# Patient Record
Sex: MALE | Race: WHITE | NOT HISPANIC OR LATINO | Employment: FULL TIME | URBAN - METROPOLITAN AREA
[De-identification: names, ages, dates, MRNs, and addresses within clinical notes are randomized per-mention and may not be internally consistent; named-entity substitution may affect disease eponyms.]

---

## 2017-02-08 ENCOUNTER — ALLSCRIPTS OFFICE VISIT (OUTPATIENT)
Dept: OTHER | Facility: OTHER | Age: 62
End: 2017-02-08

## 2017-02-16 ENCOUNTER — TRANSCRIBE ORDERS (OUTPATIENT)
Dept: ADMINISTRATIVE | Facility: HOSPITAL | Age: 62
End: 2017-02-16

## 2017-02-16 ENCOUNTER — HOSPITAL ENCOUNTER (OUTPATIENT)
Dept: RADIOLOGY | Facility: MEDICAL CENTER | Age: 62
Discharge: HOME | End: 2017-02-16
Payer: COMMERCIAL

## 2017-02-16 DIAGNOSIS — S29.012A UPPER BACK STRAIN, INITIAL ENCOUNTER: ICD-10-CM

## 2017-02-16 DIAGNOSIS — M53.0 CERVICOCRANIAL SYNDROME: ICD-10-CM

## 2017-02-16 DIAGNOSIS — M54.2 CERVICALGIA: ICD-10-CM

## 2017-02-16 DIAGNOSIS — S16.1XXA CERVICAL STRAIN, INITIAL ENCOUNTER: ICD-10-CM

## 2017-02-16 DIAGNOSIS — S16.1XXA CERVICAL STRAIN, INITIAL ENCOUNTER: Primary | ICD-10-CM

## 2017-02-16 PROCEDURE — 72052 X-RAY EXAM NECK SPINE 6/>VWS: CPT

## 2017-03-03 DIAGNOSIS — E11.9 TYPE 2 DIABETES MELLITUS WITHOUT COMPLICATIONS (HCC): ICD-10-CM

## 2017-03-27 ENCOUNTER — GENERIC CONVERSION - ENCOUNTER (OUTPATIENT)
Dept: OTHER | Facility: OTHER | Age: 62
End: 2017-03-27

## 2017-05-11 ENCOUNTER — ALLSCRIPTS OFFICE VISIT (OUTPATIENT)
Dept: OTHER | Facility: OTHER | Age: 62
End: 2017-05-11

## 2017-05-11 DIAGNOSIS — I10 ESSENTIAL (PRIMARY) HYPERTENSION: ICD-10-CM

## 2017-05-11 DIAGNOSIS — E11.9 TYPE 2 DIABETES MELLITUS WITHOUT COMPLICATIONS (HCC): ICD-10-CM

## 2017-06-01 ENCOUNTER — TRANSCRIBE ORDERS (OUTPATIENT)
Dept: MRI IMAGING | Facility: CLINIC | Age: 62
End: 2017-06-01

## 2017-06-01 DIAGNOSIS — M54.12 CERVICAL RADICULOPATHY AT C5: Primary | ICD-10-CM

## 2017-06-20 ENCOUNTER — HOSPITAL ENCOUNTER (OUTPATIENT)
Dept: MRI IMAGING | Facility: CLINIC | Age: 62
Discharge: HOME | End: 2017-06-20
Payer: COMMERCIAL

## 2017-06-20 DIAGNOSIS — M54.12 CERVICAL RADICULOPATHY AT C5: ICD-10-CM

## 2017-06-20 PROCEDURE — 72141 MRI NECK SPINE W/O DYE: CPT

## 2017-08-05 ENCOUNTER — TRANSCRIBE ORDERS (OUTPATIENT)
Dept: ADMINISTRATIVE | Facility: HOSPITAL | Age: 62
End: 2017-08-05

## 2017-08-05 ENCOUNTER — APPOINTMENT (OUTPATIENT)
Dept: LAB | Facility: HOSPITAL | Age: 62
End: 2017-08-05
Attending: FAMILY MEDICINE
Payer: COMMERCIAL

## 2017-08-05 DIAGNOSIS — E11.9 TYPE 2 DIABETES MELLITUS WITHOUT COMPLICATIONS (HCC): ICD-10-CM

## 2017-08-05 DIAGNOSIS — I10 ESSENTIAL (PRIMARY) HYPERTENSION: ICD-10-CM

## 2017-08-05 LAB
ALBUMIN SERPL BCP-MCNC: 3.6 G/DL (ref 3.5–5)
ALP SERPL-CCNC: 55 U/L (ref 46–116)
ALT SERPL W P-5'-P-CCNC: 245 U/L (ref 12–78)
ANION GAP SERPL CALCULATED.3IONS-SCNC: 9 MMOL/L (ref 4–13)
AST SERPL W P-5'-P-CCNC: 72 U/L (ref 5–45)
BILIRUB SERPL-MCNC: 0.5 MG/DL (ref 0.2–1)
BUN SERPL-MCNC: 23 MG/DL (ref 5–25)
CALCIUM SERPL-MCNC: 8.7 MG/DL (ref 8.3–10.1)
CHLORIDE SERPL-SCNC: 107 MMOL/L (ref 100–108)
CHOLEST SERPL-MCNC: 178 MG/DL (ref 50–200)
CO2 SERPL-SCNC: 27 MMOL/L (ref 21–32)
CREAT SERPL-MCNC: 0.81 MG/DL (ref 0.6–1.3)
ERYTHROCYTE [DISTWIDTH] IN BLOOD BY AUTOMATED COUNT: 13.8 % (ref 11.6–15.1)
EST. AVERAGE GLUCOSE BLD GHB EST-MCNC: 105 MG/DL
GFR SERPL CREATININE-BSD FRML MDRD: 97 ML/MIN/1.73SQ M
GLUCOSE P FAST SERPL-MCNC: 139 MG/DL (ref 65–99)
HBA1C MFR BLD: 5.3 % (ref 4.2–6.3)
HCT VFR BLD AUTO: 42.5 % (ref 36.5–49.3)
HDLC SERPL-MCNC: 37 MG/DL (ref 40–60)
HGB BLD-MCNC: 14.9 G/DL (ref 12–17)
LDLC SERPL CALC-MCNC: 116 MG/DL (ref 0–100)
MCH RBC QN AUTO: 30.3 PG (ref 26.8–34.3)
MCHC RBC AUTO-ENTMCNC: 35.1 G/DL (ref 31.4–37.4)
MCV RBC AUTO: 87 FL (ref 82–98)
PLATELET # BLD AUTO: 154 THOUSANDS/UL (ref 149–390)
PMV BLD AUTO: 9.9 FL (ref 8.9–12.7)
POTASSIUM SERPL-SCNC: 3.4 MMOL/L (ref 3.5–5.3)
PROT SERPL-MCNC: 6.7 G/DL (ref 6.4–8.2)
RBC # BLD AUTO: 4.91 MILLION/UL (ref 3.88–5.62)
SODIUM SERPL-SCNC: 143 MMOL/L (ref 136–145)
TRIGL SERPL-MCNC: 126 MG/DL
TSH SERPL DL<=0.05 MIU/L-ACNC: 3.74 UIU/ML (ref 0.36–3.74)
WBC # BLD AUTO: 7.59 THOUSAND/UL (ref 4.31–10.16)

## 2017-08-05 PROCEDURE — 83036 HEMOGLOBIN GLYCOSYLATED A1C: CPT

## 2017-08-05 PROCEDURE — 36415 COLL VENOUS BLD VENIPUNCTURE: CPT

## 2017-08-05 PROCEDURE — 80053 COMPREHEN METABOLIC PANEL: CPT

## 2017-08-05 PROCEDURE — 80061 LIPID PANEL: CPT

## 2017-08-05 PROCEDURE — 85027 COMPLETE CBC AUTOMATED: CPT

## 2017-08-05 PROCEDURE — 84443 ASSAY THYROID STIM HORMONE: CPT

## 2017-08-08 ENCOUNTER — ALLSCRIPTS OFFICE VISIT (OUTPATIENT)
Dept: OTHER | Facility: OTHER | Age: 62
End: 2017-08-08

## 2017-08-08 DIAGNOSIS — R79.89 OTHER SPECIFIED ABNORMAL FINDINGS OF BLOOD CHEMISTRY: ICD-10-CM

## 2017-08-09 ENCOUNTER — APPOINTMENT (OUTPATIENT)
Dept: LAB | Facility: HOSPITAL | Age: 62
End: 2017-08-09
Attending: FAMILY MEDICINE
Payer: COMMERCIAL

## 2017-08-09 ENCOUNTER — TRANSCRIBE ORDERS (OUTPATIENT)
Dept: ADMINISTRATIVE | Facility: HOSPITAL | Age: 62
End: 2017-08-09

## 2017-08-09 DIAGNOSIS — R79.89 OTHER SPECIFIED ABNORMAL FINDINGS OF BLOOD CHEMISTRY: ICD-10-CM

## 2017-08-09 PROCEDURE — 80074 ACUTE HEPATITIS PANEL: CPT

## 2017-08-09 PROCEDURE — 36415 COLL VENOUS BLD VENIPUNCTURE: CPT

## 2017-08-10 ENCOUNTER — GENERIC CONVERSION - ENCOUNTER (OUTPATIENT)
Dept: OTHER | Facility: OTHER | Age: 62
End: 2017-08-10

## 2017-08-10 ENCOUNTER — ALLSCRIPTS OFFICE VISIT (OUTPATIENT)
Dept: OTHER | Facility: OTHER | Age: 62
End: 2017-08-10

## 2017-08-10 LAB
HAV IGM SER QL: NORMAL
HBV CORE IGM SER QL: NORMAL
HBV SURFACE AG SER QL: NORMAL
HCV AB SER QL: NORMAL

## 2017-08-12 ENCOUNTER — TRANSCRIBE ORDERS (OUTPATIENT)
Dept: ADMINISTRATIVE | Facility: HOSPITAL | Age: 62
End: 2017-08-12

## 2017-08-12 ENCOUNTER — APPOINTMENT (OUTPATIENT)
Dept: LAB | Facility: HOSPITAL | Age: 62
End: 2017-08-12
Attending: INTERNAL MEDICINE
Payer: COMMERCIAL

## 2017-08-12 DIAGNOSIS — R79.89 OTHER SPECIFIED ABNORMAL FINDINGS OF BLOOD CHEMISTRY: ICD-10-CM

## 2017-08-12 LAB
FERRITIN SERPL-MCNC: 140 NG/ML (ref 8–388)
IRON SATN MFR SERPL: 56 %
IRON SERPL-MCNC: 144 UG/DL (ref 65–175)
TIBC SERPL-MCNC: 257 UG/DL (ref 250–450)

## 2017-08-12 PROCEDURE — 86038 ANTINUCLEAR ANTIBODIES: CPT

## 2017-08-12 PROCEDURE — 86255 FLUORESCENT ANTIBODY SCREEN: CPT

## 2017-08-12 PROCEDURE — 83540 ASSAY OF IRON: CPT

## 2017-08-12 PROCEDURE — 36415 COLL VENOUS BLD VENIPUNCTURE: CPT

## 2017-08-12 PROCEDURE — 83550 IRON BINDING TEST: CPT

## 2017-08-12 PROCEDURE — 83516 IMMUNOASSAY NONANTIBODY: CPT

## 2017-08-12 PROCEDURE — 82784 ASSAY IGA/IGD/IGG/IGM EACH: CPT

## 2017-08-12 PROCEDURE — 82390 ASSAY OF CERULOPLASMIN: CPT

## 2017-08-12 PROCEDURE — 82728 ASSAY OF FERRITIN: CPT

## 2017-08-12 PROCEDURE — 82103 ALPHA-1-ANTITRYPSIN TOTAL: CPT

## 2017-08-13 LAB
A1AT SERPL-MCNC: 132 MG/DL (ref 90–200)
CERULOPLASMIN SERPL-MCNC: 23.7 MG/DL (ref 16–31)

## 2017-08-14 LAB
ENDOMYSIUM IGA SER QL: NEGATIVE
GLIADIN PEPTIDE IGA SER-ACNC: 6 UNITS (ref 0–19)
GLIADIN PEPTIDE IGG SER-ACNC: 3 UNITS (ref 0–19)
IGA SERPL-MCNC: 173 MG/DL (ref 90–386)
RYE IGE QN: NEGATIVE
TTG IGA SER-ACNC: <2 U/ML (ref 0–3)
TTG IGG SER-ACNC: <2 U/ML (ref 0–5)

## 2017-08-15 ENCOUNTER — HOSPITAL ENCOUNTER (OUTPATIENT)
Dept: ULTRASOUND IMAGING | Facility: CLINIC | Age: 62
Discharge: HOME | End: 2017-08-15
Payer: COMMERCIAL

## 2017-08-15 DIAGNOSIS — R79.89 OTHER SPECIFIED ABNORMAL FINDINGS OF BLOOD CHEMISTRY: ICD-10-CM

## 2017-08-15 PROCEDURE — 76705 ECHO EXAM OF ABDOMEN: CPT

## 2017-10-02 ENCOUNTER — ALLSCRIPTS OFFICE VISIT (OUTPATIENT)
Dept: OTHER | Facility: OTHER | Age: 62
End: 2017-10-02

## 2017-10-03 NOTE — PROGRESS NOTES
Assessment  1  Viral syndrome (079 99) (B34 9)    Plan  Viral syndrome    · Promethazine-Codeine 6 25-10 MG/5ML Oral Syrup; TAKE 5 ML EVERY 4 TO 6  HOURS AS NEEDED FOR COUGH    Discussion/Summary  The patient was counseled regarding diagnostic results,-instructions for management,-prognosis  Possible side effects of new medications were reviewed with the patient/guardian today  The treatment plan was reviewed with the patient/guardian  The patient/guardian understands and agrees with the treatment plan      Chief Complaint  - pt thinks its poss bronchitis which started Friday with leg cramps then Saturday had a lot of mucus and coughing and body chills, sneezing and coughing with sweats/ pounding headache as well  History of Present Illness  Viral Infection (Brief): The patient is being seen for an initial evaluation of viral infection  Symptoms:  chills-   The patient presents with complaints of gradual onset of constant episodes of moderate bilateral nasal congestion  Episodes started about 3 days ago  The patient is currently experiencing symptoms  No associated symptoms are reported  The patient is not currently being treated for this problem  Active Problems  1  Abnormal liver function test (790 6) (R79 89)   2  Acute bacterial conjunctivitis of both eyes (372 03) (H10 33)   3  Acute bronchitis (466 0) (J20 9)   4  Acute URI (465 9) (J06 9)   5  Allergic rhinitis (477 9) (J30 9)   6  Anxiety (300 00) (F41 9)   7  Arthritis of hand (716 94) (M19 049)   8  Cervical radiculopathy (723 4) (M54 12)   9  Deficiency anemia (281 9) (D53 9)   10  Encounter for prostate cancer screening (V76 44) (Z12 5)   11  Epistaxis (784 7) (R04 0)   12  Essential hypertension (401 9) (I10)   13  Fatigue (780 79) (R53 83)   14  GERD (gastroesophageal reflux disease) (530 81) (K21 9)   15  Hypokalemia (276 8) (E87 6)   16  Insomnia (780 52) (G47 00)   17  Kidney stone (592 0) (N20 0)   18   Lateral epicondylitis of left elbow (726 32) (M77 12)   19  Lateral epicondylitis, right   20  Migraine headache (346 90) (G43 909)   21  Mixed hyperlipidemia (272 2) (E78 2)   22  Need for influenza vaccination (V04 81) (Z23)   23  Need for pneumococcal vaccination (V03 82) (Z23)   24  Need for Tdap vaccination (V06 1) (Z23)   25  Scalp psoriasis (696 1) (L40 9)   26  Screen for colon cancer (V76 51) (Z12 11)   27  Seborrhea (706 3) (L21 9)   28  Tinea cruris (110 3) (B35 6)   29  Type 2 diabetes mellitus (250 00) (E11 9)    Past Medical History  1  History of Acute sinusitis (461 9) (J01 90)   2  History of Acute upper respiratory infection (465 9) (J06 9)   3  History of EKG (V15 89) (Z92 89)   4  History of tinea cruris (V12 09) (Z86 19)   5  History of Malaise and fatigue (780 79) (R53 81,R53 83)    Family History  Mother    1  Family history of Aneurysm  Father    2  Family history of CAD (coronary artery disease)   3  Family history of    4  Denied: Family history of mental disorder   5  Denied: Family history of substance abuse  Grandmother    6  Family history of Cancer  Grandfather    7  Family history of CVA (cerebral vascular accident)    Social History   · Always uses seat belt   · Full-time employment   · Lives with wife   ·    · Never a smoker   · No drug use   · Occasional alcohol use   · Recreation: Gardening   · Seeing a dentist    Surgical History  1  History of Septoplasty    Current Meds   1  AmLODIPine Besylate 10 MG Oral Tablet; take 1 tablet by mouth every day; Therapy: 62KSH4296 to (Evaluate:87Liq3413)  Requested for: 52Ydi3966; Last   Rx:50Rni6739 Ordered   2  Clobetasol Propionate 0 05 % External Liquid; APPLY  AND RUB  IN A THIN FILM TO   scalp bid; Therapy: 95Ryt9628 to (Last Rx:15Zlp5417)  Requested for: 28Yfh5416 Ordered   3  Clotrimazole-Betamethasone 1-0 05 % External Cream; APPLY  AND RUB  IN A THIN   FILM TO AFFECTED AREAS TWICE DAILY  (AM AND PM);    Therapy: 57CHE2406 to (Last Rx:63Yhc7510) Requested for: 83XHS9013 Ordered   4  Doxazosin Mesylate 2 MG Oral Tablet; Take 1 tablet daily; Therapy: 14Wha3405 to (Last Rx:10Aug2017)  Requested for: 46Xaf2793 Ordered   5  Fluticasone Propionate 50 MCG/ACT Nasal Suspension; USE 2 SPRAYS IN EACH   NOSTRIL ONCE DAILY; Therapy: 44GSD3174 to (Last Rx:05Cux9214)  Requested for: 66PKQ1700 Ordered   6  Hydrocodone-Acetaminophen 5-300 MG Oral Tablet; TAKE 1 TABLET EVERY 4 TO 6   HOURS AS NEEDED FOR PAIN;   Therapy: 22NAK0967 to (Evaluate:01Jan2017); Last Rx:15Crx3232 Ordered   7  Imitrex 50 MG Oral Tablet; TAKE 1 TABLET FOR MIGRAINE RELIEF  MAY REPEAT   EVERY 2 HOURS  MAX 200MG/DAY; Therapy: 77ZOI5841 to (Last Rx:17Nov2016)  Requested for: 79SNF9226 Ordered   8  Ketoconazole 2 % External Shampoo; APPLY TO AFFECTED AREA(S) ONCE DAILY AS   DIRECTED; Therapy: 04LCZ3465 to (Last Rx:11May2017)  Requested for: 47CBV9981 Ordered   9  Losartan Potassium 100 MG Oral Tablet; take 1 tablet once daily; Therapy: 57RYP4418 to (Evaluate:38Pbi3126)  Requested for: 37NLD8914; Last   Rx:02Jun2017 Ordered   10  Meloxicam 15 MG Oral Tablet; TAKE 1 TABLET DAILY AS NEEDED; Therapy: 69Gau8307 to (Evaluate:13Nov2017)  Requested for: 45Mwi3310; Last    Rx:78Bxh3524 Ordered   11  Selenium Sulfide 2 5 % External Lotion; use as directed on package; Therapy: 69Ust7106 to (Katy Hnacock)  Requested for: 40CEI2830; Last    Rx:24Jan2017 Ordered   12  SUMAtriptan Succinate 50 MG Oral Tablet; take 1 tablet for migraine relief  may repeat    every 2 hours  max 200mg/day; Therapy: 18KXG0450 to (Evaluate:02Oct2017)  Requested for: 57OLZ3286; Last    Rx:04Jun2017 Ordered   13  TraZODone HCl - 100 MG Oral Tablet; Take 1 tablet by mouth at bedtime; Therapy: 66KJK3447 to (Evaluate:20Jan2018)  Requested for: 28Owb3236; Last    Rx:89Jmj2074 Ordered   14  Venlafaxine HCl  MG Oral Capsule Extended Release 24 Hour; TAKE ONE    CAPSULE BY MOUTH ONCE DAILY;     Therapy: 47XCI6203 to (Evaluate:79Kbr4036)  Requested for: 13Fli4441; Last    Rx:94Gbl7312 Ordered   15  Ventolin  (90 Base) MCG/ACT Inhalation Aerosol Solution; INHALE 2 PUFFS 3    TIMES DAILY AS NEEDED; Therapy: 33Ust7000 to (Evaluate:15Apr2017)  Requested for: 54Gyw4201; Last    Rx:00Dxp1159 Ordered    Allergies  1  No Known Drug Allergies    Vitals   Recorded: 89NNV3852 01:28PM   Temperature 98 9 F   Heart Rate 97   Respiration 16   Systolic 387   Diastolic 84   Height 5 ft 2 5 in   Weight 196 lb    BMI Calculated 35 28   BSA Calculated 1 91   O2 Saturation 97     Physical Exam    Constitutional   General appearance: No acute distress, well appearing and well nourished  Ears, Nose, Mouth, and Throat   Oropharynx: Abnormal  -post nasal drip  Pulmonary   Auscultation of lungs: Clear to auscultation, equal breath sounds bilaterally, no wheezes, no rales, no rhonci  Cardiovascular   Auscultation of heart: Normal rate and rhythm, normal S1 and S2, without murmurs  Examination of extremities for edema and/or varicosities: Normal     Abdomen   Abdomen: Non-tender, no masses           Future Appointments    Date/Time Provider Specialty Site   02/13/2018 04:45 PM Otis Shah DO Family Medicine [address]     Signatures   Electronically signed by : Lizbet Espinoza DO; Oct  2 2017  1:39PM EST                       (Author)

## 2017-11-11 ENCOUNTER — TRANSCRIBE ORDERS (OUTPATIENT)
Dept: ADMINISTRATIVE | Facility: HOSPITAL | Age: 62
End: 2017-11-11

## 2017-11-11 ENCOUNTER — APPOINTMENT (OUTPATIENT)
Dept: LAB | Facility: HOSPITAL | Age: 62
End: 2017-11-11
Attending: INTERNAL MEDICINE
Payer: COMMERCIAL

## 2017-11-11 DIAGNOSIS — R79.89 OTHER SPECIFIED ABNORMAL FINDINGS OF BLOOD CHEMISTRY: ICD-10-CM

## 2017-11-11 LAB
ALBUMIN SERPL BCP-MCNC: 3.6 G/DL (ref 3.5–5)
ALP SERPL-CCNC: 55 U/L (ref 46–116)
ALT SERPL W P-5'-P-CCNC: 35 U/L (ref 12–78)
AST SERPL W P-5'-P-CCNC: 19 U/L (ref 5–45)
BILIRUB DIRECT SERPL-MCNC: 0.07 MG/DL (ref 0–0.2)
BILIRUB SERPL-MCNC: 0.4 MG/DL (ref 0.2–1)
PROT SERPL-MCNC: 6.6 G/DL (ref 6.4–8.2)

## 2017-11-11 PROCEDURE — 80076 HEPATIC FUNCTION PANEL: CPT

## 2017-11-11 PROCEDURE — 36415 COLL VENOUS BLD VENIPUNCTURE: CPT

## 2017-11-14 DIAGNOSIS — R79.89 OTHER SPECIFIED ABNORMAL FINDINGS OF BLOOD CHEMISTRY: ICD-10-CM

## 2017-12-22 ENCOUNTER — GENERIC CONVERSION - ENCOUNTER (OUTPATIENT)
Dept: FAMILY MEDICINE CLINIC | Facility: CLINIC | Age: 62
End: 2017-12-22

## 2018-01-08 DIAGNOSIS — E11.9 TYPE 2 DIABETES MELLITUS WITHOUT COMPLICATIONS (HCC): ICD-10-CM

## 2018-01-09 NOTE — MISCELLANEOUS
Message  Return to work or school:   Maureen Kyle is under my professional care  He was seen in my office on 02/29/2016   He is able to return to work on  03/02/2016       Thang Núñez, [address]        Signatures   Electronically signed by : Jay Vázquez; Feb 29 2016 11:33AM EST                       (Author)

## 2018-01-12 NOTE — PROGRESS NOTES
Assessment    1  Encounter for preventive health examination (V70 0) (Z00 00)   2  Abnormal liver function test (790 6) (R79 89)   3  Type 2 diabetes mellitus (250 00) (E11 9)   4  Essential hypertension (401 9) (I10)   5  Lateral epicondylitis of left elbow (726 32) (M77 12)    Plan  Abnormal liver function test    · 1 Sarah Alegria MD, Payam Jurado  (Gastroenterology) Co-Management  *  Status: Active   Requested for: 44HAG6283  Care Summary provided  : Yes   · (1) ACUTE HEPATITIS PANEL; Status:Active; Requested for:46Nov9990;   Type 2 diabetes mellitus    · Follow-up visit in 6 months Evaluation and Treatment  Follow-up  Status: Hold For -  Scheduling  Requested for: 27Ltc9523   · (1) COMPREHENSIVE METABOLIC PANEL; Status:Active; Requested QZW:09GNF1326;    · (1) HEMOGLOBIN A1C; Status:Active; Requested JUZ:13KJY3821;    · (1) LIPID PANEL, FASTING; Status:Active; Requested OFX:89QOL3973; Discussion/Summary  health maintenance visit Currently, he eats a healthy diet  Prostate cancer screening: PSA was ordered  Colorectal cancer screening: colorectal cancer screening is current  The risks and benefits of immunizations were discussed  Advice and education were given regarding cardiovascular risk reduction and weight bearing exercise  Patient discussion: discussed with the patient  The patient was counseled on Hepatitis C screening  The patient agrees to Hepatitis C screening  Recommend a tennis elbow strap for his left elbow  The patient was counseled regarding diagnostic results, instructions for management, prognosis  Chief Complaint  Patient is here today for six month follow up visit/ physical Pain in the left elbow  Pain level 1  Prescription refills needed  Patient is due for eye exam       History of Present Illness  HM, Adult Male: The patient is being seen for a health maintenance evaluation  General Health: The patient's health since the last visit is described as good   He has regular dental visits  He denies vision problems  He denies hearing loss  Immunizations status: up to date  Lifestyle:  He consumes a diverse and healthy diet  He does not have any weight concerns  He exercises regularly  He does not use tobacco  He denies alcohol use  He denies drug use  Screening: cancer screening reviewed and current  metabolic screening reviewed and current  risk screening reviewed and current  Review of Systems    Constitutional: No fever or chills, feels well, no tiredness, no recent weight gain or weight loss  Eyes: No complaints of eye pain, no red eyes, no discharge from eyes, no itchy eyes  ENT: no complaints of earache, no hearing loss, no nosebleeds, no nasal discharge, no sore throat, no hoarseness  Cardiovascular: No complaints of slow heart rate, no fast heart rate, no chest pain, no palpitations, no leg claudication, no lower extremity  Respiratory: No complaints of shortness of breath, no wheezing, no cough, no SOB on exertion, no orthopnea or PND  Gastrointestinal: No complaints of abdominal pain, no constipation, no nausea or vomiting, no diarrhea or bloody stools  Genitourinary: No complaints of dysuria, no incontinence, no hesitancy, no nocturia, no genital lesion, no testicular pain  Musculoskeletal: left elbow pain  Integumentary: No complaints of skin rash or skin lesions, no itching, no skin wound, no dry skin  Neurological: No compliants of headache, no confusion, no convulsions, no numbness or tingling, no dizziness or fainting, no limb weakness, no difficulty walking  Psychiatric: Is not suicidal, no sleep disturbances, no anxiety or depression, no change in personality, no emotional problems  Endocrine: No complaints of proptosis, no hot flashes, no muscle weakness, no erectile dysfunction, no deepening of the voice, no feelings of weakness     Hematologic/Lymphatic: No complaints of swollen glands, no swollen glands in the neck, does not bleed easily, no easy bruising  Active Problems    1  Acute bacterial conjunctivitis of both eyes (372 03) (H10 33)   2  Acute bronchitis (466 0) (J20 9)   3  Acute URI (465 9) (J06 9)   4  Allergic rhinitis (477 9) (J30 9)   5  Anxiety (300 00) (F41 9)   6  Cervical radiculopathy (723 4) (M54 12)   7  Deficiency anemia (281 9) (D53 9)   8  Encounter for prostate cancer screening (V76 44) (Z12 5)   9  Epistaxis (784 7) (R04 0)   10  Essential hypertension (401 9) (I10)   11  Fatigue (780 79) (R53 83)   12  GERD (gastroesophageal reflux disease) (530 81) (K21 9)   13  Hypokalemia (276 8) (E87 6)   14  Insomnia (780 52) (G47 00)   15  Kidney stone (592 0) (N20 0)   16  Lateral epicondylitis of left elbow (726 32) (M77 12)   17  Lateral epicondylitis, right   18  Migraine headache (346 90) (G43 909)   19  Mixed hyperlipidemia (272 2) (E78 2)   20  Need for influenza vaccination (V04 81) (Z23)   21  Need for pneumococcal vaccination (V03 82) (Z23)   22  Need for Tdap vaccination (V06 1) (Z23)   23  Scalp psoriasis (696 1) (L40 9)   24  Screen for colon cancer (V76 51) (Z12 11)   25  Seborrhea (706 3) (L21 9)   26  Tinea cruris (110 3) (B35 6)   27   Type 2 diabetes mellitus (250 00) (E11 9)    Past Medical History    · History of Acute sinusitis (461 9) (J01 90)   · History of Acute upper respiratory infection (465 9) (J06 9)   · History of EKG (V15 89) (Z92 89)   · History of tinea cruris (V12 09) (Z86 19)   · History of Malaise and fatigue (780 79) (R53 81,R53 83)    Surgical History    · History of Septoplasty    Family History  Mother    · Family history of Aneurysm  Father    · Family history of CAD (coronary artery disease)   · Family history of    · Denied: Family history of mental disorder   · Denied: Family history of substance abuse  Grandmother    · Family history of Cancer  Grandfather    · Family history of CVA (cerebral vascular accident)    Social History    · Always uses seat belt   · Full-time employment   · Lives with wife   ·    · Never a smoker   · No drug use   · Occasional alcohol use   · Recreation: Gardening   · Seeing a dentist    Current Meds   1  AmLODIPine Besylate 10 MG Oral Tablet; take 1 tablet by mouth every day; Therapy: 31HWL1912 to (Evaluate:04Feb2018)  Requested for: 40Tam7764; Last   Rx:62Uvb3010 Ordered   2  Clobetasol Propionate 0 05 % External Liquid; APPLY  AND RUB  IN A THIN FILM TO   scalp bid; Therapy: 97Nne8150 to (Last Rx:21Jul2016)  Requested for: 52Cdj9767 Ordered   3  Clotrimazole-Betamethasone 1-0 05 % External Cream; APPLY  AND RUB  IN A THIN   FILM TO AFFECTED AREAS TWICE DAILY  (AM AND PM); Therapy: 10PVT5053 to (Last Rx:08Feb2017)  Requested for: 31YON0678 Ordered   4  Doxazosin Mesylate 2 MG Oral Tablet; Take 1 tablet daily; Therapy: 69HUC7421 to (Last Rx:08Feb2017)  Requested for: 70NKI1638 Ordered   5  Fluticasone Propionate 50 MCG/ACT Nasal Suspension; USE 2 SPRAYS IN EACH   NOSTRIL ONCE DAILY; Therapy: 72PCU3225 to (Last Rx:26Sep2016)  Requested for: 64KRU5811 Ordered   6  Hydrocodone-Acetaminophen 5-300 MG Oral Tablet; TAKE 1 TABLET EVERY 4 TO 6   HOURS AS NEEDED FOR PAIN;   Therapy: 57GKF4874 to (Evaluate:01Jan2017); Last Rx:00Vil4094 Ordered   7  Imitrex 50 MG Oral Tablet; TAKE 1 TABLET FOR MIGRAINE RELIEF  MAY REPEAT   EVERY 2 HOURS  MAX 200MG/DAY; Therapy: 47JKK3415 to (Last Rx:17Nov2016)  Requested for: 91NDM4141 Ordered   8  Ketoconazole 2 % External Shampoo; APPLY TO AFFECTED AREA(S) ONCE DAILY AS   DIRECTED; Therapy: 61VKR7723 to (Last Rx:59Enq2192)  Requested for: 90LNS2301 Ordered   9  Losartan Potassium 100 MG Oral Tablet; take 1 tablet once daily; Therapy: 56LGY7171 to (Evaluate:31Aug2017)  Requested for: 29ELG2783; Last   Rx:02Jun2017 Ordered   10  Selenium Sulfide 2 5 % External Lotion; use as directed on package; Therapy: 32Yat0225 to (Gaviota Swartz)  Requested for: 56YOT5379; Last    Rx:24Jan2017 Ordered   11  SUMAtriptan Succinate 50 MG Oral Tablet; take 1 tablet for migraine relief  may repeat    every 2 hours  max 200mg/day; Therapy: 57KFQ6361 to [phone])  Requested for: 70HJM1239; Last    Rx:04Jun2017 Ordered   12  TraZODone HCl - 100 MG Oral Tablet; Take 1 tablet by mouth at bedtime; Therapy: 24AFH7580 to (Evaluate:20Jan2018)  Requested for: 30Izu9937; Last    Rx:52Zqn2653 Ordered   13  Venlafaxine HCl  MG Oral Capsule Extended Release 24 Hour; TAKE ONE    CAPSULE BY MOUTH ONCE DAILY; Therapy: 61IUE7159 to (Evaluate:75Lxt3051)  Requested for: 91Xzd7134; Last    Rx:41Myj8119 Ordered   14  Ventolin  (90 Base) MCG/ACT Inhalation Aerosol Solution; INHALE 2 PUFFS 3    TIMES DAILY AS NEEDED; Therapy: 22Qtr2458 to (Evaluate:87Nru7753)  Requested for: 19Yug3642; Last    Rx:33Hor2981 Ordered    Allergies    1  No Known Drug Allergies    Vitals   Recorded: 08Aug2017 05:04PM   Heart Rate 84   Systolic 804   Diastolic 78   Height 5 ft 2 5 in   Weight 199 lb 4 oz   BMI Calculated 35 86   BSA Calculated 1 92   O2 Saturation 97     Physical Exam    Constitutional   General appearance: No acute distress, well appearing and well nourished  Eyes   Conjunctiva and lids: No erythema, swelling or discharge  Pupils and irises: Equal, round, reactive to light  Ophthalmoscopic examination: Normal fundi and optic discs  Ears, Nose, Mouth, and Throat   External inspection of ears and nose: Normal     Otoscopic examination: Tympanic membranes translucent with normal light reflex  Canals patent without erythema  Hearing: Normal     Nasal mucosa, septum, and turbinates: Normal without edema or erythema  Lips, teeth, and gums: Normal, good dentition  Oropharynx: Normal with no erythema, edema, exudate or lesions  Neck   Neck: Supple, symmetric, trachea midline, no masses  Thyroid: Normal, no thyromegaly      Pulmonary   Respiratory effort: No increased work of breathing or signs of respiratory distress  Percussion of chest: Normal     Palpation of chest: Normal     Auscultation of lungs: Clear to auscultation  Cardiovascular   Palpation of heart: Normal PMI, no thrills  Auscultation of heart: Normal rate and rhythm, normal S1 and S2, no murmurs  Carotid pulses: 2+ bilaterally  Abdominal aorta: Normal     Femoral pulses: 2+ bilaterally  Pedal pulses: 2+ bilaterally  Examination of extremities for edema and/or varicosities: Normal     Chest   Breasts: Normal, no dimpling or skin changes appreciated  Palpation of breasts and axillae: Normal, no masses palpated  Chest: Normal     Abdomen   Abdomen: Non-tender, no masses  Liver and spleen: No hepatomegaly or splenomegaly  Lymphatic   Palpation of lymph nodes in neck: No lymphadenopathy  Palpation of lymph nodes in axillae: No lymphadenopathy  Musculoskeletal   Gait and station: Normal     Inspection/palpation of digits and nails: Normal without clubbing or cyanosis  Inspection/palpation of joints, bones, and muscles: Abnormal   tenderness over the left lateral epicondyle  Range of motion: Normal     Stability: Normal     Muscle strength/tone: Normal     Skin   Skin and subcutaneous tissue: Normal without rashes or lesions  Palpation of skin and subcutaneous tissue: Normal turgor  Neurologic   Cranial nerves: Cranial nerves 2-12 intact  Reflexes: 2+ and symmetric  Sensation: No sensory loss  Psychiatric   Judgment and insight: Normal     Orientation to person, place and time: Normal     Recent and remote memory: Intact      Mood and affect: Normal        Signatures   Electronically signed by : James Finch DO; Aug  8 2017  5:27PM EST                       (Author)

## 2018-01-13 VITALS
SYSTOLIC BLOOD PRESSURE: 132 MMHG | WEIGHT: 198 LBS | HEIGHT: 63 IN | HEART RATE: 78 BPM | BODY MASS INDEX: 35.08 KG/M2 | OXYGEN SATURATION: 97 % | DIASTOLIC BLOOD PRESSURE: 82 MMHG

## 2018-01-13 VITALS
WEIGHT: 199.25 LBS | SYSTOLIC BLOOD PRESSURE: 130 MMHG | OXYGEN SATURATION: 97 % | BODY MASS INDEX: 35.3 KG/M2 | DIASTOLIC BLOOD PRESSURE: 78 MMHG | HEART RATE: 84 BPM | HEIGHT: 63 IN

## 2018-01-14 VITALS
WEIGHT: 205 LBS | TEMPERATURE: 97.8 F | DIASTOLIC BLOOD PRESSURE: 90 MMHG | HEIGHT: 63 IN | BODY MASS INDEX: 36.32 KG/M2 | SYSTOLIC BLOOD PRESSURE: 138 MMHG | OXYGEN SATURATION: 98 % | HEART RATE: 87 BPM

## 2018-01-14 VITALS
HEART RATE: 97 BPM | SYSTOLIC BLOOD PRESSURE: 122 MMHG | BODY MASS INDEX: 34.73 KG/M2 | OXYGEN SATURATION: 97 % | HEIGHT: 63 IN | WEIGHT: 196 LBS | RESPIRATION RATE: 16 BRPM | TEMPERATURE: 98.9 F | DIASTOLIC BLOOD PRESSURE: 84 MMHG

## 2018-01-14 VITALS
HEIGHT: 63 IN | BODY MASS INDEX: 34.55 KG/M2 | WEIGHT: 195 LBS | DIASTOLIC BLOOD PRESSURE: 66 MMHG | SYSTOLIC BLOOD PRESSURE: 130 MMHG

## 2018-01-14 NOTE — RESULT NOTES
Verified Results  (1) ACUTE HEPATITIS PANEL 38Ojt5660 11:13AM Dionisio Sheppardrex Order Number: [id number]     Test Name Result Flag Reference   HEPATITIS B SURFACE ANTIGEN Non-reactive  Non-reactive, NonReactive - Confirmed   HEPATITIS A IGM ANTIBODY Non-reactive  Non-reactive, Equivocal-Suggest Recollect   HEPATITIS C ANTIBODY Non-reactive  Non-reactive   HEPATITIS B CORE IGM ANTIBODY Non-reactive  Non-reactive

## 2018-01-16 NOTE — MISCELLANEOUS
Message  Return to work or school:   Peyton Perez is under my professional care  He was seen in my office on 10/2/2017    He is not able to return to work until 10/10/2017     Please excuse from 10/2-10/10          Signatures   Electronically signed by : Johnny Cota DO; Oct  2 2017  1:55PM EST                       (Author)    Electronically signed by : Johnny Cota DO; Oct  9 2017  1:17PM EST                       (Author)

## 2018-01-26 DIAGNOSIS — F41.9 ANXIETY: Primary | ICD-10-CM

## 2018-01-26 RX ORDER — TRAZODONE HYDROCHLORIDE 100 MG/1
100 TABLET ORAL
Qty: 30 TABLET | Refills: 5 | Status: SHIPPED | OUTPATIENT
Start: 2018-01-26 | End: 2018-08-08 | Stop reason: SDUPTHER

## 2018-01-26 RX ORDER — TRAZODONE HYDROCHLORIDE 100 MG/1
1 TABLET ORAL
COMMUNITY
Start: 2015-06-15 | End: 2018-01-26 | Stop reason: SDUPTHER

## 2018-01-26 RX ORDER — TRAZODONE HYDROCHLORIDE 100 MG/1
TABLET ORAL
Qty: 30 TABLET | Refills: 5 | OUTPATIENT
Start: 2018-01-26

## 2018-02-10 ENCOUNTER — TRANSCRIBE ORDERS (OUTPATIENT)
Dept: ADMINISTRATIVE | Facility: HOSPITAL | Age: 63
End: 2018-02-10

## 2018-02-10 ENCOUNTER — APPOINTMENT (OUTPATIENT)
Dept: LAB | Facility: HOSPITAL | Age: 63
End: 2018-02-10
Attending: FAMILY MEDICINE
Payer: COMMERCIAL

## 2018-02-10 DIAGNOSIS — E11.9 TYPE 2 DIABETES MELLITUS WITHOUT COMPLICATIONS (HCC): ICD-10-CM

## 2018-02-10 LAB
ALBUMIN SERPL BCP-MCNC: 3.4 G/DL (ref 3.5–5)
ALP SERPL-CCNC: 54 U/L (ref 46–116)
ALT SERPL W P-5'-P-CCNC: 29 U/L (ref 12–78)
ANION GAP SERPL CALCULATED.3IONS-SCNC: 8 MMOL/L (ref 4–13)
AST SERPL W P-5'-P-CCNC: 17 U/L (ref 5–45)
BILIRUB SERPL-MCNC: 0.4 MG/DL (ref 0.2–1)
BUN SERPL-MCNC: 18 MG/DL (ref 5–25)
CALCIUM SERPL-MCNC: 8.4 MG/DL (ref 8.3–10.1)
CHLORIDE SERPL-SCNC: 106 MMOL/L (ref 100–108)
CHOLEST SERPL-MCNC: 169 MG/DL (ref 50–200)
CO2 SERPL-SCNC: 28 MMOL/L (ref 21–32)
CREAT SERPL-MCNC: 0.86 MG/DL (ref 0.6–1.3)
EST. AVERAGE GLUCOSE BLD GHB EST-MCNC: 97 MG/DL
GFR SERPL CREATININE-BSD FRML MDRD: 94 ML/MIN/1.73SQ M
GLUCOSE P FAST SERPL-MCNC: 128 MG/DL (ref 65–99)
HBA1C MFR BLD: 5 % (ref 4.2–6.3)
HDLC SERPL-MCNC: 41 MG/DL (ref 40–60)
LDLC SERPL CALC-MCNC: 99 MG/DL (ref 0–100)
POTASSIUM SERPL-SCNC: 3.8 MMOL/L (ref 3.5–5.3)
PROT SERPL-MCNC: 6.4 G/DL (ref 6.4–8.2)
SODIUM SERPL-SCNC: 142 MMOL/L (ref 136–145)
TRIGL SERPL-MCNC: 145 MG/DL

## 2018-02-10 PROCEDURE — 36415 COLL VENOUS BLD VENIPUNCTURE: CPT

## 2018-02-10 PROCEDURE — 80061 LIPID PANEL: CPT

## 2018-02-10 PROCEDURE — 83036 HEMOGLOBIN GLYCOSYLATED A1C: CPT

## 2018-02-10 PROCEDURE — 80053 COMPREHEN METABOLIC PANEL: CPT

## 2018-02-13 ENCOUNTER — OFFICE VISIT (OUTPATIENT)
Dept: FAMILY MEDICINE CLINIC | Facility: CLINIC | Age: 63
End: 2018-02-13
Payer: COMMERCIAL

## 2018-02-13 VITALS
WEIGHT: 210.8 LBS | OXYGEN SATURATION: 97 % | HEIGHT: 63 IN | SYSTOLIC BLOOD PRESSURE: 130 MMHG | HEART RATE: 80 BPM | DIASTOLIC BLOOD PRESSURE: 84 MMHG | BODY MASS INDEX: 37.35 KG/M2

## 2018-02-13 DIAGNOSIS — F41.9 ANXIETY: ICD-10-CM

## 2018-02-13 DIAGNOSIS — K21.9 GASTROESOPHAGEAL REFLUX DISEASE WITHOUT ESOPHAGITIS: ICD-10-CM

## 2018-02-13 DIAGNOSIS — I10 ESSENTIAL HYPERTENSION: Primary | ICD-10-CM

## 2018-02-13 DIAGNOSIS — M77.12 LATERAL EPICONDYLITIS, LEFT ELBOW: ICD-10-CM

## 2018-02-13 DIAGNOSIS — E11.9 TYPE 2 DIABETES MELLITUS WITHOUT COMPLICATION, WITHOUT LONG-TERM CURRENT USE OF INSULIN (HCC): ICD-10-CM

## 2018-02-13 PROBLEM — M54.12 CERVICAL RADICULOPATHY: Status: ACTIVE | Noted: 2017-05-11

## 2018-02-13 PROBLEM — R79.89 ABNORMAL LIVER FUNCTION TEST: Status: RESOLVED | Noted: 2017-08-08 | Resolved: 2018-02-13

## 2018-02-13 PROBLEM — R79.89 ABNORMAL LIVER FUNCTION TEST: Status: ACTIVE | Noted: 2017-08-08

## 2018-02-13 PROCEDURE — 99214 OFFICE O/P EST MOD 30 MIN: CPT | Performed by: FAMILY MEDICINE

## 2018-02-13 PROCEDURE — 20605 DRAIN/INJ JOINT/BURSA W/O US: CPT | Performed by: FAMILY MEDICINE

## 2018-02-13 PROCEDURE — 3008F BODY MASS INDEX DOCD: CPT | Performed by: FAMILY MEDICINE

## 2018-02-13 RX ORDER — HYDROCODONE BITARTRATE AND ACETAMINOPHEN 5; 300 MG/1; MG/1
1 TABLET ORAL
COMMUNITY
Start: 2014-12-20 | End: 2020-04-16 | Stop reason: SDUPTHER

## 2018-02-13 RX ORDER — AMLODIPINE BESYLATE 10 MG/1
10 TABLET ORAL DAILY
Qty: 90 TABLET | Refills: 3 | Status: SHIPPED | OUTPATIENT
Start: 2018-02-13 | End: 2019-04-17 | Stop reason: SDUPTHER

## 2018-02-13 RX ORDER — LIDOCAINE HYDROCHLORIDE 10 MG/ML
1 INJECTION, SOLUTION INFILTRATION; PERINEURAL
Status: COMPLETED | OUTPATIENT
Start: 2018-02-13 | End: 2018-02-13

## 2018-02-13 RX ORDER — SUMATRIPTAN 50 MG/1
1 TABLET, FILM COATED ORAL
COMMUNITY
Start: 2014-11-14 | End: 2018-05-10 | Stop reason: SDUPTHER

## 2018-02-13 RX ORDER — KETOCONAZOLE 20 MG/ML
SHAMPOO TOPICAL DAILY
COMMUNITY
Start: 2017-05-11

## 2018-02-13 RX ORDER — LOSARTAN POTASSIUM 100 MG/1
1 TABLET ORAL DAILY
COMMUNITY
Start: 2014-12-11 | End: 2018-07-11 | Stop reason: SDUPTHER

## 2018-02-13 RX ORDER — CLOBETASOL PROPIONATE 0.05 G/ML
SPRAY TOPICAL
COMMUNITY
Start: 2016-07-21 | End: 2019-04-17

## 2018-02-13 RX ORDER — AMLODIPINE BESYLATE 10 MG/1
1 TABLET ORAL DAILY
COMMUNITY
Start: 2014-12-11 | End: 2018-02-13 | Stop reason: SDUPTHER

## 2018-02-13 RX ORDER — DOXAZOSIN 2 MG/1
1 TABLET ORAL DAILY
COMMUNITY
Start: 2016-07-21 | End: 2018-05-10 | Stop reason: SDUPTHER

## 2018-02-13 RX ORDER — PANTOPRAZOLE SODIUM 40 MG/1
40 TABLET, DELAYED RELEASE ORAL DAILY
Refills: 0 | COMMUNITY
Start: 2017-11-22

## 2018-02-13 RX ORDER — FLUTICASONE PROPIONATE 50 MCG
2 SPRAY, SUSPENSION (ML) NASAL DAILY
COMMUNITY
Start: 2015-06-15

## 2018-02-13 RX ORDER — METHYLPREDNISOLONE ACETATE 40 MG/ML
1 INJECTION, SUSPENSION INTRA-ARTICULAR; INTRALESIONAL; INTRAMUSCULAR; SOFT TISSUE
Status: COMPLETED | OUTPATIENT
Start: 2018-02-13 | End: 2018-02-13

## 2018-02-13 RX ORDER — VENLAFAXINE HYDROCHLORIDE 150 MG/1
1 CAPSULE, EXTENDED RELEASE ORAL DAILY
COMMUNITY
Start: 2015-03-13 | End: 2018-02-13 | Stop reason: SDUPTHER

## 2018-02-13 RX ORDER — CLOTRIMAZOLE AND BETAMETHASONE DIPROPIONATE 10; .64 MG/G; MG/G
CREAM TOPICAL 2 TIMES DAILY
COMMUNITY
Start: 2017-02-08 | End: 2019-10-16 | Stop reason: SDUPTHER

## 2018-02-13 RX ORDER — VENLAFAXINE HYDROCHLORIDE 150 MG/1
150 CAPSULE, EXTENDED RELEASE ORAL DAILY
Qty: 90 CAPSULE | Refills: 3 | Status: SHIPPED | OUTPATIENT
Start: 2018-02-13 | End: 2019-02-08 | Stop reason: SDUPTHER

## 2018-02-13 RX ORDER — MELOXICAM 15 MG/1
1 TABLET ORAL DAILY PRN
COMMUNITY
Start: 2017-09-14 | End: 2018-05-08 | Stop reason: SDUPTHER

## 2018-02-13 RX ORDER — SELENIUM SULFIDE 2.5 MG/100ML
LOTION TOPICAL
COMMUNITY
Start: 2016-07-21 | End: 2019-04-17

## 2018-02-13 RX ADMIN — METHYLPREDNISOLONE ACETATE 1 ML: 40 INJECTION, SUSPENSION INTRA-ARTICULAR; INTRALESIONAL; INTRAMUSCULAR; SOFT TISSUE at 17:11

## 2018-02-13 RX ADMIN — LIDOCAINE HYDROCHLORIDE 1 ML: 10 INJECTION, SOLUTION INFILTRATION; PERINEURAL at 17:11

## 2018-02-13 NOTE — PROGRESS NOTES
Assessment/Plan:       Diagnoses and all orders for this visit:    Essential hypertension    Type 2 diabetes mellitus without complication, without long-term current use of insulin (HCC)    Gastroesophageal reflux disease without esophagitis    Anxiety    Other orders  -     VENTOLIN  (90 Base) MCG/ACT inhaler; INHALE 2 PUFFS 3 TIMES A DAY AS NEEDED  -     amLODIPine (NORVASC) 10 mg tablet; Take 1 tablet by mouth daily  -     clobetasol propionate (CLOBEX) 0 05 % external spray; Apply topically  -     clotrimazole-betamethasone (LOTRISONE) 1-0 05 % cream; Apply topically Twice daily  -     doxazosin (CARDURA) 2 mg tablet; Take 1 tablet by mouth daily  -     SUMAtriptan (IMITREX) 50 mg tablet; Take 1 tablet by mouth  -     HYDROcodone-acetaminophen (XODOL) 5-300 MG per tablet; Take 1 tablet by mouth  -     fluticasone (FLONASE) 50 mcg/act nasal spray; 2 sprays into each nostril daily  -     losartan (COZAAR) 100 MG tablet; Take 1 tablet by mouth daily  -     ketoconazole (NIZORAL) 2 % shampoo; Apply topically daily  -     meloxicam (MOBIC) 15 mg tablet; Take 1 tablet by mouth daily as needed  -     selenium sulfide (SELSUN) 2 5 % shampoo; Apply topically  -     pantoprazole (PROTONIX) 40 mg tablet; Take 40 mg by mouth daily  -     venlafaxine (EFFEXOR-XR) 150 mg 24 hr capsule; Take 1 capsule by mouth daily          Continue current medications    Subjective:      Patient ID: Mark Medellin is a 63 y o  male  Hypertension   This is a chronic problem  The current episode started more than 1 year ago  The problem is unchanged  The problem is controlled  Associated symptoms include anxiety  Pertinent negatives include no chest pain, headaches or shortness of breath  Past treatments include angiotensin blockers and direct vasodilators  The current treatment provides moderate improvement  There are no compliance problems  Anxiety   Presents for follow-up visit  Symptoms include excessive worry and irritability  Patient reports no chest pain, confusion, dizziness, nervous/anxious behavior or shortness of breath  The severity of symptoms is mild  The quality of sleep is fair  Nighttime awakenings: occasional        Heartburn   He complains of heartburn  He reports no abdominal pain, no chest pain, no sore throat or no wheezing  This is a chronic problem  The current episode started more than 1 year ago  The problem occurs rarely  The problem has been resolved  The heartburn does not wake him from sleep  The heartburn does not limit his activity  The heartburn doesn't change with position  Pertinent negatives include no fatigue  He has tried a PPI for the symptoms  The treatment provided moderate relief  Arm Pain    The incident occurred more than 1 week ago  The pain is present in the left elbow  The pain is moderate  The pain has been constant since the incident  Pertinent negatives include no chest pain or numbness  The symptoms are aggravated by movement  The following portions of the patient's history were reviewed and updated as appropriate:   He has no past medical history on file  ,   does not have any pertinent problems on file  ,   has no past surgical history on file  ,  family history is not on file  ,   reports that he has been smoking  He has never used smokeless tobacco  His alcohol and drug histories are not on file  ,  has No Known Allergies     Current Outpatient Prescriptions   Medication Sig Dispense Refill    amLODIPine (NORVASC) 10 mg tablet Take 1 tablet by mouth daily      clobetasol propionate (CLOBEX) 0 05 % external spray Apply topically      clotrimazole-betamethasone (LOTRISONE) 1-0 05 % cream Apply topically Twice daily      doxazosin (CARDURA) 2 mg tablet Take 1 tablet by mouth daily      fluticasone (FLONASE) 50 mcg/act nasal spray 2 sprays into each nostril daily      HYDROcodone-acetaminophen (XODOL) 5-300 MG per tablet Take 1 tablet by mouth      ketoconazole (NIZORAL) 2 % shampoo Apply topically daily      losartan (COZAAR) 100 MG tablet Take 1 tablet by mouth daily      meloxicam (MOBIC) 15 mg tablet Take 1 tablet by mouth daily as needed      selenium sulfide (SELSUN) 2 5 % shampoo Apply topically      SUMAtriptan (IMITREX) 50 mg tablet Take 1 tablet by mouth      venlafaxine (EFFEXOR-XR) 150 mg 24 hr capsule Take 1 capsule by mouth daily      pantoprazole (PROTONIX) 40 mg tablet Take 40 mg by mouth daily  0    traZODone (DESYREL) 100 mg tablet Take 1 tablet by mouth daily at bedtime 30 tablet 5    VENTOLIN  (90 Base) MCG/ACT inhaler INHALE 2 PUFFS 3 TIMES A DAY AS NEEDED  2     No current facility-administered medications for this visit  Review of Systems   Constitutional: Positive for irritability  Negative for chills, fatigue and fever  HENT: Negative for congestion, ear pain, hearing loss, postnasal drip, rhinorrhea and sore throat  Eyes: Negative for pain and visual disturbance  Respiratory: Negative for chest tightness, shortness of breath and wheezing  Cardiovascular: Negative for chest pain and leg swelling  Gastrointestinal: Positive for heartburn  Negative for abdominal distention, abdominal pain, constipation, diarrhea and vomiting  Endocrine: Negative for cold intolerance and heat intolerance  Genitourinary: Negative for difficulty urinating, frequency and urgency  Musculoskeletal: Negative for arthralgias and gait problem  Left elbow pain   Skin: Negative for color change  Neurological: Negative for dizziness, tremors, syncope, numbness and headaches  Hematological: Negative for adenopathy  Psychiatric/Behavioral: Negative for agitation, confusion and sleep disturbance  The patient is not nervous/anxious  Objective:  Vitals:    02/13/18 1647   BP: 130/84   Pulse: 80   SpO2: 97%     [email or web address] mass index is 37 94 kg/m²  Physical Exam   Constitutional: He is oriented to person, place, and time   He appears well-developed and well-nourished  HENT:   Head: Normocephalic  Eyes: Conjunctivae are normal  No scleral icterus  Neck: Normal range of motion  No thyromegaly present  Cardiovascular: Normal rate, regular rhythm and normal heart sounds  No murmur heard  Pulmonary/Chest: No respiratory distress  He has no wheezes  Abdominal: Soft  Bowel sounds are normal  He exhibits no distension  There is no tenderness  Musculoskeletal: Normal range of motion  He exhibits tenderness (over the left lateral epicondyle)  He exhibits no edema  Lymphadenopathy:     He has no cervical adenopathy  Neurological: He is alert and oriented to person, place, and time  No cranial nerve deficit  Skin: Skin is warm and dry  No rash noted  No pallor  Psychiatric: He has a normal mood and affect  His behavior is normal    Nursing note and vitals reviewed        Hand/upper extremity injection  Date/Time: 2/13/2018 5:11 PM  Consent given by: patient  Timeout: Immediately prior to procedure a time out was called to verify the correct patient, procedure, equipment, support staff and site/side marked as required   Supporting Documentation  Indications: pain   Procedure Details  Condition:lateral epicondylitis Needle size: 25 G  Medications administered: 1 mL lidocaine 1 %; 1 mL methylPREDNISolone acetate 40 mg/mL  Patient tolerance: patient tolerated the procedure well with no immediate complications  Dressing:  Sterile dressing applied

## 2018-05-08 DIAGNOSIS — M19.90 ARTHRITIS: Primary | ICD-10-CM

## 2018-05-09 RX ORDER — MELOXICAM 15 MG/1
TABLET ORAL DAILY PRN
Qty: 30 TABLET | Refills: 5 | Status: SHIPPED | OUTPATIENT
Start: 2018-05-09 | End: 2018-11-05 | Stop reason: SDUPTHER

## 2018-05-10 DIAGNOSIS — G43.109 MIGRAINE WITH AURA AND WITHOUT STATUS MIGRAINOSUS, NOT INTRACTABLE: Primary | ICD-10-CM

## 2018-05-10 DIAGNOSIS — I10 ESSENTIAL HYPERTENSION: Primary | ICD-10-CM

## 2018-05-11 RX ORDER — DOXAZOSIN 2 MG/1
TABLET ORAL DAILY
Qty: 30 TABLET | Refills: 5 | Status: SHIPPED | OUTPATIENT
Start: 2018-05-11 | End: 2018-10-01 | Stop reason: SDUPTHER

## 2018-05-11 RX ORDER — SUMATRIPTAN 50 MG/1
TABLET, FILM COATED ORAL
Qty: 9 TABLET | Refills: 3 | Status: SHIPPED | OUTPATIENT
Start: 2018-05-11 | End: 2019-10-16 | Stop reason: SDUPTHER

## 2018-07-11 DIAGNOSIS — I10 ESSENTIAL HYPERTENSION: Primary | ICD-10-CM

## 2018-07-12 PROCEDURE — 4010F ACE/ARB THERAPY RXD/TAKEN: CPT | Performed by: FAMILY MEDICINE

## 2018-07-12 RX ORDER — LOSARTAN POTASSIUM 100 MG/1
100 TABLET ORAL DAILY
Qty: 90 TABLET | Refills: 3 | Status: SHIPPED | OUTPATIENT
Start: 2018-07-12 | End: 2019-08-04 | Stop reason: SDUPTHER

## 2018-08-08 DIAGNOSIS — F41.9 ANXIETY: ICD-10-CM

## 2018-08-09 RX ORDER — TRAZODONE HYDROCHLORIDE 100 MG/1
100 TABLET ORAL
Qty: 30 TABLET | Refills: 5 | Status: SHIPPED | OUTPATIENT
Start: 2018-08-09 | End: 2019-02-08 | Stop reason: SDUPTHER

## 2018-08-10 ENCOUNTER — APPOINTMENT (OUTPATIENT)
Dept: LAB | Facility: HOSPITAL | Age: 63
End: 2018-08-10
Attending: FAMILY MEDICINE
Payer: COMMERCIAL

## 2018-08-10 DIAGNOSIS — I10 ESSENTIAL HYPERTENSION: ICD-10-CM

## 2018-08-10 DIAGNOSIS — E11.9 TYPE 2 DIABETES MELLITUS WITHOUT COMPLICATION, WITHOUT LONG-TERM CURRENT USE OF INSULIN (HCC): ICD-10-CM

## 2018-08-10 LAB
ALBUMIN SERPL BCP-MCNC: 3.7 G/DL (ref 3.5–5)
ALP SERPL-CCNC: 50 U/L (ref 46–116)
ALT SERPL W P-5'-P-CCNC: 26 U/L (ref 12–78)
ANION GAP SERPL CALCULATED.3IONS-SCNC: 8 MMOL/L (ref 4–13)
AST SERPL W P-5'-P-CCNC: 15 U/L (ref 5–45)
BILIRUB SERPL-MCNC: 0.4 MG/DL (ref 0.2–1)
BUN SERPL-MCNC: 23 MG/DL (ref 5–25)
CALCIUM SERPL-MCNC: 8.5 MG/DL (ref 8.3–10.1)
CHLORIDE SERPL-SCNC: 106 MMOL/L (ref 100–108)
CO2 SERPL-SCNC: 28 MMOL/L (ref 21–32)
CREAT SERPL-MCNC: 0.92 MG/DL (ref 0.6–1.3)
ERYTHROCYTE [DISTWIDTH] IN BLOOD BY AUTOMATED COUNT: 14.1 % (ref 11.6–15.1)
GFR SERPL CREATININE-BSD FRML MDRD: 90 ML/MIN/1.73SQ M
GLUCOSE P FAST SERPL-MCNC: 118 MG/DL (ref 65–99)
HCT VFR BLD AUTO: 43 % (ref 36.5–49.3)
HGB BLD-MCNC: 15.1 G/DL (ref 12–17)
MCH RBC QN AUTO: 30.7 PG (ref 26.8–34.3)
MCHC RBC AUTO-ENTMCNC: 35.1 G/DL (ref 31.4–37.4)
MCV RBC AUTO: 87 FL (ref 82–98)
PLATELET # BLD AUTO: 155 THOUSANDS/UL (ref 149–390)
PMV BLD AUTO: 10.4 FL (ref 8.9–12.7)
POTASSIUM SERPL-SCNC: 3.5 MMOL/L (ref 3.5–5.3)
PROT SERPL-MCNC: 6.6 G/DL (ref 6.4–8.2)
RBC # BLD AUTO: 4.92 MILLION/UL (ref 3.88–5.62)
SODIUM SERPL-SCNC: 142 MMOL/L (ref 136–145)
WBC # BLD AUTO: 6.67 THOUSAND/UL (ref 4.31–10.16)

## 2018-08-10 PROCEDURE — 36415 COLL VENOUS BLD VENIPUNCTURE: CPT

## 2018-08-10 PROCEDURE — 85027 COMPLETE CBC AUTOMATED: CPT

## 2018-08-10 PROCEDURE — 80053 COMPREHEN METABOLIC PANEL: CPT

## 2018-09-04 ENCOUNTER — TELEPHONE (OUTPATIENT)
Dept: FAMILY MEDICINE CLINIC | Facility: CLINIC | Age: 63
End: 2018-09-04

## 2018-09-04 DIAGNOSIS — H10.33 ACUTE BACTERIAL CONJUNCTIVITIS OF BOTH EYES: Primary | ICD-10-CM

## 2018-09-04 RX ORDER — TOBRAMYCIN 3 MG/ML
1 SOLUTION/ DROPS OPHTHALMIC
Qty: 5 ML | Refills: 0 | Status: SHIPPED | OUTPATIENT
Start: 2018-09-04 | End: 2019-04-17

## 2018-10-01 DIAGNOSIS — I10 ESSENTIAL HYPERTENSION: ICD-10-CM

## 2018-10-02 RX ORDER — DOXAZOSIN 2 MG/1
2 TABLET ORAL DAILY
Qty: 30 TABLET | Refills: 5 | Status: SHIPPED | OUTPATIENT
Start: 2018-10-02 | End: 2019-06-12 | Stop reason: SDUPTHER

## 2018-10-08 ENCOUNTER — OFFICE VISIT (OUTPATIENT)
Dept: FAMILY MEDICINE CLINIC | Facility: CLINIC | Age: 63
End: 2018-10-08
Payer: COMMERCIAL

## 2018-10-08 VITALS
OXYGEN SATURATION: 97 % | WEIGHT: 199 LBS | DIASTOLIC BLOOD PRESSURE: 84 MMHG | HEIGHT: 63 IN | HEART RATE: 84 BPM | BODY MASS INDEX: 35.26 KG/M2 | SYSTOLIC BLOOD PRESSURE: 138 MMHG | RESPIRATION RATE: 17 BRPM | TEMPERATURE: 99.3 F

## 2018-10-08 DIAGNOSIS — K21.9 GASTROESOPHAGEAL REFLUX DISEASE WITHOUT ESOPHAGITIS: ICD-10-CM

## 2018-10-08 DIAGNOSIS — G43.109 MIGRAINE WITH AURA AND WITHOUT STATUS MIGRAINOSUS, NOT INTRACTABLE: ICD-10-CM

## 2018-10-08 DIAGNOSIS — I10 ESSENTIAL HYPERTENSION: ICD-10-CM

## 2018-10-08 DIAGNOSIS — E11.9 TYPE 2 DIABETES MELLITUS WITHOUT COMPLICATION, WITHOUT LONG-TERM CURRENT USE OF INSULIN (HCC): Primary | ICD-10-CM

## 2018-10-08 DIAGNOSIS — M77.12 LATERAL EPICONDYLITIS OF LEFT ELBOW: ICD-10-CM

## 2018-10-08 DIAGNOSIS — F41.9 ANXIETY: ICD-10-CM

## 2018-10-08 PROCEDURE — 3075F SYST BP GE 130 - 139MM HG: CPT | Performed by: FAMILY MEDICINE

## 2018-10-08 PROCEDURE — 99214 OFFICE O/P EST MOD 30 MIN: CPT | Performed by: FAMILY MEDICINE

## 2018-10-08 PROCEDURE — 20605 DRAIN/INJ JOINT/BURSA W/O US: CPT | Performed by: FAMILY MEDICINE

## 2018-10-08 PROCEDURE — 3079F DIAST BP 80-89 MM HG: CPT | Performed by: FAMILY MEDICINE

## 2018-10-08 RX ORDER — METHYLPREDNISOLONE ACETATE 40 MG/ML
1 INJECTION, SUSPENSION INTRA-ARTICULAR; INTRALESIONAL; INTRAMUSCULAR; SOFT TISSUE
Status: COMPLETED | OUTPATIENT
Start: 2018-10-08 | End: 2018-10-08

## 2018-10-08 RX ADMIN — METHYLPREDNISOLONE ACETATE 1 ML: 40 INJECTION, SUSPENSION INTRA-ARTICULAR; INTRALESIONAL; INTRAMUSCULAR; SOFT TISSUE at 16:40

## 2018-10-08 NOTE — PROGRESS NOTES
Assessment/Plan:       Diagnoses and all orders for this visit:    Type 2 diabetes mellitus without complication, without long-term current use of insulin (HCC)    Essential hypertension    Gastroesophageal reflux disease without esophagitis    Anxiety    Migraine with aura and without status migrainosus, not intractable    Lateral epicondylitis of left elbow    Other orders  -     Erenumab-aooe (AIMOVIG 140 DOSE) 70 MG/ML SOAJ; Inject under the skin        Type 2 diabetes mellitus without complication, without long-term current use of insulin (HCC)  Lab Results   Component Value Date    HGBA1C 5 0 02/10/2018       No results for input(s): POCGLU in the last 72 hours  Blood Sugar Average: Last 72 hrs:  controlled with diet    Gastroesophageal reflux disease without esophagitis  Controlled with pantoprazole, has tried to stop and symptoms returned    Essential hypertension  Controled with amlodipine and losartan  Anxiety  Controlled with effexor and trazodone     continue current medications, he is up-to-date with blood work, we will see him back in 6 months time, sooner if he has any problems  Subjective:      Patient ID: Olam Nyhan is a 66 y o  male  Patient comes in today for checkup  He states that he is doing well,He is controlling his diet for his diabetes, he is not taking medications for this, he does not check his blood sugars  He is taking his amlodipine and his losartan for his hypertension, he is not having any compliance issues, no side effects  He was recently started on new medication for his migraines by his neurologist   He states this is working well for him  He does complain of some left elbow pain located over the lateral aspect of his elbow where he has had previous injections done          The following portions of the patient's history were reviewed and updated as appropriate:   He has no past medical history on file  ,   does not have any pertinent problems on file ,   has a past surgical history that includes Septoplasty  ,  family history includes Aneurysm in his mother; Cancer in his other; Coronary artery disease in his father; Stroke in his other  ,   reports that he has been smoking  He has never used smokeless tobacco  He reports that he drinks alcohol  He reports that he does not use drugs  ,  has No Known Allergies     Current Outpatient Prescriptions   Medication Sig Dispense Refill    amLODIPine (NORVASC) 10 mg tablet Take 1 tablet (10 mg total) by mouth daily 90 tablet 3    clobetasol propionate (CLOBEX) 0 05 % external spray Apply topically      clotrimazole-betamethasone (LOTRISONE) 1-0 05 % cream Apply topically Twice daily      doxazosin (CARDURA) 2 mg tablet Take 1 tablet (2 mg total) by mouth daily 30 tablet 5    Erenumab-aooe (AIMOVIG 140 DOSE) 70 MG/ML SOAJ Inject under the skin      fluticasone (FLONASE) 50 mcg/act nasal spray 2 sprays into each nostril daily      HYDROcodone-acetaminophen (XODOL) 5-300 MG per tablet Take 1 tablet by mouth      ketoconazole (NIZORAL) 2 % shampoo Apply topically daily      losartan (COZAAR) 100 MG tablet Take 1 tablet (100 mg total) by mouth daily 90 tablet 3    meloxicam (MOBIC) 15 mg tablet TAKE 1 TABLET BY MOUTH DAILY AS NEEDED  30 tablet 5    pantoprazole (PROTONIX) 40 mg tablet Take 40 mg by mouth daily  0    selenium sulfide (SELSUN) 2 5 % shampoo Apply topically      SUMAtriptan (IMITREX) 50 mg tablet TAKE 1 TABLET FOR MIGRAINE RELIEF  MAY REPEAT EVERY 2 HOURS  MAX 200MG/DAY   9 tablet 3    tobramycin (TOBREX) 0 3 % SOLN Administer 1 drop to both eyes every 4 (four) hours while awake 5 mL 0    traZODone (DESYREL) 100 mg tablet Take 1 tablet (100 mg total) by mouth daily at bedtime 30 tablet 5    venlafaxine (EFFEXOR-XR) 150 mg 24 hr capsule Take 1 capsule (150 mg total) by mouth daily 90 capsule 3    VENTOLIN  (90 Base) MCG/ACT inhaler INHALE 2 PUFFS 3 TIMES A DAY AS NEEDED  2     No current facility-administered medications for this visit  Review of Systems   Constitutional: Negative for chills, fatigue and fever  HENT: Negative for congestion, ear pain, hearing loss, postnasal drip, rhinorrhea and sore throat  Eyes: Negative for pain and visual disturbance  Respiratory: Negative for chest tightness, shortness of breath and wheezing  Cardiovascular: Negative for chest pain and leg swelling  Gastrointestinal: Negative for abdominal distention, abdominal pain, constipation, diarrhea and vomiting  Endocrine: Negative for cold intolerance and heat intolerance  Genitourinary: Negative for difficulty urinating, frequency and urgency  Musculoskeletal: Positive for arthralgias (left elbow)  Negative for gait problem  Skin: Negative for color change  Neurological: Negative for dizziness, tremors, syncope, numbness and headaches  Hematological: Negative for adenopathy  Psychiatric/Behavioral: Negative for agitation, confusion and sleep disturbance  The patient is not nervous/anxious  Objective:  Vitals:    10/08/18 1627   BP: 138/84   Pulse: 84   Resp: 17   Temp: 99 3 °F (37 4 °C)   SpO2: 97%   Weight: 90 3 kg (199 lb)   Height: 5' 2 5" (1 588 m)     Body mass index is 35 82 kg/m²  Physical Exam   Constitutional: He is oriented to person, place, and time  He appears well-developed and well-nourished  HENT:   Head: Normocephalic  Mouth/Throat: Oropharynx is clear and moist    Eyes: Conjunctivae are normal  No scleral icterus  Neck: Normal range of motion  No thyromegaly present  Cardiovascular: Normal rate, regular rhythm and normal heart sounds  No murmur heard  Pulmonary/Chest: Effort normal and breath sounds normal  No respiratory distress  He has no wheezes  Abdominal: Soft  Bowel sounds are normal  He exhibits no distension  There is no tenderness  Musculoskeletal: Normal range of motion  He exhibits tenderness (over left lateral epicondyle)   He exhibits no edema  Lymphadenopathy:     He has no cervical adenopathy  Neurological: He is alert and oriented to person, place, and time  No cranial nerve deficit  Skin: Skin is warm and dry  No rash noted  No pallor  Psychiatric: He has a normal mood and affect  His behavior is normal    Nursing note and vitals reviewed      Medium joint arthrocentesis  Date/Time: 10/8/2018 4:40 PM  Consent given by: patient  Site marked: site marked  Supporting Documentation  Indications: pain   Procedure Details  Location: elbow - L elbow  Needle size: 25 G  Ultrasound guidance: no  Approach: anterolateral  Medications administered: 1 mL methylPREDNISolone acetate 40 mg/mL    Patient tolerance: patient tolerated the procedure well with no immediate complications  Dressing:  Sterile dressing applied

## 2018-10-08 NOTE — ASSESSMENT & PLAN NOTE
Lab Results   Component Value Date    HGBA1C 5 0 02/10/2018       No results for input(s): POCGLU in the last 72 hours      Blood Sugar Average: Last 72 hrs:  controlled with diet

## 2018-11-05 DIAGNOSIS — M19.90 ARTHRITIS: ICD-10-CM

## 2018-11-05 RX ORDER — MELOXICAM 15 MG/1
15 TABLET ORAL DAILY PRN
Qty: 30 TABLET | Refills: 0 | Status: SHIPPED | OUTPATIENT
Start: 2018-11-05 | End: 2018-12-10 | Stop reason: SDUPTHER

## 2018-11-28 ENCOUNTER — TELEPHONE (OUTPATIENT)
Dept: FAMILY MEDICINE CLINIC | Facility: CLINIC | Age: 63
End: 2018-11-28

## 2018-11-28 DIAGNOSIS — J31.0 RHINOSINUSITIS: Primary | ICD-10-CM

## 2018-11-28 DIAGNOSIS — J32.9 RHINOSINUSITIS: Primary | ICD-10-CM

## 2018-11-28 RX ORDER — AZITHROMYCIN 250 MG/1
TABLET, FILM COATED ORAL
Qty: 6 TABLET | Refills: 0 | Status: SHIPPED | OUTPATIENT
Start: 2018-11-28 | End: 2018-12-02

## 2018-12-10 DIAGNOSIS — M19.90 ARTHRITIS: ICD-10-CM

## 2018-12-11 RX ORDER — MELOXICAM 15 MG/1
TABLET ORAL
Qty: 30 TABLET | Refills: 5 | Status: SHIPPED | OUTPATIENT
Start: 2018-12-11 | End: 2019-06-17 | Stop reason: SDUPTHER

## 2019-01-28 DIAGNOSIS — J32.9 RHINOSINUSITIS: ICD-10-CM

## 2019-01-28 DIAGNOSIS — J31.0 RHINOSINUSITIS: ICD-10-CM

## 2019-01-29 RX ORDER — AZITHROMYCIN 250 MG/1
TABLET, FILM COATED ORAL
Qty: 6 TABLET | Refills: 0 | OUTPATIENT
Start: 2019-01-29

## 2019-02-08 DIAGNOSIS — F41.9 ANXIETY: ICD-10-CM

## 2019-02-08 RX ORDER — TRAZODONE HYDROCHLORIDE 100 MG/1
100 TABLET ORAL
Qty: 90 TABLET | Refills: 3 | Status: SHIPPED | OUTPATIENT
Start: 2019-02-08 | End: 2020-01-13

## 2019-02-08 RX ORDER — VENLAFAXINE HYDROCHLORIDE 150 MG/1
150 CAPSULE, EXTENDED RELEASE ORAL DAILY
Qty: 90 CAPSULE | Refills: 3 | Status: SHIPPED | OUTPATIENT
Start: 2019-02-08 | End: 2020-02-07

## 2019-02-19 ENCOUNTER — OFFICE VISIT (OUTPATIENT)
Dept: FAMILY MEDICINE CLINIC | Facility: CLINIC | Age: 64
End: 2019-02-19
Payer: COMMERCIAL

## 2019-02-19 VITALS
WEIGHT: 201 LBS | OXYGEN SATURATION: 94 % | BODY MASS INDEX: 35.61 KG/M2 | HEART RATE: 100 BPM | DIASTOLIC BLOOD PRESSURE: 76 MMHG | HEIGHT: 63 IN | SYSTOLIC BLOOD PRESSURE: 138 MMHG | TEMPERATURE: 97.4 F

## 2019-02-19 DIAGNOSIS — Z23 NEED FOR TDAP VACCINATION: ICD-10-CM

## 2019-02-19 DIAGNOSIS — H66.003 ACUTE SUPPURATIVE OTITIS MEDIA OF BOTH EARS WITHOUT SPONTANEOUS RUPTURE OF TYMPANIC MEMBRANES, RECURRENCE NOT SPECIFIED: Primary | ICD-10-CM

## 2019-02-19 DIAGNOSIS — Z23 NEED FOR 23-POLYVALENT PNEUMOCOCCAL POLYSACCHARIDE VACCINE: ICD-10-CM

## 2019-02-19 PROCEDURE — 90472 IMMUNIZATION ADMIN EACH ADD: CPT

## 2019-02-19 PROCEDURE — 90732 PPSV23 VACC 2 YRS+ SUBQ/IM: CPT

## 2019-02-19 PROCEDURE — 99213 OFFICE O/P EST LOW 20 MIN: CPT | Performed by: PHYSICIAN ASSISTANT

## 2019-02-19 PROCEDURE — 3008F BODY MASS INDEX DOCD: CPT | Performed by: PHYSICIAN ASSISTANT

## 2019-02-19 PROCEDURE — 90471 IMMUNIZATION ADMIN: CPT

## 2019-02-19 PROCEDURE — 90715 TDAP VACCINE 7 YRS/> IM: CPT

## 2019-02-19 RX ORDER — MECLIZINE HYDROCHLORIDE 25 MG/1
25 TABLET ORAL 3 TIMES DAILY PRN
Qty: 30 TABLET | Refills: 0 | Status: SHIPPED | OUTPATIENT
Start: 2019-02-19 | End: 2019-11-14 | Stop reason: SDUPTHER

## 2019-02-19 RX ORDER — AMOXICILLIN AND CLAVULANATE POTASSIUM 875; 125 MG/1; MG/1
1 TABLET, FILM COATED ORAL EVERY 12 HOURS SCHEDULED
Qty: 14 TABLET | Refills: 0 | Status: SHIPPED | OUTPATIENT
Start: 2019-02-19 | End: 2019-02-26

## 2019-02-19 RX ORDER — AZELASTINE 1 MG/ML
2 SPRAY, METERED NASAL 2 TIMES DAILY
Qty: 1 BOTTLE | Refills: 0 | Status: SHIPPED | OUTPATIENT
Start: 2019-02-19 | End: 2019-03-19 | Stop reason: SDUPTHER

## 2019-02-19 NOTE — PROGRESS NOTES
Assessment/Plan:       Diagnoses and all orders for this visit:    Acute suppurative otitis media of both ears without spontaneous rupture of tympanic membranes, recurrence not specified  -     azelastine (ASTELIN) 0 1 % nasal spray; 2 sprays into each nostril 2 (two) times a day Use in each nostril as directed  -     amoxicillin-clavulanate (AUGMENTIN) 875-125 mg per tablet; Take 1 tablet by mouth every 12 (twelve) hours for 7 days  -     meclizine (ANTIVERT) 25 mg tablet; Take 1 tablet (25 mg total) by mouth 3 (three) times a day as needed for dizziness    Need for Tdap vaccination  -     TDAP VACCINE GREATER THAN OR EQUAL TO 6YO IM    Need for 23-polyvalent pneumococcal polysaccharide vaccine  -     PNEUMOCOCCAL POLYSACCHARIDE VACCINE 23-VALENT =>3YO SQ IM            Subjective:      Patient ID: Julio Cesar Parham is a 66 y o  male  Dizziness   This is a new problem  The current episode started in the past 7 days  The problem occurs 2 to 4 times per day  The problem has been gradually worsening  Associated symptoms include congestion, headaches and vertigo  Pertinent negatives include no abdominal pain, anorexia, change in bowel habit, chest pain, chills, coughing, diaphoresis, fatigue, fever, nausea, neck pain, numbness, rash, sore throat, swollen glands, urinary symptoms, visual change, vomiting or weakness  The symptoms are aggravated by bending and twisting  He has tried rest and position changes for the symptoms  The treatment provided mild relief  Earache    There is pain in the right ear  This is a new problem  The current episode started in the past 7 days  The problem occurs every few minutes  The problem has been unchanged  There has been no fever  The pain is at a severity of 5/10  Associated symptoms include headaches  Pertinent negatives include no abdominal pain, coughing, ear discharge, neck pain, rash, rhinorrhea, sore throat or vomiting  He has tried nothing for the symptoms   The treatment provided no relief  The following portions of the patient's history were reviewed and updated as appropriate:   He has no past medical history on file  ,  does not have any pertinent problems on file  ,   has a past surgical history that includes Septoplasty  ,  family history includes Aneurysm in his mother; Cancer in his other; Coronary artery disease in his father; Stroke in his other  ,   reports that he has been smoking  He has never used smokeless tobacco  He reports that he drinks alcohol  He reports that he does not use drugs  ,  has No Known Allergies     Current Outpatient Medications   Medication Sig Dispense Refill    amLODIPine (NORVASC) 10 mg tablet Take 1 tablet (10 mg total) by mouth daily 90 tablet 3    doxazosin (CARDURA) 2 mg tablet Take 1 tablet (2 mg total) by mouth daily 30 tablet 5    Erenumab-aooe (AIMOVIG 140 DOSE) 70 MG/ML SOAJ Inject under the skin      HYDROcodone-acetaminophen (XODOL) 5-300 MG per tablet Take 1 tablet by mouth      losartan (COZAAR) 100 MG tablet Take 1 tablet (100 mg total) by mouth daily 90 tablet 3    meloxicam (MOBIC) 15 mg tablet TAKE 1 TABLET BY MOUTH ONCE DAILY AS NEEDED 30 tablet 5    pantoprazole (PROTONIX) 40 mg tablet Take 40 mg by mouth daily  0    SUMAtriptan (IMITREX) 50 mg tablet TAKE 1 TABLET FOR MIGRAINE RELIEF  MAY REPEAT EVERY 2 HOURS  MAX 200MG/DAY   9 tablet 3    traZODone (DESYREL) 100 mg tablet Take 1 tablet (100 mg total) by mouth daily at bedtime 90 tablet 3    venlafaxine (EFFEXOR-XR) 150 mg 24 hr capsule Take 1 capsule (150 mg total) by mouth daily 90 capsule 3    VENTOLIN  (90 Base) MCG/ACT inhaler INHALE 2 PUFFS 3 TIMES A DAY AS NEEDED  2    amoxicillin-clavulanate (AUGMENTIN) 875-125 mg per tablet Take 1 tablet by mouth every 12 (twelve) hours for 7 days 14 tablet 0    azelastine (ASTELIN) 0 1 % nasal spray 2 sprays into each nostril 2 (two) times a day Use in each nostril as directed 1 Bottle 0    clobetasol propionate (CLOBEX) 0 05 % external spray Apply topically      clotrimazole-betamethasone (LOTRISONE) 1-0 05 % cream Apply topically Twice daily      fluticasone (FLONASE) 50 mcg/act nasal spray 2 sprays into each nostril daily      ketoconazole (NIZORAL) 2 % shampoo Apply topically daily      meclizine (ANTIVERT) 25 mg tablet Take 1 tablet (25 mg total) by mouth 3 (three) times a day as needed for dizziness 30 tablet 0    selenium sulfide (SELSUN) 2 5 % shampoo Apply topically      tobramycin (TOBREX) 0 3 % SOLN Administer 1 drop to both eyes every 4 (four) hours while awake (Patient not taking: Reported on 2/19/2019) 5 mL 0     No current facility-administered medications for this visit  Review of Systems   Constitutional: Negative for chills, diaphoresis, fatigue and fever  HENT: Positive for congestion, ear pain and tinnitus  Negative for ear discharge, rhinorrhea, sinus pressure, sinus pain, sneezing and sore throat  Respiratory: Negative for cough and chest tightness  Cardiovascular: Negative for chest pain  Gastrointestinal: Negative for abdominal pain, anorexia, change in bowel habit, nausea and vomiting  Musculoskeletal: Negative for neck pain  Skin: Negative for rash  Neurological: Positive for dizziness, vertigo and headaches  Negative for syncope, weakness and numbness  Objective:  Vitals:    02/19/19 0959   BP: 138/76   Pulse: 100   Temp: (!) 97 4 °F (36 3 °C)   SpO2: 94%   Weight: 91 2 kg (201 lb)   Height: 5' 2 5" (1 588 m)     Body mass index is 36 18 kg/m²  Physical Exam   Constitutional: He is oriented to person, place, and time  He appears well-developed and well-nourished  HENT:   Head: Normocephalic  Right Ear: Hearing, external ear and ear canal normal  Tympanic membrane is erythematous  A middle ear effusion is present  Left Ear: Hearing, external ear and ear canal normal  A middle ear effusion is present     Mouth/Throat: Uvula is midline and oropharynx is clear and moist    Eyes: Conjunctivae are normal    Cardiovascular: Normal rate, regular rhythm and normal heart sounds  Pulmonary/Chest: Effort normal and breath sounds normal    Lymphadenopathy:     He has cervical adenopathy  Neurological: He is alert and oriented to person, place, and time  Skin: Skin is warm and dry  Psychiatric: He has a normal mood and affect  His behavior is normal  Judgment and thought content normal    Nursing note and vitals reviewed

## 2019-03-19 DIAGNOSIS — H66.003 ACUTE SUPPURATIVE OTITIS MEDIA OF BOTH EARS WITHOUT SPONTANEOUS RUPTURE OF TYMPANIC MEMBRANES, RECURRENCE NOT SPECIFIED: ICD-10-CM

## 2019-03-19 RX ORDER — AZELASTINE 1 MG/ML
2 SPRAY, METERED NASAL 2 TIMES DAILY
Qty: 1 BOTTLE | Refills: 5 | Status: SHIPPED | OUTPATIENT
Start: 2019-03-19 | End: 2019-04-17

## 2019-04-17 ENCOUNTER — OFFICE VISIT (OUTPATIENT)
Dept: FAMILY MEDICINE CLINIC | Facility: CLINIC | Age: 64
End: 2019-04-17
Payer: COMMERCIAL

## 2019-04-17 VITALS
BODY MASS INDEX: 37.39 KG/M2 | SYSTOLIC BLOOD PRESSURE: 122 MMHG | DIASTOLIC BLOOD PRESSURE: 70 MMHG | HEIGHT: 63 IN | TEMPERATURE: 97.2 F | HEART RATE: 86 BPM | OXYGEN SATURATION: 96 % | WEIGHT: 211 LBS

## 2019-04-17 DIAGNOSIS — F41.9 ANXIETY: ICD-10-CM

## 2019-04-17 DIAGNOSIS — E11.9 TYPE 2 DIABETES MELLITUS WITHOUT COMPLICATION, WITHOUT LONG-TERM CURRENT USE OF INSULIN (HCC): Primary | ICD-10-CM

## 2019-04-17 DIAGNOSIS — M77.12 LATERAL EPICONDYLITIS, LEFT ELBOW: ICD-10-CM

## 2019-04-17 DIAGNOSIS — G43.109 MIGRAINE WITH AURA AND WITHOUT STATUS MIGRAINOSUS, NOT INTRACTABLE: ICD-10-CM

## 2019-04-17 DIAGNOSIS — Z12.5 SCREENING PSA (PROSTATE SPECIFIC ANTIGEN): ICD-10-CM

## 2019-04-17 DIAGNOSIS — E66.9 OBESITY (BMI 35.0-39.9 WITHOUT COMORBIDITY): ICD-10-CM

## 2019-04-17 DIAGNOSIS — I10 ESSENTIAL HYPERTENSION: ICD-10-CM

## 2019-04-17 DIAGNOSIS — Z12.11 SCREEN FOR COLON CANCER: ICD-10-CM

## 2019-04-17 PROBLEM — H66.003 ACUTE SUPPURATIVE OTITIS MEDIA OF BOTH EARS WITHOUT SPONTANEOUS RUPTURE OF TYMPANIC MEMBRANES: Status: RESOLVED | Noted: 2019-02-19 | Resolved: 2019-04-17

## 2019-04-17 LAB — SL AMB POCT HEMOGLOBIN AIC: 5 (ref ?–6.5)

## 2019-04-17 PROCEDURE — 20605 DRAIN/INJ JOINT/BURSA W/O US: CPT | Performed by: FAMILY MEDICINE

## 2019-04-17 PROCEDURE — 3044F HG A1C LEVEL LT 7.0%: CPT | Performed by: PHYSICIAN ASSISTANT

## 2019-04-17 PROCEDURE — 3008F BODY MASS INDEX DOCD: CPT | Performed by: FAMILY MEDICINE

## 2019-04-17 PROCEDURE — 36416 COLLJ CAPILLARY BLOOD SPEC: CPT | Performed by: FAMILY MEDICINE

## 2019-04-17 PROCEDURE — 3074F SYST BP LT 130 MM HG: CPT | Performed by: FAMILY MEDICINE

## 2019-04-17 PROCEDURE — 3078F DIAST BP <80 MM HG: CPT | Performed by: FAMILY MEDICINE

## 2019-04-17 PROCEDURE — 83036 HEMOGLOBIN GLYCOSYLATED A1C: CPT | Performed by: FAMILY MEDICINE

## 2019-04-17 PROCEDURE — 99214 OFFICE O/P EST MOD 30 MIN: CPT | Performed by: FAMILY MEDICINE

## 2019-04-17 PROCEDURE — 3044F HG A1C LEVEL LT 7.0%: CPT | Performed by: FAMILY MEDICINE

## 2019-04-17 RX ORDER — METHYLPREDNISOLONE ACETATE 40 MG/ML
1 INJECTION, SUSPENSION INTRA-ARTICULAR; INTRALESIONAL; INTRAMUSCULAR; SOFT TISSUE
Status: COMPLETED | OUTPATIENT
Start: 2019-04-17 | End: 2019-04-17

## 2019-04-17 RX ORDER — LIDOCAINE HYDROCHLORIDE 10 MG/ML
1 INJECTION, SOLUTION INFILTRATION; PERINEURAL
Status: COMPLETED | OUTPATIENT
Start: 2019-04-17 | End: 2019-04-17

## 2019-04-17 RX ORDER — AMLODIPINE BESYLATE 10 MG/1
10 TABLET ORAL DAILY
Qty: 90 TABLET | Refills: 3 | Status: SHIPPED | OUTPATIENT
Start: 2019-04-17 | End: 2020-05-04 | Stop reason: SDUPTHER

## 2019-04-17 RX ADMIN — LIDOCAINE HYDROCHLORIDE 1 ML: 10 INJECTION, SOLUTION INFILTRATION; PERINEURAL at 16:20

## 2019-04-17 RX ADMIN — METHYLPREDNISOLONE ACETATE 1 ML: 40 INJECTION, SUSPENSION INTRA-ARTICULAR; INTRALESIONAL; INTRAMUSCULAR; SOFT TISSUE at 16:20

## 2019-05-29 ENCOUNTER — TELEPHONE (OUTPATIENT)
Dept: FAMILY MEDICINE CLINIC | Facility: CLINIC | Age: 64
End: 2019-05-29

## 2019-05-29 DIAGNOSIS — M25.552 PAIN OF BOTH HIP JOINTS: Primary | ICD-10-CM

## 2019-05-29 DIAGNOSIS — M25.551 PAIN OF BOTH HIP JOINTS: Primary | ICD-10-CM

## 2019-06-12 DIAGNOSIS — I10 ESSENTIAL HYPERTENSION: ICD-10-CM

## 2019-06-12 RX ORDER — DOXAZOSIN 2 MG/1
2 TABLET ORAL DAILY
Qty: 30 TABLET | Refills: 5 | Status: SHIPPED | OUTPATIENT
Start: 2019-06-12 | End: 2019-12-11 | Stop reason: SDUPTHER

## 2019-06-17 DIAGNOSIS — M19.90 ARTHRITIS: ICD-10-CM

## 2019-06-17 RX ORDER — MELOXICAM 15 MG/1
TABLET ORAL
Qty: 30 TABLET | Refills: 5 | Status: SHIPPED | OUTPATIENT
Start: 2019-06-17 | End: 2020-05-04

## 2019-08-04 DIAGNOSIS — I10 ESSENTIAL HYPERTENSION: ICD-10-CM

## 2019-08-04 RX ORDER — LOSARTAN POTASSIUM 100 MG/1
TABLET ORAL
Qty: 90 TABLET | Refills: 3 | Status: SHIPPED | OUTPATIENT
Start: 2019-08-04 | End: 2020-05-11

## 2019-10-10 PROBLEM — E11.9 TYPE 2 DIABETES MELLITUS WITHOUT COMPLICATIONS (HCC): Status: ACTIVE | Noted: 2019-10-10

## 2019-10-11 PROBLEM — E66.812 CLASS 2 SEVERE OBESITY WITH SERIOUS COMORBIDITY AND BODY MASS INDEX (BMI) OF 37.0 TO 37.9 IN ADULT (HCC): Status: ACTIVE | Noted: 2019-10-11

## 2019-10-11 PROBLEM — E66.01 CLASS 2 SEVERE OBESITY WITH SERIOUS COMORBIDITY AND BODY MASS INDEX (BMI) OF 37.0 TO 37.9 IN ADULT (HCC): Status: ACTIVE | Noted: 2019-10-11

## 2019-10-12 ENCOUNTER — APPOINTMENT (OUTPATIENT)
Dept: LAB | Facility: HOSPITAL | Age: 64
End: 2019-10-12
Attending: FAMILY MEDICINE
Payer: COMMERCIAL

## 2019-10-12 DIAGNOSIS — I10 ESSENTIAL HYPERTENSION: ICD-10-CM

## 2019-10-12 DIAGNOSIS — Z12.5 SCREENING PSA (PROSTATE SPECIFIC ANTIGEN): ICD-10-CM

## 2019-10-12 DIAGNOSIS — E11.9 TYPE 2 DIABETES MELLITUS WITHOUT COMPLICATION, WITHOUT LONG-TERM CURRENT USE OF INSULIN (HCC): ICD-10-CM

## 2019-10-12 LAB
ALBUMIN SERPL BCP-MCNC: 3.4 G/DL (ref 3.5–5)
ALP SERPL-CCNC: 47 U/L (ref 46–116)
ALT SERPL W P-5'-P-CCNC: 31 U/L (ref 12–78)
ANION GAP SERPL CALCULATED.3IONS-SCNC: 9 MMOL/L (ref 4–13)
AST SERPL W P-5'-P-CCNC: 13 U/L (ref 5–45)
BILIRUB SERPL-MCNC: 0.5 MG/DL (ref 0.2–1)
BUN SERPL-MCNC: 15 MG/DL (ref 5–25)
CALCIUM SERPL-MCNC: 8.3 MG/DL (ref 8.3–10.1)
CHLORIDE SERPL-SCNC: 108 MMOL/L (ref 100–108)
CHOLEST SERPL-MCNC: 182 MG/DL (ref 50–200)
CO2 SERPL-SCNC: 27 MMOL/L (ref 21–32)
CREAT SERPL-MCNC: 0.85 MG/DL (ref 0.6–1.3)
ERYTHROCYTE [DISTWIDTH] IN BLOOD BY AUTOMATED COUNT: 13.1 % (ref 11.6–15.1)
GFR SERPL CREATININE-BSD FRML MDRD: 93 ML/MIN/1.73SQ M
GLUCOSE P FAST SERPL-MCNC: 99 MG/DL (ref 65–99)
HCT VFR BLD AUTO: 42 % (ref 36.5–49.3)
HDLC SERPL-MCNC: 44 MG/DL (ref 40–60)
HGB BLD-MCNC: 14.7 G/DL (ref 12–17)
LDLC SERPL CALC-MCNC: 109 MG/DL (ref 0–100)
MCH RBC QN AUTO: 31.1 PG (ref 26.8–34.3)
MCHC RBC AUTO-ENTMCNC: 35 G/DL (ref 31.4–37.4)
MCV RBC AUTO: 89 FL (ref 82–98)
NONHDLC SERPL-MCNC: 138 MG/DL
PLATELET # BLD AUTO: 164 THOUSANDS/UL (ref 149–390)
PMV BLD AUTO: 10.4 FL (ref 8.9–12.7)
POTASSIUM SERPL-SCNC: 3.7 MMOL/L (ref 3.5–5.3)
PROT SERPL-MCNC: 6.4 G/DL (ref 6.4–8.2)
RBC # BLD AUTO: 4.72 MILLION/UL (ref 3.88–5.62)
SODIUM SERPL-SCNC: 144 MMOL/L (ref 136–145)
TRIGL SERPL-MCNC: 143 MG/DL
WBC # BLD AUTO: 6.03 THOUSAND/UL (ref 4.31–10.16)

## 2019-10-12 PROCEDURE — 80053 COMPREHEN METABOLIC PANEL: CPT

## 2019-10-12 PROCEDURE — 36415 COLL VENOUS BLD VENIPUNCTURE: CPT

## 2019-10-12 PROCEDURE — 80061 LIPID PANEL: CPT

## 2019-10-12 PROCEDURE — G0103 PSA SCREENING: HCPCS

## 2019-10-12 PROCEDURE — 85027 COMPLETE CBC AUTOMATED: CPT

## 2019-10-13 LAB — PSA SERPL-MCNC: 0.2 NG/ML (ref 0–4)

## 2019-10-16 ENCOUNTER — OFFICE VISIT (OUTPATIENT)
Dept: FAMILY MEDICINE CLINIC | Facility: CLINIC | Age: 64
End: 2019-10-16
Payer: COMMERCIAL

## 2019-10-16 VITALS
OXYGEN SATURATION: 98 % | HEIGHT: 63 IN | HEART RATE: 60 BPM | SYSTOLIC BLOOD PRESSURE: 110 MMHG | WEIGHT: 207 LBS | DIASTOLIC BLOOD PRESSURE: 74 MMHG | TEMPERATURE: 98.1 F | BODY MASS INDEX: 36.68 KG/M2

## 2019-10-16 DIAGNOSIS — J45.20 MILD INTERMITTENT ASTHMA WITHOUT COMPLICATION: ICD-10-CM

## 2019-10-16 DIAGNOSIS — L30.9 DERMATITIS: ICD-10-CM

## 2019-10-16 DIAGNOSIS — K21.9 GASTROESOPHAGEAL REFLUX DISEASE WITHOUT ESOPHAGITIS: ICD-10-CM

## 2019-10-16 DIAGNOSIS — E11.9 TYPE 2 DIABETES MELLITUS WITHOUT COMPLICATION, WITHOUT LONG-TERM CURRENT USE OF INSULIN (HCC): Primary | ICD-10-CM

## 2019-10-16 DIAGNOSIS — G43.109 MIGRAINE WITH AURA AND WITHOUT STATUS MIGRAINOSUS, NOT INTRACTABLE: ICD-10-CM

## 2019-10-16 DIAGNOSIS — F41.9 ANXIETY: ICD-10-CM

## 2019-10-16 DIAGNOSIS — I10 ESSENTIAL HYPERTENSION: ICD-10-CM

## 2019-10-16 DIAGNOSIS — Z12.11 COLON CANCER SCREENING: ICD-10-CM

## 2019-10-16 DIAGNOSIS — M77.12 LATERAL EPICONDYLITIS OF LEFT ELBOW: ICD-10-CM

## 2019-10-16 LAB — SL AMB POCT HEMOGLOBIN AIC: 5 (ref ?–6.5)

## 2019-10-16 PROCEDURE — 83036 HEMOGLOBIN GLYCOSYLATED A1C: CPT

## 2019-10-16 PROCEDURE — 3074F SYST BP LT 130 MM HG: CPT

## 2019-10-16 PROCEDURE — 99214 OFFICE O/P EST MOD 30 MIN: CPT

## 2019-10-16 PROCEDURE — 3078F DIAST BP <80 MM HG: CPT

## 2019-10-16 PROCEDURE — 20605 DRAIN/INJ JOINT/BURSA W/O US: CPT

## 2019-10-16 PROCEDURE — 3008F BODY MASS INDEX DOCD: CPT

## 2019-10-16 RX ORDER — CLOTRIMAZOLE AND BETAMETHASONE DIPROPIONATE 10; .64 MG/G; MG/G
CREAM TOPICAL 2 TIMES DAILY
Qty: 30 G | Refills: 0 | Status: SHIPPED | OUTPATIENT
Start: 2019-10-16 | End: 2019-12-01 | Stop reason: SDUPTHER

## 2019-10-16 RX ORDER — SUMATRIPTAN 50 MG/1
50 TABLET, FILM COATED ORAL EVERY 2 HOUR PRN
Qty: 9 TABLET | Refills: 3 | Status: SHIPPED | OUTPATIENT
Start: 2019-10-16 | End: 2020-04-14

## 2019-10-16 RX ORDER — METHYLPREDNISOLONE ACETATE 80 MG/ML
80 INJECTION, SUSPENSION INTRA-ARTICULAR; INTRALESIONAL; INTRAMUSCULAR; SOFT TISSUE ONCE
Status: COMPLETED | OUTPATIENT
Start: 2019-10-16 | End: 2019-10-16

## 2019-10-16 RX ORDER — LIDOCAINE HYDROCHLORIDE 10 MG/ML
1 INJECTION, SOLUTION INFILTRATION; PERINEURAL
Status: COMPLETED | OUTPATIENT
Start: 2019-10-16 | End: 2019-10-16

## 2019-10-16 RX ADMIN — LIDOCAINE HYDROCHLORIDE 1 ML: 10 INJECTION, SOLUTION INFILTRATION; PERINEURAL at 16:35

## 2019-10-16 RX ADMIN — METHYLPREDNISOLONE ACETATE 80 MG: 80 INJECTION, SUSPENSION INTRA-ARTICULAR; INTRALESIONAL; INTRAMUSCULAR; SOFT TISSUE at 16:41

## 2019-10-16 NOTE — PROGRESS NOTES
Assessment/Plan:       Diagnoses and all orders for this visit:    Type 2 diabetes mellitus without complication, without long-term current use of insulin ([facility])  -     POCT hemoglobin A1c  -     Comprehensive metabolic panel; Future  -     Lipid panel; Future    Colon cancer screening  -     Cologuard; Future    Essential hypertension  -     CBC; Future    Gastroesophageal reflux disease without esophagitis    Anxiety    Migraine with aura and without status migrainosus, not intractable  -     Erenumab-aooe (AIMOVIG, 140 MG DOSE,) 70 MG/ML SOAJ; Inject 70 mg under the skin once as needed (headache) for up to 1 dose  -     SUMAtriptan (IMITREX) 50 mg tablet; Take 1 tablet (50 mg total) by mouth every 2 (two) hours as needed for migraine Max 200 mg/day    Lateral epicondylitis of left elbow  -     methylPREDNISolone acetate (DEPO-MEDROL) injection 80 mg    Mild intermittent asthma without complication  -     VENTOLIN  (90 Base) MCG/ACT inhaler; Inhale 2 puffs 3 (three) times a day as needed for wheezing    Dermatitis  -     clotrimazole-betamethasone (LOTRISONE) 1-0 05 % cream; Apply topically 2 (two) times a day    Other orders  -     Medium joint arthrocentesis        Gastroesophageal reflux disease without esophagitis  Continue pantoprazole, symptoms well controlled    Type 2 diabetes mellitus without complications ([facility] [address] )    Lab Results   Component Value Date    HGBA1C 5 0 10/16/2019    Diet controlled, doing well with this  Essential hypertension  Controlled, continue his amlodipine and his losartan  Migraine headache  He is given refills of the Imitrex  Anxiety  Continue his venlafaxine daily and his trazodone at night for sleep  Follow-up in 6 months  Subjective:      Patient ID: Madelyn Peoples is a 60 y o  male  Patient comes in today for check on his diabetes, hypertension, migraine cephalgia, hypertension, anxiety    He continues take his medications prescribed, he does need refills of his inhaler for his intermittent asthma, his Imitrex for his migraines, and his Lotrisone cream which he uses intermittently for dermatitis  He does complain of some left lateral elbow pain which he has had intermittently in the past and has received Depo-Medrol injections for with relief  The following portions of the patient's history were reviewed and updated as appropriate:   He has no past medical history on file  ,  does not have any pertinent problems on file  ,   has a past surgical history that includes Septoplasty  ,  family history includes Aneurysm in his mother; Cancer in his other; Coronary artery disease in his father; Stroke in his other  ,   reports that he has been smoking  He has never used smokeless tobacco  He reports that he drinks alcohol  He reports that he does not use drugs  ,  has No Known Allergies     Current Outpatient Medications   Medication Sig Dispense Refill    amLODIPine (NORVASC) 10 mg tablet Take 1 tablet (10 mg total) by mouth daily 90 tablet 3    doxazosin (CARDURA) 2 mg tablet Take 1 tablet (2 mg total) by mouth daily 30 tablet 5    Erenumab-aooe (AIMOVIG, 140 MG DOSE,) 70 MG/ML SOAJ Inject 70 mg under the skin once as needed (headache) for up to 1 dose 2 pen 3    fluticasone (FLONASE) 50 mcg/act nasal spray 2 sprays into each nostril daily      HYDROcodone-acetaminophen (XODOL) 5-300 MG per tablet Take 1 tablet by mouth      losartan (COZAAR) 100 MG tablet TAKE 1 TABLET BY MOUTH EVERY DAY 90 tablet 3    meclizine (ANTIVERT) 25 mg tablet Take 1 tablet (25 mg total) by mouth 3 (three) times a day as needed for dizziness 30 tablet 0    meloxicam (MOBIC) 15 mg tablet TAKE 1 TABLET BY MOUTH ONCE DAILY AS NEEDED 30 tablet 5    pantoprazole (PROTONIX) 40 mg tablet Take 40 mg by mouth daily  0    SUMAtriptan (IMITREX) 50 mg tablet Take 1 tablet (50 mg total) by mouth every 2 (two) hours as needed for migraine Max 200 mg/day 9 tablet 3    traZODone (DESYREL) 100 mg tablet Take 1 tablet (100 mg total) by mouth daily at bedtime 90 tablet 3    venlafaxine (EFFEXOR-XR) 150 mg 24 hr capsule Take 1 capsule (150 mg total) by mouth daily 90 capsule 3    VENTOLIN  (90 Base) MCG/ACT inhaler Inhale 2 puffs 3 (three) times a day as needed for wheezing 18 g 2    clotrimazole-betamethasone (LOTRISONE) 1-0 05 % cream Apply topically 2 (two) times a day 30 g 0    ketoconazole (NIZORAL) 2 % shampoo Apply topically daily       No current facility-administered medications for this visit  Review of Systems   Constitutional: Negative for chills, fatigue and fever  HENT: Negative for congestion, ear pain, hearing loss, postnasal drip, rhinorrhea and sore throat  Eyes: Negative for pain and visual disturbance  Respiratory: Negative for chest tightness, shortness of breath and wheezing  Cardiovascular: Negative for chest pain and leg swelling  Gastrointestinal: Negative for abdominal distention, abdominal pain, constipation, diarrhea and vomiting  Endocrine: Negative for cold intolerance and heat intolerance  Genitourinary: Negative for difficulty urinating, frequency and urgency  Musculoskeletal: Positive for arthralgias (Left elbow)  Negative for gait problem  Skin: Negative for color change  Neurological: Negative for dizziness, tremors, syncope, numbness and headaches  Hematological: Negative for adenopathy  Psychiatric/Behavioral: Negative for agitation, confusion and sleep disturbance  The patient is not nervous/anxious  Objective:  Vitals:    10/16/19 1558   BP: 110/74   Pulse: 60   Temp: 98 1 °F (36 7 °C)   SpO2: 98%   Weight: 93 9 kg (207 lb)   Height: 5' 2 5" (1 588 m)     Body mass index is 37 26 kg/m²  Physical Exam   Constitutional: He is oriented to person, place, and time  He appears well-developed and well-nourished  HENT:   Head: Normocephalic     Mouth/Throat: Oropharynx is clear and moist    Eyes: Conjunctivae are normal  No scleral icterus  Neck: Normal range of motion  No thyromegaly present  Cardiovascular: Normal rate, regular rhythm and normal heart sounds  No murmur heard  Pulmonary/Chest: Effort normal and breath sounds normal  No respiratory distress  He has no wheezes  Abdominal: Soft  Bowel sounds are normal  He exhibits no distension  There is no tenderness  Musculoskeletal: Normal range of motion  He exhibits tenderness (Over the left lateral epicondyle)  He exhibits no edema  Lymphadenopathy:     He has no cervical adenopathy  Neurological: He is alert and oriented to person, place, and time  No cranial nerve deficit  Skin: Skin is warm and dry  No rash noted  No pallor  Psychiatric: He has a normal mood and affect  His behavior is normal    Nursing note and vitals reviewed          Medium joint arthrocentesis: L elbow  Date/Time: 10/16/2019 4:35 PM  Consent given by: patient  Site marked: site marked  Timeout: Immediately prior to procedure a time out was called to verify the correct patient, procedure, equipment, support staff and site/side marked as required   Supporting Documentation  Indications: pain   Procedure Details  Location: elbow - L elbow  Needle size: 25 G  Ultrasound guidance: no  Approach: anterolateral  Medications administered: 1 mL lidocaine 1 %    Aspirate amount: 0 mL    Patient tolerance: patient tolerated the procedure well with no immediate complications  Dressing:  Sterile dressing applied

## 2019-11-14 ENCOUNTER — OFFICE VISIT (OUTPATIENT)
Dept: FAMILY MEDICINE CLINIC | Facility: CLINIC | Age: 64
End: 2019-11-14
Payer: COMMERCIAL

## 2019-11-14 VITALS
DIASTOLIC BLOOD PRESSURE: 80 MMHG | WEIGHT: 194 LBS | BODY MASS INDEX: 34.38 KG/M2 | OXYGEN SATURATION: 97 % | SYSTOLIC BLOOD PRESSURE: 122 MMHG | HEIGHT: 63 IN | TEMPERATURE: 98.5 F | HEART RATE: 100 BPM

## 2019-11-14 DIAGNOSIS — R42 DIZZINESS: ICD-10-CM

## 2019-11-14 DIAGNOSIS — J01.90 ACUTE NON-RECURRENT SINUSITIS, UNSPECIFIED LOCATION: Primary | ICD-10-CM

## 2019-11-14 PROCEDURE — 99213 OFFICE O/P EST LOW 20 MIN: CPT | Performed by: FAMILY MEDICINE

## 2019-11-14 RX ORDER — AMOXICILLIN AND CLAVULANATE POTASSIUM 875; 125 MG/1; MG/1
1 TABLET, FILM COATED ORAL 2 TIMES DAILY
Qty: 20 TABLET | Refills: 0 | Status: SHIPPED | OUTPATIENT
Start: 2019-11-14 | End: 2019-11-24

## 2019-11-14 RX ORDER — MECLIZINE HYDROCHLORIDE 25 MG/1
25 TABLET ORAL 3 TIMES DAILY PRN
Qty: 30 TABLET | Refills: 0 | Status: SHIPPED | OUTPATIENT
Start: 2019-11-14

## 2019-11-14 NOTE — PROGRESS NOTES
Assessment/Plan:       Diagnoses and all orders for this visit:    Acute non-recurrent sinusitis, unspecified location  -     amoxicillin-clavulanate (AUGMENTIN) 875-125 mg per tablet; Take 1 tablet by mouth 2 (two) times a day for 10 days    Dizziness  -     meclizine (ANTIVERT) 25 mg tablet; Take 1 tablet (25 mg total) by mouth 3 (three) times a day as needed for dizziness        No problem-specific Assessment & Plan notes found for this encounter  Subjective:      Patient ID: Ewelina Harry is a 60 y o  male  Patient comes in with a 3 day history of sinus congestion and dizziness  The following portions of the patient's history were reviewed and updated as appropriate:   He has no past medical history on file  ,  does not have any pertinent problems on file  ,   has a past surgical history that includes Septoplasty  ,  family history includes Aneurysm in his mother; Cancer in his other; Coronary artery disease in his father; Stroke in his other  ,   reports that he has been smoking  He has never used smokeless tobacco  He reports that he drinks alcohol  He reports that he does not use drugs  ,  has No Known Allergies     Current Outpatient Medications   Medication Sig Dispense Refill    amLODIPine (NORVASC) 10 mg tablet Take 1 tablet (10 mg total) by mouth daily 90 tablet 3    clotrimazole-betamethasone (LOTRISONE) 1-0 05 % cream Apply topically 2 (two) times a day 30 g 0    doxazosin (CARDURA) 2 mg tablet Take 1 tablet (2 mg total) by mouth daily 30 tablet 5    Erenumab-aooe (AIMOVIG, 140 MG DOSE,) 70 MG/ML SOAJ Inject 70 mg under the skin once as needed (headache) for up to 1 dose 2 pen 3    fluticasone (FLONASE) 50 mcg/act nasal spray 2 sprays into each nostril daily      HYDROcodone-acetaminophen (XODOL) 5-300 MG per tablet Take 1 tablet by mouth      ketoconazole (NIZORAL) 2 % shampoo Apply topically daily      losartan (COZAAR) 100 MG tablet TAKE 1 TABLET BY MOUTH EVERY DAY 90 tablet 3    meclizine (ANTIVERT) 25 mg tablet Take 1 tablet (25 mg total) by mouth 3 (three) times a day as needed for dizziness 30 tablet 0    meloxicam (MOBIC) 15 mg tablet TAKE 1 TABLET BY MOUTH ONCE DAILY AS NEEDED 30 tablet 5    pantoprazole (PROTONIX) 40 mg tablet Take 40 mg by mouth daily  0    SUMAtriptan (IMITREX) 50 mg tablet Take 1 tablet (50 mg total) by mouth every 2 (two) hours as needed for migraine Max 200 mg/day 9 tablet 3    traZODone (DESYREL) 100 mg tablet Take 1 tablet (100 mg total) by mouth daily at bedtime 90 tablet 3    venlafaxine (EFFEXOR-XR) 150 mg 24 hr capsule Take 1 capsule (150 mg total) by mouth daily 90 capsule 3    VENTOLIN  (90 Base) MCG/ACT inhaler Inhale 2 puffs 3 (three) times a day as needed for wheezing 18 g 2    amoxicillin-clavulanate (AUGMENTIN) 875-125 mg per tablet Take 1 tablet by mouth 2 (two) times a day for 10 days 20 tablet 0     No current facility-administered medications for this visit  Review of Systems   Constitutional: Negative  HENT: Positive for congestion  Respiratory: Negative  Gastrointestinal: Positive for nausea  Neurological: Positive for dizziness  Objective:  Vitals:    11/14/19 0922   BP: 122/80   Pulse: 100   Temp: 98 5 °F (36 9 °C)   SpO2: 97%   Weight: 88 kg (194 lb)   Height: 5' 2 5" (1 588 m)     Body mass index is 34 92 kg/m²  Physical Exam   Constitutional: He is oriented to person, place, and time  He appears well-developed and well-nourished  HENT:   Head: Normocephalic and atraumatic  Right Ear: Tympanic membrane and external ear normal    Left Ear: Tympanic membrane and external ear normal    Paranasal sinus are red bilaterally  Posterior oropharynx is injected   Eyes: Pupils are equal, round, and reactive to light  EOM are normal    Neck: Neck supple  Cardiovascular: Normal rate, regular rhythm and normal heart sounds  Pulmonary/Chest: Effort normal and breath sounds normal    Abdominal: Soft  Bowel sounds are normal    Musculoskeletal: Normal range of motion  Neurological: He is alert and oriented to person, place, and time  Skin: Skin is warm and dry  Psychiatric: He has a normal mood and affect   Thought content normal

## 2019-12-01 DIAGNOSIS — L30.9 DERMATITIS: ICD-10-CM

## 2019-12-03 RX ORDER — CLOTRIMAZOLE AND BETAMETHASONE DIPROPIONATE 10; .64 MG/G; MG/G
CREAM TOPICAL
Qty: 30 G | Refills: 0 | Status: SHIPPED | OUTPATIENT
Start: 2019-12-03 | End: 2021-03-01

## 2019-12-11 DIAGNOSIS — I10 ESSENTIAL HYPERTENSION: ICD-10-CM

## 2019-12-11 RX ORDER — DOXAZOSIN 2 MG/1
TABLET ORAL
Qty: 90 TABLET | Refills: 1 | Status: SHIPPED | OUTPATIENT
Start: 2019-12-11 | End: 2020-05-04 | Stop reason: SDUPTHER

## 2020-01-13 DIAGNOSIS — F41.9 ANXIETY: ICD-10-CM

## 2020-01-13 RX ORDER — TRAZODONE HYDROCHLORIDE 100 MG/1
100 TABLET ORAL
Qty: 90 TABLET | Refills: 0 | Status: SHIPPED | OUTPATIENT
Start: 2020-01-13 | End: 2020-04-17

## 2020-02-07 DIAGNOSIS — F41.9 ANXIETY: ICD-10-CM

## 2020-02-07 RX ORDER — VENLAFAXINE HYDROCHLORIDE 150 MG/1
CAPSULE, EXTENDED RELEASE ORAL
Qty: 90 CAPSULE | Refills: 3 | Status: SHIPPED | OUTPATIENT
Start: 2020-02-07 | End: 2021-01-06

## 2020-03-09 ENCOUNTER — TELEPHONE (OUTPATIENT)
Dept: FAMILY MEDICINE CLINIC | Facility: CLINIC | Age: 65
End: 2020-03-09

## 2020-03-09 NOTE — TELEPHONE ENCOUNTER
Pt was doing some home repairs yesterday and stepped on a painted nail  Foot has a lump on it now where the nail penetrated  It did not bleed much at time of injury  He is going to soak it in epsom salts  Does he need an appt or a tetanus?   He does have a recent tetanus on file dated 2/19/19   [phone] call pt back

## 2020-03-09 NOTE — TELEPHONE ENCOUNTER
I am sorry Dr Gita Davison  His last Tdap was 02/2019  I just want to make sure that pt needs another one  Please let me know

## 2020-04-14 DIAGNOSIS — J45.20 MILD INTERMITTENT ASTHMA WITHOUT COMPLICATION: ICD-10-CM

## 2020-04-14 DIAGNOSIS — G43.109 MIGRAINE WITH AURA AND WITHOUT STATUS MIGRAINOSUS, NOT INTRACTABLE: ICD-10-CM

## 2020-04-14 RX ORDER — SUMATRIPTAN 50 MG/1
50 TABLET, FILM COATED ORAL EVERY 2 HOUR PRN
Qty: 9 TABLET | Refills: 3 | Status: SHIPPED | OUTPATIENT
Start: 2020-04-14 | End: 2020-11-27

## 2020-04-15 ENCOUNTER — TELEPHONE (OUTPATIENT)
Dept: FAMILY MEDICINE CLINIC | Facility: CLINIC | Age: 65
End: 2020-04-15

## 2020-04-16 ENCOUNTER — TELEMEDICINE (OUTPATIENT)
Dept: FAMILY MEDICINE CLINIC | Facility: CLINIC | Age: 65
End: 2020-04-16
Payer: COMMERCIAL

## 2020-04-16 VITALS
BODY MASS INDEX: 35.28 KG/M2 | SYSTOLIC BLOOD PRESSURE: 152 MMHG | TEMPERATURE: 97.7 F | WEIGHT: 196 LBS | DIASTOLIC BLOOD PRESSURE: 105 MMHG

## 2020-04-16 DIAGNOSIS — N20.0 KIDNEY STONE: Primary | ICD-10-CM

## 2020-04-16 PROCEDURE — 99442 PR PHYS/QHP TELEPHONE EVALUATION 11-20 MIN: CPT | Performed by: FAMILY MEDICINE

## 2020-04-16 RX ORDER — HYDROCODONE BITARTRATE AND ACETAMINOPHEN 5; 300 MG/1; MG/1
1 TABLET ORAL EVERY 4 HOURS PRN
Qty: 30 TABLET | Refills: 0 | Status: SHIPPED | OUTPATIENT
Start: 2020-04-16 | End: 2021-03-01

## 2020-04-17 DIAGNOSIS — F41.9 ANXIETY: ICD-10-CM

## 2020-04-17 RX ORDER — TRAZODONE HYDROCHLORIDE 100 MG/1
TABLET ORAL
Qty: 90 TABLET | Refills: 0 | Status: SHIPPED | OUTPATIENT
Start: 2020-04-17 | End: 2020-05-04 | Stop reason: SDUPTHER

## 2020-05-04 ENCOUNTER — TELEMEDICINE (OUTPATIENT)
Dept: FAMILY MEDICINE CLINIC | Facility: CLINIC | Age: 65
End: 2020-05-04
Payer: COMMERCIAL

## 2020-05-04 DIAGNOSIS — M19.049 HAND ARTHRITIS: Primary | ICD-10-CM

## 2020-05-04 DIAGNOSIS — I10 ESSENTIAL HYPERTENSION: ICD-10-CM

## 2020-05-04 DIAGNOSIS — F41.9 ANXIETY: ICD-10-CM

## 2020-05-04 PROBLEM — N20.0 KIDNEY STONE: Status: RESOLVED | Noted: 2020-04-16 | Resolved: 2020-05-04

## 2020-05-04 PROBLEM — J01.90 ACUTE NON-RECURRENT SINUSITIS: Status: RESOLVED | Noted: 2019-11-14 | Resolved: 2020-05-04

## 2020-05-04 PROCEDURE — 99214 OFFICE O/P EST MOD 30 MIN: CPT | Performed by: FAMILY MEDICINE

## 2020-05-04 RX ORDER — CELECOXIB 200 MG/1
200 CAPSULE ORAL DAILY
Qty: 30 CAPSULE | Refills: 3 | Status: SHIPPED | OUTPATIENT
Start: 2020-05-04 | End: 2020-08-24

## 2020-05-04 RX ORDER — DOXAZOSIN 2 MG/1
2 TABLET ORAL DAILY
Qty: 90 TABLET | Refills: 1 | Status: SHIPPED | OUTPATIENT
Start: 2020-05-04 | End: 2021-01-06

## 2020-05-04 RX ORDER — TRAZODONE HYDROCHLORIDE 100 MG/1
100 TABLET ORAL
Qty: 90 TABLET | Refills: 3 | Status: SHIPPED | OUTPATIENT
Start: 2020-05-04 | End: 2021-04-02

## 2020-05-04 RX ORDER — AMLODIPINE BESYLATE 10 MG/1
10 TABLET ORAL DAILY
Qty: 90 TABLET | Refills: 3 | Status: SHIPPED | OUTPATIENT
Start: 2020-05-04 | End: 2021-04-02

## 2020-05-10 DIAGNOSIS — I10 ESSENTIAL HYPERTENSION: ICD-10-CM

## 2020-05-11 DIAGNOSIS — I10 ESSENTIAL HYPERTENSION: Primary | ICD-10-CM

## 2020-05-11 PROCEDURE — 4010F ACE/ARB THERAPY RXD/TAKEN: CPT | Performed by: FAMILY MEDICINE

## 2020-05-11 RX ORDER — LOSARTAN POTASSIUM 100 MG/1
TABLET ORAL
Qty: 90 TABLET | Refills: 3 | OUTPATIENT
Start: 2020-05-11

## 2020-05-11 RX ORDER — VALSARTAN 320 MG/1
320 TABLET ORAL DAILY
Qty: 90 TABLET | Refills: 1 | Status: SHIPPED | OUTPATIENT
Start: 2020-05-11 | End: 2020-10-20

## 2020-08-21 ENCOUNTER — APPOINTMENT (OUTPATIENT)
Dept: LAB | Facility: HOSPITAL | Age: 65
End: 2020-08-21
Attending: FAMILY MEDICINE
Payer: COMMERCIAL

## 2020-08-21 DIAGNOSIS — I10 ESSENTIAL HYPERTENSION: ICD-10-CM

## 2020-08-21 DIAGNOSIS — E11.9 TYPE 2 DIABETES MELLITUS WITHOUT COMPLICATION, WITHOUT LONG-TERM CURRENT USE OF INSULIN (HCC): ICD-10-CM

## 2020-08-21 LAB
ALBUMIN SERPL BCP-MCNC: 3.5 G/DL (ref 3.5–5)
ALP SERPL-CCNC: 63 U/L (ref 46–116)
ALT SERPL W P-5'-P-CCNC: 24 U/L (ref 12–78)
ANION GAP SERPL CALCULATED.3IONS-SCNC: 8 MMOL/L (ref 4–13)
AST SERPL W P-5'-P-CCNC: 21 U/L (ref 5–45)
BILIRUB SERPL-MCNC: 0.5 MG/DL (ref 0.2–1)
BUN SERPL-MCNC: 13 MG/DL (ref 5–25)
CALCIUM SERPL-MCNC: 8.3 MG/DL (ref 8.3–10.1)
CHLORIDE SERPL-SCNC: 109 MMOL/L (ref 100–108)
CHOLEST SERPL-MCNC: 165 MG/DL (ref 50–200)
CO2 SERPL-SCNC: 27 MMOL/L (ref 21–32)
CREAT SERPL-MCNC: 0.93 MG/DL (ref 0.6–1.3)
ERYTHROCYTE [DISTWIDTH] IN BLOOD BY AUTOMATED COUNT: 14.1 % (ref 11.6–15.1)
GFR SERPL CREATININE-BSD FRML MDRD: 88 ML/MIN/1.73SQ M
GLUCOSE P FAST SERPL-MCNC: 108 MG/DL (ref 65–99)
HCT VFR BLD AUTO: 43 % (ref 36.5–49.3)
HDLC SERPL-MCNC: 53 MG/DL
HGB BLD-MCNC: 15 G/DL (ref 12–17)
LDLC SERPL CALC-MCNC: 85 MG/DL (ref 0–100)
MCH RBC QN AUTO: 31.9 PG (ref 26.8–34.3)
MCHC RBC AUTO-ENTMCNC: 34.9 G/DL (ref 31.4–37.4)
MCV RBC AUTO: 92 FL (ref 82–98)
NONHDLC SERPL-MCNC: 112 MG/DL
PLATELET # BLD AUTO: 168 THOUSANDS/UL (ref 149–390)
PMV BLD AUTO: 10 FL (ref 8.9–12.7)
POTASSIUM SERPL-SCNC: 3.9 MMOL/L (ref 3.5–5.3)
PROT SERPL-MCNC: 6.5 G/DL (ref 6.4–8.2)
RBC # BLD AUTO: 4.7 MILLION/UL (ref 3.88–5.62)
SODIUM SERPL-SCNC: 144 MMOL/L (ref 136–145)
TRIGL SERPL-MCNC: 133 MG/DL
WBC # BLD AUTO: 6.39 THOUSAND/UL (ref 4.31–10.16)

## 2020-08-21 PROCEDURE — 80053 COMPREHEN METABOLIC PANEL: CPT

## 2020-08-21 PROCEDURE — 80061 LIPID PANEL: CPT

## 2020-08-21 PROCEDURE — 85027 COMPLETE CBC AUTOMATED: CPT

## 2020-08-21 PROCEDURE — 36415 COLL VENOUS BLD VENIPUNCTURE: CPT

## 2020-08-24 ENCOUNTER — OFFICE VISIT (OUTPATIENT)
Dept: FAMILY MEDICINE CLINIC | Facility: CLINIC | Age: 65
End: 2020-08-24
Payer: COMMERCIAL

## 2020-08-24 VITALS
DIASTOLIC BLOOD PRESSURE: 72 MMHG | HEIGHT: 63 IN | OXYGEN SATURATION: 97 % | SYSTOLIC BLOOD PRESSURE: 120 MMHG | TEMPERATURE: 97 F | BODY MASS INDEX: 35.26 KG/M2 | WEIGHT: 199 LBS | HEART RATE: 68 BPM

## 2020-08-24 DIAGNOSIS — G43.109 MIGRAINE WITH AURA AND WITHOUT STATUS MIGRAINOSUS, NOT INTRACTABLE: ICD-10-CM

## 2020-08-24 DIAGNOSIS — M77.12 LATERAL EPICONDYLITIS OF LEFT ELBOW: ICD-10-CM

## 2020-08-24 DIAGNOSIS — F41.9 ANXIETY: ICD-10-CM

## 2020-08-24 DIAGNOSIS — E11.9 TYPE 2 DIABETES MELLITUS WITHOUT COMPLICATION, UNSPECIFIED WHETHER LONG TERM INSULIN USE (HCC): Primary | ICD-10-CM

## 2020-08-24 DIAGNOSIS — M54.12 CERVICAL RADICULOPATHY: ICD-10-CM

## 2020-08-24 DIAGNOSIS — I10 ESSENTIAL HYPERTENSION: ICD-10-CM

## 2020-08-24 LAB — SL AMB POCT HEMOGLOBIN AIC: 5.1 (ref ?–6.5)

## 2020-08-24 PROCEDURE — 83036 HEMOGLOBIN GLYCOSYLATED A1C: CPT | Performed by: FAMILY MEDICINE

## 2020-08-24 PROCEDURE — 3008F BODY MASS INDEX DOCD: CPT | Performed by: FAMILY MEDICINE

## 2020-08-24 PROCEDURE — 3078F DIAST BP <80 MM HG: CPT | Performed by: FAMILY MEDICINE

## 2020-08-24 PROCEDURE — 3044F HG A1C LEVEL LT 7.0%: CPT | Performed by: FAMILY MEDICINE

## 2020-08-24 PROCEDURE — 20605 DRAIN/INJ JOINT/BURSA W/O US: CPT | Performed by: FAMILY MEDICINE

## 2020-08-24 PROCEDURE — 99214 OFFICE O/P EST MOD 30 MIN: CPT | Performed by: FAMILY MEDICINE

## 2020-08-24 PROCEDURE — 3074F SYST BP LT 130 MM HG: CPT | Performed by: FAMILY MEDICINE

## 2020-08-24 RX ORDER — TRIAMCINOLONE ACETONIDE 40 MG/ML
40 INJECTION, SUSPENSION INTRA-ARTICULAR; INTRAMUSCULAR
Status: COMPLETED | OUTPATIENT
Start: 2020-08-24 | End: 2020-08-24

## 2020-08-24 RX ORDER — LIDOCAINE HYDROCHLORIDE 10 MG/ML
1 INJECTION, SOLUTION INFILTRATION; PERINEURAL
Status: COMPLETED | OUTPATIENT
Start: 2020-08-24 | End: 2020-08-24

## 2020-08-24 RX ORDER — INDOMETHACIN 50 MG/1
50 CAPSULE ORAL 2 TIMES DAILY WITH MEALS
Qty: 60 CAPSULE | Refills: 1 | Status: SHIPPED | OUTPATIENT
Start: 2020-08-24 | End: 2020-10-19

## 2020-08-24 RX ADMIN — TRIAMCINOLONE ACETONIDE 40 MG: 40 INJECTION, SUSPENSION INTRA-ARTICULAR; INTRAMUSCULAR at 08:57

## 2020-08-24 RX ADMIN — LIDOCAINE HYDROCHLORIDE 1 ML: 10 INJECTION, SOLUTION INFILTRATION; PERINEURAL at 08:57

## 2020-08-24 NOTE — PROGRESS NOTES
Assessment/Plan:         Problem List Items Addressed This Visit        Endocrine    Type 2 diabetes mellitus without complications ([facility] [address] ) - Primary     Well controlled, continue watch his diet  Will check a CMP, hemoglobin A1c in 6 months  Lab Results   Component Value Date    HGBA1C 5 1 08/24/2020            Relevant Orders    POCT hemoglobin A1c (Completed)    Comprehensive metabolic panel    Hemoglobin A1C       Cardiovascular and Mediastinum    Essential hypertension      Well controlled, continue current medications  Migraine headache       Doing well with the sumatriptan as needed  Relevant Medications    indomethacin (INDOCIN) 50 mg capsule       Nervous and Auditory    Cervical radiculopathy      Stop the Celebrex as it is not working, will switch to indomethacin twice daily as needed  Relevant Medications    indomethacin (INDOCIN) 50 mg capsule       Other    Anxiety       Controlled the venlafaxine, continue this                 Subjective:      Patient ID: Riley Rich is a 60 y o  male  Patient comes in today for checkup on his diabetes, hypertension, migraine cephalgia, cervical radiculopathy  He does complain of left elbow pain which she has had multiple times in the past including injections  He would like another injection of this  He states the pain is worsened as he is now moving and has been doing a lot of lifting  Continues to watch his diet for his diabetes, he is taking his medications for his anxiety and blood pressure as prescribed  He is using sumatriptan as needed for his headaches which is working well  He does state that he feels Celebrex is not working would like to try something different  The following portions of the patient's history were reviewed and updated as appropriate:   He has no past medical history on file  ,  does not have any pertinent problems on file  ,   has a past surgical history that includes Septoplasty  ,  family history includes Aneurysm in his mother; Cancer in his other; Coronary artery disease in his father; Stroke in his other  ,   reports that he has been smoking  He has never used smokeless tobacco  He reports current alcohol use  He reports that he does not use drugs  ,  has No Known Allergies     Current Outpatient Medications   Medication Sig Dispense Refill    amLODIPine (NORVASC) 10 mg tablet Take 1 tablet (10 mg total) by mouth daily 90 tablet 3    clotrimazole-betamethasone (LOTRISONE) 1-0 05 % cream APPLY TO AFFECTED AREA TWICE A DAY 30 g 0    doxazosin (CARDURA) 2 mg tablet Take 1 tablet (2 mg total) by mouth daily 90 tablet 1    Erenumab-aooe (AIMOVIG, 140 MG DOSE,) 70 MG/ML SOAJ Inject 70 mg under the skin once as needed (headache) for up to 1 dose 2 pen 3    fluticasone (FLONASE) 50 mcg/act nasal spray 2 sprays into each nostril daily      HYDROcodone-acetaminophen (XODOL) 5-300 MG per tablet Take 1 tablet by mouth every 4 (four) hours as needed for moderate painMax Daily Amount: 6 tablets 30 tablet 0    ketoconazole (NIZORAL) 2 % shampoo Apply topically daily      meclizine (ANTIVERT) 25 mg tablet Take 1 tablet (25 mg total) by mouth 3 (three) times a day as needed for dizziness 30 tablet 0    pantoprazole (PROTONIX) 40 mg tablet Take 40 mg by mouth daily  0    SUMAtriptan (IMITREX) 50 mg tablet TAKE 1 TABLET (50 MG TOTAL) BY MOUTH EVERY 2 (TWO) HOURS AS NEEDED FOR MIGRAINE  MG/DAY 9 tablet 3    traZODone (DESYREL) 100 mg tablet Take 1 tablet (100 mg total) by mouth daily at bedtime 90 tablet 3    valsartan (DIOVAN) 320 MG tablet Take 1 tablet (320 mg total) by mouth daily 90 tablet 1    venlafaxine (EFFEXOR-XR) 150 mg 24 hr capsule TAKE 1 CAPSULE BY MOUTH EVERY DAY 90 capsule 3    VENTOLIN  (90 Base) MCG/ACT inhaler INHALE 2 PUFFS 3 (THREE) TIMES A DAY AS NEEDED FOR WHEEZING 18 Inhaler 2    indomethacin (INDOCIN) 50 mg capsule Take 1 capsule (50 mg total) by mouth 2 (two) times a day with meals 60 capsule 1     No current facility-administered medications for this visit  Review of Systems   Constitutional: Negative for chills, fatigue and fever  HENT: Negative for congestion, ear pain, hearing loss, postnasal drip, rhinorrhea and sore throat  Eyes: Negative for pain and visual disturbance  Respiratory: Negative for chest tightness, shortness of breath and wheezing  Cardiovascular: Negative for chest pain and leg swelling  Gastrointestinal: Negative for abdominal distention, abdominal pain, constipation, diarrhea and vomiting  Endocrine: Negative for cold intolerance and heat intolerance  Genitourinary: Negative for difficulty urinating, frequency and urgency  Musculoskeletal: Positive for arthralgias  Negative for gait problem  Skin: Negative for color change  Neurological: Negative for dizziness, tremors, syncope, numbness and headaches  Hematological: Negative for adenopathy  Psychiatric/Behavioral: Negative for agitation, confusion and sleep disturbance  The patient is not nervous/anxious  Objective:  Vitals:    08/24/20 0810   BP: 120/72   Pulse: 68   Temp: (!) 97 °F (36 1 °C)   SpO2: 97%   Weight: 90 3 kg (199 lb)   Height: 5' 2 5" (1 588 m)     Body mass index is 35 82 kg/m²  Physical Exam  Vitals signs and nursing note reviewed  Constitutional:       Appearance: He is well-developed  HENT:      Head: Normocephalic  Eyes:      General: No scleral icterus  Conjunctiva/sclera: Conjunctivae normal    Neck:      Musculoskeletal: Normal range of motion  Thyroid: No thyromegaly  Cardiovascular:      Rate and Rhythm: Normal rate and regular rhythm  Pulses: no weak pulses          Dorsalis pedis pulses are 2+ on the right side and 2+ on the left side  Posterior tibial pulses are 2+ on the right side and 2+ on the left side  Heart sounds: Normal heart sounds  No murmur     Pulmonary:      Effort: Pulmonary effort is normal  No respiratory distress  Breath sounds: Normal breath sounds  No wheezing  Abdominal:      General: Bowel sounds are normal  There is no distension  Palpations: Abdomen is soft  Tenderness: There is no abdominal tenderness  Musculoskeletal: Normal range of motion  General: No tenderness  Feet:      Right foot:      Skin integrity: No ulcer, skin breakdown, erythema, warmth, callus or dry skin  Left foot:      Skin integrity: No ulcer, skin breakdown, erythema, warmth, callus or dry skin  Lymphadenopathy:      Cervical: No cervical adenopathy  Skin:     General: Skin is warm and dry  Coloration: Skin is not pale  Findings: No rash  Neurological:      Mental Status: He is alert and oriented to person, place, and time  Cranial Nerves: No cranial nerve deficit  Psychiatric:         Behavior: Behavior normal        BMI Counseling: Body mass index is 35 82 kg/m²  The BMI is above normal  Nutrition recommendations include decreasing portion sizes and encouraging healthy choices of fruits and vegetables  Exercise recommendations include moderate physical activity 150 minutes/week  No pharmacotherapy was ordered  Patient's shoes and socks removed  Right Foot/Ankle   Right Foot Inspection  Skin Exam: skin normal and skin intact no dry skin, no warmth, no callus, no erythema, no maceration, no abnormal color, no pre-ulcer, no ulcer and no callus                            Sensory       Monofilament testing: intact  Vascular    The right DP pulse is 2+  The right PT pulse is 2+  Left Foot/Ankle  Left Foot Inspection  Skin Exam: skin normal and skin intactno dry skin, no warmth, no erythema, no maceration, normal color, no pre-ulcer, no ulcer and no callus                                         Sensory       Monofilament: intact  Vascular    The left DP pulse is 2+  The left PT pulse is 2+  Assign Risk Category:  No deformity present;  No loss of protective sensation;  No weak pulses       Risk: 0      Medium joint arthrocentesis: L elbow  Date/Time: 8/24/2020 8:57 AM  Consent given by: patient  Site marked: site marked  Timeout: Immediately prior to procedure a time out was called to verify the correct patient, procedure, equipment, support staff and site/side marked as required   Supporting Documentation  Indications: pain and joint swelling   Procedure Details  Location: elbow - L elbow  Preparation: Patient was prepped and draped in the usual sterile fashion  Needle size: 25 G  Ultrasound guidance: no  Approach: posterolateral  Medications administered: 1 mL lidocaine 1 %; 40 mg triamcinolone acetonide 40 mg/mL    Aspirate amount: 0 mL

## 2020-08-24 NOTE — ASSESSMENT & PLAN NOTE
Well controlled, continue watch his diet    Will check a CMP, hemoglobin A1c in 6 months  Lab Results   Component Value Date    HGBA1C 5 1 08/24/2020

## 2020-10-16 DIAGNOSIS — J45.20 MILD INTERMITTENT ASTHMA WITHOUT COMPLICATION: ICD-10-CM

## 2020-10-18 DIAGNOSIS — M54.12 CERVICAL RADICULOPATHY: ICD-10-CM

## 2020-10-19 RX ORDER — INDOMETHACIN 50 MG/1
50 CAPSULE ORAL 2 TIMES DAILY WITH MEALS
Qty: 60 CAPSULE | Refills: 1 | Status: SHIPPED | OUTPATIENT
Start: 2020-10-19 | End: 2021-01-06

## 2020-10-20 DIAGNOSIS — I10 ESSENTIAL HYPERTENSION: ICD-10-CM

## 2020-10-20 RX ORDER — VALSARTAN 320 MG/1
TABLET ORAL
Qty: 90 TABLET | Refills: 1 | Status: SHIPPED | OUTPATIENT
Start: 2020-10-20 | End: 2021-04-21

## 2020-11-06 ENCOUNTER — TELEPHONE (OUTPATIENT)
Dept: FAMILY MEDICINE CLINIC | Facility: CLINIC | Age: 65
End: 2020-11-06

## 2020-11-06 DIAGNOSIS — M54.50 ACUTE BILATERAL LOW BACK PAIN WITHOUT SCIATICA: Primary | ICD-10-CM

## 2020-11-06 RX ORDER — CYCLOBENZAPRINE HCL 10 MG
10 TABLET ORAL 3 TIMES DAILY PRN
Qty: 30 TABLET | Refills: 0 | Status: SHIPPED | OUTPATIENT
Start: 2020-11-06 | End: 2021-04-06 | Stop reason: SDUPTHER

## 2020-11-25 DIAGNOSIS — G43.109 MIGRAINE WITH AURA AND WITHOUT STATUS MIGRAINOSUS, NOT INTRACTABLE: ICD-10-CM

## 2020-11-27 RX ORDER — SUMATRIPTAN 50 MG/1
50 TABLET, FILM COATED ORAL EVERY 2 HOUR PRN
Qty: 9 TABLET | Refills: 3 | Status: SHIPPED | OUTPATIENT
Start: 2020-11-27 | End: 2021-03-15

## 2020-12-29 ENCOUNTER — TELEPHONE (OUTPATIENT)
Dept: FAMILY MEDICINE CLINIC | Facility: CLINIC | Age: 65
End: 2020-12-29

## 2020-12-29 DIAGNOSIS — H10.33 ACUTE CONJUNCTIVITIS OF BOTH EYES, UNSPECIFIED ACUTE CONJUNCTIVITIS TYPE: Primary | ICD-10-CM

## 2020-12-29 RX ORDER — TOBRAMYCIN 3 MG/ML
2 SOLUTION/ DROPS OPHTHALMIC
Qty: 5 ML | Refills: 0 | Status: SHIPPED | OUTPATIENT
Start: 2020-12-29 | End: 2021-03-01

## 2021-01-05 DIAGNOSIS — I10 ESSENTIAL HYPERTENSION: ICD-10-CM

## 2021-01-05 DIAGNOSIS — F41.9 ANXIETY: ICD-10-CM

## 2021-01-05 DIAGNOSIS — M54.12 CERVICAL RADICULOPATHY: ICD-10-CM

## 2021-01-06 RX ORDER — DOXAZOSIN 2 MG/1
TABLET ORAL
Qty: 90 TABLET | Refills: 1 | Status: SHIPPED | OUTPATIENT
Start: 2021-01-06 | End: 2021-06-15

## 2021-01-06 RX ORDER — VENLAFAXINE HYDROCHLORIDE 150 MG/1
CAPSULE, EXTENDED RELEASE ORAL
Qty: 90 CAPSULE | Refills: 3 | Status: SHIPPED | OUTPATIENT
Start: 2021-01-06 | End: 2021-12-09

## 2021-01-06 RX ORDER — INDOMETHACIN 50 MG/1
50 CAPSULE ORAL 2 TIMES DAILY WITH MEALS
Qty: 60 CAPSULE | Refills: 1 | Status: SHIPPED | OUTPATIENT
Start: 2021-01-06 | End: 2021-03-01

## 2021-02-26 ENCOUNTER — APPOINTMENT (OUTPATIENT)
Dept: LAB | Facility: HOSPITAL | Age: 66
End: 2021-02-26
Attending: FAMILY MEDICINE
Payer: COMMERCIAL

## 2021-02-26 DIAGNOSIS — E11.9 TYPE 2 DIABETES MELLITUS WITHOUT COMPLICATION, UNSPECIFIED WHETHER LONG TERM INSULIN USE (HCC): ICD-10-CM

## 2021-02-26 LAB
ALBUMIN SERPL BCP-MCNC: 3.3 G/DL (ref 3.5–5)
ALP SERPL-CCNC: 67 U/L (ref 46–116)
ALT SERPL W P-5'-P-CCNC: 21 U/L (ref 12–78)
ANION GAP SERPL CALCULATED.3IONS-SCNC: 8 MMOL/L (ref 4–13)
AST SERPL W P-5'-P-CCNC: 14 U/L (ref 5–45)
BILIRUB SERPL-MCNC: 0.4 MG/DL (ref 0.2–1)
BUN SERPL-MCNC: 19 MG/DL (ref 5–25)
CALCIUM ALBUM COR SERPL-MCNC: 9.1 MG/DL (ref 8.3–10.1)
CALCIUM SERPL-MCNC: 8.5 MG/DL (ref 8.3–10.1)
CHLORIDE SERPL-SCNC: 107 MMOL/L (ref 100–108)
CO2 SERPL-SCNC: 28 MMOL/L (ref 21–32)
CREAT SERPL-MCNC: 0.92 MG/DL (ref 0.6–1.3)
EST. AVERAGE GLUCOSE BLD GHB EST-MCNC: 97 MG/DL
GFR SERPL CREATININE-BSD FRML MDRD: 88 ML/MIN/1.73SQ M
GLUCOSE P FAST SERPL-MCNC: 167 MG/DL (ref 65–99)
HBA1C MFR BLD: 5 %
POTASSIUM SERPL-SCNC: 3.9 MMOL/L (ref 3.5–5.3)
PROT SERPL-MCNC: 6.5 G/DL (ref 6.4–8.2)
SODIUM SERPL-SCNC: 143 MMOL/L (ref 136–145)

## 2021-02-26 PROCEDURE — 83036 HEMOGLOBIN GLYCOSYLATED A1C: CPT

## 2021-02-26 PROCEDURE — 36415 COLL VENOUS BLD VENIPUNCTURE: CPT

## 2021-02-26 PROCEDURE — 3044F HG A1C LEVEL LT 7.0%: CPT | Performed by: FAMILY MEDICINE

## 2021-02-26 PROCEDURE — 80053 COMPREHEN METABOLIC PANEL: CPT

## 2021-03-01 ENCOUNTER — OFFICE VISIT (OUTPATIENT)
Dept: FAMILY MEDICINE CLINIC | Facility: CLINIC | Age: 66
End: 2021-03-01
Payer: COMMERCIAL

## 2021-03-01 VITALS
RESPIRATION RATE: 16 BRPM | TEMPERATURE: 96.8 F | WEIGHT: 195 LBS | HEIGHT: 63 IN | SYSTOLIC BLOOD PRESSURE: 140 MMHG | BODY MASS INDEX: 34.55 KG/M2 | DIASTOLIC BLOOD PRESSURE: 90 MMHG | HEART RATE: 86 BPM | OXYGEN SATURATION: 97 %

## 2021-03-01 DIAGNOSIS — F41.9 ANXIETY: ICD-10-CM

## 2021-03-01 DIAGNOSIS — Z12.11 SCREEN FOR COLON CANCER: ICD-10-CM

## 2021-03-01 DIAGNOSIS — M25.512 ACUTE PAIN OF LEFT SHOULDER: ICD-10-CM

## 2021-03-01 DIAGNOSIS — M77.12 LATERAL EPICONDYLITIS OF LEFT ELBOW: ICD-10-CM

## 2021-03-01 DIAGNOSIS — E66.01 CLASS 2 SEVERE OBESITY DUE TO EXCESS CALORIES WITH SERIOUS COMORBIDITY AND BODY MASS INDEX (BMI) OF 37.0 TO 37.9 IN ADULT (HCC): ICD-10-CM

## 2021-03-01 DIAGNOSIS — K21.9 GASTROESOPHAGEAL REFLUX DISEASE WITHOUT ESOPHAGITIS: ICD-10-CM

## 2021-03-01 DIAGNOSIS — Z12.5 SCREENING PSA (PROSTATE SPECIFIC ANTIGEN): ICD-10-CM

## 2021-03-01 DIAGNOSIS — G43.109 MIGRAINE WITH AURA AND WITHOUT STATUS MIGRAINOSUS, NOT INTRACTABLE: ICD-10-CM

## 2021-03-01 DIAGNOSIS — E11.9 TYPE 2 DIABETES MELLITUS WITHOUT COMPLICATION, UNSPECIFIED WHETHER LONG TERM INSULIN USE (HCC): Primary | ICD-10-CM

## 2021-03-01 DIAGNOSIS — I10 ESSENTIAL HYPERTENSION: ICD-10-CM

## 2021-03-01 PROCEDURE — 3008F BODY MASS INDEX DOCD: CPT | Performed by: FAMILY MEDICINE

## 2021-03-01 PROCEDURE — 20605 DRAIN/INJ JOINT/BURSA W/O US: CPT | Performed by: FAMILY MEDICINE

## 2021-03-01 PROCEDURE — 3077F SYST BP >= 140 MM HG: CPT | Performed by: FAMILY MEDICINE

## 2021-03-01 PROCEDURE — 99214 OFFICE O/P EST MOD 30 MIN: CPT | Performed by: FAMILY MEDICINE

## 2021-03-01 PROCEDURE — 3725F SCREEN DEPRESSION PERFORMED: CPT | Performed by: FAMILY MEDICINE

## 2021-03-01 PROCEDURE — 3080F DIAST BP >= 90 MM HG: CPT | Performed by: FAMILY MEDICINE

## 2021-03-01 PROCEDURE — 4004F PT TOBACCO SCREEN RCVD TLK: CPT | Performed by: FAMILY MEDICINE

## 2021-03-01 RX ORDER — METHYLPREDNISOLONE ACETATE 40 MG/ML
1 INJECTION, SUSPENSION INTRA-ARTICULAR; INTRALESIONAL; INTRAMUSCULAR; SOFT TISSUE
Status: COMPLETED | OUTPATIENT
Start: 2021-03-01 | End: 2021-03-01

## 2021-03-01 RX ORDER — LIDOCAINE HYDROCHLORIDE 10 MG/ML
1 INJECTION, SOLUTION INFILTRATION; PERINEURAL
Status: COMPLETED | OUTPATIENT
Start: 2021-03-01 | End: 2021-03-01

## 2021-03-01 RX ORDER — MELOXICAM 15 MG/1
15 TABLET ORAL DAILY
Qty: 30 TABLET | Refills: 1 | Status: SHIPPED | OUTPATIENT
Start: 2021-03-01 | End: 2021-04-27

## 2021-03-01 RX ADMIN — LIDOCAINE HYDROCHLORIDE 1 ML: 10 INJECTION, SOLUTION INFILTRATION; PERINEURAL at 08:48

## 2021-03-01 RX ADMIN — METHYLPREDNISOLONE ACETATE 1 ML: 40 INJECTION, SUSPENSION INTRA-ARTICULAR; INTRALESIONAL; INTRAMUSCULAR; SOFT TISSUE at 08:48

## 2021-03-01 NOTE — PROGRESS NOTES
Assessment/Plan:         Problem List Items Addressed This Visit        Digestive    Gastroesophageal reflux disease without esophagitis      Well controlled, continue pantoprazole  Endocrine    Type 2 diabetes mellitus without complications ([facility] [address] ) - Primary       Continue to watch his diet, encouraged to lose weight  Lab Results   Component Value Date    HGBA1C 5 0 02/26/2021            Relevant Orders    Comprehensive metabolic panel    Hemoglobin A1C       Cardiovascular and Mediastinum    Essential hypertension       Continue amlodipine, valsartan, recheck blood pressure in 6 months  Relevant Orders    CBC    Migraine headache       Continues Imitrex as needed         Relevant Medications    meloxicam (MOBIC) 15 mg tablet       Other    Anxiety      Continue his venlafaxine daily, and his trazodone at bedtime  Class 2 severe obesity with serious comorbidity and body mass index (BMI) of 37 0 to 37 9 in [facility])      Other Visit Diagnoses     Screen for colon cancer        Relevant Orders    Cologuard    Acute pain of left shoulder        Relevant Medications    meloxicam (MOBIC) 15 mg tablet    Screening PSA (prostate specific antigen)        Relevant Orders    PSA, Total Screen    Lateral epicondylitis of left elbow            BMI Counseling: Body mass index is 35 1 kg/m²  The BMI is above normal  Nutrition recommendations include decreasing portion sizes and encouraging healthy choices of fruits and vegetables  Exercise recommendations include moderate physical activity 150 minutes/week  No pharmacotherapy was ordered  Tobacco Cessation Counseling: Tobacco cessation counseling was provided  The patient is sincerely urged to quit consumption of tobacco  He is not ready to quit tobacco        Subjective:      Patient ID: Ewelina Harry is a 63 y o  male  She comes in today for checkup on his diabetes, migraine cephalgia, hypertension, anxiety, GERD    He is take his medication as prescribed, no compliance issues or side effects  He did have blood work done and this was reviewed with him today  He does complain of some left shoulder pain and left elbow pain worse with activity  He would like to get another injection of his lateral epicondyle  The following portions of the patient's history were reviewed and updated as appropriate:   Past Medical History:  He has no past medical history on file ,  _______________________________________________________________________  Medical Problems:  does not have any pertinent problems on file ,  _______________________________________________________________________  Past Surgical History:   has a past surgical history that includes Septoplasty  ,  _______________________________________________________________________  Family History:  family history includes Aneurysm in his mother; Cancer in his other; Coronary artery disease in his father; Stroke in his other ,  _______________________________________________________________________  Social History:   reports that he has been smoking  He has never used smokeless tobacco  He reports current alcohol use  He reports that he does not use drugs  ,  _______________________________________________________________________  Allergies:  has No Known Allergies     _______________________________________________________________________  Current Outpatient Medications   Medication Sig Dispense Refill    amLODIPine (NORVASC) 10 mg tablet Take 1 tablet (10 mg total) by mouth daily 90 tablet 3    cyclobenzaprine (FLEXERIL) 10 mg tablet Take 1 tablet (10 mg total) by mouth 3 (three) times a day as needed for muscle spasms 30 tablet 0    doxazosin (CARDURA) 2 mg tablet TAKE 1 TABLET BY MOUTH EVERY DAY 90 tablet 1    fluticasone (FLONASE) 50 mcg/act nasal spray 2 sprays into each nostril daily      meclizine (ANTIVERT) 25 mg tablet Take 1 tablet (25 mg total) by mouth 3 (three) times a day as needed for dizziness 30 tablet 0    pantoprazole (PROTONIX) 40 mg tablet Take 40 mg by mouth daily  0    SUMAtriptan (IMITREX) 50 mg tablet TAKE 1 TABLET (50 MG TOTAL) BY MOUTH EVERY 2 (TWO) HOURS AS NEEDED FOR MIGRAINE  MG/DAY 9 tablet 3    traZODone (DESYREL) 100 mg tablet Take 1 tablet (100 mg total) by mouth daily at bedtime 90 tablet 3    valsartan (DIOVAN) 320 MG tablet TAKE 1 TABLET BY MOUTH EVERY DAY 90 tablet 1    venlafaxine (EFFEXOR-XR) 150 mg 24 hr capsule TAKE 1 CAPSULE BY MOUTH EVERY DAY 90 capsule 3    Ventolin  (90 Base) MCG/ACT inhaler INHALE 2 PUFFS 3 (THREE) TIMES A DAY AS NEEDED FOR WHEEZING 18 Inhaler 2    ketoconazole (NIZORAL) 2 % shampoo Apply topically daily      meloxicam (MOBIC) 15 mg tablet Take 1 tablet (15 mg total) by mouth daily 30 tablet 1     No current facility-administered medications for this visit       _______________________________________________________________________  Review of Systems   Constitutional: Negative for chills, fatigue and fever  HENT: Negative for congestion, ear pain, hearing loss, postnasal drip, rhinorrhea and sore throat  Eyes: Negative for pain and visual disturbance  Respiratory: Negative for chest tightness, shortness of breath and wheezing  Cardiovascular: Negative for chest pain and leg swelling  Gastrointestinal: Negative for abdominal distention, abdominal pain, constipation, diarrhea and vomiting  Endocrine: Negative for cold intolerance and heat intolerance  Genitourinary: Negative for difficulty urinating, frequency and urgency  Musculoskeletal: Positive for arthralgias (  Left elbow and shoulder)  Negative for gait problem  Skin: Negative for color change  Neurological: Negative for dizziness, tremors, syncope, numbness and headaches  Hematological: Negative for adenopathy  Psychiatric/Behavioral: Negative for agitation, confusion and sleep disturbance  The patient is not nervous/anxious  Objective:  Vitals:    03/01/21 0816   BP: 140/90   Pulse: 86   Resp: 16   Temp: (!) 96 8 °F (36 °C)   SpO2: 97%   Weight: 88 5 kg (195 lb)   Height: 5' 2 5" (1 588 m)     Body mass index is 35 1 kg/m²  Physical Exam  Vitals signs and nursing note reviewed  Constitutional:       Appearance: He is well-developed  HENT:      Head: Normocephalic  Eyes:      General: No scleral icterus  Conjunctiva/sclera: Conjunctivae normal    Neck:      Musculoskeletal: Normal range of motion  Thyroid: No thyromegaly  Cardiovascular:      Rate and Rhythm: Normal rate and regular rhythm  Heart sounds: Normal heart sounds  No murmur  Pulmonary:      Effort: Pulmonary effort is normal  No respiratory distress  Breath sounds: Normal breath sounds  No wheezing  Abdominal:      General: Bowel sounds are normal  There is no distension  Palpations: Abdomen is soft  Tenderness: There is no abdominal tenderness  Musculoskeletal: Normal range of motion  General: Tenderness ( over the lateral left epicondyle, and the left shoulder anterior and posterior rotator cuff ) present  Lymphadenopathy:      Cervical: No cervical adenopathy  Skin:     General: Skin is warm and dry  Coloration: Skin is not pale  Findings: No rash  Neurological:      Mental Status: He is alert and oriented to person, place, and time  Cranial Nerves: No cranial nerve deficit  Psychiatric:         Behavior: Behavior normal          Medium joint arthrocentesis: L elbow  Universal Protocol:  Consent: Verbal consent obtained  Risks and benefits: risks, benefits and alternatives were discussed  Consent given by: patient  Time out: Immediately prior to procedure a "time out" was called to verify the correct patient, procedure, equipment, support staff and site/side marked as required    Timeout called at: 3/1/2021 8:38 AM   Patient understanding: patient states understanding of the procedure being performed  Site marked: the operative site was marked  Patient identity confirmed: verbally with patient    Supporting Documentation  Indications: pain and joint swelling   Procedure Details  Location: elbow - L elbow  Needle size: 25 G  Ultrasound guidance: no  Approach: anterolateral  Medications administered: 1 mL lidocaine 1 %; 1 mL methylPREDNISolone acetate 40 mg/mL    Patient tolerance: patient tolerated the procedure well with no immediate complications  Dressing:  Sterile dressing applied

## 2021-03-01 NOTE — ASSESSMENT & PLAN NOTE
Continue to watch his diet, encouraged to lose weight    Lab Results   Component Value Date    HGBA1C 5 0 02/26/2021

## 2021-03-15 DIAGNOSIS — G43.109 MIGRAINE WITH AURA AND WITHOUT STATUS MIGRAINOSUS, NOT INTRACTABLE: ICD-10-CM

## 2021-03-15 RX ORDER — SUMATRIPTAN 50 MG/1
50 TABLET, FILM COATED ORAL EVERY 2 HOUR PRN
Qty: 9 TABLET | Refills: 3 | Status: SHIPPED | OUTPATIENT
Start: 2021-03-15

## 2021-04-02 DIAGNOSIS — I10 ESSENTIAL HYPERTENSION: ICD-10-CM

## 2021-04-02 DIAGNOSIS — F41.9 ANXIETY: ICD-10-CM

## 2021-04-02 RX ORDER — AMLODIPINE BESYLATE 10 MG/1
TABLET ORAL
Qty: 90 TABLET | Refills: 3 | Status: SHIPPED | OUTPATIENT
Start: 2021-04-02

## 2021-04-02 RX ORDER — TRAZODONE HYDROCHLORIDE 100 MG/1
TABLET ORAL
Qty: 90 TABLET | Refills: 3 | Status: SHIPPED | OUTPATIENT
Start: 2021-04-02 | End: 2022-07-28 | Stop reason: SDUPTHER

## 2021-04-06 DIAGNOSIS — M54.50 ACUTE BILATERAL LOW BACK PAIN WITHOUT SCIATICA: ICD-10-CM

## 2021-04-06 RX ORDER — CYCLOBENZAPRINE HCL 10 MG
10 TABLET ORAL 3 TIMES DAILY PRN
Qty: 30 TABLET | Refills: 0 | Status: SHIPPED | OUTPATIENT
Start: 2021-04-06

## 2021-04-20 DIAGNOSIS — I10 ESSENTIAL HYPERTENSION: ICD-10-CM

## 2021-04-21 PROCEDURE — 4010F ACE/ARB THERAPY RXD/TAKEN: CPT | Performed by: FAMILY MEDICINE

## 2021-04-21 RX ORDER — VALSARTAN 320 MG/1
TABLET ORAL
Qty: 90 TABLET | Refills: 1 | Status: SHIPPED | OUTPATIENT
Start: 2021-04-21 | End: 2021-10-26

## 2021-04-27 DIAGNOSIS — M25.512 ACUTE PAIN OF LEFT SHOULDER: ICD-10-CM

## 2021-04-27 RX ORDER — MELOXICAM 15 MG/1
TABLET ORAL
Qty: 30 TABLET | Refills: 1 | Status: SHIPPED | OUTPATIENT
Start: 2021-04-27 | End: 2021-07-08 | Stop reason: SDUPTHER

## 2021-06-14 DIAGNOSIS — I10 ESSENTIAL HYPERTENSION: ICD-10-CM

## 2021-06-15 RX ORDER — DOXAZOSIN 2 MG/1
TABLET ORAL
Qty: 90 TABLET | Refills: 1 | Status: SHIPPED | OUTPATIENT
Start: 2021-06-15 | End: 2021-07-08 | Stop reason: SDUPTHER

## 2021-07-08 DIAGNOSIS — I10 ESSENTIAL HYPERTENSION: ICD-10-CM

## 2021-07-08 DIAGNOSIS — M25.512 ACUTE PAIN OF LEFT SHOULDER: ICD-10-CM

## 2021-07-08 RX ORDER — MELOXICAM 15 MG/1
15 TABLET ORAL DAILY
Qty: 30 TABLET | Refills: 1 | Status: SHIPPED | OUTPATIENT
Start: 2021-07-08 | End: 2021-09-05

## 2021-07-08 RX ORDER — DOXAZOSIN 2 MG/1
2 TABLET ORAL DAILY
Qty: 90 TABLET | Refills: 1 | Status: SHIPPED | OUTPATIENT
Start: 2021-07-08 | End: 2022-04-06

## 2021-07-28 ENCOUNTER — VBI (OUTPATIENT)
Dept: ADMINISTRATIVE | Facility: OTHER | Age: 66
End: 2021-07-28

## 2021-09-04 DIAGNOSIS — M25.512 ACUTE PAIN OF LEFT SHOULDER: ICD-10-CM

## 2021-09-05 RX ORDER — MELOXICAM 15 MG/1
TABLET ORAL
Qty: 30 TABLET | Refills: 1 | Status: SHIPPED | OUTPATIENT
Start: 2021-09-05 | End: 2021-11-06

## 2021-09-10 ENCOUNTER — APPOINTMENT (OUTPATIENT)
Dept: LAB | Facility: HOSPITAL | Age: 66
End: 2021-09-10
Attending: FAMILY MEDICINE
Payer: COMMERCIAL

## 2021-09-10 DIAGNOSIS — I10 ESSENTIAL HYPERTENSION: ICD-10-CM

## 2021-09-10 DIAGNOSIS — Z12.5 SCREENING PSA (PROSTATE SPECIFIC ANTIGEN): ICD-10-CM

## 2021-09-10 DIAGNOSIS — E11.9 TYPE 2 DIABETES MELLITUS WITHOUT COMPLICATION, UNSPECIFIED WHETHER LONG TERM INSULIN USE (HCC): ICD-10-CM

## 2021-09-10 LAB
ALBUMIN SERPL BCP-MCNC: 3.4 G/DL (ref 3.5–5)
ALP SERPL-CCNC: 65 U/L (ref 46–116)
ALT SERPL W P-5'-P-CCNC: 28 U/L (ref 12–78)
ANION GAP SERPL CALCULATED.3IONS-SCNC: 10 MMOL/L (ref 4–13)
AST SERPL W P-5'-P-CCNC: 24 U/L (ref 5–45)
BILIRUB SERPL-MCNC: 0.59 MG/DL (ref 0.2–1)
BUN SERPL-MCNC: 18 MG/DL (ref 5–25)
CALCIUM ALBUM COR SERPL-MCNC: 8.9 MG/DL (ref 8.3–10.1)
CALCIUM SERPL-MCNC: 8.4 MG/DL (ref 8.3–10.1)
CHLORIDE SERPL-SCNC: 106 MMOL/L (ref 100–108)
CO2 SERPL-SCNC: 27 MMOL/L (ref 21–32)
CREAT SERPL-MCNC: 0.87 MG/DL (ref 0.6–1.3)
ERYTHROCYTE [DISTWIDTH] IN BLOOD BY AUTOMATED COUNT: 13.7 % (ref 11.6–15.1)
EST. AVERAGE GLUCOSE BLD GHB EST-MCNC: 85 MG/DL
GFR SERPL CREATININE-BSD FRML MDRD: 91 ML/MIN/1.73SQ M
GLUCOSE P FAST SERPL-MCNC: 103 MG/DL (ref 65–99)
HBA1C MFR BLD: 4.6 %
HCT VFR BLD AUTO: 41.9 % (ref 36.5–49.3)
HGB BLD-MCNC: 14.8 G/DL (ref 12–17)
MCH RBC QN AUTO: 31.7 PG (ref 26.8–34.3)
MCHC RBC AUTO-ENTMCNC: 35.3 G/DL (ref 31.4–37.4)
MCV RBC AUTO: 90 FL (ref 82–98)
PLATELET # BLD AUTO: 158 THOUSANDS/UL (ref 149–390)
PMV BLD AUTO: 9.8 FL (ref 8.9–12.7)
POTASSIUM SERPL-SCNC: 4 MMOL/L (ref 3.5–5.3)
PROT SERPL-MCNC: 6.6 G/DL (ref 6.4–8.2)
PSA SERPL-MCNC: 0.3 NG/ML (ref 0–4)
RBC # BLD AUTO: 4.67 MILLION/UL (ref 3.88–5.62)
SODIUM SERPL-SCNC: 143 MMOL/L (ref 136–145)
WBC # BLD AUTO: 4.36 THOUSAND/UL (ref 4.31–10.16)

## 2021-09-10 PROCEDURE — 3044F HG A1C LEVEL LT 7.0%: CPT | Performed by: FAMILY MEDICINE

## 2021-09-10 PROCEDURE — 80053 COMPREHEN METABOLIC PANEL: CPT

## 2021-09-10 PROCEDURE — 36415 COLL VENOUS BLD VENIPUNCTURE: CPT

## 2021-09-10 PROCEDURE — G0103 PSA SCREENING: HCPCS

## 2021-09-10 PROCEDURE — 85027 COMPLETE CBC AUTOMATED: CPT

## 2021-09-10 PROCEDURE — 83036 HEMOGLOBIN GLYCOSYLATED A1C: CPT

## 2021-09-13 ENCOUNTER — OFFICE VISIT (OUTPATIENT)
Dept: FAMILY MEDICINE CLINIC | Facility: CLINIC | Age: 66
End: 2021-09-13
Payer: COMMERCIAL

## 2021-09-13 VITALS
TEMPERATURE: 97.8 F | OXYGEN SATURATION: 96 % | HEIGHT: 63 IN | WEIGHT: 198 LBS | HEART RATE: 77 BPM | SYSTOLIC BLOOD PRESSURE: 118 MMHG | BODY MASS INDEX: 35.08 KG/M2 | DIASTOLIC BLOOD PRESSURE: 76 MMHG

## 2021-09-13 DIAGNOSIS — G43.109 MIGRAINE WITH AURA AND WITHOUT STATUS MIGRAINOSUS, NOT INTRACTABLE: ICD-10-CM

## 2021-09-13 DIAGNOSIS — J45.20 MILD INTERMITTENT ASTHMA WITHOUT COMPLICATION: ICD-10-CM

## 2021-09-13 DIAGNOSIS — L30.9 DERMATITIS: ICD-10-CM

## 2021-09-13 DIAGNOSIS — Z23 NEED FOR VACCINATION: ICD-10-CM

## 2021-09-13 DIAGNOSIS — E11.9 TYPE 2 DIABETES MELLITUS WITHOUT COMPLICATION, UNSPECIFIED WHETHER LONG TERM INSULIN USE (HCC): ICD-10-CM

## 2021-09-13 DIAGNOSIS — Z00.00 ANNUAL PHYSICAL EXAM: Primary | ICD-10-CM

## 2021-09-13 DIAGNOSIS — J31.0 CHRONIC RHINITIS: ICD-10-CM

## 2021-09-13 PROCEDURE — 3008F BODY MASS INDEX DOCD: CPT | Performed by: FAMILY MEDICINE

## 2021-09-13 PROCEDURE — 4004F PT TOBACCO SCREEN RCVD TLK: CPT | Performed by: FAMILY MEDICINE

## 2021-09-13 PROCEDURE — 99396 PREV VISIT EST AGE 40-64: CPT | Performed by: FAMILY MEDICINE

## 2021-09-13 PROCEDURE — 90682 RIV4 VACC RECOMBINANT DNA IM: CPT | Performed by: FAMILY MEDICINE

## 2021-09-13 PROCEDURE — 90471 IMMUNIZATION ADMIN: CPT | Performed by: FAMILY MEDICINE

## 2021-09-13 RX ORDER — MOMETASONE FUROATE 1 MG/G
CREAM TOPICAL DAILY
Qty: 30 G | Refills: 0 | Status: SHIPPED | OUTPATIENT
Start: 2021-09-13

## 2021-09-13 RX ORDER — PROMETHAZINE HYDROCHLORIDE 25 MG/1
25 TABLET ORAL EVERY 6 HOURS PRN
Qty: 30 TABLET | Refills: 0 | Status: SHIPPED | OUTPATIENT
Start: 2021-09-13 | End: 2022-03-27 | Stop reason: SDUPTHER

## 2021-09-13 RX ORDER — AZELASTINE 1 MG/ML
1 SPRAY, METERED NASAL 2 TIMES DAILY
Qty: 30 ML | Refills: 3 | Status: SHIPPED | OUTPATIENT
Start: 2021-09-13 | End: 2021-12-07

## 2021-09-13 NOTE — PROGRESS NOTES
[address]    NAME: Christin Silva  AGE: 61 y o  SEX: male  : 1955     DATE: 2021     Assessment and Plan:     Problem List Items Addressed This Visit        Endocrine    Type 2 diabetes mellitus without complications ([address] )    Relevant Orders    Comprehensive metabolic panel    Hemoglobin A1C       Respiratory    Mild intermittent asthma without complication       Cardiovascular and Mediastinum    Migraine headache    Relevant Medications    promethazine (PHENERGAN) 25 mg tablet      Other Visit Diagnoses     Annual physical exam    -  Primary    BMI 35 0-35 9,adult        Dermatitis        Relevant Medications    mometasone (ELOCON) 0 1 % cream    Chronic rhinitis        Relevant Medications    azelastine (ASTELIN) 0 1 % nasal spray          Immunizations and preventive care screenings were discussed with patient today  Appropriate education was printed on patient's after visit summary  Counseling:  · Dental Health: discussed importance of regular tooth brushing, flossing, and dental visits  BMI Counseling: Body mass index is 35 64 kg/m²  The BMI is above normal  Nutrition recommendations include decreasing portion sizes and encouraging healthy choices of fruits and vegetables  Exercise recommendations include moderate physical activity 150 minutes/week  No pharmacotherapy was ordered  Return in 6 months (on 3/13/2022)  Chief Complaint:     Chief Complaint   Patient presents with    Follow-up     labs done on the 10th     foot exam due      History of Present Illness:     Adult Annual Physical   Patient here for a comprehensive physical exam  The patient reports no problems  Diet and Physical Activity  · Diet/Nutrition: diabetic diet  · Exercise: walking        Depression Screening  PHQ-9 Depression Screening    PHQ-9:   Frequency of the following problems over the past two weeks:           General Health  · Sleep: sleeps well  · Hearing: normal - bilateral   · Vision: no vision problems  · Dental: regular dental visits   Health  · Symptoms include: none     Review of Systems:     Review of Systems   Constitutional: Negative for chills, fatigue and fever  HENT: Positive for rhinorrhea  Negative for congestion, ear pain, hearing loss, postnasal drip and sore throat  Eyes: Negative for pain and visual disturbance  Respiratory: Negative for chest tightness, shortness of breath and wheezing  Cardiovascular: Negative for chest pain and leg swelling  Gastrointestinal: Negative for abdominal distention, abdominal pain, constipation, diarrhea and vomiting  Endocrine: Negative for cold intolerance and heat intolerance  Genitourinary: Negative for difficulty urinating, frequency and urgency  Musculoskeletal: Negative for arthralgias and gait problem  Skin: Positive for rash  Negative for color change  Neurological: Negative for dizziness, tremors, syncope, numbness and headaches  Hematological: Negative for adenopathy  Psychiatric/Behavioral: Negative for agitation, confusion and sleep disturbance  The patient is not nervous/anxious  Past Medical History:     History reviewed  No pertinent past medical history     Past Surgical History:     Past Surgical History:   Procedure Laterality Date    SEPTOPLASTY        Family History:     Family History   Problem Relation Age of Onset    Aneurysm Mother     Coronary artery disease Father     Cancer Other     Stroke Other         CVA      Social History:     Social History     Socioeconomic History    Marital status: /Civil Union     Spouse name: None    Number of children: None    Years of education: None    Highest education level: None   Occupational History    Occupation: full-time employment   Tobacco Use    Smoking status: Current Some Day Smoker    Smokeless tobacco: Never Used    Tobacco comment: never a smoker ( as per allscripts)   Substance and Sexual Activity    Alcohol use: Yes     Comment: occasional alcohol use    Drug use: No    Sexual activity: None   Other Topics Concern    None   Social History Narrative    Always uses seat belt    Lives with wife    Recreation: gardening    Seeing a dentist     Social Determinants of Health     Financial Resource Strain:     Difficulty of Paying Living Expenses:    Food Insecurity:     Worried About [address] in the Last Year:     [address] in the Last Year:    Transportation Needs:     Lack of Transportation (Medical):      Lack of Transportation (Non-Medical):    Physical Activity:     Days of Exercise per Week:     Minutes of Exercise per Session:    Stress:     Feeling of Stress :    Social Connections:     Frequency of Communication with Friends and Family:     Frequency of Social Gatherings with Friends and Family:     Attends Holiness Services:     Active Member of Clubs or Organizations:     Attends Club or Organization Meetings:     Marital Status:    Intimate Partner Violence:     Fear of Current or Ex-Partner:     Emotionally Abused:     Physically Abused:     Sexually Abused:       Current Medications:     Current Outpatient Medications   Medication Sig Dispense Refill    amLODIPine (NORVASC) 10 mg tablet TAKE 1 TABLET BY MOUTH EVERY DAY 90 tablet 3    azelastine (ASTELIN) 0 1 % nasal spray 1 spray into each nostril 2 (two) times a day Use in each nostril as directed 30 mL 3    cyclobenzaprine (FLEXERIL) 10 mg tablet Take 1 tablet (10 mg total) by mouth 3 (three) times a day as needed for muscle spasms 30 tablet 0    doxazosin (CARDURA) 2 mg tablet Take 1 tablet (2 mg total) by mouth daily 90 tablet 1    fluticasone (FLONASE) 50 mcg/act nasal spray 2 sprays into each nostril daily      ketoconazole (NIZORAL) 2 % shampoo Apply topically daily      meclizine (ANTIVERT) 25 mg tablet Take 1 tablet (25 mg total) by mouth 3 (three) times a day as needed for dizziness 30 tablet 0    meloxicam (MOBIC) 15 mg tablet TAKE 1 TABLET BY MOUTH EVERY DAY 30 tablet 1    mometasone (ELOCON) 0 1 % cream Apply topically daily 30 g 0    pantoprazole (PROTONIX) 40 mg tablet Take 40 mg by mouth daily  0    promethazine (PHENERGAN) 25 mg tablet Take 1 tablet (25 mg total) by mouth every 6 (six) hours as needed for nausea or vomiting 30 tablet 0    SUMAtriptan (IMITREX) 50 mg tablet TAKE 1 TABLET (50 MG TOTAL) BY MOUTH EVERY 2 (TWO) HOURS AS NEEDED FOR MIGRAINE  MG/DAY 9 tablet 3    traZODone (DESYREL) 100 mg tablet TAKE 1 TABLET BY MOUTH DAILY AT BEDTIME 90 tablet 3    valsartan (DIOVAN) 320 MG tablet TAKE 1 TABLET BY MOUTH EVERY DAY 90 tablet 1    venlafaxine (EFFEXOR-XR) 150 mg 24 hr capsule TAKE 1 CAPSULE BY MOUTH EVERY DAY 90 capsule 3    Ventolin  (90 Base) MCG/ACT inhaler INHALE 2 PUFFS 3 (THREE) TIMES A DAY AS NEEDED FOR WHEEZING 18 Inhaler 2     No current facility-administered medications for this visit  Allergies:     No Known Allergies   Physical Exam:     /76   Pulse 77   Temp 97 8 °F (36 6 °C)   Ht 5' 2 5" (1 588 m)   Wt 89 8 kg (198 lb)   SpO2 96%   BMI 35 64 kg/m²     Physical Exam  Constitutional:       Appearance: He is well-developed  HENT:      Head: Normocephalic  Right Ear: External ear normal       Left Ear: External ear normal       Nose: Nose normal    Eyes:      Conjunctiva/sclera: Conjunctivae normal       Pupils: Pupils are equal, round, and reactive to light  Neck:      Thyroid: No thyromegaly  Cardiovascular:      Rate and Rhythm: Normal rate and regular rhythm  Pulses: no weak pulses          Dorsalis pedis pulses are 2+ on the right side and 2+ on the left side  Posterior tibial pulses are 2+ on the right side and 2+ on the left side  Heart sounds: Normal heart sounds     Pulmonary:      Effort: Pulmonary effort is normal  Breath sounds: Normal breath sounds  Abdominal:      General: Bowel sounds are normal       Palpations: Abdomen is soft  Musculoskeletal:         General: Normal range of motion  Cervical back: Normal range of motion  Feet:      Right foot:      Skin integrity: No ulcer, skin breakdown, erythema, warmth, callus or dry skin  Left foot:      Skin integrity: No ulcer, skin breakdown, erythema, warmth, callus or dry skin  Skin:     General: Skin is warm and dry  Neurological:      Mental Status: He is alert and oriented to person, place, and time  Psychiatric:         Behavior: Behavior normal       Patient's shoes and socks removed  Right Foot/Ankle   Right Foot Inspection  Skin Exam: skin normal and skin intact no dry skin, no warmth, no callus, no erythema, no maceration, no abnormal color, no pre-ulcer, no ulcer and no callus                            Sensory       Monofilament testing: intact  Vascular    The right DP pulse is 2+  The right PT pulse is 2+  Left Foot/Ankle  Left Foot Inspection  Skin Exam: skin normal and skin intactno dry skin, no warmth, no erythema, no maceration, normal color, no pre-ulcer, no ulcer and no callus                                         Sensory       Monofilament: intact  Vascular    The left DP pulse is 2+  The left PT pulse is 2+  Assign Risk Category:  No deformity present; No loss of protective sensation;  No weak pulses       Risk: 0        DO Lorenza Everett [id number] [facility]

## 2021-09-13 NOTE — PATIENT INSTRUCTIONS

## 2021-10-26 DIAGNOSIS — I10 ESSENTIAL HYPERTENSION: ICD-10-CM

## 2021-10-26 PROCEDURE — 4010F ACE/ARB THERAPY RXD/TAKEN: CPT | Performed by: FAMILY MEDICINE

## 2021-10-26 RX ORDER — VALSARTAN 320 MG/1
TABLET ORAL
Qty: 90 TABLET | Refills: 1 | Status: SHIPPED | OUTPATIENT
Start: 2021-10-26 | End: 2022-04-28

## 2021-11-06 DIAGNOSIS — M25.512 ACUTE PAIN OF LEFT SHOULDER: ICD-10-CM

## 2021-11-06 RX ORDER — MELOXICAM 15 MG/1
TABLET ORAL
Qty: 30 TABLET | Refills: 1 | Status: SHIPPED | OUTPATIENT
Start: 2021-11-06 | End: 2022-01-23

## 2021-11-23 ENCOUNTER — VBI (OUTPATIENT)
Dept: ADMINISTRATIVE | Facility: OTHER | Age: 66
End: 2021-11-23

## 2021-12-07 DIAGNOSIS — J31.0 CHRONIC RHINITIS: ICD-10-CM

## 2021-12-07 RX ORDER — AZELASTINE 1 MG/ML
1 SPRAY, METERED NASAL 2 TIMES DAILY
Qty: 1 ML | Refills: 1 | Status: SHIPPED | OUTPATIENT
Start: 2021-12-07 | End: 2022-04-20

## 2021-12-09 DIAGNOSIS — F41.9 ANXIETY: ICD-10-CM

## 2021-12-09 RX ORDER — VENLAFAXINE HYDROCHLORIDE 150 MG/1
CAPSULE, EXTENDED RELEASE ORAL
Qty: 90 CAPSULE | Refills: 3 | Status: SHIPPED | OUTPATIENT
Start: 2021-12-09

## 2022-01-23 DIAGNOSIS — M25.512 ACUTE PAIN OF LEFT SHOULDER: ICD-10-CM

## 2022-01-23 RX ORDER — MELOXICAM 15 MG/1
TABLET ORAL
Qty: 30 TABLET | Refills: 1 | Status: SHIPPED | OUTPATIENT
Start: 2022-01-23 | End: 2022-03-23

## 2022-02-28 ENCOUNTER — TELEPHONE (OUTPATIENT)
Dept: FAMILY MEDICINE CLINIC | Facility: CLINIC | Age: 67
End: 2022-02-28

## 2022-02-28 NOTE — TELEPHONE ENCOUNTER
Patient dropped off a sheet stating his niece has the Prothrombin [id number] Mutation and her doctor thinks the rest of the family should be tested for this  He would like a lab ordered for him if possible to test for this  The sheet is in your folder and scanned into the patients chart

## 2022-02-28 NOTE — TELEPHONE ENCOUNTER
Called patient - he said he does not think it is necessary to be tested for this and does not think it will be an issue   Patient does not want to see a hematologist

## 2022-02-28 NOTE — TELEPHONE ENCOUNTER
I do not know what test is appropriate for this, I would have to refer him to a hematologist for possible genetic evaluation    Would he like to see a hematologist?

## 2022-03-09 ENCOUNTER — APPOINTMENT (OUTPATIENT)
Dept: LAB | Facility: HOSPITAL | Age: 67
End: 2022-03-09
Payer: COMMERCIAL

## 2022-03-09 DIAGNOSIS — E11.9 TYPE 2 DIABETES MELLITUS WITHOUT COMPLICATION, UNSPECIFIED WHETHER LONG TERM INSULIN USE (HCC): ICD-10-CM

## 2022-03-09 LAB
ALBUMIN SERPL BCP-MCNC: 3.8 G/DL (ref 3.5–5)
ALP SERPL-CCNC: 60 U/L (ref 46–116)
ALT SERPL W P-5'-P-CCNC: 28 U/L (ref 12–78)
ANION GAP SERPL CALCULATED.3IONS-SCNC: 10 MMOL/L (ref 4–13)
AST SERPL W P-5'-P-CCNC: 15 U/L (ref 5–45)
BILIRUB SERPL-MCNC: 0.44 MG/DL (ref 0.2–1)
BUN SERPL-MCNC: 17 MG/DL (ref 5–25)
CALCIUM SERPL-MCNC: 8.7 MG/DL (ref 8.3–10.1)
CHLORIDE SERPL-SCNC: 107 MMOL/L (ref 100–108)
CO2 SERPL-SCNC: 27 MMOL/L (ref 21–32)
CREAT SERPL-MCNC: 0.82 MG/DL (ref 0.6–1.3)
EST. AVERAGE GLUCOSE BLD GHB EST-MCNC: 94 MG/DL
GFR SERPL CREATININE-BSD FRML MDRD: 93 ML/MIN/1.73SQ M
GLUCOSE P FAST SERPL-MCNC: 106 MG/DL (ref 65–99)
HBA1C MFR BLD: 4.9 %
POTASSIUM SERPL-SCNC: 3.8 MMOL/L (ref 3.5–5.3)
PROT SERPL-MCNC: 7.1 G/DL (ref 6.4–8.2)
SODIUM SERPL-SCNC: 144 MMOL/L (ref 136–145)

## 2022-03-09 PROCEDURE — 36415 COLL VENOUS BLD VENIPUNCTURE: CPT

## 2022-03-09 PROCEDURE — 3044F HG A1C LEVEL LT 7.0%: CPT | Performed by: FAMILY MEDICINE

## 2022-03-09 PROCEDURE — 80053 COMPREHEN METABOLIC PANEL: CPT

## 2022-03-09 PROCEDURE — 83036 HEMOGLOBIN GLYCOSYLATED A1C: CPT

## 2022-03-14 ENCOUNTER — OFFICE VISIT (OUTPATIENT)
Dept: FAMILY MEDICINE CLINIC | Facility: CLINIC | Age: 67
End: 2022-03-14
Payer: COMMERCIAL

## 2022-03-14 VITALS
TEMPERATURE: 97.2 F | HEIGHT: 63 IN | OXYGEN SATURATION: 97 % | HEART RATE: 88 BPM | DIASTOLIC BLOOD PRESSURE: 80 MMHG | WEIGHT: 188.6 LBS | BODY MASS INDEX: 33.42 KG/M2 | SYSTOLIC BLOOD PRESSURE: 130 MMHG

## 2022-03-14 DIAGNOSIS — J45.20 MILD INTERMITTENT ASTHMA WITHOUT COMPLICATION: ICD-10-CM

## 2022-03-14 DIAGNOSIS — E11.9 TYPE 2 DIABETES MELLITUS WITHOUT COMPLICATION, UNSPECIFIED WHETHER LONG TERM INSULIN USE (HCC): Primary | ICD-10-CM

## 2022-03-14 DIAGNOSIS — I10 ESSENTIAL HYPERTENSION: ICD-10-CM

## 2022-03-14 DIAGNOSIS — G43.109 MIGRAINE WITH AURA AND WITHOUT STATUS MIGRAINOSUS, NOT INTRACTABLE: ICD-10-CM

## 2022-03-14 PROBLEM — R42 DIZZINESS: Status: RESOLVED | Noted: 2019-11-14 | Resolved: 2022-03-14

## 2022-03-14 PROCEDURE — 3008F BODY MASS INDEX DOCD: CPT | Performed by: FAMILY MEDICINE

## 2022-03-14 PROCEDURE — 3725F SCREEN DEPRESSION PERFORMED: CPT | Performed by: FAMILY MEDICINE

## 2022-03-14 PROCEDURE — 99214 OFFICE O/P EST MOD 30 MIN: CPT | Performed by: FAMILY MEDICINE

## 2022-03-14 RX ORDER — TOPIRAMATE 25 MG/1
25 CAPSULE, COATED PELLETS ORAL 2 TIMES DAILY
Qty: 60 CAPSULE | Refills: 3 | Status: SHIPPED | OUTPATIENT
Start: 2022-03-14 | End: 2022-03-27

## 2022-03-14 NOTE — PROGRESS NOTES
Assessment/Plan:         Problem List Items Addressed This Visit        Endocrine    Type 2 diabetes mellitus without complications ([address] ) - Primary       Continue to watch his diet, will check a CMP, hemoglobin A1c in 6 months  Lab Results   Component Value Date    HGBA1C 4 9 03/09/2022            Relevant Orders    Comprehensive metabolic panel    Hemoglobin A1C       Respiratory    Mild intermittent asthma without complication       Continue his Ventolin as needed  Cardiovascular and Mediastinum    Essential hypertension       Controlled, continue his valsartan, amlodipine         Migraine headache      Start Topamax 25 mg twice daily, he is to call us in a week let us know how this is working and potentially increase to 50 twice daily         Relevant Medications    topiramate (TOPAMAX) 25 mg sprinkle capsule       Other    BMI 33 0-33 9,adult            Subjective:      Patient ID: Allen Bianchi is a 61 y o  male  Patient comes in today for checkup, states that he has had more frequent migraines especially since having COVID  He has been using his Imitrex on a regular basis  He did get his blood work done, and this reviewed with him today  The following portions of the patient's history were reviewed and updated as appropriate:   Past Medical History:  He has no past medical history on file ,  _______________________________________________________________________  Medical Problems:  does not have any pertinent problems on file ,  _______________________________________________________________________  Past Surgical History:   has a past surgical history that includes Septoplasty  ,  _______________________________________________________________________  Family History:  family history includes Aneurysm in his mother; Cancer in his other; Coronary artery disease in his father; Stroke in his other ,  _______________________________________________________________________  Social History: reports that he has been smoking  He has never used smokeless tobacco  He reports current alcohol use  He reports that he does not use drugs  ,  _______________________________________________________________________  Allergies:  has No Known Allergies     _______________________________________________________________________  Current Outpatient Medications   Medication Sig Dispense Refill    amLODIPine (NORVASC) 10 mg tablet TAKE 1 TABLET BY MOUTH EVERY DAY 90 tablet 3    azelastine (ASTELIN) 0 1 % nasal spray 1 SPRAY INTO EACH NOSTRIL 2 (TWO) TIMES A DAY USE IN EACH NOSTRIL AS DIRECTED 1 mL 1    cyclobenzaprine (FLEXERIL) 10 mg tablet Take 1 tablet (10 mg total) by mouth 3 (three) times a day as needed for muscle spasms 30 tablet 0    doxazosin (CARDURA) 2 mg tablet Take 1 tablet (2 mg total) by mouth daily 90 tablet 1    fluticasone (FLONASE) 50 mcg/act nasal spray 2 sprays into each nostril daily      ketoconazole (NIZORAL) 2 % shampoo Apply topically daily      meclizine (ANTIVERT) 25 mg tablet Take 1 tablet (25 mg total) by mouth 3 (three) times a day as needed for dizziness 30 tablet 0    meloxicam (MOBIC) 15 mg tablet TAKE 1 TABLET BY MOUTH EVERY DAY 30 tablet 1    mometasone (ELOCON) 0 1 % cream Apply topically daily 30 g 0    pantoprazole (PROTONIX) 40 mg tablet Take 40 mg by mouth daily  0    promethazine (PHENERGAN) 25 mg tablet Take 1 tablet (25 mg total) by mouth every 6 (six) hours as needed for nausea or vomiting 30 tablet 0    SUMAtriptan (IMITREX) 50 mg tablet TAKE 1 TABLET (50 MG TOTAL) BY MOUTH EVERY 2 (TWO) HOURS AS NEEDED FOR MIGRAINE  MG/DAY 9 tablet 3    traZODone (DESYREL) 100 mg tablet TAKE 1 TABLET BY MOUTH DAILY AT BEDTIME 90 tablet 3    valsartan (DIOVAN) 320 MG tablet TAKE 1 TABLET BY MOUTH EVERY DAY 90 tablet 1    venlafaxine (EFFEXOR-XR) 150 mg 24 hr capsule TAKE 1 CAPSULE BY MOUTH EVERY DAY 90 capsule 3    Ventolin  (90 Base) MCG/ACT inhaler INHALE 2 PUFFS 3 (THREE) TIMES A DAY AS NEEDED FOR WHEEZING 18 Inhaler 2    topiramate (TOPAMAX) 25 mg sprinkle capsule Take 1 capsule (25 mg total) by mouth 2 (two) times a day 60 capsule 3     No current facility-administered medications for this visit      _______________________________________________________________________  Review of Systems   Constitutional: Negative for chills, fatigue and fever  HENT: Negative for congestion, ear pain, hearing loss, postnasal drip, rhinorrhea and sore throat  Eyes: Negative for pain and visual disturbance  Respiratory: Negative for chest tightness, shortness of breath and wheezing  Cardiovascular: Negative for chest pain and leg swelling  Gastrointestinal: Negative for abdominal distention, abdominal pain, constipation, diarrhea and vomiting  Endocrine: Negative for cold intolerance and heat intolerance  Genitourinary: Negative for difficulty urinating, frequency and urgency  Musculoskeletal: Negative for arthralgias and gait problem  Skin: Negative for color change  Neurological: Positive for headaches  Negative for dizziness, tremors, syncope and numbness  Hematological: Negative for adenopathy  Psychiatric/Behavioral: Negative for agitation, confusion and sleep disturbance  The patient is not nervous/anxious  Objective:  Vitals:    03/14/22 0909   BP: 130/80   BP Location: Left arm   Patient Position: Sitting   Pulse: 88   Temp: (!) 97 2 °F (36 2 °C)   TempSrc: Tympanic   SpO2: 97%   Weight: 85 5 kg (188 lb 9 6 oz)   Height: 5' 2 5" (1 588 m)     Body mass index is 33 95 kg/m²  Physical Exam  Vitals and nursing note reviewed  Constitutional:       Appearance: He is well-developed  HENT:      Head: Normocephalic  Eyes:      General: No scleral icterus  Conjunctiva/sclera: Conjunctivae normal    Neck:      Thyroid: No thyromegaly  Cardiovascular:      Rate and Rhythm: Normal rate and regular rhythm        Heart sounds: Normal heart sounds  No murmur heard  Pulmonary:      Effort: Pulmonary effort is normal  No respiratory distress  Breath sounds: Normal breath sounds  No wheezing  Abdominal:      General: Bowel sounds are normal  There is no distension  Palpations: Abdomen is soft  Tenderness: There is no abdominal tenderness  Musculoskeletal:         General: No tenderness  Normal range of motion  Cervical back: Normal range of motion  Lymphadenopathy:      Cervical: No cervical adenopathy  Skin:     General: Skin is warm and dry  Coloration: Skin is not pale  Findings: No rash  Neurological:      Mental Status: He is alert and oriented to person, place, and time  Cranial Nerves: No cranial nerve deficit     Psychiatric:         Behavior: Behavior normal

## 2022-03-14 NOTE — ASSESSMENT & PLAN NOTE
Continue to watch his diet, will check a CMP, hemoglobin A1c in 6 months    Lab Results   Component Value Date    HGBA1C 4 9 03/09/2022

## 2022-03-14 NOTE — ASSESSMENT & PLAN NOTE
Start Topamax 25 mg twice daily, he is to call us in a week let us know how this is working and potentially increase to 50 twice daily

## 2022-03-23 DIAGNOSIS — M25.512 ACUTE PAIN OF LEFT SHOULDER: ICD-10-CM

## 2022-03-23 RX ORDER — MELOXICAM 15 MG/1
TABLET ORAL
Qty: 30 TABLET | Refills: 1 | Status: SHIPPED | OUTPATIENT
Start: 2022-03-23 | End: 2022-05-26

## 2022-03-25 ENCOUNTER — TELEPHONE (OUTPATIENT)
Dept: FAMILY MEDICINE CLINIC | Facility: CLINIC | Age: 67
End: 2022-03-25

## 2022-03-25 NOTE — TELEPHONE ENCOUNTER
T/c from pt - pt states the Topiramate is not working and is still getting migraines  Was wondering if he should increase tht daily dosage and also if he can have a refill for Promethazine 25mg  Pt also requested if an allergy medicine can be called in as his eyes are starting to get itchy due to his allergies      Please advise    Pt phone - [phone]

## 2022-03-27 DIAGNOSIS — H10.13 ALLERGIC CONJUNCTIVITIS OF BOTH EYES: Primary | ICD-10-CM

## 2022-03-27 DIAGNOSIS — G43.109 MIGRAINE WITH AURA AND WITHOUT STATUS MIGRAINOSUS, NOT INTRACTABLE: ICD-10-CM

## 2022-03-27 RX ORDER — TOPIRAMATE 50 MG/1
50 TABLET, FILM COATED ORAL EVERY 12 HOURS SCHEDULED
Qty: 60 TABLET | Refills: 1 | Status: SHIPPED | OUTPATIENT
Start: 2022-03-27 | End: 2022-04-20

## 2022-03-27 RX ORDER — OLOPATADINE HYDROCHLORIDE 1 MG/ML
1 SOLUTION/ DROPS OPHTHALMIC 2 TIMES DAILY
Qty: 5 ML | Refills: 1 | Status: SHIPPED | OUTPATIENT
Start: 2022-03-27

## 2022-03-27 RX ORDER — PROMETHAZINE HYDROCHLORIDE 25 MG/1
25 TABLET ORAL EVERY 6 HOURS PRN
Qty: 30 TABLET | Refills: 0 | Status: SHIPPED | OUTPATIENT
Start: 2022-03-27 | End: 2022-06-07 | Stop reason: SDUPTHER

## 2022-03-27 NOTE — TELEPHONE ENCOUNTER
Increase topamax to 50mg, he can take two of the 25mg tabs BID and I sent in the 50 mg dose    I also sent in the promethazine and patanol eye drops

## 2022-04-06 DIAGNOSIS — I10 ESSENTIAL HYPERTENSION: ICD-10-CM

## 2022-04-06 RX ORDER — DOXAZOSIN 2 MG/1
TABLET ORAL
Qty: 90 TABLET | Refills: 1 | Status: SHIPPED | OUTPATIENT
Start: 2022-04-06

## 2022-04-20 DIAGNOSIS — J31.0 CHRONIC RHINITIS: ICD-10-CM

## 2022-04-20 DIAGNOSIS — G43.109 MIGRAINE WITH AURA AND WITHOUT STATUS MIGRAINOSUS, NOT INTRACTABLE: ICD-10-CM

## 2022-04-20 RX ORDER — TOPIRAMATE 50 MG/1
TABLET, FILM COATED ORAL
Qty: 60 TABLET | Refills: 1 | Status: SHIPPED | OUTPATIENT
Start: 2022-04-20 | End: 2022-05-23

## 2022-04-20 RX ORDER — AZELASTINE HYDROCHLORIDE 137 UG/1
SPRAY, METERED NASAL
Qty: 30 ML | Refills: 1 | Status: SHIPPED | OUTPATIENT
Start: 2022-04-20

## 2022-04-28 DIAGNOSIS — I10 ESSENTIAL HYPERTENSION: ICD-10-CM

## 2022-04-28 PROCEDURE — 4010F ACE/ARB THERAPY RXD/TAKEN: CPT | Performed by: FAMILY MEDICINE

## 2022-04-28 RX ORDER — VALSARTAN 320 MG/1
TABLET ORAL
Qty: 90 TABLET | Refills: 1 | Status: SHIPPED | OUTPATIENT
Start: 2022-04-28

## 2022-05-26 DIAGNOSIS — M25.512 ACUTE PAIN OF LEFT SHOULDER: ICD-10-CM

## 2022-05-26 RX ORDER — MELOXICAM 15 MG/1
TABLET ORAL
Qty: 30 TABLET | Refills: 1 | Status: SHIPPED | OUTPATIENT
Start: 2022-05-26 | End: 2022-06-07 | Stop reason: SDUPTHER

## 2022-06-07 DIAGNOSIS — M25.512 ACUTE PAIN OF LEFT SHOULDER: ICD-10-CM

## 2022-06-07 DIAGNOSIS — G43.109 MIGRAINE WITH AURA AND WITHOUT STATUS MIGRAINOSUS, NOT INTRACTABLE: ICD-10-CM

## 2022-06-07 RX ORDER — MELOXICAM 15 MG/1
15 TABLET ORAL DAILY
Qty: 30 TABLET | Refills: 1 | Status: SHIPPED | OUTPATIENT
Start: 2022-06-07

## 2022-06-07 RX ORDER — PROMETHAZINE HYDROCHLORIDE 25 MG/1
25 TABLET ORAL EVERY 6 HOURS PRN
Qty: 30 TABLET | Refills: 0 | Status: SHIPPED | OUTPATIENT
Start: 2022-06-07

## 2022-07-28 DIAGNOSIS — F41.9 ANXIETY: ICD-10-CM

## 2022-07-28 RX ORDER — TRAZODONE HYDROCHLORIDE 100 MG/1
100 TABLET ORAL
Qty: 90 TABLET | Refills: 3 | Status: SHIPPED | OUTPATIENT
Start: 2022-07-28

## 2022-08-23 ENCOUNTER — APPOINTMENT (OUTPATIENT)
Dept: RADIOLOGY | Facility: HOSPITAL | Age: 67
DRG: 247 | End: 2022-08-23
Payer: COMMERCIAL

## 2022-08-23 ENCOUNTER — HOSPITAL ENCOUNTER (INPATIENT)
Facility: HOSPITAL | Age: 67
LOS: 1 days | Discharge: HOME | DRG: 247 | End: 2022-08-26
Attending: STUDENT IN AN ORGANIZED HEALTH CARE EDUCATION/TRAINING PROGRAM | Admitting: STUDENT IN AN ORGANIZED HEALTH CARE EDUCATION/TRAINING PROGRAM
Payer: COMMERCIAL

## 2022-08-23 DIAGNOSIS — I21.4 NSTEMI (NON-ST ELEVATED MYOCARDIAL INFARCTION) (HCC): ICD-10-CM

## 2022-08-23 DIAGNOSIS — R94.39 ABNORMAL STRESS TEST: ICD-10-CM

## 2022-08-23 DIAGNOSIS — R07.9 CHEST PAIN: Primary | ICD-10-CM

## 2022-08-23 PROBLEM — E11.9 TYPE 2 DIABETES MELLITUS WITHOUT COMPLICATIONS (HCC): Status: RESOLVED | Noted: 2019-10-10 | Resolved: 2022-08-23

## 2022-08-23 LAB
2HR DELTA HS TROPONIN: 40 NG/L
4HR DELTA HS TROPONIN: 48 NG/L
ALBUMIN SERPL BCP-MCNC: 3.5 G/DL (ref 3.5–5)
ALP SERPL-CCNC: 52 U/L (ref 46–116)
ALT SERPL W P-5'-P-CCNC: 29 U/L (ref 12–78)
ANION GAP SERPL CALCULATED.3IONS-SCNC: 11 MMOL/L (ref 4–13)
AST SERPL W P-5'-P-CCNC: 17 U/L (ref 5–45)
BASOPHILS # BLD AUTO: 0.03 THOUSANDS/ΜL (ref 0–0.1)
BASOPHILS NFR BLD AUTO: 1 % (ref 0–1)
BILIRUB SERPL-MCNC: 0.21 MG/DL (ref 0.2–1)
BUN SERPL-MCNC: 26 MG/DL (ref 5–25)
CALCIUM SERPL-MCNC: 8 MG/DL (ref 8.3–10.1)
CARDIAC TROPONIN I PNL SERPL HS: 102 NG/L
CARDIAC TROPONIN I PNL SERPL HS: 54 NG/L
CARDIAC TROPONIN I PNL SERPL HS: 94 NG/L
CHLORIDE SERPL-SCNC: 112 MMOL/L (ref 96–108)
CO2 SERPL-SCNC: 20 MMOL/L (ref 21–32)
CREAT SERPL-MCNC: 1.07 MG/DL (ref 0.6–1.3)
EOSINOPHIL # BLD AUTO: 0.11 THOUSAND/ΜL (ref 0–0.61)
EOSINOPHIL NFR BLD AUTO: 2 % (ref 0–6)
ERYTHROCYTE [DISTWIDTH] IN BLOOD BY AUTOMATED COUNT: 13.8 % (ref 11.6–15.1)
GFR SERPL CREATININE-BSD FRML MDRD: 72 ML/MIN/1.73SQ M
GLUCOSE SERPL-MCNC: 192 MG/DL (ref 65–140)
HCT VFR BLD AUTO: 42.1 % (ref 36.5–49.3)
HGB BLD-MCNC: 14.9 G/DL (ref 12–17)
IMM GRANULOCYTES # BLD AUTO: 0.01 THOUSAND/UL (ref 0–0.2)
IMM GRANULOCYTES NFR BLD AUTO: 0 % (ref 0–2)
LIPASE SERPL-CCNC: 155 U/L (ref 73–393)
LYMPHOCYTES # BLD AUTO: 0.85 THOUSANDS/ΜL (ref 0.6–4.47)
LYMPHOCYTES NFR BLD AUTO: 16 % (ref 14–44)
MCH RBC QN AUTO: 32.6 PG (ref 26.8–34.3)
MCHC RBC AUTO-ENTMCNC: 35.4 G/DL (ref 31.4–37.4)
MCV RBC AUTO: 92 FL (ref 82–98)
MONOCYTES # BLD AUTO: 0.24 THOUSAND/ΜL (ref 0.17–1.22)
MONOCYTES NFR BLD AUTO: 5 % (ref 4–12)
NEUTROPHILS # BLD AUTO: 3.93 THOUSANDS/ΜL (ref 1.85–7.62)
NEUTS SEG NFR BLD AUTO: 76 % (ref 43–75)
NRBC BLD AUTO-RTO: 0 /100 WBCS
PLATELET # BLD AUTO: 144 THOUSANDS/UL (ref 149–390)
PMV BLD AUTO: 10.1 FL (ref 8.9–12.7)
POTASSIUM SERPL-SCNC: 3.6 MMOL/L (ref 3.5–5.3)
PROT SERPL-MCNC: 6.4 G/DL (ref 6.4–8.4)
RBC # BLD AUTO: 4.57 MILLION/UL (ref 3.88–5.62)
SODIUM SERPL-SCNC: 143 MMOL/L (ref 135–147)
WBC # BLD AUTO: 5.17 THOUSAND/UL (ref 4.31–10.16)

## 2022-08-23 PROCEDURE — 84484 ASSAY OF TROPONIN QUANT: CPT | Performed by: STUDENT IN AN ORGANIZED HEALTH CARE EDUCATION/TRAINING PROGRAM

## 2022-08-23 PROCEDURE — 36415 COLL VENOUS BLD VENIPUNCTURE: CPT

## 2022-08-23 PROCEDURE — 93005 ELECTROCARDIOGRAM TRACING: CPT

## 2022-08-23 PROCEDURE — 71045 X-RAY EXAM CHEST 1 VIEW: CPT

## 2022-08-23 PROCEDURE — 84484 ASSAY OF TROPONIN QUANT: CPT | Performed by: INTERNAL MEDICINE

## 2022-08-23 PROCEDURE — 99285 EMERGENCY DEPT VISIT HI MDM: CPT

## 2022-08-23 PROCEDURE — 83690 ASSAY OF LIPASE: CPT | Performed by: PHYSICIAN ASSISTANT

## 2022-08-23 PROCEDURE — 99220 PR INITIAL OBSERVATION CARE/DAY 70 MINUTES: CPT | Performed by: INTERNAL MEDICINE

## 2022-08-23 PROCEDURE — 99285 EMERGENCY DEPT VISIT HI MDM: CPT | Performed by: PHYSICIAN ASSISTANT

## 2022-08-23 PROCEDURE — 80053 COMPREHEN METABOLIC PANEL: CPT | Performed by: STUDENT IN AN ORGANIZED HEALTH CARE EDUCATION/TRAINING PROGRAM

## 2022-08-23 PROCEDURE — 85025 COMPLETE CBC W/AUTO DIFF WBC: CPT | Performed by: STUDENT IN AN ORGANIZED HEALTH CARE EDUCATION/TRAINING PROGRAM

## 2022-08-23 RX ORDER — PANTOPRAZOLE SODIUM 40 MG/1
40 TABLET, DELAYED RELEASE ORAL DAILY
Status: DISCONTINUED | OUTPATIENT
Start: 2022-08-24 | End: 2022-08-26 | Stop reason: HOSPADM

## 2022-08-23 RX ORDER — VENLAFAXINE HYDROCHLORIDE 150 MG/1
150 CAPSULE, EXTENDED RELEASE ORAL DAILY
Status: DISCONTINUED | OUTPATIENT
Start: 2022-08-24 | End: 2022-08-26 | Stop reason: HOSPADM

## 2022-08-23 RX ORDER — ASPIRIN 81 MG/1
324 TABLET, CHEWABLE ORAL ONCE
Status: COMPLETED | OUTPATIENT
Start: 2022-08-23 | End: 2022-08-23

## 2022-08-23 RX ORDER — ALBUTEROL SULFATE 90 UG/1
2 AEROSOL, METERED RESPIRATORY (INHALATION) 3 TIMES DAILY PRN
Status: DISCONTINUED | OUTPATIENT
Start: 2022-08-23 | End: 2022-08-26 | Stop reason: HOSPADM

## 2022-08-23 RX ORDER — ACETAMINOPHEN 325 MG/1
650 TABLET ORAL EVERY 6 HOURS PRN
Status: DISCONTINUED | OUTPATIENT
Start: 2022-08-23 | End: 2022-08-26 | Stop reason: HOSPADM

## 2022-08-23 RX ORDER — TRAZODONE HYDROCHLORIDE 100 MG/1
100 TABLET ORAL
Status: DISCONTINUED | OUTPATIENT
Start: 2022-08-23 | End: 2022-08-26 | Stop reason: HOSPADM

## 2022-08-23 RX ORDER — ENOXAPARIN SODIUM 100 MG/ML
40 INJECTION SUBCUTANEOUS DAILY
Status: DISCONTINUED | OUTPATIENT
Start: 2022-08-24 | End: 2022-08-24

## 2022-08-23 RX ORDER — TOPIRAMATE 25 MG/1
50 TABLET ORAL EVERY 12 HOURS
Status: DISCONTINUED | OUTPATIENT
Start: 2022-08-23 | End: 2022-08-26 | Stop reason: HOSPADM

## 2022-08-23 RX ORDER — DOXAZOSIN MESYLATE 1 MG/1
2 TABLET ORAL DAILY
Status: DISCONTINUED | OUTPATIENT
Start: 2022-08-24 | End: 2022-08-26 | Stop reason: HOSPADM

## 2022-08-23 RX ORDER — LOSARTAN POTASSIUM 50 MG/1
100 TABLET ORAL DAILY
Refills: 1 | Status: DISCONTINUED | OUTPATIENT
Start: 2022-08-24 | End: 2022-08-26 | Stop reason: HOSPADM

## 2022-08-23 RX ORDER — ATORVASTATIN CALCIUM 40 MG/1
80 TABLET, FILM COATED ORAL EVERY EVENING
Status: DISCONTINUED | OUTPATIENT
Start: 2022-08-23 | End: 2022-08-26 | Stop reason: HOSPADM

## 2022-08-23 RX ORDER — AMLODIPINE BESYLATE 10 MG/1
10 TABLET ORAL DAILY
Status: DISCONTINUED | OUTPATIENT
Start: 2022-08-24 | End: 2022-08-26 | Stop reason: HOSPADM

## 2022-08-23 RX ORDER — ASPIRIN 81 MG/1
81 TABLET, CHEWABLE ORAL DAILY
Status: DISCONTINUED | OUTPATIENT
Start: 2022-08-24 | End: 2022-08-26 | Stop reason: HOSPADM

## 2022-08-23 RX ADMIN — TRAZODONE HYDROCHLORIDE 100 MG: 100 TABLET ORAL at 21:17

## 2022-08-23 RX ADMIN — ASPIRIN 324 MG: 81 TABLET, CHEWABLE ORAL at 20:08

## 2022-08-23 RX ADMIN — ATORVASTATIN CALCIUM 80 MG: 40 TABLET, FILM COATED ORAL at 21:17

## 2022-08-23 RX ADMIN — TOPIRAMATE 50 MG: 25 TABLET, FILM COATED ORAL at 21:17

## 2022-08-24 ENCOUNTER — APPOINTMENT (OUTPATIENT)
Dept: NUCLEAR MEDICINE | Facility: HOSPITAL | Age: 67
DRG: 247 | End: 2022-08-24
Payer: COMMERCIAL

## 2022-08-24 ENCOUNTER — APPOINTMENT (OUTPATIENT)
Dept: NON INVASIVE DIAGNOSTICS | Facility: HOSPITAL | Age: 67
DRG: 247 | End: 2022-08-24
Payer: COMMERCIAL

## 2022-08-24 DIAGNOSIS — M25.512 ACUTE PAIN OF LEFT SHOULDER: ICD-10-CM

## 2022-08-24 LAB
ALBUMIN SERPL BCP-MCNC: 3.4 G/DL (ref 3.5–5)
ALP SERPL-CCNC: 47 U/L (ref 46–116)
ALT SERPL W P-5'-P-CCNC: 27 U/L (ref 12–78)
ANION GAP SERPL CALCULATED.3IONS-SCNC: 7 MMOL/L (ref 4–13)
AORTIC ROOT: 3.6 CM
APICAL FOUR CHAMBER EJECTION FRACTION: 71 %
ASCENDING AORTA: 3.7 CM
AST SERPL W P-5'-P-CCNC: 16 U/L (ref 5–45)
ATRIAL RATE: 68 BPM
ATRIAL RATE: 74 BPM
ATRIAL RATE: 76 BPM
ATRIAL RATE: 77 BPM
AV LVOT MEAN GRADIENT: 3 MMHG
AV LVOT PEAK GRADIENT: 5 MMHG
AV REGURGITATION PRESSURE HALF TIME: 499 MS
BILIRUB SERPL-MCNC: 0.27 MG/DL (ref 0.2–1)
BUN SERPL-MCNC: 22 MG/DL (ref 5–25)
CALCIUM ALBUM COR SERPL-MCNC: 8.6 MG/DL (ref 8.3–10.1)
CALCIUM SERPL-MCNC: 8.1 MG/DL (ref 8.3–10.1)
CARDIAC TROPONIN I PNL SERPL HS: 79 NG/L
CHLORIDE SERPL-SCNC: 110 MMOL/L (ref 96–108)
CHOLEST SERPL-MCNC: 182 MG/DL
CO2 SERPL-SCNC: 24 MMOL/L (ref 21–32)
CREAT SERPL-MCNC: 0.89 MG/DL (ref 0.6–1.3)
DOP CALC LVOT PEAK VEL VTI: 25.14 CM
DOP CALC LVOT PEAK VEL: 1.16 M/S
E WAVE DECELERATION TIME: 214 MS
ERYTHROCYTE [DISTWIDTH] IN BLOOD BY AUTOMATED COUNT: 13.4 % (ref 11.6–15.1)
FRACTIONAL SHORTENING: 37 % (ref 28–44)
GFR SERPL CREATININE-BSD FRML MDRD: 90 ML/MIN/1.73SQ M
GLUCOSE SERPL-MCNC: 93 MG/DL (ref 65–140)
HCT VFR BLD AUTO: 40.6 % (ref 36.5–49.3)
HDLC SERPL-MCNC: 41 MG/DL
HGB BLD-MCNC: 14 G/DL (ref 12–17)
INTERVENTRICULAR SEPTUM IN DIASTOLE (PARASTERNAL SHORT AXIS VIEW): 1.4 CM
INTERVENTRICULAR SEPTUM: 1.4 CM (ref 0.6–1.1)
LAAS-AP2: 35 CM2
LAAS-AP4: 33.7 CM2
LDLC SERPL CALC-MCNC: 107 MG/DL (ref 0–100)
LEFT ATRIUM AREA SYSTOLE SINGLE PLANE A4C: 37.7 CM2
LEFT ATRIUM SIZE: 5.2 CM
LEFT INTERNAL DIMENSION IN SYSTOLE: 3.1 CM (ref 2.1–4)
LEFT VENTRICULAR INTERNAL DIMENSION IN DIASTOLE: 4.9 CM (ref 3.5–6)
LEFT VENTRICULAR POSTERIOR WALL IN END DIASTOLE: 1.3 CM
LEFT VENTRICULAR STROKE VOLUME: 76 ML
LVSV (TEICH): 76 ML
MAGNESIUM SERPL-MCNC: 2.3 MG/DL (ref 1.6–2.6)
MAX HR: 99 BPM
MCH RBC QN AUTO: 31.7 PG (ref 26.8–34.3)
MCHC RBC AUTO-ENTMCNC: 34.5 G/DL (ref 31.4–37.4)
MCV RBC AUTO: 92 FL (ref 82–98)
MV E'TISSUE VEL-SEP: 8 CM/S
MV PEAK A VEL: 1.07 M/S
MV PEAK E VEL: 117 CM/S
MV STENOSIS PRESSURE HALF TIME: 62 MS
MV VALVE AREA P 1/2 METHOD: 3.55 CM2
NUC STRESS EJECTION FRACTION: 66 %
P AXIS: 53 DEGREES
P AXIS: 56 DEGREES
P AXIS: 57 DEGREES
P AXIS: 69 DEGREES
PLATELET # BLD AUTO: 147 THOUSANDS/UL (ref 149–390)
PMV BLD AUTO: 10.2 FL (ref 8.9–12.7)
POTASSIUM SERPL-SCNC: 3.4 MMOL/L (ref 3.5–5.3)
PR INTERVAL: 148 MS
PR INTERVAL: 150 MS
PR INTERVAL: 154 MS
PR INTERVAL: 154 MS
PROT SERPL-MCNC: 6.2 G/DL (ref 6.4–8.4)
QRS AXIS: 30 DEGREES
QRS AXIS: 36 DEGREES
QRS AXIS: 36 DEGREES
QRS AXIS: 37 DEGREES
QRSD INTERVAL: 78 MS
QRSD INTERVAL: 80 MS
QRSD INTERVAL: 82 MS
QRSD INTERVAL: 84 MS
QT INTERVAL: 392 MS
QT INTERVAL: 394 MS
QT INTERVAL: 404 MS
QT INTERVAL: 416 MS
QTC INTERVAL: 435 MS
QTC INTERVAL: 442 MS
QTC INTERVAL: 443 MS
QTC INTERVAL: 457 MS
RATE PRESSURE PRODUCT: NORMAL
RBC # BLD AUTO: 4.42 MILLION/UL (ref 3.88–5.62)
RIGHT ATRIUM AREA SYSTOLE A4C: 15.2 CM2
RIGHT VENTRICLE ID DIMENSION: 4.3 CM
SL CV AV DECELERATION TIME RETROGRADE: 1722 MS
SL CV AV PEAK GRADIENT RETROGRADE: 66 MMHG
SL CV LEFT ATRIUM LENGTH A2C: 6.8 CM
SL CV PED ECHO LEFT VENTRICLE DIASTOLIC VOLUME (MOD BIPLANE) 2D: 113 ML
SL CV PED ECHO LEFT VENTRICLE SYSTOLIC VOLUME (MOD BIPLANE) 2D: 37 ML
SL CV REST NUCLEAR ISOTOPE DOSE: 10.8 MCI
SL CV STRESS NUCLEAR ISOTOPE DOSE: 31.8 MCI
SL CV STRESS RECOVERY BP: NORMAL MMHG
SL CV STRESS RECOVERY HR: 90 BPM
SODIUM SERPL-SCNC: 141 MMOL/L (ref 135–147)
STRESS BASELINE BP: NORMAL MMHG
STRESS BASELINE HR: 65 BPM
STRESS O2 SAT REST: 100 %
STRESS PEAK HR: 99 BPM
STRESS POST O2 SAT PEAK: 100 %
STRESS POST PEAK BP: 166 MMHG
STRESS ST DEPRESSION: 0 MM
T WAVE AXIS: 25 DEGREES
T WAVE AXIS: 29 DEGREES
T WAVE AXIS: 34 DEGREES
T WAVE AXIS: 36 DEGREES
TR MAX PG: 27 MMHG
TR PEAK VELOCITY: 2.6 M/S
TRICUSPID VALVE PEAK REGURGITATION VELOCITY: 2.59 M/S
TRIGL SERPL-MCNC: 171 MG/DL
VENTRICULAR RATE: 68 BPM
VENTRICULAR RATE: 74 BPM
VENTRICULAR RATE: 76 BPM
VENTRICULAR RATE: 77 BPM
WBC # BLD AUTO: 6.34 THOUSAND/UL (ref 4.31–10.16)

## 2022-08-24 PROCEDURE — A9502 TC99M TETROFOSMIN: HCPCS

## 2022-08-24 PROCEDURE — 80053 COMPREHEN METABOLIC PANEL: CPT | Performed by: INTERNAL MEDICINE

## 2022-08-24 PROCEDURE — 93018 CV STRESS TEST I&R ONLY: CPT | Performed by: INTERNAL MEDICINE

## 2022-08-24 PROCEDURE — 93306 TTE W/DOPPLER COMPLETE: CPT | Performed by: INTERNAL MEDICINE

## 2022-08-24 PROCEDURE — G1004 CDSM NDSC: HCPCS

## 2022-08-24 PROCEDURE — 93017 CV STRESS TEST TRACING ONLY: CPT

## 2022-08-24 PROCEDURE — 78452 HT MUSCLE IMAGE SPECT MULT: CPT | Performed by: INTERNAL MEDICINE

## 2022-08-24 PROCEDURE — 99205 OFFICE O/P NEW HI 60 MIN: CPT | Performed by: INTERNAL MEDICINE

## 2022-08-24 PROCEDURE — 84484 ASSAY OF TROPONIN QUANT: CPT | Performed by: INTERNAL MEDICINE

## 2022-08-24 PROCEDURE — 78452 HT MUSCLE IMAGE SPECT MULT: CPT

## 2022-08-24 PROCEDURE — 85027 COMPLETE CBC AUTOMATED: CPT | Performed by: INTERNAL MEDICINE

## 2022-08-24 PROCEDURE — 83735 ASSAY OF MAGNESIUM: CPT | Performed by: INTERNAL MEDICINE

## 2022-08-24 PROCEDURE — 36415 COLL VENOUS BLD VENIPUNCTURE: CPT | Performed by: INTERNAL MEDICINE

## 2022-08-24 PROCEDURE — 80061 LIPID PANEL: CPT | Performed by: INTERNAL MEDICINE

## 2022-08-24 PROCEDURE — 93306 TTE W/DOPPLER COMPLETE: CPT

## 2022-08-24 PROCEDURE — 93010 ELECTROCARDIOGRAM REPORT: CPT | Performed by: INTERNAL MEDICINE

## 2022-08-24 PROCEDURE — 93016 CV STRESS TEST SUPVJ ONLY: CPT | Performed by: INTERNAL MEDICINE

## 2022-08-24 RX ORDER — MELOXICAM 15 MG/1
15 TABLET ORAL DAILY
Qty: 30 TABLET | Refills: 1 | Status: SHIPPED | OUTPATIENT
Start: 2022-08-24 | End: 2022-10-19

## 2022-08-24 RX ORDER — AMINOPHYLLINE DIHYDRATE 25 MG/ML
50 INJECTION, SOLUTION INTRAVENOUS ONCE
Status: COMPLETED | OUTPATIENT
Start: 2022-08-24 | End: 2022-08-24

## 2022-08-24 RX ORDER — SODIUM CHLORIDE 9 MG/ML
100 INJECTION, SOLUTION INTRAVENOUS CONTINUOUS
Status: DISCONTINUED | OUTPATIENT
Start: 2022-08-24 | End: 2022-08-24

## 2022-08-24 RX ORDER — SODIUM CHLORIDE 9 MG/ML
100 INJECTION, SOLUTION INTRAVENOUS CONTINUOUS
Status: DISCONTINUED | OUTPATIENT
Start: 2022-08-25 | End: 2022-08-26

## 2022-08-24 RX ADMIN — LOSARTAN POTASSIUM 100 MG: 50 TABLET, FILM COATED ORAL at 09:05

## 2022-08-24 RX ADMIN — AMINOPHYLLINE 50 MG: 25 INJECTION, SOLUTION INTRAVENOUS at 14:53

## 2022-08-24 RX ADMIN — AMLODIPINE BESYLATE 10 MG: 10 TABLET ORAL at 09:05

## 2022-08-24 RX ADMIN — ENOXAPARIN SODIUM 40 MG: 40 INJECTION SUBCUTANEOUS at 09:05

## 2022-08-24 RX ADMIN — REGADENOSON 0.4 MG: 0.08 INJECTION, SOLUTION INTRAVENOUS at 14:48

## 2022-08-24 RX ADMIN — TOPIRAMATE 50 MG: 25 TABLET, FILM COATED ORAL at 09:03

## 2022-08-24 RX ADMIN — VENLAFAXINE HYDROCHLORIDE 150 MG: 150 CAPSULE, EXTENDED RELEASE ORAL at 09:03

## 2022-08-24 RX ADMIN — ASPIRIN 81 MG: 81 TABLET, CHEWABLE ORAL at 09:05

## 2022-08-24 RX ADMIN — TOPIRAMATE 50 MG: 25 TABLET, FILM COATED ORAL at 20:05

## 2022-08-24 RX ADMIN — ACETAMINOPHEN 650 MG: 325 TABLET, FILM COATED ORAL at 20:06

## 2022-08-24 RX ADMIN — SODIUM CHLORIDE 100 ML/HR: 0.9 INJECTION, SOLUTION INTRAVENOUS at 23:06

## 2022-08-24 RX ADMIN — DOXAZOSIN 2 MG: 1 TABLET ORAL at 09:03

## 2022-08-24 RX ADMIN — ATORVASTATIN CALCIUM 80 MG: 40 TABLET, FILM COATED ORAL at 17:08

## 2022-08-24 RX ADMIN — PANTOPRAZOLE SODIUM 40 MG: 40 TABLET, DELAYED RELEASE ORAL at 09:05

## 2022-08-24 RX ADMIN — TRAZODONE HYDROCHLORIDE 100 MG: 100 TABLET ORAL at 21:09

## 2022-08-24 NOTE — ASSESSMENT & PLAN NOTE
Patient presenting to the ED for intermittent episodes of midsternal to left-sided chest pain that started today  Patient reports that his chest pain had radiated to his jaw and down his left arm  He also reports some associated shortness of breath  Denies any associated headaches, dizziness, abdominal pain, nausea/vomiting/diarrhea, urinary complaints at this time    · Patient reports lower solution of chest pain in the ED  · ECG: NSR without ischemic changes  · Troponin level: 0hr 54  · Start on aspirin and statin therapy  · Monitor on telemetry  · Serial ECG and troponin  · Consult to Cardiology, recommendations are appreciated

## 2022-08-24 NOTE — ED PROVIDER NOTES
History  Chief Complaint   Patient presents with    Chest Pain     Patient reports chest pains that began earlier today, patient reports significant pressure and Jaw hurting  Patient also reports left arm was hurting  Currently in triage no pain in arm or Jaw  SOB during pain and nausea during pain, +Dizziness during episode; hx of gastric bypass ; patient reports increased pain after eating x couple days, radiates across chest lasting about 10 minutes  62 yo with h/o HTN, HLD here with chest pain  Ongoing for the past week  Intermittent  More severe today  States it felt like someone was sitting on his chest  Radiated to left jaw and left arm  Now resolved  Felt short of breath and dizzy during this episode  Aside from the intermittent episodes this past week he has no prior h/o similar  Denies recent travels, trauma, surgery  No h/o VTE or CAD  History provided by:  Patient   used: No    Chest Pain  Pain location:  Substernal area  Pain quality: pressure    Pain radiates to:  L jaw and L arm  Pain severity:  Moderate  Onset quality:  Gradual  Duration:  1 week  Timing:  Intermittent  Progression:  Worsening  Chronicity:  New  Relieved by:  Nothing  Worsened by:  Nothing tried  Associated symptoms: shortness of breath    Associated symptoms: no abdominal pain, no back pain, no cough, no fever, no palpitations and not vomiting        Prior to Admission Medications   Prescriptions Last Dose Informant Patient Reported? Taking?    Azelastine HCl 137 MCG/SPRAY SOLN   No No   Si SPRAY INTO EACH NOSTRIL 2 (TWO) TIMES A DAY USE IN EACH NOSTRIL AS DIRECTED   SUMAtriptan (IMITREX) 50 mg tablet   No No   Sig: TAKE 1 TABLET (50 MG TOTAL) BY MOUTH EVERY 2 (TWO) HOURS AS NEEDED FOR MIGRAINE  MG/DAY   Ventolin  (90 Base) MCG/ACT inhaler   No No   Sig: INHALE 2 PUFFS 3 (THREE) TIMES A DAY AS NEEDED FOR WHEEZING   amLODIPine (NORVASC) 10 mg tablet   No No   Sig: TAKE 1 TABLET BY MOUTH EVERY DAY   cyclobenzaprine (FLEXERIL) 10 mg tablet   No No   Sig: Take 1 tablet (10 mg total) by mouth 3 (three) times a day as needed for muscle spasms   doxazosin (CARDURA) 2 mg tablet   No No   Sig: TAKE 1 TABLET BY MOUTH EVERY DAY   fluticasone (FLONASE) 50 mcg/act nasal spray   Yes No   Si sprays into each nostril daily   ketoconazole (NIZORAL) 2 % shampoo   Yes No   Sig: Apply topically daily   meclizine (ANTIVERT) 25 mg tablet   No No   Sig: Take 1 tablet (25 mg total) by mouth 3 (three) times a day as needed for dizziness   meloxicam (MOBIC) 15 mg tablet   No No   Sig: Take 1 tablet (15 mg total) by mouth daily   mometasone (ELOCON) 0 1 % cream   No No   Sig: Apply topically daily   olopatadine (PATANOL) 0 1 % ophthalmic solution   No No   Sig: Administer 1 drop to both eyes 2 (two) times a day   pantoprazole (PROTONIX) 40 mg tablet   Yes No   Sig: Take 40 mg by mouth daily   promethazine (PHENERGAN) 25 mg tablet   No No   Sig: Take 1 tablet (25 mg total) by mouth every 6 (six) hours as needed for nausea or vomiting   topiramate (TOPAMAX) 50 MG tablet   No No   Sig: TAKE 1 TABLET BY MOUTH EVERY 12 HOURS   traZODone (DESYREL) 100 mg tablet   No No   Sig: Take 1 tablet (100 mg total) by mouth daily at bedtime   valsartan (DIOVAN) 320 MG tablet   No No   Sig: TAKE 1 TABLET BY MOUTH EVERY DAY   venlafaxine (EFFEXOR-XR) 150 mg 24 hr capsule   No No   Sig: TAKE 1 CAPSULE BY MOUTH EVERY DAY      Facility-Administered Medications: None       No past medical history on file  Past Surgical History:   Procedure Laterality Date    SEPTOPLASTY         Family History   Problem Relation Age of Onset    Aneurysm Mother     Coronary artery disease Father     Cancer Other     Stroke Other         CVA     I have reviewed and agree with the history as documented      E-Cigarette/Vaping     E-Cigarette/Vaping Substances     Social History     Tobacco Use    Smoking status: Current Some Day Smoker    Smokeless tobacco: Never Used    Tobacco comment: never a smoker ( as per allscripts)   Substance Use Topics    Alcohol use: Yes     Comment: occasional alcohol use    Drug use: No       Review of Systems   Constitutional: Negative for chills and fever  HENT: Negative for ear pain and sore throat  Eyes: Negative for pain and visual disturbance  Respiratory: Positive for shortness of breath  Negative for cough  Cardiovascular: Positive for chest pain  Negative for palpitations  Gastrointestinal: Negative for abdominal pain and vomiting  Genitourinary: Negative for dysuria and hematuria  Musculoskeletal: Negative for arthralgias and back pain  Skin: Negative for color change and rash  Neurological: Positive for light-headedness  Negative for seizures and syncope  All other systems reviewed and are negative  Physical Exam  Physical Exam  Vitals and nursing note reviewed  Constitutional:       Appearance: He is well-developed  HENT:      Head: Normocephalic and atraumatic  Eyes:      Conjunctiva/sclera: Conjunctivae normal    Cardiovascular:      Rate and Rhythm: Normal rate and regular rhythm  Heart sounds: No murmur heard  Pulmonary:      Effort: Pulmonary effort is normal  No respiratory distress  Breath sounds: Normal breath sounds  Abdominal:      Palpations: Abdomen is soft  Tenderness: There is no abdominal tenderness  Musculoskeletal:      Cervical back: Neck supple  Skin:     General: Skin is warm and dry  Neurological:      Mental Status: He is alert           Vital Signs  ED Triage Vitals   Temperature Pulse Respirations Blood Pressure SpO2   08/23/22 1840 08/23/22 1838 08/23/22 1838 08/23/22 1838 08/23/22 1838   97 6 °F (36 4 °C) 88 16 169/82 100 %      Temp Source Heart Rate Source Patient Position - Orthostatic VS BP Location FiO2 (%)   08/23/22 1840 08/23/22 1838 08/23/22 1838 08/23/22 1838 --   Temporal Monitor Sitting Left arm       Pain Score 08/23/22 1838       No Pain           Vitals:    08/23/22 1838 08/23/22 1915 08/23/22 2000   BP: 169/82 (!) 147/106 132/75   Pulse: 88 71 75   Patient Position - Orthostatic VS: Sitting           Visual Acuity      ED Medications  Medications   aspirin chewable tablet 324 mg (324 mg Oral Given 8/23/22 2008)       Diagnostic Studies  Results Reviewed     Procedure Component Value Units Date/Time    HS Troponin I 4hr [[id number]]     Lab Status: No result Specimen: Blood     Lipase [[id number]]  (Normal) Collected: 08/23/22 1842    Lab Status: Final result Specimen: Blood from Arm, Right Updated: 08/23/22 2002     Lipase 155 u/L     HS Troponin 0hr (reflex protocol) [[id number]]  (Abnormal) Collected: 08/23/22 1842    Lab Status: Final result Specimen: Blood from Arm, Right Updated: 08/1957     hs TnI 0hr 54 ng/L     HS Troponin I 2hr [[id number]]     Lab Status: No result Specimen: Blood     Comprehensive metabolic panel [[id number]]  (Abnormal) Collected: 08/23/22 1842    Lab Status: Final result Specimen: Blood from Arm, Right Updated: 08/23/22 1909     Sodium 143 mmol/L      Potassium 3 6 mmol/L      Chloride 112 mmol/L      CO2 20 mmol/L      ANION GAP 11 mmol/L      BUN 26 mg/dL      Creatinine 1 07 mg/dL      Glucose 192 mg/dL      Calcium 8 0 mg/dL      AST 17 U/L      ALT 29 U/L      Alkaline Phosphatase 52 U/L      Total Protein 6 4 g/dL      Albumin 3 5 g/dL      Total Bilirubin 0 21 mg/dL      eGFR 72 ml/min/1 73sq m     Narrative:      Dana guidelines for Chronic Kidney Disease (CKD):     Stage 1 with normal or high GFR (GFR > 90 mL/min/1 73 square meters)    Stage 2 Mild CKD (GFR = 60-89 mL/min/1 73 square meters)    Stage 3A Moderate CKD (GFR = 45-59 mL/min/1 73 square meters)    Stage 3B Moderate CKD (GFR = 30-44 mL/min/1 73 square meters)    Stage 4 Severe CKD (GFR = 15-29 mL/min/1 73 square meters)    Stage 5 End Stage CKD (GFR <15 mL/min/1 73 square meters)  Note: GFR calculation is accurate only with a steady state creatinine    CBC and differential [[id number]]  (Abnormal) Collected: 08/23/22 1842    Lab Status: Final result Specimen: Blood from Arm, Right Updated: 08/23/22 1854     WBC 5 17 Thousand/uL      RBC 4 57 Million/uL      Hemoglobin 14 9 g/dL      Hematocrit 42 1 %      MCV 92 fL      MCH 32 6 pg      MCHC 35 4 g/dL      RDW 13 8 %      MPV 10 1 fL      Platelets 072 Thousands/uL      nRBC 0 /100 WBCs      Neutrophils Relative 76 %      Immat GRANS % 0 %      Lymphocytes Relative 16 %      Monocytes Relative 5 %      Eosinophils Relative 2 %      Basophils Relative 1 %      Neutrophils Absolute 3 93 Thousands/µL      Immature Grans Absolute 0 01 Thousand/uL      Lymphocytes Absolute 0 85 Thousands/µL      Monocytes Absolute 0 24 Thousand/µL      Eosinophils Absolute 0 11 Thousand/µL      Basophils Absolute 0 03 Thousands/µL                  XR chest 1 view portable    (Results Pending)              Procedures  ECG 12 Lead Documentation Only    Date/Time: 8/23/2022 8:13 PM  Performed by: Chris Toure PA-C  Authorized by: Chris Toure PA-C     ECG reviewed by me, the ED Provider: yes    Patient location:  ED  Interpretation:     Interpretation: normal    Quality:     Tracing quality:  Limited by artifact  Rate:     ECG rate:  77    ECG rate assessment: normal    Rhythm:     Rhythm: sinus rhythm    Ectopy:     Ectopy: none    QRS:     QRS axis:  Normal    QRS intervals:  Normal  Conduction:     Conduction: normal    ST segments:     ST segments:  Normal  T waves:     T waves: normal               ED Course             HEART Risk Score    Flowsheet Row Most Recent Value   Heart Score Risk Calculator    History 1 Filed at: 08/23/2022 2015   ECG 0 Filed at: 08/23/2022 2015   Age 1 Filed at: 08/23/2022 2015   Risk Factors 1 Filed at: 08/23/2022 2015   Troponin 2 Filed at: 08/23/2022 2015   HEART Score 5 Filed at: 08/23/2022 2015 SBIRT 22yo+    Flowsheet Row Most Recent Value   SBIRT (23 yo +)    In order to provide better care to our patients, we are screening all of our patients for alcohol and drug use  Would it be okay to ask you these screening questions? Yes Filed at: 08/23/2022 1907   Initial Alcohol Screen: US AUDIT-C     1  How often do you have a drink containing alcohol? 0 Filed at: 08/23/2022 1907   2  How many drinks containing alcohol do you have on a typical day you are drinking? 0 Filed at: 08/23/2022 1907   3a  Male UNDER 65: How often do you have five or more drinks on one occasion? 0 Filed at: 08/23/2022 1907   3b  FEMALE Any Age, or MALE 65+: How often do you have 4 or more drinks on one occassion? 0 Filed at: 08/23/2022 1907   Audit-C Score 0 Filed at: 08/23/2022 1907   ONEIDA: How many times in the past year have you    Used an illegal drug or used a prescription medication for non-medical reasons? Never Filed at: 08/23/2022 1907                    MDM  Number of Diagnoses or Management Options  Chest pain: new and requires workup  Diagnosis management comments: DDX including but not limited to: ACS, MI, PE, PTX, pneumonia, dissection, pleurisy, pericarditis, myocarditis, rhabdomyolysis, GI etiology  Plan: Cardiac workup  dispo pending  Amount and/or Complexity of Data Reviewed  Clinical lab tests: ordered and reviewed  Tests in the radiology section of CPT®: ordered and reviewed  Independent visualization of images, tracings, or specimens: yes    Risk of Complications, Morbidity, and/or Mortality  Presenting problems: moderate  Management options: low  General comments: 62 yo with chest pain  Elevated trop  ekg nonischemic  XR unremarkable  Will admit for cardiac obs  Pt in agreement  Stable at time of admit       Patient Progress  Patient progress: stable      Disposition  Final diagnoses:   Chest pain     Time reflects when diagnosis was documented in both MDM as applicable and the Disposition within this note     Time User Action Codes Description Comment    8/23/2022  8:08 PM Nuno Shahid Add [R07 9] Chest pain       ED Disposition     ED Disposition   Admit    Condition   Stable    Date/Time   Tue Aug 23, 2022  8:08 PM    Comment   Case was discussed with Amanda Vázquez and the patient's admission status was agreed to be Admission Status: observation status to the service of Dr Vivi Garvin   Follow-up Information    None         Patient's Medications   Discharge Prescriptions    No medications on file       No discharge procedures on file      PDMP Review     None          ED Provider  Electronically Signed by           Duncan Shah PA-C  08/23/22 2017

## 2022-08-24 NOTE — H&P
[id number] [address] Duane Rose 1955, 61 y o  male MRN: [id number]  Unit/Bed#: ED 24 Encounter: [id number]  Primary Care Provider: Lara Vail DO   Date and time admitted to hospital: 8/23/2022  7:00 PM    * Chest pain  Assessment & Plan  Patient presenting to the ED for intermittent episodes of midsternal to left-sided chest pain that started today  Patient reports following his 2nd episode of chest pain today the pain had radiated to his jaw and down his left arm  He also reports some associated shortness of breath  Denies any associated headaches, dizziness, abdominal pain, nausea/vomiting/diarrhea, urinary complaints at this time  · Patient reports lower solution of chest pain in the ED  · ECG: NSR without ischemic changes  · Troponin level: 0hr 54  · Start on aspirin and statin therapy  · Monitor on telemetry  · Serial ECG and troponin  · Consult to Cardiology, recommendations are appreciated    Mild intermittent asthma without complication  Assessment & Plan  · No acute exacerbation  · Continue pre-hospital inhaler    Migraine headache  Assessment & Plan  · Continue Topamax    Gastroesophageal reflux disease without esophagitis  Assessment & Plan  · Continue home PPI    Essential hypertension  Assessment & Plan  · Continue pre-hospital amlodipine, valsartan, doxazosin  · Monitor vitals per routine    Anxiety  Assessment & Plan  · Mood stable  · Continue Effexor      VTE Prophylaxis: Enoxaparin (Lovenox)  / sequential compression device   Code Status: Level 1 code  Discussion with family:  Update in the morning    Anticipated Length of Stay:  Patient will be admitted on an Observation basis with an anticipated length of stay of  Less than 2 midnights  Justification for Hospital Stay:  Chest pain    Total Time for Visit, including Counseling / Coordination of Care: 60 minutes    Greater than 50% of this total time spent on direct patient counseling and coordination of care     Chief Complaint:   Chest pain    History of Present Illness:    Ortiz Oconnor is a 61 y o  male who has a past medical history significant for hypertension, asthma, migraines, GERD  Patient presenting to the ED for intermittent episodes of midsternal to left-sided chest pain that started today  Patient reports that his chest pain had radiated to his jaw and down his left arm  He also reports some associated shortness of breath  Denies any associated headaches, dizziness, abdominal pain, nausea/vomiting/diarrhea, urinary complaints at this time  Patient required medical admission for further evaluation treatment of chest pain with elevated troponin level  Place consult to cardiology service  All patient questions answered to the best my ability  Review of Systems:    Review of Systems   Constitutional: Negative for chills and fever  HENT: Negative for ear pain and sore throat  Eyes: Negative for pain and visual disturbance  Respiratory: Positive for shortness of breath  Negative for cough  Cardiovascular: Positive for chest pain  Negative for palpitations  Gastrointestinal: Negative for abdominal pain, constipation, diarrhea and vomiting  Genitourinary: Negative for dysuria, hematuria and urgency  Musculoskeletal: Negative for arthralgias, back pain and myalgias  Skin: Negative for color change and rash  Neurological: Negative for dizziness, seizures, syncope and headaches  All other systems reviewed and are negative  Past Medical and Surgical History:     No past medical history on file  Past Surgical History:   Procedure Laterality Date    SEPTOPLASTY         Meds/Allergies:    Prior to Admission medications    Medication Sig Start Date End Date Taking?  Authorizing Provider   meloxicam (MOBIC) 15 mg tablet Take 1 tablet (15 mg total) by mouth daily 6/7/22   Randy Arthur DO   promethazine (PHENERGAN) 25 mg tablet Take 1 tablet (25 mg total) by mouth every 6 (six) hours as needed for nausea or vomiting 6/7/22   Patricio Moreno DO   amLODIPine (NORVASC) 10 mg tablet TAKE 1 TABLET BY MOUTH EVERY DAY 4/2/21   Patricio Moreno DO   Azelastine HCl 137 MCG/SPRAY SOLN 1 SPRAY INTO EACH NOSTRIL 2 (TWO) TIMES A DAY USE IN EACH NOSTRIL AS DIRECTED 4/20/22   Patricio Moreno DO   cyclobenzaprine (FLEXERIL) 10 mg tablet Take 1 tablet (10 mg total) by mouth 3 (three) times a day as needed for muscle spasms 4/6/21   Patricio Moreno DO   doxazosin (CARDURA) 2 mg tablet TAKE 1 TABLET BY MOUTH EVERY DAY 4/6/22   Patricio Moreno DO   fluticasone (FLONASE) 50 mcg/act nasal spray 2 sprays into each nostril daily 6/15/15   Historical Provider, MD   ketoconazole (NIZORAL) 2 % shampoo Apply topically daily 5/11/17   Historical Provider, MD   meclizine (ANTIVERT) 25 mg tablet Take 1 tablet (25 mg total) by mouth 3 (three) times a day as needed for dizziness 11/14/19   Ed Doss MD   mometasone (ELOCON) 0 1 % cream Apply topically daily 9/13/21   Patricio Moreno DO   olopatadine (PATANOL) 0 1 % ophthalmic solution Administer 1 drop to both eyes 2 (two) times a day 3/27/22   Patricio Moreno DO   pantoprazole (PROTONIX) 40 mg tablet Take 40 mg by mouth daily 11/22/17   Historical Provider, MD   SUMAtriptan (IMITREX) 50 mg tablet TAKE 1 TABLET (50 MG TOTAL) BY MOUTH EVERY 2 (TWO) HOURS AS NEEDED FOR MIGRAINE  MG/DAY 3/15/21   Patricio Moreno DO   topiramate (TOPAMAX) 50 MG tablet TAKE 1 TABLET BY MOUTH EVERY 12 HOURS 5/23/22   Patricio Moreno DO   traZODone (DESYREL) 100 mg tablet Take 1 tablet (100 mg total) by mouth daily at bedtime 7/28/22   Patricio Moreno DO   valsartan (DIOVAN) 320 MG tablet TAKE 1 TABLET BY MOUTH EVERY DAY 4/28/22   Patricio Moreno DO   venlafaxine (EFFEXOR-XR) 150 mg 24 hr capsule TAKE 1 CAPSULE BY MOUTH EVERY DAY 12/9/21   Rich Delgado, DO   Ventolin  (90 Base) MCG/ACT inhaler INHALE 2 PUFFS 3 (THREE) TIMES A DAY AS NEEDED FOR WHEEZING 10/16/20   Patricio Moreno DO     I have reviewed home medications using allscripts  Allergies: No Known Allergies    Social History:     Marital Status: /Civil Union   Occupation: NA  Patient Pre-hospital Living Situation: Home  Patient Pre-hospital Level of Mobility: Walks  Patient Pre-hospital Diet Restrictions: None  Substance Use History:   Social History     Substance and Sexual Activity   Alcohol Use Yes    Comment: occasional alcohol use     Social History     Tobacco Use   Smoking Status Current Some Day Smoker   Smokeless Tobacco Never Used   Tobacco Comment    never a smoker ( as per allscripts)     Social History     Substance and Sexual Activity   Drug Use No       Family History:    Family History   Problem Relation Age of Onset    Aneurysm Mother     Coronary artery disease Father     Cancer Other     Stroke Other         CVA       Physical Exam:     Vitals:   Blood Pressure: 132/75 (08/23/22 2000)  Pulse: 75 (08/23/22 2000)  Temperature: 97 6 °F (36 4 °C) (08/23/22 1840)  Temp Source: Temporal (08/23/22 1840)  Respirations: 16 (08/23/22 2000)  SpO2: 99 % (08/23/22 2000)    Physical Exam  Vitals and nursing note reviewed  Constitutional:       General: He is not in acute distress  Appearance: He is well-developed  He is obese  HENT:      Head: Normocephalic and atraumatic  Mouth/Throat:      Pharynx: Oropharynx is clear  Eyes:      Conjunctiva/sclera: Conjunctivae normal    Cardiovascular:      Rate and Rhythm: Normal rate and regular rhythm  Pulses: Normal pulses  Heart sounds: No murmur heard  Pulmonary:      Effort: Pulmonary effort is normal  No respiratory distress  Breath sounds: Normal breath sounds  Abdominal:      General: Bowel sounds are normal       Palpations: Abdomen is soft  Tenderness: There is no abdominal tenderness  Musculoskeletal:         General: Normal range of motion  Cervical back: Neck supple  Right lower leg: No edema  Left lower leg: No edema     Skin: General: Skin is warm and dry  Neurological:      Mental Status: He is alert and oriented to person, place, and time  Additional Data:     Lab Results: I have personally reviewed pertinent reports  Results from last 7 days   Lab Units 08/23/22  1842   WBC Thousand/uL 5 17   HEMOGLOBIN g/dL 14 9   HEMATOCRIT % 42 1   PLATELETS Thousands/uL 144*   NEUTROS PCT % 76*   LYMPHS PCT % 16   MONOS PCT % 5   EOS PCT % 2     Results from last 7 days   Lab Units 08/23/22  1842   SODIUM mmol/L 143   POTASSIUM mmol/L 3 6   CHLORIDE mmol/L 112*   CO2 mmol/L 20*   BUN mg/dL 26*   CREATININE mg/dL 1 07   ANION GAP mmol/L 11   CALCIUM mg/dL 8 0*   ALBUMIN g/dL 3 5   TOTAL BILIRUBIN mg/dL 0 21   ALK PHOS U/L 52   ALT U/L 29   AST U/L 17   GLUCOSE RANDOM mg/dL 192*                       Imaging: I have personally reviewed pertinent reports  XR chest 1 view portable    (Results Pending)       EKG, Pathology, and Other Studies Reviewed on Admission:   · EKG: Reviewed    [facility] / [facility] Records Reviewed: Yes     ** Please Note: This note has been constructed using a voice recognition system   **

## 2022-08-24 NOTE — QUICK NOTE
Troponin levels trending upward: 0hr 54, 2hr 94, 4hr 102  No changes in ECG  Denies any recurrent chest pain  Continue with asa and statin  Repeat serial troponins  Hold off on heparin drip for now

## 2022-08-24 NOTE — UTILIZATION REVIEW
Initial Clinical Review    OBSERVATION WRITTEN 8/23/22 @ 2009 CONVERTED TO INPATIENT ADMISSION 8/25/22 @ 0757 DUE TO FURTHER DIAGNOSTIC WORKUP REQUIRED FOR CHEST PAIN WITH ELEVATED TROPS, REQUIRING AT LEAST A 2 MIDNIGHT STAY  Admission: Date/Time/Statement:   Admission Orders (From admission, onward)     Ordered        08/25/22 0757  Inpatient Admission  Once            08/23/22 2009  Place in Observation  Once                      Orders Placed This Encounter   Procedures    Inpatient Admission     Standing Status:   Standing     Number of Occurrences:   1     Order Specific Question:   Level of Care     Answer:   Med Surg [16]     Order Specific Question:   Estimated length of stay     Answer:   More than 2 Midnights     Order Specific Question:   Certification     Answer:   I certify that inpatient services are medically necessary for this patient for a duration of greater than two midnights  See H&P and MD Progress Notes for additional information about the patient's course of treatment  ED Arrival Information     Expected   -    Arrival   8/23/2022 18:29    Acuity   Emergent            Means of arrival   Walk-In    Escorted by   Spouse    Service   Hospitalist    Admission type   Emergency            Arrival complaint   Chest Pain           Chief Complaint   Patient presents with    Chest Pain     Patient reports chest pains that began earlier today, patient reports significant pressure and Jaw hurting  Patient also reports left arm was hurting  Currently in triage no pain in arm or Jaw  SOB during pain and nausea during pain, +Dizziness during episode; hx of gastric bypass 2013; patient reports increased pain after eating x couple days, radiates across chest lasting about 10 minutes  Initial Presentation: 61 y o  male who has a past medical history significant for hypertension, asthma, migraines, GERD  Patient presenting to the ED for intermittent episodes of midsternal to left-sided chest pain that started today, radiating to his jaw and down his left arm along with some associated shortness of breath  Elevated troponin level noted in ED  Initially admitted under Observation status then changed to Inpatient dt continued care needed for evaluation of chest pain with elevated trop, NSTEMI:  Med surg telemetry, cardiology consult, start asa and statin, serial ekgs and trops  Continue PTA inhaler, and home meds  Order NM stress test  Keep NPO for cardiac cath     8/24 CARDIOLOGY CONSULT:  Denies chest pain currently, trops 54, 94, 102, 79  ECG showed NSR, nonspecific ST changes  Start asa and statin, further eval w/ TTE  BP well controlled, continue amlodipine and losartan        ED Triage Vitals   Temperature Pulse Respirations Blood Pressure SpO2   08/23/22 1840 08/23/22 1838 08/23/22 1838 08/23/22 1838 08/23/22 1838   97 6 °F (36 4 °C) 88 16 169/82 100 %      Temp Source Heart Rate Source Patient Position - Orthostatic VS BP Location FiO2 (%)   08/23/22 1840 08/23/22 1838 08/23/22 1838 08/23/22 1838 --   Temporal Monitor Sitting Left arm       Pain Score       08/23/22 1838       No Pain          Wt Readings from Last 1 Encounters:   08/24/22 85 3 kg (188 lb)     Additional Vital Signs:   Date/Time Temp Pulse Resp BP MAP (mmHg) SpO2 Calculated FIO2 (%) - Nasal Cannula Nasal Cannula O2 Flow Rate (L/min) O2 Device   08/26/22 06:58:42 97 9 °F (36 6 °C) 69 18 142/81 101 96 % -- -- --   08/26/22 0300 98 1 °F (36 7 °C) 53 Abnormal  15 132/65 81 96 % -- -- --   08/25/22 2300 98 3 °F (36 8 °C) 78 16 160/78 109 91 % -- -- --   08/25/22 13:33:04 -- 94 18 179/94 Abnormal  122 97 % -- -- --   08/25/22 11:56:58 -- 68 20 162/85 111 97 % -- -- --   08/25/22 11:25:08 -- 71 19 118/82 94 96 % -- -- --   08/25/22 09:28:09 -- -- -- -- -- 99 % 32 3 L/min Nasal cannula   08/25/22 07:00:30 -- 59 17 145/75 98 97 % -- -- --   08/25/22 02:53:42 97 6 °F (36 4 °C) 59 17 131/73 92 95 % -- -- --   08/24/22 23:24:56 98 6 °F (37 °C) 65 -- 146/88 107 97 % -- -- --   08/24/22 19:09:40 97 9 °F (36 6 °C) 72 17 151/79 103 96 % -- -- --   08/24/22 17:06:14 98 5 °F (36 9 °C) 82 18 147/78 101 96 % -- -- --   08/24/22 1100 -- 65 12 124/60 -- 99 % -- -- None (Room air)       Date/Time Temp Pulse Resp BP MAP (mmHg) SpO2 O2 Device   08/24/22 0800 -- 54 Abnormal  17 125/58 84 99 % None (Room air)   08/24/22 0400 -- 67 16 159/74 107 97 % None (Room air)   08/24/22 0200 -- 60 18 143/70 99 98 % None (Room air)   08/24/22 0100 -- 61 14 160/76 109 98 % None (Room air)   08/24/22 0000 -- 65 15 160/84 116 98 % None (Room air)   08/23/22 2300 -- 64 15 148/70 100 97 % None (Room air)   08/23/22 2200 -- 65 12 163/84 114 98 % None (Room air)   08/23/22 2100 -- 64 13 139/66 95 97 % None (Room air)   08/23/22 2000 -- 75 16 132/75 98 99 % None (Room air)   08/23/22 1915 -- 71 23 Abnormal  147/106 Abnormal  118 96 % None (Room air)   08/23/22 1840 97 6 °F (36 4 °C) -- -- -- -- -- --   08/23/22 1838 -- 88 16 169/82 -- 100 % --       Pertinent Labs/Diagnostic Test Results:   8/23 EKG:  NSR    8/24  ECHO PENDING    Left Ventricle: Left ventricular cavity size is normal  Wall thickness is moderately increased  There is moderate concentric hypertrophy  Systolic function is normal (65%)  Wall motion is normal  Diastolic function is normal     Right Ventricle: Right ventricular cavity size is dilated  Systolic function is normal     Left Atrium: The atrium is moderately dilated    Aortic Valve: There is mild regurgitation    Mitral Valve: There is mild to moderate regurgitation    Tricuspid Valve: There is mild regurgitation  The right ventricular systolic pressure is normal     Pulmonic Valve: There is trace regurgitation        8/24 NM STRESS TEST PENDING    Stress ECG: No ST deviation is noted  The ECG was not diagnostic due to pharmacological (vasodilator) stress      Stress Function: Left ventricular function post-stress is normal  Post-stress ejection fraction is 66 %    Stress Combined Conclusion: The ECG and SPECT imaging portions of the stress study are discordant but are nonetheless concerning for inducible myocardial ischemia given the known limited sensitivity of stress electrocardiography    Perfusion: There is a left ventricular perfusion defect that is large in size with moderate reduction in uptake present in the entire inferior wall, basal-mid inferoseptal wall, basal-mid inferolateral wall that is partially reversible      8/25 Cardiac Cath:  · There is a 99% stenosis in mid RCA nad 90% stenosis of proximal RCA  This is culprit for patient presentation of NSTEMI  Underwent PCI with ESTHER  Excellent result  0% residual stenosis  MARAH 3 flow at completion   Successful PCI of RCA with ESTHER x2 in overlapping fashion       XR chest 1 view portable   Final Result by Smith Plasencia MD (08/24 0725)      No acute cardiopulmonary disease                    Workstation performed: [id number]               Results from last 7 days   Lab Units 08/26/22  0506 08/24/22  0411 08/23/22  1842   WBC Thousand/uL 7 95 6 34 5 17   HEMOGLOBIN g/dL 14 2 14 0 14 9   HEMATOCRIT % 40 9 40 6 42 1   PLATELETS Thousands/uL 171 147* 144*   NEUTROS ABS Thousands/µL 5 91  --  3 93         Results from last 7 days   Lab Units 08/26/22  0506 08/25/22  0448 08/24/22  0411 08/23/22  1842   SODIUM mmol/L 142 146 141 143   POTASSIUM mmol/L 3 4* 3 2* 3 4* 3 6   CHLORIDE mmol/L 110* 114* 110* 112*   CO2 mmol/L 21 22 24 20*   ANION GAP mmol/L 11 10 7 11   BUN mg/dL 17 20 22 26*   CREATININE mg/dL 0 83 0 91 0 89 1 07   EGFR ml/min/1 73sq m 92 88 90 72   CALCIUM mg/dL 8 4 8 0* 8 1* 8 0*   MAGNESIUM mg/dL 2 1  --  2 3  --      Results from last 7 days   Lab Units 08/24/22  0411 08/23/22  1842   AST U/L 16 17   ALT U/L 27 29   ALK PHOS U/L 47 52   TOTAL PROTEIN g/dL 6 2* 6 4   ALBUMIN g/dL 3 4* 3 5   TOTAL BILIRUBIN mg/dL 0 27 0 21         Results from last 7 days   Lab Units 08/26/22  0506 08/25/22  0448 08/24/22  0411 08/23/22  1842   GLUCOSE RANDOM mg/dL 107 92 93 192*     Results from last 7 days   Lab Units 08/24/22  0016 08/23/22  2250 08/23/22 2035 08/23/22  1842   HS TNI 0HR ng/L 79*  --   --  54*   HS TNI 2HR ng/L  --   --  94*  --    HSTNI D2 ng/L  --   --  40*  --    HS TNI 4HR ng/L  --  102*  --   --    HSTNI D4 ng/L  --  48*  --   --      Results from last 7 days   Lab Units 08/23/22  1842   LIPASE u/L 155     ED Treatment:   Medication Administration from 08/23/2022 1828 to 08/24/2022 8765       Date/Time Order Dose Route Action     08/23/2022 2008 aspirin chewable tablet 324 mg 324 mg Oral Given     08/24/2022 0905 amLODIPine (NORVASC) tablet 10 mg 10 mg Oral Given     08/24/2022 0903 doxazosin (CARDURA) tablet 2 mg 2 mg Oral Given     08/24/2022 0905 pantoprazole (PROTONIX) EC tablet 40 mg 40 mg Oral Given     08/24/2022 0903 topiramate (TOPAMAX) tablet 50 mg 50 mg Oral Given     08/23/2022 2117 topiramate (TOPAMAX) tablet 50 mg 50 mg Oral Given     08/23/2022 2117 traZODone (DESYREL) tablet 100 mg 100 mg Oral Given     08/24/2022 0905 losartan (COZAAR) tablet 100 mg 100 mg Oral Given     08/24/2022 0903 venlafaxine (EFFEXOR-XR) 24 hr capsule 150 mg 150 mg Oral Given     08/24/2022 0905 aspirin chewable tablet 81 mg 81 mg Oral Given     08/23/2022 2117 atorvastatin (LIPITOR) tablet 80 mg 80 mg Oral Given     08/24/2022 0905 enoxaparin (LOVENOX) subcutaneous injection 40 mg 40 mg Subcutaneous Given        No past medical history on file    Present on Admission:   Anxiety   Essential hypertension   Gastroesophageal reflux disease without esophagitis   Mild intermittent asthma without complication   Migraine headache      Admitting Diagnosis: Chest pain [R07 9]  Age/Sex: 61 y o  male  Admission Orders:  Scheduled Medications:  amLODIPine, 10 mg, Oral, Daily  aspirin, 81 mg, Oral, Daily  atorvastatin, 80 mg, Oral, QPM  clopidogrel, 600 mg, Oral, Once  [START ON 8/27/2022] clopidogrel, 75 mg, Oral, Daily  doxazosin, 2 mg, Oral, Daily  losartan, 100 mg, Oral, Daily  metoprolol succinate, 12 5 mg, Oral, Daily  pantoprazole, 40 mg, Oral, Daily  topiramate, 50 mg, Oral, Q12H  traZODone, 100 mg, Oral, HS  venlafaxine, 150 mg, Oral, Daily      Continuous IV Infusions:    sodium chloride 0 9 % infusion  Rate: 100 mL/hr Dose: 100 mL/hr  Freq: Continuous Route: IV  Indications of Use: IV Hydration  Last Dose: 100 mL/hr (08/26/22 0300)     PRN Meds:  acetaminophen, 650 mg, Oral, Q6H PRN x2 thus far  albuterol, 2 puff, Inhalation, TID PRN  HYDROmorphone, 0 5 mg, Intravenous, Q4H PRN  ondansetron, 4 mg, Intravenous, Q4H PRN x1 thus far    cath site care  scd      IP CONSULT TO CARDIOLOGY  IP CONSULT TO CASE MANAGEMENT    Network Utilization Review Department  ATTENTION: Please call with any questions or concerns to [phone] and carefully listen to the prompts so that you are directed to the right person  All voicemails are confidential   Yany Brooks all requests for admission clinical reviews, approved or denied determinations and any other requests to dedicated fax number below belonging to the campus where the patient is receiving treatment  List of dedicated fax numbers for the Facilities:  [address] DENIALS (Administrative/Medical Necessity) [phone]   [address] (Maternity/NICU/Pediatrics) [phone]   [address] [place] [address]  [phone]   [address] [facility] [place] [id number] [address] [place] [address] [phone]   Nik Cooper  [address] [id number] [phone]

## 2022-08-25 DIAGNOSIS — I25.10 CORONARY ARTERY DISEASE INVOLVING NATIVE CORONARY ARTERY OF NATIVE HEART WITHOUT ANGINA PECTORIS: Primary | ICD-10-CM

## 2022-08-25 LAB
ANION GAP SERPL CALCULATED.3IONS-SCNC: 10 MMOL/L (ref 4–13)
ARRHY DURING EX: NORMAL
BUN SERPL-MCNC: 20 MG/DL (ref 5–25)
CALCIUM SERPL-MCNC: 8 MG/DL (ref 8.3–10.1)
CHEST PAIN STATEMENT: NORMAL
CHLORIDE SERPL-SCNC: 114 MMOL/L (ref 96–108)
CO2 SERPL-SCNC: 22 MMOL/L (ref 21–32)
CREAT SERPL-MCNC: 0.91 MG/DL (ref 0.6–1.3)
GFR SERPL CREATININE-BSD FRML MDRD: 88 ML/MIN/1.73SQ M
GLUCOSE P FAST SERPL-MCNC: 92 MG/DL (ref 65–99)
GLUCOSE SERPL-MCNC: 92 MG/DL (ref 65–140)
KCT BLD-ACNC: 282 SEC (ref 89–137)
KCT BLD-ACNC: 289 SEC (ref 89–137)
KCT BLD-ACNC: 329 SEC (ref 89–137)
MAX DIASTOLIC BP: 90 MMHG
MAX HEART RATE: 99 BPM
MAX PREDICTED HEART RATE: 156 BPM
MAX. SYSTOLIC BP: 169 MMHG
POTASSIUM SERPL-SCNC: 3.2 MMOL/L (ref 3.5–5.3)
PROTOCOL NAME: NORMAL
REASON FOR TERMINATION: NORMAL
SODIUM SERPL-SCNC: 146 MMOL/L (ref 135–147)
SPECIMEN SOURCE: ABNORMAL
TARGET HR FORMULA: NORMAL
TEST INDICATION: NORMAL
TIME IN EXERCISE PHASE: NORMAL

## 2022-08-25 PROCEDURE — 99153 MOD SED SAME PHYS/QHP EA: CPT | Performed by: INTERNAL MEDICINE

## 2022-08-25 PROCEDURE — C1725 CATH, TRANSLUMIN NON-LASER: HCPCS | Performed by: INTERNAL MEDICINE

## 2022-08-25 PROCEDURE — 80048 BASIC METABOLIC PNL TOTAL CA: CPT

## 2022-08-25 PROCEDURE — 93454 CORONARY ARTERY ANGIO S&I: CPT | Performed by: INTERNAL MEDICINE

## 2022-08-25 PROCEDURE — C1769 GUIDE WIRE: HCPCS | Performed by: INTERNAL MEDICINE

## 2022-08-25 PROCEDURE — 99232 SBSQ HOSP IP/OBS MODERATE 35: CPT | Performed by: INTERNAL MEDICINE

## 2022-08-25 PROCEDURE — 99152 MOD SED SAME PHYS/QHP 5/>YRS: CPT | Performed by: INTERNAL MEDICINE

## 2022-08-25 PROCEDURE — C1894 INTRO/SHEATH, NON-LASER: HCPCS | Performed by: INTERNAL MEDICINE

## 2022-08-25 PROCEDURE — C1887 CATHETER, GUIDING: HCPCS | Performed by: INTERNAL MEDICINE

## 2022-08-25 PROCEDURE — C1874 STENT, COATED/COV W/DEL SYS: HCPCS | Performed by: INTERNAL MEDICINE

## 2022-08-25 PROCEDURE — 85347 COAGULATION TIME ACTIVATED: CPT

## 2022-08-25 PROCEDURE — B2101ZZ FLUOROSCOPY OF SINGLE CORONARY ARTERY USING LOW OSMOLAR CONTRAST: ICD-10-PCS | Performed by: INTERNAL MEDICINE

## 2022-08-25 PROCEDURE — C9600 PERC DRUG-EL COR STENT SING: HCPCS | Performed by: INTERNAL MEDICINE

## 2022-08-25 PROCEDURE — 027035Z DILATION OF CORONARY ARTERY, ONE ARTERY WITH TWO DRUG-ELUTING INTRALUMINAL DEVICES, PERCUTANEOUS APPROACH: ICD-10-PCS | Performed by: INTERNAL MEDICINE

## 2022-08-25 PROCEDURE — 92928 PRQ TCAT PLMT NTRAC ST 1 LES: CPT | Performed by: INTERNAL MEDICINE

## 2022-08-25 PROCEDURE — 99232 SBSQ HOSP IP/OBS MODERATE 35: CPT | Performed by: STUDENT IN AN ORGANIZED HEALTH CARE EDUCATION/TRAINING PROGRAM

## 2022-08-25 DEVICE — IMPLANTABLE DEVICE: Type: IMPLANTABLE DEVICE | Site: HEART | Status: FUNCTIONAL

## 2022-08-25 RX ORDER — FENTANYL CITRATE 50 UG/ML
INJECTION, SOLUTION INTRAMUSCULAR; INTRAVENOUS AS NEEDED
Status: DISCONTINUED | OUTPATIENT
Start: 2022-08-25 | End: 2022-08-25 | Stop reason: HOSPADM

## 2022-08-25 RX ORDER — HYDROMORPHONE HCL/PF 1 MG/ML
0.5 SYRINGE (ML) INJECTION EVERY 4 HOURS PRN
Status: DISCONTINUED | OUTPATIENT
Start: 2022-08-25 | End: 2022-08-26 | Stop reason: HOSPADM

## 2022-08-25 RX ORDER — VERAPAMIL HYDROCHLORIDE 2.5 MG/ML
INJECTION, SOLUTION INTRAVENOUS AS NEEDED
Status: DISCONTINUED | OUTPATIENT
Start: 2022-08-25 | End: 2022-08-25 | Stop reason: HOSPADM

## 2022-08-25 RX ORDER — LIDOCAINE HYDROCHLORIDE 10 MG/ML
INJECTION, SOLUTION EPIDURAL; INFILTRATION; INTRACAUDAL; PERINEURAL AS NEEDED
Status: DISCONTINUED | OUTPATIENT
Start: 2022-08-25 | End: 2022-08-25 | Stop reason: HOSPADM

## 2022-08-25 RX ORDER — HEPARIN SODIUM 1000 [USP'U]/ML
INJECTION, SOLUTION INTRAVENOUS; SUBCUTANEOUS AS NEEDED
Status: DISCONTINUED | OUTPATIENT
Start: 2022-08-25 | End: 2022-08-25 | Stop reason: HOSPADM

## 2022-08-25 RX ORDER — METOPROLOL SUCCINATE 25 MG/1
12.5 TABLET, EXTENDED RELEASE ORAL DAILY
Status: DISCONTINUED | OUTPATIENT
Start: 2022-08-25 | End: 2022-08-26 | Stop reason: HOSPADM

## 2022-08-25 RX ORDER — MIDAZOLAM HYDROCHLORIDE 2 MG/2ML
INJECTION, SOLUTION INTRAMUSCULAR; INTRAVENOUS AS NEEDED
Status: DISCONTINUED | OUTPATIENT
Start: 2022-08-25 | End: 2022-08-25 | Stop reason: HOSPADM

## 2022-08-25 RX ORDER — POTASSIUM CHLORIDE 20 MEQ/1
40 TABLET, EXTENDED RELEASE ORAL ONCE
Status: COMPLETED | OUTPATIENT
Start: 2022-08-25 | End: 2022-08-25

## 2022-08-25 RX ORDER — SODIUM CHLORIDE 9 MG/ML
75 INJECTION, SOLUTION INTRAVENOUS CONTINUOUS
Status: DISPENSED | OUTPATIENT
Start: 2022-08-25 | End: 2022-08-25

## 2022-08-25 RX ORDER — NITROGLYCERIN 20 MG/100ML
INJECTION INTRAVENOUS AS NEEDED
Status: DISCONTINUED | OUTPATIENT
Start: 2022-08-25 | End: 2022-08-25 | Stop reason: HOSPADM

## 2022-08-25 RX ADMIN — METOPROLOL SUCCINATE 12.5 MG: 25 TABLET, EXTENDED RELEASE ORAL at 15:37

## 2022-08-25 RX ADMIN — HYDROMORPHONE HYDROCHLORIDE 0.5 MG: 1 INJECTION, SOLUTION INTRAMUSCULAR; INTRAVENOUS; SUBCUTANEOUS at 14:46

## 2022-08-25 RX ADMIN — TOPIRAMATE 50 MG: 25 TABLET, FILM COATED ORAL at 21:26

## 2022-08-25 RX ADMIN — AMLODIPINE BESYLATE 10 MG: 10 TABLET ORAL at 08:00

## 2022-08-25 RX ADMIN — ATORVASTATIN CALCIUM 80 MG: 40 TABLET, FILM COATED ORAL at 17:38

## 2022-08-25 RX ADMIN — TRAZODONE HYDROCHLORIDE 100 MG: 100 TABLET ORAL at 21:26

## 2022-08-25 RX ADMIN — VENLAFAXINE HYDROCHLORIDE 150 MG: 150 CAPSULE, EXTENDED RELEASE ORAL at 08:00

## 2022-08-25 RX ADMIN — LOSARTAN POTASSIUM 100 MG: 50 TABLET, FILM COATED ORAL at 08:00

## 2022-08-25 RX ADMIN — PANTOPRAZOLE SODIUM 40 MG: 40 TABLET, DELAYED RELEASE ORAL at 08:00

## 2022-08-25 RX ADMIN — SODIUM CHLORIDE 75 ML/HR: 0.9 INJECTION, SOLUTION INTRAVENOUS at 12:05

## 2022-08-25 RX ADMIN — ACETAMINOPHEN 650 MG: 325 TABLET, FILM COATED ORAL at 17:38

## 2022-08-25 RX ADMIN — HYDROMORPHONE HYDROCHLORIDE 0.5 MG: 1 INJECTION, SOLUTION INTRAMUSCULAR; INTRAVENOUS; SUBCUTANEOUS at 21:26

## 2022-08-25 RX ADMIN — TICAGRELOR 90 MG: 90 TABLET ORAL at 21:27

## 2022-08-25 RX ADMIN — TOPIRAMATE 50 MG: 25 TABLET, FILM COATED ORAL at 07:41

## 2022-08-25 RX ADMIN — SODIUM CHLORIDE 100 ML/HR: 0.9 INJECTION, SOLUTION INTRAVENOUS at 08:01

## 2022-08-25 RX ADMIN — ASPIRIN 81 MG: 81 TABLET, CHEWABLE ORAL at 08:00

## 2022-08-25 RX ADMIN — POTASSIUM CHLORIDE 40 MEQ: 1500 TABLET, EXTENDED RELEASE ORAL at 07:40

## 2022-08-25 RX ADMIN — DOXAZOSIN 2 MG: 1 TABLET ORAL at 08:00

## 2022-08-25 NOTE — PLAN OF CARE
Problem: PAIN - ADULT  Goal: Verbalizes/displays adequate comfort level or baseline comfort level  Description: Interventions:  - Encourage patient to monitor pain and request assistance  - Assess pain using appropriate pain scale  - Administer analgesics based on type and severity of pain and evaluate response  - Implement non-pharmacological measures as appropriate and evaluate response  - Consider cultural and social influences on pain and pain management  - Notify physician/advanced practitioner if interventions unsuccessful or patient reports new pain  Outcome: Progressing     Problem: INFECTION - ADULT  Goal: Absence or prevention of progression during hospitalization  Description: INTERVENTIONS:  - Assess and monitor for signs and symptoms of infection  - Monitor lab/diagnostic results  - Monitor all insertion sites, i e  indwelling lines, tubes, and drains  - Monitor endotracheal if appropriate and nasal secretions for changes in amount and color  - Allen appropriate cooling/warming therapies per order  - Administer medications as ordered  - Instruct and encourage patient and family to use good hand hygiene technique  - Identify and instruct in appropriate isolation precautions for identified infection/condition  Outcome: Progressing  Goal: Absence of fever/infection during neutropenic period  Description: INTERVENTIONS:  - Monitor WBC    Outcome: Progressing     Problem: SAFETY ADULT  Goal: Patient will remain free of falls  Description: INTERVENTIONS:  - Educate patient/family on patient safety including physical limitations  - Instruct patient to call for assistance with activity   - Consult OT/PT to assist with strengthening/mobility   - Keep Call bell within reach  - Keep bed low and locked with side rails adjusted as appropriate  - Keep care items and personal belongings within reach  - Initiate and maintain comfort rounds  - Make Fall Risk Sign visible to staff  - Offer Toileting every 2 Hours, in advance of need  - Initiate/Maintain bed alarm  - Obtain necessary fall risk management equipment: socks  - Apply yellow socks and bracelet for high fall risk patients  - Consider moving patient to room near nurses station  Outcome: Progressing  Goal: Maintain or return to baseline ADL function  Description: INTERVENTIONS:  -  Assess patient's ability to carry out ADLs; assess patient's baseline for ADL function and identify physical deficits which impact ability to perform ADLs (bathing, care of mouth/teeth, toileting, grooming, dressing, etc )  - Assess/evaluate cause of self-care deficits   - Assess range of motion  - Assess patient's mobility; develop plan if impaired  - Assess patient's need for assistive devices and provide as appropriate  - Encourage maximum independence but intervene and supervise when necessary  - Involve family in performance of ADLs  - Assess for home care needs following discharge   - Consider OT consult to assist with ADL evaluation and planning for discharge  - Provide patient education as appropriate  Outcome: Progressing  Goal: Maintains/Returns to pre admission functional level  Description: INTERVENTIONS:  - Perform BMAT or MOVE assessment daily    - Set and communicate daily mobility goal to care team and patient/family/caregiver  - Collaborate with rehabilitation services on mobility goals if consulted  - Perform Range of Motion 2 times a day  - Reposition patient every 2 hours    - Dangle patient 2 times a day  - Stand patient 2 times a day  - Ambulate patient 2 times a day  - Out of bed to chair 2 times a day   - Out of bed for meals 2 times a day  - Out of bed for toileting  - Record patient progress and toleration of activity level   Outcome: Progressing     Problem: DISCHARGE PLANNING  Goal: Discharge to home or other facility with appropriate resources  Description: INTERVENTIONS:  - Identify barriers to discharge w/patient and caregiver  - Arrange for needed discharge resources and transportation as appropriate  - Identify discharge learning needs (meds, wound care, etc )  - Arrange for interpretive services to assist at discharge as needed  - Refer to Case Management Department for coordinating discharge planning if the patient needs post-hospital services based on physician/advanced practitioner order or complex needs related to functional status, cognitive ability, or social support system  Outcome: Progressing     Problem: Knowledge Deficit  Goal: Patient/family/caregiver demonstrates understanding of disease process, treatment plan, medications, and discharge instructions  Description: Complete learning assessment and assess knowledge base    Interventions:  - Provide teaching at level of understanding  - Provide teaching via preferred learning methods  Outcome: Progressing     Problem: Potential for Falls  Goal: Patient will remain free of falls  Description: INTERVENTIONS:  - Educate patient/family on patient safety including physical limitations  - Instruct patient to call for assistance with activity   - Consult OT/PT to assist with strengthening/mobility   - Keep Call bell within reach  - Keep bed low and locked with side rails adjusted as appropriate  - Keep care items and personal belongings within reach  - Initiate and maintain comfort rounds  - Make Fall Risk Sign visible to staff  - Offer Toileting every 2 Hours, in advance of need  - Initiate/Maintain bedalarm  - Obtain necessary fall risk management equipment: socks  - Apply yellow socks and bracelet for high fall risk patients  - Consider moving patient to room near nurses station  Outcome: Progressing

## 2022-08-25 NOTE — CASE MANAGEMENT
Case Management Discharge Planning Note    Patient name Pipe Berg  Location [address] [place] [address] [phone]/-[id number] MRN [id number]  : 1955 Date 2022       Current Admission Date: 2022  Current Admission Diagnosis:Chest pain   Patient Active Problem List    Diagnosis Date Noted    Chest pain 2022    Mild intermittent asthma without complication     BMI 33 0-33 9,adult 10/11/2019    Cervical radiculopathy 2017    Insomnia 2015    Essential hypertension 2014    Gastroesophageal reflux disease without esophagitis 2014    Anxiety 2014    Migraine headache 2014      LOS (days): 0  Geometric Mean LOS (GMLOS) (days): 1 70  Days to GMLOS:1 4     OBJECTIVE:  Risk of Unplanned Readmission Score: 8 25         Current admission status: Inpatient   Preferred Pharmacy:   [address] [place] [address]  Phone: [phone] Fax: [phone]    Primary Care Provider: Zoe Odonnell DO    Primary Insurance: [facility]  Secondary Insurance:     DISCHARGE DETAILS:    Discharge planning discussed with[de-identified] Met with pt at bedside, introduced myself and role as CC which the pt verbalized an understanding  Pt admitted with chest pain  Pt states that he lives with his wife in a ranch style home  Pt is retired, he drives, no DME  Pt has never used [facility] in the past and no history of STR  Freedom of Choice: Yes  Comments - Freedom of Choice: Pt declines d/c needs at this time    CM contacted family/caregiver?: No- see comments  Were Treatment Team discharge recommendations reviewed with patient/caregiver?: Yes  Did patient/caregiver verbalize understanding of patient care needs?: Yes  Were patient/caregiver advised of the risks associated with not following Treatment Team discharge recommendations?: Yes    Contacts  Patient Contacts: Giovani Olson  Relationship to Patient[de-identified] Family  Contact Method:  (CM did not reach out to pt's wife)    [address]         Is the patient interested in [place] [address] at discharge?: No    DME Referral Provided  Referral made for DME?: No         Would you like to participate in our [address] service program?  : No - Declined    Treatment Team Recommendation: Home  Discharge Destination Plan[de-identified] Home  Transport at Discharge : Family

## 2022-08-25 NOTE — PROGRESS NOTES
Cardiology Progress Note - Shahzad Liang 61 y o  male MRN: [id number]    Unit/Bed#: -01 Encounter: [id number]      Assessment/Plan:  1  NSTEMI Type 1  · The patient presented after 2 episodes of chest pain radiating to jaw and left arm  Patient denies chest pain at this time  · HS troponin 54, 94, 102, 79  · ECG showed NSR, nonspecific ST changes  · Patient underwent cardiac catheterization with 99% stenosis of mid RCA and 90% stenosis of proximal RCA ESTHER x2 in overlapping fashion successfully placed  · Continue aspirin, Brilinta, and atorvastatin  · Start metoprolol succinate 12 5 mg daily     2  CAD s/p ESTHER x2 to prox and mid RCA  · See plan above     3  Hypertension  · BP well controlled, continue amlodipine 10 mg daily and losartan 100 mg daily     4  Hyperlipidemia  · Continue atorvastatin 80 mg daily  Subjective:   Patient seen and examined  No significant events overnight  Patient denies any chest pain, shortness of breath, palpitations, or lower extremity edema  Objective:     Vitals: Blood pressure (!) 179/94, pulse 94, temperature 97 6 °F (36 4 °C), resp  rate 18, height 5' 3" (1 6 m), weight 85 3 kg (188 lb), SpO2 97 %  , Body mass index is 33 3 kg/m² ,   Orthostatic Blood Pressures    Flowsheet Row Most Recent Value   Blood Pressure 179/94 filed at 08/25/2022 1333   Patient Position - Orthostatic VS Lying filed at 08/24/2022 0400            Intake/Output Summary (Last 24 hours) at 8/25/2022 1712  Last data filed at 8/25/2022 1334  Gross per 24 hour   Intake 1718 34 ml   Output 605 ml   Net 1113 34 ml         Physical Exam:  Physical Exam  Vitals and nursing note reviewed  Constitutional:       Appearance: He is well-developed  HENT:      Head: Normocephalic and atraumatic  Eyes:      Conjunctiva/sclera: Conjunctivae normal    Cardiovascular:      Rate and Rhythm: Normal rate and regular rhythm  Heart sounds: Normal heart sounds  No murmur heard    Pulmonary:      Effort: Pulmonary effort is normal  No respiratory distress  Breath sounds: Normal breath sounds  Abdominal:      Palpations: Abdomen is soft  Tenderness: There is no abdominal tenderness  Musculoskeletal:      Cervical back: Neck supple  Right lower leg: No edema  Left lower leg: No edema  Skin:     General: Skin is warm and dry  Comments: TR band in place, R wrist cath site C/D/I   Neurological:      Mental Status: He is alert and oriented to person, place, and time     Psychiatric:         Mood and Affect: Mood normal          Behavior: Behavior normal               Medications:      Current Facility-Administered Medications:     acetaminophen (TYLENOL) tablet 650 mg, 650 mg, Oral, Q6H PRN, Yamileth Anaya PA-C, 650 mg at 08/24/22 2006    albuterol (PROVENTIL HFA,VENTOLIN HFA) inhaler 2 puff, 2 puff, Inhalation, TID PRN, Yamileth Anaya PA-C    amLODIPine (NORVASC) tablet 10 mg, 10 mg, Oral, Daily, Piper Matos PA-C, 10 mg at 08/25/22 0800    aspirin chewable tablet 81 mg, 81 mg, Oral, Daily, Piper Matos PA-C, 81 mg at 08/25/22 0800    atorvastatin (LIPITOR) tablet 80 mg, 80 mg, Oral, QPM, Piper Matos PA-C, 80 mg at 08/24/22 1708    doxazosin (CARDURA) tablet 2 mg, 2 mg, Oral, Daily, Piper Matos PA-C, 2 mg at 08/25/22 0800    HYDROmorphone (DILAUDID) injection 0 5 mg, 0 5 mg, Intravenous, Q4H PRN, Heather Guzmán MD, 0 5 mg at 08/25/22 1446    losartan (COZAAR) tablet 100 mg, 100 mg, Oral, Daily, Piper Matos PA-C, 100 mg at 08/25/22 0800    metoprolol succinate (TOPROL-XL) 24 hr tablet 12 5 mg, 12 5 mg, Oral, Daily, DYLAN Rojas, 12 5 mg at 08/25/22 1537    pantoprazole (PROTONIX) EC tablet 40 mg, 40 mg, Oral, Daily, Piper Matos PA-C, 40 mg at 08/25/22 0800    sodium chloride 0 9 % infusion, 100 mL/hr, Intravenous, Continuous, DYLAN Rojas, Stopped at 08/25/22 1205    sodium chloride 0 9 % infusion, 75 mL/hr, Intravenous, Continuous, Mariela Posey PA-C, Last Rate: 75 mL/hr at 08/25/22 1205, 75 mL/hr at 08/25/22 1205    ticagrelor (BRILINTA) tablet 90 mg, 90 mg, Oral, Q12H PAOLO, Mary Mclaughlin PA-C    topiramate (TOPAMAX) tablet 50 mg, 50 mg, Oral, Q12H, Piper Matos PA-C, 50 mg at 08/25/22 0741    traZODone (DESYREL) tablet 100 mg, 100 mg, Oral, HS, Piper Matos PA-C, 100 mg at 08/24/22 2109    venlafaxine (EFFEXOR-XR) 24 hr capsule 150 mg, 150 mg, Oral, Daily, Piper Matos PA-C, 150 mg at 08/25/22 0800     Labs & Results:     Results from last 7 days   Lab Units 08/24/22  0016 08/23/22  2250 08/23/22  2035 08/23/22  1842   HS TNI 0HR ng/L 79*  --   --  54*   HS TNI 2HR ng/L  --   --  94*  --    HSTNI D2 ng/L  --   --  40*  --    HS TNI 4HR ng/L  --  102*  --   --    HSTNI D4 ng/L  --  48*  --   --      Results from last 7 days   Lab Units 08/24/22  0411 08/23/22  1842   WBC Thousand/uL 6 34 5 17   HEMOGLOBIN g/dL 14 0 14 9   HEMATOCRIT % 40 6 42 1   PLATELETS Thousands/uL 147* 144*     Results from last 7 days   Lab Units 08/24/22  0411   TRIGLYCERIDES mg/dL 171*   HDL mg/dL 41     Results from last 7 days   Lab Units 08/25/22  0448 08/24/22  0411 08/23/22  1842   POTASSIUM mmol/L 3 2* 3 4* 3 6   CHLORIDE mmol/L 114* 110* 112*   CO2 mmol/L 22 24 20*   BUN mg/dL 20 22 26*   CREATININE mg/dL 0 91 0 89 1 07   CALCIUM mg/dL 8 0* 8 1* 8 0*   ALK PHOS U/L  --  47 52   ALT U/L  --  27 29   AST U/L  --  16 17         Results from last 7 days   Lab Units 08/24/22  0411   MAGNESIUM mg/dL 2 3       Vitals: Blood pressure (!) 179/94, pulse 94, temperature 97 6 °F (36 4 °C), resp  rate 18, height 5' 3" (1 6 m), weight 85 3 kg (188 lb), SpO2 97 %  , Body mass index is 33 3 kg/m² ,   Orthostatic Blood Pressures    Flowsheet Row Most Recent Value   Blood Pressure 179/94 filed at 08/25/2022 1333   Patient Position - Orthostatic VS Lying filed at 08/24/2022 8772          Systolic (58DHZ), PIE:441 , Min:118 , VAB:646     Diastolic (09ODH), QAQ:90, Min:73, Max:94        Intake/Output Summary (Last 24 hours) at 8/25/2022 1712  Last data filed at 8/25/2022 1334  Gross per 24 hour   Intake 1718 34 ml   Output 605 ml   Net 1113 34 ml       Invasive Devices  Report    Peripheral Intravenous Line  Duration           Peripheral IV 08/23/22 Right Arm 1 day                      Telemetry:  Telemetry Orders (From admission, onward)             48 Hour Telemetry Monitoring  Continuous x 48 hours        References:    Telemetry Guidelines   Question:  Reason for 48 Hour Telemetry  Answer:  Acute MI, chest pain - R/O MI, or unstable angina                 Telemetry Reviewed: Normal Sinus Rhythm  Indication for Continued Telemetry Use: Post PCI/EP Study    BP Readings from Last 3 Encounters:   08/25/22 (!) 179/94   03/14/22 130/80   09/13/21 118/76      Wt Readings from Last 3 Encounters:   08/24/22 85 3 kg (188 lb)   03/14/22 85 5 kg (188 lb 9 6 oz)   09/13/21 89 8 kg (198 lb)

## 2022-08-25 NOTE — ASSESSMENT & PLAN NOTE
· Present on admission  · + stress test during admission  · Plan for cardiac cath today, follow up results

## 2022-08-25 NOTE — PROGRESS NOTES
[facility]  Progress Note - Ortiz Share 1955, 61 y o  male MRN: [id number]  Unit/Bed#: MO CATH LAB ROOM Encounter: [id number]  Primary Care Provider: Randy Arthur DO   Date and time admitted to hospital: 2022  7:00 PM    * Chest pain  Assessment & Plan  · Present on admission  · + stress test during admission  · Plan for cardiac cath today, follow up results     Mild intermittent asthma without complication  Assessment & Plan  · No acute exacerbation  · Continue pre-hospital inhaler    Gastroesophageal reflux disease without esophagitis  Assessment & Plan  · Continue home PPI    Essential hypertension  Assessment & Plan  · Continue pre-hospital amlodipine, valsartan, doxazosin  · Monitor vitals per routine    Anxiety  Assessment & Plan  · Mood stable  · Continue Effexor        VTE Pharmacologic Prophylaxis: VTE Score: 3 Moderate Risk (Score 3-4) - Pharmacological DVT Prophylaxis Contraindicated  Sequential Compression Devices Ordered  Patient Centered Rounds: I performed bedside rounds with nursing staff today  Discussions with Specialists or Other Care Team Provider: na        Time Spent for Care: 30 minutes  More than 50% of total time spent on counseling and coordination of care as described above  Current Length of Stay: 0 day(s)  Current Patient Status: Inpatient   Certification Statement: The patient will continue to require additional inpatient hospital stay due to cardiac cath  Discharge Plan: Anticipate discharge in 24-48 hrs to home  Code Status: Level 1 - Full Code    Subjective:   Patient feels better today     Objective:     Vitals:   Temp (24hrs), Av 2 °F (36 8 °C), Min:97 6 °F (36 4 °C), Max:98 6 °F (37 °C)    Temp:  [97 6 °F (36 4 °C)-98 6 °F (37 °C)] 97 6 °F (36 4 °C)  HR:  [59-82] 59  Resp:  [17-18] 17  BP: (131-151)/(73-88) 145/75  SpO2:  [95 %-99 %] 99 %  Body mass index is 33 3 kg/m²       Input and Output Summary (last 24 hours): Intake/Output Summary (Last 24 hours) at 8/25/2022 1114  Last data filed at 8/25/2022 1111  Gross per 24 hour   Intake 1311 67 ml   Output 5 ml   Net 1306 67 ml       Physical Exam:   Physical Exam  Constitutional:       General: He is not in acute distress  Appearance: Normal appearance  He is not toxic-appearing  Cardiovascular:      Rate and Rhythm: Normal rate and regular rhythm  Heart sounds: Normal heart sounds  No murmur heard  Pulmonary:      Effort: Pulmonary effort is normal  No respiratory distress  Breath sounds: Normal breath sounds  No wheezing  Abdominal:      General: Abdomen is flat  There is no distension  Palpations: Abdomen is soft  Tenderness: There is no abdominal tenderness  Neurological:      General: No focal deficit present  Mental Status: He is alert  Mental status is at baseline  Motor: No weakness            Additional Data:     Labs:  Results from last 7 days   Lab Units 08/24/22  0411 08/23/22  1842   WBC Thousand/uL 6 34 5 17   HEMOGLOBIN g/dL 14 0 14 9   HEMATOCRIT % 40 6 42 1   PLATELETS Thousands/uL 147* 144*   NEUTROS PCT %  --  76*   LYMPHS PCT %  --  16   MONOS PCT %  --  5   EOS PCT %  --  2     Results from last 7 days   Lab Units 08/25/22  0448 08/24/22  0411   SODIUM mmol/L 146 141   POTASSIUM mmol/L 3 2* 3 4*   CHLORIDE mmol/L 114* 110*   CO2 mmol/L 22 24   BUN mg/dL 20 22   CREATININE mg/dL 0 91 0 89   ANION GAP mmol/L 10 7   CALCIUM mg/dL 8 0* 8 1*   ALBUMIN g/dL  --  3 4*   TOTAL BILIRUBIN mg/dL  --  0 27   ALK PHOS U/L  --  47   ALT U/L  --  27   AST U/L  --  16   GLUCOSE RANDOM mg/dL 92 93                       Lines/Drains:  Invasive Devices  Report    Peripheral Intravenous Line  Duration           Peripheral IV 08/23/22 Right Arm 1 day                  Telemetry:  Telemetry Orders (From admission, onward)             48 Hour Telemetry Monitoring  Continuous x 48 hours        Expiring   References:    Telemetry Guidelines Question:  Reason for 48 Hour Telemetry  Answer:  Acute MI, chest pain - R/O MI, or unstable angina                              Imaging: No pertinent imaging reviewed  Recent Cultures (last 7 days):         Last 24 Hours Medication List:   Current Facility-Administered Medications   Medication Dose Route Frequency Provider Last Rate    acetaminophen  650 mg Oral Q6H PRN Raquel Mcallister PA-C      albuterol  2 puff Inhalation TID PRN Raquel Mcallister PA-C      amLODIPine  10 mg Oral Daily Piper Matos PA-C      aspirin  81 mg Oral Daily Piper Matos PA-C      atorvastatin  80 mg Oral QPM Piper Matos PA-C      doxazosin  2 mg Oral Daily Piper Matos PA-C      losartan  100 mg Oral Daily Piper Matos PA-C      pantoprazole  40 mg Oral Daily Piper Matos PA-C      sodium chloride  100 mL/hr Intravenous Continuous Tatum Quest, CRNP 100 mL/hr (08/25/22 0801)    ticagrelor  90 mg Oral Q12H [facility] Mary Mclaughlin PA-C      topiramate  50 mg Oral Q12H Piper Matos PA-C      traZODone  100 mg Oral HS Piper Matos PA-C      venlafaxine  150 mg Oral Daily Raquel Mcallister PA-C          Today, Patient Was Seen By: Rosangela Valero MD    **Please Note: This note may have been constructed using a voice recognition system  **

## 2022-08-26 VITALS
TEMPERATURE: 97.9 F | OXYGEN SATURATION: 96 % | RESPIRATION RATE: 18 BRPM | DIASTOLIC BLOOD PRESSURE: 81 MMHG | BODY MASS INDEX: 33.31 KG/M2 | HEIGHT: 63 IN | HEART RATE: 69 BPM | SYSTOLIC BLOOD PRESSURE: 142 MMHG | WEIGHT: 188 LBS

## 2022-08-26 DIAGNOSIS — I25.10 CORONARY ARTERY DISEASE INVOLVING NATIVE CORONARY ARTERY OF NATIVE HEART WITHOUT ANGINA PECTORIS: Primary | ICD-10-CM

## 2022-08-26 LAB
ANION GAP SERPL CALCULATED.3IONS-SCNC: 11 MMOL/L (ref 4–13)
BASOPHILS # BLD AUTO: 0.03 THOUSANDS/ΜL (ref 0–0.1)
BASOPHILS NFR BLD AUTO: 0 % (ref 0–1)
BUN SERPL-MCNC: 17 MG/DL (ref 5–25)
CALCIUM SERPL-MCNC: 8.4 MG/DL (ref 8.3–10.1)
CHLORIDE SERPL-SCNC: 110 MMOL/L (ref 96–108)
CO2 SERPL-SCNC: 21 MMOL/L (ref 21–32)
CREAT SERPL-MCNC: 0.83 MG/DL (ref 0.6–1.3)
EOSINOPHIL # BLD AUTO: 0.14 THOUSAND/ΜL (ref 0–0.61)
EOSINOPHIL NFR BLD AUTO: 2 % (ref 0–6)
ERYTHROCYTE [DISTWIDTH] IN BLOOD BY AUTOMATED COUNT: 13.7 % (ref 11.6–15.1)
GFR SERPL CREATININE-BSD FRML MDRD: 92 ML/MIN/1.73SQ M
GLUCOSE SERPL-MCNC: 107 MG/DL (ref 65–140)
HCT VFR BLD AUTO: 40.9 % (ref 36.5–49.3)
HGB BLD-MCNC: 14.2 G/DL (ref 12–17)
IMM GRANULOCYTES # BLD AUTO: 0.02 THOUSAND/UL (ref 0–0.2)
IMM GRANULOCYTES NFR BLD AUTO: 0 % (ref 0–2)
LYMPHOCYTES # BLD AUTO: 1.35 THOUSANDS/ΜL (ref 0.6–4.47)
LYMPHOCYTES NFR BLD AUTO: 17 % (ref 14–44)
MAGNESIUM SERPL-MCNC: 2.1 MG/DL (ref 1.6–2.6)
MCH RBC QN AUTO: 31.7 PG (ref 26.8–34.3)
MCHC RBC AUTO-ENTMCNC: 34.7 G/DL (ref 31.4–37.4)
MCV RBC AUTO: 91 FL (ref 82–98)
MONOCYTES # BLD AUTO: 0.5 THOUSAND/ΜL (ref 0.17–1.22)
MONOCYTES NFR BLD AUTO: 6 % (ref 4–12)
NEUTROPHILS # BLD AUTO: 5.91 THOUSANDS/ΜL (ref 1.85–7.62)
NEUTS SEG NFR BLD AUTO: 75 % (ref 43–75)
NRBC BLD AUTO-RTO: 0 /100 WBCS
PLATELET # BLD AUTO: 171 THOUSANDS/UL (ref 149–390)
PMV BLD AUTO: 10.2 FL (ref 8.9–12.7)
POTASSIUM SERPL-SCNC: 3.4 MMOL/L (ref 3.5–5.3)
RBC # BLD AUTO: 4.48 MILLION/UL (ref 3.88–5.62)
SODIUM SERPL-SCNC: 142 MMOL/L (ref 135–147)
WBC # BLD AUTO: 7.95 THOUSAND/UL (ref 4.31–10.16)

## 2022-08-26 PROCEDURE — 99239 HOSP IP/OBS DSCHRG MGMT >30: CPT | Performed by: STUDENT IN AN ORGANIZED HEALTH CARE EDUCATION/TRAINING PROGRAM

## 2022-08-26 PROCEDURE — 99232 SBSQ HOSP IP/OBS MODERATE 35: CPT | Performed by: INTERNAL MEDICINE

## 2022-08-26 PROCEDURE — 83735 ASSAY OF MAGNESIUM: CPT | Performed by: STUDENT IN AN ORGANIZED HEALTH CARE EDUCATION/TRAINING PROGRAM

## 2022-08-26 PROCEDURE — 85025 COMPLETE CBC W/AUTO DIFF WBC: CPT | Performed by: STUDENT IN AN ORGANIZED HEALTH CARE EDUCATION/TRAINING PROGRAM

## 2022-08-26 PROCEDURE — 80048 BASIC METABOLIC PNL TOTAL CA: CPT | Performed by: STUDENT IN AN ORGANIZED HEALTH CARE EDUCATION/TRAINING PROGRAM

## 2022-08-26 RX ORDER — CLOPIDOGREL BISULFATE 75 MG/1
75 TABLET ORAL DAILY
Qty: 90 TABLET | Refills: 3 | Status: SHIPPED | OUTPATIENT
Start: 2022-08-26 | End: 2023-08-14

## 2022-08-26 RX ORDER — CLOPIDOGREL BISULFATE 75 MG/1
75 TABLET ORAL DAILY
Status: DISCONTINUED | OUTPATIENT
Start: 2022-08-27 | End: 2022-08-26 | Stop reason: HOSPADM

## 2022-08-26 RX ORDER — CLOPIDOGREL BISULFATE 75 MG/1
600 TABLET ORAL ONCE
Status: COMPLETED | OUTPATIENT
Start: 2022-08-26 | End: 2022-08-26

## 2022-08-26 RX ORDER — ONDANSETRON 2 MG/ML
4 INJECTION INTRAMUSCULAR; INTRAVENOUS EVERY 4 HOURS PRN
Status: DISCONTINUED | OUTPATIENT
Start: 2022-08-26 | End: 2022-08-26 | Stop reason: HOSPADM

## 2022-08-26 RX ORDER — ATORVASTATIN CALCIUM 80 MG/1
80 TABLET, FILM COATED ORAL EVERY EVENING
Qty: 30 TABLET | Refills: 0 | Status: SHIPPED | OUTPATIENT
Start: 2022-08-26 | End: 2022-09-25 | Stop reason: SDUPTHER

## 2022-08-26 RX ORDER — ASPIRIN 81 MG/1
81 TABLET, CHEWABLE ORAL DAILY
Qty: 30 TABLET | Refills: 0 | Status: SHIPPED | OUTPATIENT
Start: 2022-08-27 | End: 2022-10-03

## 2022-08-26 RX ORDER — METOPROLOL SUCCINATE 25 MG/1
12.5 TABLET, EXTENDED RELEASE ORAL DAILY
Qty: 15 TABLET | Refills: 0 | Status: SHIPPED | OUTPATIENT
Start: 2022-08-27 | End: 2022-09-25 | Stop reason: SDUPTHER

## 2022-08-26 RX ADMIN — ACETAMINOPHEN 650 MG: 325 TABLET, FILM COATED ORAL at 14:58

## 2022-08-26 RX ADMIN — SODIUM CHLORIDE 100 ML/HR: 0.9 INJECTION, SOLUTION INTRAVENOUS at 03:00

## 2022-08-26 RX ADMIN — LOSARTAN POTASSIUM 100 MG: 50 TABLET, FILM COATED ORAL at 09:51

## 2022-08-26 RX ADMIN — ONDANSETRON 4 MG: 2 INJECTION INTRAMUSCULAR; INTRAVENOUS at 09:52

## 2022-08-26 RX ADMIN — TICAGRELOR 90 MG: 90 TABLET ORAL at 09:53

## 2022-08-26 RX ADMIN — ASPIRIN 81 MG: 81 TABLET, CHEWABLE ORAL at 09:51

## 2022-08-26 RX ADMIN — DOXAZOSIN 2 MG: 1 TABLET ORAL at 09:51

## 2022-08-26 RX ADMIN — AMLODIPINE BESYLATE 10 MG: 10 TABLET ORAL at 09:52

## 2022-08-26 RX ADMIN — PANTOPRAZOLE SODIUM 40 MG: 40 TABLET, DELAYED RELEASE ORAL at 09:52

## 2022-08-26 RX ADMIN — TOPIRAMATE 50 MG: 25 TABLET, FILM COATED ORAL at 09:51

## 2022-08-26 RX ADMIN — METOPROLOL SUCCINATE 12.5 MG: 25 TABLET, EXTENDED RELEASE ORAL at 09:53

## 2022-08-26 RX ADMIN — VENLAFAXINE HYDROCHLORIDE 150 MG: 150 CAPSULE, EXTENDED RELEASE ORAL at 09:52

## 2022-08-26 RX ADMIN — ATORVASTATIN CALCIUM 80 MG: 40 TABLET, FILM COATED ORAL at 17:45

## 2022-08-26 RX ADMIN — CLOPIDOGREL BISULFATE 600 MG: 75 TABLET ORAL at 17:46

## 2022-08-26 NOTE — DISCHARGE SUMMARY
[facility]  Discharge- Diego Brenner 1955, 61 y o  male MRN: [id number]  Unit/Bed#: -01 Encounter: [id number]  Primary Care Provider: Mauri Zaragoza DO   Date and time admitted to hospital: 8/23/2022  7:00 PM    * NSTEMI type 1    Assessment & Plan  · Present on admission  · + stress test during admission  · Status post cardiac cath, refer to report  · There is a 99% stenosis in mid RCA nad 90% stenosis of proximal RCA  · Status post ESTHER, now stable for discharge  · Brilinta is not covered by his insurance, unable to afford $400 copay  · Loaded with plavix prior to discharge, continue home plavix       Medical Problems             Resolved Problems  Date Reviewed: 8/23/2022   None               Discharging Physician / Practitioner: Helio Nash MD  PCP: Mauri Zaragoza DO  Admission Date:   Admission Orders (From admission, onward)     Ordered        08/25/22 0757  Inpatient Admission  Once            08/23/22 2009  Place in Observation  Once                      Discharge Date: 08/26/22    Consultations During Hospital Stay:  [address]  · IP CONSULT TO CASE MANAGEMENT  ·     Procedures Performed:   · imaging    Significant Findings / Test Results:   Principal Problem:    Chest pain  Active Problems:    Anxiety    Essential hypertension    Gastroesophageal reflux disease without esophagitis    Migraine headache    BMI 33 0-33 9,adult    Mild intermittent asthma without complication  Resolved Problems:    * No resolved hospital problems  *  ·   ·     Incidental Findings:   · See above      Test Results Pending at Discharge (will require follow up):    · Na      Outpatient Tests Requested:  · Follow up with PCP and cardiology     Complications:  Abnormal stress test     Reason for Admission: chest pain     Hospital Course:   Diego Brenner is a 61 y o  male patient who originally presented to the hospital on 8/23/2022 due to chest pain status post cardiac cath after abnormal inpatient stress test with successful ESTHER, now stable for discharge  Condition at Discharge: good    Discharge Day Visit / Exam:   * Please refer to separate progress note for these details *    Discussion with Family: Updated  (wife) at bedside  Discharge instructions/Information to patient and family:   See after visit summary for information provided to patient and family  Provisions for Follow-Up Care:  See after visit summary for information related to follow-up care and any pertinent home health orders  Disposition:   Home    Planned Readmission: none      Discharge Statement:  I spent 30+ minutes discharging the patient  This time was spent on the day of discharge  I had direct contact with the patient on the day of discharge  Greater than 50% of the total time was spent examining patient, answering all patient questions, arranging and discussing plan of care with patient as well as directly providing post-discharge instructions  Additional time then spent on discharge activities  Discharge Medications:  See after visit summary for reconciled discharge medications provided to patient and/or family        **Please Note: This note may have been constructed using a voice recognition system**

## 2022-08-26 NOTE — PLAN OF CARE
Problem: PAIN - ADULT  Goal: Verbalizes/displays adequate comfort level or baseline comfort level  Description: Interventions:  - Encourage patient to monitor pain and request assistance  - Assess pain using appropriate pain scale  - Administer analgesics based on type and severity of pain and evaluate response  - Implement non-pharmacological measures as appropriate and evaluate response  - Consider cultural and social influences on pain and pain management  - Notify physician/advanced practitioner if interventions unsuccessful or patient reports new pain  8/26/2022 1759 by Sabas Vieira  Outcome: Completed  8/26/2022 1558 by Damian Botello  Outcome: Progressing     Problem: INFECTION - ADULT  Goal: Absence or prevention of progression during hospitalization  Description: INTERVENTIONS:  - Assess and monitor for signs and symptoms of infection  - Monitor lab/diagnostic results  - Monitor all insertion sites, i e  indwelling lines, tubes, and drains  - Monitor endotracheal if appropriate and nasal secretions for changes in amount and color  - [place] appropriate cooling/warming therapies per order  - Administer medications as ordered  - Instruct and encourage patient and family to use good hand hygiene technique  - Identify and instruct in appropriate isolation precautions for identified infection/condition  8/26/2022 1759 by Damian Botello  Outcome: Completed  8/26/2022 1558 by Damian Botello  Outcome: Progressing  Goal: Absence of fever/infection during neutropenic period  Description: INTERVENTIONS:  - Monitor WBC    8/26/2022 1759 by Damian Botello  Outcome: Completed  8/26/2022 1558 by Damian Botello  Outcome: Progressing     Problem: SAFETY ADULT  Goal: Patient will remain free of falls  Description: INTERVENTIONS:  - Educate patient/family on patient safety including physical limitations  - Instruct patient to call for assistance with activity   - Consult OT/PT to assist with strengthening/mobility   - Keep Call bell within reach  - Keep bed low and locked with side rails adjusted as appropriate  - Keep care items and personal belongings within reach  - Initiate and maintain comfort rounds  - Make Fall Risk Sign visible to staff  - Offer Toileting every 2    Hours, in advance of need  - Initiate/Maintain alarm  - Obtain necessary fall risk management equipment:  - Apply yellow socks and bracelet for high fall risk patients  - Consider moving patient to room near nurses station  8/26/2022 1759 by Sim Botello  Outcome: Completed  8/26/2022 1558 by Sim Botello  Outcome: Progressing  Goal: Maintain or return to baseline ADL function  Description: INTERVENTIONS:  -  Assess patient's ability to carry out ADLs; assess patient's baseline for ADL function and identify physical deficits which impact ability to perform ADLs (bathing, care of mouth/teeth, toileting, grooming, dressing, etc )  - Assess/evaluate cause of self-care deficits   - Assess range of motion  - Assess patient's mobility; develop plan if impaired  - Assess patient's need for assistive devices and provide as appropriate  - Encourage maximum independence but intervene and supervise when necessary  - Involve family in performance of ADLs  - Assess for home care needs following discharge   - Consider OT consult to assist with ADL evaluation and planning for discharge  - Provide patient education as appropriate  8/26/2022 1759 by Fidelia Davison  Outcome: Completed  8/26/2022 1558 by Sim Botello  Outcome: Progressing  Goal: Maintains/Returns to pre admission functional level  Description: INTERVENTIONS:  - Perform BMAT or MOVE assessment daily    - Set and communicate daily mobility goal to care team and patient/family/caregiver  - Collaborate with rehabilitation services on mobility goals if consulted  - Perform Range of Motion 3 times a day  - Reposition patient every 3 hours    - Dangle patient 3 times a day  - Stand patient 3 times a day  - Ambulate patient 3 times a day  - Out of bed to chair 3 times a day   - Out of bed for meals 3 times a day  - Out of bed for toileting  - Record patient progress and toleration of activity level   8/26/2022 1759 by Alona Rawls  Outcome: Completed  8/26/2022 1558 by Verna Botello  Outcome: Progressing     Problem: DISCHARGE PLANNING  Goal: Discharge to home or other facility with appropriate resources  Description: INTERVENTIONS:  - Identify barriers to discharge w/patient and caregiver  - Arrange for needed discharge resources and transportation as appropriate  - Identify discharge learning needs (meds, wound care, etc )  - Arrange for interpretive services to assist at discharge as needed  - Refer to Case Management Department for coordinating discharge planning if the patient needs post-hospital services based on physician/advanced practitioner order or complex needs related to functional status, cognitive ability, or social support system  8/26/2022 1759 by Alona Rawls  Outcome: Completed  8/26/2022 1558 by Verna Botello  Outcome: Progressing     Problem: Knowledge Deficit  Goal: Patient/family/caregiver demonstrates understanding of disease process, treatment plan, medications, and discharge instructions  Description: Complete learning assessment and assess knowledge base    Interventions:  - Provide teaching at level of understanding  - Provide teaching via preferred learning methods  8/26/2022 1759 by Alona Rawls  Outcome: Completed  8/26/2022 1558 by Verna Botello  Outcome: Progressing     Problem: Potential for Falls  Goal: Patient will remain free of falls  Description: INTERVENTIONS:  - Educate patient/family on patient safety including physical limitations  - Instruct patient to call for assistance with activity   - Consult OT/PT to assist with strengthening/mobility   - Keep Call bell within reach  - Keep bed low and locked with side rails adjusted as appropriate  - Keep care items and personal belongings within reach  - Initiate and maintain comfort rounds  - Make Fall Risk Sign visible to staff  - Offer Toileting every 2 Hours, in advance of need  - Initiate/Maintain alarm  - Obtain necessary fall risk management equipment  - Apply yellow socks and bracelet for high fall risk patients  - Consider moving patient to room near nurses station  8/26/2022 1759 by Arielle Botello  Outcome: Completed  8/26/2022 1558 by Arielle Botello  Outcome: Progressing     Problem: MOBILITY - ADULT  Goal: Maintain or return to baseline ADL function  Description: INTERVENTIONS:  -  Assess patient's ability to carry out ADLs; assess patient's baseline for ADL function and identify physical deficits which impact ability to perform ADLs (bathing, care of mouth/teeth, toileting, grooming, dressing, etc )  - Assess/evaluate cause of self-care deficits   - Assess range of motion  - Assess patient's mobility; develop plan if impaired  - Assess patient's need for assistive devices and provide as appropriate  - Encourage maximum independence but intervene and supervise when necessary  - Involve family in performance of ADLs  - Assess for home care needs following discharge   - Consider OT consult to assist with ADL evaluation and planning for discharge  - Provide patient education as appropriate  8/26/2022 1759 by Marimar Sullivan  Outcome: Completed  8/26/2022 1558 by Arielle Botello  Outcome: Progressing  Goal: Maintains/Returns to pre admission functional level  Description: INTERVENTIONS:  - Perform BMAT or MOVE assessment daily    - Set and communicate daily mobility goal to care team and patient/family/caregiver  - Collaborate with rehabilitation services on mobility goals if consulted  - Perform Range of Motion 3times a day  - Reposition patient every 2 hours    - Dangle patient 3 times a day  - Stand patient 3 times a day  - Ambulate patient 3 times a day  - Out of bed to chair 3 times a day   - Out of bed for meals 3 times a day  - Out of bed for toileting  - Record patient progress and toleration of activity level   8/26/2022 1759 by Pat Botello  Outcome: Completed  8/26/2022 1558 by Pat Botello  Outcome: Progressing

## 2022-08-26 NOTE — PROGRESS NOTES
Cardiology Progress Note - Shyla Kim 61 y o  male MRN: [id number]    Unit/Bed#: -01 Encounter: [id number]      Assessment/Plan:  1  NSTEMI Type 1  · The patient presented after 2 episodes of chest pain radiating to jaw and left arm  Patient denies chest pain at this time  · HS troponin 54, 94, 102, 79  · ECG showed NSR, nonspecific ST changes  · Patient underwent cardiac catheterization with 99% stenosis of mid RCA and 90% stenosis of proximal RCA ESTHER x2 in overlapping fashion successfully placed  · Continue aspirin, metoprolol succinate 12 5 mg daily, and atorvastatin  · Brilinta unaffordable for patient  Transition to Plavix  Plavix load of 600 mg ordered at 5:00 p m  Anil Horton Continue Plavix 75 mg daily starting tomorrow      2   CAD s/p ESTHER x2 to prox and mid RCA  · See plan above     3  Hypertension  · BP well controlled, continue metoprolol succinate, amlodipine 10 mg daily and losartan 100 mg daily     4  Hyperlipidemia  · Continue atorvastatin 80 mg daily  Will see as needed  Please reach out with any questions or concerns  Will arrange outpatient follow-up  Subjective:   Patient seen and examined  No significant events overnight  Patient denies any chest pain, shortness of breath, palpitations, or lower extremity edema  Objective:     Vitals: Blood pressure 142/81, pulse 69, temperature 97 9 °F (36 6 °C), resp  rate 18, height 5' 3" (1 6 m), weight 85 3 kg (188 lb), SpO2 96 %  , Body mass index is 33 3 kg/m² ,   Orthostatic Blood Pressures    Flowsheet Row Most Recent Value   Blood Pressure 142/81 filed at 08/26/2022 0408   Patient Position - Orthostatic VS Lying filed at 08/24/2022 0400            Intake/Output Summary (Last 24 hours) at 8/26/2022 1511  Last data filed at 8/26/2022 1454  Gross per 24 hour   Intake 780 ml   Output 2500 ml   Net -1720 ml         Physical Exam:  Physical Exam  Vitals and nursing note reviewed  Constitutional:       Appearance: He is well-developed  HENT:      Head: Normocephalic and atraumatic  Eyes:      Conjunctiva/sclera: Conjunctivae normal    Cardiovascular:      Rate and Rhythm: Normal rate and regular rhythm  Heart sounds: No murmur heard  Pulmonary:      Effort: Pulmonary effort is normal  No respiratory distress  Breath sounds: Normal breath sounds  Abdominal:      Palpations: Abdomen is soft  Tenderness: There is no abdominal tenderness  Musculoskeletal:      Cervical back: Neck supple  Skin:     General: Skin is warm and dry  Comments: R wrist site C/D/I   Neurological:      Mental Status: He is alert and oriented to person, place, and time     Psychiatric:         Mood and Affect: Mood normal          Behavior: Behavior normal               Medications:      Current Facility-Administered Medications:     acetaminophen (TYLENOL) tablet 650 mg, 650 mg, Oral, Q6H PRN, Piper Matos PA-C, 650 mg at 08/26/22 1458    albuterol (PROVENTIL HFA,VENTOLIN HFA) inhaler 2 puff, 2 puff, Inhalation, TID PRN, Massiel Loera PA-C    amLODIPine (NORVASC) tablet 10 mg, 10 mg, Oral, Daily, Piper Matos PA-C, 10 mg at 08/26/22 2652    aspirin chewable tablet 81 mg, 81 mg, Oral, Daily, Piper Matos PA-C, 81 mg at 08/26/22 0951    atorvastatin (LIPITOR) tablet 80 mg, 80 mg, Oral, QPM, Piper Matos PA-C, 80 mg at 08/25/22 1738    clopidogrel (PLAVIX) tablet 600 mg, 600 mg, Oral, Once, DYLAN Brandon    [START ON 8/27/2022] clopidogrel (PLAVIX) tablet 75 mg, 75 mg, Oral, Daily, DYLAN Brandon    doxazosin (CARDURA) tablet 2 mg, 2 mg, Oral, Daily, Piper Matos PA-C, 2 mg at 08/26/22 0951    HYDROmorphone (DILAUDID) injection 0 5 mg, 0 5 mg, Intravenous, Q4H PRN, Markos Byrne MD, 0 5 mg at 08/25/22 2126    losartan (COZAAR) tablet 100 mg, 100 mg, Oral, Daily, Piper Matos PA-C, 100 mg at 08/26/22 2741    metoprolol succinate (TOPROL-XL) 24 hr tablet 12 5 mg, 12 5 mg, Oral, Daily, DYLAN Brandon, 12 5 mg at 08/26/22 0953    ondansetron (ZOFRAN) injection 4 mg, 4 mg, Intravenous, Q4H PRN, Bernadette Hdz MD, 4 mg at 08/26/22 1762    pantoprazole (PROTONIX) EC tablet 40 mg, 40 mg, Oral, Daily, Piper Matos PA-C, 40 mg at 08/26/22 0777    topiramate (TOPAMAX) tablet 50 mg, 50 mg, Oral, Q12H, Piper Matos PA-C, 50 mg at 08/26/22 0951    traZODone (DESYREL) tablet 100 mg, 100 mg, Oral, HS, Piper Matos PA-C, 100 mg at 08/25/22 2126    venlafaxine (EFFEXOR-XR) 24 hr capsule 150 mg, 150 mg, Oral, Daily, Piper Matos PA-C, 150 mg at 08/26/22 0379     Labs & Results:     Results from last 7 days   Lab Units 08/24/22  0016 08/23/22  2250 08/23/22 2035 08/23/22  1842   HS TNI 0HR ng/L 79*  --   --  54*   HS TNI 2HR ng/L  --   --  94*  --    HSTNI D2 ng/L  --   --  40*  --    HS TNI 4HR ng/L  --  102*  --   --    HSTNI D4 ng/L  --  48*  --   --      Results from last 7 days   Lab Units 08/26/22  0506 08/24/22  0411 08/23/22  1842   WBC Thousand/uL 7 95 6 34 5 17   HEMOGLOBIN g/dL 14 2 14 0 14 9   HEMATOCRIT % 40 9 40 6 42 1   PLATELETS Thousands/uL 171 147* 144*     Results from last 7 days   Lab Units 08/24/22  0411   TRIGLYCERIDES mg/dL 171*   HDL mg/dL 41     Results from last 7 days   Lab Units 08/26/22  0506 08/25/22  0448 08/24/22  0411 08/23/22  1842   POTASSIUM mmol/L 3 4* 3 2* 3 4* 3 6   CHLORIDE mmol/L 110* 114* 110* 112*   CO2 mmol/L 21 22 24 20*   BUN mg/dL 17 20 22 26*   CREATININE mg/dL 0 83 0 91 0 89 1 07   CALCIUM mg/dL 8 4 8 0* 8 1* 8 0*   ALK PHOS U/L  --   --  47 52   ALT U/L  --   --  27 29   AST U/L  --   --  16 17         Results from last 7 days   Lab Units 08/26/22  0506 08/24/22  0411   MAGNESIUM mg/dL 2 1 2 3       Vitals: Blood pressure 142/81, pulse 69, temperature 97 9 °F (36 6 °C), resp  rate 18, height 5' 3" (1 6 m), weight 85 3 kg (188 lb), SpO2 96 %  , Body mass index is 33 3 kg/m² ,   Orthostatic Blood Pressures    Flowsheet Row Most Recent Value   Blood Pressure 142/81 filed at 08/26/2022 0658   Patient Position - Orthostatic VS Lying filed at 08/24/2022 1925          Systolic (05HIE), LDK:348 , Min:132 , SALVADOR:742     Diastolic (56DUU), UVZ:22, Min:65, Max:81        Intake/Output Summary (Last 24 hours) at 8/26/2022 1511  Last data filed at 8/26/2022 1454  Gross per 24 hour   Intake 780 ml   Output 2500 ml   Net -1720 ml       Invasive Devices  Report    Peripheral Intravenous Line  Duration           Peripheral IV 08/23/22 Right Arm 2 days                    BP Readings from Last 3 Encounters:   08/26/22 142/81   03/14/22 130/80   09/13/21 118/76      Wt Readings from Last 3 Encounters:   08/24/22 85 3 kg (188 lb)   03/14/22 85 5 kg (188 lb 9 6 oz)   09/13/21 89 8 kg (198 lb)

## 2022-08-26 NOTE — PLAN OF CARE
Problem: PAIN - ADULT  Goal: Verbalizes/displays adequate comfort level or baseline comfort level  Description: Interventions:  - Encourage patient to monitor pain and request assistance  - Assess pain using appropriate pain scale  - Administer analgesics based on type and severity of pain and evaluate response  - Implement non-pharmacological measures as appropriate and evaluate response  - Consider cultural and social influences on pain and pain management  - Notify physician/advanced practitioner if interventions unsuccessful or patient reports new pain  Outcome: Progressing     Problem: INFECTION - ADULT  Goal: Absence or prevention of progression during hospitalization  Description: INTERVENTIONS:  - Assess and monitor for signs and symptoms of infection  - Monitor lab/diagnostic results  - Monitor all insertion sites, i e  indwelling lines, tubes, and drains  - Monitor endotracheal if appropriate and nasal secretions for changes in amount and color  - [place] appropriate cooling/warming therapies per order  - Administer medications as ordered  - Instruct and encourage patient and family to use good hand hygiene technique  - Identify and instruct in appropriate isolation precautions for identified infection/condition  Outcome: Progressing     Problem: INFECTION - ADULT  Goal: Absence of fever/infection during neutropenic period  Description: INTERVENTIONS:  - Monitor WBC    Outcome: Progressing     Problem: SAFETY ADULT  Goal: Maintain or return to baseline ADL function  Description: INTERVENTIONS:  -  Assess patient's ability to carry out ADLs; assess patient's baseline for ADL function and identify physical deficits which impact ability to perform ADLs (bathing, care of mouth/teeth, toileting, grooming, dressing, etc )  - Assess/evaluate cause of self-care deficits   - Assess range of motion  - Assess patient's mobility; develop plan if impaired  - Assess patient's need for assistive devices and provide as appropriate  - Encourage maximum independence but intervene and supervise when necessary  - Involve family in performance of ADLs  - Assess for home care needs following discharge   - Consider OT consult to assist with ADL evaluation and planning for discharge  - Provide patient education as appropriate  Outcome: Progressing     Problem: SAFETY ADULT  Goal: Maintains/Returns to pre admission functional level  Description: INTERVENTIONS:  - Perform BMAT or MOVE assessment daily    - Set and communicate daily mobility goal to care team and patient/family/caregiver  - Collaborate with rehabilitation services on mobility goals if consulted  - Perform Range of Motion 3 times a day  - Reposition patient every 2 hours    - Dangle patient 3 times a day  - Stand patient 3 times a day  - Ambulate patient 3 times a day  - Out of bed to chair 3 times a day   - Out of bed for meals 3 times a day  - Out of bed for toileting  - Record patient progress and toleration of activity level   Outcome: Progressing     Problem: Potential for Falls  Goal: Patient will remain free of falls  Description: INTERVENTIONS:  - Educate patient/family on patient safety including physical limitations  - Instruct patient to call for assistance with activity   - Consult OT/PT to assist with strengthening/mobility   - Keep Call bell within reach  - Keep bed low and locked with side rails adjusted as appropriate  - Keep care items and personal belongings within reach  - Initiate and maintain comfort rounds  - Make Fall Risk Sign visible to staff  - Offer Toileting every 2 Hours, in advance of need  - Initiate/Maintain alarm  - Obtain necessary fall risk management equipment:   - Apply yellow socks and bracelet for high fall risk patients  - Consider moving patient to room near nurses station  Outcome: Progressing     Problem: MOBILITY - ADULT  Goal: Maintains/Returns to pre admission functional level  Description: INTERVENTIONS:  - Perform BMAT or MOVE assessment daily    - Set and communicate daily mobility goal to care team and patient/family/caregiver  - Collaborate with rehabilitation services on mobility goals if consulted  - Perform Range of Motion 3 times a day  - Reposition patient every 2 hours    - Dangle patient 3 times a day  - Stand patient 3 times a day  - Ambulate patient 3 times a day  - Out of bed to chair 3 times a day   - Out of bed for meals 3 times a day  - Out of bed for toileting  - Record patient progress and toleration of activity level   Outcome: Progressing

## 2022-08-26 NOTE — ASSESSMENT & PLAN NOTE
· Present on admission  · + stress test during admission  · Status post cardiac cath, refer to report  · There is a 99% stenosis in mid RCA nad 90% stenosis of proximal RCA  · Status post ESTHER, now stable for discharge  · Brilinta is not covered by his insurance, unable to afford $400 copay  · Loaded with plavix prior to discharge, continue home plavix

## 2022-08-29 ENCOUNTER — TRANSITIONAL CARE MANAGEMENT (OUTPATIENT)
Dept: FAMILY MEDICINE CLINIC | Facility: CLINIC | Age: 67
End: 2022-08-29

## 2022-08-29 NOTE — UTILIZATION REVIEW
Notification of Discharge   This is a Notification of Discharge from our facility [address]  Please be advised that this patient has been discharge from our facility  Below you will find the admission and discharge date and time including the patients disposition  UTILIZATION REVIEW CONTACT:  Jon De Leon  Utilization   Network Utilization Review Department  Phone: [phone] x carefully listen to the prompts  All voicemails are confidential   Email: [email or web address]     PHYSICIAN ADVISORY SERVICES:  FOR [id number] REVIEW - MEDICAL NECESSITY DENIAL  Phone: [phone]  Fax: [phone]  Email: [email or web address]     PRESENTATION DATE: 8/23/2022  7:00 PM  OBERVATION ADMISSION DATE: 08/23/2022  INPATIENT ADMISSION DATE: 8/25/22  7:57 AM   DISCHARGE DATE: 8/26/2022  6:42 PM  DISPOSITION: Home/Self Care Home/Self Care      IMPORTANT INFORMATION:  Send all requests for admission clinical reviews, approved or denied determinations and any other requests to dedicated fax number below belonging to the campus where the patient is receiving treatment   List of dedicated fax numbers:  [address] DENIALS (Administrative/Medical Necessity) [phone]   [address] (Maternity/NICU/Pediatrics) [phone]   [facility] [phone]   [address] [place] [address] [phone]   [address] [place] [address] [phone]   [address] [place] [address],[address] [phone]   [facility]  [phone]   [address] [place] [address], [facility]  [address] [facility] [address] [facility] [phone]

## 2022-08-30 ENCOUNTER — RA CDI HCC (OUTPATIENT)
Dept: OTHER | Facility: HOSPITAL | Age: 67
End: 2022-08-30

## 2022-08-30 NOTE — PROGRESS NOTES
[facility]  coding opportunities       Chart reviewed, no opportunity found:   [address]        Patients Insurance     Medicare Insurance: [employer]

## 2022-08-31 ENCOUNTER — TELEPHONE (OUTPATIENT)
Dept: CARDIOLOGY CLINIC | Facility: CLINIC | Age: 67
End: 2022-08-31

## 2022-09-02 ENCOUNTER — OFFICE VISIT (OUTPATIENT)
Dept: FAMILY MEDICINE CLINIC | Facility: CLINIC | Age: 67
End: 2022-09-02
Payer: COMMERCIAL

## 2022-09-02 VITALS
SYSTOLIC BLOOD PRESSURE: 120 MMHG | TEMPERATURE: 96.4 F | HEIGHT: 63 IN | BODY MASS INDEX: 30.83 KG/M2 | WEIGHT: 174 LBS | DIASTOLIC BLOOD PRESSURE: 78 MMHG | OXYGEN SATURATION: 99 % | HEART RATE: 76 BPM

## 2022-09-02 DIAGNOSIS — E66.01 CLASS 2 SEVERE OBESITY DUE TO EXCESS CALORIES WITH SERIOUS COMORBIDITY AND BODY MASS INDEX (BMI) OF 37.0 TO 37.9 IN ADULT (HCC): ICD-10-CM

## 2022-09-02 DIAGNOSIS — I25.10 CORONARY ARTERY DISEASE INVOLVING NATIVE CORONARY ARTERY OF NATIVE HEART WITHOUT ANGINA PECTORIS: Primary | ICD-10-CM

## 2022-09-02 DIAGNOSIS — E87.6 HYPOKALEMIA: ICD-10-CM

## 2022-09-02 DIAGNOSIS — I10 ESSENTIAL HYPERTENSION: ICD-10-CM

## 2022-09-02 PROBLEM — E66.812 CLASS 2 SEVERE OBESITY DUE TO EXCESS CALORIES WITH SERIOUS COMORBIDITY AND BODY MASS INDEX (BMI) OF 37.0 TO 37.9 IN ADULT (HCC): Status: ACTIVE | Noted: 2022-09-02

## 2022-09-02 PROBLEM — R07.9 CHEST PAIN: Status: RESOLVED | Noted: 2022-08-23 | Resolved: 2022-09-02

## 2022-09-02 PROCEDURE — 99495 TRANSJ CARE MGMT MOD F2F 14D: CPT | Performed by: FAMILY MEDICINE

## 2022-09-02 NOTE — PROGRESS NOTES
Assessment/Plan:         Problem List Items Addressed This Visit        Cardiovascular and Mediastinum    Essential hypertension    Coronary artery disease involving native coronary artery of native heart without angina pectoris - Primary     Continue the metoprolol, atorvastatin, Plavix, follow-up with cardiology as scheduled  Start cardiac rehab  Other    Class 2 severe obesity due to excess calories with serious comorbidity and body mass index (BMI) of 37 0 to 37 9 in adult [facility])      Other Visit Diagnoses     Hypokalemia        Relevant Orders    Basic metabolic panel            Subjective:      Patient ID: Misty Barrow is a 74 y o  male  TCM Call     Date and time call was made  8/29/2022  1:33 PM    Hospital care reviewed  Records reviewed    Patient was hospitialized at  [facility]    Date of Admission  08/23/22    Date of discharge  08/26/22    Diagnosis  Chest pain    Disposition  Home    Were the patients medications reviewed and updated  Yes    Current Symptoms  None      TCM Call     Post hospital issues  None    Should patient be enrolled in anticoag monitoring? No    Scheduled for follow up? Yes    Did you obtain your prescribed medications  Yes    Do you need help managing your prescriptions or medications  No    Is transportation to your appointment needed  No    I have advised the patient to call PCP with any new or worsening symptoms    Ping Stiles/bibi  Living Arrangements  Spouse or Significiant other    Support System  Family    The type of support provided  Emotional    Do you have social support  Yes, as much as I need    Are you recieving any outpatient services  No    Are you recieving home care services  No    Are you using any community resources  No    Current waiver services  No    Have you fallen in the last 12 months  No    Interperter language line needed  No    Counseling  Patient    Counseling topics  patient and family education;  Importance of RX compliance; Activities of daily living    Comments  spoke with pt on 8/29/22 after his hospital d/c on 8/26/22  pt   underwent a Cardiac catheterization (Left Chest) on 8/25/22, pt stated   that he is feeling well, denied any discomfort or pain  denied chest pain,   SOB, vision changes, wekaness   etc  pt will see PCP on 9/2/22 for hospital   f/u and she will f/u with cardiologist as advised at his d/c  pt is aware   to call our office if he has any questions or concern  er/cma  Patient comes in today for transition of care management, he was admitted to [address] [place] [address] August 23rd and discharged on August 26th with a diagnosis of a non ST elevation MI  He underwent successful stenting of a right coronary artery lesion  He is now taking metoprolol, Plavix, atorvastatin  His only complaint today is of some fatigue  Was noted in his blood work that he has a slight hypokalemia  The following portions of the patient's history were reviewed and updated as appropriate:   Past Medical History:  He has no past medical history on file ,  _______________________________________________________________________  Medical Problems:  does not have any pertinent problems on file ,  _______________________________________________________________________  Past Surgical History:   has a past surgical history that includes Septoplasty; Cardiac catheterization (Left, 8/25/2022); and Cardiac catheterization (N/A, 8/25/2022)  ,  _______________________________________________________________________  Family History:  family history includes Aneurysm in his mother; Cancer in his other; Coronary artery disease in his father; Stroke in his other ,  _______________________________________________________________________  Social History:   reports that he has been smoking  He has never used smokeless tobacco  He reports current alcohol use   He reports that he does not use drugs ,  _______________________________________________________________________  Allergies:  has No Known Allergies     _______________________________________________________________________  Current Outpatient Medications   Medication Sig Dispense Refill    amLODIPine (NORVASC) 10 mg tablet TAKE 1 TABLET BY MOUTH EVERY DAY 90 tablet 3    aspirin 81 mg chewable tablet Chew 1 tablet (81 mg total) daily 30 tablet 0    atorvastatin (LIPITOR) 80 mg tablet Take 1 tablet (80 mg total) by mouth every evening 30 tablet 0    Azelastine HCl 137 MCG/SPRAY SOLN 1 SPRAY INTO EACH NOSTRIL 2 (TWO) TIMES A DAY USE IN EACH NOSTRIL AS DIRECTED 30 mL 1    clopidogrel (Plavix) 75 mg tablet Take 1 tablet (75 mg total) by mouth daily 90 tablet 3    cyclobenzaprine (FLEXERIL) 10 mg tablet Take 1 tablet (10 mg total) by mouth 3 (three) times a day as needed for muscle spasms 30 tablet 0    doxazosin (CARDURA) 2 mg tablet TAKE 1 TABLET BY MOUTH EVERY DAY 90 tablet 1    fluticasone (FLONASE) 50 mcg/act nasal spray 2 sprays into each nostril daily      ketoconazole (NIZORAL) 2 % shampoo Apply topically daily      meclizine (ANTIVERT) 25 mg tablet Take 1 tablet (25 mg total) by mouth 3 (three) times a day as needed for dizziness 30 tablet 0    meloxicam (MOBIC) 15 mg tablet TAKE 1 TABLET (15 MG TOTAL) BY MOUTH DAILY   30 tablet 1    metoprolol succinate (TOPROL-XL) 25 mg 24 hr tablet Take 0 5 tablets (12 5 mg total) by mouth daily 15 tablet 0    mometasone (ELOCON) 0 1 % cream Apply topically daily 30 g 0    olopatadine (PATANOL) 0 1 % ophthalmic solution Administer 1 drop to both eyes 2 (two) times a day 5 mL 1    pantoprazole (PROTONIX) 40 mg tablet Take 40 mg by mouth daily  0    SUMAtriptan (IMITREX) 50 mg tablet TAKE 1 TABLET (50 MG TOTAL) BY MOUTH EVERY 2 (TWO) HOURS AS NEEDED FOR MIGRAINE  MG/DAY 9 tablet 3    topiramate (TOPAMAX) 50 MG tablet TAKE 1 TABLET BY MOUTH EVERY 12 HOURS 60 tablet 5    traZODone (DESYREL) 100 mg tablet Take 1 tablet (100 mg total) by mouth daily at bedtime 90 tablet 3    valsartan (DIOVAN) 320 MG tablet TAKE 1 TABLET BY MOUTH EVERY DAY 90 tablet 1    venlafaxine (EFFEXOR-XR) 150 mg 24 hr capsule TAKE 1 CAPSULE BY MOUTH EVERY DAY 90 capsule 3    Ventolin  (90 Base) MCG/ACT inhaler INHALE 2 PUFFS 3 (THREE) TIMES A DAY AS NEEDED FOR WHEEZING 18 Inhaler 2     No current facility-administered medications for this visit      _______________________________________________________________________  Review of Systems   Constitutional: Positive for fatigue  Negative for chills and fever  HENT: Negative for congestion, ear pain, hearing loss, postnasal drip, rhinorrhea and sore throat  Eyes: Negative for pain and visual disturbance  Respiratory: Negative for chest tightness, shortness of breath and wheezing  Cardiovascular: Negative for chest pain and leg swelling  Gastrointestinal: Negative for abdominal distention, abdominal pain, constipation, diarrhea and vomiting  Endocrine: Negative for cold intolerance and heat intolerance  Genitourinary: Negative for difficulty urinating, frequency and urgency  Musculoskeletal: Negative for arthralgias and gait problem  Skin: Negative for color change  Neurological: Negative for dizziness, tremors, syncope, numbness and headaches  Hematological: Negative for adenopathy  Psychiatric/Behavioral: Negative for agitation, confusion and sleep disturbance  The patient is not nervous/anxious  Objective:  Vitals:    09/02/22 0922   BP: 120/78   BP Location: Left arm   Patient Position: Sitting   Cuff Size: Adult   Pulse: 76   Temp: (!) 96 4 °F (35 8 °C)   SpO2: 99%   Weight: 78 9 kg (174 lb)   Height: 5' 3" (1 6 m)     Body mass index is 30 82 kg/m²  Physical Exam  Vitals and nursing note reviewed  Constitutional:       Appearance: He is well-developed  HENT:      Head: Normocephalic     Eyes:      General: No scleral icterus  Conjunctiva/sclera: Conjunctivae normal    Neck:      Thyroid: No thyromegaly  Cardiovascular:      Rate and Rhythm: Normal rate and regular rhythm  Heart sounds: Normal heart sounds  No murmur heard  Pulmonary:      Effort: Pulmonary effort is normal  No respiratory distress  Breath sounds: Normal breath sounds  No wheezing  Abdominal:      General: Bowel sounds are normal  There is no distension  Palpations: Abdomen is soft  Tenderness: There is no abdominal tenderness  Musculoskeletal:         General: No tenderness  Normal range of motion  Cervical back: Normal range of motion  Lymphadenopathy:      Cervical: No cervical adenopathy  Skin:     General: Skin is warm and dry  Coloration: Skin is not pale  Findings: No rash  Neurological:      Mental Status: He is alert and oriented to person, place, and time  Cranial Nerves: No cranial nerve deficit     Psychiatric:         Behavior: Behavior normal

## 2022-09-02 NOTE — ASSESSMENT & PLAN NOTE
Continue the metoprolol, atorvastatin, Plavix, follow-up with cardiology as scheduled  Start cardiac rehab

## 2022-09-09 ENCOUNTER — TELEPHONE (OUTPATIENT)
Dept: FAMILY MEDICINE CLINIC | Facility: CLINIC | Age: 67
End: 2022-09-09

## 2022-09-09 ENCOUNTER — OFFICE VISIT (OUTPATIENT)
Dept: CARDIOLOGY CLINIC | Facility: CLINIC | Age: 67
End: 2022-09-09
Payer: COMMERCIAL

## 2022-09-09 VITALS
BODY MASS INDEX: 30.83 KG/M2 | OXYGEN SATURATION: 97 % | RESPIRATION RATE: 18 BRPM | HEART RATE: 84 BPM | WEIGHT: 174 LBS | SYSTOLIC BLOOD PRESSURE: 160 MMHG | DIASTOLIC BLOOD PRESSURE: 90 MMHG | HEIGHT: 63 IN

## 2022-09-09 DIAGNOSIS — J40 BRONCHITIS: Primary | ICD-10-CM

## 2022-09-09 DIAGNOSIS — I25.10 ATHEROSCLEROSIS OF NATIVE CORONARY ARTERY OF NATIVE HEART WITHOUT ANGINA PECTORIS: Primary | ICD-10-CM

## 2022-09-09 PROCEDURE — 99214 OFFICE O/P EST MOD 30 MIN: CPT | Performed by: PHYSICIAN ASSISTANT

## 2022-09-09 RX ORDER — AZITHROMYCIN 250 MG/1
TABLET, FILM COATED ORAL
Qty: 6 TABLET | Refills: 0 | Status: SHIPPED | OUTPATIENT
Start: 2022-09-09 | End: 2022-09-15

## 2022-09-09 RX ORDER — NITROGLYCERIN 0.4 MG/1
0.4 TABLET SUBLINGUAL
Qty: 100 TABLET | Refills: 0 | Status: SHIPPED | OUTPATIENT
Start: 2022-09-09

## 2022-09-09 NOTE — PROGRESS NOTES
Cardiology Outpatient Follow up Note    Momo Bah 74 y o  male MRN: [id number]    09/09/22          Assessment:  1  CAD s/p RCA PCI x2  2  Hypertension  3  Hyperlipidemia    Plan:  Importance of maintaining dual antiplatelet therapy with aspirin and Plavix was strongly stressed  Given SL NTG PRN and instructed on how to use  Discussed appropriate cardiac diet however wishes to seek nutritional counseling  Referral placed  Continue Toprol XL and atorvastatin daily  Patient enrolled in cardiac rehab and will begin next week  Report any concerning or worsening symptoms immediately or report to the ED for evaluation  RTO in 3-4 months with Dr Tristan Lynn or sooner if needed    Diagnostics:   Last TTE 08/24/2022    Left Ventricle: Left ventricular cavity size is normal  Wall thickness is moderately increased  There is moderate concentric hypertrophy  Systolic function is normal (65%)  Wall motion is normal  Diastolic function is normal     Right Ventricle: Right ventricular cavity size is dilated  Systolic function is normal     Left Atrium: The atrium is moderately dilated    Aortic Valve: There is mild regurgitation    Mitral Valve: There is mild to moderate regurgitation    Tricuspid Valve: There is mild regurgitation  The right ventricular systolic pressure is normal     Pulmonic Valve: There is trace regurgitation  Cardiac catheterization 08/25/2022  Successful PCI of RCA with ESTHER x2 in overlapping fashion    HPI: Momo Bah is a 74y o  year old male with history of CAD status post mid RCA PCI x2, hypertension, hyperlipidemia, status post gastric bypass surgery who presents for hospital follow-up for NSTEMI  Patient presented on 08/24/2022 with right-sided chest discomfort while eating dinner at home with his wife  He noticed the chest discomfort continued to worsen radiating to his left side and jaw and wife urged him to present to the ED for further evaluation    On arrival to the ED he had EKG which showed sinus rhythm with nonspecific ST changes but with up trending troponins  He was evaluated with TTE and pharmacologic MPI which showed perfusion defects and underwent cardiac catheterization which showed 99% stenosis of mid RCA and 90% stenosis of proximal RCA status post ESTHER x2 in overlapping fashion  He was started on aspirin and Brilinta but unfortunately Brilinta was unaffordable and was transition to Plavix  He was started on Toprol XL with atorvastatin  He was ultimately discharged on 08/26/2022 for cardiology follow-up in office  Since discharge, patient reports he is doing well from a cardiovascular standpoint  Reports working in the garden pulling weeds and mowing his lawn without much exertional issue  He does report of some fatigue and does not feel 100% yet  He scheduled cardiac rehab to start next week  He had multiple questions regarding specific dietary recommendations which were answered today however patient would like further consultation with a nutritionist   He denies any shortness of breath, orthopnea, PND or lower extremity edema        Patient Active Problem List   Diagnosis    Anxiety    Cervical radiculopathy    Essential hypertension    Gastroesophageal reflux disease without esophagitis    Insomnia    Migraine headache    BMI 30 0-30 9,adult    Mild intermittent asthma without complication    Coronary artery disease involving native coronary artery of native heart without angina pectoris    Class 2 severe obesity due to excess calories with serious comorbidity and body mass index (BMI) of 37 0 to 37 9 in adult ([facility])       No Known Allergies      Current Outpatient Medications:     amLODIPine (NORVASC) 10 mg tablet, TAKE 1 TABLET BY MOUTH EVERY DAY, Disp: 90 tablet, Rfl: 3    aspirin 81 mg chewable tablet, Chew 1 tablet (81 mg total) daily, Disp: 30 tablet, Rfl: 0    atorvastatin (LIPITOR) 80 mg tablet, Take 1 tablet (80 mg total) by mouth every evening, Disp: 30 tablet, Rfl: 0    Azelastine HCl 137 MCG/SPRAY SOLN, 1 SPRAY INTO EACH NOSTRIL 2 (TWO) TIMES A DAY USE IN EACH NOSTRIL AS DIRECTED, Disp: 30 mL, Rfl: 1    clopidogrel (Plavix) 75 mg tablet, Take 1 tablet (75 mg total) by mouth daily, Disp: 90 tablet, Rfl: 3    doxazosin (CARDURA) 2 mg tablet, TAKE 1 TABLET BY MOUTH EVERY DAY, Disp: 90 tablet, Rfl: 1    fluticasone (FLONASE) 50 mcg/act nasal spray, 2 sprays into each nostril daily, Disp: , Rfl:     meclizine (ANTIVERT) 25 mg tablet, Take 1 tablet (25 mg total) by mouth 3 (three) times a day as needed for dizziness, Disp: 30 tablet, Rfl: 0    meloxicam (MOBIC) 15 mg tablet, TAKE 1 TABLET (15 MG TOTAL) BY MOUTH DAILY  , Disp: 30 tablet, Rfl: 1    metoprolol succinate (TOPROL-XL) 25 mg 24 hr tablet, Take 0 5 tablets (12 5 mg total) by mouth daily, Disp: 15 tablet, Rfl: 0    mometasone (ELOCON) 0 1 % cream, Apply topically daily, Disp: 30 g, Rfl: 0    olopatadine (PATANOL) 0 1 % ophthalmic solution, Administer 1 drop to both eyes 2 (two) times a day, Disp: 5 mL, Rfl: 1    SUMAtriptan (IMITREX) 50 mg tablet, TAKE 1 TABLET (50 MG TOTAL) BY MOUTH EVERY 2 (TWO) HOURS AS NEEDED FOR MIGRAINE  MG/DAY, Disp: 9 tablet, Rfl: 3    topiramate (TOPAMAX) 50 MG tablet, TAKE 1 TABLET BY MOUTH EVERY 12 HOURS, Disp: 60 tablet, Rfl: 5    traZODone (DESYREL) 100 mg tablet, Take 1 tablet (100 mg total) by mouth daily at bedtime, Disp: 90 tablet, Rfl: 3    Ventolin  (90 Base) MCG/ACT inhaler, INHALE 2 PUFFS 3 (THREE) TIMES A DAY AS NEEDED FOR WHEEZING, Disp: 18 Inhaler, Rfl: 2    cyclobenzaprine (FLEXERIL) 10 mg tablet, Take 1 tablet (10 mg total) by mouth 3 (three) times a day as needed for muscle spasms (Patient not taking: Reported on 9/9/2022), Disp: 30 tablet, Rfl: 0    ketoconazole (NIZORAL) 2 % shampoo, Apply topically daily (Patient not taking: Reported on 9/9/2022), Disp: , Rfl:     pantoprazole (PROTONIX) 40 mg tablet, Take 40 mg by mouth daily (Patient not taking: Reported on 9/9/2022), Disp: , Rfl: 0    valsartan (DIOVAN) 320 MG tablet, TAKE 1 TABLET BY MOUTH EVERY DAY (Patient not taking: Reported on 9/9/2022), Disp: 90 tablet, Rfl: 1    venlafaxine (EFFEXOR-XR) 150 mg 24 hr capsule, TAKE 1 CAPSULE BY MOUTH EVERY DAY (Patient not taking: Reported on 9/9/2022), Disp: 90 capsule, Rfl: 3    No past medical history on file  Family History   Problem Relation Age of Onset    Aneurysm Mother     Coronary artery disease Father     Cancer Other     Stroke Other         CVA       Past Surgical History:   Procedure Laterality Date    CARDIAC CATHETERIZATION Left 8/25/2022    Procedure: Cardiac catheterization;  Surgeon: Eleanor Reed MD;  Location: [address] [place] [address] CATH LAB; Service: Cardiology    CARDIAC CATHETERIZATION N/A 8/25/2022    Procedure: Cardiac Coronary Angiogram;  Surgeon: Eleanor Reed MD;  Location: [address] [place] [address] CATH LAB;   Service: Cardiology    SEPTOPLASTY         Social History     Socioeconomic History    Marital status: /Civil Union     Spouse name: Not on file    Number of children: Not on file    Years of education: Not on file    Highest education level: Not on file   Occupational History    Occupation: full-time employment   Tobacco Use    Smoking status: Current Some Day Smoker    Smokeless tobacco: Never Used    Tobacco comment: never a smoker ( as per allscripts)   Substance and Sexual Activity    Alcohol use: Yes     Comment: occasional alcohol use    Drug use: No    Sexual activity: Not on file   Other Topics Concern    Not on file   Social History Narrative    Always uses seat belt    Lives with wife    Recreation: gardening    Seeing a dentist     Social Determinants of Health     Financial Resource Strain: Not on file   Food Insecurity: No Food Insecurity    Worried About [address] [employer] in the Last Year: Never true    Roni of Food in the Last Year: Never true   Transportation Needs: No Transportation Needs    Lack of Transportation (Medical): No    Lack of Transportation (Non-Medical): No   Physical Activity: Not on file   Stress: Not on file   Social Connections: Not on file   Intimate Partner Violence: Not on file   Housing Stability: Low Risk     Unable to Pay for Housing in the Last Year: No    Number of Places Lived in the Last Year: 1    Unstable Housing in the Last Year: No       Review of Systems   Constitutional: Positive for malaise/fatigue  Negative for chills, decreased appetite, diaphoresis, fever, night sweats, weight gain and weight loss  HENT: Negative for nosebleeds, odynophagia and sore throat  Eyes: Negative for double vision, photophobia and visual disturbance  Cardiovascular: Negative for chest pain, dyspnea on exertion, irregular heartbeat, leg swelling, near-syncope, orthopnea, palpitations, paroxysmal nocturnal dyspnea and syncope  Hematologic/Lymphatic: Negative for bleeding problem  Musculoskeletal: Negative for muscle cramps, muscle weakness and myalgias  Gastrointestinal: Negative for bloating, abdominal pain, diarrhea, dysphagia, hematemesis, hematochezia, nausea and vomiting  Genitourinary: Negative for frequency and hematuria  Neurological: Negative for headaches, light-headedness, loss of balance, paresthesias and vertigo  Vitals: /90 (BP Location: Left arm, Patient Position: Sitting, Cuff Size: Standard)   Pulse 84   Resp 18   Ht 5' 3" (1 6 m)   Wt 78 9 kg (174 lb)   SpO2 97%   BMI 30 82 kg/m²     Physical Exam:   GEN: Alert and oriented x 3, in no acute distress  Well appearing and well nourished  HEENT: Sclera anicteric, conjunctivae pink, mucous membranes moist  Oropharynx clear  NECK: Supple, no carotid bruits, no significant JVD  Trachea midline, no thyromegaly  HEART: Regular rhythm, normal S1 and S2, no murmurs, clicks, gallops or rubs  PMI nondisplaced, no thrills     LUNGS: Clear to auscultation bilaterally; no wheezes, rales, or rhonchi  No increased work of breathing or signs of respiratory distress  ABDOMEN: Soft, nontender, nondistended, normoactive bowel sounds  EXTREMITIES: Skin warm and well perfused, no clubbing, cyanosis, or edema  NEURO: No focal findings  Normal speech  Mood and affect normal    SKIN: Normal without suspicious lesions on exposed skin      Lab Results:   Lab Results   Component Value Date    HGBA1C 4 9 03/09/2022    HGBA1C 4 6 09/10/2021    HGBA1C 5 0 02/26/2021     No results found for: CHOL  Lab Results   Component Value Date    HDL 41 08/24/2022    HDL 53 08/21/2020    HDL 44 10/12/2019     Lab Results   Component Value Date    LDLCALC 107 (H) 08/24/2022    LDLCALC 85 08/21/2020    LDLCALC 109 (H) 10/12/2019     Lab Results   Component Value Date    TRIG 171 (H) 08/24/2022    TRIG 133 08/21/2020    TRIG 143 10/12/2019     No results found for: CHOLHDL

## 2022-09-09 NOTE — TELEPHONE ENCOUNTER
Pt states he did a test 9/8 that was negative and threw it away - asked for pt to try to upload a picture of neg test to [facility]

## 2022-09-09 NOTE — TELEPHONE ENCOUNTER
Pt left a message on med line requesting a medication be sent in for Bronchitis   Pt states "I have come down with Bronchitis again, and have been coughing "

## 2022-09-14 ENCOUNTER — TELEPHONE (OUTPATIENT)
Dept: CARDIOLOGY CLINIC | Facility: CLINIC | Age: 67
End: 2022-09-14

## 2022-09-25 DIAGNOSIS — I10 ESSENTIAL HYPERTENSION: ICD-10-CM

## 2022-09-25 RX ORDER — DOXAZOSIN 2 MG/1
TABLET ORAL
Qty: 90 TABLET | Refills: 1 | Status: SHIPPED | OUTPATIENT
Start: 2022-09-25

## 2022-09-30 DIAGNOSIS — G43.109 MIGRAINE WITH AURA AND WITHOUT STATUS MIGRAINOSUS, NOT INTRACTABLE: ICD-10-CM

## 2022-09-30 RX ORDER — TOPIRAMATE 50 MG/1
TABLET, FILM COATED ORAL
Qty: 180 TABLET | Refills: 2 | Status: SHIPPED | OUTPATIENT
Start: 2022-09-30

## 2022-10-18 DIAGNOSIS — I21.4 NSTEMI (NON-ST ELEVATED MYOCARDIAL INFARCTION) (HCC): ICD-10-CM

## 2022-10-18 RX ORDER — METOPROLOL SUCCINATE 25 MG/1
TABLET, EXTENDED RELEASE ORAL
Qty: 45 TABLET | Refills: 2 | Status: SHIPPED | OUTPATIENT
Start: 2022-10-18

## 2022-10-18 RX ORDER — ATORVASTATIN CALCIUM 80 MG/1
TABLET, FILM COATED ORAL
Qty: 90 TABLET | Refills: 2 | Status: SHIPPED | OUTPATIENT
Start: 2022-10-18

## 2022-10-19 DIAGNOSIS — M25.512 ACUTE PAIN OF LEFT SHOULDER: ICD-10-CM

## 2022-10-19 RX ORDER — MELOXICAM 15 MG/1
15 TABLET ORAL DAILY
Qty: 30 TABLET | Refills: 1 | Status: SHIPPED | OUTPATIENT
Start: 2022-10-19

## 2022-10-23 DIAGNOSIS — I10 ESSENTIAL HYPERTENSION: ICD-10-CM

## 2022-10-24 RX ORDER — VALSARTAN 320 MG/1
TABLET ORAL
Qty: 90 TABLET | Refills: 1 | Status: SHIPPED | OUTPATIENT
Start: 2022-10-24

## 2022-11-28 ENCOUNTER — TELEPHONE (OUTPATIENT)
Dept: CARDIOLOGY CLINIC | Facility: CLINIC | Age: 67
End: 2022-11-28

## 2022-11-28 NOTE — TELEPHONE ENCOUNTER
Received fax from [employer] 11/21/22    Scanned Patient daily report-Cardiac Phase II into chart, and put in Dr Sandor Garcia folder

## 2022-12-03 DIAGNOSIS — F41.9 ANXIETY: ICD-10-CM

## 2022-12-05 RX ORDER — VENLAFAXINE HYDROCHLORIDE 150 MG/1
CAPSULE, EXTENDED RELEASE ORAL
Qty: 90 CAPSULE | Refills: 3 | Status: SHIPPED | OUTPATIENT
Start: 2022-12-05

## 2022-12-08 ENCOUNTER — TELEPHONE (OUTPATIENT)
Dept: FAMILY MEDICINE CLINIC | Facility: CLINIC | Age: 67
End: 2022-12-08

## 2022-12-08 ENCOUNTER — TELEMEDICINE (OUTPATIENT)
Dept: FAMILY MEDICINE CLINIC | Facility: CLINIC | Age: 67
End: 2022-12-08

## 2022-12-08 VITALS — BODY MASS INDEX: 30.19 KG/M2 | WEIGHT: 170.4 LBS | HEIGHT: 63 IN

## 2022-12-08 DIAGNOSIS — U07.1 COVID: Primary | ICD-10-CM

## 2022-12-08 DIAGNOSIS — Z20.822 EXPOSURE TO COVID-19 VIRUS: ICD-10-CM

## 2022-12-08 NOTE — PROGRESS NOTES
COVID-19 Outpatient Progress Note    Assessment/Plan:    Problem List Items Addressed This Visit    None  Visit Diagnoses     COVID    -  Primary    Relevant Medications    molnupiravir 200 mg capsule    Exposure to COVID-19 virus        Relevant Medications    molnupiravir 200 mg capsule           Disposition:     Patient has asymptomatic or mild COVID-19 infection  Based off CDC guidelines, they were recommended to isolate for 5 days  If they are asymptomatic or symptoms are improving with no fevers in the past 24 hours, isolation may be ended followed by 5 days of wearing a mask when around othes to minimize risk of infecting others  If still have a fever or other symptoms have not improved, continue to isolate until they improve  Regardless of when they end isolation, avoid being around people who are more likely to get very sick from COVID-19 until at least day 11  Discussed symptom directed medication options with patient  Discussed vitamin D, vitamin C, and/or zinc supplementation with patient  Patient meets criteria for Molnupiravir and they have been counseled according to EUA documentation provided by the FDA  After discussion, patient agrees to treatment  [address] is an investigational medicine used to treat mild-to-moderate COVID-19 in adults with positive results of direct SARS-CoV-2 viral testing, and who are at high risk for progressing to severe COVID-19 including hospitalization or death, and for whom other COVID-19 treatment options authorized by the FDA are not accessible or clinically appropriate  The FDA has authorized the emergency use of molnupiravir for the treatment of mild-tomoderate COVID-19 in adults under an EUA      [address] is not authorized:  - for use in people less than 25years of age   - for prevention of COVID-19   - for people needing hospitalization for COVID-19   - for use for longer than 5 consecutive days    What is the most important information I should know about molnupiravir? Maddie Dallin may cause serious side effects, including:    Maddie Dallin may cause harm to your unborn baby  It is not known if molnupiravir will harm your baby if you take molnupiravir during pregnancy  - Maddie Dallin is not recommended for use in pregnancy  - Maddie Dallin has not been studied in pregnancy  Maddie Dallin was studied in pregnant animals only  When molnupiravir was given to pregnant animals, molnupiravir caused harm to their unborn babies   - You and your healthcare provider may decide that you should take molnupiravir duringpregnancy if there are no other COVID-19 treatment options authorized by the FDA that are accessible or clinically appropriate for you  - If you and your healthcare provider decide that you should take molnupiravir during pregnancy, you and your healthcare provider should discuss the known and potential benefits and the potential risks of taking molnupiravir during pregnancy  For individuals who are able to become pregnant:  - You should use a reliable method of birth control (contraception) consistently and correctly during treatment with molnupiravir and for 4 days after the last dose of molnupiravir  Talk to your healthcare provider about reliable birth control methods  - Before starting treatment with molnupiravir your healthcare provider may do a pregnancy test to see if you are pregnant before starting treatment with molnupiravir  - Tell your healthcare provider right away if you become pregnant or think you may be pregnant during treatment with molnupiravir  Pregnancy Surveillance Program:  - There is a pregnancy surveillance program for individuals who take molnupiravir during pregnancy  The purpose of this program is to collect information about the health of you and your baby   Talk to your healthcare provider about how to take part in this program   - If you take molnupiravir during pregnancy and you agree to participate in the pregnancy surveillance program and allow your healthcare provider to share your information with Eduardo Hammer , then your healthcare provider will report your use of molnupiravir during pregnancy to [address] [facility]  by calling [phone] or pregnancyreporting msd com  For individuals who are sexually active with partners who are able to become pregnant:  - It is not known if molnupiravir can affect sperm  While the risk is regarded as low, animal studies to fully assess the potential for molnupiravir to affect the babies of males treated with molnupiravir have not been completed  A reliable method of birth control (contraception) should be used consistently and correctly during treatment with molnupiravir and for at least 3 months after the last dose  The risk to sperm beyond 3 months is not known  Studies to understand the risk to sperm beyond 3 months are ongoing  Talk to your healthcare provider about reliable birth control methods  Talk to your healthcare provider if you have questions or concerns about how molnupiravir may affect sperm  How do I take molnupiravir? - Take molnupiravir exactly as your healthcare provider tells you to take it  - Take 4 capsules of molnupiravir every 12 hours (for example, at 8 am and at 8 pm)  - Take molnupiravir for 5 days  It is important that you complete the full 5 days of treatment with molnupiravir  Do not stop taking molnupiravir before you complete the full 5 days of treatment, even if you feel better  - Take molnupiravir with or without food  - You should stay in isolation for as long as your healthcare provider tells you to  Talk to your healthcare provider if you are not sure about how to properly isolate while you have COVID-19   - Swallow molnupiravir capsules whole  Do not open, break, or crush the capsules  If you cannot swallow capsules whole, tell your healthcare provider      What to do if you miss a dose:  - If it has been less than 10 hours since the missed dose, take it as soon as you remember  - If it has been more than 10 hours since the missed dose, skip the missed dose and take your dose at the next scheduled time  - Do not double the dose of molnupiravir to make up for a missed dose  What are the important possible side effects of molnupiravir? Possible side effects of molnupiravir are:  - See, Augusta Lasha is the most important information I should know about molnupiravir?”  - Diarrhea  - Nausea  - Dizziness    These are not all the possible side effects of molnupiravir  Not many people have taken molnupiravir  Serious and unexpected side effects may happen  This medicine is still being studied, so it is possible that all of the risks are not known at this time  What other treatment choices are there? Like Sherrine Fluke may allow for the emergency use of other medicines to treat people with COVID-19  Go to [facility] for more information  It is your choice to be treated or not to be treated with molnupiravir  Should you decide not to take it, it will not change your standard medical care  What if I am breastfeeding? Breastfeeding is not recommended during treatment with molnupiravir and for 4 days after the last dose of molnupiravir  If you are breastfeeding or plan to breastfeed, talk to your healthcare provider about your options and specific situation before taking molnupiravir  How do I report side effects with molnupiravir? Contact your healthcare provider if you have any side effects that bother you or do not go away  Report side effects to FDA MedWatch at www fda gov/medwatch or call 9-425-DHN-4329 (1-981.497.1285)  How should I store [address]? - Store molnupiravir capsules at room temperature between 68°F to 77°F (20°C to 25°C)    - Keep molnupiravir and all medicines out of the reach of children and pets  Full fact sheet for patients, parents, and caregivers can be found at: [employer] com au    I have spent 10 minutes directly with the patient  Encounter provider: Earl Saleh DO     Provider located at: [facility] [address]  [address] [facility]   [address]  [phone]     Recent Visits  No visits were found meeting these conditions  Showing recent visits within past 7 days and meeting all other requirements  Today's Visits  Date Type Provider Dept   12/08/22 Telephone Sarah Crowley DO [facility] [address] [facility]   12/08/22 Telemedicine Earl Saleh DO [facility] [address] [facility]   Showing today's visits and meeting all other requirements  Future Appointments  No visits were found meeting these conditions  Showing future appointments within next 150 days and meeting all other requirements     This virtual check-in was done via [address] and patient was informed that this is a secure, HIPAA-compliant platform  He agrees to proceed  Patient agrees to participate in a virtual check in via telephone or video visit instead of presenting to the office to address urgent/immediate medical needs  Patient is aware this is a billable service  He acknowledged consent and understanding of privacy and security of the video platform  The patient has agreed to participate and understands they can discontinue the visit at any time  After connecting through [facility], the patient was identified by name and date of birth  Misty Barrow was informed that this was a telemedicine visit and that the exam was being conducted confidentially over secure lines  My office door was closed  No one else was in the room  Misty Barrow acknowledged consent and understanding of privacy and security of the telemedicine visit   I informed the patient that I have reviewed his record in [facility] [address] and presented the opportunity for him to ask any questions regarding the visit today  The patient agreed to participate  Verification of patient location:  Patient is located in the following state in which I hold an active license: [place]    Subjective:   Shahzad Liang is a 74 y o  male who has been screened for COVID-19  Patient's symptoms include chills, fatigue, nasal congestion, rhinorrhea, cough (productive), myalgias and headache  Patient denies fever, malaise, sore throat, anosmia, loss of taste, shortness of breath, chest tightness, abdominal pain, nausea, vomiting and diarrhea  - Date of symptom onset: 12/7/2022  - Date of positive COVID-19 test: 12/8/2022  Type of test: Home antigen  Patient with typical symptoms of COVID-19 and they attest that they were positive on home rapid antigen testing  Image of positive result is not able to be uploaded into their chart  COVID-19 vaccination status: Fully vaccinated with booster    Isa Thayer has been staying home and has isolated themselves in his home  He is taking care to not share personal items and is cleaning all surfaces that are touched often, like counters, tabletops, and doorknobs using household cleaning sprays or wipes  He is wearing a mask when he leaves his room  Wife tested positive for COVID  Taking Tylenol, helps for headache and then it returns  No results found for: Jihan Woodward, SARSCORONAVI, CORONAVIRUSR, SARSCOVAG, [address] [facility]    Review of Systems   Constitutional: Positive for appetite change, chills and fatigue  Negative for fever  HENT: Positive for congestion and rhinorrhea  Negative for sore throat  Respiratory: Positive for cough (productive)  Negative for chest tightness and shortness of breath  Gastrointestinal: Negative for abdominal pain, diarrhea, nausea and vomiting  Musculoskeletal: Positive for myalgias  Neurological: Positive for headaches       Current Outpatient Medications on File Prior to Visit   Medication Sig   • amLODIPine (NORVASC) 10 mg tablet TAKE 1 TABLET BY MOUTH EVERY DAY   • Aspirin Low Dose 81 MG chewable tablet CHEW 1 TABLET BY MOUTH DAILY   • atorvastatin (LIPITOR) 80 mg tablet TAKE 1 TABLET BY MOUTH EVERY EVENING   • Azelastine HCl 137 MCG/SPRAY SOLN 1 SPRAY INTO EACH NOSTRIL 2 (TWO) TIMES A DAY USE IN EACH NOSTRIL AS DIRECTED   • clopidogrel (Plavix) 75 mg tablet Take 1 tablet (75 mg total) by mouth daily   • cyclobenzaprine (FLEXERIL) 10 mg tablet Take 1 tablet (10 mg total) by mouth 3 (three) times a day as needed for muscle spasms   • doxazosin (CARDURA) 2 mg tablet TAKE 1 TABLET BY MOUTH EVERY DAY   • fluticasone (FLONASE) 50 mcg/act nasal spray 2 sprays into each nostril daily   • meclizine (ANTIVERT) 25 mg tablet Take 1 tablet (25 mg total) by mouth 3 (three) times a day as needed for dizziness   • meloxicam (MOBIC) 15 mg tablet TAKE 1 TABLET (15 MG TOTAL) BY MOUTH DAILY     • metoprolol succinate (TOPROL-XL) 25 mg 24 hr tablet TAKE 1/2 TABLET BY MOUTH EVERY DAY   • nitroglycerin (NITROSTAT) 0 4 mg SL tablet Place 1 tablet (0 4 mg total) under the tongue every 5 (five) minutes as needed for chest pain   • pantoprazole (PROTONIX) 40 mg tablet Take 40 mg by mouth daily   • topiramate (TOPAMAX) 50 MG tablet TAKE 1 TABLET BY MOUTH EVERY 12 HOURS   • traZODone (DESYREL) 100 mg tablet Take 1 tablet (100 mg total) by mouth daily at bedtime   • valsartan (DIOVAN) 320 MG tablet TAKE 1 TABLET BY MOUTH EVERY DAY   • venlafaxine (EFFEXOR-XR) 150 mg 24 hr capsule TAKE 1 CAPSULE BY MOUTH EVERY DAY   • Ventolin  (90 Base) MCG/ACT inhaler INHALE 2 PUFFS 3 (THREE) TIMES A DAY AS NEEDED FOR WHEEZING   • ketoconazole (NIZORAL) 2 % shampoo Apply topically daily (Patient not taking: Reported on 9/9/2022)   • mometasone (ELOCON) 0 1 % cream Apply topically daily (Patient not taking: Reported on 12/8/2022)   • olopatadine (PATANOL) 0 1 % ophthalmic solution Administer 1 drop to both eyes 2 (two) times a day (Patient not taking: Reported on 12/8/2022)   • SUMAtriptan (IMITREX) 50 mg tablet TAKE 1 TABLET (50 MG TOTAL) BY MOUTH EVERY 2 (TWO) HOURS AS NEEDED FOR MIGRAINE  MG/DAY (Patient not taking: Reported on 12/8/2022)       Objective:    Ht 5' 3" (1 6 m)   Wt 77 3 kg (170 lb 6 4 oz)   BMI 30 19 kg/m²      Physical Exam  Vitals reviewed  Constitutional:       General: He is not in acute distress  Appearance: He is ill-appearing  Comments: Laying in bed   HENT:      Head: Normocephalic and atraumatic  Right Ear: External ear normal       Left Ear: External ear normal       Nose: Congestion present  Mouth/Throat:      Mouth: Mucous membranes are moist    Eyes:      Extraocular Movements: Extraocular movements intact  Conjunctiva/sclera: Conjunctivae normal    Pulmonary:      Effort: Pulmonary effort is normal  No respiratory distress  Breath sounds: No wheezing  Neurological:      General: No focal deficit present  Mental Status: He is alert and oriented to person, place, and time  Mental status is at baseline            Erica Rodriguez DO

## 2022-12-08 NOTE — TELEPHONE ENCOUNTER
Pt called after virtual visit with Dr Talmage Severance to report he has taken an @ home covid test and he is positive

## 2022-12-09 ENCOUNTER — TELEPHONE (OUTPATIENT)
Dept: CARDIOLOGY CLINIC | Facility: CLINIC | Age: 67
End: 2022-12-09

## 2022-12-19 DIAGNOSIS — M25.512 ACUTE PAIN OF LEFT SHOULDER: ICD-10-CM

## 2022-12-19 RX ORDER — MELOXICAM 15 MG/1
15 TABLET ORAL DAILY
Qty: 30 TABLET | Refills: 1 | Status: SHIPPED | OUTPATIENT
Start: 2022-12-19

## 2023-01-13 ENCOUNTER — TELEPHONE (OUTPATIENT)
Dept: FAMILY MEDICINE CLINIC | Facility: CLINIC | Age: 68
End: 2023-01-13

## 2023-01-13 NOTE — TELEPHONE ENCOUNTER
Not sure which medication he is referring to as the old medication  Will discuss further at his follow-up appt

## 2023-01-13 NOTE — TELEPHONE ENCOUNTER
Pt would like dr Justina Bumpers to know that topiramate (TOPAMAX) 50 MG tablet does not work for him at all - would like to go on old med or try something else

## 2023-01-18 ENCOUNTER — OFFICE VISIT (OUTPATIENT)
Dept: CARDIOLOGY CLINIC | Facility: CLINIC | Age: 68
End: 2023-01-18

## 2023-01-18 VITALS
DIASTOLIC BLOOD PRESSURE: 76 MMHG | HEART RATE: 75 BPM | HEIGHT: 63 IN | RESPIRATION RATE: 16 BRPM | BODY MASS INDEX: 29.59 KG/M2 | WEIGHT: 167 LBS | OXYGEN SATURATION: 98 % | SYSTOLIC BLOOD PRESSURE: 128 MMHG

## 2023-01-18 DIAGNOSIS — I25.119 ATHEROSCLEROSIS OF NATIVE CORONARY ARTERY OF NATIVE HEART WITH ANGINA PECTORIS (HCC): ICD-10-CM

## 2023-01-18 DIAGNOSIS — I25.10 CORONARY ARTERY DISEASE INVOLVING NATIVE CORONARY ARTERY OF NATIVE HEART WITHOUT ANGINA PECTORIS: ICD-10-CM

## 2023-01-18 NOTE — PROGRESS NOTES
Cardiology Follow Up    Vanita Cagle  1955  [id number]  [facility] [address] [place] [address] [phone]    Discussion/Summary:  1  CAD s/p PCI to RCA  2  Hypertension - well controlled  3  Hyperlipidemia    Cont with ASA and plavix  Cont with metoprolol and statin  He has sublingual nitroglycerin  Has not used it   Cont with statin  Doing well  Will check CBC and BMP    Recommend patient presents to the emergency room or call our office if he has any new/persistent or progressive symptoms  Previous studies were reviewed  Safety measures were reviewed  Questions were entertained and answered  Patient was advised to report any problems requiring medical attention  Follow-up with PCP and appropriate specialist and lab work as discussed  Return for follow up visit as scheduled or earlier, if needed  Thank you for allowing me to participate in the care and evaluation of your patient  Should you have any questions, please feel free to contact me  History of Present Illness:     Vanita Cagle is a 74 y o  male who presents for follow up for cardiovascular care  He walks every day  Walks half a mile daily with no symptoms    Denies chest pain, chest pressure, shortness of breath, dizziness, lightheadedness, presyncope or syncope  Denies orthopnea, PND or lower limb edema  He had chest pressure and jaw ache in 8/2022  Elevated troponin  Had a cardiac cath and noted to have significant RCA lesion  Underwent PCI with ESTHER  Has not had similar symptoms since  Has had heart burn for years  States it gets better with tums  Has been stable      Patient Active Problem List   Diagnosis   • Anxiety   • Cervical radiculopathy   • Essential hypertension   • Gastroesophageal reflux disease without esophagitis   • Insomnia   • Migraine headache   • BMI 30 0-30 9,adult   • Mild intermittent asthma without complication   • Coronary artery disease involving native coronary artery of native heart without angina pectoris   • Class 2 severe obesity due to excess calories with serious comorbidity and body mass index (BMI) of 37 0 to 37 9 in adult [facility])     No past medical history on file  Social History     Socioeconomic History   • Marital status: /Civil Union     Spouse name: Not on file   • Number of children: Not on file   • Years of education: Not on file   • Highest education level: Not on file   Occupational History   • Occupation: full-time employment   Tobacco Use   • Smoking status: Some Days   • Smokeless tobacco: Never   • Tobacco comments:     never a smoker ( as per allscripts)   Substance and Sexual Activity   • Alcohol use: Yes     Comment: occasional alcohol use   • Drug use: No   • Sexual activity: Not on file   Other Topics Concern   • Not on file   Social History Narrative    Always uses seat belt    Lives with wife    Recreation: gardening    Seeing a dentist     Social Determinants of Health     Financial Resource Strain: Not on file   Food Insecurity: No Food Insecurity   • Worried About Running Out of Food in the Last Year: Never true   • Ran Out of Food in the Last Year: Never true   Transportation Needs: No Transportation Needs   • Lack of Transportation (Medical): No   • Lack of Transportation (Non-Medical):  No   Physical Activity: Not on file   Stress: Not on file   Social Connections: Not on file   Intimate Partner Violence: Not on file   Housing Stability: Low Risk    • Unable to Pay for Housing in the Last Year: No   • Number of Places Lived in the Last Year: 1   • Unstable Housing in the Last Year: No      Family History   Problem Relation Age of Onset   • Aneurysm Mother    • Coronary artery disease Father    • Cancer Other    • Stroke Other         CVA     Past Surgical History:   Procedure Laterality Date   • CARDIAC CATHETERIZATION Left 8/25/2022 Procedure: Cardiac catheterization;  Surgeon: Jose Rafael Whitfield MD;  Location: [address] [place] [address] CATH LAB; Service: Cardiology   • CARDIAC CATHETERIZATION N/A 8/25/2022    Procedure: Cardiac Coronary Angiogram;  Surgeon: Jose Rafael Whitfield MD;  Location: [address] [place] [address] CATH LAB; Service: Cardiology   • SEPTOPLASTY         Current Outpatient Medications:   •  amLODIPine (NORVASC) 10 mg tablet, TAKE 1 TABLET BY MOUTH EVERY DAY, Disp: 90 tablet, Rfl: 3  •  Aspirin Low Dose 81 MG chewable tablet, CHEW 1 TABLET BY MOUTH DAILY, Disp: 100 tablet, Rfl: 3  •  atorvastatin (LIPITOR) 80 mg tablet, TAKE 1 TABLET BY MOUTH EVERY EVENING, Disp: 90 tablet, Rfl: 2  •  Azelastine HCl 137 MCG/SPRAY SOLN, 1 SPRAY INTO EACH NOSTRIL 2 (TWO) TIMES A DAY USE IN EACH NOSTRIL AS DIRECTED, Disp: 30 mL, Rfl: 1  •  clopidogrel (Plavix) 75 mg tablet, Take 1 tablet (75 mg total) by mouth daily, Disp: 90 tablet, Rfl: 3  •  cyclobenzaprine (FLEXERIL) 10 mg tablet, Take 1 tablet (10 mg total) by mouth 3 (three) times a day as needed for muscle spasms, Disp: 30 tablet, Rfl: 0  •  doxazosin (CARDURA) 2 mg tablet, TAKE 1 TABLET BY MOUTH EVERY DAY, Disp: 90 tablet, Rfl: 1  •  fluticasone (FLONASE) 50 mcg/act nasal spray, 2 sprays into each nostril daily, Disp: , Rfl:   •  meclizine (ANTIVERT) 25 mg tablet, Take 1 tablet (25 mg total) by mouth 3 (three) times a day as needed for dizziness, Disp: 30 tablet, Rfl: 0  •  meloxicam (MOBIC) 15 mg tablet, TAKE 1 TABLET (15 MG TOTAL) BY MOUTH DAILY  , Disp: 30 tablet, Rfl: 1  •  metoprolol succinate (TOPROL-XL) 25 mg 24 hr tablet, TAKE 1/2 TABLET BY MOUTH EVERY DAY, Disp: 45 tablet, Rfl: 2  •  mometasone (ELOCON) 0 1 % cream, Apply topically daily, Disp: 30 g, Rfl: 0  •  pantoprazole (PROTONIX) 40 mg tablet, Take 40 mg by mouth daily, Disp: , Rfl: 0  •  topiramate (TOPAMAX) 50 MG tablet, TAKE 1 TABLET BY MOUTH EVERY 12 HOURS, Disp: 180 tablet, Rfl: 2  •  traZODone (DESYREL) 100 mg tablet, Take 1 tablet (100 mg total) by mouth daily at bedtime, Disp: 90 tablet, Rfl: 3  •  valsartan (DIOVAN) 320 MG tablet, TAKE 1 TABLET BY MOUTH EVERY DAY, Disp: 90 tablet, Rfl: 1  •  venlafaxine (EFFEXOR-XR) 150 mg 24 hr capsule, TAKE 1 CAPSULE BY MOUTH EVERY DAY, Disp: 90 capsule, Rfl: 3  •  Ventolin  (90 Base) MCG/ACT inhaler, INHALE 2 PUFFS 3 (THREE) TIMES A DAY AS NEEDED FOR WHEEZING, Disp: 18 Inhaler, Rfl: 2  •  ketoconazole (NIZORAL) 2 % shampoo, Apply topically daily (Patient not taking: Reported on 9/9/2022), Disp: , Rfl:   •  nitroglycerin (NITROSTAT) 0 4 mg SL tablet, Place 1 tablet (0 4 mg total) under the tongue every 5 (five) minutes as needed for chest pain (Patient not taking: Reported on 1/18/2023), Disp: 100 tablet, Rfl: 0  •  olopatadine (PATANOL) 0 1 % ophthalmic solution, Administer 1 drop to both eyes 2 (two) times a day (Patient not taking: Reported on 12/8/2022), Disp: 5 mL, Rfl: 1  •  SUMAtriptan (IMITREX) 50 mg tablet, TAKE 1 TABLET (50 MG TOTAL) BY MOUTH EVERY 2 (TWO) HOURS AS NEEDED FOR MIGRAINE  MG/DAY (Patient not taking: Reported on 12/8/2022), Disp: 9 tablet, Rfl: 3  No Known Allergies    Labs:  Lab Results   Component Value Date    WBC 7 95 08/26/2022    HGB 14 2 08/26/2022    HCT 40 9 08/26/2022    MCV 91 08/26/2022     08/26/2022     Lab Results   Component Value Date    CALCIUM 8 4 08/26/2022    K 3 4 (L) 08/26/2022    CO2 21 08/26/2022     (H) 08/26/2022    BUN 17 08/26/2022    CREATININE 0 83 08/26/2022     Lab Results   Component Value Date    HGBA1C 4 9 03/09/2022     No results found for: CHOL  Lab Results   Component Value Date    HDL 41 08/24/2022    HDL 53 08/21/2020    HDL 44 10/12/2019     Lab Results   Component Value Date    LDLCALC 107 (H) 08/24/2022    LDLCALC 85 08/21/2020    LDLCALC 109 (H) 10/12/2019     Lab Results   Component Value Date    TRIG 171 (H) 08/24/2022    TRIG 133 08/21/2020    TRIG 143 10/12/2019     No results found for: CHOLHDL    Review of Systems:  Review of Systems   Constitutional: Negative  Negative for activity change, chills, diaphoresis and fever  Respiratory: Negative for cough, chest tightness, shortness of breath, wheezing and stridor  Cardiovascular: Negative for chest pain, palpitations and leg swelling  Gastrointestinal: Negative for nausea and vomiting  Musculoskeletal: Negative for back pain  Neurological: Negative for dizziness, syncope, weakness and light-headedness  All other systems reviewed and are negative  Physical Exam:  Physical Exam  Vitals and nursing note reviewed  Constitutional:       General: He is not in acute distress  Appearance: Normal appearance  He is not ill-appearing or diaphoretic  HENT:      Head: Normocephalic and atraumatic  Eyes:      Extraocular Movements: Extraocular movements intact  Cardiovascular:      Rate and Rhythm: Normal rate and regular rhythm  Heart sounds: No murmur heard  No friction rub  No gallop  Pulmonary:      Effort: Pulmonary effort is normal  No respiratory distress  Breath sounds: Normal breath sounds  Abdominal:      General: There is no distension  Palpations: Abdomen is soft  Musculoskeletal:         General: No swelling  Normal range of motion  Skin:     General: Skin is warm and dry  Capillary Refill: Capillary refill takes less than 2 seconds  Coloration: Skin is not pale  Neurological:      General: No focal deficit present  Mental Status: He is alert and oriented to person, place, and time  Cranial Nerves: No cranial nerve deficit     Psychiatric:         Mood and Affect: Mood normal

## 2023-02-14 ENCOUNTER — APPOINTMENT (OUTPATIENT)
Dept: LAB | Facility: HOSPITAL | Age: 68
End: 2023-02-14

## 2023-02-14 DIAGNOSIS — E87.6 HYPOKALEMIA: ICD-10-CM

## 2023-02-14 DIAGNOSIS — E11.9 TYPE 2 DIABETES MELLITUS WITHOUT COMPLICATION, UNSPECIFIED WHETHER LONG TERM INSULIN USE (HCC): ICD-10-CM

## 2023-02-14 LAB
ALBUMIN SERPL BCP-MCNC: 3.7 G/DL (ref 3.5–5)
ALP SERPL-CCNC: 42 U/L (ref 34–104)
ALT SERPL W P-5'-P-CCNC: 47 U/L (ref 7–52)
ANION GAP SERPL CALCULATED.3IONS-SCNC: 6 MMOL/L (ref 4–13)
AST SERPL W P-5'-P-CCNC: 29 U/L (ref 13–39)
BASOPHILS # BLD AUTO: 0.05 THOUSANDS/ÂΜL (ref 0–0.1)
BASOPHILS NFR BLD AUTO: 1 % (ref 0–1)
BILIRUB SERPL-MCNC: 0.45 MG/DL (ref 0.2–1)
BUN SERPL-MCNC: 17 MG/DL (ref 5–25)
CALCIUM SERPL-MCNC: 8.8 MG/DL (ref 8.4–10.2)
CHLORIDE SERPL-SCNC: 112 MMOL/L (ref 96–108)
CO2 SERPL-SCNC: 24 MMOL/L (ref 21–32)
CREAT SERPL-MCNC: 0.86 MG/DL (ref 0.6–1.3)
EOSINOPHIL # BLD AUTO: 0.21 THOUSAND/ÂΜL (ref 0–0.61)
EOSINOPHIL NFR BLD AUTO: 4 % (ref 0–6)
ERYTHROCYTE [DISTWIDTH] IN BLOOD BY AUTOMATED COUNT: 15 % (ref 11.6–15.1)
EST. AVERAGE GLUCOSE BLD GHB EST-MCNC: 85 MG/DL
GFR SERPL CREATININE-BSD FRML MDRD: 90 ML/MIN/1.73SQ M
GLUCOSE P FAST SERPL-MCNC: 97 MG/DL (ref 65–99)
HBA1C MFR BLD: 4.6 %
HCT VFR BLD AUTO: 41.3 % (ref 36.5–49.3)
HGB BLD-MCNC: 13.7 G/DL (ref 12–17)
IMM GRANULOCYTES # BLD AUTO: 0.02 THOUSAND/UL (ref 0–0.2)
IMM GRANULOCYTES NFR BLD AUTO: 0 % (ref 0–2)
LYMPHOCYTES # BLD AUTO: 1.05 THOUSANDS/ÂΜL (ref 0.6–4.47)
LYMPHOCYTES NFR BLD AUTO: 17 % (ref 14–44)
MCH RBC QN AUTO: 31 PG (ref 26.8–34.3)
MCHC RBC AUTO-ENTMCNC: 33.2 G/DL (ref 31.4–37.4)
MCV RBC AUTO: 93 FL (ref 82–98)
MONOCYTES # BLD AUTO: 0.45 THOUSAND/ÂΜL (ref 0.17–1.22)
MONOCYTES NFR BLD AUTO: 8 % (ref 4–12)
NEUTROPHILS # BLD AUTO: 4.24 THOUSANDS/ÂΜL (ref 1.85–7.62)
NEUTS SEG NFR BLD AUTO: 70 % (ref 43–75)
NRBC BLD AUTO-RTO: 0 /100 WBCS
PLATELET # BLD AUTO: 138 THOUSANDS/UL (ref 149–390)
PMV BLD AUTO: 10.7 FL (ref 8.9–12.7)
POTASSIUM SERPL-SCNC: 3.7 MMOL/L (ref 3.5–5.3)
PROT SERPL-MCNC: 6.1 G/DL (ref 6.4–8.4)
RBC # BLD AUTO: 4.42 MILLION/UL (ref 3.88–5.62)
SODIUM SERPL-SCNC: 142 MMOL/L (ref 135–147)
WBC # BLD AUTO: 6.02 THOUSAND/UL (ref 4.31–10.16)

## 2023-02-21 ENCOUNTER — RA CDI HCC (OUTPATIENT)
Dept: OTHER | Facility: HOSPITAL | Age: 68
End: 2023-02-21

## 2023-02-21 NOTE — PROGRESS NOTES
[facility]  coding opportunities       Chart reviewed, no opportunity found:   [address]        Patients Insurance     Medicare Insurance: [employer] Advantage

## 2023-02-22 DIAGNOSIS — M25.512 ACUTE PAIN OF LEFT SHOULDER: ICD-10-CM

## 2023-02-22 RX ORDER — MELOXICAM 15 MG/1
15 TABLET ORAL DAILY
Qty: 30 TABLET | Refills: 1 | Status: SHIPPED | OUTPATIENT
Start: 2023-02-22

## 2023-02-27 ENCOUNTER — OFFICE VISIT (OUTPATIENT)
Dept: FAMILY MEDICINE CLINIC | Facility: CLINIC | Age: 68
End: 2023-02-27

## 2023-02-27 VITALS
WEIGHT: 166 LBS | BODY MASS INDEX: 29.41 KG/M2 | OXYGEN SATURATION: 100 % | SYSTOLIC BLOOD PRESSURE: 142 MMHG | HEIGHT: 63 IN | DIASTOLIC BLOOD PRESSURE: 78 MMHG | HEART RATE: 67 BPM

## 2023-02-27 DIAGNOSIS — Z23 NEED FOR PNEUMOCOCCAL VACCINATION: Primary | ICD-10-CM

## 2023-02-27 DIAGNOSIS — R22.31 ARM MASS, RIGHT: ICD-10-CM

## 2023-02-27 DIAGNOSIS — I10 ESSENTIAL HYPERTENSION: ICD-10-CM

## 2023-02-27 DIAGNOSIS — G43.109 MIGRAINE WITH AURA AND WITHOUT STATUS MIGRAINOSUS, NOT INTRACTABLE: ICD-10-CM

## 2023-02-27 DIAGNOSIS — I25.119 ATHEROSCLEROSIS OF NATIVE CORONARY ARTERY OF NATIVE HEART WITH ANGINA PECTORIS (HCC): ICD-10-CM

## 2023-02-27 DIAGNOSIS — H69.82 DYSFUNCTION OF LEFT EUSTACHIAN TUBE: ICD-10-CM

## 2023-02-27 DIAGNOSIS — Z12.5 SCREENING PSA (PROSTATE SPECIFIC ANTIGEN): ICD-10-CM

## 2023-02-27 DIAGNOSIS — D69.6 PLATELETS DECREASED (HCC): ICD-10-CM

## 2023-02-27 DIAGNOSIS — E11.9 TYPE 2 DIABETES MELLITUS WITHOUT COMPLICATION, UNSPECIFIED WHETHER LONG TERM INSULIN USE (HCC): ICD-10-CM

## 2023-02-27 DIAGNOSIS — Z00.00 HEALTH CARE MAINTENANCE: ICD-10-CM

## 2023-02-27 PROBLEM — M54.12 CERVICAL RADICULOPATHY: Status: RESOLVED | Noted: 2017-05-11 | Resolved: 2023-02-27

## 2023-02-27 PROBLEM — E66.812 CLASS 2 SEVERE OBESITY DUE TO EXCESS CALORIES WITH SERIOUS COMORBIDITY AND BODY MASS INDEX (BMI) OF 37.0 TO 37.9 IN ADULT (HCC): Status: RESOLVED | Noted: 2022-09-02 | Resolved: 2023-02-27

## 2023-02-27 PROBLEM — E66.01 CLASS 2 SEVERE OBESITY DUE TO EXCESS CALORIES WITH SERIOUS COMORBIDITY AND BODY MASS INDEX (BMI) OF 37.0 TO 37.9 IN ADULT (HCC): Status: RESOLVED | Noted: 2022-09-02 | Resolved: 2023-02-27

## 2023-02-27 RX ORDER — PROMETHAZINE HYDROCHLORIDE 25 MG/1
25 TABLET ORAL EVERY 6 HOURS PRN
Qty: 30 TABLET | Refills: 0 | Status: SHIPPED | OUTPATIENT
Start: 2023-02-27

## 2023-02-27 RX ORDER — METHYLPREDNISOLONE 4 MG/1
TABLET ORAL
Qty: 21 TABLET | Refills: 0 | Status: SHIPPED | OUTPATIENT
Start: 2023-02-27 | End: 2023-03-05

## 2023-02-27 RX ORDER — SUMATRIPTAN 50 MG/1
50 TABLET, FILM COATED ORAL EVERY 2 HOUR PRN
Qty: 9 TABLET | Refills: 3 | Status: SHIPPED | OUTPATIENT
Start: 2023-02-27

## 2023-02-27 NOTE — PATIENT INSTRUCTIONS
Medicare Preventive Visit Patient Instructions  Thank you for completing your Welcome to Medicare Visit or Medicare Annual Wellness Visit today  Your next wellness visit will be due in one year (2/28/2024)  The screening/preventive services that you may require over the next 5-10 years are detailed below  Some tests may not apply to you based off risk factors and/or age  Screening tests ordered at today's visit but not completed yet may show as past due  Also, please note that scanned in results may not display below  Preventive Screenings:  Service Recommendations Previous Testing/Comments   Colorectal Cancer Screening  · Colonoscopy    · Fecal Occult Blood Test (FOBT)/Fecal Immunochemical Test (FIT)  · Fecal DNA/Cologuard Test  · Flexible Sigmoidoscopy Age: 39-70 years old   Colonoscopy: every 10 years (May be performed more frequently if at higher risk)  OR  FOBT/FIT: every 1 year  OR  Cologuard: every 3 years  OR  Sigmoidoscopy: every 5 years  Screening may be recommended earlier than age 39 if at higher risk for colorectal cancer  Also, an individualized decision between you and your healthcare provider will decide whether screening between the ages of 74-80 would be appropriate   Colonoscopy: 09/17/2021  FOBT/FIT: Not on file  Cologuard: 09/17/2021  Sigmoidoscopy: Not on file    Screening Current     Prostate Cancer Screening Individualized decision between patient and health care provider in men between ages of 53-78   Medicare will cover every 12 months beginning on the day after your 50th birthday PSA: 0 3 ng/mL           Hepatitis C Screening Once for adults born between 1945 and 1965  More frequently in patients at high risk for Hepatitis C Hep C Antibody: 08/09/2017    Screening Current   Diabetes Screening 1-2 times per year if you're at risk for diabetes or have pre-diabetes Fasting glucose: 97 mg/dL (2/14/2023)  A1C: 4 6 % (2/14/2023)  Screening Not Indicated  History Diabetes   Cholesterol Screening Once every 5 years if you don't have a lipid disorder  May order more often based on risk factors  Lipid panel: 08/24/2022  Screening Current      Other Preventive Screenings Covered by Medicare:  1  Abdominal Aortic Aneurysm (AAA) Screening: covered once if your at risk  You're considered to be at risk if you have a family history of AAA or a male between the age of 73-68 who smoking at least 100 cigarettes in your lifetime  2  Lung Cancer Screening: covers low dose CT scan once per year if you meet all of the following conditions: (1) Age 50-69; (2) No signs or symptoms of lung cancer; (3) Current smoker or have quit smoking within the last 15 years; (4) You have a tobacco smoking history of at least 20 pack years (packs per day x number of years you smoked); (5) You get a written order from a healthcare provider  3  Glaucoma Screening: covered annually if you're considered high risk: (1) You have diabetes OR (2) Family history of glaucoma OR (3)  aged 48 and older OR (3)  American aged 72 and older  3  Osteoporosis Screening: covered every 2 years if you meet one of the following conditions: (1) Have a vertebral abnormality; (2) On glucocorticoid therapy for more than 3 months; (3) Have primary hyperparathyroidism; (4) On osteoporosis medications and need to assess response to drug therapy  5  HIV Screening: covered annually if you're between the age of 12-76  Also covered annually if you are younger than 13 and older than 72 with risk factors for HIV infection  For pregnant patients, it is covered up to 3 times per pregnancy      Immunizations:  Immunization Recommendations   Influenza Vaccine Annual influenza vaccination during flu season is recommended for all persons aged >= 6 months who do not have contraindications   Pneumococcal Vaccine   * Pneumococcal conjugate vaccine = PCV13 (Prevnar 13), PCV15 (Vaxneuvance), PCV20 (Prevnar 20)  * Pneumococcal polysaccharide vaccine = PPSV23 (Pneumovax) Adults 2364 years old: 1-3 doses may be recommended based on certain risk factors  Adults 72 years old: 1-2 doses may be recommended based off what pneumonia vaccine you previously received   Hepatitis B Vaccine 3 dose series if at intermediate or high risk (ex: diabetes, end stage renal disease, liver disease)   Tetanus (Td) Vaccine - COST NOT COVERED BY MEDICARE PART B Following completion of primary series, a booster dose should be given every 10 years to maintain immunity against tetanus  Td may also be given as tetanus wound prophylaxis  Tdap Vaccine - COST NOT COVERED BY MEDICARE PART B Recommended at least once for all adults  For pregnant patients, recommended with each pregnancy  Shingles Vaccine (Shingrix) - COST NOT COVERED BY MEDICARE PART B  2 shot series recommended in those aged 48 and above     Health Maintenance Due:      Topic Date Due   • HIV Screening  Never done   • Colorectal Cancer Screening  09/17/2024   • Hepatitis C Screening  Completed     Immunizations Due:      Topic Date Due   • Pneumococcal Vaccine: 65+ Years (2 - PCV) 02/19/2020   • COVID-19 Vaccine (4 - Booster for Kulwant Claude series) 01/26/2022     Advance Directives   What are advance directives? Advance directives are legal documents that state your wishes and plans for medical care  These plans are made ahead of time in case you lose your ability to make decisions for yourself  Advance directives can apply to any medical decision, such as the treatments you want, and if you want to donate organs  What are the types of advance directives? There are many types of advance directives, and each state has rules about how to use them  You may choose a combination of any of the following:  · Living will: This is a written record of the treatment you want  You can also choose which treatments you do not want, which to limit, and which to stop at a certain time   This includes surgery, medicine, IV fluid, and tube feedings  · Durable power of  for healthcare [place] [facility]): This is a written record that states who you want to make healthcare choices for you when you are unable to make them for yourself  This person, called a proxy, is usually a family member or a friend  You may choose more than 1 proxy  · Do not resuscitate (DNR) order:  A DNR order is used in case your heart stops beating or you stop breathing  It is a request not to have certain forms of treatment, such as CPR  A DNR order may be included in other types of advance directives  · Medical directive: This covers the care that you want if you are in a coma, near death, or unable to make decisions for yourself  You can list the treatments you want for each condition  Treatment may include pain medicine, surgery, blood transfusions, dialysis, IV or tube feedings, and a ventilator (breathing machine)  · Values history: This document has questions about your views, beliefs, and how you feel and think about life  This information can help others choose the care that you would choose  Why are advance directives important? An advance directive helps you control your care  Although spoken wishes may be used, it is better to have your wishes written down  Spoken wishes can be misunderstood, or not followed  Treatments may be given even if you do not want them  An advance directive may make it easier for your family to make difficult choices about your care  Cigarette Smoking and Your Health   Risks to your health if you smoke:  Nicotine and other chemicals found in tobacco damage every cell in your body  Even if you are a light smoker, you have an increased risk for cancer, heart disease, and lung disease  If you are pregnant or have diabetes, smoking increases your risk for complications  Benefits to your health if you stop smoking:   · You decrease respiratory symptoms such as coughing, wheezing, and shortness of breath     · You reduce your risk for cancers of the lung, mouth, throat, kidney, bladder, pancreas, stomach, and cervix  If you already have cancer, you increase the benefits of chemotherapy  You also reduce your risk for cancer returning or a second cancer from developing  · You reduce your risk for heart disease, blood clots, heart attack, and stroke  · You reduce your risk for lung infections, and diseases such as pneumonia, asthma, chronic bronchitis, and emphysema  · Your circulation improves  More oxygen can be delivered to your body  If you have diabetes, you lower your risk for complications, such as kidney, artery, and eye diseases  You also lower your risk for nerve damage  Nerve damage can lead to amputations, poor vision, and blindness  · You improve your body's ability to heal and to fight infections  For more information and support to stop smoking:   · [employer]  Phone: [phone] [phone]  Web Address: [employer]  Weight Management   Why it is important to manage your weight:  Being overweight increases your risk of health conditions such as heart disease, high blood pressure, type 2 diabetes, and certain types of cancer  It can also increase your risk for osteoarthritis, sleep apnea, and other respiratory problems  Aim for a slow, steady weight loss  Even a small amount of weight loss can lower your risk of health problems  How to lose weight safely:  A safe and healthy way to lose weight is to eat fewer calories and get regular exercise  You can lose up about 1 pound a week by decreasing the number of calories you eat by 500 calories each day  Healthy meal plan for weight management:  A healthy meal plan includes a variety of foods, contains fewer calories, and helps you stay healthy  A healthy meal plan includes the following:  · Eat whole-grain foods more often  A healthy meal plan should contain fiber  Fiber is the part of grains, fruits, and vegetables that is not broken down by your body   Whole-grain foods are healthy and provide extra fiber in your diet  Some examples of whole-grain foods are whole-wheat breads and pastas, oatmeal, brown rice, and bulgur  · Eat a variety of vegetables every day  Include dark, leafy greens such as spinach, kale, zaida greens, and mustard greens  Eat yellow and orange vegetables such as carrots, sweet potatoes, and winter squash  · Eat a variety of fruits every day  Choose fresh or canned fruit (canned in its own juice or light syrup) instead of juice  Fruit juice has very little or no fiber  · Eat low-fat dairy foods  Drink fat-free (skim) milk or 1% milk  Eat fat-free yogurt and low-fat cottage cheese  Try low-fat cheeses such as mozzarella and other reduced-fat cheeses  · Choose meat and other protein foods that are low in fat  Choose beans or other legumes such as split peas or lentils  Choose fish, skinless poultry (chicken or turkey), or lean cuts of red meat (beef or pork)  Before you cook meat or poultry, cut off any visible fat  · Use less fat and oil  Try baking foods instead of frying them  Add less fat, such as margarine, sour cream, regular salad dressing and mayonnaise to foods  Eat fewer high-fat foods  Some examples of high-fat foods include french fries, doughnuts, ice cream, and cakes  · Eat fewer sweets  Limit foods and drinks that are high in sugar  This includes candy, cookies, regular soda, and sweetened drinks  Exercise:  Exercise at least 30 minutes per day on most days of the week  Some examples of exercise include walking, biking, dancing, and swimming  You can also fit in more physical activity by taking the stairs instead of the elevator or parking farther away from stores  Ask your healthcare provider about the best exercise plan for you  Alcohol Use and Your Health    Drinking too much can harm your health    Excessive alcohol use leads to about 88,000 death in the [place] each year, and shortens the life of those who diet by almost 30 years  Further, excessive drinking cost the economy $249 billion in 2010  Most excessive drinkers are not alcohol dependent  Excessive alcohol use has immediate effects that increase the risk of many harmful health conditions  These are most often the result of binge drinking  Over time, excessive alcohol use can lead to the development of chronic diseases and other series health problems  What is considered a "drink"? Excessive alcohol use includes:  · Binge Drinking: For women, 4 or more drinks consumed on one occasion  For men, 5 or more drinks consumed on one occasion  · Heavy Drinking: For women, 8 or more drinks per week  For men, 15 or more drinks per week  · Any alcohol used by pregnant women  · Any alcohol used by those under the age of 21 years    If you choose to drink, do so in moderation:  · Do not drink at all if you are under the age of 24, or if you are or may be pregnant, or have health problems that could be made worse by drinking    · For women, up to 1 drink per day  · For men, up to 2 drinks a day    No one should begin drinking or drink more frequently based on potential health benefits    Short-Term Health Risks:  · Injuries: motor vehicle crashes, falls, drownings, burns  · Violence: homicide, suicide, sexual assault, intimate partner violence  · Alcohol poisoning  · Reproductive health: risky sexual behaviors, unintended prengnacy, sexually transmitted diseases, miscarriage, stillbirth, fetal alcohol syndrome    Long-Term Health Risks:  · Chronic diseases: high blood pressure, heart disease, stroke, liver disease, digestive problems  · Cancers: breast, mouth and throat, liver, colon  · Learning and memory problems: dementia, poor school performance  · Mental health: depression, anxiety, insomnia  · Social problems: lost productivity, family problems, unemployment  · Alcohol dependence    For support and more information:  · Substance Abuse and Mental [facility] Services [address], [place] [address]  Web Address: https://Paper.li/    · Alcoholics Anonymous        Web Address: http://www lawler info/    https://www cdc gov/alcohol/fact-sheets/alcohol-use htm     © [address] 2018 Information is for End User's use only and may not be sold, redistributed or otherwise used for commercial purposes   All illustrations and images included in CareNotes® are the copyrighted property of A D A M , Inc  or [address] [place] [address]

## 2023-02-27 NOTE — ASSESSMENT & PLAN NOTE
Stable, he is taking his metoprolol, atorvastatin    Did discuss with him the proper usage of the nitroglycerin if needed

## 2023-02-27 NOTE — PROGRESS NOTES
Assessment and Plan:     Problem List Items Addressed This Visit        Cardiovascular and Mediastinum    Essential hypertension     Controlled, continue the valsartan, metoprolol, amlodipine         Relevant Orders    TSH, 3rd generation    Migraine headache     Refill his Imitrex and promethazine to take as needed         Relevant Medications    SUMAtriptan (IMITREX) 50 mg tablet    promethazine (PHENERGAN) 25 mg tablet    Atherosclerosis of native coronary artery of native heart with angina pectoris (HCC)     Stable, he is taking his metoprolol, atorvastatin  Did discuss with him the proper usage of the nitroglycerin if needed            Other    BMI 29 0-29 9,adult    Platelets decreased ([facility] [address] )     Stable, will continue to monitor        Other Visit Diagnoses     Need for pneumococcal vaccination    -  Primary    Relevant Orders    Pneumococcal Conjugate Vaccine 20-valent (Pcv20) (Completed)    Health care maintenance        Arm mass, right        Relevant Orders    US extremity soft tissue    Dysfunction of left eustachian tube        Relevant Medications    methylPREDNISolone 4 MG tablet therapy pack    Type 2 diabetes mellitus without complication, unspecified whether long term insulin use (HCC)        Relevant Orders    Comprehensive metabolic panel    Screening PSA (prostate specific antigen)        Relevant Orders    PSA, Total Screen        BMI Counseling: Body mass index is 29 41 kg/m²  The BMI is above normal  Nutrition recommendations include decreasing portion sizes and encouraging healthy choices of fruits and vegetables  Exercise recommendations include moderate physical activity 150 minutes/week  No pharmacotherapy was ordered  Rationale for BMI follow-up plan is due to patient being overweight or obese  Preventive health issues were discussed with patient, and age appropriate screening tests were ordered as noted in patient's After Visit Summary    Personalized health advice and appropriate referrals for health education or preventive services given if needed, as noted in patient's After Visit Summary  History of Present Illness:     Patient presents for a Medicare Wellness Visit    Patient presents for a check-up, is concerned about some right arm pain and a lump around his elbow  He notes increased sinus pressure  Patient Care Team:  Renée Gomes DO as PCP - General  Renée Gomes DO as PCP - [address] [place] [address] (RTE)  DO Justine Lai PA-C     Review of Systems:     Review of Systems   Constitutional: Negative for chills, fatigue and fever  HENT: Positive for congestion  Negative for ear pain, hearing loss, postnasal drip, rhinorrhea and sore throat  Eyes: Negative for pain and visual disturbance  Respiratory: Negative for chest tightness, shortness of breath and wheezing  Cardiovascular: Negative for chest pain and leg swelling  Gastrointestinal: Negative for abdominal distention, abdominal pain, constipation, diarrhea and vomiting  Endocrine: Negative for cold intolerance and heat intolerance  Genitourinary: Negative for difficulty urinating, frequency and urgency  Musculoskeletal: Positive for myalgias  Negative for arthralgias and gait problem  Skin: Negative for color change  Neurological: Negative for dizziness, tremors, syncope, numbness and headaches  Hematological: Negative for adenopathy  Psychiatric/Behavioral: Negative for agitation, confusion and sleep disturbance  The patient is not nervous/anxious           Problem List:     Patient Active Problem List   Diagnosis   • Anxiety   • Essential hypertension   • Gastroesophageal reflux disease without esophagitis   • Insomnia   • Migraine headache   • BMI 29 0-29 9,adult   • Mild intermittent asthma without complication   • Atherosclerosis of native coronary artery of native heart with angina pectoris ([facility] [address] )   • Platelets decreased ([facility] [address] )      Past Medical and Surgical History: History reviewed  No pertinent past medical history  Past Surgical History:   Procedure Laterality Date   • CARDIAC CATHETERIZATION Left 8/25/2022    Procedure: Cardiac catheterization;  Surgeon: Juice Villafana MD;  Location: [address] [place] [address] CATH LAB; Service: Cardiology   • CARDIAC CATHETERIZATION N/A 8/25/2022    Procedure: Cardiac Coronary Angiogram;  Surgeon: Juice Villafana MD;  Location: [address] [place] [address] CATH LAB; Service: Cardiology   • SEPTOPLASTY        Family History:     Family History   Problem Relation Age of Onset   • Aneurysm Mother    • Coronary artery disease Father    • Cancer Other    • Stroke Other         CVA      Social History:     Social History     Socioeconomic History   • Marital status: /Civil Union     Spouse name: None   • Number of children: None   • Years of education: None   • Highest education level: None   Occupational History   • Occupation: full-time employment   Tobacco Use   • Smoking status: Never   • Smokeless tobacco: Never   • Tobacco comments:     never a smoker ( as per allscripts)   Substance and Sexual Activity   • Alcohol use: Yes     Comment: occasional alcohol use   • Drug use: No   • Sexual activity: None   Other Topics Concern   • None   Social History Narrative    Always uses seat belt    Lives with wife    Recreation: gardening    Seeing a dentist     Social Determinants of Health     Financial Resource Strain: Low Risk    • Difficulty of Paying Living Expenses: Not very hard   Food Insecurity: No Food Insecurity   • Worried About Running Out of Food in the Last Year: Never true   • Ran Out of Food in the Last Year: Never true   Transportation Needs: No Transportation Needs   • Lack of Transportation (Medical): No   • Lack of Transportation (Non-Medical):  No   Physical Activity: Not on file   Stress: Not on file   Social Connections: Not on file   Intimate Partner Violence: Not on file   Housing Stability: Low Risk    • Unable to Pay for Housing in the Last Year: No   • Number of Places Lived in the Last Year: 1   • Unstable Housing in the Last Year: No      Medications and Allergies:     Current Outpatient Medications   Medication Sig Dispense Refill   • methylPREDNISolone 4 MG tablet therapy pack Use as directed on package 21 tablet 0   • nitroglycerin (NITROSTAT) 0 4 mg SL tablet Place 1 tablet (0 4 mg total) under the tongue every 5 (five) minutes as needed for chest pain 100 tablet 0   • promethazine (PHENERGAN) 25 mg tablet Take 1 tablet (25 mg total) by mouth every 6 (six) hours as needed for nausea or vomiting 30 tablet 0   • SUMAtriptan (IMITREX) 50 mg tablet Take 1 tablet (50 mg total) by mouth every 2 (two) hours as needed for migraine Max 200 mg/day 9 tablet 3   • amLODIPine (NORVASC) 10 mg tablet TAKE 1 TABLET BY MOUTH EVERY DAY 90 tablet 3   • Aspirin Low Dose 81 MG chewable tablet CHEW 1 TABLET BY MOUTH DAILY 100 tablet 3   • atorvastatin (LIPITOR) 80 mg tablet TAKE 1 TABLET BY MOUTH EVERY EVENING 90 tablet 2   • Azelastine HCl 137 MCG/SPRAY SOLN 1 SPRAY INTO EACH NOSTRIL 2 (TWO) TIMES A DAY USE IN EACH NOSTRIL AS DIRECTED 30 mL 1   • clopidogrel (Plavix) 75 mg tablet Take 1 tablet (75 mg total) by mouth daily 90 tablet 3   • cyclobenzaprine (FLEXERIL) 10 mg tablet Take 1 tablet (10 mg total) by mouth 3 (three) times a day as needed for muscle spasms 30 tablet 0   • doxazosin (CARDURA) 2 mg tablet TAKE 1 TABLET BY MOUTH EVERY DAY 90 tablet 1   • fluticasone (FLONASE) 50 mcg/act nasal spray 2 sprays into each nostril daily     • meloxicam (MOBIC) 15 mg tablet TAKE 1 TABLET (15 MG TOTAL) BY MOUTH DAILY   30 tablet 1   • metoprolol succinate (TOPROL-XL) 25 mg 24 hr tablet TAKE 1/2 TABLET BY MOUTH EVERY DAY 45 tablet 2   • mometasone (ELOCON) 0 1 % cream Apply topically daily 30 g 0   • olopatadine (PATANOL) 0 1 % ophthalmic solution Administer 1 drop to both eyes 2 (two) times a day (Patient not taking: Reported on 12/8/2022) 5 mL 1   • pantoprazole (PROTONIX) 40 mg tablet Take 40 mg by mouth daily  0   • topiramate (TOPAMAX) 50 MG tablet TAKE 1 TABLET BY MOUTH EVERY 12 HOURS 180 tablet 2   • traZODone (DESYREL) 100 mg tablet Take 1 tablet (100 mg total) by mouth daily at bedtime 90 tablet 3   • valsartan (DIOVAN) 320 MG tablet TAKE 1 TABLET BY MOUTH EVERY DAY 90 tablet 1   • venlafaxine (EFFEXOR-XR) 150 mg 24 hr capsule TAKE 1 CAPSULE BY MOUTH EVERY DAY 90 capsule 3   • Ventolin  (90 Base) MCG/ACT inhaler INHALE 2 PUFFS 3 (THREE) TIMES A DAY AS NEEDED FOR WHEEZING 18 Inhaler 2     No current facility-administered medications for this visit  No Known Allergies   Immunizations:     Immunization History   Administered Date(s) Administered   • COVID-19 MODERNA VACC 0 5 ML IM 04/30/2021, 05/28/2021, 12/01/2021   • INFLUENZA 09/08/2012, 10/01/2018, 10/01/2019   • Influenza, high dose seasonal 0 7 mL 09/23/2022   • Influenza, injectable, quadrivalent, preservative free 0 5 mL 09/22/2020   • Influenza, recombinant, quadrivalent,injectable, preservative free 09/13/2021   • Pneumococcal Conjugate Vaccine 20-valent (Pcv20), Polysace 02/27/2023   • Pneumococcal Polysaccharide PPV23 02/19/2019   • Tdap 02/19/2019      Health Maintenance:         Topic Date Due   • HIV Screening  Never done   • Colorectal Cancer Screening  09/17/2024   • Hepatitis C Screening  Completed         Topic Date Due   • COVID-19 Vaccine (4 - Booster for Paulene Kendell series) 01/26/2022      Medicare Screening Tests and Risk Assessments:     Star Huynh is here for his Subsequent Wellness visit  Last Medicare Wellness visit information reviewed, patient interviewed and updates made to the record today  Health Risk Assessment:   Patient rates overall health as good  Patient feels that their physical health rating is slightly better  Patient is satisfied with their life  Eyesight was rated as slightly worse  Hearing was rated as same   Patient feels that their emotional and mental health rating is same  Patients states they are never, rarely angry  Patient states they are often unusually tired/fatigued  Pain experienced in the last 7 days has been a lot  Patient's pain rating has been 4/10  Patient states that he has experienced no weight loss or gain in last 6 months  Fall Risk Screening: In the past year, patient has experienced: no history of falling in past year      Home Safety:  Patient does not have trouble with stairs inside or outside of their home  Patient has working smoke alarms and has working carbon monoxide detector  Home safety hazards include: none  Nutrition:   Current diet is Low Cholesterol and Limited junk food  Medications:   Patient is not currently taking any over-the-counter supplements  Patient is able to manage medications  Activities of Daily Living (ADLs)/Instrumental Activities of Daily Living (IADLs):   Walk and transfer into and out of bed and chair?: Yes  Dress and groom yourself?: Yes    Bathe or shower yourself?: Yes    Feed yourself? Yes  Do your laundry/housekeeping?: Yes  Manage your money, pay your bills and track your expenses?: Yes  Make your own meals?: Yes    Do your own shopping?: Yes    Previous Hospitalizations:   Any hospitalizations or ED visits within the last 12 months?: Yes    How many hospitalizations have you had in the last year?: 1-2    Advance Care Planning:   Living will: Yes    Durable POA for healthcare:  Yes    Advanced directive: Yes      Cognitive Screening:   Provider or family/friend/caregiver concerned regarding cognition?: No    PREVENTIVE SCREENINGS      Cardiovascular Screening:    General: Screening Current      Diabetes Screening:     General: Screening Not Indicated and History Diabetes      Colorectal Cancer Screening:     General: Screening Current      Prostate Cancer Screening:      Due for: PSA      Osteoporosis Screening:    General: Screening Not Indicated      Abdominal Aortic Aneurysm (AAA) Screening:    Risk factors include: age between 73-69 yo and tobacco use        Lung Cancer Screening:     General: Screening Not Indicated      Hepatitis C Screening:    General: Screening Current    Screening, Brief Intervention, and Referral to Treatment (SBIRT)    Screening  Typical number of drinks in a day: 4  Typical number of drinks in a week: 20  Interpretation: Low risk drinking behavior  AUDIT-C Screenin) How often did you have a drink containing alcohol in the past year? 4 or more times a week  2) How many drinks did you have on a typical day when you were drinking in the past year? 10 or more  3) How often did you have 6 or more drinks on one occasion in the past year? daily or almost daily    AUDIT-C Score: 8  Interpretation: Score 4-12 (male): POSITIVE screen for alcohol misuse    AUDIT Screenin) How often during the last year have you found that you were not able to stop drinking once you had started? 4 - daily or almost daily  5) How often during the last year have you failed to do what was normally expected from you because of drinking? 0 - never  6) How often during the last year have you needed a first drink in the morning to get yourself going after a heavy drinking session? 0 - never  7) How often during the last year have you had a feeling of guilt or remorse after drinking? 0 - never  8) How often during the last year have you been unable to remember what happened the night before because you had been drinking? 0 - never  9) Have you or someone else been injured as a result of your drinking? 0 - no  10) Has a relative or friend or a doctor or another health worker been concerned about your drinking or suggested you cut down?  4 - yes, during the last year    AUDIT Score: 16  Interpretation: High risk alcohol consumption; likely alcohol dependence    Single Item Drug Screening:  How often have you used an illegal drug (including marijuana) or a prescription medication for non-medical reasons in the past year? never    Single Item Drug Screen Score: 0  Interpretation: Negative screen for possible drug use disorder    No results found  Physical Exam:     /78   Pulse 67   Ht 5' 3" (1 6 m)   Wt 75 3 kg (166 lb)   SpO2 100%   BMI 29 41 kg/m²     Physical Exam  Constitutional:       Appearance: He is well-developed  HENT:      Head: Normocephalic  Right Ear: External ear normal       Left Ear: External ear normal       Nose: Nose normal    Eyes:      Conjunctiva/sclera: Conjunctivae normal       Pupils: Pupils are equal, round, and reactive to light  Neck:      Thyroid: No thyromegaly  Cardiovascular:      Rate and Rhythm: Normal rate and regular rhythm  Heart sounds: Normal heart sounds  Pulmonary:      Effort: Pulmonary effort is normal       Breath sounds: Normal breath sounds  Abdominal:      General: Bowel sounds are normal       Palpations: Abdomen is soft  Musculoskeletal:         General: Normal range of motion  Cervical back: Normal range of motion  Skin:     General: Skin is warm and dry  Comments: 1 x 1 cm subcutaneous tender lump right antecubital fossa   Neurological:      Mental Status: He is alert and oriented to person, place, and time     Psychiatric:         Behavior: Behavior normal           Thressa Fabry,

## 2023-03-17 ENCOUNTER — HOSPITAL ENCOUNTER (OUTPATIENT)
Dept: ULTRASOUND IMAGING | Facility: HOSPITAL | Age: 68
End: 2023-03-17
Attending: FAMILY MEDICINE

## 2023-03-17 DIAGNOSIS — R22.31 ARM MASS, RIGHT: ICD-10-CM

## 2023-03-21 DIAGNOSIS — I10 ESSENTIAL HYPERTENSION: ICD-10-CM

## 2023-03-21 RX ORDER — DOXAZOSIN 2 MG/1
TABLET ORAL
Qty: 90 TABLET | Refills: 1 | Status: SHIPPED | OUTPATIENT
Start: 2023-03-21

## 2023-05-24 DIAGNOSIS — G43.109 MIGRAINE WITH AURA AND WITHOUT STATUS MIGRAINOSUS, NOT INTRACTABLE: ICD-10-CM

## 2023-05-25 RX ORDER — PROMETHAZINE HYDROCHLORIDE 25 MG/1
25 TABLET ORAL EVERY 6 HOURS PRN
Qty: 30 TABLET | Refills: 0 | Status: SHIPPED | OUTPATIENT
Start: 2023-05-25

## 2023-05-29 DIAGNOSIS — G43.109 MIGRAINE WITH AURA AND WITHOUT STATUS MIGRAINOSUS, NOT INTRACTABLE: ICD-10-CM

## 2023-05-30 RX ORDER — SUMATRIPTAN 50 MG/1
50 TABLET, FILM COATED ORAL EVERY 2 HOUR PRN
Qty: 9 TABLET | Refills: 3 | Status: SHIPPED | OUTPATIENT
Start: 2023-05-30

## 2023-06-12 DIAGNOSIS — M25.512 ACUTE PAIN OF LEFT SHOULDER: ICD-10-CM

## 2023-06-12 RX ORDER — MELOXICAM 15 MG/1
15 TABLET ORAL DAILY
Qty: 30 TABLET | Refills: 1 | Status: SHIPPED | OUTPATIENT
Start: 2023-06-12

## 2023-07-10 DIAGNOSIS — G43.109 MIGRAINE WITH AURA AND WITHOUT STATUS MIGRAINOSUS, NOT INTRACTABLE: ICD-10-CM

## 2023-07-10 RX ORDER — PROMETHAZINE HYDROCHLORIDE 25 MG/1
25 TABLET ORAL EVERY 6 HOURS PRN
Qty: 30 TABLET | Refills: 0 | Status: SHIPPED | OUTPATIENT
Start: 2023-07-10

## 2023-07-12 DIAGNOSIS — F41.9 ANXIETY: ICD-10-CM

## 2023-07-12 DIAGNOSIS — I21.4 NSTEMI (NON-ST ELEVATED MYOCARDIAL INFARCTION) (HCC): ICD-10-CM

## 2023-07-12 DIAGNOSIS — G43.109 MIGRAINE WITH AURA AND WITHOUT STATUS MIGRAINOSUS, NOT INTRACTABLE: ICD-10-CM

## 2023-07-12 RX ORDER — TOPIRAMATE 50 MG/1
TABLET, FILM COATED ORAL
Qty: 180 TABLET | Refills: 2 | Status: SHIPPED | OUTPATIENT
Start: 2023-07-12

## 2023-07-12 RX ORDER — METOPROLOL SUCCINATE 25 MG/1
TABLET, EXTENDED RELEASE ORAL
Qty: 45 TABLET | Refills: 2 | Status: SHIPPED | OUTPATIENT
Start: 2023-07-12

## 2023-07-12 RX ORDER — TRAZODONE HYDROCHLORIDE 100 MG/1
TABLET ORAL
Qty: 90 TABLET | Refills: 3 | Status: SHIPPED | OUTPATIENT
Start: 2023-07-12

## 2023-07-12 RX ORDER — ATORVASTATIN CALCIUM 80 MG/1
TABLET, FILM COATED ORAL
Qty: 90 TABLET | Refills: 2 | Status: SHIPPED | OUTPATIENT
Start: 2023-07-12

## 2023-08-08 DIAGNOSIS — M25.512 ACUTE PAIN OF LEFT SHOULDER: ICD-10-CM

## 2023-08-08 RX ORDER — MELOXICAM 15 MG/1
15 TABLET ORAL DAILY
Qty: 30 TABLET | Refills: 1 | Status: SHIPPED | OUTPATIENT
Start: 2023-08-08

## 2023-08-14 DIAGNOSIS — I25.10 CORONARY ARTERY DISEASE INVOLVING NATIVE CORONARY ARTERY OF NATIVE HEART WITHOUT ANGINA PECTORIS: ICD-10-CM

## 2023-08-14 RX ORDER — CLOPIDOGREL BISULFATE 75 MG/1
75 TABLET ORAL DAILY
Qty: 90 TABLET | Refills: 3 | Status: SHIPPED | OUTPATIENT
Start: 2023-08-14

## 2023-08-22 ENCOUNTER — RA CDI HCC (OUTPATIENT)
Dept: OTHER | Facility: HOSPITAL | Age: 68
End: 2023-08-22

## 2023-08-22 NOTE — PROGRESS NOTES
[address] [facility] coding opportunities       Chart reviewed, no opportunity found: CHART REVIEWED, NO OPPORTUNITY FOUND        Patients Insurance     [facility]ic[facility]

## 2023-08-28 ENCOUNTER — OFFICE VISIT (OUTPATIENT)
Dept: FAMILY MEDICINE CLINIC | Facility: CLINIC | Age: 68
End: 2023-08-28
Payer: COMMERCIAL

## 2023-08-28 VITALS
BODY MASS INDEX: 29.34 KG/M2 | WEIGHT: 165.6 LBS | HEART RATE: 86 BPM | DIASTOLIC BLOOD PRESSURE: 90 MMHG | SYSTOLIC BLOOD PRESSURE: 138 MMHG | HEIGHT: 63 IN | TEMPERATURE: 97.5 F | OXYGEN SATURATION: 98 %

## 2023-08-28 DIAGNOSIS — L30.9 DERMATITIS: ICD-10-CM

## 2023-08-28 DIAGNOSIS — G43.109 MIGRAINE WITH AURA AND WITHOUT STATUS MIGRAINOSUS, NOT INTRACTABLE: ICD-10-CM

## 2023-08-28 DIAGNOSIS — D69.6 PLATELETS DECREASED (HCC): ICD-10-CM

## 2023-08-28 DIAGNOSIS — I25.119 ATHEROSCLEROSIS OF NATIVE CORONARY ARTERY OF NATIVE HEART WITH ANGINA PECTORIS (HCC): ICD-10-CM

## 2023-08-28 DIAGNOSIS — F41.9 ANXIETY: ICD-10-CM

## 2023-08-28 DIAGNOSIS — I10 ESSENTIAL HYPERTENSION: Primary | ICD-10-CM

## 2023-08-28 PROCEDURE — 99214 OFFICE O/P EST MOD 30 MIN: CPT | Performed by: FAMILY MEDICINE

## 2023-08-28 RX ORDER — PROMETHAZINE HYDROCHLORIDE 25 MG/1
25 TABLET ORAL EVERY 6 HOURS PRN
Qty: 30 TABLET | Refills: 5 | Status: SHIPPED | OUTPATIENT
Start: 2023-08-28

## 2023-08-28 RX ORDER — VENLAFAXINE HYDROCHLORIDE 75 MG/1
225 CAPSULE, EXTENDED RELEASE ORAL
Qty: 90 CAPSULE | Refills: 3 | Status: SHIPPED | OUTPATIENT
Start: 2023-08-28

## 2023-08-28 RX ORDER — MOMETASONE FUROATE 1 MG/G
CREAM TOPICAL DAILY
Qty: 30 G | Refills: 0 | Status: SHIPPED | OUTPATIENT
Start: 2023-08-28

## 2023-08-28 NOTE — ASSESSMENT & PLAN NOTE
Doing well using Imitrex as needed in addition to the Phenergan as needed.   Continue taking the topiramate daily

## 2023-08-28 NOTE — PROGRESS NOTES
Name: Darwin Calderón      :       MRN: [id number]  Encounter Provider: Chrys Libman, DO  Encounter Date: 2023   Encounter department: [address] [place] [address] Road [address] [place] [address]     1. Essential hypertension  Assessment & Plan:  Controlled, continue amlodipine, metoprolol, valsartan      2. Anxiety  Assessment & Plan:  Increase his venlafaxine to 225 mg daily    Orders:  -     venlafaxine (EFFEXOR-XR) 75 mg 24 hr capsule; Take 3 capsules (225 mg total) by mouth daily with breakfast    3. Platelets decreased ([address])  Assessment & Plan:  Stable, continue to monitor with his routine blood work    Orders:  -     CBC; Future    4. Atherosclerosis of native coronary artery of native heart with angina pectoris [facility])  Assessment & Plan:  Stable, continue his blood pressure, cholesterol control. He is taking his Plavix, recommended he follow-up with his cardiologist.    Orders:  -     Lipid panel; Future    5. Dermatitis  -     mometasone (ELOCON) 0.1 % cream; Apply topically daily    6. Migraine with aura and without status migrainosus, not intractable  Assessment & Plan:  Doing well using Imitrex as needed in addition to the Phenergan as needed. Continue taking the topiramate daily    Orders:  -     promethazine (PHENERGAN) 25 mg tablet; Take 1 tablet (25 mg total) by mouth every 6 (six) hours as needed for nausea or vomiting           Subjective      Patient comes in today for checkup, he does complain of some worsening depression. He is taking his venlafaxine. He also is complaining of a rash he gets when he gets bitten by mosquitoes. He states it has been particularly bad this year. Review of Systems   Constitutional: Negative for chills, fatigue and fever. HENT: Negative for congestion, ear pain, hearing loss, postnasal drip, rhinorrhea and sore throat. Eyes: Negative for pain and visual disturbance.    Respiratory: Negative for chest tightness, shortness of breath and wheezing. Cardiovascular: Negative for chest pain and leg swelling. Gastrointestinal: Negative for abdominal distention, abdominal pain, constipation, diarrhea and vomiting. Endocrine: Negative for cold intolerance and heat intolerance. Genitourinary: Negative for difficulty urinating, frequency and urgency. Musculoskeletal: Negative for arthralgias and gait problem. Skin: Positive for rash (from insect bites). Negative for color change. Neurological: Negative for dizziness, tremors, syncope, numbness and headaches. Hematological: Negative for adenopathy. Psychiatric/Behavioral: Positive for dysphoric mood. Negative for agitation, confusion and sleep disturbance. The patient is not nervous/anxious. Current Outpatient Medications on File Prior to Visit   Medication Sig   • amLODIPine (NORVASC) 10 mg tablet TAKE 1 TABLET BY MOUTH EVERY DAY   • Aspirin Low Dose 81 MG chewable tablet CHEW 1 TABLET BY MOUTH DAILY   • atorvastatin (LIPITOR) 80 mg tablet TAKE 1 TABLET BY MOUTH EVERY DAY IN THE EVENING   • Azelastine HCl 137 MCG/SPRAY SOLN 1 SPRAY INTO EACH NOSTRIL 2 (TWO) TIMES A DAY USE IN EACH NOSTRIL AS DIRECTED   • clopidogrel (PLAVIX) 75 mg tablet TAKE 1 TABLET BY MOUTH EVERY DAY   • cyclobenzaprine (FLEXERIL) 10 mg tablet Take 1 tablet (10 mg total) by mouth 3 (three) times a day as needed for muscle spasms   • doxazosin (CARDURA) 2 mg tablet TAKE 1 TABLET BY MOUTH EVERY DAY   • fluticasone (FLONASE) 50 mcg/act nasal spray 2 sprays into each nostril daily   • meloxicam (MOBIC) 15 mg tablet TAKE 1 TABLET (15 MG TOTAL) BY MOUTH DAILY.    • metoprolol succinate (TOPROL-XL) 25 mg 24 hr tablet TAKE 1/2 TABLET BY MOUTH EVERY DAY   • nitroglycerin (NITROSTAT) 0.4 mg SL tablet Place 1 tablet (0.4 mg total) under the tongue every 5 (five) minutes as needed for chest pain   • pantoprazole (PROTONIX) 40 mg tablet Take 40 mg by mouth daily   • SUMAtriptan (IMITREX) 50 mg tablet TAKE 1 TABLET (50 MG TOTAL) BY MOUTH EVERY 2 (TWO) HOURS AS NEEDED FOR MIGRAINE  MG/DAY   • topiramate (TOPAMAX) 50 MG tablet TAKE 1 TABLET BY MOUTH EVERY 12 HOURS   • traZODone (DESYREL) 100 mg tablet TAKE 1 TABLET BY MOUTH DAILY AT BEDTIME   • valsartan (DIOVAN) 320 MG tablet TAKE 1 TABLET BY MOUTH EVERY DAY   • Ventolin  (90 Base) MCG/ACT inhaler INHALE 2 PUFFS 3 (THREE) TIMES A DAY AS NEEDED FOR WHEEZING   • [DISCONTINUED] mometasone (ELOCON) 0.1 % cream Apply topically daily   • [DISCONTINUED] promethazine (PHENERGAN) 25 mg tablet Take 1 tablet (25 mg total) by mouth every 6 (six) hours as needed for nausea or vomiting   • [DISCONTINUED] venlafaxine (EFFEXOR-XR) 150 mg 24 hr capsule TAKE 1 CAPSULE BY MOUTH EVERY DAY   • [DISCONTINUED] olopatadine (PATANOL) 0.1 % ophthalmic solution Administer 1 drop to both eyes 2 (two) times a day (Patient not taking: Reported on 12/8/2022)       Objective     /90 (BP Location: Left arm, Patient Position: Sitting, Cuff Size: Standard)   Pulse 86   Temp 97.5 °F (36.4 °C) (Tympanic)   Ht 5' 3" (1.6 m)   Wt 75.1 kg (165 lb 9.6 oz)   SpO2 98%   BMI 29.33 kg/m²     Physical Exam  Vitals and nursing note reviewed. Constitutional:       Appearance: He is well-developed. HENT:      Head: Normocephalic. Eyes:      General: No scleral icterus. Conjunctiva/sclera: Conjunctivae normal.   Neck:      Thyroid: No thyromegaly. Cardiovascular:      Rate and Rhythm: Normal rate and regular rhythm. Heart sounds: Normal heart sounds. No murmur heard. Pulmonary:      Effort: Pulmonary effort is normal. No respiratory distress. Breath sounds: Normal breath sounds. No wheezing. Abdominal:      General: Bowel sounds are normal. There is no distension. Palpations: Abdomen is soft. Tenderness: There is no abdominal tenderness. Musculoskeletal:         General: No tenderness. Normal range of motion. Cervical back: Normal range of motion. Lymphadenopathy:      Cervical: No cervical adenopathy. Skin:     General: Skin is warm and dry. Coloration: Skin is not pale. Findings: No rash. Neurological:      Mental Status: He is alert and oriented to person, place, and time. Cranial Nerves: No cranial nerve deficit.    Psychiatric:         Behavior: Behavior normal.       Maria Elena Davila DO

## 2023-08-28 NOTE — ASSESSMENT & PLAN NOTE
Stable, continue his blood pressure, cholesterol control.   He is taking his Plavix, recommended he follow-up with his cardiologist.

## 2023-09-04 DIAGNOSIS — I10 ESSENTIAL HYPERTENSION: ICD-10-CM

## 2023-09-04 RX ORDER — DOXAZOSIN 2 MG/1
TABLET ORAL
Qty: 90 TABLET | Refills: 1 | Status: SHIPPED | OUTPATIENT
Start: 2023-09-04

## 2023-09-15 DIAGNOSIS — G43.109 MIGRAINE WITH AURA AND WITHOUT STATUS MIGRAINOSUS, NOT INTRACTABLE: ICD-10-CM

## 2023-09-15 RX ORDER — SUMATRIPTAN 50 MG/1
50 TABLET, FILM COATED ORAL EVERY 2 HOUR PRN
Qty: 9 TABLET | Refills: 3 | Status: SHIPPED | OUTPATIENT
Start: 2023-09-15

## 2023-09-22 ENCOUNTER — APPOINTMENT (EMERGENCY)
Dept: CT IMAGING | Facility: HOSPITAL | Age: 68
End: 2023-09-22
Payer: COMMERCIAL

## 2023-09-22 ENCOUNTER — HOSPITAL ENCOUNTER (EMERGENCY)
Facility: HOSPITAL | Age: 68
Discharge: HOME | End: 2023-09-22
Attending: EMERGENCY MEDICINE
Payer: COMMERCIAL

## 2023-09-22 VITALS
TEMPERATURE: 98.2 F | SYSTOLIC BLOOD PRESSURE: 149 MMHG | HEART RATE: 74 BPM | OXYGEN SATURATION: 98 % | RESPIRATION RATE: 15 BRPM | DIASTOLIC BLOOD PRESSURE: 72 MMHG

## 2023-09-22 DIAGNOSIS — S01.01XA LACERATION OF SCALP, INITIAL ENCOUNTER: Primary | ICD-10-CM

## 2023-09-22 DIAGNOSIS — S09.90XA INJURY OF HEAD, INITIAL ENCOUNTER: ICD-10-CM

## 2023-09-22 DIAGNOSIS — F41.9 ANXIETY: ICD-10-CM

## 2023-09-22 PROCEDURE — 99283 EMERGENCY DEPT VISIT LOW MDM: CPT

## 2023-09-22 PROCEDURE — 90715 TDAP VACCINE 7 YRS/> IM: CPT | Performed by: PHYSICIAN ASSISTANT

## 2023-09-22 PROCEDURE — 12002 RPR S/N/AX/GEN/TRNK2.6-7.5CM: CPT | Performed by: PHYSICIAN ASSISTANT

## 2023-09-22 PROCEDURE — G1004 CDSM NDSC: HCPCS

## 2023-09-22 PROCEDURE — 90471 IMMUNIZATION ADMIN: CPT

## 2023-09-22 PROCEDURE — 99284 EMERGENCY DEPT VISIT MOD MDM: CPT | Performed by: PHYSICIAN ASSISTANT

## 2023-09-22 PROCEDURE — 70450 CT HEAD/BRAIN W/O DYE: CPT

## 2023-09-22 RX ORDER — ACETAMINOPHEN 500 MG
1000 TABLET ORAL EVERY 8 HOURS PRN
Qty: 30 TABLET | Refills: 0 | Status: SHIPPED | OUTPATIENT
Start: 2023-09-22

## 2023-09-22 RX ORDER — CEPHALEXIN 500 MG/1
500 CAPSULE ORAL EVERY 6 HOURS SCHEDULED
Qty: 20 CAPSULE | Refills: 0 | Status: SHIPPED | OUTPATIENT
Start: 2023-09-22 | End: 2023-09-27

## 2023-09-22 RX ORDER — CEPHALEXIN 250 MG/1
500 CAPSULE ORAL ONCE
Status: COMPLETED | OUTPATIENT
Start: 2023-09-22 | End: 2023-09-22

## 2023-09-22 RX ORDER — LIDOCAINE HCL/EPINEPHRINE/PF 2%-1:200K
1 VIAL (ML) INJECTION ONCE
Status: COMPLETED | OUTPATIENT
Start: 2023-09-22 | End: 2023-09-22

## 2023-09-22 RX ORDER — ACETAMINOPHEN 325 MG/1
975 TABLET ORAL ONCE
Status: COMPLETED | OUTPATIENT
Start: 2023-09-22 | End: 2023-09-22

## 2023-09-22 RX ORDER — VENLAFAXINE HYDROCHLORIDE 75 MG/1
225 CAPSULE, EXTENDED RELEASE ORAL
Qty: 270 CAPSULE | Refills: 2 | Status: SHIPPED | OUTPATIENT
Start: 2023-09-22

## 2023-09-22 RX ADMIN — LIDOCAINE HYDROCHLORIDE,EPINEPHRINE BITARTRATE 1 ML: 20; .005 INJECTION, SOLUTION EPIDURAL; INFILTRATION; INTRACAUDAL; PERINEURAL at 20:58

## 2023-09-22 RX ADMIN — ACETAMINOPHEN 975 MG: 325 TABLET, FILM COATED ORAL at 21:46

## 2023-09-22 RX ADMIN — TETANUS TOXOID, REDUCED DIPHTHERIA TOXOID AND ACELLULAR PERTUSSIS VACCINE, ADSORBED 0.5 ML: 5; 2.5; 8; 8; 2.5 SUSPENSION INTRAMUSCULAR at 21:47

## 2023-09-22 RX ADMIN — CEPHALEXIN 500 MG: 250 CAPSULE ORAL at 21:47

## 2023-09-23 NOTE — ED PROVIDER NOTES
History  Chief Complaint   Patient presents with   • Head Laceration     Pt struck his head on a bird feeder hook while riding on tractor, presents with head bleeding and laceration, +BT     Patient is a 79years old male who presented to the ED with family for evaluation. Patient stated that about 30 minutes ago, struck his tractor against a bed feeder hook which sprung back and struck his forehead with resulting laceration. Patient presents to the ED covered in blood. Patient is endorsing mild frontal headache but admits blood thinner usage; admits currently on Plavix. Denies any LOC, neck pain, blurry vision or any other complaint on review of systems. Prior to Admission Medications   Prescriptions Last Dose Informant Patient Reported? Taking?    Aspirin Low Dose 81 MG chewable tablet  Self No No   Sig: CHEW 1 TABLET BY MOUTH DAILY   Azelastine HCl 137 MCG/SPRAY SOLN  Self No No   Si SPRAY INTO EACH NOSTRIL 2 (TWO) TIMES A DAY USE IN EACH NOSTRIL AS DIRECTED   SUMAtriptan (IMITREX) 50 mg tablet   No No   Sig: TAKE 1 TABLET (50 MG TOTAL) BY MOUTH EVERY 2 (TWO) HOURS AS NEEDED FOR MIGRAINE  MG/DAY   Ventolin  (90 Base) MCG/ACT inhaler  Self No No   Sig: INHALE 2 PUFFS 3 (THREE) TIMES A DAY AS NEEDED FOR WHEEZING   amLODIPine (NORVASC) 10 mg tablet  Self No No   Sig: TAKE 1 TABLET BY MOUTH EVERY DAY   atorvastatin (LIPITOR) 80 mg tablet   No No   Sig: TAKE 1 TABLET BY MOUTH EVERY DAY IN THE EVENING   clopidogrel (PLAVIX) 75 mg tablet   No No   Sig: TAKE 1 TABLET BY MOUTH EVERY DAY   cyclobenzaprine (FLEXERIL) 10 mg tablet  Self No No   Sig: Take 1 tablet (10 mg total) by mouth 3 (three) times a day as needed for muscle spasms   doxazosin (CARDURA) 2 mg tablet   No No   Sig: TAKE 1 TABLET BY MOUTH EVERY DAY   fluticasone (FLONASE) 50 mcg/act nasal spray  Self Yes No   Si sprays into each nostril daily   meloxicam (MOBIC) 15 mg tablet   No No   Sig: TAKE 1 TABLET (15 MG TOTAL) BY MOUTH DAILY. metoprolol succinate (TOPROL-XL) 25 mg 24 hr tablet   No No   Sig: TAKE 1/2 TABLET BY MOUTH EVERY DAY   mometasone (ELOCON) 0.1 % cream   No No   Sig: Apply topically daily   nitroglycerin (NITROSTAT) 0.4 mg SL tablet  Self No No   Sig: Place 1 tablet (0.4 mg total) under the tongue every 5 (five) minutes as needed for chest pain   pantoprazole (PROTONIX) 40 mg tablet  Self Yes No   Sig: Take 40 mg by mouth daily   promethazine (PHENERGAN) 25 mg tablet   No No   Sig: Take 1 tablet (25 mg total) by mouth every 6 (six) hours as needed for nausea or vomiting   topiramate (TOPAMAX) 50 MG tablet   No No   Sig: TAKE 1 TABLET BY MOUTH EVERY 12 HOURS   traZODone (DESYREL) 100 mg tablet   No No   Sig: TAKE 1 TABLET BY MOUTH DAILY AT BEDTIME   valsartan (DIOVAN) 320 MG tablet   No No   Sig: TAKE 1 TABLET BY MOUTH EVERY DAY   venlafaxine (EFFEXOR-XR) 75 mg 24 hr capsule   No No   Sig: TAKE 3 CAPSULES (225 MG TOTAL) BY MOUTH DAILY WITH BREAKFAST      Facility-Administered Medications: None       No past medical history on file. Past Surgical History:   Procedure Laterality Date   • CARDIAC CATHETERIZATION Left 8/25/2022    Procedure: Cardiac catheterization;  Surgeon: Marshal Tilley MD;  Location: [address] [place] [address] CATH LAB; Service: Cardiology   • CARDIAC CATHETERIZATION N/A 8/25/2022    Procedure: Cardiac Coronary Angiogram;  Surgeon: Marshal Tilley MD;  Location: [address] [place] [address] CATH LAB; Service: Cardiology   • SEPTOPLASTY         Family History   Problem Relation Age of Onset   • Aneurysm Mother    • Coronary artery disease Father    • Cancer Other    • Stroke Other         CVA     I have reviewed and agree with the history as documented.     E-Cigarette/Vaping     E-Cigarette/Vaping Substances     Social History     Tobacco Use   • Smoking status: Never   • Smokeless tobacco: Never   • Tobacco comments:     never a smoker ( as per allscripts)   Substance Use Topics   • Alcohol use: Yes     Comment: occasional alcohol use   • Drug use: No       Review of Systems   Constitutional: Negative for chills and fever. HENT: Negative for ear pain and sore throat. Eyes: Negative for pain and visual disturbance. Respiratory: Negative for cough and shortness of breath. Cardiovascular: Negative for chest pain and palpitations. Gastrointestinal: Negative for abdominal pain and vomiting. Genitourinary: Negative for dysuria and hematuria. Musculoskeletal: Negative for arthralgias and back pain. Skin: Negative for color change and rash. Neurological: Negative for dizziness, seizures, syncope and headaches. All other systems reviewed and are negative. Physical Exam  Physical Exam  Vitals and nursing note reviewed. Constitutional:       General: He is not in acute distress. Appearance: He is well-developed. HENT:      Head: Normocephalic. Laceration present. No raccoon eyes or Ham's sign. Right Ear: Tympanic membrane, ear canal and external ear normal. There is no impacted cerumen. Left Ear: Tympanic membrane, ear canal and external ear normal. There is no impacted cerumen. Nose: No congestion or rhinorrhea. Eyes:      Extraocular Movements: Extraocular movements intact. Conjunctiva/sclera: Conjunctivae normal.      Pupils: Pupils are equal, round, and reactive to light. Cardiovascular:      Rate and Rhythm: Normal rate and regular rhythm. Heart sounds: No murmur heard. Pulmonary:      Effort: Pulmonary effort is normal. No respiratory distress. Breath sounds: Normal breath sounds. Abdominal:      Palpations: Abdomen is soft. Tenderness: There is no abdominal tenderness. Musculoskeletal:         General: No swelling. Cervical back: Normal range of motion and neck supple. No rigidity. Skin:     General: Skin is warm and dry. Capillary Refill: Capillary refill takes less than 2 seconds.    Neurological:      Mental Status: He is alert and oriented to person, place, and time. GCS: GCS eye subscore is 4. GCS verbal subscore is 5. GCS motor subscore is 6. Cranial Nerves: Cranial nerves 2-12 are intact. Sensory: Sensation is intact. Motor: Motor function is intact. Coordination: Coordination is intact. Gait: Gait is intact. Psychiatric:         Mood and Affect: Mood normal.         Vital Signs  ED Triage Vitals   Temperature Pulse Respirations Blood Pressure SpO2   09/22/23 2200 09/22/23 2102 09/22/23 2059 09/22/23 2059 09/22/23 2102   98.2 °F (36.8 °C) 89 16 (!) 177/90 99 %      Temp Source Heart Rate Source Patient Position - Orthostatic VS BP Location FiO2 (%)   09/22/23 2200 09/22/23 2059 09/22/23 2059 09/22/23 2059 --   Oral Monitor Sitting Left arm       Pain Score       --                  Vitals:    09/22/23 2059 09/22/23 2102 09/22/23 2200   BP: (!) 177/90  149/72   Pulse:  89 74   Patient Position - Orthostatic VS: Sitting  Sitting         Visual Acuity  Visual Acuity    Flowsheet Row Most Recent Value   L Pupil Size (mm) 3   R Pupil Size (mm) 3          ED Medications  Medications   lidocaine-epinephrine (XYLOCAINE-MPF/EPINEPHRINE) 2 %-1:200,000 injection 1 mL (1 mL Infiltration Given by Other 9/22/23 2058)   acetaminophen (TYLENOL) tablet 975 mg (975 mg Oral Given 9/22/23 2146)   tetanus-diphtheria-acellular pertussis (BOOSTRIX) IM injection 0.5 mL (0.5 mL Intramuscular Given 9/22/23 2147)   cephalexin (KEFLEX) capsule 500 mg (500 mg Oral Given 9/22/23 2147)       Diagnostic Studies  Results Reviewed     None                 CT head wo contrast   Final Result by Evelyne Fischer MD (09/22 2225)      No intracranial hemorrhage or calvarial fracture. Right frontal scalp soft tissue swelling/contusion with adjacent skin staples. Workstation performed: [id number]                    Procedures  Universal Protocol:  Procedure performed by:  Consent: Verbal consent obtained.   Risks and benefits: risks, benefits and alternatives were discussed  Consent given by: patient  Time out: Immediately prior to procedure a "time out" was called to verify the correct patient, procedure, equipment, support staff and site/side marked as required. Timeout called at: 9/22/2023 9:28 PM.  Patient understanding: patient states understanding of the procedure being performed  Patient consent: the patient's understanding of the procedure matches consent given  Procedure consent: procedure consent matches procedure scheduled  Relevant documents: relevant documents present and verified  Test results: test results available and properly labeled  Site marked: the operative site was marked  Radiology Images displayed and confirmed. If images not available, report reviewed: imaging studies available  Patient identity confirmed: verbally with patient    Laceration repair    Date/Time: 9/22/2023 9:27 PM    Performed by: Juan José Norris PA-C  Authorized by: Juan José Norris PA-C  Body area: head/neck  Location details: forehead  Laceration length: 5.1 cm  Contamination: The wound is contaminated.   Foreign bodies: no foreign bodies  Tendon involvement: superficial  Nerve involvement: none  Vascular damage: yes  Anesthesia: local infiltration    Anesthesia:  Local Anesthetic: lidocaine 2% with epinephrine  Anesthetic total: 2 mL    Sedation:  Patient sedated: no      Wound Dehiscence:  Superficial Wound Dehiscence: simple closure      Procedure Details:  Irrigation solution: saline  Irrigation method: syringe  Amount of cleaning: extensive  Debridement: none  Degree of undermining: none  Skin closure: staples (5 staples)  Subcutaneous closure: 4-0 Vicryl  Number of sutures: 1  Approximation: close  Approximation difficulty: complex  Dressing: non-adhesive packing strip  Patient tolerance: patient tolerated the procedure well with no immediate complications               ED Course  ED Course as of 09/23/23 0332   Fri Sep 22, 2023 1 CT head wo contrast  IMPRESSION:     No intracranial hemorrhage or calvarial fracture. Right frontal scalp soft tissue swelling/contusion with adjacent skin staples. SBIRT 22yo+    Flowsheet Row Most Recent Value   Initial Alcohol Screen: US AUDIT-C     1. How often do you have a drink containing alcohol? 0 Filed at: 09/22/2023 2106   2. How many drinks containing alcohol do you have on a typical day you are drinking? 0 Filed at: 09/22/2023 2106   3b. FEMALE Any Age, or MALE 65+: How often do you have 4 or more drinks on one occassion? 0 Filed at: 09/22/2023 2106   Audit-C Score 0 Filed at: 09/22/2023 2106   ONEIDA: How many times in the past year have you. .. Used an illegal drug or used a prescription medication for non-medical reasons? Never Filed at: 09/22/2023 2106            Medical Decision Making  Patient presented to the ED for evaluation of a 5.1 cm full-thickness laceration overlying the forehead. After obtaining consent from patient, wound was examined and was notable for a vascular bleed as a result, Vicryl was used to tied the bleeding vessel. Area was cleaned with copious amount of saline and Betadine after 2% lidocaine with epi was applied. 5 staples was applied. Patient's tetanus shot was updated. Given the mechanism of the injury, prophylactic antibiotic Keflex was initiated. Following staple application, patient was observed for more than 40 minutes with no residual bleeding. Patient was discharged home with instructions to follow-up in 7 to 10 days to have staples removed. Patient otherwise stable upon discharge. Amount and/or Complexity of Data Reviewed  Radiology: ordered. Decision-making details documented in ED Course. Risk  OTC drugs. Prescription drug management.           Disposition  Final diagnoses:   Laceration of scalp, initial encounter   Injury of head, initial encounter     Time reflects when diagnosis was documented in both MDM as applicable and the Disposition within this note     Time User Action Codes Description Comment    9/22/2023 10:55 PM Jemal Lagunas Add [S01.01XA] Laceration of scalp, initial encounter     9/22/2023 10:55 PM Ema Núñez Add [S09.90XA] Injury of head, initial encounter       ED Disposition     ED Disposition   Discharge    Condition   Stable    Date/Time   Fri Sep 22, 2023 10:55 PM    Comment   [facility] [place] discharge to home/self care.                Follow-up Information     Follow up With Specialties Details Why Contact Tu Stacy DO Family Medicine Call in 1 day  [id number] [address]  [address] [facility]  [phone]            Discharge Medication List as of 9/22/2023 10:57 PM      START taking these medications    Details   acetaminophen (TYLENOL) 500 mg tablet Take 2 tablets (1,000 mg total) by mouth every 8 (eight) hours as needed for mild pain, moderate pain or headaches, Starting Fri 9/22/2023, Normal      cephalexin (KEFLEX) 500 mg capsule Take 1 capsule (500 mg total) by mouth every 6 (six) hours for 5 days, Starting Fri 9/22/2023, Until Wed 9/27/2023, Normal         CONTINUE these medications which have NOT CHANGED    Details   amLODIPine (NORVASC) 10 mg tablet TAKE 1 TABLET BY MOUTH EVERY DAY, Normal      Aspirin Low Dose 81 MG chewable tablet CHEW 1 TABLET BY MOUTH DAILY, Normal      atorvastatin (LIPITOR) 80 mg tablet TAKE 1 TABLET BY MOUTH EVERY DAY IN THE EVENING, Normal      Azelastine HCl 137 MCG/SPRAY SOLN 1 SPRAY INTO EACH NOSTRIL 2 (TWO) TIMES A DAY USE IN EACH NOSTRIL AS DIRECTED, Normal      clopidogrel (PLAVIX) 75 mg tablet TAKE 1 TABLET BY MOUTH EVERY DAY, Starting Mon 8/14/2023, Normal      cyclobenzaprine (FLEXERIL) 10 mg tablet Take 1 tablet (10 mg total) by mouth 3 (three) times a day as needed for muscle spasms, Starting Tue 4/6/2021, Normal      doxazosin (CARDURA) 2 mg tablet TAKE 1 TABLET BY MOUTH EVERY DAY, Normal      fluticasone (FLONASE) 50 mcg/act nasal spray 2 sprays into each nostril daily, Starting Mon 6/15/2015, Historical Med      meloxicam (MOBIC) 15 mg tablet TAKE 1 TABLET (15 MG TOTAL) BY MOUTH DAILY. , Starting Tue 8/8/2023, Normal      metoprolol succinate (TOPROL-XL) 25 mg 24 hr tablet TAKE 1/2 TABLET BY MOUTH EVERY DAY, Normal      mometasone (ELOCON) 0.1 % cream Apply topically daily, Starting Mon 8/28/2023, Normal      nitroglycerin (NITROSTAT) 0.4 mg SL tablet Place 1 tablet (0.4 mg total) under the tongue every 5 (five) minutes as needed for chest pain, Starting Fri 9/9/2022, Normal      pantoprazole (PROTONIX) 40 mg tablet Take 40 mg by mouth daily, Starting Wed 11/22/2017, Historical Med      promethazine (PHENERGAN) 25 mg tablet Take 1 tablet (25 mg total) by mouth every 6 (six) hours as needed for nausea or vomiting, Starting Mon 8/28/2023, Normal      SUMAtriptan (IMITREX) 50 mg tablet TAKE 1 TABLET (50 MG TOTAL) BY MOUTH EVERY 2 (TWO) HOURS AS NEEDED FOR MIGRAINE  MG/DAY, Starting Fri 9/15/2023, Normal      topiramate (TOPAMAX) 50 MG tablet TAKE 1 TABLET BY MOUTH EVERY 12 HOURS, Normal      traZODone (DESYREL) 100 mg tablet TAKE 1 TABLET BY MOUTH DAILY AT BEDTIME, Normal      valsartan (DIOVAN) 320 MG tablet TAKE 1 TABLET BY MOUTH EVERY DAY, Normal      venlafaxine (EFFEXOR-XR) 75 mg 24 hr capsule TAKE 3 CAPSULES (225 MG TOTAL) BY MOUTH DAILY WITH BREAKFAST, Starting Fri 9/22/2023, Normal      Ventolin  (90 Base) MCG/ACT inhaler INHALE 2 PUFFS 3 (THREE) TIMES A DAY AS NEEDED FOR WHEEZING, Starting Fri 10/16/2020, Normal             No discharge procedures on file.     PDMP Review     None          ED Provider  Electronically Signed by           Lucía Riley PA-C  09/23/23 7217

## 2023-09-23 NOTE — DISCHARGE INSTRUCTIONS
Please follow-up in 7 to 10 days to have staples removed. Please take prescribed medications as instructed. Please call for follow-up with your primary care physician within the next available appointment. Please return immediately to the emergency room for any new or worsening of symptoms.

## 2023-09-26 DIAGNOSIS — M25.512 ACUTE PAIN OF LEFT SHOULDER: ICD-10-CM

## 2023-09-26 DIAGNOSIS — I10 ESSENTIAL HYPERTENSION: ICD-10-CM

## 2023-09-26 RX ORDER — MELOXICAM 15 MG/1
15 TABLET ORAL DAILY
Qty: 30 TABLET | Refills: 1 | Status: SHIPPED | OUTPATIENT
Start: 2023-09-26

## 2023-09-26 RX ORDER — VALSARTAN 320 MG/1
TABLET ORAL
Qty: 90 TABLET | Refills: 1 | Status: SHIPPED | OUTPATIENT
Start: 2023-09-26

## 2023-12-05 DIAGNOSIS — M25.512 ACUTE PAIN OF LEFT SHOULDER: ICD-10-CM

## 2023-12-05 RX ORDER — MELOXICAM 15 MG/1
15 TABLET ORAL DAILY
Qty: 30 TABLET | Refills: 1 | Status: SHIPPED | OUTPATIENT
Start: 2023-12-05

## 2024-01-08 DIAGNOSIS — M25.511 CHRONIC RIGHT SHOULDER PAIN: Primary | ICD-10-CM

## 2024-01-08 DIAGNOSIS — G89.29 CHRONIC RIGHT SHOULDER PAIN: Primary | ICD-10-CM

## 2024-01-08 RX ORDER — TRAMADOL HYDROCHLORIDE 50 MG/1
50 TABLET ORAL EVERY 6 HOURS PRN
Qty: 20 TABLET | Refills: 0 | Status: SHIPPED | OUTPATIENT
Start: 2024-01-08

## 2024-02-03 DIAGNOSIS — M25.512 ACUTE PAIN OF LEFT SHOULDER: ICD-10-CM

## 2024-02-05 DIAGNOSIS — J45.20 MILD INTERMITTENT ASTHMA WITHOUT COMPLICATION: ICD-10-CM

## 2024-02-05 RX ORDER — ALBUTEROL SULFATE 90 UG/1
2 AEROSOL, METERED RESPIRATORY (INHALATION) 3 TIMES DAILY PRN
Qty: 18 G | Refills: 0 | Status: SHIPPED | OUTPATIENT
Start: 2024-02-05

## 2024-02-05 RX ORDER — MELOXICAM 15 MG/1
15 TABLET ORAL DAILY
Qty: 30 TABLET | Refills: 1 | Status: SHIPPED | OUTPATIENT
Start: 2024-02-05

## 2024-02-09 ENCOUNTER — HOSPITAL ENCOUNTER (OUTPATIENT)
Dept: RADIOLOGY | Facility: HOSPITAL | Age: 69
End: 2024-02-09
Payer: COMMERCIAL

## 2024-02-09 ENCOUNTER — APPOINTMENT (OUTPATIENT)
Dept: LAB | Facility: HOSPITAL | Age: 69
End: 2024-02-09
Payer: COMMERCIAL

## 2024-02-09 DIAGNOSIS — Z12.5 SCREENING PSA (PROSTATE SPECIFIC ANTIGEN): ICD-10-CM

## 2024-02-09 DIAGNOSIS — G89.29 CHRONIC RIGHT SHOULDER PAIN: ICD-10-CM

## 2024-02-09 DIAGNOSIS — I25.119 ATHEROSCLEROSIS OF NATIVE CORONARY ARTERY OF NATIVE HEART WITH ANGINA PECTORIS (HCC): ICD-10-CM

## 2024-02-09 DIAGNOSIS — E11.9 TYPE 2 DIABETES MELLITUS WITHOUT COMPLICATION, UNSPECIFIED WHETHER LONG TERM INSULIN USE (HCC): ICD-10-CM

## 2024-02-09 DIAGNOSIS — I10 ESSENTIAL HYPERTENSION: ICD-10-CM

## 2024-02-09 DIAGNOSIS — D69.6 PLATELETS DECREASED (HCC): ICD-10-CM

## 2024-02-09 DIAGNOSIS — M25.511 CHRONIC RIGHT SHOULDER PAIN: ICD-10-CM

## 2024-02-09 LAB
ALBUMIN SERPL BCP-MCNC: 4.1 G/DL (ref 3.5–5)
ALP SERPL-CCNC: 48 U/L (ref 34–104)
ALT SERPL W P-5'-P-CCNC: 36 U/L (ref 7–52)
ANION GAP SERPL CALCULATED.3IONS-SCNC: 8 MMOL/L
AST SERPL W P-5'-P-CCNC: 29 U/L (ref 13–39)
BILIRUB SERPL-MCNC: 0.55 MG/DL (ref 0.2–1)
BUN SERPL-MCNC: 27 MG/DL (ref 5–25)
CALCIUM SERPL-MCNC: 8.8 MG/DL (ref 8.4–10.2)
CHLORIDE SERPL-SCNC: 110 MMOL/L (ref 96–108)
CHOLEST SERPL-MCNC: 129 MG/DL
CO2 SERPL-SCNC: 23 MMOL/L (ref 21–32)
CREAT SERPL-MCNC: 0.98 MG/DL (ref 0.6–1.3)
ERYTHROCYTE [DISTWIDTH] IN BLOOD BY AUTOMATED COUNT: 14.5 % (ref 11.6–15.1)
GFR SERPL CREATININE-BSD FRML MDRD: 80 ML/MIN/1.73SQ M
GLUCOSE P FAST SERPL-MCNC: 121 MG/DL (ref 65–99)
HCT VFR BLD AUTO: 45.1 % (ref 36.5–49.3)
HDLC SERPL-MCNC: 57 MG/DL
HGB BLD-MCNC: 15.5 G/DL (ref 12–17)
LDLC SERPL CALC-MCNC: 41 MG/DL (ref 0–100)
MCH RBC QN AUTO: 31.6 PG (ref 26.8–34.3)
MCHC RBC AUTO-ENTMCNC: 34.4 G/DL (ref 31.4–37.4)
MCV RBC AUTO: 92 FL (ref 82–98)
NONHDLC SERPL-MCNC: 72 MG/DL
PLATELET # BLD AUTO: 139 THOUSANDS/UL (ref 149–390)
PMV BLD AUTO: 10.3 FL (ref 8.9–12.7)
POTASSIUM SERPL-SCNC: 3.5 MMOL/L (ref 3.5–5.3)
PROT SERPL-MCNC: 6.4 G/DL (ref 6.4–8.4)
PSA SERPL-MCNC: 0.43 NG/ML (ref 0–4)
RBC # BLD AUTO: 4.9 MILLION/UL (ref 3.88–5.62)
SODIUM SERPL-SCNC: 141 MMOL/L (ref 135–147)
TRIGL SERPL-MCNC: 153 MG/DL
TSH SERPL DL<=0.05 MIU/L-ACNC: 1.47 UIU/ML (ref 0.45–4.5)
WBC # BLD AUTO: 4.79 THOUSAND/UL (ref 4.31–10.16)

## 2024-02-09 PROCEDURE — G0103 PSA SCREENING: HCPCS

## 2024-02-09 PROCEDURE — 36415 COLL VENOUS BLD VENIPUNCTURE: CPT

## 2024-02-09 PROCEDURE — 85027 COMPLETE CBC AUTOMATED: CPT

## 2024-02-09 PROCEDURE — 80053 COMPREHEN METABOLIC PANEL: CPT

## 2024-02-09 PROCEDURE — 73030 X-RAY EXAM OF SHOULDER: CPT

## 2024-02-09 PROCEDURE — 84443 ASSAY THYROID STIM HORMONE: CPT

## 2024-02-09 PROCEDURE — 80061 LIPID PANEL: CPT

## 2024-02-18 DIAGNOSIS — I10 ESSENTIAL HYPERTENSION: ICD-10-CM

## 2024-02-19 RX ORDER — VALSARTAN 320 MG/1
TABLET ORAL
Qty: 90 TABLET | Refills: 1 | Status: SHIPPED | OUTPATIENT
Start: 2024-02-19

## 2024-02-19 RX ORDER — DOXAZOSIN 2 MG/1
TABLET ORAL
Qty: 90 TABLET | Refills: 1 | Status: SHIPPED | OUTPATIENT
Start: 2024-02-19

## 2024-02-27 ENCOUNTER — RA CDI HCC (OUTPATIENT)
Dept: OTHER | Facility: HOSPITAL | Age: 69
End: 2024-02-27

## 2024-03-04 ENCOUNTER — OFFICE VISIT (OUTPATIENT)
Dept: FAMILY MEDICINE CLINIC | Facility: CLINIC | Age: 69
End: 2024-03-04
Payer: COMMERCIAL

## 2024-03-04 VITALS
HEIGHT: 63 IN | WEIGHT: 169 LBS | OXYGEN SATURATION: 98 % | DIASTOLIC BLOOD PRESSURE: 80 MMHG | RESPIRATION RATE: 16 BRPM | SYSTOLIC BLOOD PRESSURE: 126 MMHG | BODY MASS INDEX: 29.95 KG/M2 | HEART RATE: 76 BPM

## 2024-03-04 DIAGNOSIS — I10 ESSENTIAL HYPERTENSION: ICD-10-CM

## 2024-03-04 DIAGNOSIS — J45.20 MILD INTERMITTENT ASTHMA WITHOUT COMPLICATION: ICD-10-CM

## 2024-03-04 DIAGNOSIS — G89.29 CHRONIC RIGHT SHOULDER PAIN: ICD-10-CM

## 2024-03-04 DIAGNOSIS — M25.511 CHRONIC RIGHT SHOULDER PAIN: ICD-10-CM

## 2024-03-04 DIAGNOSIS — I25.119 ATHEROSCLEROSIS OF NATIVE CORONARY ARTERY OF NATIVE HEART WITH ANGINA PECTORIS (HCC): Primary | ICD-10-CM

## 2024-03-04 DIAGNOSIS — D69.6 PLATELETS DECREASED (HCC): ICD-10-CM

## 2024-03-04 DIAGNOSIS — Z00.00 HEALTH CARE MAINTENANCE: ICD-10-CM

## 2024-03-04 PROCEDURE — 99214 OFFICE O/P EST MOD 30 MIN: CPT | Performed by: FAMILY MEDICINE

## 2024-03-04 PROCEDURE — G0439 PPPS, SUBSEQ VISIT: HCPCS | Performed by: FAMILY MEDICINE

## 2024-03-04 NOTE — PROGRESS NOTES
Assessment and Plan:     Problem List Items Addressed This Visit     Essential hypertension     Controlled, continue his valsartan, metoprolol, amlodipine         Mild intermittent asthma without complication     Stable, continue with Ventolin as needed.         Atherosclerosis of native coronary artery of native heart with angina pectoris (HCC) - Primary     Doing well, continue his Plavix, metoprolol, cholesterol and blood pressure control.         Platelets decreased (HCC)     Stable, will continue to monitor with his routine blood work.        Other Visit Diagnoses     Health care maintenance        Chronic right shoulder pain        Relevant Orders    MRI shoulder right wo contrast           Preventive health issues were discussed with patient, and age appropriate screening tests were ordered as noted in patient's After Visit Summary.  Personalized health advice and appropriate referrals for health education or preventive services given if needed, as noted in patient's After Visit Summary.     History of Present Illness:     Patient presents for a Medicare Wellness Visit    Patient comes in today for checkup, he does complain of persistent right shoulder pain.  He states the pain starts in the front of his shoulder and radiates to the back.  He states is worse when he tries to lift his right arm away from his body.  He did have an x-ray done which was unremarkable.       Patient Care Team:  Rich Delgado DO as PCP - General  Rich Delgado DO as PCP - PCP-[facility] ([facility])  DO Erin Everett PA-C     Review of Systems:     Review of Systems   Constitutional:  Negative for chills, fatigue and fever.   HENT:  Negative for congestion, ear pain, hearing loss, postnasal drip, rhinorrhea and sore throat.    Eyes:  Negative for pain and visual disturbance.   Respiratory:  Negative for chest tightness, shortness of breath and wheezing.    Cardiovascular:  Negative for chest pain and leg  swelling.   Gastrointestinal:  Negative for abdominal distention, abdominal pain, constipation, diarrhea and vomiting.   Endocrine: Negative for cold intolerance and heat intolerance.   Genitourinary:  Negative for difficulty urinating, frequency and urgency.   Musculoskeletal:  Positive for arthralgias (right shoulder). Negative for gait problem.   Skin:  Negative for color change.   Neurological:  Negative for dizziness, tremors, syncope, numbness and headaches.   Hematological:  Negative for adenopathy.   Psychiatric/Behavioral:  Negative for agitation, confusion and sleep disturbance. The patient is not nervous/anxious.         Problem List:     Patient Active Problem List   Diagnosis   • Anxiety   • Essential hypertension   • Gastroesophageal reflux disease without esophagitis   • Insomnia   • Migraine headache   • BMI 29.0-29.9,adult   • Mild intermittent asthma without complication   • Atherosclerosis of native coronary artery of native heart with angina pectoris (HCC)   • Platelets decreased (HCC)      Past Medical and Surgical History:     History reviewed. No pertinent past medical history.  Past Surgical History:   Procedure Laterality Date   • CARDIAC CATHETERIZATION Left 8/25/2022    Procedure: Cardiac catheterization;  Surgeon: Luann Feng MD;  Location: [facility];  Service: Cardiology   • CARDIAC CATHETERIZATION N/A 8/25/2022    Procedure: Cardiac Coronary Angiogram;  Surgeon: Luann Feng MD;  Location: [facility];  Service: Cardiology   • SEPTOPLASTY        Family History:     Family History   Problem Relation Age of Onset   • Aneurysm Mother    • Coronary artery disease Father    • Cancer Other    • Stroke Other         CVA      Social History:     Social History     Socioeconomic History   • Marital status: /Civil Union     Spouse name: None   • Number of children: None   • Years of education: None   • Highest education level: None   Occupational History   •  Occupation: full-time employment   Tobacco Use   • Smoking status: Never   • Smokeless tobacco: Never   • Tobacco comments:     never a smoker ( as per allscripts)   Vaping Use   • Vaping status: Never Used   Substance and Sexual Activity   • Alcohol use: Yes     Comment: occasional alcohol use   • Drug use: No   • Sexual activity: None   Other Topics Concern   • None   Social History Narrative    Always uses seat belt    Lives with wife    Recreation: gardening    Seeing a dentist     Social Determinants of Health     Financial Resource Strain: Low Risk  (3/4/2024)    Overall Financial Resource Strain (CARDIA)    • Difficulty of Paying Living Expenses: Not hard at all   Food Insecurity: No Food Insecurity (8/25/2022)    Hunger Vital Sign    • Worried About Running Out of Food in the Last Year: Never true    • Ran Out of Food in the Last Year: Never true   Transportation Needs: No Transportation Needs (3/4/2024)    PRAPARE - Transportation    • Lack of Transportation (Medical): No    • Lack of Transportation (Non-Medical): No   Physical Activity: Not on file   Stress: Not on file   Social Connections: Not on file   Intimate Partner Violence: Not on file   Housing Stability: Low Risk  (8/25/2022)    Housing Stability Vital Sign    • Unable to Pay for Housing in the Last Year: No    • Number of Places Lived in the Last Year: 1    • Unstable Housing in the Last Year: No      Medications and Allergies:     Current Outpatient Medications   Medication Sig Dispense Refill   • acetaminophen (TYLENOL) 500 mg tablet Take 2 tablets (1,000 mg total) by mouth every 8 (eight) hours as needed for mild pain, moderate pain or headaches 30 tablet 0   • amLODIPine (NORVASC) 10 mg tablet TAKE 1 TABLET BY MOUTH EVERY DAY 90 tablet 3   • Aspirin Low Dose 81 MG chewable tablet CHEW 1 TABLET BY MOUTH DAILY 100 tablet 3   • atorvastatin (LIPITOR) 80 mg tablet TAKE 1 TABLET BY MOUTH EVERY DAY IN THE EVENING 90 tablet 2   • Azelastine HCl 137  MCG/SPRAY SOLN 1 SPRAY INTO EACH NOSTRIL 2 (TWO) TIMES A DAY USE IN EACH NOSTRIL AS DIRECTED 30 mL 1   • clopidogrel (PLAVIX) 75 mg tablet TAKE 1 TABLET BY MOUTH EVERY DAY 90 tablet 3   • cyclobenzaprine (FLEXERIL) 10 mg tablet Take 1 tablet (10 mg total) by mouth 3 (three) times a day as needed for muscle spasms 30 tablet 0   • doxazosin (CARDURA) 2 mg tablet TAKE 1 TABLET BY MOUTH EVERY DAY 90 tablet 1   • fluticasone (FLONASE) 50 mcg/act nasal spray 2 sprays into each nostril daily     • meloxicam (MOBIC) 15 mg tablet TAKE 1 TABLET (15 MG TOTAL) BY MOUTH DAILY. 30 tablet 1   • metoprolol succinate (TOPROL-XL) 25 mg 24 hr tablet TAKE 1/2 TABLET BY MOUTH EVERY DAY 45 tablet 2   • mometasone (ELOCON) 0.1 % cream Apply topically daily 30 g 0   • nitroglycerin (NITROSTAT) 0.4 mg SL tablet Place 1 tablet (0.4 mg total) under the tongue every 5 (five) minutes as needed for chest pain 100 tablet 0   • pantoprazole (PROTONIX) 40 mg tablet Take 40 mg by mouth daily  0   • SUMAtriptan (IMITREX) 50 mg tablet TAKE 1 TABLET (50 MG TOTAL) BY MOUTH EVERY 2 (TWO) HOURS AS NEEDED FOR MIGRAINE  MG/DAY 9 tablet 3   • topiramate (TOPAMAX) 50 MG tablet TAKE 1 TABLET BY MOUTH EVERY 12 HOURS 180 tablet 2   • traMADol (Ultram) 50 mg tablet Take 1 tablet (50 mg total) by mouth every 6 (six) hours as needed for moderate pain 20 tablet 0   • traZODone (DESYREL) 100 mg tablet TAKE 1 TABLET BY MOUTH DAILY AT BEDTIME 90 tablet 3   • valsartan (DIOVAN) 320 MG tablet TAKE 1 TABLET BY MOUTH EVERY DAY 90 tablet 1   • venlafaxine (EFFEXOR-XR) 75 mg 24 hr capsule TAKE 3 CAPSULES (225 MG TOTAL) BY MOUTH DAILY WITH BREAKFAST 270 capsule 2   • Ventolin  (90 Base) MCG/ACT inhaler Inhale 2 puffs 3 (three) times a day as needed for wheezing 18 g 0   • promethazine (PHENERGAN) 25 mg tablet Take 1 tablet (25 mg total) by mouth every 6 (six) hours as needed for nausea or vomiting 30 tablet 5     No current facility-administered medications for  this visit.     No Known Allergies   Immunizations:     Immunization History   Administered Date(s) Administered   • COVID-19 MODERNA VACC 0.5 ML IM 04/30/2021, 05/28/2021, 12/01/2021   • COVID-19 Moderna mRNA Vaccine 12 Yr+ 50 mcg/0.5 mL (Spikevax) 01/08/2024   • INFLUENZA 09/08/2012, 10/01/2018, 10/01/2019   • Influenza, high dose seasonal 0.7 mL 09/23/2022, 10/03/2023   • Influenza, injectable, quadrivalent, preservative free 0.5 mL 09/22/2020   • Influenza, recombinant, quadrivalent,injectable, preservative free 09/13/2021   • Pneumococcal Conjugate Vaccine 20-valent (Pcv20), Polysace 02/27/2023   • Pneumococcal Polysaccharide PPV23 02/19/2019   • Tdap 02/19/2019, 09/22/2023      Health Maintenance:         Topic Date Due   • Colorectal Cancer Screening  09/17/2024   • Hepatitis C Screening  Completed         Topic Date Due   • COVID-19 Vaccine (5 - 2023-24 season) 03/04/2024      Medicare Screening Tests and Risk Assessments:     Reuben is here for his Subsequent Wellness visit. Last Medicare Wellness visit information reviewed, patient interviewed and updates made to the record today.      Health Risk Assessment:   Patient rates overall health as excellent. Patient feels that their physical health rating is much better. Patient is very satisfied with their life. Eyesight was rated as same. Hearing was rated as same. Patient feels that their emotional and mental health rating is much better. Patient states they are always unusually tired/fatigued. Pain experienced in the last 7 days has been a lot. Patient states that he has experienced no weight loss or gain in last 6 months.     Depression Screening:   PHQ-2 Score: 0      Fall Risk Screening:   In the past year, patient has experienced: no history of falling in past year      Home Safety:  Patient does not have trouble with stairs inside or outside of their home. Patient has working smoke alarms and has working carbon monoxide detector. Home safety hazards  include: household clutter.     Nutrition:   Current diet is Other (please comment). Beer    Medications:   Patient is not currently taking any over-the-counter supplements. Patient is able to manage medications.     Activities of Daily Living (ADLs)/Instrumental Activities of Daily Living (IADLs):   Walk and transfer into and out of bed and chair?: Yes  Dress and groom yourself?: Yes    Bathe or shower yourself?: Yes    Feed yourself? Yes  Do your laundry/housekeeping?: Yes  Manage your money, pay your bills and track your expenses?: Yes  Make your own meals?: Yes    Do your own shopping?: Yes    Previous Hospitalizations:   Any hospitalizations or ED visits within the last 12 months?: Yes    How many hospitalizations have you had in the last year?: 1-2    Advance Care Planning:   Living will: Yes    Durable POA for healthcare: Yes    Advanced directive: Yes      Cognitive Screening:   Provider or family/friend/caregiver concerned regarding cognition?: No    PREVENTIVE SCREENINGS      Cardiovascular Screening:    General: Screening Current      Diabetes Screening:     General: Screening Not Indicated and History Diabetes      Colorectal Cancer Screening:     General: Screening Current      Prostate Cancer Screening:    General: Screening Current      Osteoporosis Screening:    General: Screening Not Indicated      Abdominal Aortic Aneurysm (AAA) Screening:    Risk factors include: age between 65-76 yo        Lung Cancer Screening:     General: Screening Not Indicated      Hepatitis C Screening:    General: Screening Current    Screening, Brief Intervention, and Referral to Treatment (SBIRT)    Screening  Typical number of drinks in a day: 6  Typical number of drinks in a week: 30  Interpretation: Risky drinking behavior.    AUDIT-C Screenin) How often did you have a drink containing alcohol in the past year? 4 or more times a week  2) How many drinks did you have on a typical day when you were drinking in the  "past year? 5 to 6  3) How often did you have 6 or more drinks on one occasion in the past year? daily or almost daily    AUDIT-C Score: 8  Interpretation: Score 4-12 (male): POSITIVE screen for alcohol misuse    AUDIT Screenin) How often during the last year have you found that you were not able to stop drinking once you had started? 1 - less than monthly  5) How often during the last year have you failed to do what was normally expected from you because of drinking? 1 - less than monthly  6) How often during the last year have you needed a first drink in the morning to get yourself going after a heavy drinking session? 0 - never  7) How often during the last year have you had a feeling of guilt or remorse after drinking? 0 - never  8) How often during the last year have you been unable to remember what happened the night before because you had been drinking? 0 - never  9) Have you or someone else been injured as a result of your drinking? 0 - no  10) Has a relative or friend or a doctor or another health worker been concerned about your drinking or suggested you cut down? 4 - yes, during the last year    AUDIT Score: 14  Interpretation: Harmful or hazardous alcohol consumption    Single Item Drug Screening:  How often have you used an illegal drug (including marijuana) or a prescription medication for non-medical reasons in the past year? never    Single Item Drug Screen Score: 0  Interpretation: Negative screen for possible drug use disorder    No results found.     Physical Exam:     /80   Pulse 76   Resp 16   Ht 5' 3\" (1.6 m)   Wt 76.7 kg (169 lb)   SpO2 98%   BMI 29.94 kg/m²     Physical Exam  Constitutional:       Appearance: He is well-developed.   HENT:      Head: Normocephalic.      Right Ear: External ear normal.      Left Ear: External ear normal.      Nose: Nose normal.   Eyes:      Extraocular Movements: Extraocular movements intact.      Conjunctiva/sclera: Conjunctivae normal.      " Pupils: Pupils are equal, round, and reactive to light.   Neck:      Thyroid: No thyromegaly.   Cardiovascular:      Rate and Rhythm: Normal rate and regular rhythm.      Heart sounds: Normal heart sounds.   Pulmonary:      Effort: Pulmonary effort is normal.      Breath sounds: Normal breath sounds.   Abdominal:      General: Bowel sounds are normal.      Palpations: Abdomen is soft.   Musculoskeletal:         General: Tenderness (right superior shoulder) present. Normal range of motion.      Cervical back: Normal range of motion.      Comments: Restricted right shoulder abduction to 90 degrees.   Skin:     General: Skin is warm and dry.   Neurological:      Mental Status: He is alert and oriented to person, place, and time.   Psychiatric:         Mood and Affect: Mood normal.         Behavior: Behavior normal.          Rich Delgado DO

## 2024-03-04 NOTE — PATIENT INSTRUCTIONS
Medicare Preventive Visit Patient Instructions  Thank you for completing your Welcome to Medicare Visit or Medicare Annual Wellness Visit today. Your next wellness visit will be due in one year (3/5/2025).  The screening/preventive services that you may require over the next 5-10 years are detailed below. Some tests may not apply to you based off risk factors and/or age. Screening tests ordered at today's visit but not completed yet may show as past due. Also, please note that scanned in results may not display below.  Preventive Screenings:  Service Recommendations Previous Testing/Comments   Colorectal Cancer Screening  Colonoscopy    Fecal Occult Blood Test (FOBT)/Fecal Immunochemical Test (FIT)  Fecal DNA/Cologuard Test  Flexible Sigmoidoscopy Age: 45-75 years old   Colonoscopy: every 10 years (May be performed more frequently if at higher risk)  OR  FOBT/FIT: every 1 year  OR  Cologuard: every 3 years  OR  Sigmoidoscopy: every 5 years  Screening may be recommended earlier than age 45 if at higher risk for colorectal cancer. Also, an individualized decision between you and your healthcare provider will decide whether screening between the ages of 76-85 would be appropriate. Colonoscopy: 11/15/2008  FOBT/FIT: Not on file  Cologuard: 09/17/2021  Sigmoidoscopy: Not on file    Screening Current     Prostate Cancer Screening Individualized decision between patient and health care provider in men between ages of 55-69   Medicare will cover every 12 months beginning on the day after your 50th birthday PSA: 0.43 ng/mL     Screening Current     Hepatitis C Screening Once for adults born between 1945 and 1965  More frequently in patients at high risk for Hepatitis C Hep C Antibody: 08/09/2017    Screening Current   Diabetes Screening 1-2 times per year if you're at risk for diabetes or have pre-diabetes Fasting glucose: 121 mg/dL (2/9/2024)  A1C: 4.6 % (2/14/2023)  Screening Not Indicated  History Diabetes   Cholesterol  Screening Once every 5 years if you don't have a lipid disorder. May order more often based on risk factors. Lipid panel: 02/09/2024  Screening Current      Other Preventive Screenings Covered by Medicare:  Abdominal Aortic Aneurysm (AAA) Screening: covered once if your at risk. You're considered to be at risk if you have a family history of AAA or a male between the age of 65-75 who smoking at least 100 cigarettes in your lifetime.  Lung Cancer Screening: covers low dose CT scan once per year if you meet all of the following conditions: (1) Age 55-77; (2) No signs or symptoms of lung cancer; (3) Current smoker or have quit smoking within the last 15 years; (4) You have a tobacco smoking history of at least 20 pack years (packs per day x number of years you smoked); (5) You get a written order from a healthcare provider.  Glaucoma Screening: covered annually if you're considered high risk: (1) You have diabetes OR (2) Family history of glaucoma OR (3)  aged 50 and older OR (4)  American aged 65 and older  Osteoporosis Screening: covered every 2 years if you meet one of the following conditions: (1) Have a vertebral abnormality; (2) On glucocorticoid therapy for more than 3 months; (3) Have primary hyperparathyroidism; (4) On osteoporosis medications and need to assess response to drug therapy.  HIV Screening: covered annually if you're between the age of 15-65. Also covered annually if you are younger than 15 and older than 65 with risk factors for HIV infection. For pregnant patients, it is covered up to 3 times per pregnancy.    Immunizations:  Immunization Recommendations   Influenza Vaccine Annual influenza vaccination during flu season is recommended for all persons aged >= 6 months who do not have contraindications   Pneumococcal Vaccine   * Pneumococcal conjugate vaccine = PCV13 (Prevnar 13), PCV15 (Vaxneuvance), PCV20 (Prevnar 20)  * Pneumococcal polysaccharide vaccine = PPSV23  (Pneumovax) Adults 19-65 yo with certain risk factors or if 65+ yo  If never received any pneumonia vaccine: recommend Prevnar 20 (PCV20)  Give PCV20 if previously received 1 dose of PCV13 or PPSV23   Hepatitis B Vaccine 3 dose series if at intermediate or high risk (ex: diabetes, end stage renal disease, liver disease)   Respiratory syncytial virus (RSV) Vaccine - COVERED BY MEDICARE PART D  * RSVPreF3 (Arexvy) CDC recommends that adults 60 years of age and older may receive a single dose of RSV vaccine using shared clinical decision-making (SCDM)   Tetanus (Td) Vaccine - COST NOT COVERED BY MEDICARE PART B Following completion of primary series, a booster dose should be given every 10 years to maintain immunity against tetanus. Td may also be given as tetanus wound prophylaxis.   Tdap Vaccine - COST NOT COVERED BY MEDICARE PART B Recommended at least once for all adults. For pregnant patients, recommended with each pregnancy.   Shingles Vaccine (Shingrix) - COST NOT COVERED BY MEDICARE PART B  2 shot series recommended in those 19 years and older who have or will have weakened immune systems or those 50 years and older     Health Maintenance Due:      Topic Date Due   • Colorectal Cancer Screening  09/17/2024   • Hepatitis C Screening  Completed     Immunizations Due:      Topic Date Due   • COVID-19 Vaccine (5 - 2023-24 season) 03/04/2024     Advance Directives   What are advance directives?  Advance directives are legal documents that state your wishes and plans for medical care. These plans are made ahead of time in case you lose your ability to make decisions for yourself. Advance directives can apply to any medical decision, such as the treatments you want, and if you want to donate organs.   What are the types of advance directives?  There are many types of advance directives, and each state has rules about how to use them. You may choose a combination of any of the following:  Living will:  This is a  written record of the treatment you want. You can also choose which treatments you do not want, which to limit, and which to stop at a certain time. This includes surgery, medicine, IV fluid, and tube feedings.   Durable power of  for healthcare (DPAHC):  This is a written record that states who you want to make healthcare choices for you when you are unable to make them for yourself. This person, called a proxy, is usually a family member or a friend. You may choose more than 1 proxy.  Do not resuscitate (DNR) order:  A DNR order is used in case your heart stops beating or you stop breathing. It is a request not to have certain forms of treatment, such as CPR. A DNR order may be included in other types of advance directives.  Medical directive:  This covers the care that you want if you are in a coma, near death, or unable to make decisions for yourself. You can list the treatments you want for each condition. Treatment may include pain medicine, surgery, blood transfusions, dialysis, IV or tube feedings, and a ventilator (breathing machine).  Values history:  This document has questions about your views, beliefs, and how you feel and think about life. This information can help others choose the care that you would choose.  Why are advance directives important?  An advance directive helps you control your care. Although spoken wishes may be used, it is better to have your wishes written down. Spoken wishes can be misunderstood, or not followed. Treatments may be given even if you do not want them. An advance directive may make it easier for your family to make difficult choices about your care.   Weight Management   Why it is important to manage your weight:  Being overweight increases your risk of health conditions such as heart disease, high blood pressure, type 2 diabetes, and certain types of cancer. It can also increase your risk for osteoarthritis, sleep apnea, and other respiratory problems. Aim for  a slow, steady weight loss. Even a small amount of weight loss can lower your risk of health problems.  How to lose weight safely:  A safe and healthy way to lose weight is to eat fewer calories and get regular exercise. You can lose up about 1 pound a week by decreasing the number of calories you eat by 500 calories each day.   Healthy meal plan for weight management:  A healthy meal plan includes a variety of foods, contains fewer calories, and helps you stay healthy. A healthy meal plan includes the following:  Eat whole-grain foods more often.  A healthy meal plan should contain fiber. Fiber is the part of grains, fruits, and vegetables that is not broken down by your body. Whole-grain foods are healthy and provide extra fiber in your diet. Some examples of whole-grain foods are whole-wheat breads and pastas, oatmeal, brown rice, and bulgur.  Eat a variety of vegetables every day.  Include dark, leafy greens such as spinach, kale, zaida greens, and mustard greens. Eat yellow and orange vegetables such as carrots, sweet potatoes, and winter squash.   Eat a variety of fruits every day.  Choose fresh or canned fruit (canned in its own juice or light syrup) instead of juice. Fruit juice has very little or no fiber.  Eat low-fat dairy foods.  Drink fat-free (skim) milk or 1% milk. Eat fat-free yogurt and low-fat cottage cheese. Try low-fat cheeses such as mozzarella and other reduced-fat cheeses.  Choose meat and other protein foods that are low in fat.  Choose beans or other legumes such as split peas or lentils. Choose fish, skinless poultry (chicken or turkey), or lean cuts of red meat (beef or pork). Before you cook meat or poultry, cut off any visible fat.   Use less fat and oil.  Try baking foods instead of frying them. Add less fat, such as margarine, sour cream, regular salad dressing and mayonnaise to foods. Eat fewer high-fat foods. Some examples of high-fat foods include french fries, doughnuts, ice  "cream, and cakes.  Eat fewer sweets.  Limit foods and drinks that are high in sugar. This includes candy, cookies, regular soda, and sweetened drinks.  Exercise:  Exercise at least 30 minutes per day on most days of the week. Some examples of exercise include walking, biking, dancing, and swimming. You can also fit in more physical activity by taking the stairs instead of the elevator or parking farther away from stores. Ask your healthcare provider about the best exercise plan for you.   Alcohol Use and Your Health    Drinking too much can harm your health.  Excessive alcohol use leads to about 88,000 death in the [place] each year, and shortens the life of those who diet by almost 30 years.  Further, excessive drinking cost the economy $249 billion in 2010.  Most excessive drinkers are not alcohol dependent.    Excessive alcohol use has immediate effects that increase the risk of many harmful health conditions.  These are most often the result of binge drinking.  Over time, excessive alcohol use can lead to the development of chronic diseases and other series health problems.    What is considered a \"drink\"?        Excessive alcohol use includes:  Binge Drinking: For women, 4 or more drinks consumed on one occasion. For men, 5 or more drinks consumed on one occasion.  Heavy Drinking: For women, 8 or more drinks per week. For men, 15 or more drinks per week  Any alcohol used by pregnant women  Any alcohol used by those under the age of 21 years    If you choose to drink, do so in moderation:  Do not drink at all if you are under the age of 21, or if you are or may be pregnant, or have health problems that could be made worse by drinking.  For women, up to 1 drink per day  For men, up to 2 drinks a day    No one should begin drinking or drink more frequently based on potential health benefits    Short-Term Health Risks:  Injuries: motor vehicle crashes, falls, drownings, burns  Violence: homicide, suicide, " sexual assault, intimate partner violence  Alcohol poisoning  Reproductive health: risky sexual behaviors, unintended prengnacy, sexually transmitted diseases, miscarriage, stillbirth, fetal alcohol syndrome    Long-Term Health Risks:  Chronic diseases: high blood pressure, heart disease, stroke, liver disease, digestive problems  Cancers: breast, mouth and throat, liver, colon  Learning and memory problems: dementia, poor school performance  Mental health: depression, anxiety, insomnia  Social problems: lost productivity, family problems, unemployment  Alcohol dependence    For support and more information:  Substance Abuse and Mental Health Services Administration  [address]  [place] [address]  Web Address: http://www.[facility].gov    Alcoholics Anonymous        Web Address: http://www.aa.org    https://www.cdc.gov/alcohol/fact-sheets/alcohol-use.htm     © Copyright [employer] 2018 Information is for End User's use only and may not be sold, redistributed or otherwise used for commercial purposes. All illustrations and images included in CareNotes® are the copyrighted property of [employer]. or [employer]

## 2024-03-30 DIAGNOSIS — I21.4 NSTEMI (NON-ST ELEVATED MYOCARDIAL INFARCTION) (HCC): ICD-10-CM

## 2024-04-01 DIAGNOSIS — M25.512 ACUTE PAIN OF LEFT SHOULDER: ICD-10-CM

## 2024-04-01 RX ORDER — MELOXICAM 15 MG/1
15 TABLET ORAL DAILY
Qty: 30 TABLET | Refills: 1 | Status: SHIPPED | OUTPATIENT
Start: 2024-04-01

## 2024-04-01 RX ORDER — ATORVASTATIN CALCIUM 80 MG/1
TABLET, FILM COATED ORAL
Qty: 90 TABLET | Refills: 2 | Status: SHIPPED | OUTPATIENT
Start: 2024-04-01

## 2024-04-03 DIAGNOSIS — I21.4 NSTEMI (NON-ST ELEVATED MYOCARDIAL INFARCTION) (HCC): ICD-10-CM

## 2024-04-03 RX ORDER — METOPROLOL SUCCINATE 25 MG/1
12.5 TABLET, EXTENDED RELEASE ORAL DAILY
Qty: 45 TABLET | Refills: 2 | Status: SHIPPED | OUTPATIENT
Start: 2024-04-03

## 2024-04-27 DIAGNOSIS — G43.109 MIGRAINE WITH AURA AND WITHOUT STATUS MIGRAINOSUS, NOT INTRACTABLE: ICD-10-CM

## 2024-04-27 RX ORDER — SUMATRIPTAN 50 MG/1
50 TABLET, FILM COATED ORAL EVERY 2 HOUR PRN
Qty: 9 TABLET | Refills: 3 | Status: SHIPPED | OUTPATIENT
Start: 2024-04-27

## 2024-05-15 DIAGNOSIS — F33.1 MODERATE EPISODE OF RECURRENT MAJOR DEPRESSIVE DISORDER (HCC): Primary | ICD-10-CM

## 2024-05-15 RX ORDER — ARIPIPRAZOLE 2 MG/1
2 TABLET ORAL DAILY
Qty: 30 TABLET | Refills: 1 | Status: SHIPPED | OUTPATIENT
Start: 2024-05-15

## 2024-05-16 DIAGNOSIS — G43.109 MIGRAINE WITH AURA AND WITHOUT STATUS MIGRAINOSUS, NOT INTRACTABLE: ICD-10-CM

## 2024-05-17 DIAGNOSIS — F41.9 ANXIETY: ICD-10-CM

## 2024-05-17 RX ORDER — PROMETHAZINE HYDROCHLORIDE 25 MG/1
TABLET ORAL
Qty: 30 TABLET | Refills: 5 | Status: SHIPPED | OUTPATIENT
Start: 2024-05-17

## 2024-05-17 RX ORDER — VENLAFAXINE HYDROCHLORIDE 75 MG/1
225 CAPSULE, EXTENDED RELEASE ORAL
Qty: 270 CAPSULE | Refills: 1 | Status: SHIPPED | OUTPATIENT
Start: 2024-05-17

## 2024-05-24 DIAGNOSIS — M25.512 ACUTE PAIN OF LEFT SHOULDER: ICD-10-CM

## 2024-05-24 RX ORDER — MELOXICAM 15 MG/1
15 TABLET ORAL DAILY
Qty: 30 TABLET | Refills: 5 | Status: SHIPPED | OUTPATIENT
Start: 2024-05-24

## 2024-05-25 DIAGNOSIS — J45.20 MILD INTERMITTENT ASTHMA WITHOUT COMPLICATION: ICD-10-CM

## 2024-05-25 RX ORDER — ALBUTEROL SULFATE 90 UG/1
2 AEROSOL, METERED RESPIRATORY (INHALATION) 3 TIMES DAILY PRN
Qty: 18 G | Refills: 5 | Status: SHIPPED | OUTPATIENT
Start: 2024-05-25

## 2024-06-10 DIAGNOSIS — F33.1 MODERATE EPISODE OF RECURRENT MAJOR DEPRESSIVE DISORDER (HCC): ICD-10-CM

## 2024-06-11 RX ORDER — ARIPIPRAZOLE 2 MG/1
2 TABLET ORAL DAILY
Qty: 90 TABLET | Refills: 1 | Status: SHIPPED | OUTPATIENT
Start: 2024-06-11

## 2024-07-10 DIAGNOSIS — F41.9 ANXIETY: ICD-10-CM

## 2024-07-10 RX ORDER — TRAZODONE HYDROCHLORIDE 100 MG/1
100 TABLET ORAL
Qty: 100 TABLET | Refills: 1 | Status: SHIPPED | OUTPATIENT
Start: 2024-07-10

## 2024-07-31 ENCOUNTER — TELEPHONE (OUTPATIENT)
Age: 69
End: 2024-07-31

## 2024-07-31 ENCOUNTER — OFFICE VISIT (OUTPATIENT)
Dept: FAMILY MEDICINE CLINIC | Facility: CLINIC | Age: 69
End: 2024-07-31
Payer: COMMERCIAL

## 2024-07-31 VITALS
BODY MASS INDEX: 31.89 KG/M2 | HEART RATE: 104 BPM | OXYGEN SATURATION: 98 % | SYSTOLIC BLOOD PRESSURE: 130 MMHG | HEIGHT: 63 IN | DIASTOLIC BLOOD PRESSURE: 80 MMHG | WEIGHT: 180 LBS

## 2024-07-31 DIAGNOSIS — R01.1 MURMUR: ICD-10-CM

## 2024-07-31 DIAGNOSIS — R06.02 SOB (SHORTNESS OF BREATH): Primary | ICD-10-CM

## 2024-07-31 PROCEDURE — 99213 OFFICE O/P EST LOW 20 MIN: CPT | Performed by: FAMILY MEDICINE

## 2024-07-31 PROCEDURE — G2211 COMPLEX E/M VISIT ADD ON: HCPCS | Performed by: FAMILY MEDICINE

## 2024-07-31 NOTE — TELEPHONE ENCOUNTER
Patient called in requesting a stronger inhaler be prescribed to him. Patient says when he goes outside at times he finds his self having to catch his breath due to the air and wheezing. Please advise.

## 2024-07-31 NOTE — PROGRESS NOTES
Ambulatory Visit  Name: Reuben García      : 1955      MRN: [id number]  Encounter Provider: Rich Delgado DO  Encounter Date: 2024   Encounter department: [facility] [address]      Assessment & Plan  SOB (shortness of breath)  Discussed with him that I believe this is probably worsening of his valvular disease, we will check an echo.  I did discuss with him that if this shortness of breath worsens he should go to the emergency room.  Orders:    Echo complete w/ contrast if indicated; Future    Murmur    Orders:    Echo complete w/ contrast if indicated; Future         History of Present Illness     Patient comes in today complaining of progressive shortness of breath that he blames on his asthma.  He states that it is worse when he is outside doing work.  He also states is worse when he lays flat.  He has been using his rescue inhaler which has not been helping.  He denies any chest pain.      Review of Systems   Constitutional:  Negative for chills, fatigue and fever.   HENT:  Negative for congestion, ear pain, hearing loss, postnasal drip, rhinorrhea and sore throat.    Eyes:  Negative for pain and visual disturbance.   Respiratory:  Positive for shortness of breath. Negative for chest tightness and wheezing.    Cardiovascular:  Negative for chest pain and leg swelling.   Gastrointestinal:  Negative for abdominal distention, abdominal pain, constipation, diarrhea and vomiting.   Endocrine: Negative for cold intolerance and heat intolerance.   Genitourinary:  Negative for difficulty urinating, frequency and urgency.   Musculoskeletal:  Negative for arthralgias and gait problem.   Skin:  Negative for color change.   Neurological:  Negative for dizziness, tremors, syncope, numbness and headaches.   Hematological:  Negative for adenopathy.   Psychiatric/Behavioral:  Negative for agitation, confusion and sleep disturbance. The patient is not nervous/anxious.   "    Objective     /80 (BP Location: Left arm, Patient Position: Sitting, Cuff Size: Standard)   Pulse 104   Ht 5' 3\" (1.6 m)   Wt 81.6 kg (180 lb)   SpO2 98%   BMI 31.89 kg/m²     Physical Exam  Constitutional:       Appearance: He is well-developed.   HENT:      Head: Normocephalic.      Right Ear: External ear normal.      Left Ear: External ear normal.      Nose: Nose normal.   Eyes:      Extraocular Movements: Extraocular movements intact.      Conjunctiva/sclera: Conjunctivae normal.      Pupils: Pupils are equal, round, and reactive to light.   Neck:      Thyroid: No thyromegaly.   Cardiovascular:      Rate and Rhythm: Normal rate and regular rhythm.      Heart sounds: Murmur (4 out of 6 on the left sternal border) heard.   Pulmonary:      Effort: Pulmonary effort is normal.      Breath sounds: Normal breath sounds.   Abdominal:      General: Bowel sounds are normal.      Palpations: Abdomen is soft.   Musculoskeletal:         General: Normal range of motion.      Cervical back: Normal range of motion.   Skin:     General: Skin is warm and dry.   Neurological:      Mental Status: He is alert and oriented to person, place, and time.   Psychiatric:         Mood and Affect: Mood normal.         Behavior: Behavior normal.         Rich Delgado,      "

## 2024-08-07 ENCOUNTER — HOSPITAL ENCOUNTER (OUTPATIENT)
Dept: NON INVASIVE DIAGNOSTICS | Facility: HOSPITAL | Age: 69
Discharge: HOME | End: 2024-08-07
Payer: COMMERCIAL

## 2024-08-07 VITALS
HEART RATE: 99 BPM | BODY MASS INDEX: 31.89 KG/M2 | SYSTOLIC BLOOD PRESSURE: 130 MMHG | HEIGHT: 63 IN | WEIGHT: 180 LBS | DIASTOLIC BLOOD PRESSURE: 80 MMHG

## 2024-08-07 DIAGNOSIS — R06.02 SOB (SHORTNESS OF BREATH): ICD-10-CM

## 2024-08-07 DIAGNOSIS — I34.0 NONRHEUMATIC MITRAL VALVE REGURGITATION: Primary | ICD-10-CM

## 2024-08-07 DIAGNOSIS — R01.1 MURMUR: ICD-10-CM

## 2024-08-07 LAB
AORTIC ROOT: 3.6 CM
AORTIC VALVE MEAN VELOCITY: 11.7 M/S
APICAL FOUR CHAMBER EJECTION FRACTION: 69 %
ASCENDING AORTA: 3.7 CM
AV AREA BY CONTINUOUS VTI: 2.4 CM2
AV AREA PEAK VELOCITY: 2.4 CM2
AV LVOT MEAN GRADIENT: 2 MMHG
AV LVOT PEAK GRADIENT: 3 MMHG
AV MEAN GRADIENT: 6 MMHG
AV PEAK GRADIENT: 11 MMHG
AV REGURGITATION PRESSURE HALF TIME: 526 MS
AV VALVE AREA: 2.37 CM2
AV VELOCITY RATIO: 0.54
BSA FOR ECHO PROCEDURE: 1.85 M2
DOP CALC AO PEAK VEL: 1.67 M/S
DOP CALC AO VTI: 27.51 CM
DOP CALC LVOT AREA: 4.52 CM2
DOP CALC LVOT CARDIAC INDEX: 3.15 L/MIN/M2
DOP CALC LVOT CARDIAC OUTPUT: 5.83 L/MIN
DOP CALC LVOT DIAMETER: 2.4 CM
DOP CALC LVOT PEAK VEL VTI: 14.41 CM
DOP CALC LVOT PEAK VEL: 0.9 M/S
DOP CALC LVOT STROKE INDEX: 36.2 ML/M2
DOP CALC LVOT STROKE VOLUME: 65.16
DOP CALC MV VTI: 38.33 CM
E WAVE DECELERATION TIME: 275 MS
E/A RATIO: 1.63
FRACTIONAL SHORTENING: 46 (ref 28–44)
INTERVENTRICULAR SEPTUM IN DIASTOLE (PARASTERNAL SHORT AXIS VIEW): 1.2 CM
INTERVENTRICULAR SEPTUM: 1.2 CM (ref 0.6–1.1)
LAAS-AP2: 53.8 CM2
LAAS-AP4: 49.7 CM2
LEFT ATRIUM AREA SYSTOLE SINGLE PLANE A4C: 48.3 CM2
LEFT ATRIUM SIZE: 6.4 CM
LEFT ATRIUM VOLUME (MOD BIPLANE): 254 ML
LEFT ATRIUM VOLUME INDEX (MOD BIPLANE): 137.3 ML/M2
LEFT INTERNAL DIMENSION IN SYSTOLE: 3 CM (ref 2.1–4)
LEFT VENTRICULAR INTERNAL DIMENSION IN DIASTOLE: 5.6 CM (ref 3.5–6)
LEFT VENTRICULAR POSTERIOR WALL IN END DIASTOLE: 1.3 CM
LEFT VENTRICULAR STROKE VOLUME: 115 ML
LVSV (TEICH): 115 ML
MITRAL REGURGITATION PEAK VELOCITY: 5.68 M/S
MITRAL VALVE MEAN INFLOW VELOCITY: 4.44 M/S
MITRAL VALVE REGURGITANT PEAK GRADIENT: 129 MMHG
MV E'TISSUE VEL-LAT: 10 CM/S
MV E'TISSUE VEL-SEP: 10 CM/S
MV MEAN GRADIENT: 6 MMHG
MV PEAK A VEL: 0.84 M/S
MV PEAK E VEL: 137 CM/S
MV PEAK GRADIENT: 13 MMHG
MV STENOSIS PRESSURE HALF TIME: 80 MS
MV VALVE AREA BY CONTINUITY EQUATION: 1.7 CM2
MV VALVE AREA P 1/2 METHOD: 2.75
RIGHT ATRIUM AREA SYSTOLE A4C: 20.6 CM2
RIGHT VENTRICLE ID DIMENSION: 4.5 CM
SL CV AV DECELERATION TIME RETROGRADE: 1813 MS
SL CV AV PEAK GRADIENT RETROGRADE: 87 MMHG
SL CV DOP CALC MV VTI RETROGRADE: 149.7 CM
SL CV LEFT ATRIUM LENGTH A2C: 8.7 CM
SL CV LV EF: 69
SL CV MV MEAN GRADIENT RETROGRADE: 86 MMHG
SL CV PED ECHO LEFT VENTRICLE DIASTOLIC VOLUME (MOD BIPLANE) 2D: 151 ML
SL CV PED ECHO LEFT VENTRICLE SYSTOLIC VOLUME (MOD BIPLANE) 2D: 36 ML
TRICUSPID ANNULAR PLANE SYSTOLIC EXCURSION: 2.2 CM

## 2024-08-07 PROCEDURE — 93306 TTE W/DOPPLER COMPLETE: CPT

## 2024-08-07 PROCEDURE — 93306 TTE W/DOPPLER COMPLETE: CPT | Performed by: INTERNAL MEDICINE

## 2024-08-08 ENCOUNTER — TELEPHONE (OUTPATIENT)
Dept: CARDIAC SURGERY | Facility: CLINIC | Age: 69
End: 2024-08-08

## 2024-08-08 DIAGNOSIS — Z00.6 ENCOUNTER FOR EXAMINATION FOR NORMAL COMPARISON OR CONTROL IN CLINICAL RESEARCH PROGRAM: ICD-10-CM

## 2024-08-08 NOTE — TELEPHONE ENCOUNTER
Left voicemail for patient to call our office to schedule consultation.    PLEASE TRANSFER CALL TO OFFICE

## 2024-08-12 ENCOUNTER — NURSE TRIAGE (OUTPATIENT)
Dept: VASCULAR SURGERY | Facility: CLINIC | Age: 69
End: 2024-08-12

## 2024-08-12 NOTE — TELEPHONE ENCOUNTER
"Answer Assessment - Initial Assessment Questions  1. RESPIRATORY STATUS: \"Describe your breathing?\" (e.g., wheezing, shortness of breath, unable to speak, severe coughing)       SOB   2. ONSET: \"When did this breathing problem begin?\"       1 month  3. PATTERN \"Does the difficult breathing come and go, or has it been constant since it started?\"       On exertion  4. SEVERITY: \"How bad is your breathing?\" (e.g., mild, moderate, severe)     - MILD: No SOB at rest, mild SOB with walking, speaks normally in sentences, can lay down, no retractions, pulse < 100.     - MODERATE: SOB at rest, SOB with minimal exertion and prefers to sit, cannot lie down flat, speaks in phrases, mild retractions, audible wheezing, pulse 100-120.     - SEVERE: Very SOB at rest, speaks in single words, struggling to breathe, sitting hunched forward, retractions, pulse > 120       8/10  5. RECURRENT SYMPTOM: \"Have you had difficulty breathing before?\" If Yes, ask: \"When was the last time?\" and \"What happened that time?\"       no  6. CARDIAC HISTORY: \"Do you have any history of heart disease?\" (e.g., heart attack, angina, bypass surgery, angioplasty)       Leaking valve  7. LUNG HISTORY: \"Do you have any history of lung disease?\"  (e.g., pulmonary embolus, asthma, emphysema)      asthma  8. CAUSE: \"What do you think is causing the breathing problem?\"       unsure  9. OTHER SYMPTOMS: \"Do you have any other symptoms? (e.g., dizziness, runny nose, cough, chest pain, fever)      no    11. TRAVEL: \"Have you traveled out of the country in the last month?\" (e.g., travel history, exposures)        no    Protocols used: Breathing Difficulty-ADULT-[place]  Patients spouse, Maureen called [phone].  Patient has an appt on 8/14/24 to see Dr. Sanford for mitral valve regurg referred by his PCP dr. Delgado. Patient has history of MI and stent placement 8/25/22.  Over the past month, patient has become increasingly short of breath on exertion, to the point that " he mostly stays inactive because of the shortness of breath.  Even walking short distances in the house  becomes too much for him.  He denies chest pain.   Sent to cardiovascular clinical to advise.

## 2024-08-12 NOTE — TELEPHONE ENCOUNTER
Called Maureen, patients spouse back.  I told her I forwarded the triage call to Dr. Delgado .  In the meantime, she said she spoke to him on the phone , and even on the phone, he sounded short of breath talking.   My recommendation was to place a call to dr. Delgado, and also to take patient to [facility] for immediate evaluation.  Maureen understood directions and agreed.

## 2024-08-13 DIAGNOSIS — I10 ESSENTIAL HYPERTENSION: ICD-10-CM

## 2024-08-13 DIAGNOSIS — I25.10 CORONARY ARTERY DISEASE INVOLVING NATIVE CORONARY ARTERY OF NATIVE HEART WITHOUT ANGINA PECTORIS: ICD-10-CM

## 2024-08-14 ENCOUNTER — TELEPHONE (OUTPATIENT)
Dept: CARDIAC SURGERY | Facility: CLINIC | Age: 69
End: 2024-08-14

## 2024-08-14 ENCOUNTER — CONSULT (OUTPATIENT)
Dept: CARDIAC SURGERY | Facility: CLINIC | Age: 69
End: 2024-08-14
Payer: COMMERCIAL

## 2024-08-14 VITALS
BODY MASS INDEX: 31.8 KG/M2 | HEART RATE: 97 BPM | DIASTOLIC BLOOD PRESSURE: 102 MMHG | OXYGEN SATURATION: 96 % | SYSTOLIC BLOOD PRESSURE: 188 MMHG | WEIGHT: 179.5 LBS | HEIGHT: 63 IN

## 2024-08-14 DIAGNOSIS — I34.0 NONRHEUMATIC MITRAL VALVE REGURGITATION: Primary | ICD-10-CM

## 2024-08-14 DIAGNOSIS — Z13.6 ENCOUNTER FOR SCREENING FOR STENOSIS OF CAROTID ARTERY: ICD-10-CM

## 2024-08-14 DIAGNOSIS — I25.10 CORONARY ARTERY DISEASE INVOLVING NATIVE CORONARY ARTERY OF NATIVE HEART, UNSPECIFIED WHETHER ANGINA PRESENT: ICD-10-CM

## 2024-08-14 PROCEDURE — 99204 OFFICE O/P NEW MOD 45 MIN: CPT | Performed by: THORACIC SURGERY (CARDIOTHORACIC VASCULAR SURGERY)

## 2024-08-14 RX ORDER — VALSARTAN 320 MG/1
TABLET ORAL
Qty: 90 TABLET | Refills: 1 | Status: SHIPPED | OUTPATIENT
Start: 2024-08-14

## 2024-08-14 RX ORDER — DOXAZOSIN 2 MG/1
TABLET ORAL
Qty: 90 TABLET | Refills: 1 | Status: SHIPPED | OUTPATIENT
Start: 2024-08-14

## 2024-08-14 RX ORDER — CLOPIDOGREL BISULFATE 75 MG/1
75 TABLET ORAL DAILY
Qty: 90 TABLET | Refills: 1 | Status: SHIPPED | OUTPATIENT
Start: 2024-08-14

## 2024-08-14 RX ORDER — UREA 10 %
500 LOTION (ML) TOPICAL DAILY
COMMUNITY

## 2024-08-14 RX ORDER — FUROSEMIDE 20 MG
20 TABLET ORAL DAILY
Qty: 30 TABLET | Refills: 0 | Status: SHIPPED | OUTPATIENT
Start: 2024-08-14

## 2024-08-14 NOTE — PROGRESS NOTES
"Consultation - Cardiothoracic Surgery   Reuben García 68 y.o. male MRN: [id number]    Physician Requesting Consult: Rich Delgado DO (PCP)    Reason for Consult / Principal Problem: Mitral regurgitation    History of Present Illness: Reuben García is a 68 y.o. year old male with PMHx MR, CAD/NSTEMI in August 2022 - s/p PCI/ESTHER x2 to RCA, HTN, HLD, obesity (BMI 31), arthritis, GERD, migraines, mild asthma. Surgical history notable for gastric bypass surgery. He had followed briefly with cardiology (Dr. Feng) after his NSTEMI, but was unsatisfied with his visit, so he did not follow up after that. For about 3 weeks now, patient has been having progressive shortness of breath and MCCRARY. He has asthma, and was trying to use his inhaler but did not have symptom relief, so he called his PCP who directed him to be seen in the office. His PCP heard a heart murmur and ordered an echo. Patient previously has mild to moderate MR at the time of his NSTEMI, and his echo on 8/8 demonstrated now severe MR with severely dilated LA. He was referred to cardiac surgery at this point.     Upon interview today, patient confirms above details. He states that he has had to sleep with his bed adjusted to a seated position due to his shortness of breath. He states that he takes a sleeping pill and is able to sleep for about 4 hours, but then he has to get up to sit on the couch due to shortness of breath. He also notes a \"gurgling\" sensation when he gets woken up at night. He states that with going into his garden to pick tomatoes or picking up a large container of cat litter, he gets short of breath and needs a few minutes to recover. 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. He is accompanied by his wife today. They do not live together. He lives with his ex daughter-in-law and her boyfriend. Boyfriend helps patient with mowing and house work. Family history notable for SCD in his " sister at age 69. Patient does not use tobacco products. He does drink about 30 beers a week on average and does not use drugs.     He sees a dentist regularly. His last visit was about 6 months ago, and he has an upcoming appointment in September.     Past Medical History:  Past Medical History:   Diagnosis Date    Allergic     Arthritis     Clotting disorder (HCC)     Coronary artery disease     Depression     Diabetes mellitus (HCC)     Headache(784.0)     HL (hearing loss)     Hypertension     Myocardial infarction (HCC) 8/23/22    Obesity     Visual impairment          Past Surgical History:   Past Surgical History:   Procedure Laterality Date    CARDIAC CATHETERIZATION Left 8/25/2022    Procedure: Cardiac catheterization;  Surgeon: Luann Feng MD;  Location: [facility];  Service: Cardiology    CARDIAC CATHETERIZATION N/A 8/25/2022    Procedure: Cardiac Coronary Angiogram;  Surgeon: Luann Feng MD;  Location: [facility];  Service: Cardiology    SEPTOPLASTY           Family History:  Family History   Problem Relation Age of Onset    Aneurysm Mother     Coronary artery disease Father     Cancer Other     Stroke Other         CVA    Hypertension Sister          Social History:      Social History     Substance and Sexual Activity   Alcohol Use Yes    Alcohol/week: 30.0 standard drinks of alcohol    Types: 30 Cans of beer per week    Comment: patient sasys he is cutting down  to 1-2 packs of beer     Social History     Substance and Sexual Activity   Drug Use No     Social History     Tobacco Use   Smoking Status Never   Smokeless Tobacco Never   Tobacco Comments    never a smoker ( as per allscripts)       Home Medications:   Prior to Admission medications    Medication Sig Start Date End Date Taking? Authorizing Provider   acetaminophen (TYLENOL) 500 mg tablet Take 2 tablets (1,000 mg total) by mouth every 8 (eight) hours as needed for mild pain, moderate pain or headaches 9/22/23    Obasia Lagunas PA-C   amLODIPine (NORVASC) 10 mg tablet TAKE 1 TABLET BY MOUTH EVERY DAY 4/2/21   Rich Delgado DO   ARIPiprazole (ABILIFY) 2 mg tablet TAKE 1 TABLET BY MOUTH EVERY DAY 6/11/24   Rich Delgado DO   Aspirin Low Dose 81 MG chewable tablet CHEW 1 TABLET BY MOUTH DAILY 10/3/22   Rich Delgado DO   atorvastatin (LIPITOR) 80 mg tablet TAKE 1 TABLET BY MOUTH EVERY DAY IN THE EVENING 4/1/24   Rich Delgado DO   Azelastine HCl 137 MCG/SPRAY SOLN 1 SPRAY INTO EACH NOSTRIL 2 (TWO) TIMES A DAY USE IN EACH NOSTRIL AS DIRECTED 4/20/22   Rich Delgado DO   clopidogrel (PLAVIX) 75 mg tablet TAKE 1 TABLET BY MOUTH EVERY DAY 8/14/24   Rich Delgado DO   cyclobenzaprine (FLEXERIL) 10 mg tablet Take 1 tablet (10 mg total) by mouth 3 (three) times a day as needed for muscle spasms 4/6/21   Rich Delgado DO   doxazosin (CARDURA) 2 mg tablet TAKE 1 TABLET BY MOUTH EVERY DAY 8/14/24   Rich Delgado DO   fluticasone (FLONASE) 50 mcg/act nasal spray 2 sprays into each nostril daily 6/15/15   Historical Provider, MD   meloxicam (MOBIC) 15 mg tablet TAKE 1 TABLET (15 MG TOTAL) BY MOUTH DAILY. 5/24/24   Rich Delgado DO   metoprolol succinate (TOPROL-XL) 25 mg 24 hr tablet TAKE 1/2 TABLET BY MOUTH DAILY 4/3/24   Rich Delgado DO   mometasone (ELOCON) 0.1 % cream Apply topically daily 8/28/23   Rich Delgado DO   nitroglycerin (NITROSTAT) 0.4 mg SL tablet Place 1 tablet (0.4 mg total) under the tongue every 5 (five) minutes as needed for chest pain 9/9/22   Saroj Cunningham PA-C   pantoprazole (PROTONIX) 40 mg tablet Take 40 mg by mouth daily 11/22/17   Historical Provider, MD   promethazine (PHENERGAN) 25 mg tablet TAKE 1 TABLET BY MOUTH EVERY 6 HOURS AS NEEDED FOR NAUSEA OR VOMITING. 5/17/24   Rich Delgado DO   SUMAtriptan (IMITREX) 50 mg tablet TAKE 1 TABLET (50 MG TOTAL) BY MOUTH EVERY 2 (TWO) HOURS AS NEEDED FOR MIGRAINE  MG/DAY 4/27/24   Rich Delgado DO   traMADol (Ultram) 50 mg tablet Take 1 tablet (50 mg  total) by mouth every 6 (six) hours as needed for moderate pain 1/8/24   Rich Delgado DO   traZODone (DESYREL) 100 mg tablet TAKE 1 TABLET BY MOUTH EVERYDAY AT BEDTIME 7/10/24   Rich Delgado DO   valsartan (DIOVAN) 320 MG tablet TAKE 1 TABLET BY MOUTH EVERY DAY 8/14/24   Rich Delgado DO   venlafaxine (EFFEXOR-XR) 75 mg 24 hr capsule TAKE 3 CAPSULES (225 MG TOTAL) BY MOUTH DAILY WITH BREAKFAST 5/17/24   Rich Delgado DO   Ventolin  (90 Base) MCG/ACT inhaler Inhale 2 puffs 3 (three) times a day as needed for wheezing 5/25/24   Rich Delgado DO   clopidogrel (PLAVIX) 75 mg tablet TAKE 1 TABLET BY MOUTH EVERY DAY 8/14/23 8/14/24  Rich Delgado DO   doxazosin (CARDURA) 2 mg tablet TAKE 1 TABLET BY MOUTH EVERY DAY 2/19/24 8/14/24  Rich Delgado DO   valsartan (DIOVAN) 320 MG tablet TAKE 1 TABLET BY MOUTH EVERY DAY 2/19/24 8/14/24  Rich Delgado DO       Allergies:  No Known Allergies    Review of Systems:     Review of Systems   Constitutional:  Positive for activity change and fatigue. Negative for chills and fever.   HENT:  Negative for ear pain and sore throat.    Eyes:  Negative for pain and visual disturbance.   Respiratory:  Positive for shortness of breath and wheezing. Negative for cough.    Cardiovascular:  Positive for leg swelling. Negative for chest pain and palpitations.        Orthopnea  PND   Gastrointestinal:  Negative for abdominal pain and vomiting.   Genitourinary:  Negative for dysuria and hematuria.   Musculoskeletal:  Negative for arthralgias and back pain.   Skin:  Negative for color change and rash.   Neurological:  Negative for seizures and syncope.   Psychiatric/Behavioral:  Positive for sleep disturbance.    All other systems reviewed and are negative.      Vital Signs:     Vitals:    08/14/24 1448 08/14/24 1453   BP: (!) 186/100 (!) 188/102   BP Location: Left arm Right arm   Patient Position: Sitting Sitting   Cuff Size: Standard Standard   Pulse: 97    SpO2: 96%    Weight: 81.4 kg  "(179 lb 8 oz)    Height: 5' 3\" (1.6 m)        Physical Exam:     Physical Exam  Vitals reviewed.   Constitutional:       Appearance: Normal appearance.   HENT:      Head: Normocephalic and atraumatic.   Eyes:      Pupils: Pupils are equal, round, and reactive to light.   Neck:      Vascular: No carotid bruit or JVD.   Cardiovascular:      Rate and Rhythm: Normal rate and regular rhythm.      Pulses:           Carotid pulses are 1+ on the right side and 1+ on the left side.       Radial pulses are 2+ on the right side and 2+ on the left side.        Dorsalis pedis pulses are 2+ on the right side and 2+ on the left side.      Heart sounds: Murmur heard.      Systolic murmur is present with a grade of 3/6.      No friction rub. No gallop.   Pulmonary:      Effort: Pulmonary effort is normal.      Breath sounds: Normal breath sounds. No wheezing, rhonchi or rales.   Abdominal:      Palpations: Abdomen is soft.      Tenderness: There is no abdominal tenderness.   Musculoskeletal:      Cervical back: Normal range of motion.      Right lower le+ Edema present.      Left lower le+ Edema present.   Skin:     General: Skin is warm and dry.      Capillary Refill: Capillary refill takes less than 2 seconds.   Neurological:      General: No focal deficit present.      Mental Status: He is alert.      Sensory: Sensation is intact.   Psychiatric:         Attention and Perception: Attention normal.         Mood and Affect: Mood normal.         Behavior: Behavior normal. Behavior is cooperative.         Lab Results:               Invalid input(s): \"LABGLOM\"      Lab Results   Component Value Date    HGBA1C 4.6 2023     No results found for: \"CKTOTAL\", \"CKMB\", \"CKMBINDEX\", \"TROPONINI\"    Imaging Studies:     Cardiac Catheterization: 22  There is a 99% stenosis in mid RCA nad 90% stenosis of proximal RCA. This is culprit for patient presentation of NSTEMI. Underwent PCI with ESTHER. Excellent result. 0% residual " stenosis. MARAH 3 flow at completion     Successful PCI of RCA with ESTHER x2 in overlapping fashion    Echocardiogram: 8/8/24  Left Ventricle Left ventricular cavity size is normal. Wall thickness is mildly increased. There is mild concentric hypertrophy. The left ventricular ejection fraction is 69%. Systolic function is normal. Wall motion is normal. Diastolic function is moderately abnormal, consistent with grade II (pseudonormal) relaxation.   Right Ventricle Right ventricular cavity size is mildly dilated. Systolic function is normal. Wall thickness is normal.   Left Atrium The atrium is severely dilated.   Right Atrium The atrium is moderately dilated.   Aortic Valve The aortic valve is trileaflet. The leaflets are mildly thickened. The leaflets are not calcified. The leaflets exhibit normal mobility. There is trace regurgitation. The aortic valve has no significant stenosis.   Mitral Valve There is moderate thickening. There is moderate annular calcification.  There is severe regurgitation. There is no evidence of stenosis.   Tricuspid Valve Tricuspid valve structure is normal. There is mild regurgitation. There is no evidence of stenosis.   Pulmonic Valve Pulmonic valve structure is normal. There is no evidence of regurgitation. There is no evidence of stenosis.   Ascending Aorta The aortic root is normal in size.   IVC/SVC The inferior vena cava is normal in size.   Pericardium There is no pericardial effusion. The pericardium is normal in appearance.       I have personally reviewed pertinent reports.      Assessment:  Patient Active Problem List    Diagnosis Date Noted    Platelets decreased (HCC) 02/27/2023    Atherosclerosis of native coronary artery of native heart with angina pectoris (HCC)     Mild intermittent asthma without complication 09/13/2021    BMI 29.0-29.9,adult 10/11/2019    Insomnia 02/25/2015    Essential hypertension 12/11/2014    Gastroesophageal reflux disease without esophagitis  12/11/2014    Anxiety 12/01/2014    Migraine headache 11/14/2014     Severe mitral regurgitation; Ongoing MVR workup    Plan:  Risks and benefits of mitral valve repair vs replacement were discussed in detail today with the patient.  They understand and wish to proceed with further workup and ultimately surgical intervention.  We have ordered routine preoperative laboratory and vascular studies, including 3D THOR, cardiac catheterization, CT chest, carotid artery ultrasound, dental clearance.     Once testing is completed, patient will return to the office to review testing and finalize surgical plan.     Reuben García was comfortable with our recommendations, and their questions were answered to their satisfaction.  Thank you for allowing us to participate in the care of this patient.     Patient had a negative Cologuard in Septemer 2021.     SIGNATURE: Priscilla Rivera PA-C  DATE: 08/14/24  TIME: 3:46 PM

## 2024-08-14 NOTE — TELEPHONE ENCOUNTER
Please reach out to patient to schedule a 3D THOR with Dr. Watkins or Dr. GORGE Sotelo and a cardiac cath at [facility] per Dr. Sanford. Thank you!

## 2024-08-15 ENCOUNTER — TELEPHONE (OUTPATIENT)
Age: 69
End: 2024-08-15

## 2024-08-15 ENCOUNTER — PREP FOR PROCEDURE (OUTPATIENT)
Dept: CARDIOLOGY CLINIC | Facility: CLINIC | Age: 69
End: 2024-08-15

## 2024-08-15 DIAGNOSIS — I25.10 CORONARY ARTERY DISEASE INVOLVING NATIVE CORONARY ARTERY OF NATIVE HEART WITHOUT ANGINA PECTORIS: Primary | ICD-10-CM

## 2024-08-15 NOTE — TELEPHONE ENCOUNTER
Hey, I don't know if you can reach out to the THOR lab or anything, but we don't have really any control over their schedule. Patient should be seeing cardiology as well for symptom relief, and has been started on a diuretic so he should hopefully feel a little better while he waits for testing. He needs other testing as well, so I don't know that getting this one test would speed things up that much.

## 2024-08-15 NOTE — TELEPHONE ENCOUNTER
Pt's wife Maureen phoned and would like to know if date for THOR could be moved up or if this need's to remain as is. Pt was under the impression the THOR needed to be done a lot sooner. Pt's wife Maureen would like a call [phone]

## 2024-08-15 NOTE — TELEPHONE ENCOUNTER
Patient scheduled for 3DTEE (Dr Watkins)/[facility] (Dr Ocasio)  SLb on  09/19/2024.     Patient wife aware of general instructions, labs order, place on My chart.    Meds holds:   Lasix to hold in the morning of the procedure.

## 2024-08-16 ENCOUNTER — TELEPHONE (OUTPATIENT)
Age: 69
End: 2024-08-16

## 2024-08-16 DIAGNOSIS — F41.9 ANXIETY: ICD-10-CM

## 2024-08-16 RX ORDER — TRAZODONE HYDROCHLORIDE 150 MG/1
150 TABLET ORAL
Qty: 30 TABLET | Refills: 3 | Status: SHIPPED | OUTPATIENT
Start: 2024-08-16

## 2024-08-16 NOTE — TELEPHONE ENCOUNTER
Pt's wife Maureen phoned in with questions about transportation to and from THOR. Stated it was mentioned yesterday that transportation could be provided and she is following up on that. Maureen will be available today at [phone]

## 2024-08-19 NOTE — TELEPHONE ENCOUNTER
Patient wife aware we will be able to arrange transportation both way the day of his cardiac cath.

## 2024-08-21 ENCOUNTER — HOSPITAL ENCOUNTER (OUTPATIENT)
Dept: CT IMAGING | Facility: HOSPITAL | Age: 69
Discharge: HOME | End: 2024-08-21
Payer: COMMERCIAL

## 2024-08-21 ENCOUNTER — APPOINTMENT (OUTPATIENT)
Dept: RADIOLOGY | Facility: HOSPITAL | Age: 69
End: 2024-08-21
Payer: COMMERCIAL

## 2024-08-21 DIAGNOSIS — I34.0 NONRHEUMATIC MITRAL VALVE REGURGITATION: ICD-10-CM

## 2024-08-21 DIAGNOSIS — I25.10 CORONARY ARTERY DISEASE INVOLVING NATIVE CORONARY ARTERY OF NATIVE HEART, UNSPECIFIED WHETHER ANGINA PRESENT: ICD-10-CM

## 2024-08-21 PROCEDURE — 71250 CT THORAX DX C-: CPT

## 2024-08-22 DIAGNOSIS — G43.109 MIGRAINE WITH AURA AND WITHOUT STATUS MIGRAINOSUS, NOT INTRACTABLE: ICD-10-CM

## 2024-08-22 RX ORDER — SUMATRIPTAN 50 MG/1
50 TABLET, FILM COATED ORAL EVERY 2 HOUR PRN
Qty: 9 TABLET | Refills: 3 | Status: SHIPPED | OUTPATIENT
Start: 2024-08-22

## 2024-08-27 ENCOUNTER — HOSPITAL ENCOUNTER (OUTPATIENT)
Dept: RADIOLOGY | Facility: MEDICAL CENTER | Age: 69
Discharge: HOME | End: 2024-08-27
Payer: COMMERCIAL

## 2024-08-27 DIAGNOSIS — Z13.6 ENCOUNTER FOR SCREENING FOR STENOSIS OF CAROTID ARTERY: ICD-10-CM

## 2024-08-27 DIAGNOSIS — I34.0 NONRHEUMATIC MITRAL VALVE REGURGITATION: ICD-10-CM

## 2024-08-27 DIAGNOSIS — I25.10 CORONARY ARTERY DISEASE INVOLVING NATIVE CORONARY ARTERY OF NATIVE HEART, UNSPECIFIED WHETHER ANGINA PRESENT: ICD-10-CM

## 2024-08-27 PROCEDURE — 93880 EXTRACRANIAL BILAT STUDY: CPT | Performed by: SURGERY

## 2024-08-27 PROCEDURE — 93880 EXTRACRANIAL BILAT STUDY: CPT

## 2024-08-30 DIAGNOSIS — F41.9 ANXIETY: ICD-10-CM

## 2024-08-30 RX ORDER — TRAZODONE HYDROCHLORIDE 100 MG/1
200 TABLET ORAL
Qty: 60 TABLET | Refills: 1 | Status: SHIPPED | OUTPATIENT
Start: 2024-08-30

## 2024-09-04 ENCOUNTER — OFFICE VISIT (OUTPATIENT)
Dept: FAMILY MEDICINE CLINIC | Facility: CLINIC | Age: 69
End: 2024-09-04
Payer: COMMERCIAL

## 2024-09-04 VITALS
SYSTOLIC BLOOD PRESSURE: 152 MMHG | WEIGHT: 195 LBS | HEIGHT: 63 IN | RESPIRATION RATE: 14 BRPM | HEART RATE: 96 BPM | DIASTOLIC BLOOD PRESSURE: 104 MMHG | BODY MASS INDEX: 34.55 KG/M2 | OXYGEN SATURATION: 98 %

## 2024-09-04 DIAGNOSIS — I34.0 NONRHEUMATIC MITRAL VALVE REGURGITATION: ICD-10-CM

## 2024-09-04 DIAGNOSIS — I25.119 ATHEROSCLEROSIS OF NATIVE CORONARY ARTERY OF NATIVE HEART WITH ANGINA PECTORIS (HCC): ICD-10-CM

## 2024-09-04 DIAGNOSIS — I10 ESSENTIAL HYPERTENSION: Primary | ICD-10-CM

## 2024-09-04 PROBLEM — I34.2 NONRHEUMATIC MITRAL VALVE STENOSIS: Status: ACTIVE | Noted: 2024-09-04

## 2024-09-04 PROBLEM — I34.2 NONRHEUMATIC MITRAL VALVE STENOSIS: Status: RESOLVED | Noted: 2024-09-04 | Resolved: 2024-09-04

## 2024-09-04 PROCEDURE — 99214 OFFICE O/P EST MOD 30 MIN: CPT | Performed by: FAMILY MEDICINE

## 2024-09-04 PROCEDURE — G2211 COMPLEX E/M VISIT ADD ON: HCPCS | Performed by: FAMILY MEDICINE

## 2024-09-04 RX ORDER — FUROSEMIDE 40 MG
40 TABLET ORAL DAILY
Qty: 30 TABLET | Refills: 0 | Status: SHIPPED | OUTPATIENT
Start: 2024-09-04 | End: 2024-09-15

## 2024-09-04 NOTE — PROGRESS NOTES
Ambulatory Visit  Name: Reuben García      : 1955      MRN: [id number]  Encounter Provider: Rich Delgado DO  Encounter Date: 2024   Encounter department: [facility] [address]      Assessment & Plan  Essential hypertension  Continue amlodipine, metoprolol, increase his furosemide to 40 mg daily       Nonrheumatic mitral valve regurgitation  Follow-up with cardio thoracic surgery as scheduled.  If his shortness of breath worsens he knows to go to the emergency room.  Orders:    furosemide (LASIX) 40 mg tablet; Take 1 tablet (40 mg total) by mouth daily    Atherosclerosis of native coronary artery of native heart with angina pectoris (HCC)  Continue with metoprolol, Plavix            History of Present Illness     Patient comes in today for follow-up, he is in the middle of his workup for his mitral valve regurgitation.  He does get short of breath with minimal exertion, and has noted more fluid retention.  He does take his furosemide 20 mg once daily.      Review of Systems   Constitutional:  Negative for chills, fatigue and fever.   HENT:  Negative for congestion, ear pain, hearing loss, postnasal drip, rhinorrhea and sore throat.    Eyes:  Negative for pain and visual disturbance.   Respiratory:  Positive for shortness of breath. Negative for chest tightness and wheezing.    Cardiovascular:  Positive for leg swelling. Negative for chest pain.   Gastrointestinal:  Negative for abdominal distention, abdominal pain, constipation, diarrhea and vomiting.   Endocrine: Negative for cold intolerance and heat intolerance.   Genitourinary:  Negative for difficulty urinating, frequency and urgency.   Musculoskeletal:  Negative for arthralgias and gait problem.   Skin:  Negative for color change.   Neurological:  Negative for dizziness, tremors, syncope, numbness and headaches.   Hematological:  Negative for adenopathy.   Psychiatric/Behavioral:  Negative for agitation,  "confusion and sleep disturbance. The patient is not nervous/anxious.      Objective     BP (!) 152/104 (BP Location: Left arm, Patient Position: Sitting, Cuff Size: Standard)   Pulse 96   Resp 14   Ht 5' 3\" (1.6 m)   Wt 88.5 kg (195 lb)   SpO2 98%   BMI 34.54 kg/m²     Physical Exam  Constitutional:       Appearance: He is well-developed.   HENT:      Head: Normocephalic.      Right Ear: External ear normal.      Left Ear: External ear normal.      Nose: Nose normal.   Eyes:      Extraocular Movements: Extraocular movements intact.      Conjunctiva/sclera: Conjunctivae normal.      Pupils: Pupils are equal, round, and reactive to light.   Neck:      Thyroid: No thyromegaly.   Cardiovascular:      Rate and Rhythm: Normal rate and regular rhythm.      Heart sounds: Murmur heard.   Pulmonary:      Effort: Pulmonary effort is normal.      Breath sounds: Normal breath sounds.   Abdominal:      General: Bowel sounds are normal.      Palpations: Abdomen is soft.   Musculoskeletal:         General: Normal range of motion.      Cervical back: Normal range of motion.      Right lower leg: Edema present.      Left lower leg: Edema present.   Skin:     General: Skin is warm and dry.   Neurological:      Mental Status: He is alert and oriented to person, place, and time.   Psychiatric:         Mood and Affect: Mood normal.         Behavior: Behavior normal.         Rich Delgado DO     "

## 2024-09-04 NOTE — ASSESSMENT & PLAN NOTE
Follow-up with cardio thoracic surgery as scheduled.  If his shortness of breath worsens he knows to go to the emergency room.  Orders:    furosemide (LASIX) 40 mg tablet; Take 1 tablet (40 mg total) by mouth daily

## 2024-09-05 ENCOUNTER — TELEPHONE (OUTPATIENT)
Dept: CARDIAC SURGERY | Facility: CLINIC | Age: 69
End: 2024-09-05

## 2024-09-05 NOTE — TELEPHONE ENCOUNTER
Caller: Reuben García    Doctor/Office: Dr. Sanford    Call regarding :  Returning call to schedule OV.     Call was transferred to: Telma

## 2024-09-09 ENCOUNTER — APPOINTMENT (EMERGENCY)
Dept: RADIOLOGY | Facility: HOSPITAL | Age: 69
DRG: 291 | End: 2024-09-09
Payer: COMMERCIAL

## 2024-09-09 ENCOUNTER — HOSPITAL ENCOUNTER (INPATIENT)
Facility: HOSPITAL | Age: 69
LOS: 6 days | Discharge: HOME | DRG: 291 | End: 2024-09-15
Attending: EMERGENCY MEDICINE | Admitting: INTERNAL MEDICINE
Payer: COMMERCIAL

## 2024-09-09 DIAGNOSIS — J81.0 ACUTE PULMONARY EDEMA (HCC): ICD-10-CM

## 2024-09-09 DIAGNOSIS — I50.9 ACUTE EXACERBATION OF CHF (CONGESTIVE HEART FAILURE) (HCC): Primary | ICD-10-CM

## 2024-09-09 DIAGNOSIS — R06.00 DYSPNEA: ICD-10-CM

## 2024-09-09 DIAGNOSIS — R06.02 SOB (SHORTNESS OF BREATH): ICD-10-CM

## 2024-09-09 DIAGNOSIS — R60.0 BILATERAL LOWER EXTREMITY EDEMA: ICD-10-CM

## 2024-09-09 LAB
2HR DELTA HS TROPONIN: 1 NG/L
4HR DELTA HS TROPONIN: 1 NG/L
ALBUMIN SERPL BCG-MCNC: 3.8 G/DL (ref 3.5–5)
ALP SERPL-CCNC: 55 U/L (ref 34–104)
ALT SERPL W P-5'-P-CCNC: 27 U/L (ref 7–52)
ANION GAP SERPL CALCULATED.3IONS-SCNC: 6 MMOL/L (ref 4–13)
AST SERPL W P-5'-P-CCNC: 26 U/L (ref 13–39)
BASOPHILS # BLD AUTO: 0.04 THOUSANDS/ΜL (ref 0–0.1)
BASOPHILS NFR BLD AUTO: 1 % (ref 0–1)
BILIRUB SERPL-MCNC: 0.77 MG/DL (ref 0.2–1)
BNP SERPL-MCNC: 1846 PG/ML (ref 0–100)
BUN SERPL-MCNC: 20 MG/DL (ref 5–25)
CALCIUM SERPL-MCNC: 8.9 MG/DL (ref 8.4–10.2)
CARDIAC TROPONIN I PNL SERPL HS: 36 NG/L
CARDIAC TROPONIN I PNL SERPL HS: 37 NG/L
CARDIAC TROPONIN I PNL SERPL HS: 37 NG/L
CHLORIDE SERPL-SCNC: 111 MMOL/L (ref 96–108)
CO2 SERPL-SCNC: 29 MMOL/L (ref 21–32)
CREAT SERPL-MCNC: 1.18 MG/DL (ref 0.6–1.3)
EOSINOPHIL # BLD AUTO: 0.21 THOUSAND/ΜL (ref 0–0.61)
EOSINOPHIL NFR BLD AUTO: 4 % (ref 0–6)
ERYTHROCYTE [DISTWIDTH] IN BLOOD BY AUTOMATED COUNT: 14.7 % (ref 11.6–15.1)
GFR SERPL CREATININE-BSD FRML MDRD: 63 ML/MIN/1.73SQ M
GLUCOSE SERPL-MCNC: 121 MG/DL (ref 65–140)
HCT VFR BLD AUTO: 41.4 % (ref 36.5–49.3)
HGB BLD-MCNC: 13.6 G/DL (ref 12–17)
IMM GRANULOCYTES # BLD AUTO: 0.01 THOUSAND/UL (ref 0–0.2)
IMM GRANULOCYTES NFR BLD AUTO: 0 % (ref 0–2)
LYMPHOCYTES # BLD AUTO: 0.79 THOUSANDS/ΜL (ref 0.6–4.47)
LYMPHOCYTES NFR BLD AUTO: 13 % (ref 14–44)
MCH RBC QN AUTO: 30 PG (ref 26.8–34.3)
MCHC RBC AUTO-ENTMCNC: 32.9 G/DL (ref 31.4–37.4)
MCV RBC AUTO: 91 FL (ref 82–98)
MONOCYTES # BLD AUTO: 0.41 THOUSAND/ΜL (ref 0.17–1.22)
MONOCYTES NFR BLD AUTO: 7 % (ref 4–12)
NEUTROPHILS # BLD AUTO: 4.5 THOUSANDS/ΜL (ref 1.85–7.62)
NEUTS SEG NFR BLD AUTO: 75 % (ref 43–75)
NRBC BLD AUTO-RTO: 0 /100 WBCS
PLATELET # BLD AUTO: 149 THOUSANDS/UL (ref 149–390)
PMV BLD AUTO: 10.2 FL (ref 8.9–12.7)
POTASSIUM SERPL-SCNC: 3.7 MMOL/L (ref 3.5–5.3)
PROT SERPL-MCNC: 5.9 G/DL (ref 6.4–8.4)
RBC # BLD AUTO: 4.54 MILLION/UL (ref 3.88–5.62)
SODIUM SERPL-SCNC: 146 MMOL/L (ref 135–147)
WBC # BLD AUTO: 5.96 THOUSAND/UL (ref 4.31–10.16)

## 2024-09-09 PROCEDURE — 99285 EMERGENCY DEPT VISIT HI MDM: CPT

## 2024-09-09 PROCEDURE — 71045 X-RAY EXAM CHEST 1 VIEW: CPT

## 2024-09-09 PROCEDURE — 99285 EMERGENCY DEPT VISIT HI MDM: CPT | Performed by: EMERGENCY MEDICINE

## 2024-09-09 PROCEDURE — 80053 COMPREHEN METABOLIC PANEL: CPT | Performed by: EMERGENCY MEDICINE

## 2024-09-09 PROCEDURE — 85025 COMPLETE CBC W/AUTO DIFF WBC: CPT | Performed by: EMERGENCY MEDICINE

## 2024-09-09 PROCEDURE — 36415 COLL VENOUS BLD VENIPUNCTURE: CPT | Performed by: EMERGENCY MEDICINE

## 2024-09-09 PROCEDURE — 83880 ASSAY OF NATRIURETIC PEPTIDE: CPT | Performed by: EMERGENCY MEDICINE

## 2024-09-09 PROCEDURE — 96374 THER/PROPH/DIAG INJ IV PUSH: CPT

## 2024-09-09 PROCEDURE — 84484 ASSAY OF TROPONIN QUANT: CPT | Performed by: EMERGENCY MEDICINE

## 2024-09-09 PROCEDURE — 93005 ELECTROCARDIOGRAM TRACING: CPT

## 2024-09-09 RX ORDER — FUROSEMIDE 10 MG/ML
80 INJECTION INTRAMUSCULAR; INTRAVENOUS ONCE
Status: COMPLETED | OUTPATIENT
Start: 2024-09-09 | End: 2024-09-09

## 2024-09-09 RX ORDER — POTASSIUM CHLORIDE 1500 MG/1
40 TABLET, EXTENDED RELEASE ORAL ONCE
Status: COMPLETED | OUTPATIENT
Start: 2024-09-09 | End: 2024-09-09

## 2024-09-09 RX ADMIN — FUROSEMIDE 80 MG: 10 INJECTION, SOLUTION INTRAMUSCULAR; INTRAVENOUS at 19:54

## 2024-09-09 RX ADMIN — POTASSIUM CHLORIDE 40 MEQ: 1500 TABLET, EXTENDED RELEASE ORAL at 19:54

## 2024-09-09 NOTE — ED PROVIDER NOTES
History  Chief Complaint   Patient presents with    Shortness of Breath     C/o increasing SOB x 3 weeks. Hx CHF     69-year-old male history of diabetes, CAD, hypertension, heart failure on Plavix presenting with shortness of breath.  Patient reports a few weeks of progressively worsening shortness of breath including dyspnea and orthopnea plus bilateral lower extremity edema.  Patient also reports increasing abdominal size.  Denies any chest pain.  Denies any recent illness.  Patient reports that he doubled his dose of Lasix from 20 to 40 mg daily beginning several days ago without improvement.  Denies any other complaints.  Chart reviewed.    Past Medical History:  No date: Allergic  No date: Arthritis  No date: Clotting disorder (HCC)  No date: Coronary artery disease  No date: Depression  No date: Diabetes mellitus (HCC)  No date: Headache(784.0)  No date: HL (hearing loss)  No date: Hypertension  22: Myocardial infarction (HCC)  No date: Obesity  No date: Visual impairment  Family History: non-contributory  Social History          Prior to Admission Medications   Prescriptions Last Dose Informant Patient Reported? Taking?   ARIPiprazole (ABILIFY) 2 mg tablet  Self No No   Sig: TAKE 1 TABLET BY MOUTH EVERY DAY   Aspirin Low Dose 81 MG chewable tablet  Self No No   Sig: CHEW 1 TABLET BY MOUTH DAILY   Azelastine HCl 137 MCG/SPRAY SOLN  Self No No   Si SPRAY INTO EACH NOSTRIL 2 (TWO) TIMES A DAY USE IN EACH NOSTRIL AS DIRECTED   Multiple Vitamin (MULTIVITAMIN PO)  Self Yes No   Sig: Take 1 tablet by mouth   SUMAtriptan (IMITREX) 50 mg tablet   No No   Sig: TAKE 1 TABLET (50 MG TOTAL) BY MOUTH EVERY 2 (TWO) HOURS AS NEEDED FOR MIGRAINE  MG/DAY   Ventolin  (90 Base) MCG/ACT inhaler  Self No No   Sig: Inhale 2 puffs 3 (three) times a day as needed for wheezing   amLODIPine (NORVASC) 10 mg tablet  Self No No   Sig: TAKE 1 TABLET BY MOUTH EVERY DAY   atorvastatin (LIPITOR) 80 mg tablet  Self No  No   Sig: TAKE 1 TABLET BY MOUTH EVERY DAY IN THE EVENING   clopidogrel (PLAVIX) 75 mg tablet  Self No No   Sig: TAKE 1 TABLET BY MOUTH EVERY DAY   doxazosin (CARDURA) 2 mg tablet  Self No No   Sig: TAKE 1 TABLET BY MOUTH EVERY DAY   furosemide (LASIX) 40 mg tablet   No No   Sig: Take 1 tablet (40 mg total) by mouth daily   metoprolol succinate (TOPROL-XL) 25 mg 24 hr tablet  Self No No   Sig: TAKE 1/2 TABLET BY MOUTH DAILY   mometasone (ELOCON) 0.1 % cream  Self No No   Sig: Apply topically daily   nitroglycerin (NITROSTAT) 0.4 mg SL tablet  Self No No   Sig: Place 1 tablet (0.4 mg total) under the tongue every 5 (five) minutes as needed for chest pain   traZODone (DESYREL) 100 mg tablet   No No   Sig: Take 2 tablets (200 mg total) by mouth daily at bedtime   valsartan (DIOVAN) 320 MG tablet  Self No No   Sig: TAKE 1 TABLET BY MOUTH EVERY DAY   venlafaxine (EFFEXOR-XR) 75 mg 24 hr capsule  Self No No   Sig: TAKE 3 CAPSULES (225 MG TOTAL) BY MOUTH DAILY WITH BREAKFAST   vitamin B-12 (VITAMIN B-12) 500 mcg tablet  Self Yes No   Sig: Take 500 mcg by mouth daily      Facility-Administered Medications: None       Past Medical History:   Diagnosis Date    Allergic     Arthritis     Clotting disorder (HCC)     Coronary artery disease     Depression     Diabetes mellitus (HCC)     Headache(784.0)     HL (hearing loss)     Hypertension     Myocardial infarction (HCC) 8/23/22    Obesity     Visual impairment        Past Surgical History:   Procedure Laterality Date    CARDIAC CATHETERIZATION Left 8/25/2022    Procedure: Cardiac catheterization;  Surgeon: Luann Feng MD;  Location: [facility];  Service: Cardiology    CARDIAC CATHETERIZATION N/A 8/25/2022    Procedure: Cardiac Coronary Angiogram;  Surgeon: Luann Feng MD;  Location: [facility];  Service: Cardiology    SEPTOPLASTY         Family History   Problem Relation Age of Onset    Aneurysm Mother     Coronary artery disease Father     Cancer  Other     Stroke Other         CVA    Hypertension Sister      I have reviewed and agree with the history as documented.    E-Cigarette/Vaping    E-Cigarette Use Never User      E-Cigarette/Vaping Substances    Nicotine No     THC No     CBD No     Flavoring No     Other No     Unknown No      Social History     Tobacco Use    Smoking status: Never    Smokeless tobacco: Never    Tobacco comments:     never a smoker ( as per allscripts)   Vaping Use    Vaping status: Never Used   Substance Use Topics    Alcohol use: Yes     Alcohol/week: 30.0 standard drinks of alcohol     Types: 30 Cans of beer per week     Comment: patient sasys he is cutting down  to 1-2 packs of beer    Drug use: No       Review of Systems   Constitutional:  Negative for appetite change, chills, diaphoresis, fever and unexpected weight change.   HENT:  Negative for congestion and rhinorrhea.    Eyes:  Negative for photophobia and visual disturbance.   Respiratory:  Positive for shortness of breath. Negative for cough and chest tightness.    Cardiovascular:  Positive for leg swelling. Negative for chest pain and palpitations.   Gastrointestinal:  Negative for abdominal distention, abdominal pain, blood in stool, constipation, diarrhea, nausea and vomiting.   Genitourinary:  Negative for dysuria and hematuria.   Musculoskeletal:  Negative for back pain, joint swelling, neck pain and neck stiffness.   Skin:  Negative for color change, pallor, rash and wound.   Neurological:  Negative for dizziness, syncope, weakness, light-headedness and headaches.   Psychiatric/Behavioral:  Negative for agitation.    All other systems reviewed and are negative.      Physical Exam  Physical Exam  Vitals and nursing note reviewed.   Constitutional:       General: He is not in acute distress.     Appearance: Normal appearance. He is well-developed. He is not ill-appearing, toxic-appearing or diaphoretic.   HENT:      Head: Normocephalic and atraumatic.      Nose: Nose  normal. No congestion or rhinorrhea.      Mouth/Throat:      Mouth: Mucous membranes are moist.      Pharynx: Oropharynx is clear. No oropharyngeal exudate or posterior oropharyngeal erythema.   Eyes:      General: No scleral icterus.        Right eye: No discharge.         Left eye: No discharge.      Extraocular Movements: Extraocular movements intact.      Conjunctiva/sclera: Conjunctivae normal.      Pupils: Pupils are equal, round, and reactive to light.   Neck:      Vascular: No JVD.      Trachea: No tracheal deviation.      Comments: Supple. Normal range of motion.   Cardiovascular:      Rate and Rhythm: Normal rate and regular rhythm.      Heart sounds: Normal heart sounds. No murmur heard.     No friction rub. No gallop.      Comments: Normal rate and regular rhythm  Pulmonary:      Effort: Pulmonary effort is normal. No respiratory distress.      Breath sounds: No stridor. Examination of the right-lower field reveals rales. Examination of the left-lower field reveals rales. Rales present. No wheezing.      Comments: Bibasilar rales  Chest:      Chest wall: No tenderness.   Abdominal:      General: Bowel sounds are normal. There is no distension.      Palpations: Abdomen is soft.      Tenderness: There is no abdominal tenderness. There is no right CVA tenderness, left CVA tenderness, guarding or rebound.      Comments: Soft, nontender, nondistended.  Normal bowel sounds throughout   Musculoskeletal:         General: No swelling, tenderness, deformity or signs of injury. Normal range of motion.      Cervical back: Normal range of motion and neck supple. No rigidity. No muscular tenderness.      Right lower leg: Edema present.      Left lower leg: Edema present.      Comments: 3-4+ bilateral lower extremity pitting edema up through knees   Lymphadenopathy:      Cervical: No cervical adenopathy.   Skin:     General: Skin is warm and dry.      Coloration: Skin is not pale.      Findings: No erythema or rash.    Neurological:      General: No focal deficit present.      Mental Status: He is alert. Mental status is at baseline.      Sensory: No sensory deficit.      Motor: No weakness or abnormal muscle tone.      Coordination: Coordination normal.      Gait: Gait normal.      Comments: Alert.  Strength and sensation grossly intact.  Ambulatory without difficulty at baseline.    Psychiatric:         Behavior: Behavior normal.         Thought Content: Thought content normal.         Vital Signs  ED Triage Vitals   Temperature Pulse Respirations Blood Pressure SpO2   09/09/24 1849 09/09/24 1849 09/09/24 1849 09/09/24 1849 09/09/24 1849   98 °F (36.7 °C) 101 20 (!) 186/105 96 %      Temp src Heart Rate Source Patient Position - Orthostatic VS BP Location FiO2 (%)   -- 09/09/24 1849 09/09/24 2100 09/09/24 2100 --    Monitor Sitting Right arm       Pain Score       --                  Vitals:    09/09/24 1849 09/09/24 2100   BP: (!) 186/105 (!) 189/110   Pulse: 101 93   Patient Position - Orthostatic VS:  Sitting         Visual Acuity      ED Medications  Medications   potassium chloride (Klor-Con M20) CR tablet 40 mEq (40 mEq Oral Given 9/9/24 1954)   furosemide (LASIX) injection 80 mg (80 mg Intravenous Given 9/9/24 1954)       Diagnostic Studies  Results Reviewed       Procedure Component Value Units Date/Time    HS Troponin I 2hr [[id number]]  (Normal) Collected: 09/09/24 2101    Lab Status: Final result Specimen: Blood from Arm, Left Updated: 09/09/24 2131     hs TnI 2hr 37 ng/L      Delta 2hr hsTnI 1 ng/L     HS Troponin I 4hr [[id number]]     Lab Status: No result Specimen: Blood     HS Troponin 0hr (reflex protocol) [[id number]]  (Normal) Collected: 09/09/24 1913    Lab Status: Final result Specimen: Blood from Arm, Left Updated: 09/09/24 1943     hs TnI 0hr 36 ng/L     B-Type Natriuretic Peptide(BNP) [[id number]]  (Abnormal) Collected: 09/09/24 1913    Lab Status: Final result Specimen: Blood from Arm, Left Updated:  09/09/24 1942     BNP 1,846 pg/mL     Comprehensive metabolic panel [[id number]]  (Abnormal) Collected: 09/09/24 1913    Lab Status: Final result Specimen: Blood from Arm, Left Updated: 09/09/24 1940     Sodium 146 mmol/L      Potassium 3.7 mmol/L      Chloride 111 mmol/L      CO2 29 mmol/L      ANION GAP 6 mmol/L      BUN 20 mg/dL      Creatinine 1.18 mg/dL      Glucose 121 mg/dL      Calcium 8.9 mg/dL      AST 26 U/L      ALT 27 U/L      Alkaline Phosphatase 55 U/L      Total Protein 5.9 g/dL      Albumin 3.8 g/dL      Total Bilirubin 0.77 mg/dL      eGFR 63 ml/min/1.73sq m     Narrative:      [employer] guidelines for Chronic Kidney Disease (CKD):     Stage 1 with normal or high GFR (GFR > 90 mL/min/1.73 square meters)    Stage 2 Mild CKD (GFR = 60-89 mL/min/1.73 square meters)    Stage 3A Moderate CKD (GFR = 45-59 mL/min/1.73 square meters)    Stage 3B Moderate CKD (GFR = 30-44 mL/min/1.73 square meters)    Stage 4 Severe CKD (GFR = 15-29 mL/min/1.73 square meters)    Stage 5 End Stage CKD (GFR <15 mL/min/1.73 square meters)  Note: GFR calculation is accurate only with a steady state creatinine    CBC and differential [[id number]]  (Abnormal) Collected: 09/09/24 1913    Lab Status: Final result Specimen: Blood from Arm, Left Updated: 09/09/24 1921     WBC 5.96 Thousand/uL      RBC 4.54 Million/uL      Hemoglobin 13.6 g/dL      Hematocrit 41.4 %      MCV 91 fL      MCH 30.0 pg      MCHC 32.9 g/dL      RDW 14.7 %      MPV 10.2 fL      Platelets 149 Thousands/uL      nRBC 0 /100 WBCs      Segmented % 75 %      Immature Grans % 0 %      Lymphocytes % 13 %      Monocytes % 7 %      Eosinophils Relative 4 %      Basophils Relative 1 %      Absolute Neutrophils 4.50 Thousands/µL      Absolute Immature Grans 0.01 Thousand/uL      Absolute Lymphocytes 0.79 Thousands/µL      Absolute Monocytes 0.41 Thousand/µL      Eosinophils Absolute 0.21 Thousand/µL      Basophils Absolute 0.04 Thousands/µL                     XR chest 1 view portable    (Results Pending)              Procedures  Procedures         ED Course               HEART Risk Score      Flowsheet Row Most Recent Value   Heart Score Risk Calculator    History 0 Filed at: 09/09/2024 2100   ECG 1 Filed at: 09/09/2024 2100   Age 2 Filed at: 09/09/2024 2100   Risk Factors 2 Filed at: 09/09/2024 2100   Troponin 2 Filed at: 09/09/2024 2100   HEART Score 7 Filed at: 09/09/2024 2100                          SBIRT 20yo+      Flowsheet Row Most Recent Value   Initial Alcohol Screen: US AUDIT-C     1. How often do you have a drink containing alcohol? 0 Filed at: 09/09/2024 1851   2. How many drinks containing alcohol do you have on a typical day you are drinking?  0 Filed at: 09/09/2024 1851   3a. Male UNDER 65: How often do you have five or more drinks on one occasion? 0 Filed at: 09/09/2024 1851   Audit-C Score 0 Filed at: 09/09/2024 1851   ONEIDA: How many times in the past year have you...    Used an illegal drug or used a prescription medication for non-medical reasons? Never Filed at: 09/09/2024 1851                      Medical Decision Making  69-year-old male history of diabetes, CAD, hypertension, heart failure on Plavix presenting with shortness of breath.  Constellation of symptoms suspicious for heart failure exacerbation.  Plan for cardiac evaluation including EKG troponin plus chest x-ray and BNP.  Reassess.    EKG interpreted by me with normal sinus rhythm and nonspecific ST abnormality.  Labs interpreted by me notable for troponin of 37 with normal delta.  BNP 1800.  Borderline potassium.  Will replenish given need for IV diuresis.  Given IV Lasix.  Chest x-ray interpreted by me with pulmonary edema.  Plan for further evaluation and management inpatient with heart failure exacerbation.  Admitted.    Amount and/or Complexity of Data Reviewed  Labs: ordered.  Radiology: ordered.    Risk  Prescription drug management.  Decision regarding  hospitalization.                 Disposition  Final diagnoses:   Bilateral lower extremity edema   SOB (shortness of breath)   Dyspnea   Acute exacerbation of CHF (congestive heart failure) (HCC)   Acute pulmonary edema (HCC)     Time reflects when diagnosis was documented in both MDM as applicable and the Disposition within this note       Time User Action Codes Description Comment    9/9/2024  7:22 PM Rubén Metz Add [R60.0] Bilateral lower extremity edema     9/9/2024  7:22 PM Erne Rubén Add [R06.02] SOB (shortness of breath)     9/9/2024  7:22 PM Erne, Rubén Add [R06.00] Dyspnea     9/9/2024  7:22 PM Erne, Rubén Add [I50.9] Acute exacerbation of CHF (congestive heart failure) (HCC)     9/9/2024  7:22 PM ErnChelsi torresn Modify [R60.0] Bilateral lower extremity edema     9/9/2024  7:22 PM ErneChelsin Modify [I50.9] Acute exacerbation of CHF (congestive heart failure) (HCC)     9/9/2024  7:22 PM ErnChelsi torresn Add [J81.0] Acute pulmonary edema (HCC)           ED Disposition       ED Disposition   Admit    Condition   Stable    Date/Time   Mon Sep 9, 2024 1951    Comment   Case was discussed with NEEL and the patient's admission status was agreed to be Admission Status: inpatient status to the service of Dr. Kapadia .               Follow-up Information    None         Patient's Medications   Discharge Prescriptions    No medications on file       No discharge procedures on file.    PDMP Review         Value Time User    PDMP Reviewed  Yes 1/8/2024  9:57 AM Rich Delgado DO            ED Provider  Electronically Signed by             Rubén Metz MD  09/09/24 2666

## 2024-09-10 ENCOUNTER — APPOINTMENT (INPATIENT)
Dept: VASCULAR ULTRASOUND | Facility: HOSPITAL | Age: 69
DRG: 291 | End: 2024-09-10
Payer: COMMERCIAL

## 2024-09-10 ENCOUNTER — TELEPHONE (OUTPATIENT)
Age: 69
End: 2024-09-10

## 2024-09-10 PROBLEM — F32.A DEPRESSION: Status: ACTIVE | Noted: 2024-09-10

## 2024-09-10 PROBLEM — I50.33 ACUTE ON CHRONIC HEART FAILURE WITH PRESERVED EJECTION FRACTION (HFPEF) (HCC): Status: ACTIVE | Noted: 2024-09-10

## 2024-09-10 PROBLEM — F33.40 RECURRENT MAJOR DEPRESSIVE DISORDER, IN REMISSION (HCC): Status: ACTIVE | Noted: 2024-09-10

## 2024-09-10 LAB
ATRIAL RATE: 99 BPM
BASOPHILS # BLD AUTO: 0.05 THOUSANDS/ΜL (ref 0–0.1)
BASOPHILS NFR BLD AUTO: 1 % (ref 0–1)
EOSINOPHIL # BLD AUTO: 0.18 THOUSAND/ΜL (ref 0–0.61)
EOSINOPHIL NFR BLD AUTO: 3 % (ref 0–6)
ERYTHROCYTE [DISTWIDTH] IN BLOOD BY AUTOMATED COUNT: 14.6 % (ref 11.6–15.1)
GLUCOSE SERPL-MCNC: 93 MG/DL (ref 65–140)
HCT VFR BLD AUTO: 40 % (ref 36.5–49.3)
HGB BLD-MCNC: 13.5 G/DL (ref 12–17)
IMM GRANULOCYTES # BLD AUTO: 0.02 THOUSAND/UL (ref 0–0.2)
IMM GRANULOCYTES NFR BLD AUTO: 0 % (ref 0–2)
LYMPHOCYTES # BLD AUTO: 1 THOUSANDS/ΜL (ref 0.6–4.47)
LYMPHOCYTES NFR BLD AUTO: 14 % (ref 14–44)
MCH RBC QN AUTO: 30.3 PG (ref 26.8–34.3)
MCHC RBC AUTO-ENTMCNC: 33.8 G/DL (ref 31.4–37.4)
MCV RBC AUTO: 90 FL (ref 82–98)
MONOCYTES # BLD AUTO: 0.53 THOUSAND/ΜL (ref 0.17–1.22)
MONOCYTES NFR BLD AUTO: 7 % (ref 4–12)
NEUTROPHILS # BLD AUTO: 5.45 THOUSANDS/ΜL (ref 1.85–7.62)
NEUTS SEG NFR BLD AUTO: 75 % (ref 43–75)
NRBC BLD AUTO-RTO: 0 /100 WBCS
P AXIS: 60 DEGREES
PLATELET # BLD AUTO: 161 THOUSANDS/UL (ref 149–390)
PMV BLD AUTO: 10.2 FL (ref 8.9–12.7)
PR INTERVAL: 164 MS
QRS AXIS: 79 DEGREES
QRSD INTERVAL: 80 MS
QT INTERVAL: 356 MS
QTC INTERVAL: 456 MS
RBC # BLD AUTO: 4.45 MILLION/UL (ref 3.88–5.62)
T WAVE AXIS: 53 DEGREES
VENTRICULAR RATE: 99 BPM
WBC # BLD AUTO: 7.23 THOUSAND/UL (ref 4.31–10.16)

## 2024-09-10 PROCEDURE — 82948 REAGENT STRIP/BLOOD GLUCOSE: CPT

## 2024-09-10 PROCEDURE — 99223 1ST HOSP IP/OBS HIGH 75: CPT | Performed by: INTERNAL MEDICINE

## 2024-09-10 PROCEDURE — 99222 1ST HOSP IP/OBS MODERATE 55: CPT | Performed by: INTERNAL MEDICINE

## 2024-09-10 PROCEDURE — 93970 EXTREMITY STUDY: CPT | Performed by: SURGERY

## 2024-09-10 PROCEDURE — 93970 EXTREMITY STUDY: CPT

## 2024-09-10 PROCEDURE — 93010 ELECTROCARDIOGRAM REPORT: CPT | Performed by: INTERNAL MEDICINE

## 2024-09-10 PROCEDURE — 85025 COMPLETE CBC W/AUTO DIFF WBC: CPT | Performed by: INTERNAL MEDICINE

## 2024-09-10 RX ORDER — METOPROLOL SUCCINATE 25 MG/1
25 TABLET, EXTENDED RELEASE ORAL DAILY
Status: DISCONTINUED | OUTPATIENT
Start: 2024-09-11 | End: 2024-09-15 | Stop reason: HOSPADM

## 2024-09-10 RX ORDER — HYDRALAZINE HYDROCHLORIDE 20 MG/ML
5 INJECTION INTRAMUSCULAR; INTRAVENOUS EVERY 6 HOURS PRN
Status: DISCONTINUED | OUTPATIENT
Start: 2024-09-10 | End: 2024-09-15 | Stop reason: HOSPADM

## 2024-09-10 RX ORDER — METOPROLOL SUCCINATE 25 MG/1
12.5 TABLET, EXTENDED RELEASE ORAL DAILY
Status: DISCONTINUED | OUTPATIENT
Start: 2024-09-10 | End: 2024-09-10

## 2024-09-10 RX ORDER — FUROSEMIDE 10 MG/ML
80 INJECTION INTRAMUSCULAR; INTRAVENOUS
Status: DISCONTINUED | OUTPATIENT
Start: 2024-09-10 | End: 2024-09-14

## 2024-09-10 RX ORDER — ENOXAPARIN SODIUM 100 MG/ML
40 INJECTION SUBCUTANEOUS DAILY
Status: DISCONTINUED | OUTPATIENT
Start: 2024-09-10 | End: 2024-09-15 | Stop reason: HOSPADM

## 2024-09-10 RX ORDER — LOSARTAN POTASSIUM 50 MG/1
100 TABLET ORAL DAILY
Status: DISCONTINUED | OUTPATIENT
Start: 2024-09-11 | End: 2024-09-15 | Stop reason: HOSPADM

## 2024-09-10 RX ORDER — DOXAZOSIN 1 MG/1
2 TABLET ORAL DAILY
Status: DISCONTINUED | OUTPATIENT
Start: 2024-09-10 | End: 2024-09-15 | Stop reason: HOSPADM

## 2024-09-10 RX ORDER — ATORVASTATIN CALCIUM 40 MG/1
80 TABLET, FILM COATED ORAL EVERY EVENING
Status: DISCONTINUED | OUTPATIENT
Start: 2024-09-10 | End: 2024-09-15 | Stop reason: HOSPADM

## 2024-09-10 RX ORDER — ARIPIPRAZOLE 2 MG/1
2 TABLET ORAL DAILY
Status: DISCONTINUED | OUTPATIENT
Start: 2024-09-10 | End: 2024-09-10

## 2024-09-10 RX ORDER — CLOPIDOGREL BISULFATE 75 MG/1
75 TABLET ORAL DAILY
Status: DISCONTINUED | OUTPATIENT
Start: 2024-09-10 | End: 2024-09-15 | Stop reason: HOSPADM

## 2024-09-10 RX ORDER — AMLODIPINE BESYLATE 10 MG/1
10 TABLET ORAL DAILY
Status: DISCONTINUED | OUTPATIENT
Start: 2024-09-10 | End: 2024-09-15 | Stop reason: HOSPADM

## 2024-09-10 RX ORDER — LOSARTAN POTASSIUM 50 MG/1
50 TABLET ORAL DAILY
Status: DISCONTINUED | OUTPATIENT
Start: 2024-09-10 | End: 2024-09-10

## 2024-09-10 RX ORDER — ASPIRIN 81 MG/1
81 TABLET, CHEWABLE ORAL DAILY
Status: DISCONTINUED | OUTPATIENT
Start: 2024-09-10 | End: 2024-09-15 | Stop reason: HOSPADM

## 2024-09-10 RX ORDER — AZELASTINE 1 MG/ML
1 SPRAY, METERED NASAL 2 TIMES DAILY
Status: DISCONTINUED | OUTPATIENT
Start: 2024-09-10 | End: 2024-09-15 | Stop reason: HOSPADM

## 2024-09-10 RX ORDER — TRAZODONE HYDROCHLORIDE 100 MG/1
200 TABLET ORAL
Status: DISCONTINUED | OUTPATIENT
Start: 2024-09-10 | End: 2024-09-10

## 2024-09-10 RX ORDER — TRAZODONE HYDROCHLORIDE 100 MG/1
200 TABLET ORAL
Status: DISCONTINUED | OUTPATIENT
Start: 2024-09-10 | End: 2024-09-15 | Stop reason: HOSPADM

## 2024-09-10 RX ADMIN — CLOPIDOGREL 75 MG: 75 TABLET ORAL at 08:13

## 2024-09-10 RX ADMIN — DOXAZOSIN 2 MG: 1 TABLET ORAL at 08:14

## 2024-09-10 RX ADMIN — TRAZODONE HYDROCHLORIDE 200 MG: 100 TABLET ORAL at 22:49

## 2024-09-10 RX ADMIN — ENOXAPARIN SODIUM 40 MG: 40 INJECTION SUBCUTANEOUS at 08:13

## 2024-09-10 RX ADMIN — VENLAFAXINE HYDROCHLORIDE 225 MG: 150 CAPSULE, EXTENDED RELEASE ORAL at 08:14

## 2024-09-10 RX ADMIN — FUROSEMIDE 80 MG: 10 INJECTION, SOLUTION INTRAMUSCULAR; INTRAVENOUS at 16:50

## 2024-09-10 RX ADMIN — ARIPIPRAZOLE 2 MG: 2 TABLET ORAL at 08:15

## 2024-09-10 RX ADMIN — ASPIRIN 81 MG: 81 TABLET, CHEWABLE ORAL at 08:14

## 2024-09-10 RX ADMIN — ATORVASTATIN CALCIUM 80 MG: 40 TABLET, FILM COATED ORAL at 18:00

## 2024-09-10 RX ADMIN — FUROSEMIDE 80 MG: 10 INJECTION, SOLUTION INTRAMUSCULAR; INTRAVENOUS at 10:46

## 2024-09-10 RX ADMIN — METOPROLOL SUCCINATE 12.5 MG: 25 TABLET, EXTENDED RELEASE ORAL at 08:14

## 2024-09-10 RX ADMIN — AZELASTINE HYDROCHLORIDE 1 SPRAY: 137 SPRAY, METERED NASAL at 08:18

## 2024-09-10 RX ADMIN — CYANOCOBALAMIN TAB 500 MCG 500 MCG: 500 TAB at 08:13

## 2024-09-10 RX ADMIN — AMLODIPINE BESYLATE 10 MG: 10 TABLET ORAL at 08:13

## 2024-09-10 RX ADMIN — HYDRALAZINE HYDROCHLORIDE 5 MG: 20 INJECTION INTRAMUSCULAR; INTRAVENOUS at 04:05

## 2024-09-10 RX ADMIN — LOSARTAN POTASSIUM 50 MG: 50 TABLET, FILM COATED ORAL at 08:14

## 2024-09-10 NOTE — H&P
[facility]  H&P  Name: Reuben García 69 y.o. male I MRN: [id number]  Unit/Bed#: FT 02 I Date of Admission: 9/9/2024   Date of Service: 9/10/2024 I Hospital Day: 1      Assessment & Plan   Depression  Assessment & Plan  Symptoms controlled.  Continue Effexor 75mg daily.    Acute on chronic heart failure with preserved ejection fraction (HFpEF) ([facility])  Assessment & Plan  Recent TTE on 8/7 demonstrated EF of 69% and severe mitral regurgitation.  BNP elevated at 1800, CXR with pulmonary edema, and clinical signs of volume overload.  Will start on IV Lasix 40mg BID.  Check lower extremity dopplers in view of R>L leg edema.  Monitor I&Os. Daily weights.  T/c repeat echo.  Cardiology consulted and recs appreciated.    CAD (coronary artery disease)  Assessment & Plan  No active chest pain.  Resume aspirin 81mg daily and Plavix 75mg daily.    Insomnia  Assessment & Plan  Trazadone 200mg nightly.    Essential hypertension  Assessment & Plan  Resume amlodipine 10mg daily, Toprol 25mg daily, Cardura 2mg daily, and continue IV Lasix as above.       VTE Pharmacologic Prophylaxis:   Moderate Risk (Score 3-4) - Pharmacological DVT Prophylaxis Ordered: enoxaparin (Lovenox).  Code Status: Level 3 - DNAR and DNI  Discussion with family: Patient declined call to .     Anticipated Length of Stay: Patient will be admitted on an inpatient basis with an anticipated length of stay of greater than 2 midnights secondary to CHF exacerbation.    Total Time Spent on Date of Encounter in care of patient: 30 mins. This time was spent on one or more of the following: performing physical exam; counseling and coordination of care; obtaining or reviewing history; documenting in the medical record; reviewing/ordering tests, medications or procedures; communicating with other healthcare professionals and discussing with patient's family/caregivers.    Chief Complaint: SOB    History of Present Illness:  Patient is a  70yo male with PMH of HTN, CAD, CHF, depression who presents to ED complaining of worsening SOB associated with orthopnea, leg swelling, and 13lb weight gain over the past 1 month. Patient reports at baseline he is able to ambulate far distance but he is now short of breath walking from the living room to the kitchen. He states he was recently started on Lasaix 20mg daily about 1 month ago and that was increased by his PCP to 40mg daily on 9/4. He denies chest pain, lightheadedness, palpitations, abdominal pain, N/V, fever, or chills. In ED patient was found to have markedly elevated BNP of 1800 and pulmonary edema on CXR. He was administered 80mg IV Lasix and is being admitted for further management.    Review of Systems:  Review of Systems   Constitutional:  Negative for chills and fever.   HENT:  Negative for rhinorrhea and sore throat.    Eyes:  Negative for photophobia and visual disturbance.   Respiratory:  Positive for shortness of breath and wheezing. Negative for cough.    Cardiovascular:  Positive for leg swelling. Negative for chest pain and palpitations.   Gastrointestinal:  Negative for abdominal pain, nausea and vomiting.   Endocrine: Negative for cold intolerance and heat intolerance.   Genitourinary:  Negative for flank pain.   Musculoskeletal:  Negative for back pain.   Neurological:  Negative for dizziness.   Psychiatric/Behavioral:  Negative for agitation.        Past Medical and Surgical History:   Past Medical History:   Diagnosis Date    Allergic     Arthritis     Clotting disorder (HCC)     Coronary artery disease     Depression     Diabetes mellitus (HCC)     Headache(784.0)     HL (hearing loss)     Hypertension     Myocardial infarction (HCC) 8/23/22    Obesity     Visual impairment        Past Surgical History:   Procedure Laterality Date    CARDIAC CATHETERIZATION Left 8/25/2022    Procedure: Cardiac catheterization;  Surgeon: Luann Feng MD;  Location: [facility];  Service:  Cardiology    CARDIAC CATHETERIZATION N/A 8/25/2022    Procedure: Cardiac Coronary Angiogram;  Surgeon: Luann Feng MD;  Location: [facility];  Service: Cardiology    SEPTOPLASTY         Meds/Allergies:  Prior to Admission medications    Medication Sig Start Date End Date Taking? Authorizing Provider   amLODIPine (NORVASC) 10 mg tablet TAKE 1 TABLET BY MOUTH EVERY DAY 4/2/21   Rich Delgado DO   ARIPiprazole (ABILIFY) 2 mg tablet TAKE 1 TABLET BY MOUTH EVERY DAY 6/11/24   Rich Delgado DO   Aspirin Low Dose 81 MG chewable tablet CHEW 1 TABLET BY MOUTH DAILY 10/3/22   Rich Delgado DO   atorvastatin (LIPITOR) 80 mg tablet TAKE 1 TABLET BY MOUTH EVERY DAY IN THE EVENING 4/1/24   Rich Delgado DO   Azelastine HCl 137 MCG/SPRAY SOLN 1 SPRAY INTO EACH NOSTRIL 2 (TWO) TIMES A DAY USE IN EACH NOSTRIL AS DIRECTED 4/20/22   Rich Delgado DO   clopidogrel (PLAVIX) 75 mg tablet TAKE 1 TABLET BY MOUTH EVERY DAY 8/14/24   Rich Delgado DO   doxazosin (CARDURA) 2 mg tablet TAKE 1 TABLET BY MOUTH EVERY DAY 8/14/24   Rich Delgado DO   furosemide (LASIX) 40 mg tablet Take 1 tablet (40 mg total) by mouth daily 9/4/24   Rich Delgado DO   metoprolol succinate (TOPROL-XL) 25 mg 24 hr tablet TAKE 1/2 TABLET BY MOUTH DAILY 4/3/24   Rich Delgado DO   mometasone (ELOCON) 0.1 % cream Apply topically daily 8/28/23   Rich Delgado DO   Multiple Vitamin (MULTIVITAMIN PO) Take 1 tablet by mouth    Historical Provider, MD   nitroglycerin (NITROSTAT) 0.4 mg SL tablet Place 1 tablet (0.4 mg total) under the tongue every 5 (five) minutes as needed for chest pain 9/9/22   Saroj Cunningham PA-C   SUMAtriptan (IMITREX) 50 mg tablet TAKE 1 TABLET (50 MG TOTAL) BY MOUTH EVERY 2 (TWO) HOURS AS NEEDED FOR MIGRAINE  MG/DAY 8/22/24   Rich Delgado DO   traZODone (DESYREL) 100 mg tablet Take 2 tablets (200 mg total) by mouth daily at bedtime 8/30/24   Rich Delgado DO   valsartan (DIOVAN) 320 MG tablet TAKE 1 TABLET BY MOUTH EVERY DAY  8/14/24   Rich Delgado DO   venlafaxine (EFFEXOR-XR) 75 mg 24 hr capsule TAKE 3 CAPSULES (225 MG TOTAL) BY MOUTH DAILY WITH BREAKFAST 5/17/24   Rich Delgado DO   Ventolin  (90 Base) MCG/ACT inhaler Inhale 2 puffs 3 (three) times a day as needed for wheezing 5/25/24   Rich Delgado DO   vitamin B-12 (VITAMIN B-12) 500 mcg tablet Take 500 mcg by mouth daily    Historical Provider, MD HEARD have reviewed home medications with patient personally.    Allergies: No Known Allergies    Social History:  Marital Status: /Civil Union   Patient Pre-hospital Living Situation: Home  Patient Pre-hospital Level of Mobility: walks  Substance Use History:   Social History     Substance and Sexual Activity   Alcohol Use Yes    Alcohol/week: 30.0 standard drinks of alcohol    Types: 30 Cans of beer per week    Comment: patient sasys he is cutting down  to 1-2 packs of beer     Social History     Tobacco Use   Smoking Status Never   Smokeless Tobacco Never   Tobacco Comments    never a smoker ( as per allscripts)     Social History     Substance and Sexual Activity   Drug Use No       Family History:  Family History   Problem Relation Age of Onset    Aneurysm Mother     Coronary artery disease Father     Cancer Other     Stroke Other         CVA    Hypertension Sister        Physical Exam:     Vitals:   Blood Pressure: (!) 177/109 (09/10/24 0100)  Pulse: 90 (09/10/24 0100)  Temperature: 98 °F (36.7 °C) (09/09/24 1849)  Respirations: (!) 32 (09/10/24 0100)  Weight - Scale: 89.1 kg (196 lb 6.9 oz) (09/09/24 1953)  SpO2: 95 % (09/10/24 0100)    Physical Exam  Constitutional:       Appearance: Normal appearance.   HENT:      Head: Normocephalic and atraumatic.      Nose: Nose normal.      Mouth/Throat:      Pharynx: Oropharynx is clear.   Eyes:      Extraocular Movements: Extraocular movements intact.      Pupils: Pupils are equal, round, and reactive to light.   Cardiovascular:      Rate and Rhythm: Normal rate and regular  rhythm.      Pulses: Normal pulses.      Heart sounds: Normal heart sounds.   Pulmonary:      Effort: Pulmonary effort is normal.      Breath sounds: No rhonchi or rales.      Comments: Bibasilar crackles  Abdominal:      General: Abdomen is flat. Bowel sounds are normal.      Palpations: Abdomen is soft.      Tenderness: There is no abdominal tenderness.   Musculoskeletal:         General: No deformity.      Right lower leg: Edema present.      Left lower leg: Edema present.   Neurological:      General: No focal deficit present.      Mental Status: He is alert and oriented to person, place, and time.   Psychiatric:         Mood and Affect: Mood normal.         Behavior: Behavior normal.         Additional Data:     Lab Results:  Results from last 7 days   Lab Units 09/09/24  1913   WBC Thousand/uL 5.96   HEMOGLOBIN g/dL 13.6   HEMATOCRIT % 41.4   PLATELETS Thousands/uL 149   SEGS PCT % 75   LYMPHO PCT % 13*   MONO PCT % 7   EOS PCT % 4     Results from last 7 days   Lab Units 09/09/24  1913   SODIUM mmol/L 146   POTASSIUM mmol/L 3.7   CHLORIDE mmol/L 111*   CO2 mmol/L 29   BUN mg/dL 20   CREATININE mg/dL 1.18   ANION GAP mmol/L 6   CALCIUM mg/dL 8.9   ALBUMIN g/dL 3.8   TOTAL BILIRUBIN mg/dL 0.77   ALK PHOS U/L 55   ALT U/L 27   AST U/L 26   GLUCOSE RANDOM mg/dL 121             Lab Results   Component Value Date    HGBA1C 4.6 02/14/2023    HGBA1C 4.9 03/09/2022    HGBA1C 4.6 09/10/2021           Lines/Drains:  Invasive Devices       Peripheral Intravenous Line  Duration             Peripheral IV 09/09/24 Left Antecubital <1 day                        Imaging: Personally reviewed the following imaging: chest xray  XR chest 1 view portable    (Results Pending)    VAS VENOUS DUPLEX - LOWER LIMB BILATERAL    (Results Pending)       EKG and Other Studies Reviewed on Admission:   EKG: Personally Reviewed.    ** Please Note: This note has been constructed using a voice recognition system. **

## 2024-09-10 NOTE — ASSESSMENT & PLAN NOTE
Resume amlodipine 10mg daily, Toprol 25mg daily, Cardura 2mg daily, and continue IV Lasix as above.

## 2024-09-10 NOTE — PLAN OF CARE
Problem: CARDIOVASCULAR - ADULT  Goal: Maintains optimal cardiac output and hemodynamic stability  Description: INTERVENTIONS:  - Monitor I/O, vital signs and rhythm  - Monitor for S/S and trends of decreased cardiac output  - Administer and titrate ordered vasoactive medications to optimize hemodynamic stability  - Assess quality of pulses, skin color and temperature  - Assess for signs of decreased coronary artery perfusion  - Instruct patient to report change in severity of symptoms  Outcome: Progressing  Goal: Absence of cardiac dysrhythmias or at baseline rhythm  Description: INTERVENTIONS:  - Continuous cardiac monitoring, vital signs, obtain 12 lead EKG if ordered  - Administer antiarrhythmic and heart rate control medications as ordered  - Monitor electrolytes and administer replacement therapy as ordered  Outcome: Progressing     Problem: RESPIRATORY - ADULT  Goal: Achieves optimal ventilation and oxygenation  Description: INTERVENTIONS:  - Assess for changes in respiratory status  - Assess for changes in mentation and behavior  - Position to facilitate oxygenation and minimize respiratory effort  - Oxygen administered by appropriate delivery if ordered  - Initiate smoking cessation education as indicated  - Encourage broncho-pulmonary hygiene including cough, deep breathe, Incentive Spirometry  - Assess the need for suctioning and aspirate as needed  - Assess and instruct to report SOB or any respiratory difficulty  - Respiratory Therapy support as indicated  Outcome: Progressing     Problem: PAIN - ADULT  Goal: Verbalizes/displays adequate comfort level or baseline comfort level  Description: Interventions:  - Encourage patient to monitor pain and request assistance  - Assess pain using appropriate pain scale  - Administer analgesics based on type and severity of pain and evaluate response  - Implement non-pharmacological measures as appropriate and evaluate response  - Consider cultural and social  influences on pain and pain management  - Notify physician/advanced practitioner if interventions unsuccessful or patient reports new pain  Outcome: Progressing     Problem: INFECTION - ADULT  Goal: Absence or prevention of progression during hospitalization  Description: INTERVENTIONS:  - Assess and monitor for signs and symptoms of infection  - Monitor lab/diagnostic results  - Monitor all insertion sites, i.e. indwelling lines, tubes, and drains  - Monitor endotracheal if appropriate and nasal secretions for changes in amount and color  - Stem appropriate cooling/warming therapies per order  - Administer medications as ordered  - Instruct and encourage patient and family to use good hand hygiene technique  - Identify and instruct in appropriate isolation precautions for identified infection/condition  Outcome: Progressing     Problem: SAFETY ADULT  Goal: Patient will remain free of falls  Description: INTERVENTIONS:  - Educate patient/family on patient safety including physical limitations  - Instruct patient to call for assistance with activity   - Consult OT/PT to assist with strengthening/mobility   - Keep Call bell within reach  - Keep bed low and locked with side rails adjusted as appropriate  - Keep care items and personal belongings within reach  - Initiate and maintain comfort rounds  - Make Fall Risk Sign visible to staff  - Initiate/Maintain bed alarm  - Obtain necessary fall risk management equipment: walker  - Apply yellow socks and bracelet for high fall risk patients  - Consider moving patient to room near nurses station  Outcome: Progressing

## 2024-09-10 NOTE — PLAN OF CARE
Problem: CARDIOVASCULAR - ADULT  Goal: Maintains optimal cardiac output and hemodynamic stability  Description: INTERVENTIONS:  - Monitor I/O, vital signs and rhythm  - Monitor for S/S and trends of decreased cardiac output  - Administer and titrate ordered vasoactive medications to optimize hemodynamic stability  - Assess quality of pulses, skin color and temperature  - Assess for signs of decreased coronary artery perfusion  - Instruct patient to report change in severity of symptoms  Outcome: Progressing  Goal: Absence of cardiac dysrhythmias or at baseline rhythm  Description: INTERVENTIONS:  - Continuous cardiac monitoring, vital signs, obtain 12 lead EKG if ordered  - Administer antiarrhythmic and heart rate control medications as ordered  - Monitor electrolytes and administer replacement therapy as ordered  Outcome: Progressing     Problem: RESPIRATORY - ADULT  Goal: Achieves optimal ventilation and oxygenation  Description: INTERVENTIONS:  - Assess for changes in respiratory status  - Assess for changes in mentation and behavior  - Position to facilitate oxygenation and minimize respiratory effort  - Oxygen administered by appropriate delivery if ordered  - Initiate smoking cessation education as indicated  - Encourage broncho-pulmonary hygiene including cough, deep breathe, Incentive Spirometry  - Assess the need for suctioning and aspirate as needed  - Assess and instruct to report SOB or any respiratory difficulty  - Respiratory Therapy support as indicated  Outcome: Progressing     Problem: PAIN - ADULT  Goal: Verbalizes/displays adequate comfort level or baseline comfort level  Description: Interventions:  - Encourage patient to monitor pain and request assistance  - Assess pain using appropriate pain scale  - Administer analgesics based on type and severity of pain and evaluate response  - Implement non-pharmacological measures as appropriate and evaluate response  - Consider cultural and social  influences on pain and pain management  - Notify physician/advanced practitioner if interventions unsuccessful or patient reports new pain  Outcome: Progressing     Problem: INFECTION - ADULT  Goal: Absence or prevention of progression during hospitalization  Description: INTERVENTIONS:  - Assess and monitor for signs and symptoms of infection  - Monitor lab/diagnostic results  - Monitor all insertion sites, i.e. indwelling lines, tubes, and drains  - Monitor endotracheal if appropriate and nasal secretions for changes in amount and color  - Orange appropriate cooling/warming therapies per order  - Administer medications as ordered  - Instruct and encourage patient and family to use good hand hygiene technique  - Identify and instruct in appropriate isolation precautions for identified infection/condition  Outcome: Progressing     Problem: SAFETY ADULT  Goal: Patient will remain free of falls  Description: INTERVENTIONS:  - Educate patient/family on patient safety including physical limitations  - Instruct patient to call for assistance with activity   - Consult OT/PT to assist with strengthening/mobility   - Keep Call bell within reach  - Keep bed low and locked with side rails adjusted as appropriate  - Keep care items and personal belongings within reach  - Initiate and maintain comfort rounds  - Make Fall Risk Sign visible to staff  - Offer Toileting every  Hours, in advance of need  - Initiate/Maintain alarm  - Obtain necessary fall risk management equipment:   - Apply yellow socks and bracelet for high fall risk patients  - Consider moving patient to room near nurses station  Outcome: Progressing

## 2024-09-10 NOTE — UTILIZATION REVIEW
Initial Clinical Review    Admission: Date/Time/Statement:   Admission Orders (From admission, onward)       Ordered        09/09/24 2039  INPATIENT ADMISSION  Once                          Orders Placed This Encounter   Procedures    INPATIENT ADMISSION     Standing Status:   Standing     Number of Occurrences:   1     Order Specific Question:   Level of Care     Answer:   Med Surg [16]     Order Specific Question:   Estimated length of stay     Answer:   More than 2 Midnights     Order Specific Question:   Certification     Answer:   I certify that inpatient services are medically necessary for this patient for a duration of greater than two midnights. See H&P and MD Progress Notes for additional information about the patient's course of treatment.     ED Arrival Information       Expected   -    Arrival   9/9/2024 18:47    Acuity   Urgent              Means of arrival   Walk-In    Escorted by   Family Member    Service   Hospitalist    Admission type   Emergency              Arrival complaint   can't breath             Chief Complaint   Patient presents with    Shortness of Breath     C/o increasing SOB x 3 weeks. Hx CHF       Initial Presentation:  69 yom to ER from home for progressively worsening shortness of breath including dyspnea and orthopnea plus bilateral lower extremity edema. Hx diabetes, CAD, hypertension, heart failure on Plavix. Presents tachycardic, hypertensive, bibasilar rales, 3-4+BLE pitting edema up to knees. Admission CXR: Pulmonary vascular congestion. Cardiomegaly and small left greater than right pleural effusions. Cl 111, tprot 5.9, BNP 1846.  Admitted to inpatient status for acute on chronic CHF. Started on IV diuresis, placed on telemetry monitoring, cardio consulted.     Anticipated Length of Stay/Certification Statement:   Patient will be admitted on an inpatient basis with an anticipated length of stay of greater than 2 midnights secondary to CHF exacerbation     Date: 9/10/24   Day  2:   Acute on chronic CHF. Lungs diminished with crackles, dry, NPC. IV diuresis at 80mg BID. IV Hydralazine given for elevated BP, monitor. Cardio following.    Per cardio:  Patient appears hypervolemic. Severe MR contributing factor to heart failure exacerbation. Start Lasix 80 mg IV twice daily. Continue losartan 100 mg daily and Toprol-XL 25 mg daily.    ED Triage Vitals   Temperature Pulse Respirations Blood Pressure SpO2 Pain Score   09/09/24 1849 09/09/24 1849 09/09/24 1849 09/09/24 1849 09/09/24 1849 09/10/24 0258   98 °F (36.7 °C) 101 20 (!) 186/105 96 % No Pain     Weight (last 2 days)       Date/Time Weight    09/10/24 0538 84.8 (186.95)    09/10/24 0239 84.8 (186.95)    09/09/24 1953 89.1 (196.43)            Vital Signs (last 3 days)       Date/Time Temp Pulse Resp BP MAP (mmHg) SpO2 O2 Device Patient Position - Orthostatic VS Arielle Coma Scale Score Pain    09/10/24 15:15:38 97.9 °F (36.6 °C) 89 20 143/80 101 93 % -- -- -- --    09/10/24 10:45:31 97.4 °F (36.3 °C) 88 20 146/81 103 93 % -- -- -- --    09/10/24 0819 -- -- -- -- -- -- -- -- 15 No Pain    09/10/24 07:09:59 97.2 °F (36.2 °C) 93 -- 168/101 123 96 % -- -- -- --    09/10/24 07:08:53 97.2 °F (36.2 °C) 95 19 177/100 126 95 % -- -- -- --    09/10/24 0300 -- -- -- -- -- -- -- -- 15 --    09/10/24 0258 -- -- -- -- -- -- -- -- -- No Pain    09/10/24 02:50:01 97.4 °F (36.3 °C) 99 14 175/113 134 92 % None (Room air) -- -- --    09/10/24 0100 -- 90 32 177/109 138 95 % None (Room air) Lying -- --    09/10/24 0030 -- 92 40 185/121 148 94 % None (Room air) Lying -- --    09/09/24 2345 -- -- -- -- -- -- -- -- 15 --    09/09/24 2330 -- 96 36 191/99 138 94 % None (Room air) Lying -- --    09/09/24 2200 -- 89 27 169/95 125 95 % None (Room air) Lying -- --    09/09/24 2100 -- 93 39 189/110 141 94 % None (Room air) Sitting -- --    09/09/24 1901 -- -- -- -- -- -- None (Room air) -- -- --    09/09/24 1849 98 °F (36.7 °C) 101 20 186/105 139 96 % None (Room air)  -- -- --              Pertinent Labs/Diagnostic Test Results:   Radiology:   VAS VENOUS DUPLEX - LOWER LIMB BILATERAL   Final Interpretation by Rahat Boyle MD (09/10 1209)      XR chest 1 view portable   Final Interpretation by Arun Chin MD (09/10 0854)      Pulmonary vascular congestion. Cardiomegaly and small left greater than right pleural effusions.            Resident: Dagoberto Becerra I, the attending radiologist, have reviewed the images and agree with the final report above.      Workstation performed: [id number]           Cardiology:  9/9 EKG=  normal sinus rhythm and nonspecific ST abnormality.     Results from last 7 days   Lab Units 09/10/24  0438 09/09/24 1913   WBC Thousand/uL 7.23 5.96   HEMOGLOBIN g/dL 13.5 13.6   HEMATOCRIT % 40.0 41.4   PLATELETS Thousands/uL 161 149   TOTAL NEUT ABS Thousands/µL 5.45 4.50         Results from last 7 days   Lab Units 09/09/24 1913   SODIUM mmol/L 146   POTASSIUM mmol/L 3.7   CHLORIDE mmol/L 111*   CO2 mmol/L 29   ANION GAP mmol/L 6   BUN mg/dL 20   CREATININE mg/dL 1.18   EGFR ml/min/1.73sq m 63   CALCIUM mg/dL 8.9     Results from last 7 days   Lab Units 09/09/24 1913   AST U/L 26   ALT U/L 27   ALK PHOS U/L 55   TOTAL PROTEIN g/dL 5.9*   ALBUMIN g/dL 3.8   TOTAL BILIRUBIN mg/dL 0.77         Results from last 7 days   Lab Units 09/09/24 1913   GLUCOSE RANDOM mg/dL 121     Results from last 7 days   Lab Units 09/09/24  2250 09/09/24  2101 09/09/24 1913   HS TNI 0HR ng/L  --   --  36   HS TNI 2HR ng/L  --  37  --    HSTNI D2 ng/L  --  1  --    HS TNI 4HR ng/L 37  --   --    HSTNI D4 ng/L 1  --   --      Results from last 7 days   Lab Units 09/09/24 1913   BNP pg/mL 1,846*     ED Treatment-Medication Administration from 09/09/2024 1845 to 09/10/2024 0238         Date/Time Order Dose Route Action     09/09/2024 1954 potassium chloride (Klor-Con M20) CR tablet 40 mEq 40 mEq Oral Given     09/09/2024 1954 furosemide (LASIX) injection  80 mg 80 mg Intravenous Given            Past Medical History:   Diagnosis Date    Allergic     Arthritis     Clotting disorder (HCC)     Coronary artery disease     Depression     Diabetes mellitus (HCC)     Headache(784.0)     HL (hearing loss)     Hypertension     Myocardial infarction ([facility]) 8/23/22    Obesity     Visual impairment      Present on Admission:   CAD (coronary artery disease)   Essential hypertension   Insomnia   Nonrheumatic mitral valve regurgitation      Admitting Diagnosis: Acute pulmonary edema ([facility]) [J81.0]  Dyspnea [R06.00]  SOB (shortness of breath) [R06.02]  Acute exacerbation of CHF (congestive heart failure) ([facility]) [I50.9]  Bilateral lower extremity edema [R60.0]  Age/Sex: 69 y.o. male  Admission Orders:  Telemetry  1800cc fluid restriction  Consult cardio    Scheduled Medications:  Medications 09/01 09/02 09/03 09/04 09/05 09/06 09/07 09/08 09/09 09/10   amLODIPine (NORVASC) tablet 10 mg  Dose: 10 mg  Freq: Daily Route: PO  Start: 09/10/24 0900   Admin Instructions:      Order specific questions:                0813        ARIPiprazole (ABILIFY) tablet 2 mg  Dose: 2 mg  Freq: Daily Route: PO  Start: 09/10/24 0900             0815        aspirin chewable tablet 81 mg  Dose: 81 mg  Freq: Daily Route: PO  Start: 09/10/24 0900             0814        atorvastatin (LIPITOR) tablet 80 mg  Dose: 80 mg  Freq: Every evening Route: PO  Start: 09/10/24 1800             1800        azelastine (ASTELIN) 0.1 % nasal spray 1 spray  Dose: 1 spray  Freq: 2 times daily Route: EACH NARE  Start: 09/10/24 0900             0818     1800        clopidogrel (PLAVIX) tablet 75 mg  Dose: 75 mg  Freq: Daily Route: PO  Start: 09/10/24 0900   Admin Instructions:                0813        cyanocobalamin (VITAMIN B-12) tablet 500 mcg  Dose: 500 mcg  Freq: Daily Route: PO  Start: 09/10/24 0900             0813        doxazosin (CARDURA) tablet 2 mg  Dose: 2 mg  Freq: Daily Route: PO  Start: 09/10/24 0900   Order  specific questions:                0814        enoxaparin (LOVENOX) subcutaneous injection 40 mg  Dose: 40 mg  Freq: Daily Route: SC  Start: 09/10/24 0900   Admin Instructions:                0813        furosemide (LASIX) injection 80 mg  Dose: 80 mg  Freq: 2 times daily (diuretic) Route: IV  Start: 09/10/24 1000   Admin Instructions:      Order specific questions:                1000 1600        furosemide (LASIX) injection 80 mg  Dose: 80 mg  Freq: Once Route: IV  Start: 09/09/24 2000 End: 09/09/24 1954   Admin Instructions:      Order specific questions:               1954         losartan (COZAAR) tablet 100 mg  Dose: 100 mg  Freq: Daily Route: PO  Start: 09/11/24 0900   Order specific questions:                   losartan (COZAAR) tablet 50 mg  Dose: 50 mg  Freq: Daily Route: PO  Start: 09/10/24 0900 End: 09/10/24 0947   Order specific questions:                0814 0947-D/C'd      metoprolol succinate (TOPROL-XL) 24 hr tablet 12.5 mg  Dose: 12.5 mg  Freq: Daily Route: PO  Start: 09/10/24 0900 End: 09/10/24 0947   Admin Instructions:      Order specific questions:                0814 0947-D/C'd      metoprolol succinate (TOPROL-XL) 24 hr tablet 25 mg  Dose: 25 mg  Freq: Daily Route: PO  Start: 09/11/24 0900   Admin Instructions:      Order specific questions:                   potassium chloride (Klor-Con M20) CR tablet 40 mEq  Dose: 40 mEq  Freq: Once Route: PO  Start: 09/09/24 2000 End: 09/09/24 1954   Admin Instructions:               1954         venlafaxine (EFFEXOR-XR) 24 hr capsule 225 mg  Dose: 225 mg  Freq: Daily with breakfast Route: PO  Start: 09/10/24 0730   Admin Instructions:                0814                    Continuous Meds Sorted by Name  for Reuben García as of 09/01/24 through 9/10/24  Legend:       Medications 09/01 09/02 09/03 09/04 09/05 09/06 09/07 09/08 09/09 09/10               PRN Meds Sorted by Name  for Reuben García as of 09/01/24 through 9/10/24  Legend:        Medications 09/01 09/02 09/03 09/04 09/05 09/06 09/07 09/08 09/09 09/10   hydrALAZINE (APRESOLINE) injection 5 mg  Dose: 5 mg  Freq: Every 6 hours PRN Route: IV  PRN Reason: high blood pressure  PRN Comment: [id number]  Start: 09/10/24 0326   Admin Instructions:      Order specific questions:                0405        traZODone (DESYREL) tablet 200 mg  Dose: 200 mg  Freq: Daily at bedtime PRN Route: PO  PRN Reason: insomnia  Indications of Use: INSOMNIA  Start: 09/10/24 0330   Admin Instructions:                   traZODone (DESYREL) tablet 200 mg  Dose: 200 mg  Freq: Daily at bedtime PRN Route: PO  PRN Reason: insomnia  Indications of Use: INSOMNIA  Start: 09/10/24 2200 End: 09/10/24 0333   Admin Instructions:                0333-D/C'd      Legend:       [id number]/0709/0809/0909/10          Network Utilization Review Department  ATTENTION: Please call with any questions or concerns to [phone] and carefully listen to the prompts so that you are directed to the right person. All voicemails are confidential.   For Discharge needs, contact Care Management DC Support Team at [phone] opt. 2  Send all requests for admission clinical reviews, approved or denied determinations and any other requests to dedicated fax number below belonging to the campus where the patient is receiving treatment. List of dedicated fax numbers for the Facilities:  FACILITY NAME UR FAX NUMBER   ADMISSION DENIALS (Administrative/Medical Necessity) [phone]   DISCHARGE SUPPORT TEAM (NETWORK) [phone]   PARENT CHILD HEALTH (Maternity/NICU/Pediatrics) [phone]   [facility] [phone]   [facility] [phone]   [facility] [phone]   [facility] [phone]   [facility] [phone]   [facility] [phone]   [facility] [phone]   [facility] [phone]   [facility] [phone]   [facility] [phone]   [facility] [phone]   [facility] [phone]

## 2024-09-10 NOTE — ASSESSMENT & PLAN NOTE
Blood Pressure: 149/91   amlodipine 10mg daily, Toprol 25mg daily, Cardura 2mg daily  increase losartan to 100 mg daily   IV Lasix as noted

## 2024-09-10 NOTE — ASSESSMENT & PLAN NOTE
Recent TTE on 8/7 demonstrated EF of 69% and severe mitral regurgitation.  BNP elevated at 1800, CXR with pulmonary edema, and clinical signs of volume overload.  Will start on IV Lasix 40mg BID.  Check lower extremity dopplers in view of R>L leg edema.  Monitor I&Os. Daily weights.  T/c repeat echo.  Cardiology consulted and recs appreciated.

## 2024-09-10 NOTE — CASE MANAGEMENT
Case Management Assessment & Discharge Planning Note    Patient name Reuben García  Location /-01 MRN [id number]  : 1955 Date 9/10/2024       Current Admission Date: 2024  Current Admission Diagnosis:Acute on chronic heart failure with preserved ejection fraction (HFpEF) ([facility])   Patient Active Problem List    Diagnosis Date Noted Date Diagnosed    Acute on chronic heart failure with preserved ejection fraction (HFpEF) ([facility]) 09/10/2024     Depression 09/10/2024     Nonrheumatic mitral valve regurgitation 2024     Platelets decreased ([facility]) 2023     CAD (coronary artery disease)      Mild intermittent asthma without complication 2021     BMI 29.0-29.9,adult 10/11/2019     Insomnia 2015     Essential hypertension 2014     Gastroesophageal reflux disease without esophagitis 2014     Anxiety 2014     Migraine headache 2014       LOS (days): 1  Geometric Mean LOS (GMLOS) (days): 3.9  Days to GMLOS:3.1     OBJECTIVE:    Risk of Unplanned Readmission Score: 12.64         Current admission status: Inpatient       Preferred Pharmacy:   [id number] - [address] [address]  Phone: [phone] Fax: [phone]    Primary Care Provider: Rich Delgado DO    Primary Insurance: AARP MC REP  Secondary Insurance:     ASSESSMENT:  Active Health Care Proxies       Maureen García Health Care Agent - Spouse   Primary Phone: [phone] (Mobile)                 Advance Directives  Does patient have a Health Care POA?: Yes  Does patient have Advance Directives?: Yes  Advance Directives: Power of  for health care  Primary Contact: Maureen García, Spouse         Readmission Root Cause  30 Day Readmission: No    Patient Information  Admitted from:: Home  Mental Status: Alert  During Assessment patient was accompanied by: Not accompanied during assessment  Assessment information provided by::  Patient  Primary Caregiver: Self  Support Systems: Spouse/significant other  County of Residence: [place]  What city do you live in?: [place]  Home entry access options. Select all that apply.: Stairs  Number of steps to enter home.: 2  Do the steps have railings?: Yes  Type of Current Residence: [place]  Is patient a ?: No    Activities of Daily Living Prior to Admission  Functional Status: Independent  Completes ADLs independently?: Yes  Ambulates independently?: Yes  Does patient use assisted devices?: No  Does patient currently own DME?: No  Does patient have a history of Outpatient Therapy (PT/OT)?: No  Does the patient have a history of Short-Term Rehab?: No  Does patient have a history of HHC?: No  Does patient currently have HHC?: No         Patient Information Continued  Income Source: SSI/SSD  Does patient have prescription coverage?: Yes  Does patient receive dialysis treatments?: No  Does patient have a history of substance abuse?: No  Does patient have a history of Mental Health Diagnosis?: Yes (Depression as per chart review.)    PHQ 2/9 Screening   Reviewed PHQ 2/9 Depression Screening Score?: No    Means of Transportation  Means of Transport to Appts:: Drives Self      Social Determinants of Health (SDOH)      Flowsheet Row Most Recent Value   Housing Stability    In the last 12 months, was there a time when you were not able to pay the mortgage or rent on time? N   In the past 12 months, how many times have you moved where you were living? 1   At any time in the past 12 months, were you homeless or living in a shelter (including now)? N   Transportation Needs    In the past 12 months, has lack of transportation kept you from medical appointments or from getting medications? no   In the past 12 months, has lack of transportation kept you from meetings, work, or from getting things needed for daily living? No   Food Insecurity    Within the past 12 months, you worried that your food would run  out before you got the money to buy more. Never true   Within the past 12 months, the food you bought just didn't last and you didn't have money to get more. Never true   Utilities    In the past 12 months has the electric, gas, oil, or water company threatened to shut off services in your home? No            DISCHARGE DETAILS:    Discharge planning discussed with:: Patient at bedside  Freedom of Choice: Yes  Comments - Freedom of Choice: CM discussed discharge planning and freedom of choice if services are recommended by [facility]. No CM needs are anticipated at this time.  CM contacted family/caregiver?: No- see comments (Patient declined.)  Were Treatment Team discharge recommendations reviewed with patient/caregiver?: Yes  Did patient/caregiver verbalize understanding of patient care needs?: Yes  Were patient/caregiver advised of the risks associated with not following Treatment Team discharge recommendations?: Yes    Requested Home Health Care         Is the patient interested in HHC at discharge?: No    DME Referral Provided  Referral made for DME?: No    Other Referral/Resources/Interventions Provided:  Interventions: None Indicated    Would you like to participate in our [facility] service program?  : No - Declined    Treatment Team Recommendation: Home  Discharge Destination Plan:: Home  Transport at Discharge : Family

## 2024-09-10 NOTE — TELEPHONE ENCOUNTER
Maureen called to inform the office pt is currently admitted in [place] for CHF.  Pt is scheduled for [facility] 9/19/24.

## 2024-09-10 NOTE — H&P (VIEW-ONLY)
Consultation - Cardiology   Reuben García 69 y.o. male MRN: [id number]  Unit/Bed#: -01 Encounter: [id number]  09/10/24  2:55 PM    Assessment/ Plan:  Assessment & Plan  Acute on chronic heart failure with preserved ejection fraction (HFpEF) ([facility])  Wt Readings from Last 3 Encounters:   09/10/24 84.8 kg (186 lb 15.2 oz)   09/04/24 88.5 kg (195 lb)   08/14/24 81.4 kg (179 lb 8 oz)   Patient appears hypervolemic  Severe MR contributing factor to heart failure exacerbation.  Start Lasix 80 mg IV twice daily  Continue losartan 100 mg daily and Toprol-XL 25 mg daily    Nonrheumatic mitral valve regurgitation  Severe MR noted on TTE 8/7/24  Patient undergoing surgical evaluation for mitral valve repair  Essential hypertension  Blood pressure elevated  Continue amlodipine 10 mg daily, increase losartan to 100 mg daily and increase metoprolol succinate to 25 mg daily.  CAD (coronary artery disease)          History of Present Illness   Physician Requesting Consult: Nino Niño DO    Reason for Consult / Principal Problem: Acute exacerbation of CHF    HPI: Reuben García is a 69 y.o. year old male with PMH of CAD s/p PCI with ESTHER x 2 to RCA in 2022, hypertension, hyperlipidemia, arthritis, GERD, migraines, mild asthma who presents with shortness of breath, orthopnea, leg swelling, 13 pound weight gain over the past month.  Patient saw his PCP and was started on Lasix 20 mg daily and then was increased to 40 mg daily.    Upon further evaluation in the ED, BNP elevated at 1846.  Chest x-ray showed pulmonary vascular congestion.    Patient had recent TTE which showed EF 69%, G2 DD, severe mitral regurgitation.  Patient was evaluated by CT surgery who recommends mitral valve repair.  Patient has outpatient THOR and right and left heart catheterization scheduled for next week.    Inpatient consult to Cardiology  Consult performed by: DYLAN Stephenson  Consult ordered by: Skyla Kapadia MD          EKG: Normal sinus  rhythm, nonspecific T wave abnormality      Review of Systems   Constitutional:  Positive for unexpected weight change. Negative for chills, diaphoresis and fever.   Respiratory:  Positive for shortness of breath. Negative for cough and chest tightness.    Cardiovascular:  Positive for leg swelling. Negative for chest pain and palpitations.   Gastrointestinal:  Negative for abdominal distention, blood in stool, nausea and vomiting.   Genitourinary:  Negative for difficulty urinating.   Musculoskeletal:  Negative for arthralgias and back pain.   Neurological:  Negative for dizziness, syncope, light-headedness and headaches.   Psychiatric/Behavioral:  Negative for agitation and confusion. The patient is not nervous/anxious.        Historical Information   Past Medical History:   Diagnosis Date    Allergic     Arthritis     Clotting disorder ([facility])     Coronary artery disease     Depression     Diabetes mellitus ([facility])     Headache(784.0)     HL (hearing loss)     Hypertension     Myocardial infarction ([facility]) 8/23/22    Obesity     Visual impairment      Past Surgical History:   Procedure Laterality Date    CARDIAC CATHETERIZATION Left 8/25/2022    Procedure: Cardiac catheterization;  Surgeon: Luann Feng MD;  Location: [facility];  Service: Cardiology    CARDIAC CATHETERIZATION N/A 8/25/2022    Procedure: Cardiac Coronary Angiogram;  Surgeon: Luann Feng MD;  Location: [facility];  Service: Cardiology    SEPTOPLASTY       Social History     Substance and Sexual Activity   Alcohol Use Yes    Alcohol/week: 30.0 standard drinks of alcohol    Types: 30 Cans of beer per week    Comment: patient sasys he is cutting down  to 1-2 packs of beer     Social History     Substance and Sexual Activity   Drug Use No     Social History     Tobacco Use   Smoking Status Never   Smokeless Tobacco Never   Tobacco Comments    never a smoker ( as per allscripts)       Family History:   Family History   Problem  "Relation Age of Onset    Aneurysm Mother     Coronary artery disease Father     Cancer Other     Stroke Other         CVA    Hypertension Sister        Meds/Allergies   all current active meds have been reviewed and current meds:   Current Facility-Administered Medications:     amLODIPine (NORVASC) tablet 10 mg, Daily    aspirin chewable tablet 81 mg, Daily    atorvastatin (LIPITOR) tablet 80 mg, QPM    azelastine (ASTELIN) 0.1 % nasal spray 1 spray, BID    clopidogrel (PLAVIX) tablet 75 mg, Daily    cyanocobalamin (VITAMIN B-12) tablet 500 mcg, Daily    doxazosin (CARDURA) tablet 2 mg, Daily    enoxaparin (LOVENOX) subcutaneous injection 40 mg, Daily    furosemide (LASIX) injection 80 mg, BID (diuretic)    hydrALAZINE (APRESOLINE) injection 5 mg, Q6H PRN    [START ON 2024] losartan (COZAAR) tablet 100 mg, Daily    [START ON 2024] metoprolol succinate (TOPROL-XL) 24 hr tablet 25 mg, Daily    traZODone (DESYREL) tablet 200 mg, HS PRN    venlafaxine (EFFEXOR-XR) 24 hr capsule 225 mg, Daily With Breakfast  No Known Allergies    Objective   Vitals: Blood pressure 146/81, pulse 88, temperature (!) 97.4 °F (36.3 °C), resp. rate 20, height 5' 3\" (1.6 m), weight 84.8 kg (186 lb 15.2 oz), SpO2 93%., Body mass index is 33.12 kg/m².,   Orthostatic Blood Pressures      Flowsheet Row Most Recent Value   Blood Pressure 146/81 filed at 09/10/2024 1045   Patient Position - Orthostatic VS Lying filed at 09/10/2024 0100            Systolic (24hrs), Av , Min:146 , Max:191     Diastolic (24hrs), Av, Min:81, Max:121        Intake/Output Summary (Last 24 hours) at 9/10/2024 1455  Last data filed at 9/10/2024 1209  Gross per 24 hour   Intake 480 ml   Output 2950 ml   Net -2470 ml       Invasive Devices       Peripheral Intravenous Line  Duration             Peripheral IV 24 Left Antecubital <1 day                        Physical Exam:  Physical Exam  Vitals and nursing note reviewed.   Constitutional:       " General: He is not in acute distress.     Appearance: He is well-developed.   HENT:      Head: Normocephalic and atraumatic.   Eyes:      Conjunctiva/sclera: Conjunctivae normal.   Neck:      Vascular: No carotid bruit.   Cardiovascular:      Rate and Rhythm: Normal rate and regular rhythm.      Heart sounds: Murmur heard.   Pulmonary:      Effort: Pulmonary effort is normal. No respiratory distress.      Breath sounds: Normal breath sounds.   Abdominal:      Palpations: Abdomen is soft.      Tenderness: There is no abdominal tenderness.   Musculoskeletal:         General: No swelling.      Cervical back: Neck supple.      Right lower le+ Pitting Edema present.      Left lower le+ Pitting Edema present.   Skin:     General: Skin is warm and dry.      Capillary Refill: Capillary refill takes less than 2 seconds.   Neurological:      Mental Status: He is alert and oriented to person, place, and time.   Psychiatric:         Mood and Affect: Mood normal.          Lab Results:     Cardiac Profile:   Results from last 7 days   Lab Units 24  2250 24  2101 24   HS TNI 0HR ng/L  --   --  36   HS TNI 2HR ng/L  --  37  --    HSTNI D2 ng/L  --  1  --    HS TNI 4HR ng/L 37  --   --    HSTNI D4 ng/L 1  --   --        CBC with diff:   Results from last 7 days   Lab Units 09/10/24  0438 24   WBC Thousand/uL 7.23 5.96   HEMOGLOBIN g/dL 13.5 13.6   HEMATOCRIT % 40.0 41.4   MCV fL 90 91   PLATELETS Thousands/uL 161 149   RBC Million/uL 4.45 4.54   MCH pg 30.3 30.0   MCHC g/dL 33.8 32.9   RDW % 14.6 14.7   MPV fL 10.2 10.2   NRBC AUTO /100 WBCs 0 0         CMP:   Results from last 7 days   Lab Units 24   POTASSIUM mmol/L 3.7   CHLORIDE mmol/L 111*   CO2 mmol/L 29   BUN mg/dL 20   CREATININE mg/dL 1.18   CALCIUM mg/dL 8.9   AST U/L 26   ALT U/L 27   ALK PHOS U/L 55   EGFR ml/min/1.73sq m 63

## 2024-09-10 NOTE — ASSESSMENT & PLAN NOTE
Wt Readings from Last 3 Encounters:   09/10/24 84.8 kg (186 lb 15.2 oz)   09/04/24 88.5 kg (195 lb)   08/14/24 81.4 kg (179 lb 8 oz)   Patient appears hypervolemic  Severe MR contributing factor to heart failure exacerbation.  Start Lasix 80 mg IV twice daily  Continue losartan 100 mg daily and Toprol-XL 25 mg daily

## 2024-09-10 NOTE — CONSULTS
Consultation - Cardiology   Reuben García 69 y.o. male MRN: [id number]  Unit/Bed#: -01 Encounter: [id number]  09/10/24  2:55 PM    Assessment/ Plan:  Assessment & Plan  Acute on chronic heart failure with preserved ejection fraction (HFpEF) ([facility])  Wt Readings from Last 3 Encounters:   09/10/24 84.8 kg (186 lb 15.2 oz)   09/04/24 88.5 kg (195 lb)   08/14/24 81.4 kg (179 lb 8 oz)   Patient appears hypervolemic  Severe MR contributing factor to heart failure exacerbation.  Start Lasix 80 mg IV twice daily  Continue losartan 100 mg daily and Toprol-XL 25 mg daily    Nonrheumatic mitral valve regurgitation  Severe MR noted on TTE 8/7/24  Patient undergoing surgical evaluation for mitral valve repair  Essential hypertension  Blood pressure elevated  Continue amlodipine 10 mg daily, increase losartan to 100 mg daily and increase metoprolol succinate to 25 mg daily.  CAD (coronary artery disease)          History of Present Illness   Physician Requesting Consult: Nino Niño DO    Reason for Consult / Principal Problem: Acute exacerbation of CHF    HPI: Reuben García is a 69 y.o. year old male with PMH of CAD s/p PCI with ESTHER x 2 to RCA in 2022, hypertension, hyperlipidemia, arthritis, GERD, migraines, mild asthma who presents with shortness of breath, orthopnea, leg swelling, 13 pound weight gain over the past month.  Patient saw his PCP and was started on Lasix 20 mg daily and then was increased to 40 mg daily.    Upon further evaluation in the ED, BNP elevated at 1846.  Chest x-ray showed pulmonary vascular congestion.    Patient had recent TTE which showed EF 69%, G2 DD, severe mitral regurgitation.  Patient was evaluated by CT surgery who recommends mitral valve repair.  Patient has outpatient THOR and right and left heart catheterization scheduled for next week.    Inpatient consult to Cardiology  Consult performed by: DYLAN Stephenson  Consult ordered by: Skyla Kapadia MD          EKG: Normal sinus  rhythm, nonspecific T wave abnormality      Review of Systems   Constitutional:  Positive for unexpected weight change. Negative for chills, diaphoresis and fever.   Respiratory:  Positive for shortness of breath. Negative for cough and chest tightness.    Cardiovascular:  Positive for leg swelling. Negative for chest pain and palpitations.   Gastrointestinal:  Negative for abdominal distention, blood in stool, nausea and vomiting.   Genitourinary:  Negative for difficulty urinating.   Musculoskeletal:  Negative for arthralgias and back pain.   Neurological:  Negative for dizziness, syncope, light-headedness and headaches.   Psychiatric/Behavioral:  Negative for agitation and confusion. The patient is not nervous/anxious.        Historical Information   Past Medical History:   Diagnosis Date    Allergic     Arthritis     Clotting disorder ([facility])     Coronary artery disease     Depression     Diabetes mellitus ([facility])     Headache(784.0)     HL (hearing loss)     Hypertension     Myocardial infarction ([facility]) 8/23/22    Obesity     Visual impairment      Past Surgical History:   Procedure Laterality Date    CARDIAC CATHETERIZATION Left 8/25/2022    Procedure: Cardiac catheterization;  Surgeon: Luann Feng MD;  Location: [facility];  Service: Cardiology    CARDIAC CATHETERIZATION N/A 8/25/2022    Procedure: Cardiac Coronary Angiogram;  Surgeon: Luann Feng MD;  Location: [facility];  Service: Cardiology    SEPTOPLASTY       Social History     Substance and Sexual Activity   Alcohol Use Yes    Alcohol/week: 30.0 standard drinks of alcohol    Types: 30 Cans of beer per week    Comment: patient sasys he is cutting down  to 1-2 packs of beer     Social History     Substance and Sexual Activity   Drug Use No     Social History     Tobacco Use   Smoking Status Never   Smokeless Tobacco Never   Tobacco Comments    never a smoker ( as per allscripts)       Family History:   Family History   Problem  "Relation Age of Onset    Aneurysm Mother     Coronary artery disease Father     Cancer Other     Stroke Other         CVA    Hypertension Sister        Meds/Allergies   all current active meds have been reviewed and current meds:   Current Facility-Administered Medications:     amLODIPine (NORVASC) tablet 10 mg, Daily    aspirin chewable tablet 81 mg, Daily    atorvastatin (LIPITOR) tablet 80 mg, QPM    azelastine (ASTELIN) 0.1 % nasal spray 1 spray, BID    clopidogrel (PLAVIX) tablet 75 mg, Daily    cyanocobalamin (VITAMIN B-12) tablet 500 mcg, Daily    doxazosin (CARDURA) tablet 2 mg, Daily    enoxaparin (LOVENOX) subcutaneous injection 40 mg, Daily    furosemide (LASIX) injection 80 mg, BID (diuretic)    hydrALAZINE (APRESOLINE) injection 5 mg, Q6H PRN    [START ON 2024] losartan (COZAAR) tablet 100 mg, Daily    [START ON 2024] metoprolol succinate (TOPROL-XL) 24 hr tablet 25 mg, Daily    traZODone (DESYREL) tablet 200 mg, HS PRN    venlafaxine (EFFEXOR-XR) 24 hr capsule 225 mg, Daily With Breakfast  No Known Allergies    Objective   Vitals: Blood pressure 146/81, pulse 88, temperature (!) 97.4 °F (36.3 °C), resp. rate 20, height 5' 3\" (1.6 m), weight 84.8 kg (186 lb 15.2 oz), SpO2 93%., Body mass index is 33.12 kg/m².,   Orthostatic Blood Pressures      Flowsheet Row Most Recent Value   Blood Pressure 146/81 filed at 09/10/2024 1045   Patient Position - Orthostatic VS Lying filed at 09/10/2024 0100            Systolic (24hrs), Av , Min:146 , Max:191     Diastolic (24hrs), Av, Min:81, Max:121        Intake/Output Summary (Last 24 hours) at 9/10/2024 1455  Last data filed at 9/10/2024 1209  Gross per 24 hour   Intake 480 ml   Output 2950 ml   Net -2470 ml       Invasive Devices       Peripheral Intravenous Line  Duration             Peripheral IV 24 Left Antecubital <1 day                        Physical Exam:  Physical Exam  Vitals and nursing note reviewed.   Constitutional:       " General: He is not in acute distress.     Appearance: He is well-developed.   HENT:      Head: Normocephalic and atraumatic.   Eyes:      Conjunctiva/sclera: Conjunctivae normal.   Neck:      Vascular: No carotid bruit.   Cardiovascular:      Rate and Rhythm: Normal rate and regular rhythm.      Heart sounds: Murmur heard.   Pulmonary:      Effort: Pulmonary effort is normal. No respiratory distress.      Breath sounds: Normal breath sounds.   Abdominal:      Palpations: Abdomen is soft.      Tenderness: There is no abdominal tenderness.   Musculoskeletal:         General: No swelling.      Cervical back: Neck supple.      Right lower le+ Pitting Edema present.      Left lower le+ Pitting Edema present.   Skin:     General: Skin is warm and dry.      Capillary Refill: Capillary refill takes less than 2 seconds.   Neurological:      Mental Status: He is alert and oriented to person, place, and time.   Psychiatric:         Mood and Affect: Mood normal.          Lab Results:     Cardiac Profile:   Results from last 7 days   Lab Units 24  2250 24  2101 24   HS TNI 0HR ng/L  --   --  36   HS TNI 2HR ng/L  --  37  --    HSTNI D2 ng/L  --  1  --    HS TNI 4HR ng/L 37  --   --    HSTNI D4 ng/L 1  --   --        CBC with diff:   Results from last 7 days   Lab Units 09/10/24  0438 24   WBC Thousand/uL 7.23 5.96   HEMOGLOBIN g/dL 13.5 13.6   HEMATOCRIT % 40.0 41.4   MCV fL 90 91   PLATELETS Thousands/uL 161 149   RBC Million/uL 4.45 4.54   MCH pg 30.3 30.0   MCHC g/dL 33.8 32.9   RDW % 14.6 14.7   MPV fL 10.2 10.2   NRBC AUTO /100 WBCs 0 0         CMP:   Results from last 7 days   Lab Units 24   POTASSIUM mmol/L 3.7   CHLORIDE mmol/L 111*   CO2 mmol/L 29   BUN mg/dL 20   CREATININE mg/dL 1.18   CALCIUM mg/dL 8.9   AST U/L 26   ALT U/L 27   ALK PHOS U/L 55   EGFR ml/min/1.73sq m 63

## 2024-09-10 NOTE — ASSESSMENT & PLAN NOTE
Lab Results   Component Value Date    BNP 1,846 (H) 09/09/2024    LVEF 69 08/07/2024       Recent TTE on 8/7 demonstrated EF of 69% and severe mitral regurgitation.  BNP elevated at 1800, CXR with pulmonary edema, and clinical signs of volume overload.  IV lasix 80 mg BID  LE Dopplers showed no evidence of acute or chronic deep vein thrombosis  Monitor I&Os. Daily weights  T/c repeat echo.

## 2024-09-11 PROBLEM — E78.2 MIXED HYPERLIPIDEMIA: Status: ACTIVE | Noted: 2024-09-11

## 2024-09-11 PROBLEM — E87.6 HYPOKALEMIA: Status: ACTIVE | Noted: 2024-09-11

## 2024-09-11 LAB
ALBUMIN SERPL BCG-MCNC: 3.2 G/DL (ref 3.5–5)
ALP SERPL-CCNC: 46 U/L (ref 34–104)
ALT SERPL W P-5'-P-CCNC: 21 U/L (ref 7–52)
ANION GAP SERPL CALCULATED.3IONS-SCNC: 7 MMOL/L (ref 4–13)
AST SERPL W P-5'-P-CCNC: 23 U/L (ref 13–39)
BASOPHILS # BLD AUTO: 0.04 THOUSANDS/ΜL (ref 0–0.1)
BASOPHILS NFR BLD AUTO: 1 % (ref 0–1)
BILIRUB SERPL-MCNC: 0.71 MG/DL (ref 0.2–1)
BUN SERPL-MCNC: 20 MG/DL (ref 5–25)
CALCIUM ALBUM COR SERPL-MCNC: 8.8 MG/DL (ref 8.3–10.1)
CALCIUM SERPL-MCNC: 8.2 MG/DL (ref 8.4–10.2)
CHLORIDE SERPL-SCNC: 106 MMOL/L (ref 96–108)
CO2 SERPL-SCNC: 32 MMOL/L (ref 21–32)
CREAT SERPL-MCNC: 0.98 MG/DL (ref 0.6–1.3)
EOSINOPHIL # BLD AUTO: 0.25 THOUSAND/ΜL (ref 0–0.61)
EOSINOPHIL NFR BLD AUTO: 5 % (ref 0–6)
ERYTHROCYTE [DISTWIDTH] IN BLOOD BY AUTOMATED COUNT: 14.5 % (ref 11.6–15.1)
GFR SERPL CREATININE-BSD FRML MDRD: 79 ML/MIN/1.73SQ M
GLUCOSE SERPL-MCNC: 92 MG/DL (ref 65–140)
HCT VFR BLD AUTO: 36.5 % (ref 36.5–49.3)
HGB BLD-MCNC: 12.2 G/DL (ref 12–17)
IMM GRANULOCYTES # BLD AUTO: 0.02 THOUSAND/UL (ref 0–0.2)
IMM GRANULOCYTES NFR BLD AUTO: 0 % (ref 0–2)
LYMPHOCYTES # BLD AUTO: 1.04 THOUSANDS/ΜL (ref 0.6–4.47)
LYMPHOCYTES NFR BLD AUTO: 19 % (ref 14–44)
MAGNESIUM SERPL-MCNC: 1.9 MG/DL (ref 1.9–2.7)
MCH RBC QN AUTO: 30 PG (ref 26.8–34.3)
MCHC RBC AUTO-ENTMCNC: 33.4 G/DL (ref 31.4–37.4)
MCV RBC AUTO: 90 FL (ref 82–98)
MONOCYTES # BLD AUTO: 0.39 THOUSAND/ΜL (ref 0.17–1.22)
MONOCYTES NFR BLD AUTO: 7 % (ref 4–12)
NEUTROPHILS # BLD AUTO: 3.76 THOUSANDS/ΜL (ref 1.85–7.62)
NEUTS SEG NFR BLD AUTO: 68 % (ref 43–75)
NRBC BLD AUTO-RTO: 0 /100 WBCS
PLATELET # BLD AUTO: 134 THOUSANDS/UL (ref 149–390)
PMV BLD AUTO: 10.1 FL (ref 8.9–12.7)
POTASSIUM SERPL-SCNC: 2.9 MMOL/L (ref 3.5–5.3)
PROT SERPL-MCNC: 5 G/DL (ref 6.4–8.4)
RBC # BLD AUTO: 4.07 MILLION/UL (ref 3.88–5.62)
SODIUM SERPL-SCNC: 145 MMOL/L (ref 135–147)
WBC # BLD AUTO: 5.5 THOUSAND/UL (ref 4.31–10.16)

## 2024-09-11 PROCEDURE — 99233 SBSQ HOSP IP/OBS HIGH 50: CPT | Performed by: INTERNAL MEDICINE

## 2024-09-11 PROCEDURE — 83735 ASSAY OF MAGNESIUM: CPT | Performed by: INTERNAL MEDICINE

## 2024-09-11 PROCEDURE — 85025 COMPLETE CBC W/AUTO DIFF WBC: CPT | Performed by: INTERNAL MEDICINE

## 2024-09-11 PROCEDURE — 80053 COMPREHEN METABOLIC PANEL: CPT | Performed by: INTERNAL MEDICINE

## 2024-09-11 PROCEDURE — 99232 SBSQ HOSP IP/OBS MODERATE 35: CPT | Performed by: INTERNAL MEDICINE

## 2024-09-11 RX ORDER — POTASSIUM CHLORIDE 1500 MG/1
40 TABLET, EXTENDED RELEASE ORAL
Status: COMPLETED | OUTPATIENT
Start: 2024-09-11 | End: 2024-09-11

## 2024-09-11 RX ORDER — POTASSIUM CHLORIDE 1500 MG/1
20 TABLET, EXTENDED RELEASE ORAL 2 TIMES DAILY
Status: DISCONTINUED | OUTPATIENT
Start: 2024-09-12 | End: 2024-09-12

## 2024-09-11 RX ADMIN — ENOXAPARIN SODIUM 40 MG: 40 INJECTION SUBCUTANEOUS at 09:24

## 2024-09-11 RX ADMIN — POTASSIUM CHLORIDE 40 MEQ: 1500 TABLET, EXTENDED RELEASE ORAL at 11:57

## 2024-09-11 RX ADMIN — CLOPIDOGREL 75 MG: 75 TABLET ORAL at 09:23

## 2024-09-11 RX ADMIN — DOXAZOSIN 2 MG: 1 TABLET ORAL at 09:23

## 2024-09-11 RX ADMIN — VENLAFAXINE HYDROCHLORIDE 225 MG: 150 CAPSULE, EXTENDED RELEASE ORAL at 09:23

## 2024-09-11 RX ADMIN — ATORVASTATIN CALCIUM 80 MG: 40 TABLET, FILM COATED ORAL at 17:08

## 2024-09-11 RX ADMIN — AZELASTINE HYDROCHLORIDE 1 SPRAY: 137 SPRAY, METERED NASAL at 17:12

## 2024-09-11 RX ADMIN — TRAZODONE HYDROCHLORIDE 200 MG: 100 TABLET ORAL at 21:50

## 2024-09-11 RX ADMIN — LOSARTAN POTASSIUM 100 MG: 50 TABLET, FILM COATED ORAL at 09:23

## 2024-09-11 RX ADMIN — ASPIRIN 81 MG: 81 TABLET, CHEWABLE ORAL at 09:25

## 2024-09-11 RX ADMIN — FUROSEMIDE 80 MG: 10 INJECTION, SOLUTION INTRAMUSCULAR; INTRAVENOUS at 17:00

## 2024-09-11 RX ADMIN — AMLODIPINE BESYLATE 10 MG: 10 TABLET ORAL at 09:23

## 2024-09-11 RX ADMIN — POTASSIUM CHLORIDE 40 MEQ: 1500 TABLET, EXTENDED RELEASE ORAL at 09:38

## 2024-09-11 RX ADMIN — CYANOCOBALAMIN TAB 500 MCG 500 MCG: 500 TAB at 09:24

## 2024-09-11 RX ADMIN — AZELASTINE HYDROCHLORIDE 1 SPRAY: 137 SPRAY, METERED NASAL at 09:20

## 2024-09-11 RX ADMIN — FUROSEMIDE 80 MG: 10 INJECTION, SOLUTION INTRAMUSCULAR; INTRAVENOUS at 09:24

## 2024-09-11 RX ADMIN — METOPROLOL SUCCINATE 25 MG: 25 TABLET, EXTENDED RELEASE ORAL at 09:23

## 2024-09-11 NOTE — PLAN OF CARE
Problem: CARDIOVASCULAR - ADULT  Goal: Maintains optimal cardiac output and hemodynamic stability  Description: INTERVENTIONS:  - Monitor I/O, vital signs and rhythm  - Monitor for S/S and trends of decreased cardiac output  - Administer and titrate ordered vasoactive medications to optimize hemodynamic stability  - Assess quality of pulses, skin color and temperature  - Assess for signs of decreased coronary artery perfusion  - Instruct patient to report change in severity of symptoms  Outcome: Progressing  Goal: Absence of cardiac dysrhythmias or at baseline rhythm  Description: INTERVENTIONS:  - Continuous cardiac monitoring, vital signs, obtain 12 lead EKG if ordered  - Administer antiarrhythmic and heart rate control medications as ordered  - Monitor electrolytes and administer replacement therapy as ordered  Outcome: Progressing     Problem: RESPIRATORY - ADULT  Goal: Achieves optimal ventilation and oxygenation  Description: INTERVENTIONS:  - Assess for changes in respiratory status  - Assess for changes in mentation and behavior  - Position to facilitate oxygenation and minimize respiratory effort  - Oxygen administered by appropriate delivery if ordered  - Initiate smoking cessation education as indicated  - Encourage broncho-pulmonary hygiene including cough, deep breathe, Incentive Spirometry  - Assess the need for suctioning and aspirate as needed  - Assess and instruct to report SOB or any respiratory difficulty  - Respiratory Therapy support as indicated  Outcome: Progressing     Problem: PAIN - ADULT  Goal: Verbalizes/displays adequate comfort level or baseline comfort level  Description: Interventions:  - Encourage patient to monitor pain and request assistance  - Assess pain using appropriate pain scale  - Administer analgesics based on type and severity of pain and evaluate response  - Implement non-pharmacological measures as appropriate and evaluate response  - Consider cultural and social  influences on pain and pain management  - Notify physician/advanced practitioner if interventions unsuccessful or patient reports new pain  Outcome: Progressing     Problem: INFECTION - ADULT  Goal: Absence or prevention of progression during hospitalization  Description: INTERVENTIONS:  - Assess and monitor for signs and symptoms of infection  - Monitor lab/diagnostic results  - Monitor all insertion sites, i.e. indwelling lines, tubes, and drains  - Monitor endotracheal if appropriate and nasal secretions for changes in amount and color  - Novelty appropriate cooling/warming therapies per order  - Administer medications as ordered  - Instruct and encourage patient and family to use good hand hygiene technique  - Identify and instruct in appropriate isolation precautions for identified infection/condition  Outcome: Progressing     Problem: SAFETY ADULT  Goal: Patient will remain free of falls  Description: INTERVENTIONS:  - Educate patient/family on patient safety including physical limitations  - Instruct patient to call for assistance with activity   - Consult OT/PT to assist with strengthening/mobility   - Keep Call bell within reach  - Keep bed low and locked with side rails adjusted as appropriate  - Keep care items and personal belongings within reach  - Initiate and maintain comfort rounds  - Make Fall Risk Sign visible to staff  - Offer Toileting every 2 Hours, in advance of need  - Initiate/Maintain bed alarm  - Obtain necessary fall risk management equipment: walker  - Apply yellow socks and bracelet for high fall risk patients  - Consider moving patient to room near nurses station  Outcome: Progressing

## 2024-09-11 NOTE — ASSESSMENT & PLAN NOTE
-Potassium is 2.9, due to diuresis   -Keep potassium greater than 4.0  -100 mEq of Potassium chloride was given today

## 2024-09-11 NOTE — PROGRESS NOTES
"Cardiology Progress Note - Reuben García 69 y.o. male MRN: [id number]    Unit/Bed#: -01 Encounter: [id number]      Assessment/Plan:   Assessment & Plan  Acute on chronic heart failure with preserved ejection fraction (HFpEF) ([facility])  Wt Readings from Last 3 Encounters:   09/11/24 84.3 kg (185 lb 13.6 oz)   09/04/24 88.5 kg (195 lb)   08/14/24 81.4 kg (179 lb 8 oz)     EF 69%  Suspect severe MR is contributing.  Intake/Output: + 700 /-3650: -2950 mL.  Net -4450 mL since admission.  Weight: 185 pounds (Standing Scale). Dry weight: Approximately 180 pounds per chart review  Appears hypervolemic on exam  Diuretic: Continue Lasix 80 mg IV twice daily.  He is hypokalemic today; potassium repletion per primary team today; begin potassium 20 mEq twice daily tomorrow.  GDMT: Continue Toprol  Recommend low-sodium diet, daily weights, strict I's and O's.  Recommend continuing to monitor and replete electrolytes as needed.    Nonrheumatic mitral valve regurgitation  Severe MR noted on TTE 8/7/24  Continue elective CT surgical evaluation for possible mitral valve repair versus replacement  Essential hypertension  Continue amlodipine 10 mg daily, losartan 100 mg daily, Toprol 25 mg daily, Lasix per above  Coronary artery disease involving native coronary artery of native heart without angina pectoris  CAD s/p PCI with ESTHER x 2 to RCA (2022)  Continue aspirin, Plavix, statin, amlodipine, Toprol  Mixed hyperlipidemia  Continue statin      Subjective:   Patient seen and examined.  He reports his shortness of breath was initially beginning to improve, however he has noted some worsening shortness of breath today.  He endorses dyspnea on exertion and is noted to be dyspneic after walking from restroom back to bed in his room.  He reports no chest pain, palpitations, lightheadedness.    Objective:     Vitals: Blood pressure 149/91, pulse 85, temperature 98 °F (36.7 °C), temperature source Oral, resp. rate 20, height 5' 3\" (1.6 m), " weight 84.3 kg (185 lb 13.6 oz), SpO2 96%., Body mass index is 32.92 kg/m².,   Orthostatic Blood Pressures      Flowsheet Row Most Recent Value   Blood Pressure 149/91 filed at 09/11/2024 0331   Patient Position - Orthostatic VS Lying filed at 09/10/2024 0100              Intake/Output Summary (Last 24 hours) at 9/11/2024 1425  Last data filed at 9/11/2024 1300  Gross per 24 hour   Intake 620 ml   Output 4250 ml   Net -3630 ml         Physical Exam:   GEN: Alert and oriented x 3, in no acute distress.  Well-nourished.  Slightly tachypneic-appearing after walking back to bed from restroom.  HEENT: Sclera anicteric, conjunctivae pink, mucous membranes moist. Oropharynx clear.   NECK: Supple, no significant JVD. Trachea midline.   HEART: Regular rhythm, normal S1 and S2. + 3/6 systolic murmur. No clicks, gallops or rubs. PMI nondisplaced, no thrills.   LUNGS: Decreased breath sounds to bilateral bases; no wheezes, rales, or rhonchi. No signs of respiratory distress.   ABDOMEN: Soft, nontender, non-distended.   EXTREMITIES: At least 2+ pitting edema to bilateral lower extremities.  Skin warm and well perfused, no clubbing, cyanosis.  NEURO: No focal findings. Normal speech. Mood and affect normal.   SKIN: Normal without suspicious lesions on exposed skin.        Medications:      Current Facility-Administered Medications:     amLODIPine (NORVASC) tablet 10 mg, 10 mg, Oral, Daily, Skyla Kapadia MD, 10 mg at 09/11/24 0923    aspirin chewable tablet 81 mg, 81 mg, Oral, Daily, Skyla Kapadia MD, 81 mg at 09/11/24 0925    atorvastatin (LIPITOR) tablet 80 mg, 80 mg, Oral, QPM, Skyla Kapadia MD, 80 mg at 09/10/24 1800    azelastine (ASTELIN) 0.1 % nasal spray 1 spray, 1 spray, Each Nare, BID, Skyla Kapadia MD, 1 spray at 09/11/24 0920    clopidogrel (PLAVIX) tablet 75 mg, 75 mg, Oral, Daily, Skyla Kapadia MD, 75 mg at 09/11/24 0923    cyanocobalamin (VITAMIN B-12) tablet 500 mcg, 500 mcg, Oral, Daily, Skyla Kapadia MD,  500 mcg at 09/11/24 0924    doxazosin (CARDURA) tablet 2 mg, 2 mg, Oral, Daily, Skyla Kapadia MD, 2 mg at 09/11/24 0923    enoxaparin (LOVENOX) subcutaneous injection 40 mg, 40 mg, Subcutaneous, Daily, Skyla Kapadia MD, 40 mg at 09/11/24 0924    furosemide (LASIX) injection 80 mg, 80 mg, Intravenous, BID (diuretic), DYLAN Stephenson, 80 mg at 09/11/24 0924    hydrALAZINE (APRESOLINE) injection 5 mg, 5 mg, Intravenous, Q6H PRN, Luc Phillips MD, 5 mg at 09/10/24 0405    losartan (COZAAR) tablet 100 mg, 100 mg, Oral, Daily, DYLAN Stephenson, 100 mg at 09/11/24 0923    metoprolol succinate (TOPROL-XL) 24 hr tablet 25 mg, 25 mg, Oral, Daily, DYLAN Stephenson, 25 mg at 09/11/24 0923    [START ON 9/12/2024] potassium chloride (Klor-Con M20) CR tablet 20 mEq, 20 mEq, Oral, BID, David Richard PA-C    traZODone (DESYREL) tablet 200 mg, 200 mg, Oral, HS PRN, Skyla Kapadia MD, 200 mg at 09/10/24 2249    venlafaxine (EFFEXOR-XR) 24 hr capsule 225 mg, 225 mg, Oral, Daily With Breakfast, Skyla Kapadia MD, 225 mg at 09/11/24 0923     Labs & Results:    Results from last 7 days   Lab Units 09/09/24  2250 09/09/24 2101 09/09/24 1913   HS TNI 0HR ng/L  --   --  36   HS TNI 2HR ng/L  --  37  --    HS TNI 4HR ng/L 37  --   --    HSTNI D4 ng/L 1  --   --      Results from last 7 days   Lab Units 09/11/24  0433 09/10/24  0438 09/09/24 1913   WBC Thousand/uL 5.50 7.23 5.96   HEMOGLOBIN g/dL 12.2 13.5 13.6   HEMATOCRIT % 36.5 40.0 41.4   PLATELETS Thousands/uL 134* 161 149         Results from last 7 days   Lab Units 09/11/24  0433 09/09/24  1913   POTASSIUM mmol/L 2.9* 3.7   CHLORIDE mmol/L 106 111*   CO2 mmol/L 32 29   BUN mg/dL 20 20   CREATININE mg/dL 0.98 1.18   CALCIUM mg/dL 8.2* 8.9   ALK PHOS U/L 46 55   ALT U/L 21 27   AST U/L 23 26         Results from last 7 days   Lab Units 09/11/24  0433   MAGNESIUM mg/dL 1.9       ** Please Note: Fluency DirectDictation voice to text software may have  been used in the creation of this document. **

## 2024-09-11 NOTE — UTILIZATION REVIEW
Continued Stay Review    Date: 9/11                          Current Patient Class: Inpatient  Current Level of Care:     HPI:69 y.o. male initially admitted on  9/9    Assessment/Plan:     Date: 9/11  Day 3: Has surpassed a 2nd midnight with active treatments and services. Pt able to lie doen to sleep . Has noticed swelling in legs and stomach has gone down . Inc urination. -3850 ml output last 24 hrs . BLE edema . + tachypnea . K 2.9 keep > 4.0 100 meq KCL given today , recheck . Cont po lasix BID .         Vital Signs (last 3 days)       Date/Time Temp Pulse Resp BP MAP (mmHg) SpO2 O2 Device Patient Position - Orthostatic VS Arielle Coma Scale Score Pain    09/11/24 0331 98 °F (36.7 °C) -- -- 149/91 115 -- -- -- -- --    09/10/24 22:44:16 97.8 °F (36.6 °C) 85 20 164/95 118 96 % None (Room air) -- -- --    09/10/24 2027 -- -- -- -- -- -- None (Room air) -- 15 No Pain    09/10/24 19:00:12 97.9 °F (36.6 °C) 86 20 137/84 102 95 % -- -- -- --    09/10/24 15:15:38 97.9 °F (36.6 °C) 89 20 143/80 101 93 % -- -- -- --    09/10/24 10:45:31 97.4 °F (36.3 °C) 88 20 146/81 103 93 % -- -- -- --    09/10/24 0819 -- -- -- -- -- -- -- -- 15 No Pain    09/10/24 07:09:59 97.2 °F (36.2 °C) 93 -- 168/101 123 96 % -- -- -- --    09/10/24 07:08:53 97.2 °F (36.2 °C) 95 19 177/100 126 95 % -- -- -- --    09/10/24 0300 -- -- -- -- -- -- -- -- 15 --    09/10/24 0258 -- -- -- -- -- -- -- -- -- No Pain    09/10/24 02:50:01 97.4 °F (36.3 °C) 99 14 175/113 134 92 % None (Room air) -- -- --    09/10/24 0100 -- 90 32 177/109 138 95 % None (Room air) Lying -- --    09/10/24 0030 -- 92 40 185/121 148 94 % None (Room air) Lying -- --    09/09/24 2345 -- -- -- -- -- -- -- -- 15 --    09/09/24 2330 -- 96 36 191/99 138 94 % None (Room air) Lying -- --    09/09/24 2200 -- 89 27 169/95 125 95 % None (Room air) Lying -- --    09/09/24 2100 -- 93 39 189/110 141 94 % None (Room air) Sitting -- --    09/09/24 1901 -- -- -- -- -- -- None (Room air) -- -- --     09/09/24 1849 98 °F (36.7 °C) 101 20 186/105 139 96 % None (Room air) -- -- --          Weight (last 2 days)       Date/Time Weight    09/11/24 0545 84.3 (185.85)    09/10/24 0538 84.8 (186.95)    09/10/24 0239 84.8 (186.95)    09/09/24 1953 89.1 (196.43)              Pertinent Labs/Diagnostic Results:   Radiology:   VAS VENOUS DUPLEX - LOWER LIMB BILATERAL   Final Interpretation by Rahat Boyle MD (09/10 1209)      XR chest 1 view portable   Final Interpretation by Arun Chin MD (09/10 0854)      Pulmonary vascular congestion. Cardiomegaly and small left greater than right pleural effusions.            Resident: Dagoberto Becerra I, the attending radiologist, have reviewed the images and agree with the final report above.      Workstation performed: [id number]           Cardiology:  ECG 12 lead   Final Result by Gail Merrill MD (09/10 1332)   Normal sinus rhythm   Nonspecific T wave abnormality      Confirmed by Gail Merrill ([id number]) on 9/10/2024 1:32:13 PM        GI:  No orders to display           Results from last 7 days   Lab Units 09/11/24  0433 09/10/24  0438 09/09/24  1913   WBC Thousand/uL 5.50 7.23 5.96   HEMOGLOBIN g/dL 12.2 13.5 13.6   HEMATOCRIT % 36.5 40.0 41.4   PLATELETS Thousands/uL 134* 161 149   TOTAL NEUT ABS Thousands/µL 3.76 5.45 4.50         Results from last 7 days   Lab Units 09/11/24  0433 09/09/24  1913   SODIUM mmol/L 145 146   POTASSIUM mmol/L 2.9* 3.7   CHLORIDE mmol/L 106 111*   CO2 mmol/L 32 29   ANION GAP mmol/L 7 6   BUN mg/dL 20 20   CREATININE mg/dL 0.98 1.18   EGFR ml/min/1.73sq m 79 63   CALCIUM mg/dL 8.2* 8.9   MAGNESIUM mg/dL 1.9  --      Results from last 7 days   Lab Units 09/11/24 0433 09/09/24 1913   AST U/L 23 26   ALT U/L 21 27   ALK PHOS U/L 46 55   TOTAL PROTEIN g/dL 5.0* 5.9*   ALBUMIN g/dL 3.2* 3.8   TOTAL BILIRUBIN mg/dL 0.71 0.77     Results from last 7 days   Lab Units 09/10/24  2048   POC GLUCOSE mg/dl 93     Results from  last 7 days   Lab Units 09/11/24  0433 09/09/24  1913   GLUCOSE RANDOM mg/dL 92 121         Results from last 7 days   Lab Units 09/09/24  2250 09/09/24  2101 09/09/24  1913   HS TNI 0HR ng/L  --   --  36   HS TNI 2HR ng/L  --  37  --    HSTNI D2 ng/L  --  1  --    HS TNI 4HR ng/L 37  --   --    HSTNI D4 ng/L 1  --   --        Results from last 7 days   Lab Units 09/09/24 1913   BNP pg/mL 1,846*       Medications:   Scheduled Medications:  amLODIPine, 10 mg, Oral, Daily  aspirin, 81 mg, Oral, Daily  atorvastatin, 80 mg, Oral, QPM  azelastine, 1 spray, Each Nare, BID  clopidogrel, 75 mg, Oral, Daily  vitamin B-12, 500 mcg, Oral, Daily  doxazosin, 2 mg, Oral, Daily  enoxaparin, 40 mg, Subcutaneous, Daily  furosemide, 80 mg, Intravenous, BID (diuretic)  losartan, 100 mg, Oral, Daily  metoprolol succinate, 25 mg, Oral, Daily  [START ON 9/12/2024] potassium chloride, 20 mEq, Oral, BID  venlafaxine, 225 mg, Oral, Daily With Breakfast      Continuous IV Infusions:     PRN Meds:  hydrALAZINE, 5 mg, Intravenous, Q6H PRN  traZODone, 200 mg, Oral, HS PRN        Discharge Plan: D     Network Utilization Review Department  ATTENTION: Please call with any questions or concerns to [phone] and carefully listen to the prompts so that you are directed to the right person. All voicemails are confidential.   For Discharge needs, contact Care Management DC Support Team at [phone] opt. [phone]  Send all requests for admission clinical reviews, approved or denied determinations and any other requests to dedicated fax number below belonging to the campus where the patient is receiving treatment. List of dedicated fax numbers for the Facilities:  FACILITY NAME UR FAX NUMBER   ADMISSION DENIALS (Administrative/Medical Necessity) [phone]   DISCHARGE SUPPORT TEAM (NETWORK) [phone]   PARENT CHILD HEALTH (Maternity/NICU/Pediatrics) [phone]   [facility] [phone]   [facility] [phone]   [facility] [phone]   [facility] [phone]   [facility] [phone]   [facility] [phone]   [facility] [phone]   [facility] [phone]   [facility] [phone]   [facility] [phone]   [facility] [phone]   [facility] [phone]

## 2024-09-11 NOTE — PROGRESS NOTES
Progress Note - Hospitalist   Name: Reuben García 69 y.o. male I MRN: [id number]  Unit/Bed#: -01 I Date of Admission: 9/9/2024   Date of Service: 9/11/2024 I Hospital Day: 2    Assessment & Plan  Essential hypertension  Blood Pressure: 149/91   amlodipine 10mg daily, Toprol 25mg daily, Cardura 2mg daily  increase losartan to 100 mg daily   IV Lasix as noted  Insomnia  Trazadone 200mg nightly  Coronary artery disease involving native coronary artery of native heart without angina pectoris  No active chest pain  Resume aspirin 81mg daily and Plavix 75mg daily  Nonrheumatic mitral valve regurgitation    Acute on chronic heart failure with preserved ejection fraction (HFpEF) ([facility])  Lab Results   Component Value Date    BNP 1,846 (H) 09/09/2024    LVEF 69 08/07/2024       Recent TTE on 8/7 demonstrated EF of 69% and severe mitral regurgitation.  BNP elevated at 1800, CXR with pulmonary edema, and clinical signs of volume overload.  IV lasix 80 mg BID  LE Dopplers showed no evidence of acute or chronic deep vein thrombosis  Monitor I&Os. Daily weights  T/c repeat echo.  Recurrent major depressive disorder, in remission ([facility])  Symptoms controlled.  Continue Effexor 225mg daily.  Hypokalemia  -Potassium is 2.9, due to diuresis   -Keep potassium greater than 4.0  -100 mEq of Potassium chloride was given today    VTE Pharmacologic Prophylaxis:   Moderate Risk (Score 3-4) - Pharmacological DVT Prophylaxis Ordered: enoxaparin (Lovenox).    Mobility:   Basic Mobility Inpatient Raw Score: 24  JH-HLM Goal: 8: Walk 250 feet or more  JH-HLM Achieved: 6: Walk 10 steps or more  JH-HLM Goal achieved. Continue to encourage appropriate mobility.    Current Length of Stay: 2 day(s)  Current Patient Status: Inpatient   Certification Statement: The patient will continue to require additional inpatient hospital stay due to diuresis  Discharge Plan: Anticipate discharge in 48 hrs to home.    Code Status: Level 3 - DNAR and  DNI    Subjective   Pt is feeling better today. Was able to sleep last night and now can lay down without feeling SOB. He noticed that the swelling in his legs and stomach seems to have gone down. He reports he has been peeing a lot.    Objective     Vitals:   Temp (24hrs), Av.9 °F (36.6 °C), Min:97.8 °F (36.6 °C), Max:98 °F (36.7 °C)    Temp:  [97.8 °F (36.6 °C)-98 °F (36.7 °C)] 98 °F (36.7 °C)  HR:  [85-89] 85  Resp:  [20] 20  BP: (137-164)/(80-95) 149/91  SpO2:  [93 %-96 %] 96 %  Body mass index is 32.92 kg/m².     Input and Output Summary (last 24 hours):     Intake/Output Summary (Last 24 hours) at 2024 1252  Last data filed at 2024 1159  Gross per 24 hour   Intake 400 ml   Output 4250 ml   Net -3850 ml       Physical Exam  Constitutional:       Appearance: Normal appearance.   Cardiovascular:      Rate and Rhythm: Normal rate and regular rhythm.      Heart sounds: Murmur heard.   Pulmonary:      Effort: Tachypnea present.      Breath sounds: Normal breath sounds. No wheezing.   Musculoskeletal:      Right lower leg: Edema present.      Left lower leg: Edema present.   Neurological:      Mental Status: He is alert.   Psychiatric:         Mood and Affect: Mood normal.         Lines/Drains:  Lines/Drains/Airways       Active Status       None                      Telemetry:  Telemetry Orders (From admission, onward)               24 Hour Telemetry Monitoring  Continuous x 24 Hours (Telem)        Question:  Reason for 24 Hour Telemetry  Answer:  Decompensated CHF- and any one of the following: continuous diuretic infusion or total diuretic dose >200 mg daily, associated electrolyte derangement (I.e. K < 3.0), ionotropic drip (continuous infusion), hx of ventricular arrhythmia, or new EF < 35%                                Lab Results: I have reviewed the following results:   Results from last 7 days   Lab Units 24  0433   WBC Thousand/uL 5.50   HEMOGLOBIN g/dL 12.2   HEMATOCRIT % 36.5    PLATELETS Thousands/uL 134*   SEGS PCT % 68   LYMPHO PCT % 19   MONO PCT % 7   EOS PCT % 5     Results from last 7 days   Lab Units 09/11/24  0433   SODIUM mmol/L 145   POTASSIUM mmol/L 2.9*   CHLORIDE mmol/L 106   CO2 mmol/L 32   BUN mg/dL 20   CREATININE mg/dL 0.98   ANION GAP mmol/L 7   CALCIUM mg/dL 8.2*   ALBUMIN g/dL 3.2*   TOTAL BILIRUBIN mg/dL 0.71   ALK PHOS U/L 46   ALT U/L 21   AST U/L 23   GLUCOSE RANDOM mg/dL 92         Results from last 7 days   Lab Units 09/10/24  2048   POC GLUCOSE mg/dl 93               Recent Cultures (last 7 days):         Imaging Review: Reviewed radiology reports from this admission including: chest xray and Ultrasound(s).  Other Studies: No additional pertinent studies reviewed.    Last 24 Hours Medication List:     Current Facility-Administered Medications:     amLODIPine (NORVASC) tablet 10 mg, Daily    aspirin chewable tablet 81 mg, Daily    atorvastatin (LIPITOR) tablet 80 mg, QPM    azelastine (ASTELIN) 0.1 % nasal spray 1 spray, BID    clopidogrel (PLAVIX) tablet 75 mg, Daily    cyanocobalamin (VITAMIN B-12) tablet 500 mcg, Daily    doxazosin (CARDURA) tablet 2 mg, Daily    enoxaparin (LOVENOX) subcutaneous injection 40 mg, Daily    furosemide (LASIX) injection 80 mg, BID (diuretic)    hydrALAZINE (APRESOLINE) injection 5 mg, Q6H PRN    losartan (COZAAR) tablet 100 mg, Daily    metoprolol succinate (TOPROL-XL) 24 hr tablet 25 mg, Daily    [START ON 9/12/2024] potassium chloride (Klor-Con M20) CR tablet 20 mEq, BID    traZODone (DESYREL) tablet 200 mg, HS PRN    venlafaxine (EFFEXOR-XR) 24 hr capsule 225 mg, Daily With Breakfast    Administrative Statements   Today, Patient Was Seen By: Yareli Romero      **Please Note: This note may have been constructed using a voice recognition system.**

## 2024-09-11 NOTE — ASSESSMENT & PLAN NOTE
Wt Readings from Last 3 Encounters:   09/11/24 84.3 kg (185 lb 13.6 oz)   09/04/24 88.5 kg (195 lb)   08/14/24 81.4 kg (179 lb 8 oz)     EF 69%  Suspect severe MR is contributing.  Intake/Output: + 700 /-3650: -2950 mL.  Net -4450 mL since admission.  Weight: 185 pounds (Standing Scale). Dry weight: Approximately 180 pounds per chart review  Appears hypervolemic on exam  Diuretic: Continue Lasix 80 mg IV twice daily.  He is hypokalemic today; potassium repletion per primary team today; begin potassium 20 mEq twice daily tomorrow.  GDMT: Continue Toprol  Recommend low-sodium diet, daily weights, strict I's and O's.  Recommend continuing to monitor and replete electrolytes as needed.

## 2024-09-11 NOTE — PROGRESS NOTES
Progress Note -     Name: Reuben García 69 y.o. male I MRN: [id number]  Unit/Bed#: -01 I Date of Admission: 9/9/2024   Date of Service: 9/11/2024 I Hospital Day: 2     This SmartSection is not supported in the current .     Pastoral Care Progress Note             09/11/24 1250   Clinical Encounter Type   Visited With Patient   Routine Visit Introduction      initiative during rounds.  offered supportive conversation and empathetic listening. Pt provided life review and health journey dynamics. Pt expressed gratitude for the visit. Pt welcomes future visits. Follow up as needed or requested.

## 2024-09-12 LAB
ANION GAP SERPL CALCULATED.3IONS-SCNC: 6 MMOL/L (ref 4–13)
BUN SERPL-MCNC: 16 MG/DL (ref 5–25)
CALCIUM SERPL-MCNC: 8.1 MG/DL (ref 8.4–10.2)
CHLORIDE SERPL-SCNC: 103 MMOL/L (ref 96–108)
CO2 SERPL-SCNC: 34 MMOL/L (ref 21–32)
CREAT SERPL-MCNC: 0.83 MG/DL (ref 0.6–1.3)
ERYTHROCYTE [DISTWIDTH] IN BLOOD BY AUTOMATED COUNT: 14.6 % (ref 11.6–15.1)
GFR SERPL CREATININE-BSD FRML MDRD: 91 ML/MIN/1.73SQ M
GLUCOSE SERPL-MCNC: 93 MG/DL (ref 65–140)
HCT VFR BLD AUTO: 37 % (ref 36.5–49.3)
HGB BLD-MCNC: 12.3 G/DL (ref 12–17)
MAGNESIUM SERPL-MCNC: 1.8 MG/DL (ref 1.9–2.7)
MCH RBC QN AUTO: 29.7 PG (ref 26.8–34.3)
MCHC RBC AUTO-ENTMCNC: 33.2 G/DL (ref 31.4–37.4)
MCV RBC AUTO: 89 FL (ref 82–98)
PLATELET # BLD AUTO: 135 THOUSANDS/UL (ref 149–390)
PMV BLD AUTO: 10.1 FL (ref 8.9–12.7)
POTASSIUM SERPL-SCNC: 3 MMOL/L (ref 3.5–5.3)
RBC # BLD AUTO: 4.14 MILLION/UL (ref 3.88–5.62)
SODIUM SERPL-SCNC: 143 MMOL/L (ref 135–147)
WBC # BLD AUTO: 5.75 THOUSAND/UL (ref 4.31–10.16)

## 2024-09-12 PROCEDURE — 80048 BASIC METABOLIC PNL TOTAL CA: CPT

## 2024-09-12 PROCEDURE — 85027 COMPLETE CBC AUTOMATED: CPT | Performed by: INTERNAL MEDICINE

## 2024-09-12 PROCEDURE — 99232 SBSQ HOSP IP/OBS MODERATE 35: CPT | Performed by: INTERNAL MEDICINE

## 2024-09-12 PROCEDURE — 83735 ASSAY OF MAGNESIUM: CPT | Performed by: INTERNAL MEDICINE

## 2024-09-12 RX ORDER — MAGNESIUM SULFATE HEPTAHYDRATE 40 MG/ML
2 INJECTION, SOLUTION INTRAVENOUS ONCE
Status: COMPLETED | OUTPATIENT
Start: 2024-09-12 | End: 2024-09-12

## 2024-09-12 RX ORDER — POTASSIUM CHLORIDE 1500 MG/1
40 TABLET, EXTENDED RELEASE ORAL 2 TIMES DAILY
Status: DISCONTINUED | OUTPATIENT
Start: 2024-09-12 | End: 2024-09-12

## 2024-09-12 RX ORDER — POTASSIUM CHLORIDE 1500 MG/1
40 TABLET, EXTENDED RELEASE ORAL 2 TIMES DAILY
Status: DISCONTINUED | OUTPATIENT
Start: 2024-09-12 | End: 2024-09-15 | Stop reason: HOSPADM

## 2024-09-12 RX ADMIN — DOXAZOSIN 2 MG: 1 TABLET ORAL at 10:04

## 2024-09-12 RX ADMIN — AMLODIPINE BESYLATE 10 MG: 10 TABLET ORAL at 10:03

## 2024-09-12 RX ADMIN — METOPROLOL SUCCINATE 25 MG: 25 TABLET, EXTENDED RELEASE ORAL at 10:03

## 2024-09-12 RX ADMIN — CYANOCOBALAMIN TAB 500 MCG 500 MCG: 500 TAB at 10:03

## 2024-09-12 RX ADMIN — CLOPIDOGREL 75 MG: 75 TABLET ORAL at 10:03

## 2024-09-12 RX ADMIN — ASPIRIN 81 MG: 81 TABLET, CHEWABLE ORAL at 10:03

## 2024-09-12 RX ADMIN — MAGNESIUM SULFATE HEPTAHYDRATE 2 G: 40 INJECTION, SOLUTION INTRAVENOUS at 10:17

## 2024-09-12 RX ADMIN — FUROSEMIDE 80 MG: 10 INJECTION, SOLUTION INTRAMUSCULAR; INTRAVENOUS at 10:06

## 2024-09-12 RX ADMIN — LOSARTAN POTASSIUM 100 MG: 50 TABLET, FILM COATED ORAL at 10:03

## 2024-09-12 RX ADMIN — ATORVASTATIN CALCIUM 80 MG: 40 TABLET, FILM COATED ORAL at 17:15

## 2024-09-12 RX ADMIN — ENOXAPARIN SODIUM 40 MG: 40 INJECTION SUBCUTANEOUS at 10:05

## 2024-09-12 RX ADMIN — POTASSIUM CHLORIDE 40 MEQ: 1500 TABLET, EXTENDED RELEASE ORAL at 17:15

## 2024-09-12 RX ADMIN — FUROSEMIDE 80 MG: 10 INJECTION, SOLUTION INTRAMUSCULAR; INTRAVENOUS at 17:15

## 2024-09-12 RX ADMIN — TRAZODONE HYDROCHLORIDE 200 MG: 100 TABLET ORAL at 22:11

## 2024-09-12 RX ADMIN — VENLAFAXINE HYDROCHLORIDE 225 MG: 150 CAPSULE, EXTENDED RELEASE ORAL at 10:04

## 2024-09-12 RX ADMIN — POTASSIUM CHLORIDE 40 MEQ: 1500 TABLET, EXTENDED RELEASE ORAL at 11:45

## 2024-09-12 NOTE — ASSESSMENT & PLAN NOTE
Wt Readings from Last 3 Encounters:   09/12/24 83.7 kg (184 lb 8.4 oz)   09/04/24 88.5 kg (195 lb)   08/14/24 81.4 kg (179 lb 8 oz)     EF 69%  Suspect severe MR is contributing.  Intake/Output: Net -2.4L in the past 24 hrs  Weight: 184 pounds (Standing Scale). Dry weight: Approximately 180 pounds per chart review  Appears hypervolemic on exam  Diuretic: Continue Lasix 80 mg IV twice daily.  He is hypokalemic today; increased scheduled potassium repletion and repleted magnesium   GDMT: Continue metoprolol succinate 25 mg daily and losartan 100 mg daily  Recommend low-sodium diet, daily weights, strict I's and O's.  Recommend continuing to monitor and replete electrolytes as needed.

## 2024-09-12 NOTE — PLAN OF CARE
Problem: CARDIOVASCULAR - ADULT  Goal: Maintains optimal cardiac output and hemodynamic stability  Description: INTERVENTIONS:  - Monitor I/O, vital signs and rhythm  - Monitor for S/S and trends of decreased cardiac output  - Administer and titrate ordered vasoactive medications to optimize hemodynamic stability  - Assess quality of pulses, skin color and temperature  - Assess for signs of decreased coronary artery perfusion  - Instruct patient to report change in severity of symptoms  Outcome: Progressing  Goal: Absence of cardiac dysrhythmias or at baseline rhythm  Description: INTERVENTIONS:  - Continuous cardiac monitoring, vital signs, obtain 12 lead EKG if ordered  - Administer antiarrhythmic and heart rate control medications as ordered  - Monitor electrolytes and administer replacement therapy as ordered  Outcome: Progressing     Problem: RESPIRATORY - ADULT  Goal: Achieves optimal ventilation and oxygenation  Description: INTERVENTIONS:  - Assess for changes in respiratory status  - Assess for changes in mentation and behavior  - Position to facilitate oxygenation and minimize respiratory effort  - Oxygen administered by appropriate delivery if ordered  - Initiate smoking cessation education as indicated  - Encourage broncho-pulmonary hygiene including cough, deep breathe, Incentive Spirometry  - Assess the need for suctioning and aspirate as needed  - Assess and instruct to report SOB or any respiratory difficulty  - Respiratory Therapy support as indicated  Outcome: Progressing     Problem: PAIN - ADULT  Goal: Verbalizes/displays adequate comfort level or baseline comfort level  Description: Interventions:  - Encourage patient to monitor pain and request assistance  - Assess pain using appropriate pain scale  - Administer analgesics based on type and severity of pain and evaluate response  - Implement non-pharmacological measures as appropriate and evaluate response  - Consider cultural and social  influences on pain and pain management  - Notify physician/advanced practitioner if interventions unsuccessful or patient reports new pain  Outcome: Progressing     Problem: INFECTION - ADULT  Goal: Absence or prevention of progression during hospitalization  Description: INTERVENTIONS:  - Assess and monitor for signs and symptoms of infection  - Monitor lab/diagnostic results  - Monitor all insertion sites, i.e. indwelling lines, tubes, and drains  - Monitor endotracheal if appropriate and nasal secretions for changes in amount and color  - Royal appropriate cooling/warming therapies per order  - Administer medications as ordered  - Instruct and encourage patient and family to use good hand hygiene technique  - Identify and instruct in appropriate isolation precautions for identified infection/condition  Outcome: Progressing     Problem: SAFETY ADULT  Goal: Patient will remain free of falls  Description: INTERVENTIONS:  - Educate patient/family on patient safety including physical limitations  - Instruct patient to call for assistance with activity   - Consult OT/PT to assist with strengthening/mobility   - Keep Call bell within reach  - Keep bed low and locked with side rails adjusted as appropriate  - Keep care items and personal belongings within reach  - Initiate and maintain comfort rounds  - Make Fall Risk Sign visible to staff  - Offer Toileting every  Hours, in advance of need  - Initiate/Maintain alarm  - Obtain necessary fall risk management equipment  - Apply yellow socks and bracelet for high fall risk patients  - Consider moving patient to room near nurses station  Outcome: Progressing

## 2024-09-12 NOTE — PROGRESS NOTES
Progress Note - Hospitalist   Name: Reuben García 69 y.o. male I MRN: [id number]  Unit/Bed#: -01 I Date of Admission: 9/9/2024   Date of Service: 9/12/2024 I Hospital Day: 3    Assessment & Plan  Essential hypertension  Blood Pressure: 150/95   amlodipine 10mg daily, Toprol 25mg daily, Cardura 2mg daily  continue losartan 100 mg daily   IV Lasix as noted  Insomnia  Trazadone 200mg nightly  Coronary artery disease involving native coronary artery of native heart without angina pectoris  No active chest pain  Resume aspirin 81mg daily and Plavix 75mg daily  Nonrheumatic mitral valve regurgitation    Acute on chronic heart failure with preserved ejection fraction (HFpEF) ([facility])  Lab Results   Component Value Date    BNP 1,846 (H) 09/09/2024    LVEF 69 08/07/2024       Recent TTE on 8/7 demonstrated EF of 69% and severe mitral regurgitation.  BNP elevated at 1800, CXR with pulmonary edema, and clinical signs of volume overload.  IV lasix 80 mg BID  LE Dopplers showed no evidence of acute or chronic deep vein thrombosis  Monitor I&Os. Daily weights  T/c repeat echo.  Recurrent major depressive disorder, in remission ([facility])  Symptoms controlled.  Continue Effexor 225mg daily.  Hypokalemia  -Potassium is 3.0, due to diuresis   -Keep potassium greater than 4.0  -Potassium chloride 40 mEq BID   Mixed hyperlipidemia      VTE Pharmacologic Prophylaxis:   Moderate Risk (Score 3-4) - Pharmacological DVT Prophylaxis Ordered: enoxaparin (Lovenox).    Mobility:   Basic Mobility Inpatient Raw Score: 23  JH-HLM Goal: 7: Walk 25 feet or more  JH-HLM Achieved: 6: Walk 10 steps or more  JH-HLM Goal achieved. Continue to encourage appropriate mobility.    Education and Discussions with Family / Patient:  discussed plan with patient.     Current Length of Stay: 3 day(s)  Current Patient Status: Inpatient   Certification Statement: The patient will continue to require additional inpatient hospital stay due to continued need for diuresis    Discharge Plan: Anticipate discharge in 48-72 hrs to home.    Code Status: Level 3 - DNAR and DNI    Subjective   Patient is feeling good this morning. His SOB only occurs now when getting up and moving around. He states he is able to take deep breaths now. He has still been peeing a lot.       Objective     Vitals:   Temp (24hrs), Av.8 °F (36.6 °C), Min:97.6 °F (36.4 °C), Max:98 °F (36.7 °C)    Temp:  [97.6 °F (36.4 °C)-98 °F (36.7 °C)] 98 °F (36.7 °C)  HR:  [77-92] 91  Resp:  [17-20] 17  BP: (133-162)/() 150/95  SpO2:  [92 %-95 %] 95 %  Body mass index is 32.69 kg/m².     Input and Output Summary (last 24 hours):     Intake/Output Summary (Last 24 hours) at 2024 1207  Last data filed at 2024 1147  Gross per 24 hour   Intake 880 ml   Output 2750 ml   Net -1870 ml       Physical Exam  Constitutional:       General: He is not in acute distress.     Appearance: Normal appearance.   Cardiovascular:      Heart sounds: Murmur heard.   Pulmonary:      Effort: Pulmonary effort is normal. Tachypnea present.      Breath sounds: Normal breath sounds.   Abdominal:      Tenderness: There is no abdominal tenderness.   Musculoskeletal:      Right lower leg: Edema present.      Left lower leg: Edema present.   Neurological:      Mental Status: He is alert.   Psychiatric:         Mood and Affect: Mood normal.         Lines/Drains:  Lines/Drains/Airways       Active Status       None                      Telemetry:  Telemetry Orders (From admission, onward)               24 Hour Telemetry Monitoring  Continuous x 24 Hours (Telem)        Question:  Reason for 24 Hour Telemetry  Answer:  Decompensated CHF- and any one of the following: continuous diuretic infusion or total diuretic dose >200 mg daily, associated electrolyte derangement (I.e. K < 3.0), ionotropic drip (continuous infusion), hx of ventricular arrhythmia, or new EF < 35%                                  Lab Results: I have reviewed the following  results:   Results from last 7 days   Lab Units 09/12/24  0432 09/11/24  0433   WBC Thousand/uL 5.75 5.50   HEMOGLOBIN g/dL 12.3 12.2   HEMATOCRIT % 37.0 36.5   PLATELETS Thousands/uL 135* 134*   SEGS PCT %  --  68   LYMPHO PCT %  --  19   MONO PCT %  --  7   EOS PCT %  --  5     Results from last 7 days   Lab Units 09/12/24  0432 09/11/24  0433   SODIUM mmol/L 143 145   POTASSIUM mmol/L 3.0* 2.9*   CHLORIDE mmol/L 103 106   CO2 mmol/L 34* 32   BUN mg/dL 16 20   CREATININE mg/dL 0.83 0.98   ANION GAP mmol/L 6 7   CALCIUM mg/dL 8.1* 8.2*   ALBUMIN g/dL  --  3.2*   TOTAL BILIRUBIN mg/dL  --  0.71   ALK PHOS U/L  --  46   ALT U/L  --  21   AST U/L  --  23   GLUCOSE RANDOM mg/dL 93 92         Results from last 7 days   Lab Units 09/10/24  2048   POC GLUCOSE mg/dl 93             Last 24 Hours Medication List:     Current Facility-Administered Medications:     amLODIPine (NORVASC) tablet 10 mg, Daily    aspirin chewable tablet 81 mg, Daily    atorvastatin (LIPITOR) tablet 80 mg, QPM    azelastine (ASTELIN) 0.1 % nasal spray 1 spray, BID    clopidogrel (PLAVIX) tablet 75 mg, Daily    cyanocobalamin (VITAMIN B-12) tablet 500 mcg, Daily    doxazosin (CARDURA) tablet 2 mg, Daily    enoxaparin (LOVENOX) subcutaneous injection 40 mg, Daily    furosemide (LASIX) injection 80 mg, BID (diuretic)    hydrALAZINE (APRESOLINE) injection 5 mg, Q6H PRN    losartan (COZAAR) tablet 100 mg, Daily    magnesium sulfate 2 g/50 mL IVPB (premix) 2 g, Once, Last Rate: 2 g (09/12/24 1017)    metoprolol succinate (TOPROL-XL) 24 hr tablet 25 mg, Daily    potassium chloride (Klor-Con M20) CR tablet 40 mEq, BID    traZODone (DESYREL) tablet 200 mg, HS PRN    venlafaxine (EFFEXOR-XR) 24 hr capsule 225 mg, Daily With Breakfast    Administrative Statements   Today, Patient Was Seen By: Yareli Romero      **Please Note: This note may have been constructed using a voice recognition system.**

## 2024-09-12 NOTE — PLAN OF CARE
Problem: PAIN - ADULT  Goal: Verbalizes/displays adequate comfort level or baseline comfort level  Description: Interventions:  - Encourage patient to monitor pain and request assistance  - Assess pain using appropriate pain scale  - Administer analgesics based on type and severity of pain and evaluate response  - Implement non-pharmacological measures as appropriate and evaluate response  - Consider cultural and social influences on pain and pain management  - Notify physician/advanced practitioner if interventions unsuccessful or patient reports new pain  Outcome: Progressing     Problem: RESPIRATORY - ADULT  Goal: Achieves optimal ventilation and oxygenation  Description: INTERVENTIONS:  - Assess for changes in respiratory status  - Assess for changes in mentation and behavior  - Position to facilitate oxygenation and minimize respiratory effort  - Oxygen administered by appropriate delivery if ordered  - Initiate smoking cessation education as indicated  - Encourage broncho-pulmonary hygiene including cough, deep breathe, Incentive Spirometry  - Assess the need for suctioning and aspirate as needed  - Assess and instruct to report SOB or any respiratory difficulty  - Respiratory Therapy support as indicated  Outcome: Progressing     Problem: CARDIOVASCULAR - ADULT  Goal: Absence of cardiac dysrhythmias or at baseline rhythm  Description: INTERVENTIONS:  - Continuous cardiac monitoring, vital signs, obtain 12 lead EKG if ordered  - Administer antiarrhythmic and heart rate control medications as ordered  - Monitor electrolytes and administer replacement therapy as ordered  Outcome: Progressing

## 2024-09-12 NOTE — ASSESSMENT & PLAN NOTE
-Potassium is 3.0, due to diuresis   -Keep potassium greater than 4.0  -Potassium chloride 40 mEq BID

## 2024-09-12 NOTE — PROGRESS NOTES
Progress Note - Cardiology   Name: Reuben García 69 y.o. male I MRN: [id number]  Unit/Bed#: -01 I Date of Admission: 9/9/2024   Date of Service: 9/12/2024 I Hospital Day: 3     Assessment & Plan  Acute on chronic heart failure with preserved ejection fraction (HFpEF) ([facility])  Wt Readings from Last 3 Encounters:   09/12/24 83.7 kg (184 lb 8.4 oz)   09/04/24 88.5 kg (195 lb)   08/14/24 81.4 kg (179 lb 8 oz)     EF 69%  Suspect severe MR is contributing.  Intake/Output: Net -2.4L in the past 24 hrs  Weight: 184 pounds (Standing Scale). Dry weight: Approximately 180 pounds per chart review  Appears hypervolemic on exam  Diuretic: Continue Lasix 80 mg IV twice daily.  He is hypokalemic today; increased scheduled potassium repletion and repleted magnesium   GDMT: Continue metoprolol succinate 25 mg daily and losartan 100 mg daily  Recommend low-sodium diet, daily weights, strict I's and O's.  Recommend continuing to monitor and replete electrolytes as needed.    Nonrheumatic mitral valve regurgitation  Severe MR noted on TTE 8/7/24  Continue elective CT surgical evaluation for possible mitral valve repair versus replacement  Essential hypertension  Continue amlodipine 10 mg daily, losartan 100 mg daily, Toprol 25 mg daily, Lasix per above  Coronary artery disease involving native coronary artery of native heart without angina pectoris  CAD s/p PCI with ESTHER x 2 to RCA (2022)  Continue aspirin, Plavix, statin, amlodipine, Toprol  Mixed hyperlipidemia  Continue statin    History of Present Illness   Symptoms improving.    Objective      Temp:  [97.6 °F (36.4 °C)-98 °F (36.7 °C)] 97.6 °F (36.4 °C)  HR:  [77-92] 77  Resp:  [17-20] 18  BP: (112-162)/() 112/68  O2 Device: None (Room air)   Vitals:    09/11/24 0545 09/12/24 0546   Weight: 84.3 kg (185 lb 13.6 oz) 83.7 kg (184 lb 8.4 oz)     Orthostatic Blood Pressures      Flowsheet Row Most Recent Value   Blood Pressure 112/68 filed at 09/12/2024 1513   Patient Position  - Orthostatic VS Lying filed at 09/10/2024 0100             I/O last 24 hours:  In: 1060 [P.O.:1060]  Out: 4800 [Urine:4800]  Lines/Drains/Airways       Active Status       None                  Physical Exam  Vitals and nursing note reviewed.   Constitutional:       General: He is not in acute distress.     Appearance: He is well-developed.   HENT:      Head: Normocephalic and atraumatic.   Eyes:      Conjunctiva/sclera: Conjunctivae normal.   Neck:      Vascular: No carotid bruit.   Cardiovascular:      Rate and Rhythm: Normal rate and regular rhythm.      Heart sounds: Murmur heard.   Pulmonary:      Effort: Pulmonary effort is normal. No respiratory distress.      Breath sounds: Normal breath sounds.   Abdominal:      Palpations: Abdomen is soft.      Tenderness: There is no abdominal tenderness.   Musculoskeletal:         General: No swelling.      Cervical back: Neck supple.      Right lower le+ Pitting Edema present.      Left lower le+ Pitting Edema present.   Skin:     General: Skin is warm and dry.      Capillary Refill: Capillary refill takes less than 2 seconds.   Neurological:      Mental Status: He is alert and oriented to person, place, and time.   Psychiatric:         Mood and Affect: Mood normal.         Lab Results: I have reviewed the following results: CBC/BMP:   .     24  0432   WBC 5.75   HGB 12.3   HCT 37.0   *   SODIUM 143   K 3.0*      CO2 34*   BUN 16   CREATININE 0.83   GLUC 93   MG 1.8*      Imaging Review: Reviewed radiology reports from this admission including: Echocardiogram.  Other Studies: EKG was reviewed.

## 2024-09-12 NOTE — ASSESSMENT & PLAN NOTE
Blood Pressure: 150/95   amlodipine 10mg daily, Toprol 25mg daily, Cardura 2mg daily  continue losartan 100 mg daily   IV Lasix as noted

## 2024-09-12 NOTE — ASSESSMENT & PLAN NOTE
Severe MR noted on TTE 8/7/24  Continue elective CT surgical evaluation for possible mitral valve repair versus replacement

## 2024-09-13 LAB
ALBUMIN SERPL BCG-MCNC: 3.4 G/DL (ref 3.5–5)
ALP SERPL-CCNC: 50 U/L (ref 34–104)
ALT SERPL W P-5'-P-CCNC: 21 U/L (ref 7–52)
ANION GAP SERPL CALCULATED.3IONS-SCNC: 6 MMOL/L (ref 4–13)
AST SERPL W P-5'-P-CCNC: 22 U/L (ref 13–39)
BILIRUB SERPL-MCNC: 0.77 MG/DL (ref 0.2–1)
BUN SERPL-MCNC: 19 MG/DL (ref 5–25)
CALCIUM ALBUM COR SERPL-MCNC: 8.7 MG/DL (ref 8.3–10.1)
CALCIUM SERPL-MCNC: 8.2 MG/DL (ref 8.4–10.2)
CHLORIDE SERPL-SCNC: 101 MMOL/L (ref 96–108)
CO2 SERPL-SCNC: 32 MMOL/L (ref 21–32)
CREAT SERPL-MCNC: 0.99 MG/DL (ref 0.6–1.3)
GFR SERPL CREATININE-BSD FRML MDRD: 78 ML/MIN/1.73SQ M
GLUCOSE SERPL-MCNC: 267 MG/DL (ref 65–140)
GLUCOSE SERPL-MCNC: 83 MG/DL (ref 65–140)
MAGNESIUM SERPL-MCNC: 1.9 MG/DL (ref 1.9–2.7)
POTASSIUM SERPL-SCNC: 3.8 MMOL/L (ref 3.5–5.3)
PROT SERPL-MCNC: 5.3 G/DL (ref 6.4–8.4)
SODIUM SERPL-SCNC: 139 MMOL/L (ref 135–147)

## 2024-09-13 PROCEDURE — 83735 ASSAY OF MAGNESIUM: CPT | Performed by: INTERNAL MEDICINE

## 2024-09-13 PROCEDURE — 99232 SBSQ HOSP IP/OBS MODERATE 35: CPT | Performed by: INTERNAL MEDICINE

## 2024-09-13 PROCEDURE — 80053 COMPREHEN METABOLIC PANEL: CPT | Performed by: INTERNAL MEDICINE

## 2024-09-13 PROCEDURE — 82948 REAGENT STRIP/BLOOD GLUCOSE: CPT

## 2024-09-13 RX ORDER — NYSTATIN 100000 [USP'U]/G
POWDER TOPICAL 2 TIMES DAILY
Status: DISCONTINUED | OUTPATIENT
Start: 2024-09-13 | End: 2024-09-15 | Stop reason: HOSPADM

## 2024-09-13 RX ADMIN — ASPIRIN 81 MG: 81 TABLET, CHEWABLE ORAL at 08:45

## 2024-09-13 RX ADMIN — ENOXAPARIN SODIUM 40 MG: 40 INJECTION SUBCUTANEOUS at 08:45

## 2024-09-13 RX ADMIN — METOPROLOL SUCCINATE 25 MG: 25 TABLET, EXTENDED RELEASE ORAL at 08:45

## 2024-09-13 RX ADMIN — CLOPIDOGREL 75 MG: 75 TABLET ORAL at 08:45

## 2024-09-13 RX ADMIN — NYSTATIN 1 APPLICATION: 100000 POWDER TOPICAL at 20:29

## 2024-09-13 RX ADMIN — FUROSEMIDE 80 MG: 10 INJECTION, SOLUTION INTRAMUSCULAR; INTRAVENOUS at 17:07

## 2024-09-13 RX ADMIN — DOXAZOSIN 2 MG: 1 TABLET ORAL at 08:45

## 2024-09-13 RX ADMIN — ATORVASTATIN CALCIUM 80 MG: 40 TABLET, FILM COATED ORAL at 17:08

## 2024-09-13 RX ADMIN — VENLAFAXINE HYDROCHLORIDE 225 MG: 150 CAPSULE, EXTENDED RELEASE ORAL at 07:13

## 2024-09-13 RX ADMIN — POTASSIUM CHLORIDE 40 MEQ: 1500 TABLET, EXTENDED RELEASE ORAL at 17:07

## 2024-09-13 RX ADMIN — POTASSIUM CHLORIDE 40 MEQ: 1500 TABLET, EXTENDED RELEASE ORAL at 08:45

## 2024-09-13 RX ADMIN — CYANOCOBALAMIN TAB 500 MCG 500 MCG: 500 TAB at 08:44

## 2024-09-13 RX ADMIN — FUROSEMIDE 80 MG: 10 INJECTION, SOLUTION INTRAMUSCULAR; INTRAVENOUS at 07:12

## 2024-09-13 RX ADMIN — AMLODIPINE BESYLATE 10 MG: 10 TABLET ORAL at 08:45

## 2024-09-13 RX ADMIN — TRAZODONE HYDROCHLORIDE 200 MG: 100 TABLET ORAL at 23:06

## 2024-09-13 RX ADMIN — LOSARTAN POTASSIUM 100 MG: 50 TABLET, FILM COATED ORAL at 08:45

## 2024-09-13 NOTE — ASSESSMENT & PLAN NOTE
Lab Results   Component Value Date    BNP 1,846 (H) 09/09/2024    LVEF 69 08/07/2024       Recent TTE on 8/7 demonstrated EF of 69% and severe mitral regurgitation.  BNP elevated at 1800, CXR with pulmonary edema, and clinical signs of volume overload.  IV lasix 80 mg BID  Will continue IV diuretics for 1 more day and consider switching to oral diuretics tomorrow.  LE Dopplers showed no evidence of acute or chronic deep vein thrombosis  Monitor I&Os. Daily weights  T/c repeat echo.

## 2024-09-13 NOTE — ASSESSMENT & PLAN NOTE
Wt Readings from Last 3 Encounters:   09/13/24 77.5 kg (170 lb 13.7 oz)   09/04/24 88.5 kg (195 lb)   08/14/24 81.4 kg (179 lb 8 oz)     Recent TTe with EF of 69%  Suspect severe MR is contributing to HF   Near euvolemic today  Continue Lasix 80 mg IV twice daily, potassium, metoprolol succinate 25 mg daily and losartan 100 mg daily  Recommend low-sodium diet, daily weights, strict I's and O's.  Recommend continuing to monitor and replete electrolytes as needed.

## 2024-09-13 NOTE — PROGRESS NOTES
Progress Note - Cardiology   Name: Reuben García 69 y.o. male I MRN: [id number]  Unit/Bed#: -01 I Date of Admission: 9/9/2024   Date of Service: 9/13/2024 I Hospital Day: 4    Assessment & Plan  Acute on chronic heart failure with preserved ejection fraction (HFpEF) ([facility])  Wt Readings from Last 3 Encounters:   09/13/24 77.5 kg (170 lb 13.7 oz)   09/04/24 88.5 kg (195 lb)   08/14/24 81.4 kg (179 lb 8 oz)     Recent TTe with EF of 69%  Suspect severe MR is contributing to HF   Near euvolemic today  Continue Lasix 80 mg IV twice daily, potassium, metoprolol succinate 25 mg daily and losartan 100 mg daily  Recommend low-sodium diet, daily weights, strict I's and O's.  Recommend continuing to monitor and replete electrolytes as needed.    Nonrheumatic mitral valve regurgitation  Severe MR noted on TTE 8/7/24  Plan for elective CT surgical evaluation for possible mitral valve repair versus replacement  Essential hypertension  Continue amlodipine 10 mg daily, losartan 100 mg daily, Toprol 25 mg daily, Lasix per above  Coronary artery disease involving native coronary artery of native heart without angina pectoris  CAD s/p PCI with ESTHER x 2 to RCA (2022)  Continue aspirin, Plavix, statin, amlodipine, Toprol  Mixed hyperlipidemia  Continue statin    History of Present Illness   Chief Complaint: acute CHF     Subjective: feeling better, 8/10 improvement    Objective      Temp:  [97.6 °F (36.4 °C)-97.9 °F (36.6 °C)] 97.9 °F (36.6 °C)  HR:  [73-84] 84  Resp:  [17-18] 18  BP: (112-159)/() 159/105  O2 Device: None (Room air)   Vitals:    09/12/24 0546 09/13/24 0537   Weight: 83.7 kg (184 lb 8.4 oz) 77.5 kg (170 lb 13.7 oz)     Orthostatic Blood Pressures      Flowsheet Row Most Recent Value   Blood Pressure 159/105 filed at 09/13/2024 0653   Patient Position - Orthostatic VS Lying filed at 09/10/2024 0100             I/O last 24 hours:  In: 1240 [P.O.:1240]  Out: 4750 [Urine:4750]  Lines/Drains/Airways       Active  Status       None                  Physical Exam  Vitals reviewed.   Constitutional:       Appearance: Normal appearance. He is normal weight.   HENT:      Head: Normocephalic and atraumatic.      Nose: Nose normal.      Mouth/Throat:      Mouth: Mucous membranes are moist.      Pharynx: Oropharynx is clear.   Eyes:      Conjunctiva/sclera: Conjunctivae normal.   Cardiovascular:      Rate and Rhythm: Normal rate and regular rhythm.      Heart sounds: Normal heart sounds.   Pulmonary:      Effort: Pulmonary effort is normal.      Breath sounds: Normal breath sounds.   Abdominal:      General: Abdomen is flat.      Palpations: Abdomen is soft.   Musculoskeletal:         General: Normal range of motion.   Skin:     General: Skin is warm and dry.   Neurological:      General: No focal deficit present.      Mental Status: He is alert.   Psychiatric:         Mood and Affect: Mood normal.         Lab Results: I have reviewed the following results: CBC/BMP: No new results in last 24 hours. , LFTs: No new results in last 24 hours. , PTT/INR:No new results in last 24 hours. , Troponin,BNP:No new results in last 24 hours. , Lipid Profile:   , TSH:

## 2024-09-13 NOTE — PLAN OF CARE
Problem: CARDIOVASCULAR - ADULT  Goal: Maintains optimal cardiac output and hemodynamic stability  Description: INTERVENTIONS:  - Monitor I/O, vital signs and rhythm  - Monitor for S/S and trends of decreased cardiac output  - Administer and titrate ordered vasoactive medications to optimize hemodynamic stability  - Assess quality of pulses, skin color and temperature  - Assess for signs of decreased coronary artery perfusion  - Instruct patient to report change in severity of symptoms  Outcome: Progressing  Goal: Absence of cardiac dysrhythmias or at baseline rhythm  Description: INTERVENTIONS:  - Continuous cardiac monitoring, vital signs, obtain 12 lead EKG if ordered  - Administer antiarrhythmic and heart rate control medications as ordered  - Monitor electrolytes and administer replacement therapy as ordered  Outcome: Progressing     Problem: RESPIRATORY - ADULT  Goal: Achieves optimal ventilation and oxygenation  Description: INTERVENTIONS:  - Assess for changes in respiratory status  - Assess for changes in mentation and behavior  - Position to facilitate oxygenation and minimize respiratory effort  - Oxygen administered by appropriate delivery if ordered  - Initiate smoking cessation education as indicated  - Encourage broncho-pulmonary hygiene including cough, deep breathe, Incentive Spirometry  - Assess the need for suctioning and aspirate as needed  - Assess and instruct to report SOB or any respiratory difficulty  - Respiratory Therapy support as indicated  Outcome: Progressing     Problem: PAIN - ADULT  Goal: Verbalizes/displays adequate comfort level or baseline comfort level  Description: Interventions:  - Encourage patient to monitor pain and request assistance  - Assess pain using appropriate pain scale  - Administer analgesics based on type and severity of pain and evaluate response  - Implement non-pharmacological measures as appropriate and evaluate response  - Consider cultural and social  influences on pain and pain management  - Notify physician/advanced practitioner if interventions unsuccessful or patient reports new pain  Outcome: Progressing     Problem: INFECTION - ADULT  Goal: Absence or prevention of progression during hospitalization  Description: INTERVENTIONS:  - Assess and monitor for signs and symptoms of infection  - Monitor lab/diagnostic results  - Monitor all insertion sites, i.e. indwelling lines, tubes, and drains  - Monitor endotracheal if appropriate and nasal secretions for changes in amount and color  - [place] appropriate cooling/warming therapies per order  - Administer medications as ordered  - Instruct and encourage patient and family to use good hand hygiene technique  - Identify and instruct in appropriate isolation precautions for identified infection/condition  Outcome: Progressing     Problem: SAFETY ADULT  Goal: Patient will remain free of falls  Description: INTERVENTIONS:  - Educate patient/family on patient safety including physical limitations  - Instruct patient to call for assistance with activity   - Consult OT/PT to assist with strengthening/mobility   - Keep Call bell within reach  - Keep bed low and locked with side rails adjusted as appropriate  - Keep care items and personal belongings within reach  - Initiate and maintain comfort rounds  - Make Fall Risk Sign visible to staff  - Offer Toileting every 2 Hours, in advance of need  - Initiate/Maintain bed alarm  - Obtain necessary fall risk management equipment:   - Apply yellow socks and bracelet for high fall risk patients  - Consider moving patient to room near nurses station  Outcome: Progressing

## 2024-09-13 NOTE — PLAN OF CARE
Problem: CARDIOVASCULAR - ADULT  Goal: Maintains optimal cardiac output and hemodynamic stability  Description: INTERVENTIONS:  - Monitor I/O, vital signs and rhythm  - Monitor for S/S and trends of decreased cardiac output  - Administer and titrate ordered vasoactive medications to optimize hemodynamic stability  - Assess quality of pulses, skin color and temperature  - Assess for signs of decreased coronary artery perfusion  - Instruct patient to report change in severity of symptoms  Outcome: Progressing  Goal: Absence of cardiac dysrhythmias or at baseline rhythm  Description: INTERVENTIONS:  - Continuous cardiac monitoring, vital signs, obtain 12 lead EKG if ordered  - Administer antiarrhythmic and heart rate control medications as ordered  - Monitor electrolytes and administer replacement therapy as ordered  Outcome: Progressing     Problem: RESPIRATORY - ADULT  Goal: Achieves optimal ventilation and oxygenation  Description: INTERVENTIONS:  - Assess for changes in respiratory status  - Assess for changes in mentation and behavior  - Position to facilitate oxygenation and minimize respiratory effort  - Oxygen administered by appropriate delivery if ordered  - Initiate smoking cessation education as indicated  - Encourage broncho-pulmonary hygiene including cough, deep breathe, Incentive Spirometry  - Assess the need for suctioning and aspirate as needed  - Assess and instruct to report SOB or any respiratory difficulty  - Respiratory Therapy support as indicated  Outcome: Progressing     Problem: PAIN - ADULT  Goal: Verbalizes/displays adequate comfort level or baseline comfort level  Description: Interventions:  - Encourage patient to monitor pain and request assistance  - Assess pain using appropriate pain scale  - Administer analgesics based on type and severity of pain and evaluate response  - Implement non-pharmacological measures as appropriate and evaluate response  - Consider cultural and social  influences on pain and pain management  - Notify physician/advanced practitioner if interventions unsuccessful or patient reports new pain  Outcome: Progressing     Problem: SAFETY ADULT  Goal: Patient will remain free of falls  Description: INTERVENTIONS:  - Educate patient/family on patient safety including physical limitations  - Instruct patient to call for assistance with activity   - Consult OT/PT to assist with strengthening/mobility   - Keep Call bell within reach  - Keep bed low and locked with side rails adjusted as appropriate  - Keep care items and personal belongings within reach  - Initiate and maintain comfort rounds  - Make Fall Risk Sign visible to staff  - Offer Toileting every  Hours, in advance of need  - Initiate/Maintain alarm  - Obtain necessary fall risk management equipment  - Apply yellow socks and bracelet for high fall risk patients  - Consider moving patient to room near nurses station  Outcome: Progressing

## 2024-09-13 NOTE — ASSESSMENT & PLAN NOTE
Blood Pressure: (!) 159/105   amlodipine 10mg daily, Toprol 25mg daily, Cardura 2mg daily  continue losartan 100 mg daily   IV Lasix as noted

## 2024-09-13 NOTE — ASSESSMENT & PLAN NOTE
Severe MR noted on TTE 8/7/24  Plan for elective CT surgical evaluation for possible mitral valve repair versus replacement

## 2024-09-13 NOTE — PROGRESS NOTES
Progress Note - Hospitalist   Name: Reuben García 69 y.o. male I MRN: [id number]  Unit/Bed#: -01 I Date of Admission: 9/9/2024   Date of Service: 9/13/2024 I Hospital Day: 4    Assessment & Plan  Essential hypertension  Blood Pressure: (!) 159/105   amlodipine 10mg daily, Toprol 25mg daily, Cardura 2mg daily  continue losartan 100 mg daily   IV Lasix as noted  Insomnia  Trazadone 200mg nightly  Coronary artery disease involving native coronary artery of native heart without angina pectoris  No active chest pain  Resume aspirin 81mg daily and Plavix 75mg daily  Nonrheumatic mitral valve regurgitation    Acute on chronic heart failure with preserved ejection fraction (HFpEF) ([facility])  Lab Results   Component Value Date    BNP 1,846 (H) 09/09/2024    LVEF 69 08/07/2024       Recent TTE on 8/7 demonstrated EF of 69% and severe mitral regurgitation.  BNP elevated at 1800, CXR with pulmonary edema, and clinical signs of volume overload.  IV lasix 80 mg BID  Will continue IV diuretics for 1 more day and consider switching to oral diuretics tomorrow.  LE Dopplers showed no evidence of acute or chronic deep vein thrombosis  Monitor I&Os. Daily weights  T/c repeat echo.  Recurrent major depressive disorder, in remission ([facility])  Symptoms controlled.  Continue Effexor 225mg daily.  Hypokalemia  -Keep potassium greater than 4.0  -Potassium chloride 40 mEq BID   Mixed hyperlipidemia      VTE Pharmacologic Prophylaxis:   Moderate Risk (Score 3-4) - Pharmacological DVT Prophylaxis Ordered: enoxaparin (Lovenox).    Mobility:   Basic Mobility Inpatient Raw Score: 23  JH-HLM Goal: 7: Walk 25 feet or more  JH-HLM Achieved: 6: Walk 10 steps or more  JH-HLM Goal achieved. Continue to encourage appropriate mobility.    Education and Discussions with Family / Patient: Patient declined call to .     Current Length of Stay: 4 day(s)  Current Patient Status: Inpatient   Certification Statement: The patient will continue to  require additional inpatient hospital stay due to continued need for diuresis   Discharge Plan: Anticipate discharge in 48 hrs to home.    Code Status: Level 3 - DNAR and DNI    Subjective   Patient is feeling well this morning, SOB has significantly improved. His O2 sat dropped last night to 89% but he didn't notice / feel SOB.      Objective     Vitals:   Temp (24hrs), Av.7 °F (36.5 °C), Min:97.6 °F (36.4 °C), Max:97.9 °F (36.6 °C)    Temp:  [97.6 °F (36.4 °C)-97.9 °F (36.6 °C)] 97.9 °F (36.6 °C)  HR:  [73-84] 84  Resp:  [17-18] 18  BP: (112-159)/() 159/105  SpO2:  [89 %-96 %] 89 %  Body mass index is 30.27 kg/m².     Input and Output Summary (last 24 hours):     Intake/Output Summary (Last 24 hours) at 2024 1130  Last data filed at 2024 0900  Gross per 24 hour   Intake 900 ml   Output 3600 ml   Net -2700 ml       Physical Exam  Constitutional:       General: He is not in acute distress.     Appearance: He is not ill-appearing.   Cardiovascular:      Rate and Rhythm: Normal rate and regular rhythm.      Heart sounds: Murmur heard.   Pulmonary:      Effort: Pulmonary effort is normal.      Breath sounds: Normal breath sounds.   Abdominal:      General: Abdomen is flat.      Tenderness: There is no abdominal tenderness.   Musculoskeletal:      Right lower leg: Edema present.      Left lower leg: Edema present.   Neurological:      Mental Status: He is alert.   Psychiatric:         Mood and Affect: Mood normal.          Lines/Drains:  Lines/Drains/Airways       Active Status       None                  Telemetry:  Telemetry Orders (From admission, onward)               24 Hour Telemetry Monitoring  Continuous x 24 Hours (Telem)        Question:  Reason for 24 Hour Telemetry  Answer:  Decompensated CHF- and any one of the following: continuous diuretic infusion or total diuretic dose >200 mg daily, associated electrolyte derangement (I.e. K < 3.0), ionotropic drip (continuous infusion), hx of  ventricular arrhythmia, or new EF < 35%                              Lab Results: I have reviewed the following results:    Results from last 7 days   Lab Units 09/12/24  0432 09/11/24  0433   WBC Thousand/uL 5.75 5.50   HEMOGLOBIN g/dL 12.3 12.2   HEMATOCRIT % 37.0 36.5   PLATELETS Thousands/uL 135* 134*   SEGS PCT %  --  68   LYMPHO PCT %  --  19   MONO PCT %  --  7   EOS PCT %  --  5     Results from last 7 days   Lab Units 09/12/24  0432 09/11/24  0433   SODIUM mmol/L 143 145   POTASSIUM mmol/L 3.0* 2.9*   CHLORIDE mmol/L 103 106   CO2 mmol/L 34* 32   BUN mg/dL 16 20   CREATININE mg/dL 0.83 0.98   ANION GAP mmol/L 6 7   CALCIUM mg/dL 8.1* 8.2*   ALBUMIN g/dL  --  3.2*   TOTAL BILIRUBIN mg/dL  --  0.71   ALK PHOS U/L  --  46   ALT U/L  --  21   AST U/L  --  23   GLUCOSE RANDOM mg/dL 93 92         Results from last 7 days   Lab Units 09/13/24  0629 09/10/24  2048   POC GLUCOSE mg/dl 83 93               Last 24 Hours Medication List:     Current Facility-Administered Medications:     amLODIPine (NORVASC) tablet 10 mg, Daily    aspirin chewable tablet 81 mg, Daily    atorvastatin (LIPITOR) tablet 80 mg, QPM    azelastine (ASTELIN) 0.1 % nasal spray 1 spray, BID    clopidogrel (PLAVIX) tablet 75 mg, Daily    cyanocobalamin (VITAMIN B-12) tablet 500 mcg, Daily    doxazosin (CARDURA) tablet 2 mg, Daily    enoxaparin (LOVENOX) subcutaneous injection 40 mg, Daily    furosemide (LASIX) injection 80 mg, BID (diuretic)    hydrALAZINE (APRESOLINE) injection 5 mg, Q6H PRN    losartan (COZAAR) tablet 100 mg, Daily    metoprolol succinate (TOPROL-XL) 24 hr tablet 25 mg, Daily    potassium chloride (Klor-Con M20) CR tablet 40 mEq, BID    traZODone (DESYREL) tablet 200 mg, HS PRN    venlafaxine (EFFEXOR-XR) 24 hr capsule 225 mg, Daily With Breakfast    Administrative Statements   Today, Patient Was Seen By: Yareli Romero      **Please Note: This note may have been constructed using a voice recognition system.**

## 2024-09-14 LAB
ALBUMIN SERPL BCG-MCNC: 3.4 G/DL (ref 3.5–5)
ALP SERPL-CCNC: 48 U/L (ref 34–104)
ALT SERPL W P-5'-P-CCNC: 25 U/L (ref 7–52)
ANION GAP SERPL CALCULATED.3IONS-SCNC: 5 MMOL/L (ref 4–13)
AST SERPL W P-5'-P-CCNC: 25 U/L (ref 13–39)
BASOPHILS # BLD AUTO: 0.05 THOUSANDS/ΜL (ref 0–0.1)
BASOPHILS NFR BLD AUTO: 1 % (ref 0–1)
BILIRUB SERPL-MCNC: 0.76 MG/DL (ref 0.2–1)
BUN SERPL-MCNC: 18 MG/DL (ref 5–25)
CALCIUM ALBUM COR SERPL-MCNC: 8.8 MG/DL (ref 8.3–10.1)
CALCIUM SERPL-MCNC: 8.3 MG/DL (ref 8.4–10.2)
CHLORIDE SERPL-SCNC: 102 MMOL/L (ref 96–108)
CO2 SERPL-SCNC: 34 MMOL/L (ref 21–32)
CREAT SERPL-MCNC: 0.89 MG/DL (ref 0.6–1.3)
EOSINOPHIL # BLD AUTO: 0.38 THOUSAND/ΜL (ref 0–0.61)
EOSINOPHIL NFR BLD AUTO: 6 % (ref 0–6)
ERYTHROCYTE [DISTWIDTH] IN BLOOD BY AUTOMATED COUNT: 14.6 % (ref 11.6–15.1)
GFR SERPL CREATININE-BSD FRML MDRD: 88 ML/MIN/1.73SQ M
GLUCOSE SERPL-MCNC: 89 MG/DL (ref 65–140)
HCT VFR BLD AUTO: 39.1 % (ref 36.5–49.3)
HGB BLD-MCNC: 12.9 G/DL (ref 12–17)
IMM GRANULOCYTES # BLD AUTO: 0.01 THOUSAND/UL (ref 0–0.2)
IMM GRANULOCYTES NFR BLD AUTO: 0 % (ref 0–2)
LYMPHOCYTES # BLD AUTO: 1.23 THOUSANDS/ΜL (ref 0.6–4.47)
LYMPHOCYTES NFR BLD AUTO: 20 % (ref 14–44)
MAGNESIUM SERPL-MCNC: 2 MG/DL (ref 1.9–2.7)
MCH RBC QN AUTO: 30 PG (ref 26.8–34.3)
MCHC RBC AUTO-ENTMCNC: 33 G/DL (ref 31.4–37.4)
MCV RBC AUTO: 91 FL (ref 82–98)
MONOCYTES # BLD AUTO: 0.58 THOUSAND/ΜL (ref 0.17–1.22)
MONOCYTES NFR BLD AUTO: 10 % (ref 4–12)
NEUTROPHILS # BLD AUTO: 3.86 THOUSANDS/ΜL (ref 1.85–7.62)
NEUTS SEG NFR BLD AUTO: 63 % (ref 43–75)
NRBC BLD AUTO-RTO: 0 /100 WBCS
PLATELET # BLD AUTO: 148 THOUSANDS/UL (ref 149–390)
PMV BLD AUTO: 9.8 FL (ref 8.9–12.7)
POTASSIUM SERPL-SCNC: 3.7 MMOL/L (ref 3.5–5.3)
PROT SERPL-MCNC: 5.3 G/DL (ref 6.4–8.4)
RBC # BLD AUTO: 4.3 MILLION/UL (ref 3.88–5.62)
SODIUM SERPL-SCNC: 141 MMOL/L (ref 135–147)
WBC # BLD AUTO: 6.11 THOUSAND/UL (ref 4.31–10.16)

## 2024-09-14 PROCEDURE — 85025 COMPLETE CBC W/AUTO DIFF WBC: CPT | Performed by: INTERNAL MEDICINE

## 2024-09-14 PROCEDURE — 99232 SBSQ HOSP IP/OBS MODERATE 35: CPT | Performed by: INTERNAL MEDICINE

## 2024-09-14 PROCEDURE — 83735 ASSAY OF MAGNESIUM: CPT | Performed by: INTERNAL MEDICINE

## 2024-09-14 PROCEDURE — 99233 SBSQ HOSP IP/OBS HIGH 50: CPT | Performed by: INTERNAL MEDICINE

## 2024-09-14 PROCEDURE — 80053 COMPREHEN METABOLIC PANEL: CPT | Performed by: INTERNAL MEDICINE

## 2024-09-14 RX ORDER — TORSEMIDE 20 MG/1
20 TABLET ORAL 2 TIMES DAILY
Status: DISCONTINUED | OUTPATIENT
Start: 2024-09-14 | End: 2024-09-15 | Stop reason: HOSPADM

## 2024-09-14 RX ADMIN — ATORVASTATIN CALCIUM 80 MG: 40 TABLET, FILM COATED ORAL at 17:24

## 2024-09-14 RX ADMIN — NYSTATIN: 100000 POWDER TOPICAL at 17:43

## 2024-09-14 RX ADMIN — CYANOCOBALAMIN TAB 500 MCG 500 MCG: 500 TAB at 09:02

## 2024-09-14 RX ADMIN — POTASSIUM CHLORIDE 40 MEQ: 1500 TABLET, EXTENDED RELEASE ORAL at 17:24

## 2024-09-14 RX ADMIN — TORSEMIDE 20 MG: 20 TABLET ORAL at 09:49

## 2024-09-14 RX ADMIN — METOPROLOL SUCCINATE 25 MG: 25 TABLET, EXTENDED RELEASE ORAL at 09:02

## 2024-09-14 RX ADMIN — TORSEMIDE 20 MG: 20 TABLET ORAL at 17:24

## 2024-09-14 RX ADMIN — VENLAFAXINE HYDROCHLORIDE 225 MG: 150 CAPSULE, EXTENDED RELEASE ORAL at 09:02

## 2024-09-14 RX ADMIN — CLOPIDOGREL 75 MG: 75 TABLET ORAL at 09:02

## 2024-09-14 RX ADMIN — LOSARTAN POTASSIUM 100 MG: 50 TABLET, FILM COATED ORAL at 09:02

## 2024-09-14 RX ADMIN — ASPIRIN 81 MG: 81 TABLET, CHEWABLE ORAL at 09:02

## 2024-09-14 RX ADMIN — DOXAZOSIN 2 MG: 1 TABLET ORAL at 09:02

## 2024-09-14 RX ADMIN — FUROSEMIDE 80 MG: 10 INJECTION, SOLUTION INTRAMUSCULAR; INTRAVENOUS at 09:02

## 2024-09-14 RX ADMIN — ENOXAPARIN SODIUM 40 MG: 40 INJECTION SUBCUTANEOUS at 09:02

## 2024-09-14 RX ADMIN — POTASSIUM CHLORIDE 40 MEQ: 1500 TABLET, EXTENDED RELEASE ORAL at 09:02

## 2024-09-14 RX ADMIN — AMLODIPINE BESYLATE 10 MG: 10 TABLET ORAL at 09:02

## 2024-09-14 NOTE — ASSESSMENT & PLAN NOTE
Lab Results   Component Value Date    BNP 1,846 (H) 09/09/2024    LVEF 69 08/07/2024       Recent TTE on 8/7 demonstrated EF of 69% and severe mitral regurgitation.  BNP elevated at 1800, CXR with pulmonary edema, and clinical signs of volume overload.  IV lasix 80 mg BID  PO diuretic today; monitor and plan for discharge tomorrow  LE Dopplers showed no evidence of acute or chronic deep vein thrombosis  Monitor I&Os. Daily weights

## 2024-09-14 NOTE — PROGRESS NOTES
Progress Note - Cardiology   Name: Reuben García 69 y.o. male I MRN: [id number]  Unit/Bed#: -01 I Date of Admission: 9/9/2024   Date of Service: 9/14/2024 I Hospital Day: 5    Assessment & Plan  Acute on chronic heart failure with preserved ejection fraction (HFpEF) ([facility])  Wt Readings from Last 3 Encounters:   09/14/24 75 kg (165 lb 5.5 oz)   09/04/24 88.5 kg (195 lb)   08/14/24 81.4 kg (179 lb 8 oz)     Recent TTE with EF of 69%  Suspect severe MR is contributing to HF   Euvolemic today  Transition to p.o. torsemide 20 mg twice daily  Continue potassium, metoprolol succinate 25 mg daily and losartan 100 mg daily  Recommend low-sodium diet, daily weights, strict I's and O's.  Recommend continuing to monitor and replete electrolytes as needed.    Nonrheumatic mitral valve regurgitation  Severe MR noted on TTE 8/7/24  Plan for elective CT surgical evaluation for possible mitral valve repair versus replacement  Essential hypertension  Continue amlodipine 10 mg daily, losartan 100 mg daily, Toprol 25 mg daily, Lasix per above  Coronary artery disease involving native coronary artery of native heart without angina pectoris  CAD s/p PCI with ESTHER x 2 to RCA (2022)  Continue aspirin, Plavix, statin, amlodipine, Toprol  Mixed hyperlipidemia  Continue statin    History of Present Illness   Chief Complaint: Congestive heart failure and MR    Subjective: Patient reports he is feeling better    Objective      Temp:  [97.9 °F (36.6 °C)-98.3 °F (36.8 °C)] 97.9 °F (36.6 °C)  HR:  [78-92] 92  Resp:  [16-18] 16  BP: (134-162)/() 162/105  O2 Device: None (Room air)   Vitals:    09/13/24 0537 09/14/24 0600   Weight: 77.5 kg (170 lb 13.7 oz) 75 kg (165 lb 5.5 oz)     Orthostatic Blood Pressures      Flowsheet Row Most Recent Value   Blood Pressure 162/105 filed at 09/14/2024 0733   Patient Position - Orthostatic VS Lying filed at 09/14/2024 0300             I/O last 24 hours:  In: 600 [P.O.:600]  Out: 6500  [Urine:6500]  Lines/Drains/Airways       Active Status       None                  Physical Exam  HENT:      Head: Normocephalic.      Nose: Nose normal.      Mouth/Throat:      Mouth: Mucous membranes are moist.   Cardiovascular:      Rate and Rhythm: Normal rate and regular rhythm.      Heart sounds: Murmur heard.   Pulmonary:      Effort: Pulmonary effort is normal.   Abdominal:      General: Abdomen is flat.   Musculoskeletal:      Cervical back: Normal range of motion.      Right lower leg: Edema present.      Left lower leg: Edema present.   Skin:     General: Skin is warm.   Neurological:      General: No focal deficit present.      Mental Status: He is alert.   Psychiatric:         Mood and Affect: Mood normal.          Lab Results: I have reviewed the following results: CBC/BMP:   .     09/14/24  0536   WBC 6.11   HGB 12.9   HCT 39.1   *   SODIUM 141   K 3.7      CO2 34*   BUN 18   CREATININE 0.89   GLUC 89   MG 2.0    , LFTs:   .     09/14/24  0536   AST 25   ALT 25   ALB 3.4*   TBILI 0.76   ALKPHOS 48    , PTT/INR:No new results in last 24 hours. , Troponin,BNP:No new results in last 24 hours. , Lipid Profile:   , TSH:

## 2024-09-14 NOTE — PLAN OF CARE
Problem: PAIN - ADULT  Goal: Verbalizes/displays adequate comfort level or baseline comfort level  Description: Interventions:  - Encourage patient to monitor pain and request assistance  - Assess pain using appropriate pain scale  - Administer analgesics based on type and severity of pain and evaluate response  - Implement non-pharmacological measures as appropriate and evaluate response  - Consider cultural and social influences on pain and pain management  - Notify physician/advanced practitioner if interventions unsuccessful or patient reports new pain  Outcome: Progressing     Problem: INFECTION - ADULT  Goal: Absence or prevention of progression during hospitalization  Description: INTERVENTIONS:  - Assess and monitor for signs and symptoms of infection  - Monitor lab/diagnostic results  - Monitor all insertion sites, i.e. indwelling lines, tubes, and drains  - Monitor endotracheal if appropriate and nasal secretions for changes in amount and color  - [place] appropriate cooling/warming therapies per order  - Administer medications as ordered  - Instruct and encourage patient and family to use good hand hygiene technique  - Identify and instruct in appropriate isolation precautions for identified infection/condition  Outcome: Progressing

## 2024-09-14 NOTE — PROGRESS NOTES
Progress Note - Hospitalist   Name: Reuben García 69 y.o. male I MRN: [id number]  Unit/Bed#: -01 I Date of Admission: 9/9/2024   Date of Service: 9/14/2024 I Hospital Day: 5    Assessment & Plan  Acute on chronic heart failure with preserved ejection fraction (HFpEF) ([facility])  Lab Results   Component Value Date    BNP 1,846 (H) 09/09/2024    LVEF 69 08/07/2024       Recent TTE on 8/7 demonstrated EF of 69% and severe mitral regurgitation.  BNP elevated at 1800, CXR with pulmonary edema, and clinical signs of volume overload.  IV lasix 80 mg BID  PO diuretic today; monitor and plan for discharge tomorrow  LE Dopplers showed no evidence of acute or chronic deep vein thrombosis  Monitor I&Os. Daily weights  Essential hypertension  Blood Pressure: (!) 162/105   amlodipine 10mg daily, Toprol 25mg daily, Cardura 2mg daily  continue losartan 100 mg daily   IV Lasix as noted  Insomnia  Trazadone 200mg nightly  Coronary artery disease involving native coronary artery of native heart without angina pectoris  No active chest pain  Resume aspirin 81mg daily and Plavix 75mg daily  Nonrheumatic mitral valve regurgitation    Recurrent major depressive disorder, in remission ([facility])  Symptoms controlled.  Continue Effexor 225mg daily.  Hypokalemia  -Keep potassium greater than 4.0  -Potassium chloride 40 mEq BID   Mixed hyperlipidemia  Lab Results   Component Value Date    CHOLESTEROL 129 02/09/2024    TRIG 153 (H) 02/09/2024    HDL 57 02/09/2024    LDLCALC 41 02/09/2024       Statin    VTE Pharmacologic Prophylaxis:   High Risk (Score >/= 5) - Pharmacological DVT Prophylaxis Ordered: enoxaparin (Lovenox). Sequential Compression Devices Ordered.    Mobility:   Basic Mobility Inpatient Raw Score: 23  JH-HLM Goal: 7: Walk 25 feet or more  JH-HLM Achieved: 6: Walk 10 steps or more  JH-HLM Goal NOT achieved. Continue with multidisciplinary rounding and encourage appropriate mobility to improve upon JH-HLM goals.    Patient Centered  Rounds: I performed bedside rounds with nursing staff today.   Discussions with Specialists or Other Care Team Provider: Cardiology    Education and Discussions with Family / Patient: Patient declined call to .     Current Length of Stay: 5 day(s)  Current Patient Status: Inpatient   Certification Statement: The patient will continue to require additional inpatient hospital stay due to diuretics  Discharge Plan: Anticipate discharge in 24-48 hrs to home.    Code Status: Level 3 - DNAR and DNI    Subjective   Denies cough or LE swelling. Currently denies chest pain, shortness of breath. After discussion with nursing team, there were no noted acute events reported overnight. Patient is understanding of plan.  All questions answered.     Objective     Vitals:   Temp (24hrs), Av.1 °F (36.7 °C), Min:97.9 °F (36.6 °C), Max:98.3 °F (36.8 °C)    Temp:  [97.9 °F (36.6 °C)-98.3 °F (36.8 °C)] 97.9 °F (36.6 °C)  HR:  [78-92] 92  Resp:  [16-18] 16  BP: (134-162)/() 162/105  SpO2:  [91 %-95 %] 91 %  Body mass index is 29.29 kg/m².     Input and Output Summary (last 24 hours):     Intake/Output Summary (Last 24 hours) at 2024 1413  Last data filed at 2024 1201  Gross per 24 hour   Intake 480 ml   Output 3000 ml   Net -2520 ml       Physical Exam  Vitals and nursing note reviewed.   Constitutional:       General: He is not in acute distress.     Appearance: Normal appearance. He is well-developed. He is not toxic-appearing or diaphoretic.   HENT:      Head: Normocephalic and atraumatic.      Mouth/Throat:      Mouth: Mucous membranes are moist.   Eyes:      General: No scleral icterus.     Conjunctiva/sclera: Conjunctivae normal.   Neck:      Trachea: Phonation normal.   Cardiovascular:      Rate and Rhythm: Normal rate and regular rhythm.      Heart sounds: Normal heart sounds, S1 normal and S2 normal. Heart sounds not distant. No murmur heard.     No friction rub. No gallop.   Pulmonary:       Effort: Pulmonary effort is normal. No accessory muscle usage or respiratory distress.      Breath sounds: Normal breath sounds. No wheezing, rhonchi or rales.   Abdominal:      General: Bowel sounds are normal. There is no distension.      Palpations: Abdomen is soft. There is no mass.      Tenderness: There is no abdominal tenderness.   Musculoskeletal:         General: No edema.      Cervical back: Neck supple.      Right lower leg: No edema.      Left lower leg: No edema.   Skin:     General: Skin is warm, dry and intact.      Coloration: Skin is not jaundiced.   Neurological:      Mental Status: He is alert and oriented to person, place, and time.   Psychiatric:         Mood and Affect: Mood and affect and mood normal.         Behavior: Behavior normal.          Lines/Drains:  Lines/Drains/Airways       Active Status       None                      Telemetry:  Telemetry Orders (From admission, onward)               24 Hour Telemetry Monitoring  Continuous x 24 Hours (Telem)        Expiring   Question:  Reason for 24 Hour Telemetry  Answer:  Decompensated CHF- and any one of the following: continuous diuretic infusion or total diuretic dose >200 mg daily, associated electrolyte derangement (I.e. K < 3.0), ionotropic drip (continuous infusion), hx of ventricular arrhythmia, or new EF < 35%                     Telemetry Reviewed  Indication for Continued Telemetry Use: No indication for continued use. Will discontinue.                Lab Results: I have reviewed the following results: CBC/BMP:   .     09/14/24  0536   WBC 6.11   HGB 12.9   HCT 39.1   *   SODIUM 141   K 3.7      CO2 34*   BUN 18   CREATININE 0.89   GLUC 89   MG 2.0    , Creatinine Clearance: Estimated Creatinine Clearance: 73 mL/min (by C-G formula based on SCr of 0.89 mg/dL)., LFTs:   .     09/14/24  0536   AST 25   ALT 25   ALB 3.4*   TBILI 0.76   ALKPHOS 48    , PTT/INR:No new results in last 24 hours. , Troponin,BNP:No new results  "in last 24 hours. , Lactic Acid: No new results in last 24 hours. , Procalcitonin: No results found for: \"PROCALCITONI\", ABG: No new results in last 24 hours. , Lipase: No results found for: \"LIPASE\", Prealbumin: No results found for: \"PREALBUMIN\", Lipid Profile:      Results from last 7 days   Lab Units 09/14/24  0536   WBC Thousand/uL 6.11   HEMOGLOBIN g/dL 12.9   HEMATOCRIT % 39.1   PLATELETS Thousands/uL 148*   SEGS PCT % 63   LYMPHO PCT % 20   MONO PCT % 10   EOS PCT % 6     Results from last 7 days   Lab Units 09/14/24  0536   SODIUM mmol/L 141   POTASSIUM mmol/L 3.7   CHLORIDE mmol/L 102   CO2 mmol/L 34*   BUN mg/dL 18   CREATININE mg/dL 0.89   ANION GAP mmol/L 5   CALCIUM mg/dL 8.3*   ALBUMIN g/dL 3.4*   TOTAL BILIRUBIN mg/dL 0.76   ALK PHOS U/L 48   ALT U/L 25   AST U/L 25   GLUCOSE RANDOM mg/dL 89         Results from last 7 days   Lab Units 09/13/24  0629 09/10/24  2048   POC GLUCOSE mg/dl 83 93               Recent Cultures (last 7 days):         Imaging Review: XR chest 1 view portable    Result Date: 9/10/2024  Impression: Pulmonary vascular congestion. Cardiomegaly and small left greater than right pleural effusions. Resident: Dagoberto Becerra I, the attending radiologist, have reviewed the images and agree with the final report above. Workstation performed: [id number]       No Chest XR results available for this patient.     Other Studies: No additional pertinent studies reviewed.    Last 24 Hours Medication List:     Current Facility-Administered Medications:     amLODIPine (NORVASC) tablet 10 mg, Daily    aspirin chewable tablet 81 mg, Daily    atorvastatin (LIPITOR) tablet 80 mg, QPM    azelastine (ASTELIN) 0.1 % nasal spray 1 spray, BID    clopidogrel (PLAVIX) tablet 75 mg, Daily    cyanocobalamin (VITAMIN B-12) tablet 500 mcg, Daily    doxazosin (CARDURA) tablet 2 mg, Daily    enoxaparin (LOVENOX) subcutaneous injection 40 mg, Daily    hydrALAZINE (APRESOLINE) injection 5 mg, Q6H PRN    " losartan (COZAAR) tablet 100 mg, Daily    metoprolol succinate (TOPROL-XL) 24 hr tablet 25 mg, Daily    nystatin (MYCOSTATIN) powder, BID    potassium chloride (Klor-Con M20) CR tablet 40 mEq, BID    torsemide (DEMADEX) tablet 20 mg, BID    traZODone (DESYREL) tablet 200 mg, HS PRN    venlafaxine (EFFEXOR-XR) 24 hr capsule 225 mg, Daily With Breakfast    Administrative Statements   Today, Patient Was Seen By: Nino Niño DO      **Please Note: This note may have been constructed using a voice recognition system.**

## 2024-09-14 NOTE — PLAN OF CARE
Problem: CARDIOVASCULAR - ADULT  Goal: Maintains optimal cardiac output and hemodynamic stability  Description: INTERVENTIONS:  - Monitor I/O, vital signs and rhythm  - Monitor for S/S and trends of decreased cardiac output  - Administer and titrate ordered vasoactive medications to optimize hemodynamic stability  - Assess quality of pulses, skin color and temperature  - Assess for signs of decreased coronary artery perfusion  - Instruct patient to report change in severity of symptoms  Outcome: Progressing  Goal: Absence of cardiac dysrhythmias or at baseline rhythm  Description: INTERVENTIONS:  - Continuous cardiac monitoring, vital signs, obtain 12 lead EKG if ordered  - Administer antiarrhythmic and heart rate control medications as ordered  - Monitor electrolytes and administer replacement therapy as ordered  Outcome: Progressing     Problem: RESPIRATORY - ADULT  Goal: Achieves optimal ventilation and oxygenation  Description: INTERVENTIONS:  - Assess for changes in respiratory status  - Assess for changes in mentation and behavior  - Position to facilitate oxygenation and minimize respiratory effort  - Oxygen administered by appropriate delivery if ordered  - Initiate smoking cessation education as indicated  - Encourage broncho-pulmonary hygiene including cough, deep breathe, Incentive Spirometry  - Assess the need for suctioning and aspirate as needed  - Assess and instruct to report SOB or any respiratory difficulty  - Respiratory Therapy support as indicated  Outcome: Progressing     Problem: PAIN - ADULT  Goal: Verbalizes/displays adequate comfort level or baseline comfort level  Description: Interventions:  - Encourage patient to monitor pain and request assistance  - Assess pain using appropriate pain scale  - Administer analgesics based on type and severity of pain and evaluate response  - Implement non-pharmacological measures as appropriate and evaluate response  - Consider cultural and social  influences on pain and pain management  - Notify physician/advanced practitioner if interventions unsuccessful or patient reports new pain  Outcome: Progressing     Problem: INFECTION - ADULT  Goal: Absence or prevention of progression during hospitalization  Description: INTERVENTIONS:  - Assess and monitor for signs and symptoms of infection  - Monitor lab/diagnostic results  - Monitor all insertion sites, i.e. indwelling lines, tubes, and drains  - Monitor endotracheal if appropriate and nasal secretions for changes in amount and color  - [place] appropriate cooling/warming therapies per order  - Administer medications as ordered  - Instruct and encourage patient and family to use good hand hygiene technique  - Identify and instruct in appropriate isolation precautions for identified infection/condition  Outcome: Progressing     Problem: SAFETY ADULT  Goal: Patient will remain free of falls  Description: INTERVENTIONS:  - Educate patient/family on patient safety including physical limitations  - Instruct patient to call for assistance with activity   - Consult OT/PT to assist with strengthening/mobility   - Keep Call bell within reach  - Keep bed low and locked with side rails adjusted as appropriate  - Keep care items and personal belongings within reach  - Initiate and maintain comfort rounds  - Make Fall Risk Sign visible to staff  - Offer Toileting every  Hours, in advance of need  - Initiate/Maintain alarm  - Obtain necessary fall risk management equipmen  - Apply yellow socks and bracelet for high fall risk patients  - Consider moving patient to room near nurses station  Outcome: Progressing

## 2024-09-14 NOTE — ASSESSMENT & PLAN NOTE
Wt Readings from Last 3 Encounters:   09/14/24 75 kg (165 lb 5.5 oz)   09/04/24 88.5 kg (195 lb)   08/14/24 81.4 kg (179 lb 8 oz)     Recent TTE with EF of 69%  Suspect severe MR is contributing to HF   Euvolemic today  Transition to p.o. torsemide 20 mg twice daily  Continue potassium, metoprolol succinate 25 mg daily and losartan 100 mg daily  Recommend low-sodium diet, daily weights, strict I's and O's.  Recommend continuing to monitor and replete electrolytes as needed.

## 2024-09-14 NOTE — ASSESSMENT & PLAN NOTE
Blood Pressure: (!) 162/105   amlodipine 10mg daily, Toprol 25mg daily, Cardura 2mg daily  continue losartan 100 mg daily   IV Lasix as noted

## 2024-09-14 NOTE — ASSESSMENT & PLAN NOTE
Lab Results   Component Value Date    CHOLESTEROL 129 02/09/2024    TRIG 153 (H) 02/09/2024    HDL 57 02/09/2024    LDLCALC 41 02/09/2024       Statin

## 2024-09-15 VITALS
DIASTOLIC BLOOD PRESSURE: 92 MMHG | BODY MASS INDEX: 28.44 KG/M2 | OXYGEN SATURATION: 91 % | WEIGHT: 160.5 LBS | RESPIRATION RATE: 16 BRPM | HEIGHT: 63 IN | SYSTOLIC BLOOD PRESSURE: 150 MMHG | HEART RATE: 79 BPM | TEMPERATURE: 97.8 F

## 2024-09-15 LAB
ALBUMIN SERPL BCG-MCNC: 3.4 G/DL (ref 3.5–5)
ALP SERPL-CCNC: 48 U/L (ref 34–104)
ALT SERPL W P-5'-P-CCNC: 28 U/L (ref 7–52)
ANION GAP SERPL CALCULATED.3IONS-SCNC: 6 MMOL/L (ref 4–13)
AST SERPL W P-5'-P-CCNC: 28 U/L (ref 13–39)
BILIRUB SERPL-MCNC: 0.8 MG/DL (ref 0.2–1)
BUN SERPL-MCNC: 20 MG/DL (ref 5–25)
CALCIUM ALBUM COR SERPL-MCNC: 8.8 MG/DL (ref 8.3–10.1)
CALCIUM SERPL-MCNC: 8.3 MG/DL (ref 8.4–10.2)
CHLORIDE SERPL-SCNC: 102 MMOL/L (ref 96–108)
CO2 SERPL-SCNC: 33 MMOL/L (ref 21–32)
CREAT SERPL-MCNC: 0.88 MG/DL (ref 0.6–1.3)
ERYTHROCYTE [DISTWIDTH] IN BLOOD BY AUTOMATED COUNT: 14.5 % (ref 11.6–15.1)
GFR SERPL CREATININE-BSD FRML MDRD: 88 ML/MIN/1.73SQ M
GLUCOSE SERPL-MCNC: 82 MG/DL (ref 65–140)
HCT VFR BLD AUTO: 39.4 % (ref 36.5–49.3)
HGB BLD-MCNC: 13.1 G/DL (ref 12–17)
MAGNESIUM SERPL-MCNC: 1.9 MG/DL (ref 1.9–2.7)
MCH RBC QN AUTO: 30 PG (ref 26.8–34.3)
MCHC RBC AUTO-ENTMCNC: 33.2 G/DL (ref 31.4–37.4)
MCV RBC AUTO: 90 FL (ref 82–98)
PLATELET # BLD AUTO: 150 THOUSANDS/UL (ref 149–390)
PMV BLD AUTO: 10.4 FL (ref 8.9–12.7)
POTASSIUM SERPL-SCNC: 3.2 MMOL/L (ref 3.5–5.3)
PROT SERPL-MCNC: 5.4 G/DL (ref 6.4–8.4)
RBC # BLD AUTO: 4.37 MILLION/UL (ref 3.88–5.62)
SODIUM SERPL-SCNC: 141 MMOL/L (ref 135–147)
WBC # BLD AUTO: 5.93 THOUSAND/UL (ref 4.31–10.16)

## 2024-09-15 PROCEDURE — 99239 HOSP IP/OBS DSCHRG MGMT >30: CPT | Performed by: INTERNAL MEDICINE

## 2024-09-15 PROCEDURE — 85027 COMPLETE CBC AUTOMATED: CPT | Performed by: INTERNAL MEDICINE

## 2024-09-15 PROCEDURE — 99232 SBSQ HOSP IP/OBS MODERATE 35: CPT | Performed by: INTERNAL MEDICINE

## 2024-09-15 PROCEDURE — 83735 ASSAY OF MAGNESIUM: CPT | Performed by: INTERNAL MEDICINE

## 2024-09-15 PROCEDURE — 80053 COMPREHEN METABOLIC PANEL: CPT | Performed by: INTERNAL MEDICINE

## 2024-09-15 RX ORDER — TORSEMIDE 20 MG/1
20 TABLET ORAL 2 TIMES DAILY
Qty: 60 TABLET | Refills: 0 | Status: SHIPPED | OUTPATIENT
Start: 2024-09-15

## 2024-09-15 RX ADMIN — CYANOCOBALAMIN TAB 500 MCG 500 MCG: 500 TAB at 08:50

## 2024-09-15 RX ADMIN — ENOXAPARIN SODIUM 40 MG: 40 INJECTION SUBCUTANEOUS at 08:50

## 2024-09-15 RX ADMIN — METOPROLOL SUCCINATE 25 MG: 25 TABLET, EXTENDED RELEASE ORAL at 08:50

## 2024-09-15 RX ADMIN — LOSARTAN POTASSIUM 100 MG: 50 TABLET, FILM COATED ORAL at 08:50

## 2024-09-15 RX ADMIN — POTASSIUM CHLORIDE 40 MEQ: 1500 TABLET, EXTENDED RELEASE ORAL at 08:50

## 2024-09-15 RX ADMIN — NYSTATIN: 100000 POWDER TOPICAL at 08:52

## 2024-09-15 RX ADMIN — DOXAZOSIN 2 MG: 1 TABLET ORAL at 08:50

## 2024-09-15 RX ADMIN — CLOPIDOGREL 75 MG: 75 TABLET ORAL at 08:50

## 2024-09-15 RX ADMIN — ASPIRIN 81 MG: 81 TABLET, CHEWABLE ORAL at 08:50

## 2024-09-15 RX ADMIN — VENLAFAXINE HYDROCHLORIDE 225 MG: 150 CAPSULE, EXTENDED RELEASE ORAL at 08:50

## 2024-09-15 RX ADMIN — AMLODIPINE BESYLATE 10 MG: 10 TABLET ORAL at 08:50

## 2024-09-15 RX ADMIN — TORSEMIDE 20 MG: 20 TABLET ORAL at 08:50

## 2024-09-15 RX ADMIN — TRAZODONE HYDROCHLORIDE 200 MG: 100 TABLET ORAL at 00:31

## 2024-09-15 NOTE — DISCHARGE SUMMARY
[facility]   Discharge - Name: Reuben García I  MRN: [id number]  Unit/Bed#: -01 I Date of Admission: 9/9/2024   Date of Service: 9/15/2024 I Hospital Day: 6      Principal Problem:    Acute on chronic heart failure with preserved ejection fraction (HFpEF) ([facility])  Active Problems:    Essential hypertension    Insomnia    Coronary artery disease involving native coronary artery of native heart without angina pectoris    Nonrheumatic mitral valve regurgitation    Recurrent major depressive disorder, in remission ([facility])    Hypokalemia    Mixed hyperlipidemia      Medical Problems       Resolved Problems  Date Reviewed: 3/4/2024   None         Discharging Physician / Practitioner: Nino Niño DO  PCP: Rich Delgado DO  Admission Date:   Admission Orders (From admission, onward)       Ordered        09/09/24 2039  INPATIENT ADMISSION  Once                          Discharge Date: 09/15/24    Consultations During Hospital Stay:  IP CONSULT TO NUTRITION SERVICES  IP CONSULT TO CARDIOLOGY    Procedures Performed:   None    Significant Findings / Test Results:   XR chest 1 view portable    Result Date: 9/10/2024  Impression: Pulmonary vascular congestion. Cardiomegaly and small left greater than right pleural effusions. Resident: Dagoberto Becerra I, the attending radiologist, have reviewed the images and agree with the final report above. Workstation performed: [id number]       No Chest XR results available for this patient.   Results for orders placed during the hospital encounter of 08/07/24    Echo complete w/ contrast if indicated    Interpretation Summary    Left Ventricle: Left ventricular cavity size is normal. Wall thickness is mildly increased. There is mild concentric hypertrophy. The left ventricular ejection fraction is 69%. Systolic function is normal. Wall motion is normal. Diastolic function is moderately abnormal, consistent with grade II (pseudonormal) relaxation.     "Right Ventricle: Right ventricular cavity size is mildly dilated.    Left Atrium: The atrium is severely dilated.    Right Atrium: The atrium is moderately dilated.    Mitral Valve: There is moderate thickening. There is moderate annular calcification. There is severe regurgitation.    Tricuspid Valve: There is mild regurgitation.      No results for input(s): \"BLOODCX\", \"SPUTUMCULTUR\", \"GRAMSTAIN\", \"URINECX\", \"WOUNDCULT\", \"BODYFLUIDCUL\", \"MRSACULTURE\", \"INFLUAPCR\", \"INFLUBPCR\", \"RSVPCR\", \"LEGIONELLAUR\", \"STPU\", \"CDIFFTOXINB\" in the last 72 hours.    Incidental Findings:   None other than noted above; I reviewed the above mentioned incidental findings with the patient and/or family and they expressed understanding.    Test Results Pending at Discharge (will require follow up):   None     Outpatient Tests Requested:  None    Complications:  None    Reason for Admission:   Chief Complaint   Patient presents with    Shortness of Breath     C/o increasing SOB x 3 weeks.  CHF         Hospital Course:   Reuben García is a 69 y.o. male patient who originally presented to the hospital on 9/9/2024 due to Shortness of Breath (C/o increasing SOB x 3 weeks. Hx CHF)    Review of chart showed patient  has a past medical history of Allergic, Arthritis, Clotting disorder ([facility]), Coronary artery disease, Depression, Diabetes mellitus ([facility]), Headache(784.0), HL (hearing loss), Hypertension, Myocardial infarction (HCC) (8/23/22), Obesity, and Visual impairment.    Patient with past medical history of CAD status post PCI to RCA in 2022 with associated HTN, HLD and significant mitral regurg.  Admitted for acute on chronic diastolic heart failure.  Underwent aggressive IV diuresis and was net negative of 18 L since admission at the time of discharge patient responded well to diuresis.  Switched over to oral antibiotics and deemed stable for discharge from cardiac standpoint.  After further discussion with patient regarding education on " "dietary intake and minimizing sodium intake for management of heart failure, patient also confirmed that he consumes significant amount of alcohol.  Patient states he drinks at x 12 beers in 1 day. Encouraged minimizing and ideally avoiding use of alcohol as it could lead to worsening alcoholic cardiomyopathy.    Please see above list of diagnoses and related plan for additional information.     Condition at Discharge: stable    Discharge Day Visit / Exam:   Subjective:  Currently denies chest pain, shortness of breath. Feels more energized and less lethargic. After discussion with nursing team, there were no noted acute events reported overnight. Patient is understanding of plan.  All questions answered. Eager for discharge.  Vitals: Blood Pressure: 150/92 (09/15/24 0722)  Pulse: 79 (09/15/24 0722)  Temperature: 97.8 °F (36.6 °C) (09/15/24 0722)  Temp Source: Oral (09/14/24 0300)  Respirations: 16 (09/15/24 0722)  Height: 5' 3\" (160 cm) (09/10/24 0239)  Weight - Scale: 72.8 kg (160 lb 7.9 oz) (09/15/24 0551)  SpO2: 91 % (09/15/24 0722)  Exam:   Physical Exam  Vitals and nursing note reviewed.   Constitutional:       General: He is not in acute distress.     Appearance: He is well-developed. He is ill-appearing (Chronically). He is not diaphoretic.   HENT:      Head: Normocephalic and atraumatic.      Nose: Nose normal.   Eyes:      General: No scleral icterus.     Conjunctiva/sclera: Conjunctivae normal.      Pupils: Pupils are equal, round, and reactive to light.   Neck:      Thyroid: No thyromegaly.   Cardiovascular:      Rate and Rhythm: Normal rate and regular rhythm.      Heart sounds: Normal heart sounds. No murmur heard.  Pulmonary:      Effort: Pulmonary effort is normal.      Breath sounds: Normal breath sounds. No wheezing, rhonchi or rales.   Abdominal:      General: Bowel sounds are normal. There is no distension.      Palpations: Abdomen is soft.      Tenderness: There is no abdominal tenderness. "   Musculoskeletal:         General: No swelling, tenderness or deformity.      Cervical back: Neck supple.   Skin:     General: Skin is warm and dry.   Neurological:      Mental Status: He is alert and oriented to person, place, and time. Mental status is at baseline.   Psychiatric:         Behavior: Behavior normal.         Thought Content: Thought content normal.         Judgment: Judgment normal.          Discussion with Family: Patient declined call to .     Discharge instructions/Information to patient and family:   See after visit summary for information provided to patient and family.      Provisions for Follow-Up Care:  See after visit summary for information related to follow-up care and any pertinent home health orders.       Mobility at time of Discharge:   Basic Mobility Inpatient Raw Score: 23  JH-HLM Goal: 7: Walk 25 feet or more  JH-HLM Achieved: 7: Walk 25 feet or more  HLM Goal achieved. Continue to encourage appropriate mobility.    Disposition:   Home    Planned Readmission: none     Discharge Statement:  I spent 41 minutes discharging the patient. This time was spent on the day of discharge. I had direct contact with the patient on the day of discharge. Greater than 50% of the total time was spent examining patient, answering all patient questions, arranging and discussing plan of care with patient as well as directly providing post-discharge instructions.  Additional time then spent on discharge activities.    Discharge Medications:  See after visit summary for reconciled discharge medications provided to patient and/or family.      **Please Note: This note may have been constructed using a voice recognition system**

## 2024-09-15 NOTE — ASSESSMENT & PLAN NOTE
Wt Readings from Last 3 Encounters:   09/15/24 72.8 kg (160 lb 7.9 oz)   09/04/24 88.5 kg (195 lb)   08/14/24 81.4 kg (179 lb 8 oz)     Recent TTE with EF normal with significant MR  Suspect severe MR is contributing to HF   Euvolemic today  Transitioned to p.o. torsemide 20 mg twice daily  Continue potassium, metoprolol succinate 25 mg daily and losartan 100 mg daily  Recommend low-sodium diet, daily weights, strict I's and O's.  Recommend continuing to monitor and replete electrolytes as needed.

## 2024-09-15 NOTE — PLAN OF CARE
Problem: CARDIOVASCULAR - ADULT  Goal: Maintains optimal cardiac output and hemodynamic stability  Description: INTERVENTIONS:  - Monitor I/O, vital signs and rhythm  - Monitor for S/S and trends of decreased cardiac output  - Administer and titrate ordered vasoactive medications to optimize hemodynamic stability  - Assess quality of pulses, skin color and temperature  - Assess for signs of decreased coronary artery perfusion  - Instruct patient to report change in severity of symptoms  Outcome: Progressing  Goal: Absence of cardiac dysrhythmias or at baseline rhythm  Description: INTERVENTIONS:  - Continuous cardiac monitoring, vital signs, obtain 12 lead EKG if ordered  - Administer antiarrhythmic and heart rate control medications as ordered  - Monitor electrolytes and administer replacement therapy as ordered  Outcome: Progressing     Problem: RESPIRATORY - ADULT  Goal: Achieves optimal ventilation and oxygenation  Description: INTERVENTIONS:  - Assess for changes in respiratory status  - Assess for changes in mentation and behavior  - Position to facilitate oxygenation and minimize respiratory effort  - Oxygen administered by appropriate delivery if ordered  - Initiate smoking cessation education as indicated  - Encourage broncho-pulmonary hygiene including cough, deep breathe, Incentive Spirometry  - Assess the need for suctioning and aspirate as needed  - Assess and instruct to report SOB or any respiratory difficulty  - Respiratory Therapy support as indicated  Outcome: Progressing     Problem: PAIN - ADULT  Goal: Verbalizes/displays adequate comfort level or baseline comfort level  Description: Interventions:  - Encourage patient to monitor pain and request assistance  - Assess pain using appropriate pain scale  - Administer analgesics based on type and severity of pain and evaluate response  - Implement non-pharmacological measures as appropriate and evaluate response  - Consider cultural and social  influences on pain and pain management  - Notify physician/advanced practitioner if interventions unsuccessful or patient reports new pain  Outcome: Progressing     Problem: INFECTION - ADULT  Goal: Absence or prevention of progression during hospitalization  Description: INTERVENTIONS:  - Assess and monitor for signs and symptoms of infection  - Monitor lab/diagnostic results  - Monitor all insertion sites, i.e. indwelling lines, tubes, and drains  - Monitor endotracheal if appropriate and nasal secretions for changes in amount and color  - [place] appropriate cooling/warming therapies per order  - Administer medications as ordered  - Instruct and encourage patient and family to use good hand hygiene technique  - Identify and instruct in appropriate isolation precautions for identified infection/condition  Outcome: Progressing     Problem: SAFETY ADULT  Goal: Patient will remain free of falls  Description: INTERVENTIONS:  - Educate patient/family on patient safety including physical limitations  - Instruct patient to call for assistance with activity   - Consult OT/PT to assist with strengthening/mobility   - Keep Call bell within reach  - Keep bed low and locked with side rails adjusted as appropriate  - Keep care items and personal belongings within reach  - Initiate and maintain comfort rounds  - Make Fall Risk Sign visible to staff  - Offer Toileting every 2 Hours, in advance of need  - Initiate/Maintain bed alarm  - Obtain necessary fall risk management equipment: walker  - Apply yellow socks and bracelet for high fall risk patients  - Consider moving patient to room near nurses station  Outcome: Progressing

## 2024-09-15 NOTE — PLAN OF CARE
Problem: CARDIOVASCULAR - ADULT  Goal: Maintains optimal cardiac output and hemodynamic stability  Description: INTERVENTIONS:  - Monitor I/O, vital signs and rhythm  - Monitor for S/S and trends of decreased cardiac output  - Administer and titrate ordered vasoactive medications to optimize hemodynamic stability  - Assess quality of pulses, skin color and temperature  - Assess for signs of decreased coronary artery perfusion  - Instruct patient to report change in severity of symptoms  9/15/2024 1244 by Karri Chowdhury RN  Outcome: Adequate for Discharge  9/15/2024 1232 by Karri Chowdhury RN  Outcome: Progressing  9/15/2024 1232 by Karri Chowdhury RN  Outcome: Progressing  Goal: Absence of cardiac dysrhythmias or at baseline rhythm  Description: INTERVENTIONS:  - Continuous cardiac monitoring, vital signs, obtain 12 lead EKG if ordered  - Administer antiarrhythmic and heart rate control medications as ordered  - Monitor electrolytes and administer replacement therapy as ordered  9/15/2024 1244 by Karri Chowdhury RN  Outcome: Adequate for Discharge  9/15/2024 1232 by Karri Chowdhury RN  Outcome: Progressing  9/15/2024 1232 by Karri Chowdhury RN  Outcome: Progressing     Problem: RESPIRATORY - ADULT  Goal: Achieves optimal ventilation and oxygenation  Description: INTERVENTIONS:  - Assess for changes in respiratory status  - Assess for changes in mentation and behavior  - Position to facilitate oxygenation and minimize respiratory effort  - Oxygen administered by appropriate delivery if ordered  - Initiate smoking cessation education as indicated  - Encourage broncho-pulmonary hygiene including cough, deep breathe, Incentive Spirometry  - Assess the need for suctioning and aspirate as needed  - Assess and instruct to report SOB or any respiratory difficulty  - Respiratory Therapy support as indicated  9/15/2024 1244 by Karri Chowdhury RN  Outcome: Adequate for Discharge  9/15/2024 1232 by Karri Chowdhury RN  Outcome:  Progressing  9/15/2024 1232 by Karri Chowdhury RN  Outcome: Progressing     Problem: PAIN - ADULT  Goal: Verbalizes/displays adequate comfort level or baseline comfort level  Description: Interventions:  - Encourage patient to monitor pain and request assistance  - Assess pain using appropriate pain scale  - Administer analgesics based on type and severity of pain and evaluate response  - Implement non-pharmacological measures as appropriate and evaluate response  - Consider cultural and social influences on pain and pain management  - Notify physician/advanced practitioner if interventions unsuccessful or patient reports new pain  9/15/2024 1244 by Karri Chowdhury RN  Outcome: Adequate for Discharge  9/15/2024 1232 by Karri Chowdhury RN  Outcome: Progressing  9/15/2024 1232 by Karri Chowdhury RN  Outcome: Progressing     Problem: INFECTION - ADULT  Goal: Absence or prevention of progression during hospitalization  Description: INTERVENTIONS:  - Assess and monitor for signs and symptoms of infection  - Monitor lab/diagnostic results  - Monitor all insertion sites, i.e. indwelling lines, tubes, and drains  - Monitor endotracheal if appropriate and nasal secretions for changes in amount and color  - [place] appropriate cooling/warming therapies per order  - Administer medications as ordered  - Instruct and encourage patient and family to use good hand hygiene technique  - Identify and instruct in appropriate isolation precautions for identified infection/condition  9/15/2024 1244 by Karri Chowdhury RN  Outcome: Adequate for Discharge  9/15/2024 1232 by Karri Chowdhury RN  Outcome: Progressing  9/15/2024 1232 by Karri Chowdhury RN  Outcome: Progressing     Problem: SAFETY ADULT  Goal: Patient will remain free of falls  Description: INTERVENTIONS:  - Educate patient/family on patient safety including physical limitations  - Instruct patient to call for assistance with activity   - Consult OT/PT to assist with  strengthening/mobility   - Keep Call bell within reach  - Keep bed low and locked with side rails adjusted as appropriate  - Keep care items and personal belongings within reach  - Initiate and maintain comfort rounds  - Make Fall Risk Sign visible to staff  - Offer Toileting every  Hours, in advance of need  - Initiate/Maintain alarm  - Obtain necessary fall risk management equipment  - Apply yellow socks and bracelet for high fall risk patients  - Consider moving patient to room near nurses station  9/15/2024 1244 by Karri Chowdhury RN  Outcome: Adequate for Discharge  9/15/2024 1232 by Karri Chowdhury RN  Outcome: Progressing  9/15/2024 1232 by Karri Chowdhury RN  Outcome: Progressing

## 2024-09-15 NOTE — PLAN OF CARE
Problem: CARDIOVASCULAR - ADULT  Goal: Maintains optimal cardiac output and hemodynamic stability  Description: INTERVENTIONS:  - Monitor I/O, vital signs and rhythm  - Monitor for S/S and trends of decreased cardiac output  - Administer and titrate ordered vasoactive medications to optimize hemodynamic stability  - Assess quality of pulses, skin color and temperature  - Assess for signs of decreased coronary artery perfusion  - Instruct patient to report change in severity of symptoms  9/15/2024 1232 by Karri Chowdhury RN  Outcome: Progressing  9/15/2024 1232 by Karri Chowdhury RN  Outcome: Progressing  Goal: Absence of cardiac dysrhythmias or at baseline rhythm  Description: INTERVENTIONS:  - Continuous cardiac monitoring, vital signs, obtain 12 lead EKG if ordered  - Administer antiarrhythmic and heart rate control medications as ordered  - Monitor electrolytes and administer replacement therapy as ordered  9/15/2024 1232 by Karri Chowdhury RN  Outcome: Progressing  9/15/2024 1232 by Karri Chowdhury RN  Outcome: Progressing     Problem: RESPIRATORY - ADULT  Goal: Achieves optimal ventilation and oxygenation  Description: INTERVENTIONS:  - Assess for changes in respiratory status  - Assess for changes in mentation and behavior  - Position to facilitate oxygenation and minimize respiratory effort  - Oxygen administered by appropriate delivery if ordered  - Initiate smoking cessation education as indicated  - Encourage broncho-pulmonary hygiene including cough, deep breathe, Incentive Spirometry  - Assess the need for suctioning and aspirate as needed  - Assess and instruct to report SOB or any respiratory difficulty  - Respiratory Therapy support as indicated  9/15/2024 1232 by Karri Chowdhury RN  Outcome: Progressing  9/15/2024 1232 by Karri Chowdhury RN  Outcome: Progressing     Problem: PAIN - ADULT  Goal: Verbalizes/displays adequate comfort level or baseline comfort level  Description: Interventions:  - Encourage  patient to monitor pain and request assistance  - Assess pain using appropriate pain scale  - Administer analgesics based on type and severity of pain and evaluate response  - Implement non-pharmacological measures as appropriate and evaluate response  - Consider cultural and social influences on pain and pain management  - Notify physician/advanced practitioner if interventions unsuccessful or patient reports new pain  9/15/2024 1232 by Karri Chowdhury RN  Outcome: Progressing  9/15/2024 1232 by Karri Chowdhury RN  Outcome: Progressing     Problem: INFECTION - ADULT  Goal: Absence or prevention of progression during hospitalization  Description: INTERVENTIONS:  - Assess and monitor for signs and symptoms of infection  - Monitor lab/diagnostic results  - Monitor all insertion sites, i.e. indwelling lines, tubes, and drains  - Monitor endotracheal if appropriate and nasal secretions for changes in amount and color  - [place] appropriate cooling/warming therapies per order  - Administer medications as ordered  - Instruct and encourage patient and family to use good hand hygiene technique  - Identify and instruct in appropriate isolation precautions for identified infection/condition  9/15/2024 1232 by Karri Chowdhury RN  Outcome: Progressing  9/15/2024 1232 by Karri Chowdhury RN  Outcome: Progressing     Problem: SAFETY ADULT  Goal: Patient will remain free of falls  Description: INTERVENTIONS:  - Educate patient/family on patient safety including physical limitations  - Instruct patient to call for assistance with activity   - Consult OT/PT to assist with strengthening/mobility   - Keep Call bell within reach  - Keep bed low and locked with side rails adjusted as appropriate  - Keep care items and personal belongings within reach  - Initiate and maintain comfort rounds  - Make Fall Risk Sign visible to staff  - Offer Toileting every 2 Hours, in advance of need  - Initiate/Maintain bed alarm  - Obtain necessary fall  risk management equipment: chair alarm  - Apply yellow socks and bracelet for high fall risk patients  - Consider moving patient to room near nurses station  9/15/2024 1232 by Karri Chowdhury RN  Outcome: Progressing  9/15/2024 1232 by Karri Chowdhury RN  Outcome: Progressing

## 2024-09-15 NOTE — PROGRESS NOTES
Progress Note - Cardiology   Name: Reuben García 69 y.o. male I MRN: [id number]  Unit/Bed#: -01 I Date of Admission: 9/9/2024   Date of Service: 9/15/2024 I Hospital Day: 6     Assessment & Plan  Acute on chronic heart failure with preserved ejection fraction (HFpEF) ([facility])  Wt Readings from Last 3 Encounters:   09/15/24 72.8 kg (160 lb 7.9 oz)   09/04/24 88.5 kg (195 lb)   08/14/24 81.4 kg (179 lb 8 oz)     Recent TTE with EF normal with significant MR  Suspect severe MR is contributing to HF   Euvolemic today  Transitioned to p.o. torsemide 20 mg twice daily  Continue potassium, metoprolol succinate 25 mg daily and losartan 100 mg daily  Recommend low-sodium diet, daily weights, strict I's and O's.  Recommend continuing to monitor and replete electrolytes as needed.      Nonrheumatic mitral valve regurgitation  Severe MR noted on TTE 8/7/24  Plan for elective CT surgical evaluation for possible mitral valve repair versus replacement  Essential hypertension  Continue amlodipine 10 mg daily, losartan 100 mg daily, Toprol 25 mg daily, Lasix per above  Coronary artery disease involving native coronary artery of native heart without angina pectoris  CAD s/p PCI with ESTHER x 2 to RCA (2022)  Continue aspirin, Plavix, statin, amlodipine, Toprol  Mixed hyperlipidemia  Continue statin  General Cardiology   Progress Note   Reuben García 69 y.o. male MRN: [id number]  Unit/Bed#: -01 Encounter: [id number]      SUBJECTIVE:   No significant events overnight.  States that his shortness of breath is improved.  Patient has diuresed well during this admission.    REVIEW OF SYSTEMS:  Constitutional:  Denies fever or chills   Eyes:  Denies change in visual acuity   HENT:  Denies nasal congestion or sore throat   Respiratory:  shortness of breath   Cardiovascular:  Denies chest pain or edema   GI:  Denies abdominal pain, nausea, vomiting, bloody stools or diarrhea   :  Denies dysuria, frequency, difficulty in micturition and  nocturia  Musculoskeletal:  Denies back pain or joint pain   Neurologic:  Denies headache, focal weakness or sensory changes   Endocrine:  Denies polyuria or polydipsia   Lymphatic:  Denies swollen glands   Psychiatric:  Denies depression or anxiety     OBJECTIVE:   Vitals:  Vitals:    09/15/24 0722   BP: 150/92   Pulse: 79   Resp: 16   Temp: 97.8 °F (36.6 °C)   SpO2: 91%     Body mass index is 28.43 kg/m².  Systolic (24hrs), Av , Min:142 , Max:150     Diastolic (24hrs), Av, Min:91, Max:92      Intake/Output Summary (Last 24 hours) at 9/15/2024 1755  Last data filed at 9/15/2024 0722  Gross per 24 hour   Intake 650 ml   Output 2300 ml   Net -1650 ml     Weight (last 2 days) before discharge       Date/Time Weight    09/15/24 0551 72.8 (160.5)    24 0600 75 (165.35)    24 0537 77.5 (170.86)                PHYSICAL EXAMS:  General:  Patient is not in acute distress, laying in the bed comfortably, awake, alert responding to commands.  Head: Normocephalic, Atraumatic.  HEENT:  Both pupils normal-size atraumatic, normocephalic, nonicteric  Neck:  JVP not raised. Trachea central  Respiratory: Decreased breath sounds bilateral  Cardiovascular: Systolic murmur in the mitral area  GI:  Abdomen soft nontender. Liver and spleen normal size  Lymphatic:  No cervical or inguinal lymphadenopathy  Neurologic:  Patient is awake alert, responding to command, well-oriented to time and place and person moving   trace edema    LABORATORY RESULTS:        CBC with diff:   Results from last 7 days   Lab Units 09/15/24  0539 24  0536 24  0432 24  0433 09/10/24  0438   WBC Thousand/uL 5.93 6.11 5.75 5.50 7.23   HEMOGLOBIN g/dL 13.1 12.9 12.3 12.2 13.5   HEMATOCRIT % 39.4 39.1 37.0 36.5 40.0   MCV fL 90 91 89 90 90   PLATELETS Thousands/uL 150 148* 135* 134* 161   RBC Million/uL 4.37 4.30 4.14 4.07 4.45   MCH pg 30.0 30.0 29.7 30.0 30.3   MCHC g/dL 33.2 33.0 33.2 33.4 33.8   RDW % 14.5 14.6 14.6 14.5  "14.6   MPV fL 10.4 9.8 10.1 10.1 10.2   NRBC AUTO /100 WBCs  --  0  --  0 0       CMP:  Results from last 7 days   Lab Units 09/15/24  0539 09/14/24  0536 09/13/24  1252   POTASSIUM mmol/L 3.2* 3.7 3.8   CHLORIDE mmol/L 102 102 101   CO2 mmol/L 33* 34* 32   BUN mg/dL 20 18 19   CREATININE mg/dL 0.88 0.89 0.99   CALCIUM mg/dL 8.3* 8.3* 8.2*   AST U/L 28 25 22   ALT U/L 28 25 21   ALK PHOS U/L 48 48 50   EGFR ml/min/1.73sq m 88 88 78       BMP:  Results from last 7 days   Lab Units 09/15/24  0539 09/14/24  0536 09/13/24  1252   POTASSIUM mmol/L 3.2* 3.7 3.8   CHLORIDE mmol/L 102 102 101   CO2 mmol/L 33* 34* 32   BUN mg/dL 20 18 19   CREATININE mg/dL 0.88 0.89 0.99   CALCIUM mg/dL 8.3* 8.3* 8.2*            Results from last 7 days   Lab Units 09/15/24  0539 09/14/24  0536 09/13/24  1252 09/12/24  0432 09/11/24  0433   MAGNESIUM mg/dL 1.9 2.0 1.9 1.8* 1.9                   Lipid Profile:   No results found for: \"CHOL\"  Lab Results   Component Value Date    HDL 57 02/09/2024    HDL 41 08/24/2022    HDL 53 08/21/2020     Lab Results   Component Value Date    LDLCALC 41 02/09/2024    LDLCALC 107 (H) 08/24/2022    LDLCALC 85 08/21/2020     Lab Results   Component Value Date    TRIG 153 (H) 02/09/2024    TRIG 171 (H) 08/24/2022    TRIG 133 08/21/2020       Cardiac testing:  No results found for this or any previous visit.    No results found for this or any previous visit.    No results found for this or any previous visit.    No valid procedures specified.  No results found for this or any previous visit.      Meds/Allergies   all current active meds have been reviewed  No medications prior to admission.  No current facility-administered medications for this encounter.          Gail Merrill MD  9/15/2024,5:55 PM    Portions of the record may have been created with voice recognition software.  Occasional wrong word or \"sound a like\" substitutions may have occurred due to the inherent limitations of voice recognition " software.  Read the chart carefully and recognize, using context, where substitutions have occurred.

## 2024-09-16 ENCOUNTER — TRANSITIONAL CARE MANAGEMENT (OUTPATIENT)
Dept: FAMILY MEDICINE CLINIC | Facility: CLINIC | Age: 69
End: 2024-09-16

## 2024-09-16 NOTE — UTILIZATION REVIEW
NOTIFICATION OF ADMISSION DISCHARGE   This is a Notification of Discharge from [facility]. Please be advised that this patient has been discharge from our facility. Below you will find the admission and discharge date and time including the patient’s disposition.   UTILIZATION REVIEW CONTACT:  Rosa De Leon  Utilization   Network Utilization Review Department  Phone: [phone] x carefully listen to the prompts. All voicemails are confidential.  Email: [email or web address]     ADMISSION INFORMATION  PRESENTATION DATE: 9/9/2024  6:55 PM  OBERVATION ADMISSION DATE: N/A  INPATIENT ADMISSION DATE: 9/9/24  8:40 PM   DISCHARGE DATE: 9/15/2024  2:15 PM   DISPOSITION:Home/Self Care    Network Utilization Review Department  ATTENTION: Please call with any questions or concerns to [phone] and carefully listen to the prompts so that you are directed to the right person. All voicemails are confidential.   For Discharge needs, contact Care Management DC Support Team at [phone] opt. 2  Send all requests for admission clinical reviews, approved or denied determinations and any other requests to dedicated fax number below belonging to the campus where the patient is receiving treatment. List of dedicated fax numbers for the Facilities:  FACILITY NAME UR FAX NUMBER   ADMISSION DENIALS (Administrative/Medical Necessity) [phone]   DISCHARGE SUPPORT TEAM ([facility]) [phone]   PARENT CHILD HEALTH (Maternity/NICU/Pediatrics) [phone]   [facility] [phone]   [facility] [phone]   [facility] [phone]   [facility] [phone]   [facility] [phone]   [facility] [phone]   [facility] [phone]   [facility]  [phone]   [facility] [phone]   [facility] [phone]   [facility] [phone]   [facility] [phone]

## 2024-09-18 ENCOUNTER — ANESTHESIA EVENT (OUTPATIENT)
Dept: NON INVASIVE DIAGNOSTICS | Facility: HOSPITAL | Age: 69
End: 2024-09-18
Payer: COMMERCIAL

## 2024-09-18 NOTE — ANESTHESIA PREPROCEDURE EVALUATION
Procedure:  THOR    Relevant Problems   ANESTHESIA (within normal limits)      CARDIO   (+) Acute on chronic heart failure with preserved ejection fraction (HFpEF) (HCC)   (+) Coronary artery disease involving native coronary artery of native heart without angina pectoris   (+) Essential hypertension   (+) Migraine headache   (+) Mixed hyperlipidemia   (+) Nonrheumatic mitral valve regurgitation      ENDO (within normal limits)      GI/HEPATIC   (+) Gastroesophageal reflux disease without esophagitis      /RENAL (within normal limits)      HEMATOLOGY (within normal limits)      NEURO/PSYCH   (+) Anxiety   (+) Migraine headache   (+) Recurrent major depressive disorder, in remission (HCC)      PULMONARY   (+) Mild intermittent asthma without complication      TTE 8/2024:    Left Ventricle: Left ventricular cavity size is normal. Wall thickness is mildly increased. There is mild concentric hypertrophy. The left ventricular ejection fraction is 69%. Systolic function is normal. Wall motion is normal. Diastolic function is moderately abnormal, consistent with grade II (pseudonormal) relaxation.    Right Ventricle: Right ventricular cavity size is mildly dilated.    Left Atrium: The atrium is severely dilated.    Right Atrium: The atrium is moderately dilated.    Mitral Valve: There is moderate thickening. There is moderate annular calcification. There is severe regurgitation.    Tricuspid Valve: There is mild regurgitation.     Cardiac Cath 8/2022:  There is a 99% stenosis in mid RCA nad 90% stenosis of proximal RCA. This is culprit for patient presentation of NSTEMI. Underwent PCI with ESTHER. Excellent result. 0% residual stenosis. MARAH 3 flow at completion     Successful PCI of RCA with ESTHER x2 in overlapping fashion      Lab Results   Component Value Date    WBC 5.93 09/15/2024    HGB 13.1 09/15/2024    HCT 39.4 09/15/2024    MCV 90 09/15/2024     09/15/2024     Lab Results   Component Value Date    SODIUM 141  "09/15/2024    K 3.2 (L) 09/15/2024     09/15/2024    CO2 33 (H) 09/15/2024    BUN 20 09/15/2024    CREATININE 0.88 09/15/2024    GLUC 82 09/15/2024    CALCIUM 8.3 (L) 09/15/2024     No results found for: \"INR\", \"PROTIME\"  Lab Results   Component Value Date    HGBA1C 4.6 02/14/2023          Physical Exam    Airway    Mallampati score: II  TM Distance: >3 FB  Neck ROM: full     Dental   No notable dental hx     Cardiovascular  Cardiovascular exam normal    Pulmonary  Pulmonary exam normal     Other Findings        Anesthesia Plan  ASA Score- 3     Anesthesia Type- IV sedation with anesthesia with ASA Monitors.         Additional Monitors:     Airway Plan:            Plan Factors-Exercise tolerance (METS): >4 METS.    Chart reviewed. EKG reviewed.  Existing labs reviewed. Patient summary reviewed.                  Induction- intravenous.    Postoperative Plan-     Perioperative Resuscitation Plan - Level 1 - Full Code.       Informed Consent- Anesthetic plan and risks discussed with patient.  I personally reviewed this patient with the CRNA. Discussed and agreed on the Anesthesia Plan with the CRNA..        "

## 2024-09-19 ENCOUNTER — ANESTHESIA (OUTPATIENT)
Dept: NON INVASIVE DIAGNOSTICS | Facility: HOSPITAL | Age: 69
End: 2024-09-19
Payer: COMMERCIAL

## 2024-09-19 ENCOUNTER — HOSPITAL ENCOUNTER (OUTPATIENT)
Dept: NON INVASIVE DIAGNOSTICS | Facility: HOSPITAL | Age: 69
Discharge: HOME | End: 2024-09-19
Payer: COMMERCIAL

## 2024-09-19 ENCOUNTER — HOSPITAL ENCOUNTER (OUTPATIENT)
Facility: HOSPITAL | Age: 69
Setting detail: OUTPATIENT SURGERY
Discharge: HOME | End: 2024-09-19
Attending: INTERNAL MEDICINE | Admitting: INTERNAL MEDICINE
Payer: COMMERCIAL

## 2024-09-19 VITALS
HEIGHT: 62 IN | OXYGEN SATURATION: 94 % | BODY MASS INDEX: 29.08 KG/M2 | TEMPERATURE: 97 F | WEIGHT: 158 LBS | DIASTOLIC BLOOD PRESSURE: 79 MMHG | RESPIRATION RATE: 16 BRPM | SYSTOLIC BLOOD PRESSURE: 149 MMHG | HEART RATE: 71 BPM

## 2024-09-19 VITALS
DIASTOLIC BLOOD PRESSURE: 76 MMHG | WEIGHT: 158 LBS | SYSTOLIC BLOOD PRESSURE: 133 MMHG | BODY MASS INDEX: 29.08 KG/M2 | HEART RATE: 71 BPM | HEIGHT: 62 IN

## 2024-09-19 DIAGNOSIS — I34.0 NONRHEUMATIC MITRAL VALVE REGURGITATION: ICD-10-CM

## 2024-09-19 DIAGNOSIS — I25.10 CORONARY ARTERY DISEASE INVOLVING NATIVE CORONARY ARTERY OF NATIVE HEART WITHOUT ANGINA PECTORIS: ICD-10-CM

## 2024-09-19 DIAGNOSIS — I25.10 CORONARY ARTERY DISEASE INVOLVING NATIVE CORONARY ARTERY OF NATIVE HEART, UNSPECIFIED WHETHER ANGINA PRESENT: ICD-10-CM

## 2024-09-19 LAB
ATRIAL RATE: 72 BPM
P AXIS: -11 DEGREES
PR INTERVAL: 168 MS
QRS AXIS: 29 DEGREES
QRSD INTERVAL: 96 MS
QT INTERVAL: 446 MS
QTC INTERVAL: 488 MS
T WAVE AXIS: 15 DEGREES
VENTRICULAR RATE: 72 BPM

## 2024-09-19 PROCEDURE — 99152 MOD SED SAME PHYS/QHP 5/>YRS: CPT | Performed by: INTERNAL MEDICINE

## 2024-09-19 PROCEDURE — 93454 CORONARY ARTERY ANGIO S&I: CPT | Performed by: INTERNAL MEDICINE

## 2024-09-19 PROCEDURE — C1894 INTRO/SHEATH, NON-LASER: HCPCS | Performed by: INTERNAL MEDICINE

## 2024-09-19 PROCEDURE — 76376 3D RENDER W/INTRP POSTPROCES: CPT | Performed by: INTERNAL MEDICINE

## 2024-09-19 PROCEDURE — 99153 MOD SED SAME PHYS/QHP EA: CPT | Performed by: INTERNAL MEDICINE

## 2024-09-19 PROCEDURE — 93312 ECHO TRANSESOPHAGEAL: CPT | Performed by: INTERNAL MEDICINE

## 2024-09-19 PROCEDURE — C1769 GUIDE WIRE: HCPCS | Performed by: INTERNAL MEDICINE

## 2024-09-19 PROCEDURE — 76376 3D RENDER W/INTRP POSTPROCES: CPT

## 2024-09-19 PROCEDURE — 93005 ELECTROCARDIOGRAM TRACING: CPT

## 2024-09-19 PROCEDURE — 93010 ELECTROCARDIOGRAM REPORT: CPT | Performed by: INTERNAL MEDICINE

## 2024-09-19 PROCEDURE — 93312 ECHO TRANSESOPHAGEAL: CPT

## 2024-09-19 RX ORDER — LIDOCAINE HYDROCHLORIDE 10 MG/ML
INJECTION, SOLUTION EPIDURAL; INFILTRATION; INTRACAUDAL; PERINEURAL AS NEEDED
Status: DISCONTINUED | OUTPATIENT
Start: 2024-09-19 | End: 2024-09-19

## 2024-09-19 RX ORDER — FENTANYL CITRATE 50 UG/ML
INJECTION, SOLUTION INTRAMUSCULAR; INTRAVENOUS CODE/TRAUMA/SEDATION MEDICATION
Status: DISCONTINUED | OUTPATIENT
Start: 2024-09-19 | End: 2024-09-19 | Stop reason: HOSPADM

## 2024-09-19 RX ORDER — ASPIRIN 81 MG/1
324 TABLET, CHEWABLE ORAL ONCE
Status: COMPLETED | OUTPATIENT
Start: 2024-09-19 | End: 2024-09-19

## 2024-09-19 RX ORDER — VERAPAMIL HYDROCHLORIDE 2.5 MG/ML
INJECTION, SOLUTION INTRAVENOUS CODE/TRAUMA/SEDATION MEDICATION
Status: DISCONTINUED | OUTPATIENT
Start: 2024-09-19 | End: 2024-09-19 | Stop reason: HOSPADM

## 2024-09-19 RX ORDER — LIDOCAINE HYDROCHLORIDE 10 MG/ML
INJECTION, SOLUTION EPIDURAL; INFILTRATION; INTRACAUDAL; PERINEURAL CODE/TRAUMA/SEDATION MEDICATION
Status: DISCONTINUED | OUTPATIENT
Start: 2024-09-19 | End: 2024-09-19 | Stop reason: HOSPADM

## 2024-09-19 RX ORDER — HEPARIN SODIUM 1000 [USP'U]/ML
INJECTION, SOLUTION INTRAVENOUS; SUBCUTANEOUS CODE/TRAUMA/SEDATION MEDICATION
Status: DISCONTINUED | OUTPATIENT
Start: 2024-09-19 | End: 2024-09-19 | Stop reason: HOSPADM

## 2024-09-19 RX ORDER — SODIUM CHLORIDE 9 MG/ML
125 INJECTION, SOLUTION INTRAVENOUS CONTINUOUS
Status: DISCONTINUED | OUTPATIENT
Start: 2024-09-19 | End: 2024-09-19 | Stop reason: HOSPADM

## 2024-09-19 RX ORDER — SODIUM CHLORIDE 9 MG/ML
75 INJECTION, SOLUTION INTRAVENOUS CONTINUOUS
Status: DISCONTINUED | OUTPATIENT
Start: 2024-09-19 | End: 2024-09-19 | Stop reason: HOSPADM

## 2024-09-19 RX ORDER — MIDAZOLAM HYDROCHLORIDE 2 MG/2ML
INJECTION, SOLUTION INTRAMUSCULAR; INTRAVENOUS CODE/TRAUMA/SEDATION MEDICATION
Status: DISCONTINUED | OUTPATIENT
Start: 2024-09-19 | End: 2024-09-19 | Stop reason: HOSPADM

## 2024-09-19 RX ORDER — NITROGLYCERIN 20 MG/100ML
INJECTION INTRAVENOUS CODE/TRAUMA/SEDATION MEDICATION
Status: DISCONTINUED | OUTPATIENT
Start: 2024-09-19 | End: 2024-09-19 | Stop reason: HOSPADM

## 2024-09-19 RX ORDER — PROPOFOL 10 MG/ML
INJECTION, EMULSION INTRAVENOUS AS NEEDED
Status: DISCONTINUED | OUTPATIENT
Start: 2024-09-19 | End: 2024-09-19

## 2024-09-19 RX ADMIN — SODIUM CHLORIDE 75 ML/HR: 0.9 INJECTION, SOLUTION INTRAVENOUS at 15:00

## 2024-09-19 RX ADMIN — PROPOFOL 50 MG: 10 INJECTION, EMULSION INTRAVENOUS at 13:13

## 2024-09-19 RX ADMIN — ASPIRIN 81 MG CHEWABLE TABLET 243 MG: 81 TABLET CHEWABLE at 11:06

## 2024-09-19 RX ADMIN — PROPOFOL 50 MG: 10 INJECTION, EMULSION INTRAVENOUS at 13:05

## 2024-09-19 RX ADMIN — LIDOCAINE HYDROCHLORIDE 100 MG: 10 INJECTION, SOLUTION EPIDURAL; INFILTRATION; INTRACAUDAL; PERINEURAL at 12:53

## 2024-09-19 RX ADMIN — PROPOFOL 50 MG: 10 INJECTION, EMULSION INTRAVENOUS at 12:56

## 2024-09-19 RX ADMIN — SODIUM CHLORIDE 125 ML/HR: 0.9 INJECTION, SOLUTION INTRAVENOUS at 11:08

## 2024-09-19 RX ADMIN — PROPOFOL 50 MG: 10 INJECTION, EMULSION INTRAVENOUS at 13:00

## 2024-09-19 RX ADMIN — PROPOFOL 100 MG: 10 INJECTION, EMULSION INTRAVENOUS at 12:53

## 2024-09-19 NOTE — DISCHARGE INSTR - AVS FIRST PAGE
1. Please see the post cardiac catheterization dishcarge instructions.   No heavy lifting, greater than 10 lbs. or strenuous  activity for 48 hrs.    2.Remove band aid tomorrow.  Shower and wash area- wrist gently with soap and water- beginning tomorrow. Rinse and pat dry.  Apply new water seal band aid.  Repeat this process for 5 days. No powders, creams lotions or antibiotic ointments  for 5 days.  No tub baths, hot tubs or swimming for 5 days.     3. Please call our office ([phone]) if you have any fever, redness, swelling, discharge from your wrist access site.    4.No driving for 1 day

## 2024-09-19 NOTE — ANESTHESIA POSTPROCEDURE EVALUATION
Post-Op Assessment Note    CV Status:  Stable  Pain Score: 0    Pain management: adequate       Mental Status:  Awake and sleepy   Hydration Status:  Euvolemic   PONV Controlled:  Controlled   Airway Patency:  Patent     Post Op Vitals Reviewed: Yes    No anethesia notable event occurred.    Staff: CRNA               BP   110/64   Temp      Pulse  74   Resp      SpO2   94 RA

## 2024-09-19 NOTE — INTERVAL H&P NOTE
Vitals:    09/19/24 1347   BP: 109/58   Pulse: 72   Resp:    Temp:    SpO2: 94%     Patient seen and examined at bedside.    Brief overview of procedure discussed with patient.  Indication, risk, benefits and alternatives discussed with patient who verbalized understanding.  All questions addressed fully.  Patient elected to proceed with planned procedure, consent completed.    Patient remains clinically stable.  Renal function, coagulation profile, and hemoglobin all within normal limits.  Patient has no other planned upcoming surgical procedures at this oren beside possible cardiac surgery     We will proceed with planned procedure.    Rebecca Lugo

## 2024-09-20 ENCOUNTER — TELEPHONE (OUTPATIENT)
Dept: NON INVASIVE DIAGNOSTICS | Facility: HOSPITAL | Age: 69
End: 2024-09-20

## 2024-09-20 LAB
E WAVE DECELERATION TIME: 219 MS
E/A RATIO: 2.2
LEFT VENTRICULAR INTERNAL DIMENSION IN DIASTOLE: 5.4 CM (ref 3.5–6)
MITRAL VALVE PEAK REGURGITANT INSTANTANEOUS FLOW RATE PISA: 540 ML/S
MV PEAK A VEL: 0.7 M/S
MV PEAK E VEL: 154 CM/S
MV REGURGITANT VOLUME: 161 ML
MV STENOSIS PRESSURE HALF TIME: 64 MS
MV VALVE AREA P 1/2 METHOD: 3.44
MV VENA CONTRACTA: 1.3 CM
SL CV LV EF: 55

## 2024-09-23 ENCOUNTER — OFFICE VISIT (OUTPATIENT)
Dept: FAMILY MEDICINE CLINIC | Facility: CLINIC | Age: 69
End: 2024-09-23
Payer: COMMERCIAL

## 2024-09-23 VITALS
HEART RATE: 88 BPM | BODY MASS INDEX: 31.17 KG/M2 | RESPIRATION RATE: 12 BRPM | TEMPERATURE: 98 F | SYSTOLIC BLOOD PRESSURE: 140 MMHG | WEIGHT: 169.4 LBS | HEIGHT: 62 IN | DIASTOLIC BLOOD PRESSURE: 90 MMHG | OXYGEN SATURATION: 96 %

## 2024-09-23 DIAGNOSIS — I10 ESSENTIAL HYPERTENSION: ICD-10-CM

## 2024-09-23 DIAGNOSIS — I50.33 ACUTE ON CHRONIC HEART FAILURE WITH PRESERVED EJECTION FRACTION (HFPEF) (HCC): Primary | ICD-10-CM

## 2024-09-23 DIAGNOSIS — I34.0 NONRHEUMATIC MITRAL VALVE REGURGITATION: ICD-10-CM

## 2024-09-23 PROCEDURE — 99495 TRANSJ CARE MGMT MOD F2F 14D: CPT | Performed by: FAMILY MEDICINE

## 2024-09-23 NOTE — PROGRESS NOTES
Transition of Care Visit  Name: Reuben García      : 1955      MRN: [id number]  Encounter Provider: Rich Delgado DO  Encounter Date: 2024   Encounter department: [facility] [address]    Assessment & Plan  Acute on chronic heart failure with preserved ejection fraction (HFpEF) ([facility])  Wt Readings from Last 3 Encounters:   24 76.8 kg (169 lb 6.4 oz)   24 71.7 kg (158 lb)   24 71.7 kg (158 lb)     Doing much better since he started torsemide.  Follow-up with cardiologist and cardiothoracic surgeon for mitral valve replacement.               Nonrheumatic mitral valve regurgitation  He is scheduled tentatively for mitral valve surgery.  We will see him after he gets this done     Essential hypertension  Controlled, continue amlodipine, metoprolol, valsartan            History of Present Illness     Transitional Care Management Review:   Reuben García is a 69 y.o. male here for TCM follow up.     During the TCM phone call patient stated:  TCM Call       Date and time call was made  2024  1:25 PM    Hospital care reviewed  Records reviewed    Patient [facility]ial[facility]    Date of Admission  24    Date of discharge  09/15/24    Diagnosis  Acute on chronic heart failure with preserved ejection fraction    Disposition  Home    Were the patients medications reviewed and updated  Yes    Current Symptoms  None          TCM Call       Post hospital issues  None    Should patient be enrolled in anticoag monitoring?  No    Scheduled for follow up?  Yes    Did you obtain your prescribed medications  Yes    Do you need help managing your prescriptions or medications  No    Is transportation to your appointment needed  No    I have advised the patient to call PCP with any new or worsening symptoms  Heather Hackett MA    Living Arrangements  Spouse or Significiant other    Support System  Partner; Family    The type of support provided   Emotional    Do you have social support  Yes, as much as I need    Are you recieving any outpatient services  No    Are you recieving home care services  No    Are you using any community resources  No    Current waiver services  No    Have you fallen in the last 12 months  No    Interperter language line needed  No    Counseling  Patient; Family    Counseling topics  Activities of daily living; patient and family education    Comments  spoke with pt on 8/29/22 after his hospital d/c on 8/26/22. pt underwent a Cardiac catheterization (Left Chest) on 8/25/22, pt stated that he is feeling well, denied any discomfort or pain. denied chest pain, SOB, vision changes, wekaness..etc. pt will see PCP on 9/2/22 for hospital f/u and she will f/u with cardiologist as advised at his d/c. pt is aware to call our office if he has any questions or concern. er/cma.          Patient comes in today for transition of care management.      Review of Systems   Constitutional:  Negative for chills, fatigue and fever.   HENT:  Negative for congestion, ear pain, hearing loss, postnasal drip, rhinorrhea and sore throat.    Eyes:  Negative for pain and visual disturbance.   Respiratory:  Negative for chest tightness, shortness of breath and wheezing.    Cardiovascular:  Negative for chest pain and leg swelling.   Gastrointestinal:  Negative for abdominal distention, abdominal pain, constipation, diarrhea and vomiting.   Endocrine: Negative for cold intolerance and heat intolerance.   Genitourinary:  Negative for difficulty urinating, frequency and urgency.   Musculoskeletal:  Negative for arthralgias and gait problem.   Skin:  Negative for color change.   Neurological:  Negative for dizziness, tremors, syncope, numbness and headaches.   Hematological:  Negative for adenopathy.   Psychiatric/Behavioral:  Negative for agitation, confusion and sleep disturbance. The patient is not nervous/anxious.      Objective     /90   Pulse 88    "Temp 98 °F (36.7 °C)   Resp 12   Ht 5' 2\" (1.575 m)   Wt 76.8 kg (169 lb 6.4 oz)   SpO2 96%   BMI 30.98 kg/m²     Physical Exam  Constitutional:       Appearance: He is well-developed.   HENT:      Head: Normocephalic.      Right Ear: External ear normal.      Left Ear: External ear normal.      Nose: Nose normal.   Eyes:      Extraocular Movements: Extraocular movements intact.      Conjunctiva/sclera: Conjunctivae normal.      Pupils: Pupils are equal, round, and reactive to light.   Neck:      Thyroid: No thyromegaly.   Cardiovascular:      Rate and Rhythm: Normal rate and regular rhythm.      Heart sounds: Normal heart sounds.   Pulmonary:      Effort: Pulmonary effort is normal.      Breath sounds: Normal breath sounds.   Abdominal:      General: Bowel sounds are normal.      Palpations: Abdomen is soft.   Musculoskeletal:         General: Normal range of motion.      Cervical back: Normal range of motion.   Skin:     General: Skin is warm and dry.   Neurological:      Mental Status: He is alert and oriented to person, place, and time.   Psychiatric:         Mood and Affect: Mood normal.         Behavior: Behavior normal.       Medications have been reviewed by provider in current encounter    Administrative Statements     "

## 2024-09-23 NOTE — ASSESSMENT & PLAN NOTE
Wt Readings from Last 3 Encounters:   09/23/24 76.8 kg (169 lb 6.4 oz)   09/19/24 71.7 kg (158 lb)   09/19/24 71.7 kg (158 lb)     Doing much better since he started torsemide.  Follow-up with cardiologist and cardiothoracic surgeon for mitral valve replacement.

## 2024-09-25 ENCOUNTER — OFFICE VISIT (OUTPATIENT)
Dept: CARDIAC SURGERY | Facility: CLINIC | Age: 69
End: 2024-09-25
Payer: COMMERCIAL

## 2024-09-25 VITALS
BODY MASS INDEX: 30.73 KG/M2 | SYSTOLIC BLOOD PRESSURE: 149 MMHG | HEART RATE: 79 BPM | OXYGEN SATURATION: 97 % | HEIGHT: 62 IN | DIASTOLIC BLOOD PRESSURE: 85 MMHG | WEIGHT: 167 LBS

## 2024-09-25 DIAGNOSIS — I34.0 NONRHEUMATIC MITRAL VALVE REGURGITATION: Primary | ICD-10-CM

## 2024-09-25 DIAGNOSIS — Z01.810 PRE-OPERATIVE CARDIOVASCULAR EXAMINATION: ICD-10-CM

## 2024-09-25 DIAGNOSIS — F41.9 ANXIETY: ICD-10-CM

## 2024-09-25 DIAGNOSIS — Z13.220 SCREENING FOR LIPOID DISORDERS: ICD-10-CM

## 2024-09-25 DIAGNOSIS — Z13.1 ENCOUNTER FOR SCREENING FOR DIABETES MELLITUS: ICD-10-CM

## 2024-09-25 DIAGNOSIS — Z01.812 ENCOUNTER FOR PRE-OPERATIVE LABORATORY TESTING: ICD-10-CM

## 2024-09-25 PROCEDURE — 99214 OFFICE O/P EST MOD 30 MIN: CPT | Performed by: THORACIC SURGERY (CARDIOTHORACIC VASCULAR SURGERY)

## 2024-09-25 RX ORDER — MUPIROCIN 20 MG/G
OINTMENT TOPICAL 2 TIMES DAILY
Qty: 22 G | Refills: 0 | Status: SHIPPED | OUTPATIENT
Start: 2024-09-25

## 2024-09-25 RX ORDER — MUPIROCIN 20 MG/G
1 OINTMENT TOPICAL ONCE
OUTPATIENT
Start: 2024-09-25 | End: 2024-09-25

## 2024-09-25 RX ORDER — CEFAZOLIN SODIUM 2 G/50ML
2000 SOLUTION INTRAVENOUS ONCE
OUTPATIENT
Start: 2024-09-25 | End: 2024-09-25

## 2024-09-25 RX ORDER — CHLORHEXIDINE GLUCONATE ORAL RINSE 1.2 MG/ML
15 SOLUTION DENTAL ONCE
OUTPATIENT
Start: 2024-09-25 | End: 2024-09-25

## 2024-09-25 RX ORDER — TRAZODONE HYDROCHLORIDE 100 MG/1
200 TABLET ORAL
Qty: 180 TABLET | Refills: 1 | Status: SHIPPED | OUTPATIENT
Start: 2024-09-25

## 2024-09-25 NOTE — PROGRESS NOTES
"Follow Up - Cardiac Surgery   Reuben García 69 y.o. male MRN: [id number]    Reason for Consult / Principal Problem: Mitral regurgitation    History of Present Illness: Reuben García is a 69 y.o. year old male who returns today after his initial consultation in August for mitral valve regurgitation. He underwent preoperative testing, including carotid artery ultrasound, CT chest, cardiac catheterization and 3D THOR. He now returns to review testing and schedule surgery. Of note, patient had a 6 day hospital admission at [facility] from 9/9 through 9/15 for CHF exacerbation and he was given aggressive IV diuresis with net negative balance of 18 L upon discharge. He has since followed up with his PCP, and he reports that he is feeling better.     In review, Reuben García is a 68 y.o. year old male with PMHx MR, CAD/NSTEMI in August 2022 - s/p PCI/ESTHER x2 to RCA, HTN, HLD, obesity (BMI 31), arthritis, GERD, migraines, mild asthma. Surgical history notable for gastric bypass surgery. He had followed briefly with cardiology (Dr. Feng) after his NSTEMI, but was unsatisfied with his visit, so he did not follow up after that. For several weeks now, patient has been having progressive shortness of breath and MCCRARY. He has asthma, and was trying to use his inhaler but did not have symptom relief, so he called his PCP who directed him to be seen in the office. His PCP heard a heart murmur and ordered an echo. Patient previously has mild to moderate MR at the time of his NSTEMI, and his echo on 8/8 demonstrated now severe MR with severely dilated LA. He was referred to cardiac surgery at this point.      Patient states that he has had to sleep with his bed adjusted to a seated position due to his shortness of breath. He states that he takes a sleeping pill and is able to sleep for about 4 hours, but then he has to get up to sit on the couch due to shortness of breath. He also notes a \"gurgling\" sensation when he gets woken up at " night. He states that with going into his garden to pick tomatoes or picking up a large container of cat litter, he gets short of breath and needs a few minutes to recover. 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 He is accompanied by his wife today. They do not live together. He lives with his ex daughter-in-law and her boyfriend. Boyfriend helps patient with mowing and house work. Family history notable for SCD in his sister at age 69. Patient does not use tobacco products. He does not use recreational drugs. He does admit to drinking about 30 beers a week on average, and has no desire to cut back or quit.      He sees a dentist regularly, and we have obtained dental clearance.         Past Medical History:  Past Medical History:   Diagnosis Date    Allergic     Arthritis     Clotting disorder (HCC)     Coronary artery disease     Depression     Diabetes mellitus (HCC)     Headache(784.0)     HL (hearing loss)     Hypertension     Myocardial infarction (HCC) 8/23/22    Obesity     Visual impairment          Past Surgical History:   Past Surgical History:   Procedure Laterality Date    CARDIAC CATHETERIZATION Left 8/25/2022    Procedure: Cardiac catheterization;  Surgeon: Luann Feng MD;  Location: [facility];  Service: Cardiology    CARDIAC CATHETERIZATION N/A 8/25/2022    Procedure: Cardiac Coronary Angiogram;  Surgeon: Luann Feng MD;  Location: [facility];  Service: Cardiology    CARDIAC CATHETERIZATION N/A 9/19/2024    Procedure: Cardiac RHC/LHC;  Surgeon: Javier Ocasio MD;  Location:  CARDIAC CATH LAB;  Service: Cardiology    SEPTOPLASTY           Family History:  Family History   Problem Relation Age of Onset    Aneurysm Mother     Coronary artery disease Father     Cancer Other     Stroke Other         CVA    Hypertension Sister          Social History:      Social History     Substance and Sexual Activity   Alcohol Use  Yes    Alcohol/week: 30.0 standard drinks of alcohol    Types: 30 Cans of beer per week    Comment: patient sasys he is cutting down  to 1-2 packs of beer     Social History     Substance and Sexual Activity   Drug Use No     Social History     Tobacco Use   Smoking Status Never   Smokeless Tobacco Never   Tobacco Comments    never a smoker ( as per allscripts)       Home Medications:   Prior to Admission medications    Medication Sig Start Date End Date Taking? Authorizing Provider   amLODIPine (NORVASC) 10 mg tablet TAKE 1 TABLET BY MOUTH EVERY DAY 4/2/21  Yes Rich Delgado DO   ARIPiprazole (ABILIFY) 2 mg tablet TAKE 1 TABLET BY MOUTH EVERY DAY 6/11/24  Yes Rich Delgado DO   Aspirin Low Dose 81 MG chewable tablet CHEW 1 TABLET BY MOUTH DAILY 10/3/22  Yes Rihc Delgado DO   atorvastatin (LIPITOR) 80 mg tablet TAKE 1 TABLET BY MOUTH EVERY DAY IN THE EVENING 4/1/24  Yes Rich Delgado DO   clopidogrel (PLAVIX) 75 mg tablet TAKE 1 TABLET BY MOUTH EVERY DAY 8/14/24  Yes Rich Delgado DO   doxazosin (CARDURA) 2 mg tablet TAKE 1 TABLET BY MOUTH EVERY DAY 8/14/24  Yes Rich Delgado DO   metoprolol succinate (TOPROL-XL) 25 mg 24 hr tablet TAKE 1/2 TABLET BY MOUTH DAILY 4/3/24  Yes Rich Delgado DO   mometasone (ELOCON) 0.1 % cream Apply topically daily 8/28/23  Yes Rich Delgado DO   Multiple Vitamin (MULTIVITAMIN PO) Take 1 tablet by mouth   Yes Historical Provider, MD   nitroglycerin (NITROSTAT) 0.4 mg SL tablet Place 1 tablet (0.4 mg total) under the tongue every 5 (five) minutes as needed for chest pain 9/9/22  Yes Saroj Cunningham PA-C   SUMAtriptan (IMITREX) 50 mg tablet TAKE 1 TABLET (50 MG TOTAL) BY MOUTH EVERY 2 (TWO) HOURS AS NEEDED FOR MIGRAINE  MG/DAY 8/22/24  Yes Rich Delgado DO   torsemide (DEMADEX) 20 mg tablet Take 1 tablet (20 mg total) by mouth 2 (two) times a day 9/15/24  Yes Nino Niño DO   traZODone (DESYREL) 100 mg tablet Take 2 tablets (200 mg total) by mouth daily at bedtime 8/30/24  " Yes Rich Delgado DO   valsartan (DIOVAN) 320 MG tablet TAKE 1 TABLET BY MOUTH EVERY DAY 8/14/24  Yes Rich Delgado DO   venlafaxine (EFFEXOR-XR) 75 mg 24 hr capsule TAKE 3 CAPSULES (225 MG TOTAL) BY MOUTH DAILY WITH BREAKFAST 5/17/24  Yes Rich Delgado DO   Ventolin  (90 Base) MCG/ACT inhaler Inhale 2 puffs 3 (three) times a day as needed for wheezing 5/25/24  Yes Rich Delgado DO   vitamin B-12 (VITAMIN B-12) 500 mcg tablet Take 500 mcg by mouth daily   Yes Historical Provider, MD   Azelastine HCl 137 MCG/SPRAY SOLN 1 SPRAY INTO EACH NOSTRIL 2 (TWO) TIMES A DAY USE IN EACH NOSTRIL AS DIRECTED  Patient not taking: Reported on 9/25/2024 4/20/22   Rich Delgado DO       Allergies:  No Known Allergies    Review of Systems:     Review of Systems   Constitutional:  Positive for activity change and fatigue. Negative for chills and fever.   HENT:  Negative for ear pain and sore throat.    Eyes:  Negative for pain and visual disturbance.   Respiratory:  Positive for shortness of breath. Negative for cough.    Cardiovascular:  Negative for chest pain and palpitations.   Gastrointestinal:  Negative for abdominal pain and vomiting.   Genitourinary:  Negative for dysuria and hematuria.   Musculoskeletal:  Negative for arthralgias and back pain.   Skin:  Negative for color change and rash.   Neurological:  Negative for seizures and syncope.   Psychiatric/Behavioral:  Positive for sleep disturbance.    All other systems reviewed and are negative.      Vital Signs:     Vitals:    09/25/24 1349 09/25/24 1354   BP: 153/80 149/85   BP Location: Left arm Right arm   Patient Position: Sitting Sitting   Cuff Size: Standard Standard   Pulse: 79    SpO2: 97%    Weight: 75.8 kg (167 lb)    Height: 5' 2\" (1.575 m)        Physical Exam:     Physical Exam  Vitals reviewed.   Constitutional:       Appearance: Normal appearance.   HENT:      Head: Normocephalic and atraumatic.   Eyes:      Pupils: Pupils are equal, round, and reactive " "to light.   Neck:      Vascular: No carotid bruit or JVD.   Cardiovascular:      Rate and Rhythm: Normal rate and regular rhythm.      Pulses:           Carotid pulses are 2+ on the right side and 2+ on the left side.       Radial pulses are 2+ on the right side and 2+ on the left side.        Dorsalis pedis pulses are 2+ on the right side and 2+ on the left side.      Heart sounds: Murmur heard.      Systolic murmur is present with a grade of 3/6.      No friction rub. No gallop.   Pulmonary:      Effort: Pulmonary effort is normal.      Breath sounds: Normal breath sounds. No wheezing, rhonchi or rales.   Abdominal:      Palpations: Abdomen is soft.      Tenderness: There is no abdominal tenderness.   Musculoskeletal:      Cervical back: Normal range of motion.      Right lower leg: No edema.      Left lower leg: No edema.   Skin:     General: Skin is warm and dry.      Capillary Refill: Capillary refill takes less than 2 seconds.   Neurological:      General: No focal deficit present.      Mental Status: He is alert.      Sensory: Sensation is intact.   Psychiatric:         Attention and Perception: Attention normal.         Mood and Affect: Mood normal.         Behavior: Behavior normal. Behavior is cooperative.         Lab Results:               Invalid input(s): \"LABGLOM\"      Lab Results   Component Value Date    HGBA1C 4.6 02/14/2023     No results found for: \"CKTOTAL\", \"CKMB\", \"CKMBINDEX\", \"TROPONINI\"    Imaging Studies:     Cardiac Catheterization: 9/19/24  Left Main   The vessel is large and is angiographically normal.      Left Anterior Descending   The vessel is large. The vessel exhibits minimal luminal irregularities. There is mid myocardial bridging present.      Right Coronary Artery   The vessel is large and dominant. The vessel exhibits minimal luminal irregularities.   Previously placed Prox RCA to Mid RCA stent of unknown type is widely patent.          THOR: 9/19/24  Left Ventricle Left " ventricular cavity size is normal. Wall thickness is mildly increased. There is mild concentric hypertrophy. The left ventricular ejection fraction is 55%. Systolic function is normal. Unable to assess diastolic function.   Wall Scoring Baseline     The following segments are hypokinetic: basal inferoseptal, basal inferior and mid inferior.  All other segments are normal.            Right Ventricle Right ventricular cavity size is mildly dilated. Systolic function is normal.   Left Atrium The atrium is severely dilated. There is no thrombus.   Right Atrium The atrium is dilated.   Atrial Septum No patent foramen ovale detected, confirmed at rest using color doppler.   Left Atrial Appendage Left atrial appendage is normal in size. There is normal function. There is no thrombus.   Aortic Valve The aortic valve is trileaflet. The leaflets are mildly thickened. The leaflets are mildly calcified. The leaflets exhibit normal mobility. There is mild regurgitation. There is aortic valve sclerosis.   Mitral Valve There is moderate diffuse thickening. There is moderate calcification. The lateral segment (A1) of the anterior leaflet is flail. There is a ruptured chord.  There is severe regurgitation with a posteriorly directed wall-impinging jet. There is no evidence of stenosis.   Tricuspid Valve Tricuspid valve structure is normal. There is no evidence of regurgitation. There is no evidence of stenosis.   Pulmonic Valve Pulmonic valve structure is normal. There is trace regurgitation. There is no evidence of stenosis.   Ascending Aorta The aortic root is normal in size. There is no atheroma.   Pericardium There is no pericardial effusion. The pericardium is normal in appearance.   Pulmonary Veins There is systolic flow reversal in the right upper and right lower veins.     Carotid Artery Ultrasound: 8/27/24  RIGHT:  There is <50% stenosis noted in the internal carotid artery. Plaque is  heterogenous and irregular.  Vertebral  artery flow is antegrade. There is no significant subclavian artery  disease.     LEFT:  There is <50% stenosis noted in the internal carotid artery. Plaque is  heterogenous and irregular.  Vertebral artery flow is antegrade. There is no significant subclavian artery  disease.    CT Chest: 8/21/24  IMPRESSION:  1. Pulmonary nodules measuring up to 8 mm. Based on current Fleischner Society 2017 Guidelines on incidental pulmonary nodule, follow-up noncontrast CT is recommended at 3-6 months from the initial examination to confirm persistence; if stable at that   time (solid component remains <6mm), additional follow-up CT is recommended annually until 5 years of stability is demonstrated.  2. Ascending thoracic aortic ectasia measuring 43 mm.  3. Mild bilateral pleural effusions with trace perihepatic ascites.    Personally reviewed the following image studies in PACS and associated radiology reports: CT chest, Ultrasound(s), and Echocardiogram. My interpretation of the radiology images/reports is: as above.    Assessment:  Patient Active Problem List    Diagnosis Date Noted    Hypokalemia 09/11/2024    Mixed hyperlipidemia 09/11/2024    Acute on chronic heart failure with preserved ejection fraction (HFpEF) ([facility]) 09/10/2024    Recurrent major depressive disorder, in remission ([facility]) 09/10/2024    Nonrheumatic mitral valve regurgitation 09/04/2024    Platelets decreased ([facility]) 02/27/2023    Coronary artery disease involving native coronary artery of native heart without angina pectoris     Mild intermittent asthma without complication 09/13/2021    BMI 29.0-29.9,adult 10/11/2019    Insomnia 02/25/2015    Essential hypertension 12/11/2014    Gastroesophageal reflux disease without esophagitis 12/11/2014    Anxiety 12/01/2014    Migraine headache 11/14/2014     Severe mitral regurgitation; Ongoing MVR workup    Plan:  Risks and benefits of mitral valve repair vs replacement were discussed in detail today with the patient.   They understand and wish to proceed with further workup and ultimately surgical intervention.  We have ordered routine preoperative laboratory and vascular studies.  Pending the results of these tests, they will be scheduled for surgery on October 8, 2024 with Dr. Manolo Sanford D.O.    Reuben García was comfortable with our recommendations, and their questions were answered to their satisfaction.  Thank you for allowing us to participate in the care of this patient.     Patient had a negative Cologuard in September 2021.     SIGNATURE: Priscilla Rivera PA-C  DATE: 09/25/24  TIME: 2:23 PM    * This note was completed in part utilizing [employer] direct voice recognition software.   Grammatical errors, random word insertion, spelling mistakes, and incomplete sentences may be an occasional consequence of the system secondary to software limitations, ambient noise and hardware issues. At the time of dictation, efforts were made to edit, clarify and /or correct errors. Please read the chart carefully and recognize, using context, where substitutions have occurred.  If you have any questions or concerns about the context, text or information contained within the body of this dictation, please contact myself, the provider, for further clarification.

## 2024-09-25 NOTE — H&P (VIEW-ONLY)
"Follow Up - Cardiac Surgery   Reuben García 69 y.o. male MRN: [id number]    Reason for Consult / Principal Problem: Mitral regurgitation    History of Present Illness: Reuben García is a 69 y.o. year old male who returns today after his initial consultation in August for mitral valve regurgitation. He underwent preoperative testing, including carotid artery ultrasound, CT chest, cardiac catheterization and 3D THOR. He now returns to review testing and schedule surgery. Of note, patient had a 6 day hospital admission at [facility] from 9/9 through 9/15 for CHF exacerbation and he was given aggressive IV diuresis with net negative balance of 18 L upon discharge. He has since followed up with his PCP, and he reports that he is feeling better.     In review, Reuben García is a 68 y.o. year old male with PMHx MR, CAD/NSTEMI in August 2022 - s/p PCI/ESTHER x2 to RCA, HTN, HLD, obesity (BMI 31), arthritis, GERD, migraines, mild asthma. Surgical history notable for gastric bypass surgery. He had followed briefly with cardiology (Dr. Feng) after his NSTEMI, but was unsatisfied with his visit, so he did not follow up after that. For several weeks now, patient has been having progressive shortness of breath and MCCRARY. He has asthma, and was trying to use his inhaler but did not have symptom relief, so he called his PCP who directed him to be seen in the office. His PCP heard a heart murmur and ordered an echo. Patient previously has mild to moderate MR at the time of his NSTEMI, and his echo on 8/8 demonstrated now severe MR with severely dilated LA. He was referred to cardiac surgery at this point.      Patient states that he has had to sleep with his bed adjusted to a seated position due to his shortness of breath. He states that he takes a sleeping pill and is able to sleep for about 4 hours, but then he has to get up to sit on the couch due to shortness of breath. He also notes a \"gurgling\" sensation when he gets woken up at " night. He states that with going into his garden to pick tomatoes or picking up a large container of cat litter, he gets short of breath and needs a few minutes to recover. 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 He is accompanied by his wife today. They do not live together. He lives with his ex daughter-in-law and her boyfriend. Boyfriend helps patient with mowing and house work. Family history notable for SCD in his sister at age 69. Patient does not use tobacco products. He does not use recreational drugs. He does admit to drinking about 30 beers a week on average, and has no desire to cut back or quit.      He sees a dentist regularly, and we have obtained dental clearance.         Past Medical History:  Past Medical History:   Diagnosis Date    Allergic     Arthritis     Clotting disorder (HCC)     Coronary artery disease     Depression     Diabetes mellitus (HCC)     Headache(784.0)     HL (hearing loss)     Hypertension     Myocardial infarction (HCC) 8/23/22    Obesity     Visual impairment          Past Surgical History:   Past Surgical History:   Procedure Laterality Date    CARDIAC CATHETERIZATION Left 8/25/2022    Procedure: Cardiac catheterization;  Surgeon: Luann Feng MD;  Location: [facility];  Service: Cardiology    CARDIAC CATHETERIZATION N/A 8/25/2022    Procedure: Cardiac Coronary Angiogram;  Surgeon: Luann Feng MD;  Location: [facility];  Service: Cardiology    CARDIAC CATHETERIZATION N/A 9/19/2024    Procedure: Cardiac RHC/LHC;  Surgeon: aJvier Ocasio MD;  Location:  CARDIAC CATH LAB;  Service: Cardiology    SEPTOPLASTY           Family History:  Family History   Problem Relation Age of Onset    Aneurysm Mother     Coronary artery disease Father     Cancer Other     Stroke Other         CVA    Hypertension Sister          Social History:      Social History     Substance and Sexual Activity   Alcohol Use  Yes    Alcohol/week: 30.0 standard drinks of alcohol    Types: 30 Cans of beer per week    Comment: patient sasys he is cutting down  to 1-2 packs of beer     Social History     Substance and Sexual Activity   Drug Use No     Social History     Tobacco Use   Smoking Status Never   Smokeless Tobacco Never   Tobacco Comments    never a smoker ( as per allscripts)       Home Medications:   Prior to Admission medications    Medication Sig Start Date End Date Taking? Authorizing Provider   amLODIPine (NORVASC) 10 mg tablet TAKE 1 TABLET BY MOUTH EVERY DAY 4/2/21  Yes Rich Delgado DO   ARIPiprazole (ABILIFY) 2 mg tablet TAKE 1 TABLET BY MOUTH EVERY DAY 6/11/24  Yes Rich Delgado DO   Aspirin Low Dose 81 MG chewable tablet CHEW 1 TABLET BY MOUTH DAILY 10/3/22  Yes Rich Delgado DO   atorvastatin (LIPITOR) 80 mg tablet TAKE 1 TABLET BY MOUTH EVERY DAY IN THE EVENING 4/1/24  Yes Rich Delgado DO   clopidogrel (PLAVIX) 75 mg tablet TAKE 1 TABLET BY MOUTH EVERY DAY 8/14/24  Yes Rich Delgado DO   doxazosin (CARDURA) 2 mg tablet TAKE 1 TABLET BY MOUTH EVERY DAY 8/14/24  Yes Rich Delgado DO   metoprolol succinate (TOPROL-XL) 25 mg 24 hr tablet TAKE 1/2 TABLET BY MOUTH DAILY 4/3/24  Yes Rich Delgado DO   mometasone (ELOCON) 0.1 % cream Apply topically daily 8/28/23  Yes Rich Delgado DO   Multiple Vitamin (MULTIVITAMIN PO) Take 1 tablet by mouth   Yes Historical Provider, MD   nitroglycerin (NITROSTAT) 0.4 mg SL tablet Place 1 tablet (0.4 mg total) under the tongue every 5 (five) minutes as needed for chest pain 9/9/22  Yes Saroj Cunningham PA-C   SUMAtriptan (IMITREX) 50 mg tablet TAKE 1 TABLET (50 MG TOTAL) BY MOUTH EVERY 2 (TWO) HOURS AS NEEDED FOR MIGRAINE  MG/DAY 8/22/24  Yes Rich Delgado DO   torsemide (DEMADEX) 20 mg tablet Take 1 tablet (20 mg total) by mouth 2 (two) times a day 9/15/24  Yes Nino Niño DO   traZODone (DESYREL) 100 mg tablet Take 2 tablets (200 mg total) by mouth daily at bedtime 8/30/24  " Yes Rich Delgado DO   valsartan (DIOVAN) 320 MG tablet TAKE 1 TABLET BY MOUTH EVERY DAY 8/14/24  Yes Rich Delgado DO   venlafaxine (EFFEXOR-XR) 75 mg 24 hr capsule TAKE 3 CAPSULES (225 MG TOTAL) BY MOUTH DAILY WITH BREAKFAST 5/17/24  Yes Rich Delgado DO   Ventolin  (90 Base) MCG/ACT inhaler Inhale 2 puffs 3 (three) times a day as needed for wheezing 5/25/24  Yes Rich Delgado DO   vitamin B-12 (VITAMIN B-12) 500 mcg tablet Take 500 mcg by mouth daily   Yes Historical Provider, MD   Azelastine HCl 137 MCG/SPRAY SOLN 1 SPRAY INTO EACH NOSTRIL 2 (TWO) TIMES A DAY USE IN EACH NOSTRIL AS DIRECTED  Patient not taking: Reported on 9/25/2024 4/20/22   Rich Delgado DO       Allergies:  No Known Allergies    Review of Systems:     Review of Systems   Constitutional:  Positive for activity change and fatigue. Negative for chills and fever.   HENT:  Negative for ear pain and sore throat.    Eyes:  Negative for pain and visual disturbance.   Respiratory:  Positive for shortness of breath. Negative for cough.    Cardiovascular:  Negative for chest pain and palpitations.   Gastrointestinal:  Negative for abdominal pain and vomiting.   Genitourinary:  Negative for dysuria and hematuria.   Musculoskeletal:  Negative for arthralgias and back pain.   Skin:  Negative for color change and rash.   Neurological:  Negative for seizures and syncope.   Psychiatric/Behavioral:  Positive for sleep disturbance.    All other systems reviewed and are negative.      Vital Signs:     Vitals:    09/25/24 1349 09/25/24 1354   BP: 153/80 149/85   BP Location: Left arm Right arm   Patient Position: Sitting Sitting   Cuff Size: Standard Standard   Pulse: 79    SpO2: 97%    Weight: 75.8 kg (167 lb)    Height: 5' 2\" (1.575 m)        Physical Exam:     Physical Exam  Vitals reviewed.   Constitutional:       Appearance: Normal appearance.   HENT:      Head: Normocephalic and atraumatic.   Eyes:      Pupils: Pupils are equal, round, and reactive " "to light.   Neck:      Vascular: No carotid bruit or JVD.   Cardiovascular:      Rate and Rhythm: Normal rate and regular rhythm.      Pulses:           Carotid pulses are 2+ on the right side and 2+ on the left side.       Radial pulses are 2+ on the right side and 2+ on the left side.        Dorsalis pedis pulses are 2+ on the right side and 2+ on the left side.      Heart sounds: Murmur heard.      Systolic murmur is present with a grade of 3/6.      No friction rub. No gallop.   Pulmonary:      Effort: Pulmonary effort is normal.      Breath sounds: Normal breath sounds. No wheezing, rhonchi or rales.   Abdominal:      Palpations: Abdomen is soft.      Tenderness: There is no abdominal tenderness.   Musculoskeletal:      Cervical back: Normal range of motion.      Right lower leg: No edema.      Left lower leg: No edema.   Skin:     General: Skin is warm and dry.      Capillary Refill: Capillary refill takes less than 2 seconds.   Neurological:      General: No focal deficit present.      Mental Status: He is alert.      Sensory: Sensation is intact.   Psychiatric:         Attention and Perception: Attention normal.         Mood and Affect: Mood normal.         Behavior: Behavior normal. Behavior is cooperative.         Lab Results:               Invalid input(s): \"LABGLOM\"      Lab Results   Component Value Date    HGBA1C 4.6 02/14/2023     No results found for: \"CKTOTAL\", \"CKMB\", \"CKMBINDEX\", \"TROPONINI\"    Imaging Studies:     Cardiac Catheterization: 9/19/24  Left Main   The vessel is large and is angiographically normal.      Left Anterior Descending   The vessel is large. The vessel exhibits minimal luminal irregularities. There is mid myocardial bridging present.      Right Coronary Artery   The vessel is large and dominant. The vessel exhibits minimal luminal irregularities.   Previously placed Prox RCA to Mid RCA stent of unknown type is widely patent.          THOR: 9/19/24  Left Ventricle Left " ventricular cavity size is normal. Wall thickness is mildly increased. There is mild concentric hypertrophy. The left ventricular ejection fraction is 55%. Systolic function is normal. Unable to assess diastolic function.   Wall Scoring Baseline     The following segments are hypokinetic: basal inferoseptal, basal inferior and mid inferior.  All other segments are normal.            Right Ventricle Right ventricular cavity size is mildly dilated. Systolic function is normal.   Left Atrium The atrium is severely dilated. There is no thrombus.   Right Atrium The atrium is dilated.   Atrial Septum No patent foramen ovale detected, confirmed at rest using color doppler.   Left Atrial Appendage Left atrial appendage is normal in size. There is normal function. There is no thrombus.   Aortic Valve The aortic valve is trileaflet. The leaflets are mildly thickened. The leaflets are mildly calcified. The leaflets exhibit normal mobility. There is mild regurgitation. There is aortic valve sclerosis.   Mitral Valve There is moderate diffuse thickening. There is moderate calcification. The lateral segment (A1) of the anterior leaflet is flail. There is a ruptured chord.  There is severe regurgitation with a posteriorly directed wall-impinging jet. There is no evidence of stenosis.   Tricuspid Valve Tricuspid valve structure is normal. There is no evidence of regurgitation. There is no evidence of stenosis.   Pulmonic Valve Pulmonic valve structure is normal. There is trace regurgitation. There is no evidence of stenosis.   Ascending Aorta The aortic root is normal in size. There is no atheroma.   Pericardium There is no pericardial effusion. The pericardium is normal in appearance.   Pulmonary Veins There is systolic flow reversal in the right upper and right lower veins.     Carotid Artery Ultrasound: 8/27/24  RIGHT:  There is <50% stenosis noted in the internal carotid artery. Plaque is  heterogenous and irregular.  Vertebral  artery flow is antegrade. There is no significant subclavian artery  disease.     LEFT:  There is <50% stenosis noted in the internal carotid artery. Plaque is  heterogenous and irregular.  Vertebral artery flow is antegrade. There is no significant subclavian artery  disease.    CT Chest: 8/21/24  IMPRESSION:  1. Pulmonary nodules measuring up to 8 mm. Based on current Fleischner Society 2017 Guidelines on incidental pulmonary nodule, follow-up noncontrast CT is recommended at 3-6 months from the initial examination to confirm persistence; if stable at that   time (solid component remains <6mm), additional follow-up CT is recommended annually until 5 years of stability is demonstrated.  2. Ascending thoracic aortic ectasia measuring 43 mm.  3. Mild bilateral pleural effusions with trace perihepatic ascites.    Personally reviewed the following image studies in PACS and associated radiology reports: CT chest, Ultrasound(s), and Echocardiogram. My interpretation of the radiology images/reports is: as above.    Assessment:  Patient Active Problem List    Diagnosis Date Noted    Hypokalemia 09/11/2024    Mixed hyperlipidemia 09/11/2024    Acute on chronic heart failure with preserved ejection fraction (HFpEF) ([facility]) 09/10/2024    Recurrent major depressive disorder, in remission ([facility]) 09/10/2024    Nonrheumatic mitral valve regurgitation 09/04/2024    Platelets decreased ([facility]) 02/27/2023    Coronary artery disease involving native coronary artery of native heart without angina pectoris     Mild intermittent asthma without complication 09/13/2021    BMI 29.0-29.9,adult 10/11/2019    Insomnia 02/25/2015    Essential hypertension 12/11/2014    Gastroesophageal reflux disease without esophagitis 12/11/2014    Anxiety 12/01/2014    Migraine headache 11/14/2014     Severe mitral regurgitation; Ongoing MVR workup    Plan:  Risks and benefits of mitral valve repair vs replacement were discussed in detail today with the patient.   They understand and wish to proceed with further workup and ultimately surgical intervention.  We have ordered routine preoperative laboratory and vascular studies.  Pending the results of these tests, they will be scheduled for surgery on October 8, 2024 with Dr. Manolo Sanford D.O.    Reuben García was comfortable with our recommendations, and their questions were answered to their satisfaction.  Thank you for allowing us to participate in the care of this patient.     Patient had a negative Cologuard in September 2021.     SIGNATURE: Priscilla Rivera PA-C  DATE: 09/25/24  TIME: 2:23 PM    * This note was completed in part utilizing saambaa direct voice recognition software.   Grammatical errors, random word insertion, spelling mistakes, and incomplete sentences may be an occasional consequence of the system secondary to software limitations, ambient noise and hardware issues. At the time of dictation, efforts were made to edit, clarify and /or correct errors. Please read the chart carefully and recognize, using context, where substitutions have occurred.  If you have any questions or concerns about the context, text or information contained within the body of this dictation, please contact myself, the provider, for further clarification.

## 2024-09-25 NOTE — H&P
"Preop History and Physical - Cardiac Surgery   Reuben García 69 y.o. male MRN: [id number]    Reason for Consult / Principal Problem: Mitral regurgitation    History of Present Illness: Reuben García is a 69 y.o. year old male who returns today after his initial consultation in August for mitral valve regurgitation. He underwent preoperative testing, including carotid artery ultrasound, CT chest, cardiac catheterization and 3D THOR. He now returns to review testing and schedule surgery. Of note, patient had a 6 day hospital admission at [facility] from 9/9 through 9/15 for CHF exacerbation and he was given aggressive IV diuresis with net negative balance of 18 L upon discharge. He has since followed up with his PCP, and he reports that he is feeling better.     In review, Reuben García is a 68 y.o. year old male with PMHx MR, CAD/NSTEMI in August 2022 - s/p PCI/ESTHER x2 to RCA, HTN, HLD, obesity (BMI 31), arthritis, GERD, migraines, mild asthma. Surgical history notable for gastric bypass surgery. He had followed briefly with cardiology (Dr. Feng) after his NSTEMI, but was unsatisfied with his visit, so he did not follow up after that. For several weeks now, patient has been having progressive shortness of breath and MCCRARY. He has asthma, and was trying to use his inhaler but did not have symptom relief, so he called his PCP who directed him to be seen in the office. His PCP heard a heart murmur and ordered an echo. Patient previously has mild to moderate MR at the time of his NSTEMI, and his echo on 8/8 demonstrated now severe MR with severely dilated LA. He was referred to cardiac surgery at this point.      Patient states that he has had to sleep with his bed adjusted to a seated position due to his shortness of breath. He states that he takes a sleeping pill and is able to sleep for about 4 hours, but then he has to get up to sit on the couch due to shortness of breath. He also notes a \"gurgling\" sensation when he " gets woken up at night. He states that with going into his garden to pick tomatoes or picking up a large container of cat litter, he gets short of breath and needs a few minutes to recover. 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 He is accompanied by his wife today. They do not live together. He lives with his ex daughter-in-law and her boyfriend. Boyfriend helps patient with mowing and house work. Family history notable for SCD in his sister at age 69. Patient does not use tobacco products. He does not use recreational drugs. He does admit to drinking about 30 beers a week on average, and has no desire to cut back or quit.      He sees a dentist regularly, and we have obtained dental clearance.         Past Medical History:  Past Medical History:   Diagnosis Date    Allergic     Arthritis     Clotting disorder (HCC)     Coronary artery disease     Depression     Diabetes mellitus (HCC)     Headache(784.0)     HL (hearing loss)     Hypertension     Myocardial infarction (HCC) 8/23/22    Obesity     Visual impairment          Past Surgical History:   Past Surgical History:   Procedure Laterality Date    CARDIAC CATHETERIZATION Left 8/25/2022    Procedure: Cardiac catheterization;  Surgeon: Luann Feng MD;  Location: [facility];  Service: Cardiology    CARDIAC CATHETERIZATION N/A 8/25/2022    Procedure: Cardiac Coronary Angiogram;  Surgeon: Luann Feng MD;  Location: [facility];  Service: Cardiology    CARDIAC CATHETERIZATION N/A 9/19/2024    Procedure: Cardiac RHC/LHC;  Surgeon: Javier Ocasio MD;  Location:  CARDIAC CATH LAB;  Service: Cardiology    SEPTOPLASTY           Family History:  Family History   Problem Relation Age of Onset    Aneurysm Mother     Coronary artery disease Father     Cancer Other     Stroke Other         CVA    Hypertension Sister          Social History:      Social History     Substance and Sexual Activity    Alcohol Use Yes    Alcohol/week: 30.0 standard drinks of alcohol    Types: 30 Cans of beer per week    Comment: patient sasys he is cutting down  to 1-2 packs of beer     Social History     Substance and Sexual Activity   Drug Use No     Social History     Tobacco Use   Smoking Status Never   Smokeless Tobacco Never   Tobacco Comments    never a smoker ( as per allscripts)       Home Medications:   Prior to Admission medications    Medication Sig Start Date End Date Taking? Authorizing Provider   amLODIPine (NORVASC) 10 mg tablet TAKE 1 TABLET BY MOUTH EVERY DAY 4/2/21  Yes Rich Delgado DO   ARIPiprazole (ABILIFY) 2 mg tablet TAKE 1 TABLET BY MOUTH EVERY DAY 6/11/24  Yes Rich Delgado DO   Aspirin Low Dose 81 MG chewable tablet CHEW 1 TABLET BY MOUTH DAILY 10/3/22  Yes Rich Delgado DO   atorvastatin (LIPITOR) 80 mg tablet TAKE 1 TABLET BY MOUTH EVERY DAY IN THE EVENING 4/1/24  Yes Rich Delgado DO   clopidogrel (PLAVIX) 75 mg tablet TAKE 1 TABLET BY MOUTH EVERY DAY 8/14/24  Yes Rich Delgado DO   doxazosin (CARDURA) 2 mg tablet TAKE 1 TABLET BY MOUTH EVERY DAY 8/14/24  Yes Rich Delgado DO   metoprolol succinate (TOPROL-XL) 25 mg 24 hr tablet TAKE 1/2 TABLET BY MOUTH DAILY 4/3/24  Yes Rich Delgado DO   mometasone (ELOCON) 0.1 % cream Apply topically daily 8/28/23  Yes Rich Delgado DO   Multiple Vitamin (MULTIVITAMIN PO) Take 1 tablet by mouth   Yes Historical Provider, MD   nitroglycerin (NITROSTAT) 0.4 mg SL tablet Place 1 tablet (0.4 mg total) under the tongue every 5 (five) minutes as needed for chest pain 9/9/22  Yes Saroj Cunningham PA-C   SUMAtriptan (IMITREX) 50 mg tablet TAKE 1 TABLET (50 MG TOTAL) BY MOUTH EVERY 2 (TWO) HOURS AS NEEDED FOR MIGRAINE  MG/DAY 8/22/24  Yes Rich Delgado DO   torsemide (DEMADEX) 20 mg tablet Take 1 tablet (20 mg total) by mouth 2 (two) times a day 9/15/24  Yes Nino Niño DO   traZODone (DESYREL) 100 mg tablet Take 2 tablets (200 mg total) by mouth daily at  "bedtime 8/30/24  Yes Rich Delgado DO   valsartan (DIOVAN) 320 MG tablet TAKE 1 TABLET BY MOUTH EVERY DAY 8/14/24  Yes Rich Delgado DO   venlafaxine (EFFEXOR-XR) 75 mg 24 hr capsule TAKE 3 CAPSULES (225 MG TOTAL) BY MOUTH DAILY WITH BREAKFAST 5/17/24  Yes Rich Delgado DO   Ventolin  (90 Base) MCG/ACT inhaler Inhale 2 puffs 3 (three) times a day as needed for wheezing 5/25/24  Yes Rich Delgado DO   vitamin B-12 (VITAMIN B-12) 500 mcg tablet Take 500 mcg by mouth daily   Yes Historical Provider, MD   Azelastine HCl 137 MCG/SPRAY SOLN 1 SPRAY INTO EACH NOSTRIL 2 (TWO) TIMES A DAY USE IN EACH NOSTRIL AS DIRECTED  Patient not taking: Reported on 9/25/2024 4/20/22   Rich Delgado DO       Allergies:  No Known Allergies    Review of Systems:     Review of Systems   Constitutional:  Positive for activity change and fatigue. Negative for chills and fever.   HENT:  Negative for ear pain and sore throat.    Eyes:  Negative for pain and visual disturbance.   Respiratory:  Positive for shortness of breath. Negative for cough.    Cardiovascular:  Negative for chest pain and palpitations.   Gastrointestinal:  Negative for abdominal pain and vomiting.   Genitourinary:  Negative for dysuria and hematuria.   Musculoskeletal:  Negative for arthralgias and back pain.   Skin:  Negative for color change and rash.   Neurological:  Negative for seizures and syncope.   Psychiatric/Behavioral:  Positive for sleep disturbance.    All other systems reviewed and are negative.      Vital Signs:     Vitals:    09/25/24 1349 09/25/24 1354   BP: 153/80 149/85   BP Location: Left arm Right arm   Patient Position: Sitting Sitting   Cuff Size: Standard Standard   Pulse: 79    SpO2: 97%    Weight: 75.8 kg (167 lb)    Height: 5' 2\" (1.575 m)        Physical Exam:     Physical Exam  Vitals reviewed.   Constitutional:       Appearance: Normal appearance.   HENT:      Head: Normocephalic and atraumatic.   Eyes:      Pupils: Pupils are equal, " "round, and reactive to light.   Neck:      Vascular: No carotid bruit or JVD.   Cardiovascular:      Rate and Rhythm: Normal rate and regular rhythm.      Pulses:           Carotid pulses are 2+ on the right side and 2+ on the left side.       Radial pulses are 2+ on the right side and 2+ on the left side.        Dorsalis pedis pulses are 2+ on the right side and 2+ on the left side.      Heart sounds: Murmur heard.      Systolic murmur is present with a grade of 3/6.      No friction rub. No gallop.   Pulmonary:      Effort: Pulmonary effort is normal.      Breath sounds: Normal breath sounds. No wheezing, rhonchi or rales.   Abdominal:      Palpations: Abdomen is soft.      Tenderness: There is no abdominal tenderness.   Musculoskeletal:      Cervical back: Normal range of motion.      Right lower leg: No edema.      Left lower leg: No edema.   Skin:     General: Skin is warm and dry.      Capillary Refill: Capillary refill takes less than 2 seconds.   Neurological:      General: No focal deficit present.      Mental Status: He is alert.      Sensory: Sensation is intact.   Psychiatric:         Attention and Perception: Attention normal.         Mood and Affect: Mood normal.         Behavior: Behavior normal. Behavior is cooperative.         Lab Results:               Invalid input(s): \"LABGLOM\"      Lab Results   Component Value Date    HGBA1C 4.6 02/14/2023     No results found for: \"CKTOTAL\", \"CKMB\", \"CKMBINDEX\", \"TROPONINI\"    Imaging Studies:     Cardiac Catheterization: 9/19/24  Left Main   The vessel is large and is angiographically normal.      Left Anterior Descending   The vessel is large. The vessel exhibits minimal luminal irregularities. There is mid myocardial bridging present.      Right Coronary Artery   The vessel is large and dominant. The vessel exhibits minimal luminal irregularities.   Previously placed Prox RCA to Mid RCA stent of unknown type is widely patent.          THOR: 9/19/24  Left " Ventricle Left ventricular cavity size is normal. Wall thickness is mildly increased. There is mild concentric hypertrophy. The left ventricular ejection fraction is 55%. Systolic function is normal. Unable to assess diastolic function.   Wall Scoring Baseline     The following segments are hypokinetic: basal inferoseptal, basal inferior and mid inferior.  All other segments are normal.            Right Ventricle Right ventricular cavity size is mildly dilated. Systolic function is normal.   Left Atrium The atrium is severely dilated. There is no thrombus.   Right Atrium The atrium is dilated.   Atrial Septum No patent foramen ovale detected, confirmed at rest using color doppler.   Left Atrial Appendage Left atrial appendage is normal in size. There is normal function. There is no thrombus.   Aortic Valve The aortic valve is trileaflet. The leaflets are mildly thickened. The leaflets are mildly calcified. The leaflets exhibit normal mobility. There is mild regurgitation. There is aortic valve sclerosis.   Mitral Valve There is moderate diffuse thickening. There is moderate calcification. The lateral segment (A1) of the anterior leaflet is flail. There is a ruptured chord.  There is severe regurgitation with a posteriorly directed wall-impinging jet. There is no evidence of stenosis.   Tricuspid Valve Tricuspid valve structure is normal. There is no evidence of regurgitation. There is no evidence of stenosis.   Pulmonic Valve Pulmonic valve structure is normal. There is trace regurgitation. There is no evidence of stenosis.   Ascending Aorta The aortic root is normal in size. There is no atheroma.   Pericardium There is no pericardial effusion. The pericardium is normal in appearance.   Pulmonary Veins There is systolic flow reversal in the right upper and right lower veins.     Carotid Artery Ultrasound: 8/27/24  RIGHT:  There is <50% stenosis noted in the internal carotid artery. Plaque is  heterogenous and  irregular.  Vertebral artery flow is antegrade. There is no significant subclavian artery  disease.     LEFT:  There is <50% stenosis noted in the internal carotid artery. Plaque is  heterogenous and irregular.  Vertebral artery flow is antegrade. There is no significant subclavian artery  disease.    CT Chest: 8/21/24  IMPRESSION:  1. Pulmonary nodules measuring up to 8 mm. Based on current Fleischner Society 2017 Guidelines on incidental pulmonary nodule, follow-up noncontrast CT is recommended at 3-6 months from the initial examination to confirm persistence; if stable at that   time (solid component remains <6mm), additional follow-up CT is recommended annually until 5 years of stability is demonstrated.  2. Ascending thoracic aortic ectasia measuring 43 mm.  3. Mild bilateral pleural effusions with trace perihepatic ascites.    Personally reviewed the following image studies in PACS and associated radiology reports: CT chest, Ultrasound(s), and Echocardiogram. My interpretation of the radiology images/reports is: as above.    Assessment:  Patient Active Problem List    Diagnosis Date Noted    Hypokalemia 09/11/2024    Mixed hyperlipidemia 09/11/2024    Acute on chronic heart failure with preserved ejection fraction (HFpEF) ([facility]) 09/10/2024    Recurrent major depressive disorder, in remission ([facility]) 09/10/2024    Nonrheumatic mitral valve regurgitation 09/04/2024    Platelets decreased ([facility]) 02/27/2023    Coronary artery disease involving native coronary artery of native heart without angina pectoris     Mild intermittent asthma without complication 09/13/2021    BMI 29.0-29.9,adult 10/11/2019    Insomnia 02/25/2015    Essential hypertension 12/11/2014    Gastroesophageal reflux disease without esophagitis 12/11/2014    Anxiety 12/01/2014    Migraine headache 11/14/2014     Severe mitral regurgitation; Ongoing MVR workup    Plan:  Risks and benefits of mitral valve repair vs replacement were discussed in detail  today with the patient.  They understand and wish to proceed with further workup and ultimately surgical intervention.  We have ordered routine preoperative laboratory and vascular studies.  Pending the results of these tests, they will be scheduled for surgery on October 8, 2024 with Dr. Manolo Sanford D.O.    Reuben García was comfortable with our recommendations, and their questions were answered to their satisfaction.  Thank you for allowing us to participate in the care of this patient.     Patient had a negative Cologuard in September 2021.     SIGNATURE: Priscilla Rivera PA-C  DATE: 09/25/24  TIME: 2:23 PM    * This note was completed in part utilizing [employer] direct voice recognition software.   Grammatical errors, random word insertion, spelling mistakes, and incomplete sentences may be an occasional consequence of the system secondary to software limitations, ambient noise and hardware issues. At the time of dictation, efforts were made to edit, clarify and /or correct errors. Please read the chart carefully and recognize, using context, where substitutions have occurred.  If you have any questions or concerns about the context, text or information contained within the body of this dictation, please contact myself, the provider, for further clarification.

## 2024-10-02 ENCOUNTER — APPOINTMENT (OUTPATIENT)
Dept: LAB | Facility: HOSPITAL | Age: 69
End: 2024-10-02
Payer: COMMERCIAL

## 2024-10-02 ENCOUNTER — LAB REQUISITION (OUTPATIENT)
Dept: LAB | Facility: HOSPITAL | Age: 69
End: 2024-10-02
Payer: COMMERCIAL

## 2024-10-02 DIAGNOSIS — Z01.812 ENCOUNTER FOR PRE-OPERATIVE LABORATORY TESTING: ICD-10-CM

## 2024-10-02 DIAGNOSIS — Z01.818 PRE-OP EVALUATION: ICD-10-CM

## 2024-10-02 DIAGNOSIS — Z01.818 ENCOUNTER FOR OTHER PREPROCEDURAL EXAMINATION: ICD-10-CM

## 2024-10-02 DIAGNOSIS — Z13.1 ENCOUNTER FOR SCREENING FOR DIABETES MELLITUS: ICD-10-CM

## 2024-10-02 DIAGNOSIS — Z00.6 ENCOUNTER FOR EXAMINATION FOR NORMAL COMPARISON OR CONTROL IN CLINICAL RESEARCH PROGRAM: ICD-10-CM

## 2024-10-02 DIAGNOSIS — Z01.810 PRE-OPERATIVE CARDIOVASCULAR EXAMINATION: ICD-10-CM

## 2024-10-02 DIAGNOSIS — I34.0 NONRHEUMATIC MITRAL VALVE REGURGITATION: ICD-10-CM

## 2024-10-02 DIAGNOSIS — Z13.220 SCREENING FOR LIPOID DISORDERS: ICD-10-CM

## 2024-10-02 LAB
ABO GROUP BLD: NORMAL
ALBUMIN SERPL BCG-MCNC: 4 G/DL (ref 3.5–5)
ALP SERPL-CCNC: 54 U/L (ref 34–104)
ALT SERPL W P-5'-P-CCNC: 27 U/L (ref 7–52)
ANION GAP SERPL CALCULATED.3IONS-SCNC: 8 MMOL/L (ref 4–13)
AST SERPL W P-5'-P-CCNC: 31 U/L (ref 13–39)
BASOPHILS # BLD AUTO: 0.04 THOUSANDS/ΜL (ref 0–0.1)
BASOPHILS NFR BLD AUTO: 1 % (ref 0–1)
BILIRUB SERPL-MCNC: 0.63 MG/DL (ref 0.2–1)
BLD GP AB SCN SERPL QL: NEGATIVE
BUN SERPL-MCNC: 19 MG/DL (ref 5–25)
CALCIUM SERPL-MCNC: 8.6 MG/DL (ref 8.4–10.2)
CHLORIDE SERPL-SCNC: 103 MMOL/L (ref 96–108)
CHOLEST SERPL-MCNC: 134 MG/DL
CO2 SERPL-SCNC: 30 MMOL/L (ref 21–32)
CREAT SERPL-MCNC: 1 MG/DL (ref 0.6–1.3)
EOSINOPHIL # BLD AUTO: 0.33 THOUSAND/ΜL (ref 0–0.61)
EOSINOPHIL NFR BLD AUTO: 5 % (ref 0–6)
ERYTHROCYTE [DISTWIDTH] IN BLOOD BY AUTOMATED COUNT: 15.4 % (ref 11.6–15.1)
EST. AVERAGE GLUCOSE BLD GHB EST-MCNC: 100 MG/DL
GFR SERPL CREATININE-BSD FRML MDRD: 77 ML/MIN/1.73SQ M
GLUCOSE P FAST SERPL-MCNC: 96 MG/DL (ref 65–99)
HBA1C MFR BLD: 5.1 %
HCT VFR BLD AUTO: 40.4 % (ref 36.5–49.3)
HDLC SERPL-MCNC: 62 MG/DL
HGB BLD-MCNC: 13.8 G/DL (ref 12–17)
IMM GRANULOCYTES # BLD AUTO: 0.02 THOUSAND/UL (ref 0–0.2)
IMM GRANULOCYTES NFR BLD AUTO: 0 % (ref 0–2)
INR PPP: 0.95 (ref 0.85–1.19)
LDLC SERPL CALC-MCNC: 58 MG/DL (ref 0–100)
LYMPHOCYTES # BLD AUTO: 1.14 THOUSANDS/ΜL (ref 0.6–4.47)
LYMPHOCYTES NFR BLD AUTO: 19 % (ref 14–44)
MCH RBC QN AUTO: 30.6 PG (ref 26.8–34.3)
MCHC RBC AUTO-ENTMCNC: 34.2 G/DL (ref 31.4–37.4)
MCV RBC AUTO: 90 FL (ref 82–98)
MONOCYTES # BLD AUTO: 0.36 THOUSAND/ΜL (ref 0.17–1.22)
MONOCYTES NFR BLD AUTO: 6 % (ref 4–12)
NEUTROPHILS # BLD AUTO: 4.22 THOUSANDS/ΜL (ref 1.85–7.62)
NEUTS SEG NFR BLD AUTO: 69 % (ref 43–75)
NRBC BLD AUTO-RTO: 0 /100 WBCS
PLATELET # BLD AUTO: 187 THOUSANDS/UL (ref 149–390)
PMV BLD AUTO: 10.3 FL (ref 8.9–12.7)
POTASSIUM SERPL-SCNC: 3.5 MMOL/L (ref 3.5–5.3)
PROT SERPL-MCNC: 6.4 G/DL (ref 6.4–8.4)
PROTHROMBIN TIME: 13.4 SECONDS (ref 12.3–15)
RBC # BLD AUTO: 4.51 MILLION/UL (ref 3.88–5.62)
RH BLD: POSITIVE
SODIUM SERPL-SCNC: 141 MMOL/L (ref 135–147)
SPECIMEN EXPIRATION DATE: NORMAL
TRIGL SERPL-MCNC: 69 MG/DL
WBC # BLD AUTO: 6.11 THOUSAND/UL (ref 4.31–10.16)

## 2024-10-02 PROCEDURE — 86850 RBC ANTIBODY SCREEN: CPT | Performed by: PHYSICIAN ASSISTANT

## 2024-10-02 PROCEDURE — 86901 BLOOD TYPING SEROLOGIC RH(D): CPT | Performed by: PHYSICIAN ASSISTANT

## 2024-10-02 PROCEDURE — 80061 LIPID PANEL: CPT

## 2024-10-02 PROCEDURE — 85610 PROTHROMBIN TIME: CPT

## 2024-10-02 PROCEDURE — 87081 CULTURE SCREEN ONLY: CPT

## 2024-10-02 PROCEDURE — 36415 COLL VENOUS BLD VENIPUNCTURE: CPT

## 2024-10-02 PROCEDURE — 83036 HEMOGLOBIN GLYCOSYLATED A1C: CPT

## 2024-10-02 PROCEDURE — 85025 COMPLETE CBC W/AUTO DIFF WBC: CPT

## 2024-10-02 PROCEDURE — 86900 BLOOD TYPING SEROLOGIC ABO: CPT | Performed by: PHYSICIAN ASSISTANT

## 2024-10-03 LAB — MRSA NOSE QL CULT: NORMAL

## 2024-10-07 ENCOUNTER — ANESTHESIA EVENT (INPATIENT)
Dept: PERIOP | Facility: HOSPITAL | Age: 69
DRG: 219 | End: 2024-10-07
Payer: COMMERCIAL

## 2024-10-07 RX ORDER — PHENYLEPHRINE HCL IN 0.9% NACL 1 MG/10 ML
200-2000 SYRINGE (ML) INTRAVENOUS ONCE
Status: CANCELLED | OUTPATIENT
Start: 2024-10-08

## 2024-10-08 ENCOUNTER — ANESTHESIA (INPATIENT)
Dept: PERIOP | Facility: HOSPITAL | Age: 69
DRG: 219 | End: 2024-10-08
Payer: COMMERCIAL

## 2024-10-08 ENCOUNTER — APPOINTMENT (INPATIENT)
Dept: RADIOLOGY | Facility: HOSPITAL | Age: 69
DRG: 219 | End: 2024-10-08
Payer: COMMERCIAL

## 2024-10-08 ENCOUNTER — APPOINTMENT (OUTPATIENT)
Dept: NON INVASIVE DIAGNOSTICS | Facility: HOSPITAL | Age: 69
DRG: 219 | End: 2024-10-08
Attending: THORACIC SURGERY (CARDIOTHORACIC VASCULAR SURGERY)
Payer: COMMERCIAL

## 2024-10-08 ENCOUNTER — HOSPITAL ENCOUNTER (INPATIENT)
Facility: HOSPITAL | Age: 69
LOS: 8 days | Discharge: HOME HEALTH | DRG: 219 | End: 2024-10-16
Attending: THORACIC SURGERY (CARDIOTHORACIC VASCULAR SURGERY) | Admitting: THORACIC SURGERY (CARDIOTHORACIC VASCULAR SURGERY)
Payer: COMMERCIAL

## 2024-10-08 DIAGNOSIS — I25.10 CAD IN NATIVE ARTERY: ICD-10-CM

## 2024-10-08 DIAGNOSIS — Z98.890 S/P MVR (MITRAL VALVE REPAIR): Primary | ICD-10-CM

## 2024-10-08 DIAGNOSIS — N17.9 AKI (ACUTE KIDNEY INJURY) (HCC): ICD-10-CM

## 2024-10-08 DIAGNOSIS — I50.9 ACUTE EXACERBATION OF CHF (CONGESTIVE HEART FAILURE) (HCC): ICD-10-CM

## 2024-10-08 DIAGNOSIS — I34.0 NONRHEUMATIC MITRAL VALVE REGURGITATION: ICD-10-CM

## 2024-10-08 LAB
ABO GROUP BLD: NORMAL
ANION GAP SERPL CALCULATED.3IONS-SCNC: 8 MMOL/L (ref 4–13)
APTT PPP: 34 SECONDS (ref 23–34)
ASCENDING AORTA: 3.8 CM
ATRIAL RATE: 56 BPM
BASE EXCESS BLDA CALC-SCNC: -3 MMOL/L (ref -2–3)
BUN SERPL-MCNC: 21 MG/DL (ref 5–25)
CA-I BLD-SCNC: 1.13 MMOL/L (ref 1.12–1.32)
CALCIUM SERPL-MCNC: 7.7 MG/DL (ref 8.4–10.2)
CHLORIDE SERPL-SCNC: 109 MMOL/L (ref 96–108)
CO2 SERPL-SCNC: 24 MMOL/L (ref 21–32)
CREAT SERPL-MCNC: 1 MG/DL (ref 0.6–1.3)
DOP CALC MV VTI: 41.5 CM
E WAVE DECELERATION TIME: 175 MS
E/A RATIO: 3.58
FIBRINOGEN PPP-MCNC: 295 MG/DL (ref 206–523)
FIO2 GAS DIL.REBREATH: 50 L
GFR SERPL CREATININE-BSD FRML MDRD: 77 ML/MIN/1.73SQ M
GLUCOSE SERPL-MCNC: 115 MG/DL (ref 65–140)
GLUCOSE SERPL-MCNC: 121 MG/DL (ref 65–140)
GLUCOSE SERPL-MCNC: 211 MG/DL (ref 65–140)
GLUCOSE SERPL-MCNC: 217 MG/DL (ref 65–140)
GLUCOSE SERPL-MCNC: 220 MG/DL (ref 65–140)
GLUCOSE SERPL-MCNC: 77 MG/DL (ref 65–140)
GLUCOSE SERPL-MCNC: 89 MG/DL (ref 65–140)
HCO3 BLDA-SCNC: 24 MMOL/L (ref 22–28)
HCT VFR BLD AUTO: 34.8 % (ref 36.5–49.3)
HCT VFR BLD AUTO: 40.1 % (ref 36.5–49.3)
HCT VFR BLD CALC: 37 % (ref 36.5–49.3)
HGB BLD-MCNC: 11.5 G/DL (ref 12–17)
HGB BLD-MCNC: 13.5 G/DL (ref 12–17)
HGB BLDA-MCNC: 12.6 G/DL (ref 12–17)
INR PPP: 1.34 (ref 0.85–1.19)
MV MEAN GRADIENT: 4 MMHG
MV PEAK A VEL: 0.43 M/S
MV PEAK E VEL: 154 CM/S
MV PEAK GRADIENT: 8 MMHG
MV STENOSIS PRESSURE HALF TIME: 51 MS
MV VALVE AREA P 1/2 METHOD: 4.3
P AXIS: 100 DEGREES
PCO2 BLD: 26 MMOL/L (ref 21–32)
PCO2 BLD: 51.2 MM HG (ref 36–44)
PH BLD: 7.28 [PH] (ref 7.35–7.45)
PLATELET # BLD AUTO: 141 THOUSANDS/UL (ref 149–390)
PLATELET # BLD AUTO: 240 THOUSANDS/UL (ref 149–390)
PMV BLD AUTO: 9.7 FL (ref 8.9–12.7)
PMV BLD AUTO: 9.8 FL (ref 8.9–12.7)
PO2 BLD: 78 MM HG (ref 75–129)
POTASSIUM BLD-SCNC: 3.4 MMOL/L (ref 3.5–5.3)
POTASSIUM SERPL-SCNC: 3.4 MMOL/L (ref 3.5–5.3)
POTASSIUM SERPL-SCNC: 4.2 MMOL/L (ref 3.5–5.3)
POTASSIUM SERPL-SCNC: 4.6 MMOL/L (ref 3.5–5.3)
PR INTERVAL: 172 MS
PROTHROMBIN TIME: 16.8 SECONDS (ref 12.3–15)
PULM VEIN S/D RATIO: 0.26
PV PEAK D VEL: 0.68 M/S
PV PEAK S VEL: 0.18 M/S
QRS AXIS: 91 DEGREES
QRSD INTERVAL: 72 MS
QT INTERVAL: 472 MS
QTC INTERVAL: 455 MS
RH BLD: POSITIVE
SAO2 % BLD FROM PO2: 93 % (ref 60–85)
SODIUM BLD-SCNC: 142 MMOL/L (ref 136–145)
SODIUM SERPL-SCNC: 141 MMOL/L (ref 135–147)
SPECIMEN SOURCE: ABNORMAL
T WAVE AXIS: 67 DEGREES
TRICUSPID ANNULAR PLANE SYSTOLIC EXCURSION: 2.8 CM
VENTRICULAR RATE: 56 BPM

## 2024-10-08 PROCEDURE — 82330 ASSAY OF CALCIUM: CPT

## 2024-10-08 PROCEDURE — 02UG0JZ SUPPLEMENT MITRAL VALVE WITH SYNTHETIC SUBSTITUTE, OPEN APPROACH: ICD-10-PCS | Performed by: THORACIC SURGERY (CARDIOTHORACIC VASCULAR SURGERY)

## 2024-10-08 PROCEDURE — 30233M1 TRANSFUSION OF NONAUTOLOGOUS PLASMA CRYOPRECIPITATE INTO PERIPHERAL VEIN, PERCUTANEOUS APPROACH: ICD-10-PCS | Performed by: THORACIC SURGERY (CARDIOTHORACIC VASCULAR SURGERY)

## 2024-10-08 PROCEDURE — 84132 ASSAY OF SERUM POTASSIUM: CPT

## 2024-10-08 PROCEDURE — 85014 HEMATOCRIT: CPT | Performed by: PHYSICIAN ASSISTANT

## 2024-10-08 PROCEDURE — 88311 DECALCIFY TISSUE: CPT | Performed by: PATHOLOGY

## 2024-10-08 PROCEDURE — 30233R1 TRANSFUSION OF NONAUTOLOGOUS PLATELETS INTO PERIPHERAL VEIN, PERCUTANEOUS APPROACH: ICD-10-PCS | Performed by: THORACIC SURGERY (CARDIOTHORACIC VASCULAR SURGERY)

## 2024-10-08 PROCEDURE — 02L70CK OCCLUSION OF LEFT ATRIAL APPENDAGE WITH EXTRALUMINAL DEVICE, OPEN APPROACH: ICD-10-PCS | Performed by: THORACIC SURGERY (CARDIOTHORACIC VASCULAR SURGERY)

## 2024-10-08 PROCEDURE — 02VG0ZZ RESTRICTION OF MITRAL VALVE, OPEN APPROACH: ICD-10-PCS | Performed by: THORACIC SURGERY (CARDIOTHORACIC VASCULAR SURGERY)

## 2024-10-08 PROCEDURE — 85014 HEMATOCRIT: CPT

## 2024-10-08 PROCEDURE — 85347 COAGULATION TIME ACTIVATED: CPT

## 2024-10-08 PROCEDURE — 85049 AUTOMATED PLATELET COUNT: CPT | Performed by: PHYSICIAN ASSISTANT

## 2024-10-08 PROCEDURE — 5A1221Z PERFORMANCE OF CARDIAC OUTPUT, CONTINUOUS: ICD-10-PCS | Performed by: THORACIC SURGERY (CARDIOTHORACIC VASCULAR SURGERY)

## 2024-10-08 PROCEDURE — 33427 REPAIR OF MITRAL VALVE: CPT | Performed by: PHYSICIAN ASSISTANT

## 2024-10-08 PROCEDURE — 82947 ASSAY GLUCOSE BLOOD QUANT: CPT

## 2024-10-08 PROCEDURE — 99223 1ST HOSP IP/OBS HIGH 75: CPT | Performed by: ANESTHESIOLOGY

## 2024-10-08 PROCEDURE — 94760 N-INVAS EAR/PLS OXIMETRY 1: CPT

## 2024-10-08 PROCEDURE — 93355 ECHO TRANSESOPHAGEAL (TEE): CPT

## 2024-10-08 PROCEDURE — 85384 FIBRINOGEN ACTIVITY: CPT | Performed by: PHYSICIAN ASSISTANT

## 2024-10-08 PROCEDURE — 84132 ASSAY OF SERUM POTASSIUM: CPT | Performed by: PHYSICIAN ASSISTANT

## 2024-10-08 PROCEDURE — 85730 THROMBOPLASTIN TIME PARTIAL: CPT | Performed by: PHYSICIAN ASSISTANT

## 2024-10-08 PROCEDURE — 82803 BLOOD GASES ANY COMBINATION: CPT

## 2024-10-08 PROCEDURE — 33427 REPAIR OF MITRAL VALVE: CPT | Performed by: THORACIC SURGERY (CARDIOTHORACIC VASCULAR SURGERY)

## 2024-10-08 PROCEDURE — 94664 DEMO&/EVAL PT USE INHALER: CPT

## 2024-10-08 PROCEDURE — 85610 PROTHROMBIN TIME: CPT | Performed by: PHYSICIAN ASSISTANT

## 2024-10-08 PROCEDURE — 30233N0 TRANSFUSION OF AUTOLOGOUS RED BLOOD CELLS INTO PERIPHERAL VEIN, PERCUTANEOUS APPROACH: ICD-10-PCS | Performed by: GENERAL PRACTICE

## 2024-10-08 PROCEDURE — 30233K1 TRANSFUSION OF NONAUTOLOGOUS FROZEN PLASMA INTO PERIPHERAL VEIN, PERCUTANEOUS APPROACH: ICD-10-PCS | Performed by: THORACIC SURGERY (CARDIOTHORACIC VASCULAR SURGERY)

## 2024-10-08 PROCEDURE — 93010 ELECTROCARDIOGRAM REPORT: CPT | Performed by: INTERNAL MEDICINE

## 2024-10-08 PROCEDURE — 85049 AUTOMATED PLATELET COUNT: CPT | Performed by: THORACIC SURGERY (CARDIOTHORACIC VASCULAR SURGERY)

## 2024-10-08 PROCEDURE — P9012 CRYOPRECIPITATE EACH UNIT: HCPCS

## 2024-10-08 PROCEDURE — P9037 PLATE PHERES LEUKOREDU IRRAD: HCPCS

## 2024-10-08 PROCEDURE — 93005 ELECTROCARDIOGRAM TRACING: CPT

## 2024-10-08 PROCEDURE — 02HV33Z INSERTION OF INFUSION DEVICE INTO SUPERIOR VENA CAVA, PERCUTANEOUS APPROACH: ICD-10-PCS | Performed by: GENERAL PRACTICE

## 2024-10-08 PROCEDURE — 86923 COMPATIBILITY TEST ELECTRIC: CPT

## 2024-10-08 PROCEDURE — 94002 VENT MGMT INPAT INIT DAY: CPT

## 2024-10-08 PROCEDURE — 82948 REAGENT STRIP/BLOOD GLUCOSE: CPT

## 2024-10-08 PROCEDURE — 84295 ASSAY OF SERUM SODIUM: CPT

## 2024-10-08 PROCEDURE — P9017 PLASMA 1 DONOR FRZ W/IN 8 HR: HCPCS

## 2024-10-08 PROCEDURE — 88305 TISSUE EXAM BY PATHOLOGIST: CPT | Performed by: PATHOLOGY

## 2024-10-08 PROCEDURE — 85018 HEMOGLOBIN: CPT | Performed by: PHYSICIAN ASSISTANT

## 2024-10-08 PROCEDURE — 5A1223Z PERFORMANCE OF CARDIAC PACING, CONTINUOUS: ICD-10-PCS | Performed by: THORACIC SURGERY (CARDIOTHORACIC VASCULAR SURGERY)

## 2024-10-08 PROCEDURE — A7041 WATER SEAL DRAIN CONTAINER: HCPCS | Performed by: THORACIC SURGERY (CARDIOTHORACIC VASCULAR SURGERY)

## 2024-10-08 PROCEDURE — 76377 3D RENDER W/INTRP POSTPROCES: CPT

## 2024-10-08 PROCEDURE — 71045 X-RAY EXAM CHEST 1 VIEW: CPT

## 2024-10-08 PROCEDURE — 80048 BASIC METABOLIC PNL TOTAL CA: CPT | Performed by: PHYSICIAN ASSISTANT

## 2024-10-08 DEVICE — COR-KNOT MINI® COMBO KIT
Type: IMPLANTABLE DEVICE | Site: HEART | Status: FUNCTIONAL
Brand: COR-KNOT MINI®

## 2024-10-08 DEVICE — COR-KNOT® QUICK LOAD® SINGLES
Type: IMPLANTABLE DEVICE | Site: HEART | Status: FUNCTIONAL
Brand: COR-KNOT® QUICK LOAD®

## 2024-10-08 DEVICE — IMPLANTABLE DEVICE: Type: IMPLANTABLE DEVICE | Site: HEART | Status: FUNCTIONAL

## 2024-10-08 DEVICE — LAA EXCLUSION SYSTEM, LAAØ45
Type: IMPLANTABLE DEVICE | Site: HEART | Status: FUNCTIONAL
Brand: ATRICLIP® GILLINOV-COSGROVE LAA EXCLUSION SYSTEM

## 2024-10-08 RX ORDER — FENTANYL CITRATE 50 UG/ML
INJECTION, SOLUTION INTRAMUSCULAR; INTRAVENOUS AS NEEDED
Status: DISCONTINUED | OUTPATIENT
Start: 2024-10-08 | End: 2024-10-08

## 2024-10-08 RX ORDER — PROTAMINE SULFATE 10 MG/ML
INJECTION, SOLUTION INTRAVENOUS AS NEEDED
Status: DISCONTINUED | OUTPATIENT
Start: 2024-10-08 | End: 2024-10-08

## 2024-10-08 RX ORDER — HYDROMORPHONE HCL/PF 1 MG/ML
0.5 SYRINGE (ML) INJECTION EVERY 2 HOUR PRN
Status: DISCONTINUED | OUTPATIENT
Start: 2024-10-08 | End: 2024-10-09

## 2024-10-08 RX ORDER — MANNITOL 250 MG/ML
INJECTION, SOLUTION INTRAVENOUS AS NEEDED
Status: DISCONTINUED | OUTPATIENT
Start: 2024-10-08 | End: 2024-10-08

## 2024-10-08 RX ORDER — SODIUM CHLORIDE 450 MG/100ML
20 INJECTION, SOLUTION INTRAVENOUS CONTINUOUS
Status: DISCONTINUED | OUTPATIENT
Start: 2024-10-08 | End: 2024-10-09

## 2024-10-08 RX ORDER — NEOSTIGMINE METHYLSULFATE 1 MG/ML
INJECTION INTRAVENOUS AS NEEDED
Status: DISCONTINUED | OUTPATIENT
Start: 2024-10-08 | End: 2024-10-08

## 2024-10-08 RX ORDER — SODIUM CHLORIDE, SODIUM LACTATE, POTASSIUM CHLORIDE, CALCIUM CHLORIDE 600; 310; 30; 20 MG/100ML; MG/100ML; MG/100ML; MG/100ML
INJECTION, SOLUTION INTRAVENOUS CONTINUOUS PRN
Status: DISCONTINUED | OUTPATIENT
Start: 2024-10-08 | End: 2024-10-08

## 2024-10-08 RX ORDER — ALBUTEROL SULFATE 90 UG/1
2 INHALANT RESPIRATORY (INHALATION) 3 TIMES DAILY PRN
Status: DISCONTINUED | OUTPATIENT
Start: 2024-10-08 | End: 2024-10-16 | Stop reason: HOSPADM

## 2024-10-08 RX ORDER — CALCIUM CHLORIDE 100 MG/ML
INJECTION INTRAVENOUS; INTRAVENTRICULAR AS NEEDED
Status: DISCONTINUED | OUTPATIENT
Start: 2024-10-08 | End: 2024-10-08

## 2024-10-08 RX ORDER — ACETAMINOPHEN 650 MG/1
650 SUPPOSITORY RECTAL EVERY 4 HOURS PRN
Status: DISCONTINUED | OUTPATIENT
Start: 2024-10-08 | End: 2024-10-16 | Stop reason: HOSPADM

## 2024-10-08 RX ORDER — MAGNESIUM HYDROXIDE 1200 MG/15ML
LIQUID ORAL AS NEEDED
Status: DISCONTINUED | OUTPATIENT
Start: 2024-10-08 | End: 2024-10-08 | Stop reason: HOSPADM

## 2024-10-08 RX ORDER — FENTANYL CITRATE 50 UG/ML
50 INJECTION, SOLUTION INTRAMUSCULAR; INTRAVENOUS
Status: DISCONTINUED | OUTPATIENT
Start: 2024-10-08 | End: 2024-10-09

## 2024-10-08 RX ORDER — VANCOMYCIN HYDROCHLORIDE 1 G/20ML
INJECTION, POWDER, LYOPHILIZED, FOR SOLUTION INTRAVENOUS AS NEEDED
Status: DISCONTINUED | OUTPATIENT
Start: 2024-10-08 | End: 2024-10-08 | Stop reason: HOSPADM

## 2024-10-08 RX ORDER — FENTANYL CITRATE 50 UG/ML
INJECTION, SOLUTION INTRAMUSCULAR; INTRAVENOUS
Status: COMPLETED
Start: 2024-10-08 | End: 2024-10-08

## 2024-10-08 RX ORDER — BISACODYL 10 MG
10 SUPPOSITORY, RECTAL RECTAL DAILY PRN
Status: DISCONTINUED | OUTPATIENT
Start: 2024-10-08 | End: 2024-10-16 | Stop reason: HOSPADM

## 2024-10-08 RX ORDER — POTASSIUM CHLORIDE 29.8 MG/ML
40 INJECTION INTRAVENOUS ONCE AS NEEDED
Status: DISCONTINUED | OUTPATIENT
Start: 2024-10-08 | End: 2024-10-09

## 2024-10-08 RX ORDER — MAGNESIUM SULFATE HEPTAHYDRATE 40 MG/ML
2 INJECTION, SOLUTION INTRAVENOUS ONCE
Status: COMPLETED | OUTPATIENT
Start: 2024-10-08 | End: 2024-10-08

## 2024-10-08 RX ORDER — PANTOPRAZOLE SODIUM 40 MG/1
40 TABLET, DELAYED RELEASE ORAL DAILY
Status: DISCONTINUED | OUTPATIENT
Start: 2024-10-08 | End: 2024-10-16 | Stop reason: HOSPADM

## 2024-10-08 RX ORDER — FONDAPARINUX SODIUM 2.5 MG/.5ML
2.5 INJECTION SUBCUTANEOUS DAILY
Status: DISCONTINUED | OUTPATIENT
Start: 2024-10-09 | End: 2024-10-09

## 2024-10-08 RX ORDER — MIDAZOLAM HYDROCHLORIDE 2 MG/2ML
INJECTION, SOLUTION INTRAMUSCULAR; INTRAVENOUS AS NEEDED
Status: DISCONTINUED | OUTPATIENT
Start: 2024-10-08 | End: 2024-10-08

## 2024-10-08 RX ORDER — CEFAZOLIN SODIUM 2 G/50ML
2000 SOLUTION INTRAVENOUS EVERY 8 HOURS
Status: COMPLETED | OUTPATIENT
Start: 2024-10-08 | End: 2024-10-09

## 2024-10-08 RX ORDER — MUPIROCIN 20 MG/G
1 OINTMENT TOPICAL ONCE
Status: DISCONTINUED | OUTPATIENT
Start: 2024-10-08 | End: 2024-10-08

## 2024-10-08 RX ORDER — SODIUM CHLORIDE 9 MG/ML
INJECTION, SOLUTION INTRAVENOUS CONTINUOUS PRN
Status: DISCONTINUED | OUTPATIENT
Start: 2024-10-08 | End: 2024-10-08

## 2024-10-08 RX ORDER — ONDANSETRON 2 MG/ML
INJECTION INTRAMUSCULAR; INTRAVENOUS AS NEEDED
Status: DISCONTINUED | OUTPATIENT
Start: 2024-10-08 | End: 2024-10-08

## 2024-10-08 RX ORDER — CHLORHEXIDINE GLUCONATE ORAL RINSE 1.2 MG/ML
15 SOLUTION DENTAL ONCE
Status: COMPLETED | OUTPATIENT
Start: 2024-10-08 | End: 2024-10-08

## 2024-10-08 RX ORDER — KETAMINE HCL IN NACL, ISO-OSM 100MG/10ML
SYRINGE (ML) INJECTION AS NEEDED
Status: DISCONTINUED | OUTPATIENT
Start: 2024-10-08 | End: 2024-10-08

## 2024-10-08 RX ORDER — SUMATRIPTAN 50 MG/1
50 TABLET, FILM COATED ORAL EVERY 2 HOUR PRN
Status: DISCONTINUED | OUTPATIENT
Start: 2024-10-08 | End: 2024-10-10

## 2024-10-08 RX ORDER — POLYETHYLENE GLYCOL 3350 17 G/17G
17 POWDER, FOR SOLUTION ORAL DAILY
Status: DISCONTINUED | OUTPATIENT
Start: 2024-10-08 | End: 2024-10-14

## 2024-10-08 RX ORDER — LIDOCAINE HCL/PF 100 MG/5ML
SYRINGE (ML) INJECTION AS NEEDED
Status: DISCONTINUED | OUTPATIENT
Start: 2024-10-08 | End: 2024-10-08

## 2024-10-08 RX ORDER — GLYCOPYRROLATE 0.2 MG/ML
INJECTION INTRAMUSCULAR; INTRAVENOUS AS NEEDED
Status: DISCONTINUED | OUTPATIENT
Start: 2024-10-08 | End: 2024-10-08

## 2024-10-08 RX ORDER — ACETAMINOPHEN 325 MG/1
975 TABLET ORAL
Status: DISCONTINUED | OUTPATIENT
Start: 2024-10-08 | End: 2024-10-16 | Stop reason: HOSPADM

## 2024-10-08 RX ORDER — DEXMEDETOMIDINE HYDROCHLORIDE 4 UG/ML
.1-1 INJECTION, SOLUTION INTRAVENOUS
Status: DISCONTINUED | OUTPATIENT
Start: 2024-10-08 | End: 2024-10-08

## 2024-10-08 RX ORDER — HEPARIN SODIUM 1000 [USP'U]/ML
10000 INJECTION, SOLUTION INTRAVENOUS; SUBCUTANEOUS ONCE
Status: COMPLETED | OUTPATIENT
Start: 2024-10-08 | End: 2024-10-08

## 2024-10-08 RX ORDER — LIDOCAINE HYDROCHLORIDE 20 MG/ML
INJECTION, SOLUTION EPIDURAL; INFILTRATION; INTRACAUDAL; PERINEURAL AS NEEDED
Status: DISCONTINUED | OUTPATIENT
Start: 2024-10-08 | End: 2024-10-08

## 2024-10-08 RX ORDER — ASPIRIN 325 MG
325 TABLET ORAL DAILY
Status: CANCELLED | OUTPATIENT
Start: 2024-10-08

## 2024-10-08 RX ORDER — ROCURONIUM BROMIDE 10 MG/ML
INJECTION, SOLUTION INTRAVENOUS AS NEEDED
Status: DISCONTINUED | OUTPATIENT
Start: 2024-10-08 | End: 2024-10-08

## 2024-10-08 RX ORDER — HEPARIN SODIUM 1000 [USP'U]/ML
400 INJECTION, SOLUTION INTRAVENOUS; SUBCUTANEOUS ONCE
Status: DISCONTINUED | OUTPATIENT
Start: 2024-10-08 | End: 2024-10-08 | Stop reason: HOSPADM

## 2024-10-08 RX ORDER — CLOPIDOGREL BISULFATE 75 MG/1
75 TABLET ORAL DAILY
Status: DISCONTINUED | OUTPATIENT
Start: 2024-10-08 | End: 2024-10-16 | Stop reason: HOSPADM

## 2024-10-08 RX ORDER — POTASSIUM CHLORIDE 14.9 MG/ML
20 INJECTION INTRAVENOUS ONCE AS NEEDED
Status: DISCONTINUED | OUTPATIENT
Start: 2024-10-08 | End: 2024-10-09

## 2024-10-08 RX ORDER — MIDAZOLAM HYDROCHLORIDE 2 MG/2ML
INJECTION, SOLUTION INTRAMUSCULAR; INTRAVENOUS
Status: DISCONTINUED
Start: 2024-10-08 | End: 2024-10-08 | Stop reason: WASHOUT

## 2024-10-08 RX ORDER — SODIUM CHLORIDE, SODIUM GLUCONATE, SODIUM ACETATE, POTASSIUM CHLORIDE, MAGNESIUM CHLORIDE, SODIUM PHOSPHATE, DIBASIC, AND POTASSIUM PHOSPHATE .53; .5; .37; .037; .03; .012; .00082 G/100ML; G/100ML; G/100ML; G/100ML; G/100ML; G/100ML; G/100ML
INJECTION, SOLUTION INTRAVENOUS AS NEEDED
Status: DISCONTINUED | OUTPATIENT
Start: 2024-10-08 | End: 2024-10-08

## 2024-10-08 RX ORDER — ARIPIPRAZOLE 2 MG/1
2 TABLET ORAL DAILY
Status: DISCONTINUED | OUTPATIENT
Start: 2024-10-08 | End: 2024-10-16 | Stop reason: HOSPADM

## 2024-10-08 RX ORDER — CHLORHEXIDINE GLUCONATE ORAL RINSE 1.2 MG/ML
15 SOLUTION DENTAL 2 TIMES DAILY
Status: DISCONTINUED | OUTPATIENT
Start: 2024-10-08 | End: 2024-10-16 | Stop reason: HOSPADM

## 2024-10-08 RX ORDER — ATORVASTATIN CALCIUM 80 MG/1
80 TABLET, FILM COATED ORAL
Status: DISCONTINUED | OUTPATIENT
Start: 2024-10-08 | End: 2024-10-16 | Stop reason: HOSPADM

## 2024-10-08 RX ORDER — CALCIUM GLUCONATE 20 MG/ML
2 INJECTION, SOLUTION INTRAVENOUS ONCE AS NEEDED
Status: DISCONTINUED | OUTPATIENT
Start: 2024-10-08 | End: 2024-10-09

## 2024-10-08 RX ORDER — ONDANSETRON 2 MG/ML
4 INJECTION INTRAMUSCULAR; INTRAVENOUS EVERY 6 HOURS PRN
Status: DISCONTINUED | OUTPATIENT
Start: 2024-10-08 | End: 2024-10-16 | Stop reason: HOSPADM

## 2024-10-08 RX ORDER — TRIAMCINOLONE ACETONIDE 1 MG/G
CREAM TOPICAL 2 TIMES DAILY PRN
Status: DISCONTINUED | OUTPATIENT
Start: 2024-10-08 | End: 2024-10-16 | Stop reason: HOSPADM

## 2024-10-08 RX ORDER — OXYCODONE HYDROCHLORIDE 5 MG/1
5 TABLET ORAL EVERY 4 HOURS PRN
Status: DISCONTINUED | OUTPATIENT
Start: 2024-10-08 | End: 2024-10-16 | Stop reason: HOSPADM

## 2024-10-08 RX ORDER — FENTANYL CITRATE 50 UG/ML
50 INJECTION, SOLUTION INTRAMUSCULAR; INTRAVENOUS ONCE
Status: COMPLETED | OUTPATIENT
Start: 2024-10-08 | End: 2024-10-08

## 2024-10-08 RX ORDER — AMINOCAPROIC ACID 250 MG/ML
INJECTION, SOLUTION INTRAVENOUS AS NEEDED
Status: DISCONTINUED | OUTPATIENT
Start: 2024-10-08 | End: 2024-10-08

## 2024-10-08 RX ORDER — AMIODARONE HYDROCHLORIDE 200 MG/1
200 TABLET ORAL EVERY 8 HOURS SCHEDULED
Status: DISCONTINUED | OUTPATIENT
Start: 2024-10-08 | End: 2024-10-16 | Stop reason: HOSPADM

## 2024-10-08 RX ORDER — CEFAZOLIN SODIUM 2 G/50ML
2000 SOLUTION INTRAVENOUS ONCE
Status: COMPLETED | OUTPATIENT
Start: 2024-10-08 | End: 2024-10-08

## 2024-10-08 RX ORDER — POTASSIUM CHLORIDE 29.8 MG/ML
40 INJECTION INTRAVENOUS ONCE AS NEEDED
Status: COMPLETED | OUTPATIENT
Start: 2024-10-08 | End: 2024-10-08

## 2024-10-08 RX ORDER — HEPARIN SODIUM 1000 [USP'U]/ML
INJECTION, SOLUTION INTRAVENOUS; SUBCUTANEOUS AS NEEDED
Status: DISCONTINUED | OUTPATIENT
Start: 2024-10-08 | End: 2024-10-08

## 2024-10-08 RX ADMIN — OXYCODONE HYDROCHLORIDE 5 MG: 5 TABLET ORAL at 20:08

## 2024-10-08 RX ADMIN — PHENYLEPHRINE HYDROCHLORIDE 250 MCG: 10 INJECTION INTRAVENOUS at 11:46

## 2024-10-08 RX ADMIN — DEXMEDETOMIDINE HYDROCHLORIDE 0.5 MCG/KG/HR: 100 INJECTION, SOLUTION INTRAVENOUS at 10:14

## 2024-10-08 RX ADMIN — MIDAZOLAM 2 MG: 1 INJECTION INTRAMUSCULAR; INTRAVENOUS at 09:54

## 2024-10-08 RX ADMIN — OXYCODONE HYDROCHLORIDE 5 MG: 5 TABLET ORAL at 16:20

## 2024-10-08 RX ADMIN — FENTANYL CITRATE 50 MCG: 50 INJECTION INTRAMUSCULAR; INTRAVENOUS at 14:10

## 2024-10-08 RX ADMIN — SODIUM CHLORIDE: 0.9 INJECTION, SOLUTION INTRAVENOUS at 10:14

## 2024-10-08 RX ADMIN — HYDROMORPHONE HYDROCHLORIDE 0.5 MG: 1 INJECTION, SOLUTION INTRAMUSCULAR; INTRAVENOUS; SUBCUTANEOUS at 15:18

## 2024-10-08 RX ADMIN — MIDAZOLAM 2 MG: 1 INJECTION INTRAMUSCULAR; INTRAVENOUS at 13:56

## 2024-10-08 RX ADMIN — AMIODARONE HYDROCHLORIDE 150 MG: 50 INJECTION, SOLUTION INTRAVENOUS at 12:34

## 2024-10-08 RX ADMIN — NOREPINEPHRINE BITARTRATE 2 MCG/MIN: 1 INJECTION, SOLUTION, CONCENTRATE INTRAVENOUS at 13:17

## 2024-10-08 RX ADMIN — Medication 12.5 MG: at 08:53

## 2024-10-08 RX ADMIN — MUPIROCIN 1 APPLICATION: 20 OINTMENT TOPICAL at 08:11

## 2024-10-08 RX ADMIN — AMINOCAPROIC ACID 1 G/HR: 250 INJECTION, SOLUTION INTRAVENOUS at 14:51

## 2024-10-08 RX ADMIN — GLYCOPYRROLATE 0.6 MG: 0.2 INJECTION, SOLUTION INTRAMUSCULAR; INTRAVENOUS at 13:46

## 2024-10-08 RX ADMIN — PHENYLEPHRINE HYDROCHLORIDE 250 MCG: 10 INJECTION INTRAVENOUS at 11:36

## 2024-10-08 RX ADMIN — MUPIROCIN 1 APPLICATION: 20 OINTMENT TOPICAL at 20:00

## 2024-10-08 RX ADMIN — ONDANSETRON 4 MG: 2 INJECTION INTRAMUSCULAR; INTRAVENOUS at 13:46

## 2024-10-08 RX ADMIN — CHLORHEXIDINE GLUCONATE 0.12% ORAL RINSE 15 ML: 1.2 LIQUID ORAL at 22:26

## 2024-10-08 RX ADMIN — SODIUM CHLORIDE, SODIUM GLUCONATE, SODIUM ACETATE, POTASSIUM CHLORIDE, MAGNESIUM CHLORIDE, SODIUM PHOSPHATE, DIBASIC, AND POTASSIUM PHOSPHATE 500 ML: .53; .5; .37; .037; .03; .012; .00082 INJECTION, SOLUTION INTRAVENOUS at 09:56

## 2024-10-08 RX ADMIN — Medication 1 EACH: at 09:56

## 2024-10-08 RX ADMIN — HEPARIN SODIUM 30300 UNITS: 1000 INJECTION INTRAVENOUS; SUBCUTANEOUS at 10:43

## 2024-10-08 RX ADMIN — LIDOCAINE HYDROCHLORIDE 70 MG: 20 INJECTION, SOLUTION EPIDURAL; INFILTRATION; INTRACAUDAL at 09:54

## 2024-10-08 RX ADMIN — HYDROMORPHONE HYDROCHLORIDE 0.5 MG: 1 INJECTION, SOLUTION INTRAMUSCULAR; INTRAVENOUS; SUBCUTANEOUS at 22:16

## 2024-10-08 RX ADMIN — FENTANYL CITRATE 100 MCG: 50 INJECTION INTRAMUSCULAR; INTRAVENOUS at 13:10

## 2024-10-08 RX ADMIN — POTASSIUM CHLORIDE 40 MEQ: 29.8 INJECTION, SOLUTION INTRAVENOUS at 16:30

## 2024-10-08 RX ADMIN — FENTANYL CITRATE 200 MCG: 50 INJECTION INTRAMUSCULAR; INTRAVENOUS at 11:09

## 2024-10-08 RX ADMIN — PHENYLEPHRINE HYDROCHLORIDE 250 MCG: 10 INJECTION INTRAVENOUS at 11:14

## 2024-10-08 RX ADMIN — KETAMINE HYDROCHLORIDE 0.4 MG/KG/HR: 50 INJECTION INTRAMUSCULAR; INTRAVENOUS at 10:14

## 2024-10-08 RX ADMIN — ROCURONIUM 100 MG: 50 INJECTION, SOLUTION INTRAVENOUS at 09:56

## 2024-10-08 RX ADMIN — ROCURONIUM 50 MG: 50 INJECTION, SOLUTION INTRAVENOUS at 11:33

## 2024-10-08 RX ADMIN — HEPARIN SODIUM 5000 UNITS: 1000 INJECTION INTRAVENOUS; SUBCUTANEOUS at 11:45

## 2024-10-08 RX ADMIN — CEFAZOLIN SODIUM 2000 MG: 2 SOLUTION INTRAVENOUS at 12:51

## 2024-10-08 RX ADMIN — HEPARIN SODIUM 5000 UNITS: 1000 INJECTION INTRAVENOUS; SUBCUTANEOUS at 10:58

## 2024-10-08 RX ADMIN — AMINOCAPROIC ACID 1 G/HR: 250 INJECTION, SOLUTION INTRAVENOUS at 10:43

## 2024-10-08 RX ADMIN — PHENYLEPHRINE HYDROCHLORIDE 250 MCG: 10 INJECTION INTRAVENOUS at 12:33

## 2024-10-08 RX ADMIN — CALCIUM CHLORIDE 1 G: 100 INJECTION INTRAVENOUS; INTRAVENTRICULAR at 12:42

## 2024-10-08 RX ADMIN — SODIUM CHLORIDE 20 ML/HR: 0.45 INJECTION, SOLUTION INTRAVENOUS at 14:21

## 2024-10-08 RX ADMIN — SODIUM CHLORIDE, SODIUM LACTATE, POTASSIUM CHLORIDE, AND CALCIUM CHLORIDE 250 ML: .6; .31; .03; .02 INJECTION, SOLUTION INTRAVENOUS at 23:41

## 2024-10-08 RX ADMIN — MAGNESIUM SULFATE HEPTAHYDRATE 2 G: 40 INJECTION, SOLUTION INTRAVENOUS at 14:21

## 2024-10-08 RX ADMIN — SODIUM CHLORIDE, SODIUM LACTATE, POTASSIUM CHLORIDE, AND CALCIUM CHLORIDE: .6; .31; .03; .02 INJECTION, SOLUTION INTRAVENOUS at 09:48

## 2024-10-08 RX ADMIN — PHENOBARBITAL SODIUM 260 MG: 65 INJECTION INTRAMUSCULAR at 17:08

## 2024-10-08 RX ADMIN — PROTAMINE SULFATE 250 MG: 10 INJECTION, SOLUTION INTRAVENOUS at 12:51

## 2024-10-08 RX ADMIN — CEFAZOLIN SODIUM 2000 MG: 2 SOLUTION INTRAVENOUS at 20:08

## 2024-10-08 RX ADMIN — MAGNESIUM SULFATE HEPTAHYDRATE 2 G: 500 INJECTION, SOLUTION INTRAMUSCULAR; INTRAVENOUS at 12:31

## 2024-10-08 RX ADMIN — AMINOCAPROIC ACID 5 G: 250 INJECTION, SOLUTION INTRAVENOUS at 10:43

## 2024-10-08 RX ADMIN — POTASSIUM CHLORIDE 750 ML: 2 INJECTION, SOLUTION, CONCENTRATE INTRAVENOUS at 11:09

## 2024-10-08 RX ADMIN — ACETAMINOPHEN 975 MG: 325 TABLET, FILM COATED ORAL at 16:20

## 2024-10-08 RX ADMIN — AMIODARONE HYDROCHLORIDE 200 MG: 200 TABLET ORAL at 22:25

## 2024-10-08 RX ADMIN — MIDAZOLAM 2 MG: 1 INJECTION INTRAMUSCULAR; INTRAVENOUS at 09:50

## 2024-10-08 RX ADMIN — ACETAMINOPHEN 975 MG: 325 TABLET, FILM COATED ORAL at 20:08

## 2024-10-08 RX ADMIN — HYDROMORPHONE HYDROCHLORIDE 0.5 MG: 1 INJECTION, SOLUTION INTRAMUSCULAR; INTRAVENOUS; SUBCUTANEOUS at 18:42

## 2024-10-08 RX ADMIN — CEFAZOLIN SODIUM 2000 MG: 2 SOLUTION INTRAVENOUS at 10:20

## 2024-10-08 RX ADMIN — LIDOCAINE HYDROCHLORIDE 100 MG: 20 INJECTION INTRAVENOUS at 12:31

## 2024-10-08 RX ADMIN — SODIUM BICARBONATE 50 MEQ: 84 INJECTION, SOLUTION INTRAVENOUS at 10:57

## 2024-10-08 RX ADMIN — FENTANYL CITRATE 200 MCG: 50 INJECTION INTRAMUSCULAR; INTRAVENOUS at 09:56

## 2024-10-08 RX ADMIN — NEOSTIGMINE METHYLSULFATE 3 MG: 1 INJECTION INTRAVENOUS at 13:46

## 2024-10-08 RX ADMIN — MANNITOL 25 G: 12.5 INJECTION, SOLUTION INTRAVENOUS at 10:57

## 2024-10-08 RX ADMIN — AMIODARONE HYDROCHLORIDE 200 MG: 200 TABLET ORAL at 16:20

## 2024-10-08 RX ADMIN — SODIUM CHLORIDE 5 UNITS/HR: 9 INJECTION, SOLUTION INTRAVENOUS at 14:21

## 2024-10-08 RX ADMIN — ROCURONIUM 20 MG: 50 INJECTION, SOLUTION INTRAVENOUS at 13:07

## 2024-10-08 RX ADMIN — FENTANYL CITRATE 100 MCG: 50 INJECTION INTRAMUSCULAR; INTRAVENOUS at 10:39

## 2024-10-08 RX ADMIN — Medication 50 MG: at 09:56

## 2024-10-08 RX ADMIN — EPINEPHRINE 2 MCG/MIN: 1 INJECTION, SOLUTION, CONCENTRATE INTRAVENOUS at 12:33

## 2024-10-08 RX ADMIN — FENTANYL CITRATE 50 MCG: 50 INJECTION, SOLUTION INTRAMUSCULAR; INTRAVENOUS at 14:10

## 2024-10-08 RX ADMIN — CHLORHEXIDINE GLUCONATE 0.12% ORAL RINSE 15 ML: 1.2 LIQUID ORAL at 08:53

## 2024-10-08 NOTE — INTERVAL H&P NOTE
H&P reviewed. After examining the patient I find no changes in the patients condition since the H&P had been written.    Vitals:    10/08/24 0759   BP: 161/99   Pulse: 85   Resp: 18   Temp: 97.6 °F (36.4 °C)   SpO2: 97%       Anticipated Length of Stay:  Patient will be admitted on an Inpatient basis with an anticipated length of stay of  greater than 2 midnights.       Justification for Hospital Stay:  Post surgical recovery following open heart surgery.

## 2024-10-08 NOTE — ANESTHESIA PROCEDURE NOTES
Central Line Insertion    Performed by: Armen Chow MD  Authorized by: Armen Chow MD    Date/Time: 10/8/2024 10:11 AM  Catheter Type:  triple lumen  Consent: Verbal consent obtained. Written consent obtained.  Indications: vascular access  Catheter size: 7 Fr  Patient position: Trendelenburg  Assessment: blood return through all ports  Preparation: skin prepped with 2% chlorhexidine  Skin prep agent dried: skin prep agent completely dried prior to procedure  Sterile barriers: all five maximum sterile barriers used - cap, mask, sterile gown, sterile gloves, and large sterile sheet  Hand hygiene: hand hygiene performed prior to central venous catheter insertion  sterile gel and probe cover used in ultrasound-guided central venous catheter insertionultrasound permanent image saved  Vessel of Catheter Tip End: svc  Number of attempts: 1  Successful placement: yes  Post-procedure: line sutured and dressing applied  Patient tolerance: Patient tolerated the procedure well with no immediate complications

## 2024-10-08 NOTE — ANESTHESIA PROCEDURE NOTES
Procedure Performed: THOR Anesthesia  Start Time:  10/8/2024 10:02 AM        Preanesthesia Checklist    Patient identified, IV assessed, risks and benefits discussed, monitors and equipment assessed, procedure being performed at surgeon's request and anesthesia consent obtained.      Procedure    Diagnostic Indications for THOR:  assessment of surgical repair and hemodynamic monitoring. Type of THOR: complete THOR with interpretation. Images Saved: ultrasound permanent image saved. Physician Requesting Echo: Manolo Sanford DO.  Location performed: OR. Intubated.  Heart visualized. Insertion of THOR Probe:  Easy. Probe Type:  Epiaortic and multiplane. Modalities:  Color flow mapping, 3D, pulse wave Doppler and continuous wave Doppler.      Echocardiographic and Doppler Measurements    PREPROCEDURE    LEFT VENTRICLE:  Systolic Function: normal. Ejection Fraction: >55%. Cavity size: normal.       RIGHT VENTRICLE:  Systolic Function: normal.  Cavity size normal.              AORTIC VALVE:  Leaflets: normal and trileaflet. Leaflet motions normal and normal. Stenosis: none.     Regurgitation: none.      MITRAL VALVE:  Leaflets: calcified and thickened. Leaflet Motions: flail and flail A1. Regurgitation: severe.   Stenosis: none.       TRICUSPID VALVE:  Leaflets: normal. Leaflet Motions: normal. Stenosis: none. Regurgitation: mild.      PULMONIC VALVE:  Leaflets: normal. Regurgitation: none. Stenosis: none.        ASCENDING AORTA:  Size:  normal. Diameter: 3.8 cm. Dissection not present.      AORTIC ARCH:  Size:  normal.  dissection not present. Grade 2: severe intimal thickening without protruding atheroma.    DESCENDING AORTA:  Size: normal.  Dissection not present. Grade 2: severe intimal thickening without protruding atheroma.        RIGHT ATRIUM:  Size:  dilated.     LEFT ATRIUM:  Size: dilated.     LEFT ATRIAL APPENDAGE:  Size: normal.          ATRIAL SEPTUM:  Intra-atrial septal morphology: normal.           VENTRICULAR SEPTUM:  Intra-ventricular septum morphology: normal.         EPIAORTIC:  Plaque Thickness: 0-5 mm.    OTHER FINDINGS:  Pericardium:  normal. Pleural Effusion:  left.        POSTPROCEDURE    LEFT VENTRICLE: Unchanged .         RIGHT VENTRICLE: Unchanged .           AORTIC VALVE: Unchanged .           MITRAL VALVE:   Leaflets: ring. Regurgitation: trace. Stenosis: none. Mean Gradient: 4.Valve/Ring Size: 34 mm.    TRICUSPID VALVE: Unchanged .        PULMONIC VALVE: Unchanged             ATRIA: Unchanged .          AORTA: Unchanged .        REMOVAL:  Probe Removal: atraumatic.

## 2024-10-08 NOTE — ANESTHESIA PROCEDURE NOTES
Introducer/[place]    Performed by: Armen Chow MD  Authorized by: Armen Chow MD    Date/Time: 10/8/2024 10:12 AM  Indications: central pressure monitoring and vascular access  Location details: right internal jugular  Catheter size: 9 Fr  Assessment: blood return through all ports  Preparation: skin prepped with 2% chlorhexidine  Skin prep agent dried: skin prep agent completely dried prior to procedure  Sterile barriers: all five maximum sterile barriers used - cap, mask, sterile gown, sterile gloves, and large sterile sheet  Hand hygiene: hand hygiene performed prior to central venous catheter insertion  Ultrasound guidance: yes  ultrasound permanent image saved  Number of attempts: 1  Successful placement: yes  Post-procedure: line sutured and dressing applied  Patient tolerance: Patient tolerated the procedure well with no immediate complications

## 2024-10-08 NOTE — RESPIRATORY THERAPY NOTE
10/08/24 1628   Respiratory Assessment   Resp Comments Extubated pt to 5L NC as ordered by MD. Pt tolerated well.   Vent Information   Ventilator End Yes

## 2024-10-08 NOTE — CONSULTS
Consultation - Critical Care/ICU   Name: Reuben García 69 y.o. male I MRN: [id number]  Unit/Bed#: PPHP-313-01 I Date of Admission: 10/8/2024   Date of Service: 10/8/2024 I Hospital Day: 0   Inpatient consult to Medical Critical Care  Consult performed by: Jc Varghese PA-C  Consult ordered by: Arun Griffin PA-C        Physician Requesting Evaluation: Manolo Sanford DO   Reason for Evaluation / Principal Problem: MVR    I have discussed the above management plan in detail with the primary service.     Assessment & Plan   Impressions:  MVR s/p MV Repair  Hx CAD/NSTEMI s/p PCI/ESTHER (2022)  HFpEF, G2 DDD  HTN  HLD  GERD  Arthritis  Asthma  Heavy ETOH use    Neuro:   Discontinue continuous sedation.   ATC tylenol, PRN oxycodone for pain  Trend neuro exam  Delirium precautions  Monitor for S/S of ETOH withdrawal  Low threshold for phenobarbital use for ETOH w/d    CV:   Goals:   Cardiac Surgery Hemodynamic Monitoring goals: MAP goal >65 CI >2.2 Continue pulmonary artery catheter for hemodynamic monitoring  Continue central line due to vasoactive medications Continue arterial line for blood pressure monitoring and/or frequent blood gas draws  Volume resuscitation as needed.   Epicardial pacing wire plan : Epicardial pacing wires in place. Will pace as needed for bradycardia or cardiac output   Monitor rhythm on telemetry.        Lung:   Check STAT post-op ABG and CXR  Wean vent with spontaneous breathing trial with goal to extubate today    GI:   GI prophylaxis with PPI  Bowel regimen  Zofran PRN for nausea.     FEN:   NPO Replenish K >4.0, mag >2.0 and calcium >7.0.     :   Check STAT post-op BMP  Linder in place.   Monitor UOP with goal >0.5cc/kg/hour.  Lasix versus volume resuscitate as needed depending on hemodynamics and volume status.    ID:   Prophylactic post-op abx. Maintain normothermia. Trend temps.     Heme:   Check STAT post-op H/H and platelets.  Monitor incision site, invasive lines, and chest tube  Received call from [phone] requesting refill for patient, they are patients new pharmacy    Omeprazole 20mg 1 po qd  Last appt 1/11/2019  Next appt 4/12/2019 outputs for bleeding. Send coag panel if needed.  Moderate chest tube output post operatively. Attending surgeon notified by CTS team. X2u FFP and x2u Cryo ordered    Endo:   Insulin gtt for blood sugar control.     Disposition: ICU Care     History of Present Illness   HPI: Reuben García is a 69 y.o. male with a PMHx of MR, CAD/NSTEMI in Aug 2022 s/p PCI/DEX x2 to the RCA, HFpEF (EF 55-60%, G2 DDD), HTN, HLD, arthritis, GERD, migraines, and asthma who presented to his PCP with progressive shortness of breath not relieved with his inhaler. Echo was ordered which revealed progression of his mild to moderate MR to severe AKANKSHA with a severely dilated LA. A cardiac cath done prior to surgery did not reveal any significant CAD. He was referred to CT surgery and recommended for a mitral valve repair vs replacement. Of note, he was recently admitted to [facility] with an acute on chronic diastolic heart failure exacerbation where he was aggressively diuresed approx 18L during his hospital stay. He reports drinking approx 12 beers in a day or up to 30 beers/week.    Intra-op THOR LVEF 55%  Post-op medications: Critical Care Infusions : Levophed 6 mcg/min and Epinephrine 4 mcg/min    ROS unable to be obtained due to patient being intubated and sedated.   History obtained from chart review due to patient being intubated and sedated.   Historical Information   Past Medical History:  No date: Allergic  No date: Arthritis  No date: Clotting disorder (HCC)  No date: Coronary artery disease  No date: Depression  No date: Diabetes mellitus (HCC)  No date: Headache(784.0)  No date: HL (hearing loss)  No date: Hypertension  8/23/22: Myocardial infarction (HCC)  No date: Obesity  No date: Visual impairment Past Surgical History:  8/25/2022: CARDIAC CATHETERIZATION; Left      Comment:  Procedure: Cardiac catheterization;  Surgeon: Luann Feng MD;  Location: [facility];  Service:                Cardiology  8/25/2022:  CARDIAC CATHETERIZATION; N/A      Comment:  Procedure: Cardiac Coronary Angiogram;  Surgeon:                Luann Feng MD;  Location: [facility];                 Service: Cardiology  9/19/2024: CARDIAC CATHETERIZATION; N/A      Comment:  Procedure: Cardiac RHC/LHC;  Surgeon: Javier Ocasio MD;               Location:  CARDIAC CATH LAB;  Service: Cardiology  No date: SEPTOPLASTY   Current Outpatient Medications   Medication Instructions    amLODIPine (NORVASC) 10 mg tablet TAKE 1 TABLET BY MOUTH EVERY DAY    ARIPiprazole (ABILIFY) 2 mg, Oral, Daily    Aspirin Low Dose 81 MG chewable tablet CHEW 1 TABLET BY MOUTH DAILY    atorvastatin (LIPITOR) 80 mg tablet TAKE 1 TABLET BY MOUTH EVERY DAY IN THE EVENING    clopidogrel (PLAVIX) 75 mg, Oral, Daily    doxazosin (CARDURA) 2 mg tablet TAKE 1 TABLET BY MOUTH EVERY DAY    metoprolol succinate (TOPROL-XL) 12.5 mg, Oral, Daily    mometasone (ELOCON) 0.1 % cream Topical, Daily    Multiple Vitamin (MULTIVITAMIN PO) 1 tablet, Oral    mupirocin (BACTROBAN) 2 % ointment Topical, 2 times daily    nitroglycerin (NITROSTAT) 0.4 mg, Sublingual, Every 5 minutes PRN    SUMAtriptan (IMITREX) 50 mg, Oral, Every 2 hour PRN, Max 200 mg/day    torsemide (DEMADEX) 20 mg, Oral, 2 times daily    traZODone (DESYREL) 200 mg, Oral, Daily at bedtime,       valsartan (DIOVAN) 320 MG tablet TAKE 1 TABLET BY MOUTH EVERY DAY    venlafaxine (EFFEXOR-XR) 225 mg, Oral, Daily with breakfast    Ventolin  (90 Base) MCG/ACT inhaler 2 puffs, Inhalation, 3 times daily PRN    vitamin B-12 (VITAMIN B-12) 500 mcg, Oral, Daily    No Known Allergies   Social History     Tobacco Use    Smoking status: Never    Smokeless tobacco: Never    Tobacco comments:     never a smoker ( as per allscripts)   Vaping Use    Vaping status: Never Used   Substance Use Topics    Alcohol use: Yes     Alcohol/week: 30.0 standard drinks of alcohol     Types: 30 Cans of beer per week     Comment: patient byron joseph  is cutting down  to 1-2 packs of beer    Drug use: No    Family History   Problem Relation Age of Onset    Aneurysm Mother     Coronary artery disease Father     Cancer Other     Stroke Other         CVA    Hypertension Sister             Objective  :     Vitals: Invasive Monitoring      /99   Pulse 85   Resp 18   O2 Sat 97 %   O2 Device Nasal cannula   Temp 97.6 °F (36.4 °C)    Arterial Line  Lo BP    No data recorded   MAP    No data recorded     PA Catheter   Most Recent  Min/Max in 24hrs    PAP   No data recorded   CVP   No data recorded   CI   3.4 No data recorded   SVR   No data recorded          Physical Exam   Physical Exam  Eyes:      Pupils: Pupils are equal, round, and reactive to light.   Skin:     General: Skin is warm, dry and not mottled extremities.      Capillary Refill: Capillary refill takes less than 2 seconds.   Neck:      Vascular: Central line present. No JVD.   Cardiovascular:      Rate and Rhythm: Normal rate. Paced rhythm.      Pulses: Normal pulses.      Heart sounds: No murmur heard.     No friction rub. No gallop.   Musculoskeletal:      Right lower leg: Trace Edema present.      Left lower leg: Trace Edema present.   Abdominal: General: There is no distension.      Palpations: Abdomen is soft.      Tenderness: There is no abdominal tenderness. There is no guarding.   Constitutional:       General: He is not in acute distress.     Appearance: He is well-developed. He is not ill-appearing or toxic-appearing.      Interventions: He is sedated, intubated and restrained.   Pulmonary:      Effort: Pulmonary effort is normal. No accessory muscle usage, respiratory distress or accessory muscle usage. He is intubated.      Breath sounds: Normal breath sounds. No wheezing or rales.      Comments: Chest tubes with moderate serosang output.   115cc from pleural tube since arrival   85cc from mediastinal tube since arrival   Neurological:      General: No focal deficit present.      Mental  Status: He is agitated.      Cranial Nerves: No facial asymmetry.   Genitourinary/Anorectal:  Linder present.        Diagnostic Studies      10/08/24 CXR: Lines and tubes in good position, no PTX.   This was personally reviewed by myself in PACS.  10/08/24 EKG: NSR.   This was personally reviewed by myself.     Medications:  Scheduled PRN   acetaminophen, 975 mg, Q6H While awake  amiodarone, 200 mg, Q8H PAOLO  ARIPiprazole, 2 mg, Daily  atorvastatin, 80 mg, Daily With Dinner  cefazolin, 2,000 mg, Q8H  chlorhexidine, 15 mL, BID  clopidogrel, 75 mg, Daily  fentanyl citrate (PF), ,   fentaNYL, 50 mcg, Once  [START ON 10/9/2024] fondaparinux, 2.5 mg, Daily  magnesium sulfate, 2 g, Once  midazolam, ,   mupirocin, 1 Application, Q12H PAOLO  pantoprazole, 40 mg, Daily  polyethylene glycol, 17 g, Daily  [START ON 10/9/2024] venlafaxine, 225 mg, Daily With Breakfast      acetaminophen, 650 mg, Q4H PRN  albuterol, 2 puff, TID PRN  bisacodyl, 10 mg, Daily PRN  calcium gluconate, 2 g, Once PRN  fentanyl citrate (PF), ,   fentaNYL, 50 mcg, Q1H PRN  HYDROmorphone, 0.5 mg, Q2H PRN  lactated ringers, 500 mL, Once PRN  midazolam, ,   ondansetron, 4 mg, Q6H PRN  oxyCODONE, 2.5 mg, Q4H PRN   Or  oxyCODONE, 5 mg, Q4H PRN  potassium chloride, 20 mEq, Once PRN  potassium chloride, 40 mEq, Once PRN  potassium chloride, 40 mEq, Once PRN  SUMAtriptan, 50 mg, Q2H PRN  triamcinolone, , BID PRN       Continuous    aminocaproic acid (AMICAR) 5 g in sodium chloride 0.9 % 250 mL infusion, 1 g/hr  insulin regular (HumuLIN R,NovoLIN R) 1 Units/mL in sodium chloride 0.9 % 100 mL infusion, 0.3-21 Units/hr  niCARdipine, 2.5-15 mg/hr  phenylephine,  mcg/min  sodium chloride, 20 mL/hr         Labs:  CBC  Recent Labs     10/08/24  1214   *     BMP  No recent results    Coags  No recent results     Additional Electrolytes  No recent results       Blood Gas  No recent results  No recent results LFTs  No recent results    Infectious  No recent  results     No recent results Glucose  No recent results     Invasive lines and devices:  Invasive Devices       Central Venous Catheter Line  Duration             CVC Central Lines 10/08/24 <1 day    Introducer 10/08/24 <1 day              Peripheral Intravenous Line  Duration             Peripheral IV 10/08/24 Right Hand <1 day              Arterial Line  Duration             Arterial Line 10/08/24 Left Radial <1 day              Line  Duration             Pacer Wires <1 day    Pacer Wires <1 day              Drain  Duration             Chest Tube 1 Anterior;Mediastinal 32 Fr. <1 day    Chest Tube 2 Posterior;Mediastinal 32 Fr. <1 day    Chest Tube 3 Right Pleural 32 Fr. <1 day    Chest Tube 4 Left Pleural 32 Fr. <1 day    Urethral Catheter Non-latex;Temperature probe 16 Fr. <1 day              Airway  Duration             ETT  Cuffed 8 mm <1 day

## 2024-10-08 NOTE — OP NOTE
OPERATIVE REPORT  PATIENT NAME: Reuben García    :  1955  MRN: [id number]      SURGERY DATE: 10/8/2024    SURGEON: Manolo Sanford Jr., DO    ASSISTANT: Arun Griffin PA-C    ADDITIONAL ASSISTANT: N/A    PREOPERATIVE DIAGNOSIS: Severe mitral regurgitation    POSTOPERATIVE DIAGNOSES: Severe mitral regurgitation    NYHA CLASS: 3    CCS CLASS: 3    PROCEDURE:  Mitral valve repair with  anterior neocord placement to A2 and A3 and 36 mm Andry/LivaNova Francesco 4D Rechord mitral annuloplasty ring, and that is the edge for repair with commissuroplasty at P1-A1  Ligation left atrial appendage with 45 mm AtriClip    CARDIOPULMONARY BYPASS TIME: 97 minutes.   CROSSCLAMP TIME: 84 minutes.    ANESTHESIA: General endotracheal anesthesia with transesophageal echocardiogram guidance, Dr. Armen Chow     INDICATIONS:  The patient is a 69 y.o. year-old male with clinical and echocardiographic findings consistent with severe mitral regurgitation.     FINDINGS:  Intraoperative transesophageal echocardiogram revealed severe mitral regurgitation secondary to torn anterior leaflet cord at A1, and elongation of cords at A3, resulting in prolapse of the anterior mitral leaflet.  Epiaortic ultrasound showed no plaque or atheroma  Inspection of the mitral valve reveals  Type II mitral valve disease with torn anterior leaflet cord at A1, and elongation of cords at a 3, resulting in prolapse of the anterior mitral leaflet.  Final transesophageal echocardiogram demonstrated succesful repair of the mitral valve without evidence of regurgitation and no mitral stenosis or SHELTON.  Normal biventricular function.      OPERATIVE TECHNIQUE:   The patient was taken to the operating room and placed supine on the operating table. Following the satisfactory induction of general anesthesia and the placement of monitoring lines the patient was prepped and draped in the usual sterile fashion. A time-out procedure was performed.    The patient  underwent median sternotomy, pericardiotomy, and systemic heparinization. Epiaortic ultrasound showed no plaque or atheroma. Cannulation for cardiopulmonary bypass was performed with an arterial dispersion cannula, bicaval single stage venous cannulas and a vented antegrade cardioplegia cannula. Once the ACT was greater than 480 seconds we placed the patient on cardiopulmonary bypass, the ascending aorta was crossclamped and cardiac arrest was obtained without complications with [place] cardioplegia. A CO2 flooded field was used during the entire case.    The left atrial appendage was ligated using a 45 mm AtriClip device.    Waterson's groove was dissected, left atriotomy was performed and the mitral valve was exposed with a self-retaining retractor. Thorough mitral valve analysis was performed. There was Type IImitral valve disease with anterior leaflet torn chordae A1, with the ligation of anterior cords at A3, resulting anterior leaflet prolapse. I felt that the valve was therefore repairable. Annuloplasty sutures were placed with 2-0 Ethibond. The following techniques were used for valve repair:  A1-P1 commisuroplasty with 5-0 Prolene suture, Goretex neocord placement from both papillary muscles to anterior leaflet, and placement of a true-size 36 mm Andry/LivaNova Francesco 4D Rechord mitral annuloplasty ring . The 2-0 Ethibond sutures were passed through the ring, the ring was seated and the sutures were tied down. The valve was tested and found to be competent.     While de-airing the heart the left atriotomy was closed with  2 layers of running 4-0 Prolene suture. The heart was extensively de-aired, a dose of warm antegrade blood was delivered and the crossclamp was removed. Atrial and ventricular pacing wires were placed. Following a period of reperfusion the patient was weaned from cardiopulmonary bypass and decannulated. Protamine was administered with normalization of the ACT. Hemostasis was confirmed in  all fields. Thoracostomy tubes were placed. The sternum was closed with stainless steel wires. The fascia, subdermis and skin were closed with multiple layers of running absorbable suture.    As the attending surgeon, I was present and scrubbed for all critical portions of this procedure. There was no qualified surgical resident available. Sponge, needle and instrument counts were reported as correct by the nursing staff. The assistance of a PA was required to complete this case, specifically for assistance with cannulation, decannulation, and exposure during mitral valve repair.     COMPLICATIONS: None    PACKS/TUBES/DRAINS: Chest tubes x 4.     EBL: 250mL.    TRANSFUSION: 1 Plts.     SPECIMENS: Torn cord of anterior mitral valve leaflet          SIGNATURE: Manolo Sanford DO  DATE: October 8, 2024  TIME: 2:26 PM

## 2024-10-08 NOTE — ANESTHESIA POSTPROCEDURE EVALUATION
Post-Op Assessment Note    CV Status:  Stable    Pain management: adequate       Hydration Status:  Stable   Airway Patency:  Patent  Airway: intubated     Post Op Vitals Reviewed: Yes    No anethesia notable event occurred.    Staff: Anesthesiologist           Last Filed PACU Vitals:  Vitals Value Taken Time   Temp     Pulse 80 A paced    /50    Resp 18    SpO2 100%

## 2024-10-08 NOTE — ANESTHESIA PROCEDURE NOTES
Arterial Line Insertion    Performed by: Armen Chow MD  Authorized by: Armen Chow MD  Preparation: Patient was prepped and draped in the usual sterile fashion.  Indications: hemodynamic monitoring  Orientation:  Left  Location: radial artery  Sedation:  Patient sedated: yes  Sedation type: anxiolysis  Sedatives: midazolam    Procedure Details:  Needle gauge: 20  Seldinger technique: Seldinger technique used  Number of attempts: 1    Post-procedure:  Post-procedure: dressing applied  Waveform: good waveform  Post-procedure CNS: unchanged  Patient tolerance: Patient tolerated the procedure well with no immediate complications

## 2024-10-08 NOTE — RESPIRATORY THERAPY NOTE
10/08/24 1410   Respiratory Assessment   Assessment Type Assess only   General Appearance Sedated   Respiratory Pattern Assisted   Chest Assessment Chest expansion symmetrical   Bilateral Breath Sounds Clear   Resp Comments Recieved pt from OR orally intubated with size 8.0 secured at 22 cm at lips with white tape Pt placed on vent with settings of ACVC 18/420/50%/6+. All alarms on and functioning. BVM present at bedside. Equal bilateral breath sounds. ISTAT used and results given to MD and no changes made at this time. Will continue to monitor pt. .   Vent Information   Vent ID [id number]   Vent type     Vent Mode AC/VC   $ Vent Charge-INITIAL Yes   Ventilator Start Yes   $ Pulse Oximetry Spot Check Charge Completed   AC/VC Settings   Resp Rate (BPM) 18 BPM   Vt (mL) 420 mL   FIO2 (%) 50 %   PEEP (cmH2O) 6 cmH2O   Flow Pattern (LPM) 60 L/min   Trigger Sensitivity Pressure (cm H2O)  3 %   Humidification Heat and moisture exchanger   Heater Temperature (Set)   (HME)   AC/VC Actuals   Resp Rate (BPM) 20 BPM   VT (mL) 334   MV 6.41   MAP (cmH2O) 11 cmH2O   Peak Pressure (cmH2O) 24 cmH2O   I/E Ratio (Obs) 1:2.2   Heater Temperature (Obs)   (HME)   Static Compliance (mL/cmH20) 23 mL/cmH2O   Plateau Pressure (cm H2O) 21 cm H2O   AC/VC Alarms   High Peak Pressure (cmH2O) 40   High Resp Rate (BPM) 40 BPM   High MV (L/min) 22 L/min   Low MV (L/min) 3 L/min   Vt High (mL) 1000 mL   Vt Low (mL) 250 mL   AC/VC Apnea Settings   Resp Rate (BPM) 18 BPM   VT (mL) 420 mL   FIO2 (%) 100 %   Apnea Time (s) 20 S   Apnea Flow (L/min) 60 L/min   Maintenance   Alarm (pink) cable attached No   Resuscitation bag with peep valve at bedside Yes   Water bag changed No   Circuit changed No   Daily Screen   Patient safety screen outcome: Failed   Not Ready for Weaning due to: Underline problem not resolved   IHI Ventilator Associated Pneumonia Bundle   Daily Assessment of Readiness to Extubate Yes   Head of Bed Elevated HOB 30   ETT   Cuffed 8 mm   Placement Date/Time: 10/08/24 0959   Mask Ventilation: Ventilated by mask (1)  Preoxygenated: Yes  Technique: Direct laryngoscopy;Stylet  Type: Cuffed  Tube Size: 8 mm  Laryngoscope: Mac  Blade Size: 3  Location: Oral  Grade View: Full view of the santiago...   Secured at (cm) 22   Measured from Lips   Secured Location Right   Secured by Cloth tape   Site Condition Dry   Cuff Pressure (cm H2O)   (MLT)   Cuff Pressure (color) Green   HI-LO Suction  Intermittent suction   HI-LO Secretions Scant   HI-LO Intervention Patent

## 2024-10-08 NOTE — ANESTHESIA PREPROCEDURE EVALUATION
Procedure:  REPLACEMENT VALVE MITRAL (MVR) WITH TISSUE VALVE (Chest)    Relevant Problems   ANESTHESIA (within normal limits)      CARDIO   (+) Acute on chronic heart failure with preserved ejection fraction (HFpEF) ([facility])   (+) Coronary artery disease involving native coronary artery of native heart without angina pectoris   (+) Essential hypertension   (+) Migraine headache   (+) Mixed hyperlipidemia   (+) Nonrheumatic mitral valve regurgitation   (-) Angina at rest ([facility])   (-) Angina of effort ([facility])   (-) Chest pain   (-) MCCRARY (dyspnea on exertion)      ENDO   (-) Diabetes mellitus type 1 (HCC)   (-) Diabetes mellitus, type 2 (HCC)      GI/HEPATIC   (+) Gastroesophageal reflux disease without esophagitis      /RENAL   (-) Chronic kidney disease      NEURO/PSYCH   (+) Anxiety   (+) Migraine headache   (+) Recurrent major depressive disorder, in remission ([facility])   (-) CVA (cerebral vascular accident) ([facility])   (-) Seizures (HCC)      PULMONARY   (+) Mild intermittent asthma without complication   (-) Chronic obstructive pulmonary disease ([facility])   (-) Sleep apnea      Echo 09/19/2024:    Left Ventricle: Left ventricular cavity size is normal. Wall thickness is mildly increased. There is mild concentric hypertrophy. The left ventricular ejection fraction is 55%. Systolic function is normal.    Mitral Valve: There is moderate diffuse thickening. There is moderate calcification. The lateral segment (A1) of the anterior leaflet is flail. There is a ruptured chord. There is severe regurgitation with a posteriorly directed wall-impinging jet.    The following segments are hypokinetic: basal inferoseptal, basal inferior and mid inferior.    All other segments are normal.    Right Ventricle: Right ventricular cavity size is mildly dilated.    Left Atrium: The atrium is severely dilated. There is no thrombus.    Right Atrium: The atrium is dilated.    Atrial Septum: No patent foramen ovale detected, confirmed at rest using color  doppler.    Left Atrial Appendage: There is normal function. There is no thrombus.    Aortic Valve: There is mild regurgitation. There is aortic valve sclerosis.    [facility] 09/19/2024:  No obstructive CAD, patent stents      Physical Exam    Airway    Mallampati score: II  TM Distance: >3 FB  Neck ROM: full     Dental   No notable dental hx     Cardiovascular      Pulmonary      Other Findings        Anesthesia Plan  ASA Score- 4     Anesthesia Type- general with ASA Monitors.         Additional Monitors: arterial line, central venous line and pulmonary artery catheter.    Airway Plan: ETT.    Comment: THOR.       Plan Factors-Exercise tolerance (METS): >4 METS.    Chart reviewed. EKG reviewed. Imaging results reviewed. Existing labs reviewed. Patient summary reviewed.                  Induction- intravenous.    Postoperative Plan- Plan for postoperative opioid use. Planned trial extubation        Informed Consent- Anesthetic plan and risks discussed with patient.  I personally reviewed this patient with the CRNA. Discussed and agreed on the Anesthesia Plan with the CRNA..

## 2024-10-09 ENCOUNTER — APPOINTMENT (INPATIENT)
Dept: RADIOLOGY | Facility: HOSPITAL | Age: 69
DRG: 219 | End: 2024-10-09
Payer: COMMERCIAL

## 2024-10-09 LAB
ABO GROUP BLD BPU: NORMAL
ANION GAP SERPL CALCULATED.3IONS-SCNC: 11 MMOL/L (ref 4–13)
ATRIAL RATE: 51 BPM
ATRIAL RATE: 65 BPM
BASE EXCESS BLDA CALC-SCNC: -7.4 MMOL/L
BPU ID: NORMAL
BUN SERPL-MCNC: 26 MG/DL (ref 5–25)
BUN SERPL-MCNC: 31 MG/DL (ref 5–25)
BUN SERPL-MCNC: 34 MG/DL (ref 5–25)
CALCIUM SERPL-MCNC: 7.8 MG/DL (ref 8.4–10.2)
CALCIUM SERPL-MCNC: 7.9 MG/DL (ref 8.4–10.2)
CALCIUM SERPL-MCNC: 8.2 MG/DL (ref 8.4–10.2)
CHLORIDE SERPL-SCNC: 103 MMOL/L (ref 96–108)
CHLORIDE SERPL-SCNC: 105 MMOL/L (ref 96–108)
CHLORIDE SERPL-SCNC: 107 MMOL/L (ref 96–108)
CO2 SERPL-SCNC: 22 MMOL/L (ref 21–32)
CO2 SERPL-SCNC: 23 MMOL/L (ref 21–32)
CO2 SERPL-SCNC: 24 MMOL/L (ref 21–32)
CREAT SERPL-MCNC: 1.49 MG/DL (ref 0.6–1.3)
CREAT SERPL-MCNC: 2.31 MG/DL (ref 0.6–1.3)
CREAT SERPL-MCNC: 2.37 MG/DL (ref 0.6–1.3)
CROSSMATCH: NORMAL
ERYTHROCYTE [DISTWIDTH] IN BLOOD BY AUTOMATED COUNT: 16.5 % (ref 11.6–15.1)
GFR SERPL CREATININE-BSD FRML MDRD: 27 ML/MIN/1.73SQ M
GFR SERPL CREATININE-BSD FRML MDRD: 28 ML/MIN/1.73SQ M
GFR SERPL CREATININE-BSD FRML MDRD: 47 ML/MIN/1.73SQ M
GLUCOSE SERPL-MCNC: 115 MG/DL (ref 65–140)
GLUCOSE SERPL-MCNC: 120 MG/DL (ref 65–140)
GLUCOSE SERPL-MCNC: 123 MG/DL (ref 65–140)
GLUCOSE SERPL-MCNC: 129 MG/DL (ref 65–140)
GLUCOSE SERPL-MCNC: 139 MG/DL (ref 65–140)
GLUCOSE SERPL-MCNC: 144 MG/DL (ref 65–140)
GLUCOSE SERPL-MCNC: 175 MG/DL (ref 65–140)
GLUCOSE SERPL-MCNC: 175 MG/DL (ref 65–140)
GLUCOSE SERPL-MCNC: 184 MG/DL (ref 65–140)
GLUCOSE SERPL-MCNC: 185 MG/DL (ref 65–140)
HCO3 BLDA-SCNC: 18.9 MMOL/L (ref 22–28)
HCT VFR BLD AUTO: 33.6 % (ref 36.5–49.3)
HGB BLD-MCNC: 11.1 G/DL (ref 12–17)
INR PPP: 1.2 (ref 0.85–1.19)
MCH RBC QN AUTO: 31.2 PG (ref 26.8–34.3)
MCHC RBC AUTO-ENTMCNC: 33 G/DL (ref 31.4–37.4)
MCV RBC AUTO: 94 FL (ref 82–98)
O2 CT BLDA-SCNC: 17.2 ML/DL (ref 16–23)
OXYHGB MFR BLDA: 96.7 % (ref 94–97)
PCO2 BLDA: 41.1 MM HG (ref 36–44)
PH BLDA: 7.28 [PH] (ref 7.35–7.45)
PLATELET # BLD AUTO: 160 THOUSANDS/UL (ref 149–390)
PMV BLD AUTO: 10.1 FL (ref 8.9–12.7)
PO2 BLDA: 129.9 MM HG (ref 75–129)
POTASSIUM SERPL-SCNC: 4.1 MMOL/L (ref 3.5–5.3)
POTASSIUM SERPL-SCNC: 4.5 MMOL/L (ref 3.5–5.3)
POTASSIUM SERPL-SCNC: 4.8 MMOL/L (ref 3.5–5.3)
PROTHROMBIN TIME: 15.5 SECONDS (ref 12.3–15)
QRS AXIS: 73 DEGREES
QRS AXIS: 80 DEGREES
QRSD INTERVAL: 88 MS
QRSD INTERVAL: 98 MS
QT INTERVAL: 448 MS
QT INTERVAL: 472 MS
QTC INTERVAL: 438 MS
QTC INTERVAL: 448 MS
RBC # BLD AUTO: 3.56 MILLION/UL (ref 3.88–5.62)
SODIUM SERPL-SCNC: 138 MMOL/L (ref 135–147)
SODIUM SERPL-SCNC: 139 MMOL/L (ref 135–147)
SODIUM SERPL-SCNC: 140 MMOL/L (ref 135–147)
SPECIMEN SOURCE: ABNORMAL
T WAVE AXIS: 52 DEGREES
T WAVE AXIS: 59 DEGREES
UNIT DISPENSE STATUS: NORMAL
UNIT PRODUCT CODE: NORMAL
UNIT PRODUCT VOLUME: 125 ML
UNIT PRODUCT VOLUME: 125 ML
UNIT PRODUCT VOLUME: 280 ML
UNIT PRODUCT VOLUME: 280 ML
UNIT PRODUCT VOLUME: 300 ML
UNIT PRODUCT VOLUME: 350 ML
UNIT RH: NORMAL
VENTRICULAR RATE: 52 BPM
VENTRICULAR RATE: 60 BPM
WBC # BLD AUTO: 10.88 THOUSAND/UL (ref 4.31–10.16)

## 2024-10-09 PROCEDURE — 99232 SBSQ HOSP IP/OBS MODERATE 35: CPT | Performed by: ANESTHESIOLOGY

## 2024-10-09 PROCEDURE — 93005 ELECTROCARDIOGRAM TRACING: CPT

## 2024-10-09 PROCEDURE — 97535 SELF CARE MNGMENT TRAINING: CPT

## 2024-10-09 PROCEDURE — 80048 BASIC METABOLIC PNL TOTAL CA: CPT | Performed by: PHYSICIAN ASSISTANT

## 2024-10-09 PROCEDURE — 97167 OT EVAL HIGH COMPLEX 60 MIN: CPT

## 2024-10-09 PROCEDURE — 85610 PROTHROMBIN TIME: CPT | Performed by: PHYSICIAN ASSISTANT

## 2024-10-09 PROCEDURE — 97163 PT EVAL HIGH COMPLEX 45 MIN: CPT

## 2024-10-09 PROCEDURE — 94664 DEMO&/EVAL PT USE INHALER: CPT

## 2024-10-09 PROCEDURE — 99024 POSTOP FOLLOW-UP VISIT: CPT | Performed by: PHYSICIAN ASSISTANT

## 2024-10-09 PROCEDURE — 71045 X-RAY EXAM CHEST 1 VIEW: CPT

## 2024-10-09 PROCEDURE — 82948 REAGENT STRIP/BLOOD GLUCOSE: CPT

## 2024-10-09 PROCEDURE — 85027 COMPLETE CBC AUTOMATED: CPT | Performed by: PHYSICIAN ASSISTANT

## 2024-10-09 PROCEDURE — 82805 BLOOD GASES W/O2 SATURATION: CPT | Performed by: PHYSICIAN ASSISTANT

## 2024-10-09 PROCEDURE — 93010 ELECTROCARDIOGRAM REPORT: CPT | Performed by: INTERNAL MEDICINE

## 2024-10-09 RX ORDER — METHOCARBAMOL 500 MG/1
500 TABLET, FILM COATED ORAL EVERY 6 HOURS PRN
Status: DISCONTINUED | OUTPATIENT
Start: 2024-10-09 | End: 2024-10-10

## 2024-10-09 RX ORDER — AMOXICILLIN 250 MG
2 CAPSULE ORAL 2 TIMES DAILY
Status: DISCONTINUED | OUTPATIENT
Start: 2024-10-09 | End: 2024-10-16 | Stop reason: HOSPADM

## 2024-10-09 RX ORDER — WARFARIN SODIUM 2 MG/1
2 TABLET ORAL
Status: COMPLETED | OUTPATIENT
Start: 2024-10-09 | End: 2024-10-09

## 2024-10-09 RX ORDER — HYDROMORPHONE HCL IN WATER/PF 6 MG/30 ML
0.2 PATIENT CONTROLLED ANALGESIA SYRINGE INTRAVENOUS EVERY 6 HOURS PRN
Status: DISCONTINUED | OUTPATIENT
Start: 2024-10-09 | End: 2024-10-09

## 2024-10-09 RX ORDER — AMOXICILLIN 250 MG
1 CAPSULE ORAL 2 TIMES DAILY
Status: DISCONTINUED | OUTPATIENT
Start: 2024-10-09 | End: 2024-10-09

## 2024-10-09 RX ORDER — FUROSEMIDE 10 MG/ML
40 INJECTION INTRAMUSCULAR; INTRAVENOUS
Status: DISCONTINUED | OUTPATIENT
Start: 2024-10-09 | End: 2024-10-09

## 2024-10-09 RX ORDER — DIAZEPAM 5 MG/1
5 TABLET ORAL EVERY 6 HOURS PRN
Status: DISCONTINUED | OUTPATIENT
Start: 2024-10-09 | End: 2024-10-11

## 2024-10-09 RX ORDER — BUMETANIDE 0.25 MG/ML
2 INJECTION, SOLUTION INTRAMUSCULAR; INTRAVENOUS ONCE
Status: COMPLETED | OUTPATIENT
Start: 2024-10-09 | End: 2024-10-09

## 2024-10-09 RX ORDER — BUMETANIDE 0.25 MG/ML
2 INJECTION INTRAMUSCULAR; INTRAVENOUS CONTINUOUS
Status: DISCONTINUED | OUTPATIENT
Start: 2024-10-09 | End: 2024-10-13

## 2024-10-09 RX ORDER — POTASSIUM CHLORIDE 1500 MG/1
20 TABLET, EXTENDED RELEASE ORAL 2 TIMES DAILY
Status: DISCONTINUED | OUTPATIENT
Start: 2024-10-09 | End: 2024-10-11

## 2024-10-09 RX ORDER — BUMETANIDE 0.25 MG/ML
4 INJECTION, SOLUTION INTRAMUSCULAR; INTRAVENOUS ONCE
Status: COMPLETED | OUTPATIENT
Start: 2024-10-09 | End: 2024-10-09

## 2024-10-09 RX ORDER — HEPARIN SODIUM 5000 [USP'U]/ML
5000 INJECTION, SOLUTION INTRAVENOUS; SUBCUTANEOUS EVERY 8 HOURS SCHEDULED
Status: DISCONTINUED | OUTPATIENT
Start: 2024-10-09 | End: 2024-10-11

## 2024-10-09 RX ORDER — INSULIN LISPRO 100 [IU]/ML
1-6 INJECTION, SOLUTION INTRAVENOUS; SUBCUTANEOUS
Status: DISCONTINUED | OUTPATIENT
Start: 2024-10-09 | End: 2024-10-16 | Stop reason: HOSPADM

## 2024-10-09 RX ORDER — VENLAFAXINE HYDROCHLORIDE 150 MG/1
150 CAPSULE, EXTENDED RELEASE ORAL
Status: DISCONTINUED | OUTPATIENT
Start: 2024-10-10 | End: 2024-10-16 | Stop reason: HOSPADM

## 2024-10-09 RX ORDER — METOLAZONE 10 MG/1
10 TABLET ORAL ONCE
Status: COMPLETED | OUTPATIENT
Start: 2024-10-09 | End: 2024-10-09

## 2024-10-09 RX ADMIN — FUROSEMIDE 40 MG: 10 INJECTION, SOLUTION INTRAVENOUS at 08:28

## 2024-10-09 RX ADMIN — OXYCODONE HYDROCHLORIDE 5 MG: 5 TABLET ORAL at 00:37

## 2024-10-09 RX ADMIN — AMIODARONE HYDROCHLORIDE 200 MG: 200 TABLET ORAL at 22:30

## 2024-10-09 RX ADMIN — OXYCODONE HYDROCHLORIDE 5 MG: 5 TABLET ORAL at 05:34

## 2024-10-09 RX ADMIN — CHLORHEXIDINE GLUCONATE 0.12% ORAL RINSE 15 ML: 1.2 LIQUID ORAL at 08:28

## 2024-10-09 RX ADMIN — ATORVASTATIN CALCIUM 80 MG: 80 TABLET, FILM COATED ORAL at 15:47

## 2024-10-09 RX ADMIN — INSULIN LISPRO 1 UNITS: 100 INJECTION, SOLUTION INTRAVENOUS; SUBCUTANEOUS at 16:47

## 2024-10-09 RX ADMIN — INSULIN LISPRO 1 UNITS: 100 INJECTION, SOLUTION INTRAVENOUS; SUBCUTANEOUS at 11:12

## 2024-10-09 RX ADMIN — HYDROMORPHONE HYDROCHLORIDE 0.5 MG: 1 INJECTION, SOLUTION INTRAMUSCULAR; INTRAVENOUS; SUBCUTANEOUS at 04:03

## 2024-10-09 RX ADMIN — CLOPIDOGREL BISULFATE 75 MG: 75 TABLET ORAL at 08:29

## 2024-10-09 RX ADMIN — DIAZEPAM 5 MG: 5 TABLET ORAL at 14:17

## 2024-10-09 RX ADMIN — ACETAMINOPHEN 975 MG: 325 TABLET, FILM COATED ORAL at 08:29

## 2024-10-09 RX ADMIN — ACETAMINOPHEN 975 MG: 325 TABLET, FILM COATED ORAL at 20:41

## 2024-10-09 RX ADMIN — DIAZEPAM 5 MG: 5 TABLET ORAL at 20:41

## 2024-10-09 RX ADMIN — MUPIROCIN 1 APPLICATION: 20 OINTMENT TOPICAL at 20:42

## 2024-10-09 RX ADMIN — AMIODARONE HYDROCHLORIDE 200 MG: 200 TABLET ORAL at 14:06

## 2024-10-09 RX ADMIN — Medication 4 MG/HR: at 16:02

## 2024-10-09 RX ADMIN — HEPARIN SODIUM 5000 UNITS: 5000 INJECTION INTRAVENOUS; SUBCUTANEOUS at 22:33

## 2024-10-09 RX ADMIN — CEFAZOLIN SODIUM 2000 MG: 2 SOLUTION INTRAVENOUS at 04:05

## 2024-10-09 RX ADMIN — OXYCODONE HYDROCHLORIDE 5 MG: 5 TABLET ORAL at 17:16

## 2024-10-09 RX ADMIN — BUMETANIDE 2 MG: 0.25 INJECTION INTRAMUSCULAR; INTRAVENOUS at 11:01

## 2024-10-09 RX ADMIN — OXYCODONE HYDROCHLORIDE 5 MG: 5 TABLET ORAL at 22:30

## 2024-10-09 RX ADMIN — CHLORHEXIDINE GLUCONATE 0.12% ORAL RINSE 15 ML: 1.2 LIQUID ORAL at 17:58

## 2024-10-09 RX ADMIN — ONDANSETRON 4 MG: 2 INJECTION INTRAMUSCULAR; INTRAVENOUS at 05:34

## 2024-10-09 RX ADMIN — METHOCARBAMOL 500 MG: 500 TABLET ORAL at 10:03

## 2024-10-09 RX ADMIN — NICARDIPINE HYDROCHLORIDE 5 MG/HR: 25 INJECTION, SOLUTION INTRAVENOUS at 15:51

## 2024-10-09 RX ADMIN — BUMETANIDE 4 MG: 0.25 INJECTION INTRAMUSCULAR; INTRAVENOUS at 12:39

## 2024-10-09 RX ADMIN — Medication 4 MG/HR: at 19:10

## 2024-10-09 RX ADMIN — METHOCARBAMOL 500 MG: 500 TABLET ORAL at 18:22

## 2024-10-09 RX ADMIN — PANTOPRAZOLE SODIUM 40 MG: 40 TABLET, DELAYED RELEASE ORAL at 05:34

## 2024-10-09 RX ADMIN — METOLAZONE 10 MG: 10 TABLET ORAL at 14:17

## 2024-10-09 RX ADMIN — POTASSIUM CHLORIDE 20 MEQ: 1500 TABLET, EXTENDED RELEASE ORAL at 17:58

## 2024-10-09 RX ADMIN — Medication 4 MG/HR: at 23:53

## 2024-10-09 RX ADMIN — SENNOSIDES AND DOCUSATE SODIUM 1 TABLET: 50; 8.6 TABLET ORAL at 08:29

## 2024-10-09 RX ADMIN — POLYETHYLENE GLYCOL 3350 17 G: 17 POWDER, FOR SOLUTION ORAL at 08:29

## 2024-10-09 RX ADMIN — ARIPIPRAZOLE 2 MG: 2 TABLET ORAL at 08:32

## 2024-10-09 RX ADMIN — VENLAFAXINE HYDROCHLORIDE 225 MG: 150 CAPSULE, EXTENDED RELEASE ORAL at 08:32

## 2024-10-09 RX ADMIN — WARFARIN SODIUM 2 MG: 2 TABLET ORAL at 17:58

## 2024-10-09 RX ADMIN — OXYCODONE HYDROCHLORIDE 5 MG: 5 TABLET ORAL at 10:03

## 2024-10-09 RX ADMIN — HEPARIN SODIUM 5000 UNITS: 5000 INJECTION INTRAVENOUS; SUBCUTANEOUS at 15:47

## 2024-10-09 RX ADMIN — Medication 2 MG/HR: at 12:48

## 2024-10-09 RX ADMIN — POTASSIUM CHLORIDE 20 MEQ: 1500 TABLET, EXTENDED RELEASE ORAL at 08:29

## 2024-10-09 RX ADMIN — MUPIROCIN 1 APPLICATION: 20 OINTMENT TOPICAL at 08:29

## 2024-10-09 RX ADMIN — Medication 4 MG/HR: at 20:58

## 2024-10-09 RX ADMIN — CEFAZOLIN SODIUM 2000 MG: 2 SOLUTION INTRAVENOUS at 11:13

## 2024-10-09 RX ADMIN — HYDROMORPHONE HYDROCHLORIDE 0.5 MG: 1 INJECTION, SOLUTION INTRAMUSCULAR; INTRAVENOUS; SUBCUTANEOUS at 01:59

## 2024-10-09 RX ADMIN — ACETAMINOPHEN 975 MG: 325 TABLET, FILM COATED ORAL at 14:06

## 2024-10-09 RX ADMIN — HEPARIN SODIUM 5000 UNITS: 5000 INJECTION INTRAVENOUS; SUBCUTANEOUS at 08:29

## 2024-10-09 NOTE — PROGRESS NOTES
24 hours events:   POD #1 complex MVR with 36 mm ring annuloplasty and LAAL  Extubated  Weaned off epinephrine and weaned off levophed    Physical exam  When I walked into room he was OOB and sitting up in bed, he was diaphoretic at time of my exam after working with PT. Mental status baseline moving all extremities spontaneously and equally. NSR     Impression  Severe MR POD#1 MVR ring annuloplasty and LAAL  Hx CAD s/p PCI/ESTHER  HFpEF with G2DD  HTN  HLD  Asthma  GERD  Regular alcohol use    Plan:  Pain control  Maintain Epicardial pacing wires  Hold BB  Diuretics Lasix goal negative  Maintain bhavani  ASA and statin  Maintain chest tubes  Encourage deep cough, breathing and IS  PO diet  Maintain Linder  Transition to SSI  Complete post op ABX  PT/OT encourage OOB and ambulation    Afternoon update:   Poor response to lasix escalated to bumex infusion and trialed metolazone remains with poor urine output  Rising Cr, perfusion pressure adequate and CI well above 2.2 suspect FRANCISCO/ATN as cause rather than ongoing hypoperfusion   Urine output slightly increased to about 50 cc hour late evening

## 2024-10-09 NOTE — PLAN OF CARE
Problem: Prexisting or High Potential for Compromised Skin Integrity  Goal: Skin integrity is maintained or improved  Description: INTERVENTIONS:  - Identify patients at risk for skin breakdown  - Assess and monitor skin integrity  - Assess and monitor nutrition and hydration status  - Monitor labs   - Assess for incontinence   - Turn and reposition patient  - Assist with mobility/ambulation  - Relieve pressure over bony prominences  - Avoid friction and shearing  - Provide appropriate hygiene as needed including keeping skin clean and dry  - Evaluate need for skin moisturizer/barrier cream  - Collaborate with interdisciplinary team   - Patient/family teaching  - Consider wound care consult   Outcome: Progressing     Problem: NEUROSENSORY - ADULT  Goal: Achieves stable or improved neurological status  Description: INTERVENTIONS  - Monitor and report changes in neurological status  - Monitor vital signs such as temperature, blood pressure, glucose, and any other labs ordered   - Initiate measures to prevent increased intracranial pressure  - Monitor for seizure activity and implement precautions if appropriate      Outcome: Progressing  Goal: Remains free of injury related to seizures activity  Description: INTERVENTIONS  - Maintain airway, patient safety  and administer oxygen as ordered  - Monitor patient for seizure activity, document and report duration and description of seizure to physician/advanced practitioner  - If seizure occurs,  ensure patient safety during seizure  - Reorient patient post seizure  - Seizure pads on all 4 side rails  - Instruct patient/family to notify RN of any seizure activity including if an aura is experienced  - Instruct patient/family to call for assistance with activity based on nursing assessment  - Administer anti-seizure medications if ordered    Outcome: Progressing  Goal: Achieves maximal functionality and self care  Description: INTERVENTIONS  - Monitor swallowing and  airway patency with patient fatigue and changes in neurological status  - Encourage and assist patient to increase activity and self care.   - Encourage visually impaired, hearing impaired and aphasic patients to use assistive/communication devices  Outcome: Progressing     Problem: CARDIOVASCULAR - ADULT  Goal: Maintains optimal cardiac output and hemodynamic stability  Description: INTERVENTIONS:  - Monitor I/O, vital signs and rhythm  - Monitor for S/S and trends of decreased cardiac output  - Administer and titrate ordered vasoactive medications to optimize hemodynamic stability  - Assess quality of pulses, skin color and temperature  - Assess for signs of decreased coronary artery perfusion  - Instruct patient to report change in severity of symptoms  Outcome: Progressing  Goal: Absence of cardiac dysrhythmias or at baseline rhythm  Description: INTERVENTIONS:  - Continuous cardiac monitoring, vital signs, obtain 12 lead EKG if ordered  - Administer antiarrhythmic and heart rate control medications as ordered  - Monitor electrolytes and administer replacement therapy as ordered  Outcome: Progressing     Problem: GASTROINTESTINAL - ADULT  Goal: Maintains adequate nutritional intake  Description: INTERVENTIONS:  - Monitor percentage of each meal consumed  - Identify factors contributing to decreased intake, treat as appropriate  - Assist with meals as needed  - Monitor I&O, weight, and lab values if indicated  - Obtain nutrition services referral as needed  Outcome: Progressing  Goal: Oral mucous membranes remain intact  Description: INTERVENTIONS  - Assess oral mucosa and hygiene practices  - Implement preventative oral hygiene regimen  - Implement oral medicated treatments as ordered  - Initiate Nutrition services referral as needed  Outcome: Progressing     Problem: METABOLIC, FLUID AND ELECTROLYTES - ADULT  Goal: Electrolytes maintained within normal limits  Description: INTERVENTIONS:  - Monitor labs and  assess patient for signs and symptoms of electrolyte imbalances  - Administer electrolyte replacement as ordered  - Monitor response to electrolyte replacements, including repeat lab results as appropriate  - Instruct patient on fluid and nutrition as appropriate  Outcome: Progressing  Goal: Fluid balance maintained  Description: INTERVENTIONS:  - Monitor labs   - Monitor I/O and WT  - Instruct patient on fluid and nutrition as appropriate  - Assess for signs & symptoms of volume excess or deficit  Outcome: Progressing  Goal: Glucose maintained within target range  Description: INTERVENTIONS:  - Monitor Blood Glucose as ordered  - Assess for signs and symptoms of hyperglycemia and hypoglycemia  - Administer ordered medications to maintain glucose within target range  - Assess nutritional intake and initiate nutrition service referral as needed  Outcome: Progressing     Problem: SKIN/TISSUE INTEGRITY - ADULT  Goal: Incision(s), wounds(s) or drain site(s) healing without S/S of infection  Description: INTERVENTIONS  - Assess and document dressing, incision, wound bed, drain sites and surrounding tissue  - Provide patient and family education  - Perform skin care/dressing changes as needed  Outcome: Progressing     Problem: HEMATOLOGIC - ADULT  Goal: Maintains hematologic stability  Description: INTERVENTIONS  - Assess for signs and symptoms of bleeding or hemorrhage  - Monitor labs  - Administer supportive blood products/factors as ordered and appropriate  Outcome: Progressing

## 2024-10-09 NOTE — PROGRESS NOTES
Progress Note - Cardiac Surgery   Reuben García 69 y.o. male MRN: [id number]  Unit/Bed#: PPHP-313-01 Encounter: [id number]    Severe Mitral regurgitation. S/P mitral valve repair 36 mm Francesco and LAAL; POD # 1    24 Hour Events:     Coumadin held. Had 1 plt postoperatively then increased CT output additional: Amicar, 2 FFP and 2 Cryo given for high chest tube output.     Remains on Levo 2    Junctional bradycardia in the 50s underneath pacer, A paced 70     Medications:   Scheduled Meds:  Current Facility-Administered Medications   Medication Dose Route Frequency Provider Last Rate    acetaminophen  650 mg Rectal Q4H PRN Arun Griffin PA-C      acetaminophen  975 mg Oral Q6H While awake Arun Griffin PA-C      albuterol  2 puff Inhalation TID PRN Arun Griffin PA-C      amiodarone  200 mg Oral Q8H [facility] Arun Griffin PA-C      ARIPiprazole  2 mg Oral Daily Arun Griffin PA-C      atorvastatin  80 mg Oral Daily With Dinner Arun Griffin PA-C      bisacodyl  10 mg Rectal Daily PRN Arun Griffin PA-C      calcium gluconate  2 g Intravenous Once PRN Arun Griffin PA-C      cefazolin  2,000 mg Intravenous Q8H Arun Griffin PA-C 2,000 mg (10/09/24 0405)    chlorhexidine  15 mL Mouth/Throat BID Arun Griffin PA-C      [Held by provider] clopidogrel  75 mg Oral Daily rAun Griffin PA-C      fentaNYL  50 mcg Intravenous Q1H PRN Arun Griffin PA-C      fondaparinux  2.5 mg Subcutaneous Daily Arun Griffin PA-C      HYDROmorphone  0.5 mg Intravenous Q2H PRN Arun Griffin PA-C      insulin regular (HumuLIN R,NovoLIN R) 1 Units/mL in sodium chloride 0.9 % 100 mL infusion  0.3-21 Units/hr Intravenous Titrated Arun Griffin PA-C 0.5 Units/hr (10/08/24 2233)    mupirocin  1 Application Nasal Q12H [facility] Arun Griffin PA-C      niCARdipine  2.5-15 mg/hr Intravenous Titrated Arun Griffin PA-C      norepinephrine  1-30 mcg/min Intravenous Titrated Jc Varghese PA-C 1 mcg/min (10/09/24 0701)    ondansetron   4 mg Intravenous Q6H PRN Arun Griffin PA-C      oxyCODONE  2.5 mg Oral Q4H PRN Arun Griffin PA-C      Or    oxyCODONE  5 mg Oral Q4H PRN Arun Griffin PA-C      pantoprazole  40 mg Oral Daily Arun Griffin PA-C      polyethylene glycol  17 g Oral Daily Arun Griffin PA-C      potassium chloride  20 mEq Intravenous Once PRN Arun Griffin PA-C      potassium chloride  40 mEq Intravenous Once PRN Arun Griffin PA-C      sodium chloride  20 mL/hr Intravenous Continuous Arun Griffin PA-C 20 mL/hr (10/08/24 1421)    SUMAtriptan  50 mg Oral Q2H PRN Arun Griffin PA-C      triamcinolone   Topical BID PRN Arun Griffin PA-C      venlafaxine  225 mg Oral Daily With Breakfast Arun Griffin PA-C       Continuous Infusions:insulin regular (HumuLIN R,NovoLIN R) 1 Units/mL in sodium chloride 0.9 % 100 mL infusion, 0.3-21 Units/hr, Last Rate: 0.5 Units/hr (10/08/24 2233)  niCARdipine, 2.5-15 mg/hr  norepinephrine, 1-30 mcg/min, Last Rate: 1 mcg/min (10/09/24 0701)  sodium chloride, 20 mL/hr, Last Rate: 20 mL/hr (10/08/24 1421)      PRN Meds:.  acetaminophen    albuterol    bisacodyl    calcium gluconate    fentaNYL    HYDROmorphone    ondansetron    oxyCODONE **OR** oxyCODONE    potassium chloride    potassium chloride    SUMAtriptan    triamcinolone    Vitals: 94/52 (66)  Vitals:    10/09/24 0400 10/09/24 0500 10/09/24 0524 10/09/24 0600   BP: 115/59      BP Location:       Pulse: 69 63  69   Resp: 18 14  22   Temp: 100 °F (37.8 °C)   98.6 °F (37 °C)   TempSrc:       SpO2: 94% 95%  93%   Weight:   77.4 kg (170 lb 10.2 oz)    Height:           Hemodynamics:  PAP: (43-61)/(3-31) 51/3  CO:  [3.4 L/min-8.2 L/min] 4.3 L/min  CI:  [1.9 L/min/m2-4.7 L/min/m2] 2.5 L/min/m2  CVP 5    Respiratory:   SpO2: SpO2: 93 %; 2 LPM    Intake/Output:   I/O         10/07 0701  10/08 0700 10/08 0701  10/09 0700 10/09 0701  10/10 0700    P.O.  700     I.V. (mL/kg)  2668.3 (34.5)     Blood  1010     IV Piggyback  650     Cell  "Saver  1000     Total Intake(mL/kg)  6028.3 (77.9)     Urine (mL/kg/hr)  1260     Blood  1000     Chest Tube  730     Total Output  2990     Net  +3038.3                    Weights:   Weight (last 2 days)       Date/Time Weight    10/09/24 0524 77.4 (170.64)    10/08/24 0759 73.1 (161.05)          Chest tube Output:    Mediastinal tubes: 140 mL/8 hours  420 mL/24 hours   Pleural tubes: 120 mL/8 hours  310 mL/24 hours     Results:   Results from last 7 days   Lab Units 10/09/24  0411 10/08/24  2231 10/08/24  1417 10/08/24  1415 10/08/24  1214 10/02/24  1116   WBC Thousand/uL 10.88*  --   --   --   --  6.11   HEMOGLOBIN g/dL 11.1* 11.5*  --  13.5  --  13.8   I STAT HEMOGLOBIN g/dl  --   --  12.6  --   --   --    HEMATOCRIT % 33.6* 34.8*  --  40.1  --  40.4   HEMATOCRIT, ISTAT %  --   --  37  --   --   --    PLATELETS Thousands/uL 160  --   --  240 141* 187     Results from last 7 days   Lab Units 10/09/24  0411 10/08/24  2231 10/08/24  1947 10/08/24  1417 10/08/24  1415 10/02/24  1116   SODIUM mmol/L 140  --   --   --  141 141   POTASSIUM mmol/L 4.1 4.2 4.6  --  3.4* 3.5   CHLORIDE mmol/L 107  --   --   --  109* 103   CO2 mmol/L 22  --   --   --  24 30   CO2, I-STAT mmol/L  --   --   --  26  --   --    BUN mg/dL 26*  --   --   --  21 19   CREATININE mg/dL 1.49*  --   --   --  1.00 1.00   GLUCOSE, ISTAT mg/dl  --   --   --  211*  --   --    CALCIUM mg/dL 7.8*  --   --   --  7.7* 8.6     No results for input(s): \"MG\" in the last 72 hours.  Results from last 7 days   Lab Units 10/09/24  0411 10/08/24  1415 10/02/24  1116   INR  1.20* 1.34* 0.95   PTT seconds  --  34  --        Date:   INR:  Coumadin Dose:  10/8  1.34  0  10/9    Point of care glucose: 115-139    Studies:    CXR: left base atelectasis, line in position, right base clear    EKG: junctional 60    Reviewed cxr and EKG     Invasive Lines/Tubes:  Invasive Devices       Central Venous Catheter Line  Duration             CVC Central Lines 10/08/24 <1 day    " Introducer 10/08/24 <1 day              Peripheral Intravenous Line  Duration             Peripheral IV 10/08/24 Right Hand <1 day              Arterial Line  Duration             Arterial Line 10/08/24 Left Radial <1 day              Line  Duration             Pacer Wires <1 day    Pacer Wires <1 day              Drain  Duration             Chest Tube 1 Anterior;Mediastinal 32 Fr. <1 day    Chest Tube 2 Posterior;Mediastinal 32 Fr. <1 day    Chest Tube 3 Right Pleural 32 Fr. <1 day    Chest Tube 4 Left Pleural 32 Fr. <1 day    Urethral Catheter Non-latex;Temperature probe 16 Fr. <1 day                    Physical Exam:    General: No acute distress, Alert, and Normal appearance  HEENT/NECK:  Normocephalic. Atraumatic.  No jugular venous distention.    Cardiac: Regular rate and rhythm and Pericardial friction rub  Pulmonary:  Crackles bilaterally  Abdomen:  Non-tender, Non-distended, and Normal bowel sounds  Incisions: Sternum is stable.  Incision dressed with Acticoat.  No erythema or drainage  Extremities: Extremities warm/dry and Trace edema B/L  Neuro: Alert and oriented X 3  Skin: Warm/Dry, without rashes or lesions.    Assessment:  Principal Problem:    S/P MVR (mitral valve repair)  Active Problems:    Anxiety    Essential hypertension    Gastroesophageal reflux disease without esophagitis    Coronary artery disease involving native coronary artery of native heart without angina pectoris    Nonrheumatic mitral valve regurgitation    Recurrent major depressive disorder, in remission ([facility])    Mixed hyperlipidemia    S/P left atrial appendage ligation       Severe Mitral regurgitation. S/P mitral valve repair 36 mm Francesco and LAAL; POD # 1    Plan:    Cardiac:     Elective surgical admission  Normal ventricular systolic function, EF 55%    Cardiac infusions: Levophed, 2 mcg/min  Coumadin dosing for MVr x 90 days: INR 1.20    Cardiac index > 2.2  D/C Arminto Mariel catheter  D/C Arterial line once Levo weaned off      Hold BB while junctional bradycardia     Continue prophylactic Amiodarone, 200 mg PO TID    No ASA, will be on Plavix and Coumadin for hx of coronary stenting and recent MVr     Maintain epicardial pacing wires for pacemaker dependence    Maintain central IV access today for medications requiring central IV access    Continue Subcutaneous Heparin for DVT prophylaxis    Consult cardiology for postoperative medical management    Pulmonary:     Extubated    CXR findings: nothing acute     Acute post-op pulmonary insufficiency; Requiring 2 Liters via nasal cannula, secondary to atelectasis, flash pulmonary edema, and body habitus/obesity. Continue incentive spirometry/coughing/deep breathing exercises.  Wean supplemental oxygen as tolerated for saturation > 90%    Chest tube output remains persistently high; Continue chest tubes to suction today    Renal:     Normal preoperative renal function    Intake/Output net: +3038 mL/24 hours    Diuretic Regimen:  Start IV Lasix, 40 mg BID  Start Potassium Chloride 20 mEq PO BID    Discontinue potassium replacement scales    Maintain gunter catheter for unrine monitoring    Neuro:    Neurologically intact; No active issues     Incisional pain well controlled      GI:    Cardiac diet, with 1800 mL fluid restriction    Tolerating diet without complaint    Continue stool softeners and prn suppository    Continue GI prophylaxis    Endo: A1c 5.1    Glucose well-controlled. Discontinue continuous insulin infusion and add Insulin sliding scale coverage    Hematology:     Post-operative blood count acceptable; Trend prn    Disposition:    Maintain ICU level of care for Levo need transfer to [place] later if stable       VTE Pharmacologic Prophylaxis: Heparin  VTE Mechanical Prophylaxis: sequential compression device    Collaborative rounds completed with supervising physician and with the bedside nurse    SIGNATURE: Zahida Parish PA-C  DATE: October 9, 2024  TIME: 7:10 AM

## 2024-10-09 NOTE — PLAN OF CARE
Problem: OCCUPATIONAL THERAPY ADULT  Goal: Performs self-care activities at highest level of function for planned discharge setting.  See evaluation for individualized goals.  Description: Treatment Interventions: ADL retraining, Functional transfer training, Endurance training, Patient/family training, Continued evaluation, Cardiac education, Energy conservation          See flowsheet documentation for full assessment, interventions and recommendations.   Outcome: Progressing  Note: Limitation: Decreased ADL status, Decreased Safe judgement during ADL, Decreased endurance, Decreased self-care trans, Decreased high-level ADLs  Prognosis: Good  Assessment: Pt is a 69 y.o. male who was admitted to [facility] on 10/8/2024 with S/P MVR (mitral valve repair), LAAL. Pt seen for an OT evaluation per active OT orders, [place]/CT/A-line in place.  Pt  has a past medical history of Allergic, Arthritis, Clotting disorder ([facility]), Coronary artery disease, Depression, Diabetes mellitus ([facility]), Headache(784.0), HL (hearing loss), Hypertension, Myocardial infarction ([facility]), Obesity, and Visual impairment. Pt lives in a [facility] with 2 [facility], uses a walk-in shower with  and raised toilet. Pta, pt was independent w/ ADL/IADL and functional mobility, was (+) driving and was not using any DME at baseline. Currently, pt is Min Ax1 for UB ADL, Mod Ax1 for LB ADL, and completed transfers/short FM w Min Ax1 with RW. Pt currently presents with impairments in the following categories -steps to enter environment, difficulty performing ADLS, and limited insight into deficits activity tolerance, endurance, insight, safety , and judgement . These impairments, as well as pt's fatigue, SOB, pain, cardiac/sternal precautions, and risk for falls  limit pt's ability to safely engage in all baseline areas of occupation, includinggrooming, bathing, dressing, toileting, functional mobility/transfers, and community mobility.  The patient's raw score on  the AM-PAC Daily Activity Inpatient Short Form is 17. A raw score of greater than or equal to 19 suggests the patient may benefit from discharge to home. Please refer to the recommendation of the Occupational Therapist for safe discharge planning. Although pt AMPAC score is less than 19, pt is expected to progress well and has adequate assist at home. Pt would benefit from continued acute OT services throughout hospital course. Plan for OT interventions 2-3x per week. From OT standpoint, recommend home with increased social support, no further OT needs pending progress upon D/C. Pt was left seated in bedside chair with all needs within reach.     Rehab Resource Intensity Level, OT: No post-acute rehabilitation needs

## 2024-10-09 NOTE — PHYSICAL THERAPY NOTE
Physical Therapy Evaluation     Patient's Name: Reuben García    Admitting Diagnosis  Nonrheumatic mitral valve regurgitation [I34.0]    Problem List  Patient Active Problem List   Diagnosis    Anxiety    Essential hypertension    Gastroesophageal reflux disease without esophagitis    Insomnia    Migraine headache    BMI 29.0-29.9,adult    Mild intermittent asthma without complication    Coronary artery disease involving native coronary artery of native heart without angina pectoris    Platelets decreased (HCC)    Nonrheumatic mitral valve regurgitation    Acute on chronic heart failure with preserved ejection fraction (HFpEF) ([facility])    Recurrent major depressive disorder, in remission ([facility])    Hypokalemia    Mixed hyperlipidemia    S/P MVR (mitral valve repair)    S/P left atrial appendage ligation       Past Medical History  Past Medical History:   Diagnosis Date    Allergic     Arthritis     Clotting disorder ([facility])     Coronary artery disease     Depression     Diabetes mellitus ([facility])     Headache(784.0)     HL (hearing loss)     Hypertension     Myocardial infarction ([facility]) 8/23/22    Obesity     Visual impairment        Past Surgical History  Past Surgical History:   Procedure Laterality Date    CARDIAC CATHETERIZATION Left 8/25/2022    Procedure: Cardiac catheterization;  Surgeon: Luann Feng MD;  Location: [facility];  Service: Cardiology    CARDIAC CATHETERIZATION N/A 8/25/2022    Procedure: Cardiac Coronary Angiogram;  Surgeon: Luann Feng MD;  Location: [facility];  Service: Cardiology    CARDIAC CATHETERIZATION N/A 9/19/2024    Procedure: Cardiac RHC/LHC;  Surgeon: Javier Ocasio MD;  Location:  CARDIAC CATH LAB;  Service: Cardiology    [facility] REPLACEMENT MITRAL VALVE W/CARDIOPULMONARY BYP N/A 10/8/2024    Procedure: MITRAL VALVE REPAIR WITH 36MM JANNETH 4D  ANNULOPLASTY RING, LEFT ATRIAL APPENDAGE LIGATION WITH 45MM ATRICLIP;  Surgeon: Manolo Sanford DO;  Location:  MAIN OR;   Service: Cardiac Surgery    SEPTOPLASTY            10/09/24 1026   PT Last Visit   PT Visit Date 10/09/24   Note Type   Note type Evaluation   Pain Assessment   Pain Assessment Tool 0-10   Pain Score 8   Pain Location/Orientation Orientation: Mid;Location: Chest;Location: Incision   Pain Onset/Description Onset: Ongoing;Frequency: Intermittent;Descriptor: Aching;Descriptor: Discomfort   Effect of Pain on Daily Activities guarding   Patient's Stated Pain Goal No pain   Hospital Pain Intervention(s) Ambulation/increased activity;Emotional support;Repositioned   Restrictions/Precautions   Braces or Orthoses   (denies)   Other Precautions Cardiac/sternal;Multiple lines;Telemetry;O2;Fall Risk  ([place] catheter, a-line, CT)   Home Living   Type of Home House   Home Layout One level  (2 MINH w/ hand rail)   Home Equipment Walker   Prior Function   Level of [place] Independent with functional mobility  (amb w/o AD)   Lives With Family   General   Additional Pertinent History cleared for assessment by maximilian   Cognition   Overall Cognitive Status WFL   Arousal/Participation Cooperative   Orientation Level Oriented to person;Oriented to place;Oriented to situation   Memory Decreased recall of precautions   Following Commands Follows one step commands without difficulty   Subjective   Subjective Alert; in the chair (appears to be listing to the (R) side); after repositioning in the chair, agreeable to mobilize   RUE Assessment   RUE Assessment WFL  (AROM)   LUE Assessment   LUE Assessment WFL  (AROM)   RLE Assessment   RLE Assessment WFL  (AROM)   Strength RLE   RLE Overall Strength   (fair/ fair +)   LLE Assessment   LLE Assessment WFL  (AROM)   Strength LLE   LLE Overall Strength   (fair/ fair +)   Transfers   Sit to Stand 4  Minimal assistance   Additional items Assist x 1;Verbal cues   Stand to Sit 4  Minimal assistance   Additional items Assist x 1;Verbal cues   Ambulation/Elevation   Gait pattern Excessively slow;Short  stride;Inconsistent eze   Gait Assistance 4  Minimal assist   Additional items Assist x 1;Verbal cues;Tactile cues   Assistive Device Rolling walker   Distance 12 ft total distance at bedside   Balance   Static Sitting Fair -   Dynamic Sitting Poor +   Static Standing Poor +   Dynamic Standing Poor +   Ambulatory Poor +   Activity Tolerance   Activity Tolerance Patient limited by fatigue   Medical Staff Made Aware Co-eval performed w/ OTR due to complexity of medical status and multiple comorbidities   Assessment   Prognosis Good   Problem List Decreased strength;Decreased endurance;Impaired balance;Decreased mobility;Pain   Assessment Pt is 69 y.o. male admitted with Dx of Severe mitral regurgitation and underwent Mitral valve repair with  anterior neocord placement to A2 and A3 and 36 mm Andry/LivaNova Francesco 4D Rechord mitral annuloplasty ring, and that is the edge for repair with commissuroplasty at P1-A1 and Ligation left atrial appendage with 45 mm AtriClip on 10/8/2024. Pt 's comorbidities affecting POC include: arthritis, clotting disorder (HCC), Coronary artery disease, Depression, Diabetes mellitus (HCC), Headache, HL (hearing loss), Hypertension, obesity, and visual impairment and personal factors of: MINH. Pt's clinical presentation is currently unstable/unpredictable which is evident in ongoing telemetry monitoring while in a critical care unit w/ [place] Mariel catheter and a-line in place, supplemental O2 needs, abnormal lab values being monitored/trending, CT in place and inability to progress further w/ mobilization at this time. Pt presents w/ postop guarding, generalized weakness, incl decreased LE strength, decreased functional endurance and activity tolerance, and impaired balance w/ associated gait deviations requiring use of rw at this time. Will cont to follow pt in PT for progressive mobilization to address above functional deficits and to max level of (I), endurance, and safety. Otherwise,  anticipate pt will return home w/ available family support upon D/C provided he cont improving w/ mobility skills, safety, and endurance (incl on the steps) and when medically cleared; D/C recommendations are outlined below; will follow.   Goals   Patient Goals to get better   STG Expiration Date 10/19/24   Short Term Goal #1 7-10 days. Pt will amb 300 ft w/ least restrictive assistive device PRN, mod (I) in order to facilitate safe return to premorbid environment and community amb status. Pt will negotiate 2 steps w/ hand rail and SPC PRN, mod (I) in order to navigate in and out of home environment safely. Pt will achieve mod (I) level w/ bed mob in order to facilitate safety with OOB and back to bed transitions in own living environment. Pt will perform transfers w/ mod (I) to assure (I) and safety w/ functional mobility/transitions w/ all aspects of mobility/locomotion.  Pt will participate in LE therex and balance activities to max progression w/ mobility skills.   PT Treatment Day 0   Plan   Treatment/Interventions Functional transfer training;LE strengthening/ROM;Elevations;Therapeutic exercise;Endurance training;Equipment eval/education;Bed mobility;Gait training;Spoke to nursing;OT   PT Frequency 4-6x/wk   Discharge Recommendation   Rehab Resource Intensity Level, PT No post-acute rehabilitation needs   Equipment Recommended Walker   Walker Package Recommended Wheeled walker   Change/add to Walker Package? No   AM-PAC Basic Mobility Inpatient   Turning in Flat Bed Without Bedrails 3   Lying on Back to Sitting on Edge of Flat Bed Without Bedrails 2   Moving Bed to Chair 3   Standing Up From Chair Using Arms 3   Walk in Room 3   Climb 3-5 Stairs With Railing 2   Basic Mobility Inpatient Raw Score 16   Basic Mobility Standardized Score 38.32   [facility] Highest Level Of Mobility   -HLM Goal 5: Stand one or more mins   -HLM Achieved 6: Walk 10 steps or more   Modified Phil Scale   Modified [place] Scale  4   End of Consult   Patient Position at End of Consult Bedside chair;All needs within reach         Binh Dawson, PT

## 2024-10-09 NOTE — UTILIZATION REVIEW
Initial Clinical Review    Elective Inpatient surgical procedure  Age/Sex: 69 y.o. male  Surgery Date: 10/8/24  Procedure:   Mitral valve repair with  anterior neocord placement to A2 and A3 and 36 mm Andry/LivaNova Francesco 4D Rechord mitral annuloplasty ring, and that is the edge for repair with commissuroplasty at P1-A1  Ligation left atrial appendage with 45 mm AtriClip  Anesthesia: General endotracheal anesthesia with transesophageal echocardiogram guidance   Operative Findings:   Intraoperative transesophageal echocardiogram revealed severe mitral regurgitation secondary to torn anterior leaflet cord at A1, and elongation of cords at A3, resulting in prolapse of the anterior mitral leaflet.  Epiaortic ultrasound showed no plaque or atheroma  Inspection of the mitral valve reveals  Type II mitral valve disease with torn anterior leaflet cord at A1, and elongation of cords at a 3, resulting in prolapse of the anterior mitral leaflet.  Final transesophageal echocardiogram demonstrated succesful repair of the mitral valve without evidence of regurgitation and no mitral stenosis or SHELTON.  Normal biventricular function.    POD#1 Progress Note:  Coumadin held. Had 1 plt postoperatively then increased CT output additional: Amicar, 2 FFP and 2 Cryo given for high chest tube output. Remains on Levo 2. Junctional bradycardia in the 50s underneath pacer, A paced 70. Extubated. D/C [place] Mariel catheter. D/C Arterial line once Levo weaned off. Continue prophylactic Amiodarone. Maintain epicardial pacing wires for pacemaker dependence. Chest tube output remains persistently high; Continue chest tubes to suction today. Monitor electrolytes and replete prn, pain control prn. Continue ICU level of care.     Admission Orders: Date/Time/Statement:   Admission Orders (From admission, onward)       Ordered        10/08/24 0916  Inpatient Admission  Once                          Orders Placed This Encounter   Procedures    Inpatient  Admission     Standing Status:   Standing     Number of Occurrences:   1     Order Specific Question:   Level of Care     Answer:   Critical Care [15]     Order Specific Question:   Estimated length of stay     Answer:   More than 2 Midnights     Order Specific Question:   Certification     Answer:   I certify that inpatient services are medically necessary for this patient for a duration of greater than two midnights. See H&P and MD Progress Notes for additional information about the patient's course of treatment.     Diet: Cardiac diet, with 1800 mL fluid restriction   Mobility: OOB and ambulatory  DVT Prophylaxis: heparin, scd, ambulation    Medications/Pain Control:   Scheduled Medications:  acetaminophen, 975 mg, Oral, Q6H While awake  amiodarone, 200 mg, Oral, Q8H PAOLO  ARIPiprazole, 2 mg, Oral, Daily  atorvastatin, 80 mg, Oral, Daily With Dinner  cefazolin, 2,000 mg, Intravenous, Q8H  chlorhexidine, 15 mL, Mouth/Throat, BID  clopidogrel, 75 mg, Oral, Daily  furosemide, 40 mg, Intravenous, BID (diuretic)  heparin (porcine), 5,000 Units, Subcutaneous, Q8H PAOLO  insulin lispro, 1-6 Units, Subcutaneous, TID AC  insulin lispro, 1-6 Units, Subcutaneous, HS  mupirocin, 1 Application, Nasal, Q12H PAOLO  pantoprazole, 40 mg, Oral, Daily  polyethylene glycol, 17 g, Oral, Daily  potassium chloride, 20 mEq, Oral, BID  senna-docusate sodium, 1 tablet, Oral, BID  venlafaxine, 225 mg, Oral, Daily With Breakfast  warfarin, 2 mg, Oral, Once (warfarin)      Continuous IV Infusions:  niCARdipine, 2.5-15 mg/hr, Intravenous, Titrated  norepinephrine, 1-30 mcg/min, Intravenous, Titrated      PRN Meds:  acetaminophen, 650 mg, Rectal, Q4H PRN  albuterol, 2 puff, Inhalation, TID PRN  bisacodyl, 10 mg, Rectal, Daily PRN  calcium gluconate, 2 g, Intravenous, Once PRN  methocarbamol, 500 mg, Oral, Q6H PRN  ondansetron, 4 mg, Intravenous, Q6H PRN  oxyCODONE, 2.5 mg, Oral, Q4H PRN   Or  oxyCODONE, 5 mg, Oral, Q4H PRN  SUMAtriptan, 50 mg,  Oral, Q2H PRN  triamcinolone, , Topical, BID PRN      Vital Signs (last 3 days)       Date/Time Temp Pulse Resp BP MAP (mmHg) Arterial Line BP MAP SpO2 Calculated FIO2 (%) - Nasal Cannula Nasal Cannula O2 Flow Rate (L/min) O2 Device CO CI CVP (mean) [place] Coma Scale Score CIWA-Ar Total Pain    10/09/24 1003 -- -- -- -- -- -- -- -- -- -- -- -- -- -- -- -- 8    10/09/24 0829 -- -- -- -- -- -- -- -- -- -- -- -- -- -- -- -- 5    10/09/24 0800 -- -- -- -- -- -- -- -- -- -- -- -- -- -- -- 4 --    10/09/24 0600 98.6 °F (37 °C) 69 22 -- -- 94/52 66 mmHg 93 % -- -- -- 4.3 L/min 2.5 L/min/m2 5 mmHg -- -- --    10/09/24 0534 -- -- -- -- -- -- -- -- -- -- -- -- -- -- -- -- 10 - Worst Possible Pain    10/09/24 0500 -- 63 14 -- -- 104/52 68 mmHg 95 % -- -- -- -- -- 9 mmHg -- -- --    10/09/24 0403 -- -- -- -- -- -- -- -- -- -- -- -- -- -- -- -- 10 - Worst Possible Pain    10/09/24 0400 100 °F (37.8 °C) 69 18 115/59 82 120/56 74 mmHg 94 % -- -- -- 3.9 L/min 2.3 L/min/m2 7 mmHg 15 5 --    10/09/24 0300 -- 69 19 -- -- 104/51 68 mmHg 94 % -- -- -- -- -- 6 mmHg -- -- --    10/09/24 0200 99.7 °F (37.6 °C) 69 14 -- -- 114/55 73 mmHg 96 % -- -- Nasal cannula 8.2 L/min 4.7 L/min/m2 6 mmHg -- -- --    10/09/24 0159 -- -- -- -- -- -- -- -- -- -- -- -- -- -- -- -- 10 - Worst Possible Pain    10/09/24 0100 -- 80 30 -- -- 112/60 76 mmHg 95 % -- -- -- -- -- 7 mmHg -- -- --    10/09/24 0037 -- -- -- -- -- -- -- -- -- -- -- -- -- -- -- -- 8    10/09/24 0030 -- 80 17 -- -- 113/59 76 mmHg 95 % -- -- -- 5 L/min 2.9 L/min/m2 6 mmHg -- -- --    10/09/24 0000 99.3 °F (37.4 °C) 80 14 83/48 60 96/54 68 mmHg 93 % -- -- -- 5 L/min 2.9 L/min/m2 7 mmHg 15 -- --    10/08/24 2320 99.3 °F (37.4 °C) 80 14 -- -- 96/55 69 mmHg 95 % 32 3 L/min -- 5.2 L/min 3 L/min/m2 7 mmHg -- -- --    10/08/24 2216 -- -- -- -- -- -- -- -- -- -- -- -- -- -- -- -- 10 - Worst Possible Pain    10/08/24 2200 99.7 °F (37.6 °C) 80 11 -- -- 108/59 75 mmHg 98 % -- -- -- 5.6 L/min 3.2  L/min/m2 9 mmHg -- -- --    10/08/24 2100 99.7 °F (37.6 °C) 80 11 -- -- 93/51 64 mmHg 98 % 40 5 L/min Nasal cannula 5.1 L/min 2.9 L/min/m2 7 mmHg -- -- --    10/08/24 2008 -- -- -- -- -- -- -- -- -- -- -- -- -- -- -- -- 8    10/08/24 2000 99.5 °F (37.5 °C) 80 20 86/51 65 92/54 68 mmHg 98 % 40 5 L/min Nasal cannula 4.9 L/min 2.8 L/min/m2 8 mmHg 15 1 --    10/08/24 1904 -- -- -- -- -- -- -- 97 % -- -- Nasal cannula -- -- -- -- -- --    10/08/24 1900 99.3 °F (37.4 °C) 80 11 91/53 66 94/53 65 mmHg 96 % -- -- -- 4.8 L/min 2.7 L/min/m2 9 mmHg -- -- --    10/08/24 1842 -- -- -- -- -- -- -- -- -- -- -- -- -- -- -- -- 10 - Worst Possible Pain    10/08/24 1800 98.4 °F (36.9 °C) 80 13 96/58 72 110/56 72 mmHg 98 % -- -- -- 4.3 L/min 2.5 L/min/m2 8 mmHg -- -- --    10/08/24 1700 98.8 °F (37.1 °C) 80 13 93/53 66 97/50 64 mmHg 96 % 40 5 L/min Nasal cannula 4.8 L/min 2.8 L/min/m2 4 mmHg -- -- --    10/08/24 1620 -- -- -- -- -- -- -- -- -- -- -- -- -- -- -- -- 8    10/08/24 1615 -- 80 13 106/61 -- 120/57 76 mmHg 98 % -- -- -- -- -- -- -- -- --    10/08/24 1600 98.6 °F (37 °C) 80 14 109/61 78 114/57 74 mmHg 98 % -- -- -- 5.2 L/min 3 L/min/m2 14 mmHg 10 -- --    10/08/24 1558 98.6 °F (37 °C) 80 14 112/56 -- -- -- -- -- -- -- -- -- -- -- -- --    10/08/24 1543 99 °F (37.2 °C) 80 14 91/54 -- -- -- -- -- -- -- -- -- -- -- -- --    10/08/24 1538 -- 80 14 89/53 65 92/50 63 mmHg 96 % -- -- -- -- -- 12 mmHg -- -- --    10/08/24 1530 -- 80 15 97/56 70 98/52 66 mmHg 96 % -- -- -- -- -- 13 mmHg -- -- --    10/08/24 1520 -- 80 16 106/61 76 107/59 74 mmHg 96 % -- -- -- -- -- 14 mmHg -- -- --    10/08/24 1518 -- -- -- -- -- -- -- -- -- -- -- -- -- -- -- -- 8    10/08/24 1515 99 °F (37.2 °C) 80 14 102/63 78 103/58 72 mmHg 95 % -- -- -- -- -- 15 mmHg -- -- --    10/08/24 1510 99 °F (37.2 °C) 80 16 96/59 72 100/56 69 mmHg 94 % -- -- -- -- -- 12 mmHg -- -- --    10/08/24 1506 99.1 °F (37.3 °C) 80 20 97/55 -- -- -- -- -- -- -- -- -- -- -- -- --     10/08/24 1505 -- 80 17 95/59 71 97/56 69 mmHg 94 % -- -- -- -- -- 13 mmHg -- -- --    10/08/24 1500 99 °F (37.2 °C) 80 18 98/60 72 97/56 69 mmHg 93 % -- -- -- 3.4 L/min 1.9 L/min/m2 13 mmHg -- -- --    10/08/24 1445 -- 80 19 98/61 74 101/60 73 mmHg 94 % -- -- -- -- -- 17 mmHg -- -- --    10/08/24 1430 -- 80 19 95/55 70 98/55 69 mmHg 94 % -- -- -- -- -- 15 mmHg -- -- --    10/08/24 1415 98.4 °F (36.9 °C) 80 22 111/63 80 106/57 73 mmHg 93 % -- -- -- 4.5 L/min 2.6 L/min/m2 11 mmHg 10 -- --    10/08/24 1400 -- -- -- -- -- -- -- -- -- -- -- -- -- -- 3 -- --    10/08/24 0808 -- -- -- -- -- -- -- -- 28 2 L/min Nasal cannula -- -- -- -- -- No Pain    10/08/24 0759 97.6 °F (36.4 °C) 85 18 161/99 -- -- -- 97 % -- -- None (Room air) -- -- -- -- -- --          Weight (last 2 days)       Date/Time Weight    10/09/24 0524 77.4 (170.64)    10/08/24 0759 73.1 (161.05)            Pertinent Labs/Diagnostic Test Results:   Radiology:  XR chest portable   Final Interpretation by Be Jones MD (10/09 0845)      Stable postoperative appearance status post extubation.            Workstation performed: [id number]         XR chest portable ICU   Final Interpretation by Vignesh Kapoor MD (10/08 2050)      Postoperative change with lines and tubes as noted. Left greater than right basilar atelectasis.            Workstation performed: [id number]           Cardiology:  THOR intraop interventional w/realtime guidance of cardiac procedures   Final Result by SYSTEMGENERATED, DOCUMENTATION (10/08 5987)   This order contains the linked images for the THOR that was performed by    the Anesthesiologist.  Please see the  CARDIAC THOR ANESTHESIA procedure    for results.      ECG 12 lead   Final Result by Marbella Ruiz DO (10/08 1203)      Suspect arm lead reversal, interpretation assumes no reversal   Sinus bradycardia   Rightward axis   Borderline ECG   When compared with ECG of 19-SEP-2024 10:57,   Questionable change in QRS duration    Confirmed by Marbella Ruiz ([id number]) on 10/8/2024 6:53:34 PM        GI:  No orders to display           Results from last 7 days   Lab Units 10/09/24  0411 10/08/24  2231 10/08/24  1417 10/08/24  1415 10/08/24  1214 10/02/24  1116   WBC Thousand/uL 10.88*  --   --   --   --  6.11   HEMOGLOBIN g/dL 11.1* 11.5*  --  13.5  --  13.8   I STAT HEMOGLOBIN g/dl  --   --  12.6  --   --   --    HEMATOCRIT % 33.6* 34.8*  --  40.1  --  40.4   HEMATOCRIT, ISTAT %  --   --  37  --   --   --    PLATELETS Thousands/uL 160  --   --  240 141* 187   TOTAL NEUT ABS Thousands/µL  --   --   --   --   --  4.22         Results from last 7 days   Lab Units 10/09/24  0411 10/08/24  2231 10/08/24  1947 10/08/24  1417 10/08/24  1415 10/02/24  1116   SODIUM mmol/L 140  --   --   --  141 141   POTASSIUM mmol/L 4.1 4.2 4.6  --  3.4* 3.5   CHLORIDE mmol/L 107  --   --   --  109* 103   CO2 mmol/L 22  --   --   --  24 30   CO2, I-STAT mmol/L  --   --   --  26  --   --    ANION GAP mmol/L 11  --   --   --  8 8   BUN mg/dL 26*  --   --   --  21 19   CREATININE mg/dL 1.49*  --   --   --  1.00 1.00   EGFR ml/min/1.73sq m 47  --   --   --  77 77   CALCIUM mg/dL 7.8*  --   --   --  7.7* 8.6   CALCIUM, IONIZED, ISTAT mmol/L  --   --   --  1.13  --   --      Results from last 7 days   Lab Units 10/02/24  1116   AST U/L 31   ALT U/L 27   ALK PHOS U/L 54   TOTAL PROTEIN g/dL 6.4   ALBUMIN g/dL 4.0   TOTAL BILIRUBIN mg/dL 0.63     Results from last 7 days   Lab Units 10/09/24  0630 10/09/24  0417 10/09/24  0207 10/09/24  0033 10/08/24  2230 10/08/24  2005 10/08/24  1848 10/08/24  1613 10/08/24  1417   POC GLUCOSE mg/dl 115 139 120 123 121 89 77 115 220*     Results from last 7 days   Lab Units 10/09/24  0411 10/08/24  1415   GLUCOSE RANDOM mg/dL 129 217*         Results from last 7 days   Lab Units 10/02/24  1116   HEMOGLOBIN A1C % 5.1   EAG mg/dl 100      Results from last 7 days   Lab Units 10/09/24  0638   PH ART  7.281*   PCO2 ART mm Hg 41.1   PO2 ART mm Hg  129.9*   HCO3 ART mmol/L 18.9*   BASE EXC ART mmol/L -7.4   O2 CONTENT ART mL/dL 17.2   O2 HGB, ARTERIAL % 96.7   ABG SOURCE  Line, Arterial         Results from last 7 days   Lab Units 10/08/24  1417   I STAT BASE EXC mmol/L -3*   I STAT O2 SAT % 93*   ISTAT PH ART  7.279*   I STAT ART PCO2 mm HG 51.2*   I STAT ART PO2 mm HG 78.0   I STAT ART HCO3 mmol/L 24.0     Results from last 7 days   Lab Units 10/09/24  0411 10/08/24  1415 10/02/24  1116   PROTIME seconds 15.5* 16.8* 13.4   INR  1.20* 1.34* 0.95   PTT seconds  --  34  --      Results from last 7 days   Lab Units 10/09/24  0615 10/09/24  0555   UNIT PRODUCT CODE  [id number]  [id number]  [id number]  [id number]  O3456V52  R8467N67  L4469J27  P3581V37   UNIT NUMBER  [id number]  [id number]  [id number]  [id number]  [id number]  [id number]  [id number]  [id number]   UNITABO  O  O  O  O O  O  AB  AB  A   UNITRH  POS  POS  POS  POS POS  POS  POS  POS  POS   CROSSMATCH  Compatible  Compatible  Compatible  Compatible  --    UNIT DISPENSE STATUS  Return to Inv  Return to Inv  Return to Inv  Return to Inv Presumed Trans  Presumed Trans  Presumed Trans  Presumed Trans  Presumed Trans   UNIT PRODUCT VOL mL 350  350  350  350 125  125  280  280  300     Network Utilization Review Department  ATTENTION: Please call with any questions or concerns to [phone] and carefully listen to the prompts so that you are directed to the right person. All voicemails are confidential.   For Discharge needs, contact Care Management DC Support Team at [phone] opt. [phone]  Send all requests for admission clinical reviews, approved or denied determinations and any other requests to dedicated fax number below belonging to the campus where the patient is receiving treatment. List of dedicated fax numbers for the Facilities:  FACILITY NAME UR FAX NUMBER   ADMISSION DENIALS  (Administrative/Medical Necessity) [phone]   DISCHARGE SUPPORT TEAM (NETWORK) [phone]   PARENT CHILD HEALTH (Maternity/NICU/Pediatrics) [phone]   [facility] [phone]   [facility] [phone]   [facility] [phone]   [facility] [phone]   [facility] [phone]   [facility] [phone]   [facility] [phone]   [facility] [phone]   [facility] [phone]   [facility] [phone]   [facility] [phone]   [facility] [phone]

## 2024-10-09 NOTE — OCCUPATIONAL THERAPY NOTE
Occupational Therapy Evaluation and Treatment     Patient Name: Reuben García  Today's Date: 10/9/2024  Problem List  Principal Problem:    S/P MVR (mitral valve repair)  Active Problems:    Anxiety    Essential hypertension    Gastroesophageal reflux disease without esophagitis    Coronary artery disease involving native coronary artery of native heart without angina pectoris    Nonrheumatic mitral valve regurgitation    Recurrent major depressive disorder, in remission ([facility])    Mixed hyperlipidemia    S/P left atrial appendage ligation    Past Medical History  Past Medical History:   Diagnosis Date    Allergic     Arthritis     Clotting disorder ([facility])     Coronary artery disease     Depression     Diabetes mellitus ([facility])     Headache(784.0)     HL (hearing loss)     Hypertension     Myocardial infarction ([facility]) 8/23/22    Obesity     Visual impairment      Past Surgical History  Past Surgical History:   Procedure Laterality Date    CARDIAC CATHETERIZATION Left 8/25/2022    Procedure: Cardiac catheterization;  Surgeon: Luann Feng MD;  Location: [facility];  Service: Cardiology    CARDIAC CATHETERIZATION N/A 8/25/2022    Procedure: Cardiac Coronary Angiogram;  Surgeon: Luann Feng MD;  Location: [facility];  Service: Cardiology    CARDIAC CATHETERIZATION N/A 9/19/2024    Procedure: Cardiac RHC/LHC;  Surgeon: Javier Ocasio MD;  Location: [facility];  Service: Cardiology    IA REPLACEMENT MITRAL VALVE W/CARDIOPULMONARY BYP N/A 10/8/2024    Procedure: MITRAL VALVE REPAIR WITH 36MM JANNETH 4D  ANNULOPLASTY RING, LEFT ATRIAL APPENDAGE LIGATION WITH 45MM ATRICLIP;  Surgeon: Manolo Sanford DO;  Location: [facility] MA[facility];  Service: Cardiac Surgery    SEPTOPLASTY               10/09/24 1032   OT Last Visit   OT Visit Date 10/09/24   Note Type   Note type Evaluation   Pain Assessment   Pain Assessment Tool 0-10   Pain Score 8   Pain Location/Orientation Orientation: Mid;Location:  "Chest;Location: Incision   Patient's Stated Pain Goal No pain   Hospital Pain Intervention(s) Repositioned;Ambulation/increased activity   Restrictions/Precautions   Other Precautions Cardiac/sternal;Multiple lines;Telemetry;Pain;O2  ([place], CT, A-line)   Home Living   Type of Home House   Home Layout One level;Stairs to enter with rails;Performs ADLs on one level  (2 MINH)   Bathroom Shower/Tub Walk-in shower   Bathroom Toilet Raised   Bathroom Equipment Grab bars in shower   Bathroom Accessibility Accessible   Home Equipment Walker  (not using pta)   Additional Comments Pt lives in a 1 SH with 2 MINH, uses a walk-in shower with GB and raised toilet.   Prior Function   Level of [place] Independent with ADLs;Independent with functional mobility;Independent with IADLS   Lives With Family   Receives Help From Family   IADLs Independent with driving;Independent with meal prep;Independent with medication management   Falls in the last 6 months 1 to 4  (1 fall)   Vocational Retired   Lifestyle   Autonomy Pta pt I with ADL, IADL and functional mobility, (+)    Reciprocal Relationships supportive family   Service to Others retired    Intrinsic Gratification enjoys gardening, cooking, and time with his 2 cats   Subjective   Subjective \"I feel nauseous\"   ADL   Where Assessed Chair   Eating Assistance 5  Supervision/Setup   Grooming Assistance 5  Supervision/Setup   UB Bathing Assistance 4  Minimal Assistance   LB Bathing Assistance 3  Moderate Assistance   UB Dressing Assistance 4  Minimal Assistance   LB Dressing Assistance 3  Moderate Assistance   Toileting Assistance  4  Minimal Assistance   Functional Assistance 4  Minimal Assistance   Bed Mobility   Additional Comments Pt greeted OOB in chair, left in chair with all needs within reach.   Transfers   Sit to Stand 4  Minimal assistance   Additional items Assist x 1;Increased time required;Verbal cues   Stand to Sit 4  Minimal assistance   Additional " items Assist x 1;Increased time required;Verbal cues   Additional Comments with RW   Functional Mobility   Functional Mobility 4  Minimal assistance   Additional Comments Pt performs a couple steps forwards and backwards with MIN A x 1 with RW, deferred further FM due to [place] in place.   Additional items Rolling walker   Balance   Static Sitting Fair   Dynamic Sitting Fair -   Static Standing Poor +   Dynamic Standing Poor +   Ambulatory Poor +   Activity Tolerance   Activity Tolerance Patient limited by fatigue;Patient limited by pain;Treatment limited secondary to medical complications (Comment)  (nausea although BP stable)   Medical Staff Made Aware Co-eval with DPT due to medical complexity, assist from restorative [place].   Nurse Made Aware RN cleared for therapy   RUE Assessment   RUE Assessment WFL   LUE Assessment   LUE Assessment WFL   Hand Function   Gross Motor Coordination Functional   Fine Motor Coordination Functional   Sensation   Light Touch No apparent deficits   Cognition   Overall Cognitive Status WFL   Arousal/Participation Cooperative;Alert   Attention Attends with cues to redirect   Orientation Level Oriented to person;Oriented to place;Oriented to situation   Memory Decreased recall of precautions   Following Commands Follows one step commands without difficulty   Comments Pt cooperative to therapy, with decreased insight to deficits requiring vc for safety.   Assessment   Limitation Decreased ADL status;Decreased Safe judgement during ADL;Decreased endurance;Decreased self-care trans;Decreased high-level ADLs   Prognosis Good   Assessment Pt is a 69 y.o. male who was admitted to [facility] on 10/8/2024 with S/P MVR (mitral valve repair), LAAL. Pt seen for an OT evaluation per active OT orders, [place]/CT/A-line in place.  Pt  has a past medical history of Allergic, Arthritis, Clotting disorder ([facility]), Coronary artery disease, Depression, Diabetes mellitus (HCC), Headache(784.0), HL  (hearing loss), Hypertension, Myocardial infarction (HCC), Obesity, and Visual impairment. Pt lives in a 1  with 2 MINH, uses a walk-in shower with GB and raised toilet. Pta, pt was independent w/ ADL/IADL and functional mobility, was (+) driving and was not using any DME at baseline. Currently, pt is Min Ax1 for UB ADL, Mod Ax1 for LB ADL, and completed transfers/short FM w Min Ax1 with RW. Pt currently presents with impairments in the following categories -steps to enter environment, difficulty performing ADLS, and limited insight into deficits activity tolerance, endurance, insight, safety , and judgement . These impairments, as well as pt's fatigue, SOB, pain, cardiac/sternal precautions, and risk for falls  limit pt's ability to safely engage in all baseline areas of occupation, includinggrooming, bathing, dressing, toileting, functional mobility/transfers, and community mobility.  The patient's raw score on the AM-PAC Daily Activity Inpatient Short Form is 17. A raw score of greater than or equal to 19 suggests the patient may benefit from discharge to home. Please refer to the recommendation of the Occupational Therapist for safe discharge planning. Although pt AMPA score is less than 19, pt is expected to progress well and has adequate assist at home. Pt would benefit from continued acute OT services throughout hospital course. Plan for OT interventions 2-3x per week. From OT standpoint, recommend home with increased social support, no further OT needs pending progress upon D/C. Pt was left seated in bedside chair with all needs within reach.   Goals   Patient Goals to get better   Plan   Treatment Interventions ADL retraining;Functional transfer training;Endurance training;Patient/family training;Continued evaluation;Cardiac education;Energy conservation   Goal Expiration Date 10/23/24   OT Frequency 2-3x/wk   Discharge Recommendation   Rehab Resource Intensity Level, OT No post-acute rehabilitation needs    AM-PAC Daily Activity Inpatient   Lower Body Dressing 2   Bathing 2   Toileting 3   Upper Body Dressing 3   Grooming 3   Eating 4   Daily Activity Raw Score 17   Daily Activity Standardized Score (Calc for Raw Score >=11) 37.26   AM-PAC Applied Cognition Inpatient   Following a Speech/Presentation 4   Understanding Ordinary Conversation 4   Taking Medications 3   Remembering Where Things Are Placed or Put Away 3   Remembering List of 4-5 Errands 3   Taking Care of Complicated Tasks 3   Applied Cognition Raw Score 20   Applied Cognition Standardized Score 41.76   Modified Phil Scale   Modified Phil Scale 4   Additional Treatment Session   Start Time 1022   End Time 1032   Treatment Assessment Pt provided s/p cardiac sx education packet, reviewed w/ OT, discussed cardiac/sternal precautions, lifestyle modifications, post hospitalization IADL management, environmental temperature control, emotional regulation and management, lifting/driving restrictions, energy conservation techniques, mobility schedule, incisional management, VNA, and cardiac rehabilitation initiation upon clearance per physician at follow up. Pt reviewed education packet and acknowledged reception of such.   Additional Treatment Day 1   End of Consult   Education Provided Yes   Patient Position at End of Consult Bedside chair;All needs within reach   Nurse Communication Nurse aware of consult       OT Goals    - Pt will be Supervision with LB ADL by end of hospital course.    - Pt will be Supervision with UB ADL by end of hospital course.    - Pt will be Supervision with all functional transfers required for patient safety by end of hospital course.    - Pt will be Supervision with functional mobility to/from bathroom for increased independence with toileting tasks.    - Pt activity tolerance will increase to 30 minutes in order to safely engage in ADL and transfers.     - Pt standing tolerance will increase to 5 minutes  in order to promote  sink side ADLs and IADL activities.    - Pt will consistently follow one-step directions with min to no vc or prompting.     - Pt will attend to functional tasks for 10 minutes with min to no vc for attention/redirection.    - Pt will attend to and complete ongoing cognitive assessment in order to inform safe discharge planning.    - Pt will recall all cardiac precautions during OT sessions to promote safety following hospital stay.    - Pt will verbalize and demonstrate understanding to cardiac packet following education in order to promote safety following hospital stay.          RADHA Sykes, OTR/L

## 2024-10-09 NOTE — CASE MANAGEMENT
Case Management Assessment & Discharge Planning Note    Patient name Reuben García  Location [facility]01 MRN [id number]  : 1955 Date 10/9/2024       Current Admission Date: 10/8/2024  Current Admission Diagnosis:S/P MVR (mitral valve repair)   Patient Active Problem List    Diagnosis Date Noted Date Diagnosed    S/P MVR (mitral valve repair) 10/08/2024     S/P left atrial appendage ligation 10/08/2024     Hypokalemia 2024     Mixed hyperlipidemia 2024     Acute on chronic heart failure with preserved ejection fraction (HFpEF) ([facility]) 09/10/2024     Recurrent major depressive disorder, in remission ([facility]) 09/10/2024     Nonrheumatic mitral valve regurgitation 2024     Platelets decreased ([facility]) 2023     Coronary artery disease involving native coronary artery of native heart without angina pectoris      Mild intermittent asthma without complication 2021     BMI 29.0-29.9,adult 10/11/2019     Insomnia 2015     Essential hypertension 2014     Gastroesophageal reflux disease without esophagitis 2014     Anxiety 2014     Migraine headache 2014       LOS (days): 1  Geometric Mean LOS (GMLOS) (days): 8.5  Days to GMLOS:7.2     OBJECTIVE:  PATIENT READMITTED TO HOSPITAL  Risk of Unplanned Readmission Score: 32.27    Current admission status: Inpatient     [facility]erred Pharmacy:   [id number] [address]  Phone: [phone] Fax: [phone]    Primary Care Provider: Rich Delgado DO    Primary Insurance: [facility] REP  Secondary Insurance:     ASSESSMENT:  Active Health Care Proxies       Maureen García Health Care Agent - Spouse   Primary Phone: [phone] (Mobile)                  Patient Information  Admitted from:: Home  Mental Status: Alert  During Assessment patient was accompanied by: Not accompanied during assessment  Assessment information provided by::  Patient  Support Systems: Self, Spouse/significant other, Family members  County of Residence: [place]  What city do you live in?: [place]  Home entry access options. Select all that apply.: Stairs  Number of steps to enter home.: 2  Do the steps have railings?: Yes  Type of Current Residence: [facility]  Living Arrangements: Lives w/ Family members, Lives w/ Spouse/significant other (lives w/ spouse, DIL, and SO of DIL. All very supportive)  Is patient a ?: Yes  Is patient active with VA ([employer]?: No    Activities of Daily Living Prior to Admission  Functional Status: Independent  Completes ADLs independently?: Yes  Ambulates independently?: Yes  Does patient use assisted devices?: No  Does patient currently own DME?: Yes  What DME does the patient currently own?: Walker  Does the patient have a history of Short-Term Rehab?: No  Does patient currently have HHC?: No     Patient Information Continued  Income Source: Pension/correction  Does patient have prescription coverage?: Yes  Does patient receive dialysis treatments?: No  Does patient have a history of substance abuse?: No  Does patient have a history of Mental Health Diagnosis?: No     Means of Transportation  Means of Transport to Appts:: Drives Self      Social Determinants of Health (SDOH)      Flowsheet Row Most Recent Value   Housing Stability    In the last 12 months, was there a time when you were not able to pay the mortgage or rent on time? N   In the past 12 months, how many times have you moved where you were living? 0   At any time in the past 12 months, were you homeless or living in a shelter (including now)? N   Transportation Needs    In the past 12 months, has lack of transportation kept you from medical appointments or from getting medications? no   In the past 12 months, has lack of transportation kept you from meetings, work, or from getting things needed for daily living? No   Food Insecurity    Within the past 12 months,  you worried that your food would run out before you got the money to buy more. Never true   Within the past 12 months, the food you bought just didn't last and you didn't have money to get more. Never true   Utilities    In the past 12 months has the electric, gas, oil, or water company threatened to shut off services in your home? No            DISCHARGE DETAILS:    Discharge planning discussed with:: pt bedside  Freedom of Choice: Yes  Comments - Freedom of Choice: discussed; pt agreeable to blanket referral for visiting nurse  CM contacted family/caregiver?: No- see comments     Other Referral/Resources/Interventions Provided:  Interventions: C  Referral Comments: blanket referral in [place], awaiting response     Treatment Team Recommendation: Home with Home Health Care  Discharge Destination Plan:: Home with Home Health Care  Transport at Discharge : Family

## 2024-10-09 NOTE — PLAN OF CARE
Problem: NEUROSENSORY - ADULT  Goal: Achieves stable or improved neurological status  Description: INTERVENTIONS  - Monitor and report changes in neurological status  - Monitor vital signs such as temperature, blood pressure, glucose, and any other labs ordered   - Initiate measures to prevent increased intracranial pressure  - Monitor for seizure activity and implement precautions if appropriate      Outcome: Progressing  Goal: Remains free of injury related to seizures activity  Description: INTERVENTIONS  - Maintain airway, patient safety  and administer oxygen as ordered  - Monitor patient for seizure activity, document and report duration and description of seizure to physician/advanced practitioner  - If seizure occurs,  ensure patient safety during seizure  - Reorient patient post seizure  - Seizure pads on all 4 side rails  - Instruct patient/family to notify RN of any seizure activity including if an aura is experienced  - Instruct patient/family to call for assistance with activity based on nursing assessment  - Administer anti-seizure medications if ordered    Outcome: Progressing  Goal: Achieves maximal functionality and self care  Description: INTERVENTIONS  - Monitor swallowing and airway patency with patient fatigue and changes in neurological status  - Encourage and assist patient to increase activity and self care.   - Encourage visually impaired, hearing impaired and aphasic patients to use assistive/communication devices  Outcome: Progressing     Problem: CARDIOVASCULAR - ADULT  Goal: Maintains optimal cardiac output and hemodynamic stability  Description: INTERVENTIONS:  - Monitor I/O, vital signs and rhythm  - Monitor for S/S and trends of decreased cardiac output  - Administer and titrate ordered vasoactive medications to optimize hemodynamic stability  - Assess quality of pulses, skin color and temperature  - Assess for signs of decreased coronary artery perfusion  - Instruct patient to  report change in severity of symptoms  Outcome: Progressing  Goal: Absence of cardiac dysrhythmias or at baseline rhythm  Description: INTERVENTIONS:  - Continuous cardiac monitoring, vital signs, obtain 12 lead EKG if ordered  - Administer antiarrhythmic and heart rate control medications as ordered  - Monitor electrolytes and administer replacement therapy as ordered  Outcome: Progressing     Problem: GASTROINTESTINAL - ADULT  Goal: Maintains adequate nutritional intake  Description: INTERVENTIONS:  - Monitor percentage of each meal consumed  - Identify factors contributing to decreased intake, treat as appropriate  - Assist with meals as needed  - Monitor I&O, weight, and lab values if indicated  - Obtain nutrition services referral as needed  Outcome: Progressing  Goal: Oral mucous membranes remain intact  Description: INTERVENTIONS  - Assess oral mucosa and hygiene practices  - Implement preventative oral hygiene regimen  - Implement oral medicated treatments as ordered  - Initiate Nutrition services referral as needed  Outcome: Progressing     Problem: METABOLIC, FLUID AND ELECTROLYTES - ADULT  Goal: Electrolytes maintained within normal limits  Description: INTERVENTIONS:  - Monitor labs and assess patient for signs and symptoms of electrolyte imbalances  - Administer electrolyte replacement as ordered  - Monitor response to electrolyte replacements, including repeat lab results as appropriate  - Instruct patient on fluid and nutrition as appropriate  Outcome: Progressing  Goal: Fluid balance maintained  Description: INTERVENTIONS:  - Monitor labs   - Monitor I/O and WT  - Instruct patient on fluid and nutrition as appropriate  - Assess for signs & symptoms of volume excess or deficit  Outcome: Progressing  Goal: Glucose maintained within target range  Description: INTERVENTIONS:  - Monitor Blood Glucose as ordered  - Assess for signs and symptoms of hyperglycemia and hypoglycemia  - Administer ordered  medications to maintain glucose within target range  - Assess nutritional intake and initiate nutrition service referral as needed  Outcome: Progressing     Problem: HEMATOLOGIC - ADULT  Goal: Maintains hematologic stability  Description: INTERVENTIONS  - Assess for signs and symptoms of bleeding or hemorrhage  - Monitor labs  - Administer supportive blood products/factors as ordered and appropriate  Outcome: Progressing

## 2024-10-09 NOTE — PLAN OF CARE
Problem: PHYSICAL THERAPY ADULT  Goal: Performs mobility at highest level of function for planned discharge setting.  See evaluation for individualized goals.  Description: Treatment/Interventions: Functional transfer training, LE strengthening/ROM, Elevations, Therapeutic exercise, Endurance training, Equipment eval/education, Bed mobility, Gait training, Spoke to nursing, OT  Equipment Recommended: Walker       See flowsheet documentation for full assessment, interventions and recommendations.  Note: Prognosis: Good  Problem List: Decreased strength, Decreased endurance, Impaired balance, Decreased mobility, Pain  Assessment: Pt is 69 y.o. male admitted with Dx of Severe mitral regurgitation and underwent Mitral valve repair with  anterior neocord placement to A2 and A3 and 36 mm Andry/Liva[place] 4D Rechord mitral annuloplasty ring, and that is the edge for repair with commissuroplasty at P1-A1 and Ligation left atrial appendage with 45 mm AtriClip on 10/8/2024. Pt 's comorbidities affecting POC include: arthritis, clotting disorder (HCC), Coronary artery disease, Depression, Diabetes mellitus (HCC), Headache, HL (hearing loss), Hypertension, obesity, and visual impairment and personal factors of: MINH. Pt's clinical presentation is currently unstable/unpredictable which is evident in ongoing telemetry monitoring while in a critical care unit w/ Galien Mariel catheter and a-line in place, supplemental O2 needs, abnormal lab values being monitored/trending, CT in place and inability to progress further w/ mobilization at this time. Pt presents w/ postop guarding, generalized weakness, incl decreased LE strength, decreased functional endurance and activity tolerance, and impaired balance w/ associated gait deviations requiring use of rw at this time. Will cont to follow pt in PT for progressive mobilization to address above functional deficits and to max level of (I), endurance, and safety. Otherwise, anticipate pt  will return home w/ available family support upon D/C provided he cont improving w/ mobility skills, safety, and endurance (incl on the steps) and when medically cleared; D/C recommendations are outlined below; will follow.        Rehab Resource Intensity Level, PT: No post-acute rehabilitation needs    See flowsheet documentation for full assessment.

## 2024-10-10 LAB
ANION GAP SERPL CALCULATED.3IONS-SCNC: 11 MMOL/L (ref 4–13)
ANION GAP SERPL CALCULATED.3IONS-SCNC: 13 MMOL/L (ref 4–13)
ATRIAL RATE: 111 BPM
ATRIAL RATE: 122 BPM
ATRIAL RATE: 288 BPM
ATRIAL RATE: 53 BPM
ATRIAL RATE: 84 BPM
BASE EX.OXY STD BLDV CALC-SCNC: 57 % (ref 60–80)
BASE EX.OXY STD BLDV CALC-SCNC: 70.5 % (ref 60–80)
BASE EX.OXY STD BLDV CALC-SCNC: 73.9 % (ref 60–80)
BASE EXCESS BLDA CALC-SCNC: -6.4 MMOL/L
BASE EXCESS BLDA CALC-SCNC: -7.3 MMOL/L
BASE EXCESS BLDV CALC-SCNC: -4.1 MMOL/L
BASE EXCESS BLDV CALC-SCNC: -6.2 MMOL/L
BASE EXCESS BLDV CALC-SCNC: -7 MMOL/L
BODY TEMPERATURE: 99 DEGREES FEHRENHEIT
BODY TEMPERATURE: 99.1 DEGREES FEHRENHEIT
BUN SERPL-MCNC: 34 MG/DL (ref 5–25)
BUN SERPL-MCNC: 39 MG/DL (ref 5–25)
BUN SERPL-MCNC: 45 MG/DL (ref 5–25)
BUN SERPL-MCNC: 48 MG/DL (ref 5–25)
CALCIUM SERPL-MCNC: 7.3 MG/DL (ref 8.4–10.2)
CALCIUM SERPL-MCNC: 8.3 MG/DL (ref 8.4–10.2)
CALCIUM SERPL-MCNC: 8.6 MG/DL (ref 8.4–10.2)
CALCIUM SERPL-MCNC: 8.7 MG/DL (ref 8.4–10.2)
CHLORIDE SERPL-SCNC: 101 MMOL/L (ref 96–108)
CHLORIDE SERPL-SCNC: 102 MMOL/L (ref 96–108)
CHLORIDE SERPL-SCNC: 103 MMOL/L (ref 96–108)
CHLORIDE SERPL-SCNC: 109 MMOL/L (ref 96–108)
CO2 SERPL-SCNC: 19 MMOL/L (ref 21–32)
CO2 SERPL-SCNC: 20 MMOL/L (ref 21–32)
CO2 SERPL-SCNC: 21 MMOL/L (ref 21–32)
CO2 SERPL-SCNC: 24 MMOL/L (ref 21–32)
CREAT SERPL-MCNC: 2.09 MG/DL (ref 0.6–1.3)
CREAT SERPL-MCNC: 2.31 MG/DL (ref 0.6–1.3)
CREAT SERPL-MCNC: 2.69 MG/DL (ref 0.6–1.3)
CREAT SERPL-MCNC: 2.75 MG/DL (ref 0.6–1.3)
ERYTHROCYTE [DISTWIDTH] IN BLOOD BY AUTOMATED COUNT: 17.1 % (ref 11.6–15.1)
GFR SERPL CREATININE-BSD FRML MDRD: 22 ML/MIN/1.73SQ M
GFR SERPL CREATININE-BSD FRML MDRD: 23 ML/MIN/1.73SQ M
GFR SERPL CREATININE-BSD FRML MDRD: 28 ML/MIN/1.73SQ M
GFR SERPL CREATININE-BSD FRML MDRD: 31 ML/MIN/1.73SQ M
GLUCOSE SERPL-MCNC: 122 MG/DL (ref 65–140)
GLUCOSE SERPL-MCNC: 124 MG/DL (ref 65–140)
GLUCOSE SERPL-MCNC: 126 MG/DL (ref 65–140)
GLUCOSE SERPL-MCNC: 128 MG/DL (ref 65–140)
GLUCOSE SERPL-MCNC: 129 MG/DL (ref 65–140)
GLUCOSE SERPL-MCNC: 176 MG/DL (ref 65–140)
GLUCOSE SERPL-MCNC: 186 MG/DL (ref 65–140)
GLUCOSE SERPL-MCNC: 199 MG/DL (ref 65–140)
GLUCOSE SERPL-MCNC: 204 MG/DL (ref 65–140)
HCO3 BLDA-SCNC: 20.4 MMOL/L (ref 22–28)
HCO3 BLDA-SCNC: 20.5 MMOL/L (ref 22–28)
HCO3 BLDV-SCNC: 22.1 MMOL/L (ref 24–30)
HCO3 BLDV-SCNC: 22.4 MMOL/L (ref 24–30)
HCO3 BLDV-SCNC: 23.6 MMOL/L (ref 24–30)
HCT VFR BLD AUTO: 35.4 % (ref 36.5–49.3)
HGB BLD-MCNC: 11.5 G/DL (ref 12–17)
INR PPP: 1.33 (ref 0.85–1.19)
MAGNESIUM SERPL-MCNC: 2.9 MG/DL (ref 1.9–2.7)
MCH RBC QN AUTO: 31 PG (ref 26.8–34.3)
MCHC RBC AUTO-ENTMCNC: 32.5 G/DL (ref 31.4–37.4)
MCV RBC AUTO: 95 FL (ref 82–98)
NASAL CANNULA: 6
NON VENT- BIPAP: ABNORMAL
O2 CT BLDA-SCNC: 15.4 ML/DL (ref 16–23)
O2 CT BLDA-SCNC: 16.9 ML/DL (ref 16–23)
O2 CT BLDV-SCNC: 11.8 ML/DL
O2 CT BLDV-SCNC: 11.9 ML/DL
O2 CT BLDV-SCNC: 9.9 ML/DL
OXYHGB MFR BLDA: 94.7 % (ref 94–97)
OXYHGB MFR BLDA: 96 % (ref 94–97)
PCO2 BLD: 56.6 MM HG (ref 42–50)
PCO2 BLD: 59.6 MM HG (ref 42–50)
PCO2 BLDA: 46 MM HG (ref 36–44)
PCO2 BLDA: 50.9 MM HG (ref 36–44)
PCO2 BLDV: 55.9 MM HG (ref 42–50)
PCO2 BLDV: 59.1 MM HG (ref 42–50)
PCO2 BLDV: 61.4 MM HG (ref 42–50)
PH BLD: 7.19 [PH] (ref 7.3–7.4)
PH BLD: 7.24 [PH] (ref 7.3–7.4)
PH BLDA: 7.22 [PH] (ref 7.35–7.45)
PH BLDA: 7.26 [PH] (ref 7.35–7.45)
PH BLDV: 7.17 [PH] (ref 7.3–7.4)
PH BLDV: 7.2 [PH] (ref 7.3–7.4)
PH BLDV: 7.24 [PH] (ref 7.3–7.4)
PLATELET # BLD AUTO: 220 THOUSANDS/UL (ref 149–390)
PMV BLD AUTO: 9.8 FL (ref 8.9–12.7)
PO2 BLDA: 105.9 MM HG (ref 75–129)
PO2 BLDA: 88.3 MM HG (ref 75–129)
PO2 BLDV: 33.5 MM HG (ref 35–45)
PO2 BLDV: 41.1 MM HG (ref 35–45)
PO2 BLDV: 43.1 MM HG (ref 35–45)
PO2 VENOUS TEMP CORRECTED: 41.7 MM HG (ref 35–45)
PO2 VENOUS TEMP CORRECTED: 44 MM HG (ref 35–45)
POTASSIUM SERPL-SCNC: 3.9 MMOL/L (ref 3.5–5.3)
POTASSIUM SERPL-SCNC: 4 MMOL/L (ref 3.5–5.3)
POTASSIUM SERPL-SCNC: 4.3 MMOL/L (ref 3.5–5.3)
POTASSIUM SERPL-SCNC: 4.8 MMOL/L (ref 3.5–5.3)
PROTHROMBIN TIME: 16.8 SECONDS (ref 12.3–15)
QRS AXIS: 75 DEGREES
QRS AXIS: 77 DEGREES
QRS AXIS: 79 DEGREES
QRS AXIS: 81 DEGREES
QRS AXIS: 83 DEGREES
QRSD INTERVAL: 100 MS
QRSD INTERVAL: 100 MS
QRSD INTERVAL: 106 MS
QRSD INTERVAL: 106 MS
QRSD INTERVAL: 98 MS
QT INTERVAL: 316 MS
QT INTERVAL: 320 MS
QT INTERVAL: 324 MS
QT INTERVAL: 474 MS
QT INTERVAL: 506 MS
QTC INTERVAL: 407 MS
QTC INTERVAL: 436 MS
QTC INTERVAL: 459 MS
QTC INTERVAL: 492 MS
QTC INTERVAL: 496 MS
RBC # BLD AUTO: 3.71 MILLION/UL (ref 3.88–5.62)
SODIUM SERPL-SCNC: 136 MMOL/L (ref 135–147)
SODIUM SERPL-SCNC: 136 MMOL/L (ref 135–147)
SODIUM SERPL-SCNC: 138 MMOL/L (ref 135–147)
SODIUM SERPL-SCNC: 139 MMOL/L (ref 135–147)
SPECIMEN SOURCE: ABNORMAL
SPECIMEN SOURCE: ABNORMAL
T WAVE AXIS: 42 DEGREES
T WAVE AXIS: 52 DEGREES
T WAVE AXIS: 53 DEGREES
T WAVE AXIS: 54 DEGREES
T WAVE AXIS: 54 DEGREES
VENTRICULAR RATE: 112 BPM
VENTRICULAR RATE: 127 BPM
VENTRICULAR RATE: 57 BPM
VENTRICULAR RATE: 66 BPM
VENTRICULAR RATE: 95 BPM
WBC # BLD AUTO: 16.77 THOUSAND/UL (ref 4.31–10.16)

## 2024-10-10 PROCEDURE — 94760 N-INVAS EAR/PLS OXIMETRY 1: CPT

## 2024-10-10 PROCEDURE — 99291 CRITICAL CARE FIRST HOUR: CPT | Performed by: ANESTHESIOLOGY

## 2024-10-10 PROCEDURE — NC001 PR NO CHARGE: Performed by: NURSE PRACTITIONER

## 2024-10-10 PROCEDURE — 82948 REAGENT STRIP/BLOOD GLUCOSE: CPT

## 2024-10-10 PROCEDURE — 94002 VENT MGMT INPAT INIT DAY: CPT

## 2024-10-10 PROCEDURE — 83735 ASSAY OF MAGNESIUM: CPT | Performed by: PHYSICIAN ASSISTANT

## 2024-10-10 PROCEDURE — 93010 ELECTROCARDIOGRAM REPORT: CPT | Performed by: INTERNAL MEDICINE

## 2024-10-10 PROCEDURE — 99222 1ST HOSP IP/OBS MODERATE 55: CPT | Performed by: NURSE PRACTITIONER

## 2024-10-10 PROCEDURE — 80048 BASIC METABOLIC PNL TOTAL CA: CPT | Performed by: PHYSICIAN ASSISTANT

## 2024-10-10 PROCEDURE — 82805 BLOOD GASES W/O2 SATURATION: CPT | Performed by: NURSE PRACTITIONER

## 2024-10-10 PROCEDURE — 85610 PROTHROMBIN TIME: CPT | Performed by: PHYSICIAN ASSISTANT

## 2024-10-10 PROCEDURE — 93005 ELECTROCARDIOGRAM TRACING: CPT

## 2024-10-10 PROCEDURE — 85027 COMPLETE CBC AUTOMATED: CPT | Performed by: PHYSICIAN ASSISTANT

## 2024-10-10 RX ORDER — METOLAZONE 10 MG/1
10 TABLET ORAL DAILY
Status: DISCONTINUED | OUTPATIENT
Start: 2024-10-11 | End: 2024-10-13

## 2024-10-10 RX ORDER — METOLAZONE 10 MG/1
10 TABLET ORAL ONCE
Status: COMPLETED | OUTPATIENT
Start: 2024-10-10 | End: 2024-10-10

## 2024-10-10 RX ORDER — METHOCARBAMOL 500 MG/1
500 TABLET, FILM COATED ORAL EVERY 6 HOURS SCHEDULED
Status: DISCONTINUED | OUTPATIENT
Start: 2024-10-10 | End: 2024-10-11

## 2024-10-10 RX ORDER — LIDOCAINE 50 MG/G
2 PATCH TOPICAL DAILY
Status: DISCONTINUED | OUTPATIENT
Start: 2024-10-10 | End: 2024-10-16 | Stop reason: HOSPADM

## 2024-10-10 RX ORDER — METOPROLOL TARTRATE 25 MG/1
25 TABLET, FILM COATED ORAL EVERY 12 HOURS SCHEDULED
Status: DISCONTINUED | OUTPATIENT
Start: 2024-10-10 | End: 2024-10-11

## 2024-10-10 RX ORDER — METOPROLOL TARTRATE 1 MG/ML
5 INJECTION, SOLUTION INTRAVENOUS ONCE
Status: COMPLETED | OUTPATIENT
Start: 2024-10-10 | End: 2024-10-10

## 2024-10-10 RX ORDER — WARFARIN SODIUM 2 MG/1
2 TABLET ORAL
Status: COMPLETED | OUTPATIENT
Start: 2024-10-10 | End: 2024-10-10

## 2024-10-10 RX ORDER — MILRINONE LACTATE 0.2 MG/ML
0.13 INJECTION, SOLUTION INTRAVENOUS CONTINUOUS
Status: DISCONTINUED | OUTPATIENT
Start: 2024-10-10 | End: 2024-10-12

## 2024-10-10 RX ADMIN — HEPARIN SODIUM 5000 UNITS: 5000 INJECTION INTRAVENOUS; SUBCUTANEOUS at 22:57

## 2024-10-10 RX ADMIN — Medication 4 MG/HR: at 09:50

## 2024-10-10 RX ADMIN — METHOCARBAMOL 500 MG: 500 TABLET ORAL at 23:00

## 2024-10-10 RX ADMIN — CHLORHEXIDINE GLUCONATE 0.12% ORAL RINSE 15 ML: 1.2 LIQUID ORAL at 08:33

## 2024-10-10 RX ADMIN — Medication 4 MG/HR: at 22:11

## 2024-10-10 RX ADMIN — CLOPIDOGREL BISULFATE 75 MG: 75 TABLET ORAL at 08:33

## 2024-10-10 RX ADMIN — METOLAZONE 10 MG: 10 TABLET ORAL at 09:51

## 2024-10-10 RX ADMIN — HEPARIN SODIUM 5000 UNITS: 5000 INJECTION INTRAVENOUS; SUBCUTANEOUS at 06:43

## 2024-10-10 RX ADMIN — Medication 4 MG/HR: at 03:10

## 2024-10-10 RX ADMIN — DEXTROSE 150 MG: 50 INJECTION, SOLUTION INTRAVENOUS at 03:36

## 2024-10-10 RX ADMIN — HEPARIN SODIUM 5000 UNITS: 5000 INJECTION INTRAVENOUS; SUBCUTANEOUS at 14:07

## 2024-10-10 RX ADMIN — POLYETHYLENE GLYCOL 3350 17 G: 17 POWDER, FOR SOLUTION ORAL at 08:33

## 2024-10-10 RX ADMIN — OXYCODONE HYDROCHLORIDE 5 MG: 5 TABLET ORAL at 08:32

## 2024-10-10 RX ADMIN — POTASSIUM CHLORIDE 20 MEQ: 1500 TABLET, EXTENDED RELEASE ORAL at 17:28

## 2024-10-10 RX ADMIN — METHOCARBAMOL 500 MG: 500 TABLET ORAL at 09:51

## 2024-10-10 RX ADMIN — DIAZEPAM 5 MG: 5 TABLET ORAL at 03:40

## 2024-10-10 RX ADMIN — LIDOCAINE 2 PATCH: 700 PATCH TOPICAL at 08:33

## 2024-10-10 RX ADMIN — ARIPIPRAZOLE 2 MG: 2 TABLET ORAL at 08:34

## 2024-10-10 RX ADMIN — ONDANSETRON 4 MG: 2 INJECTION INTRAMUSCULAR; INTRAVENOUS at 06:38

## 2024-10-10 RX ADMIN — SUMATRIPTAN SUCCINATE 50 MG: 50 TABLET ORAL at 08:39

## 2024-10-10 RX ADMIN — MUPIROCIN 1 APPLICATION: 20 OINTMENT TOPICAL at 08:33

## 2024-10-10 RX ADMIN — INSULIN LISPRO 2 UNITS: 100 INJECTION, SOLUTION INTRAVENOUS; SUBCUTANEOUS at 10:40

## 2024-10-10 RX ADMIN — ACETAMINOPHEN 975 MG: 325 TABLET, FILM COATED ORAL at 16:30

## 2024-10-10 RX ADMIN — Medication 4 MG/HR: at 18:55

## 2024-10-10 RX ADMIN — MILRINONE LACTATE IN DEXTROSE 0.25 MCG/KG/MIN: 200 INJECTION, SOLUTION INTRAVENOUS at 23:45

## 2024-10-10 RX ADMIN — OXYCODONE HYDROCHLORIDE 5 MG: 5 TABLET ORAL at 03:10

## 2024-10-10 RX ADMIN — DIAZEPAM 5 MG: 5 TABLET ORAL at 16:31

## 2024-10-10 RX ADMIN — AMIODARONE HYDROCHLORIDE 1 MG/MIN: 50 INJECTION, SOLUTION INTRAVENOUS at 03:46

## 2024-10-10 RX ADMIN — METOPROLOL TARTRATE 25 MG: 25 TABLET, FILM COATED ORAL at 08:32

## 2024-10-10 RX ADMIN — POTASSIUM CHLORIDE 20 MEQ: 1500 TABLET, EXTENDED RELEASE ORAL at 08:33

## 2024-10-10 RX ADMIN — VENLAFAXINE HYDROCHLORIDE 150 MG: 150 CAPSULE, EXTENDED RELEASE ORAL at 08:34

## 2024-10-10 RX ADMIN — DEXTROSE 150 MG: 50 INJECTION, SOLUTION INTRAVENOUS at 01:46

## 2024-10-10 RX ADMIN — SENNOSIDES AND DOCUSATE SODIUM 2 TABLET: 50; 8.6 TABLET ORAL at 08:33

## 2024-10-10 RX ADMIN — METOPROLOL TARTRATE 25 MG: 25 TABLET, FILM COATED ORAL at 22:57

## 2024-10-10 RX ADMIN — SENNOSIDES AND DOCUSATE SODIUM 2 TABLET: 50; 8.6 TABLET ORAL at 17:28

## 2024-10-10 RX ADMIN — Medication 4 MG/HR: at 06:50

## 2024-10-10 RX ADMIN — DIAZEPAM 5 MG: 5 TABLET ORAL at 10:40

## 2024-10-10 RX ADMIN — AMIODARONE HYDROCHLORIDE 200 MG: 200 TABLET ORAL at 22:57

## 2024-10-10 RX ADMIN — MILRINONE LACTATE IN DEXTROSE 0.25 MCG/KG/MIN: 200 INJECTION, SOLUTION INTRAVENOUS at 11:19

## 2024-10-10 RX ADMIN — AMIODARONE HYDROCHLORIDE 200 MG: 200 TABLET ORAL at 16:30

## 2024-10-10 RX ADMIN — METHOCARBAMOL 500 MG: 500 TABLET ORAL at 01:14

## 2024-10-10 RX ADMIN — CHLORHEXIDINE GLUCONATE 0.12% ORAL RINSE 15 ML: 1.2 LIQUID ORAL at 17:28

## 2024-10-10 RX ADMIN — ATORVASTATIN CALCIUM 80 MG: 80 TABLET, FILM COATED ORAL at 15:53

## 2024-10-10 RX ADMIN — SUMATRIPTAN SUCCINATE 50 MG: 50 TABLET ORAL at 03:40

## 2024-10-10 RX ADMIN — WARFARIN SODIUM 2 MG: 2 TABLET ORAL at 17:28

## 2024-10-10 RX ADMIN — Medication 4 MG/HR: at 13:16

## 2024-10-10 RX ADMIN — PANTOPRAZOLE SODIUM 40 MG: 40 TABLET, DELAYED RELEASE ORAL at 06:47

## 2024-10-10 RX ADMIN — AMIODARONE HYDROCHLORIDE 1 MG/MIN: 50 INJECTION, SOLUTION INTRAVENOUS at 18:55

## 2024-10-10 RX ADMIN — METOPROLOL TARTRATE 5 MG: 1 INJECTION, SOLUTION INTRAVENOUS at 01:04

## 2024-10-10 RX ADMIN — Medication 4 MG/HR: at 16:18

## 2024-10-10 RX ADMIN — ACETAMINOPHEN 975 MG: 325 TABLET, FILM COATED ORAL at 08:33

## 2024-10-10 RX ADMIN — METHOCARBAMOL 500 MG: 500 TABLET ORAL at 17:28

## 2024-10-10 NOTE — ASSESSMENT & PLAN NOTE
Valium 5 mg oral every 6 hours as needed for anxiety or muscle spasms  Continue to monitor level of anxiety as this can interfere with optimizing pain postoperatively

## 2024-10-10 NOTE — PHYSICAL THERAPY NOTE
Physical Therapy Cancellation Note               10/10/24 1031   Note Type   Note Type Cancelled Session   Cancel Reasons Medical status     Attempted to see pt in AM; pt is observed in supine w/ CC team at bedside (CTs being D/C'd); reportedly, pt w/ increased WOB this morning and will be transitioning to a BiPap; will hold PT/mobilization at this time; will follow as clinical course allows; nsg aware.    Binh Dawson

## 2024-10-10 NOTE — PROGRESS NOTES
Progress Note - Critical Care/ICU   Name: Reuben García 69 y.o. male I MRN: [id number]  Unit/Bed#: PPHP-313-01 I Date of Admission: 10/8/2024   Date of Service: 10/10/2024 I Hospital Day: 2       Assessment & Plan   Impressions:  MVR s/p MV Repair  Hx CAD/NSTEMI s/p PCI/ESTHER (2022)  HFpEF, G2 DDD  HTN  HLD  GERD  Arthritis  Asthma  Daily ETOH use  Post cardiotomy FRANCISCO   Depressive disorder     Neuro:   Cardiac Surgery Pain Control: ATC tylenol, PRN oxycodone 2.5/5mg, Scheduled Robaxin , and Lidocaine Patch  Depression   Home Abilify and Effexor   ETOH use disorder   CIWA protocol   Valium q6h PRN   Delirium precautions  Regulate sleep/wake cycle  Reorder home Trazodone qHS   CAM-ICU daily  Trend neuro exam    CV:   Cardiac Surgery Hemodynamic Monitoring: MAP goal >65 CI >2.2 Discontinue PA catheter Discontinue arterial line   Follow rhythm on telemetry  Critical Care Infusions : No cardiac infusions  Beta Blocker Plan: Start Lopressor 12.5 mg BID  Epicardial pacing wire plan : Maintain for pacing needs while initiating BB with prior junctional rhythm   Post op cardiac surgery medications:   Plavix 75 mg QD  Statin: Lipitor 80 mg qHS  Amiodarone 200 mg PO q8 hours  Atrial fibrillation   Amiodarone infusion 1 mg/min     Lung:   Acute post-op pulmonary insufficiency requiring 4 liters/min via nasal cannula. Secondary to poor inspiratory effort secondary to pain, pulmonary vascular congestion, and body habitus/obesity  Chest tube output remains persistently high; Continue chest tubes to suction today    GI:   Continue PPI for stress ulcer prophylaxis  Continue bowel regimen  Trend abdominal exam and bowel function    FEN:   Diuresis Plan: Bumex infusion 4 mg/hr with goal negative fluid balance.   Q6 BMPs  Re-dose Metolazone today if UOP drops off   Nutrition plan: as tolerated  Replenish electrolytes with goals: K >4.0, Mag >2.0, and Phos >3.0    :   [place] Plan: Continue for accurate I/O monitoring for 48 hours  Trend  UOP and BUN/creat  Strict I and O     ID:   Trend temps and WBC count  Maintain normothermia    Heme:   Trend hgb and plts    Endo:   BG controlled on SSI regimen     MSK/Skin:  Mobility goal:   PT/OT consult as medically appropriate   Frequent turning and pressure off-loading  Local wound care as needed    Disposition:  Stepdown Level 1    ICU Core Measures     A: Assess, Prevent, and Manage Pain Has pain been assessed? Yes  Need for changes to pain regimen? No   B: Both SAT/SAT  N/A   C: Choice of Sedation RASS Goal: N/A patient not on sedation  Need for changes to sedation or analgesia regimen? NA   D: Delirium CAM-ICU: Negative   E: Early Mobility  Plan for early mobility? Yes   F: Family Engagement Plan for family engagement today? Yes       Review of Invasive Devices:    [place] Plan: Continue for accurate I/O monitoring for 48 hours  Central access plan: Patient has multiple central venous catheters.  Medications requiring central line Hemodynamic monitoring  [place] Plan: Discontinue arterial line    Prophylaxis:  VTE VTE covered by:  heparin (porcine), Subcutaneous, 5,000 Units at 10/09/24 2233       Stress Ulcer  covered bypantoprazole (PROTONIX) EC tablet 40 mg [[id number]]            24 Hour Events    24 Hour Events/HPI: POD # 2 s/p MV repair with neocord placement, mitral annuloplasty ring, and commisuroplasty, ATLHEA ligation with AtriClip.     Diuresis uptitrated to Bumex infusion 4 mg/hr and metolazone with improved UOP overnight. AF RVR overnight with rates up to 150s, received 5 mg metoprolol, amiodarone bolus x 2 and started on infusion with rates improved to 110-115.     Critical Care Infusions : Amiodarone 1 mg/min   Subjective   Review of Systems: Review of Systems   Gastrointestinal:  Positive for abdominal pain (cramping). Negative for nausea and vomiting.   Neurological:  Positive for headaches (migrane overnight, improved).     Objective :    Medications:  Scheduled Continuous   acetaminophen, 975  mg, Q6H While awake  amiodarone, 200 mg, Q8H PAOLO  ARIPiprazole, 2 mg, Daily  atorvastatin, 80 mg, Daily With Dinner  chlorhexidine, 15 mL, BID  clopidogrel, 75 mg, Daily  heparin (porcine), 5,000 Units, Q8H PAOLO  insulin lispro, 1-6 Units, TID AC  insulin lispro, 1-6 Units, HS  lidocaine, 2 patch, Daily  mupirocin, 1 Application, Q12H PAOLO  pantoprazole, 40 mg, Daily  polyethylene glycol, 17 g, Daily  potassium chloride, 20 mEq, BID  senna-docusate sodium, 2 tablet, BID  venlafaxine, 150 mg, Daily With Breakfast      amiodarone (CORDARONE) 900 mg in dextrose 5 % 500 mL infusion, 1 mg/min, Last Rate: 1 mg/min (10/10/24 0435)  bumetanide (BUMEX) 12.5 mg infusion 50 mL, 4 mg/hr, Last Rate: 4 mg/hr (10/10/24 0310)         Vitals: Invasive Monitoring       Most Recent Min/Max in 24hrs   Temp 98.6 °F (37 °C) Temp  Min: 98.1 °F (36.7 °C)  Max: 99 °F (37.2 °C)   Pulse (!) 116 Pulse  Min: 50  Max: 137   Resp (!) 24 Resp  Min: 14  Max: 32   /88 BP  Min: 104/50  Max: 145/67   O2 Sat 95 % SpO2  Min: 89 %  Max: 98 %   O2 Device: Nasal cannula  Nasal Cannula O2 Flow Rate (L/min): 4 L/min Arterial Line  [place] /67  Arterial Line BP  Min: 94/63  Max: 151/64   MAP 82 mmHg  Arterial Line MAP (mmHg)  Min: 62 mmHg  Max: 91 mmHg     PA Catheter   Most Recent  Min/Max in 24hrs    PAP 86/18 PAP  Min: 43/16  Max: 86/18   CVP 23 mmHg CVP (mean)  Min: 2 mmHg  Max: 23 mmHg   CI 2.5 L/min/m2  CI (L/min/m2)  Min: 1.9 L/min/m2  Max: 4.7 L/min/m2   SVR 1031 (dyne*sec)/cm5 SVR (dyne*sec)/cm5  Min: 624 (dyne*sec)/cm5  Max: 1348 (dyne*sec)/cm5        I/O Chest tube output (if present)     Intake/Output Summary (Last 24 hours) at 10/10/2024 0500  Last data filed at 10/10/2024 0400  Gross per 24 hour   Intake 2159.65 ml   Output 1525 ml   Net 634.65 ml     UOP: 100-150/hour    BM: 0 in the last 24 hours  Weight change:      Mediastinal tubes:  10 mL/8hr  160 mL/24hr   Pleural tubes: 0 mL/8hr  130 mL/24hr        Physical Exam   Physical  Exam  Skin:     General: Skin is cool and dry.   HENT:      Head: Normocephalic and atraumatic.   Neck:      Vascular: Central line present.      Comments: R IJ TLC, introducer and swan-julia catheter in place   Cardiovascular:      Rate and Rhythm: Tachycardia present. Rhythm irregularly irregular.      Heart sounds: Normal heart sounds.      Arteriovenous access: Right arteriovenous access is present.  Musculoskeletal:      Right lower leg: No edema.      Left lower leg: No edema.   Abdominal: General: There is distension.     Palpations: Abdomen is soft.      Tenderness: There is no abdominal tenderness.   Constitutional:       General: He is awake. He is not in acute distress.     Appearance: He is well-developed. He is ill-appearing.      Interventions: Nasal cannula in place.   Pulmonary:      Effort: No tachypnea or respiratory distress.      Breath sounds: Decreased breath sounds present.   Chest:      Comments: Sternotomy incision with silver impregnated dressing in place   Chest tubes in place with serosanguinous drainage   Neurological:      General: No focal deficit present.      Mental Status: He is alert and oriented to person, place, and time.   Genitourinary/Anorectal:  Linder present.          Labs:  CBC  Recent Labs     10/09/24  0411 10/10/24  0428   WBC 10.88* 16.77*   HGB 11.1* 11.5*   HCT 33.6* 35.4*    220     BMP  Recent Labs     10/10/24  0001 10/10/24  0428   SODIUM 139 136   K 3.9 4.3   * 102   CO2 19* 21   AGAP 11 13   BUN 34* 39*   CREATININE 2.09* 2.31*   CALCIUM 7.3* 8.3*        Baseline Creat: 0.9   Coags  Recent Labs     10/08/24  1415 10/09/24  0411 10/10/24  0428   INR 1.34* 1.20* 1.33*   PTT 34  --   --               Additional Electrolytes  Recent Labs     10/08/24  1417 10/10/24  0428   MG  --  2.9*   CAIONIZED 1.13  --           Blood Gas  Recent Labs     10/09/24  0638   PHART 7.281*   MEV4VQD 41.1   PO2ART 129.9*   [id number] 18.9*   BEART -7.4   SOURCE Line, Arterial      Recent Labs     10/09/24  0638   SOURCE Line, Arterial    LFTs  No recent results    Infectious  No recent results     No recent results Glucose  Recent Labs     10/09/24  1252 10/09/24  1808 10/10/24  0001 10/10/24  0428   GLUC 184* 185* 122 186*        Collaborative bedside rounds performed with cardiac surgery attending, critical care attending and bedside RN.

## 2024-10-10 NOTE — CONSULTS
Consultation - Acute Pain   Name: Reuben García 69 y.o. male I MRN: [id number]  Unit/Bed#: PPHP-313-01 I Date of Admission: 10/8/2024   Date of Service: 10/10/2024 I Hospital Day: 2     Inpatient consult to Acute Pain Service  Consult performed by: DYLAN Mondragon  Consult ordered by: DYLAN Butt        Physician Requesting Evaluation: Manolo Sanford DO   Reason for Evaluation / Principal Problem: post-op pain    Assessment & Plan  S/P MVR (mitral valve repair)  Status post mitral valve repair and ligation left atrial appendage on 10/8/2024.  Chest tubes discontinued on 10/10/2024  Multimodal analgesic plan:  Tylenol 975 mg every 6 hours while awake  Lidocaine patches daily, on for 12 hours and off for 12 hours  Apply 2 patches to anterior chest wall  Change Robaxin to 500 mg every 6 hours scheduled  Valium 5 mg oral every 6 hours as needed for anxiety or muscle spasms  Add Narcan as needed for opioid reversal agent/respiratory depression  Oxycodone 2.5 mg every 4 hours as needed for moderate pain  Oxycodone 5 mg every 4 hours as needed for severe pain  Estimated Creatinine Clearance: 24.2 mL/min (A) (by C-G formula based on SCr of 2.69 mg/dL (H)).  Given elevation in creatinine, would not recommend any further increases in opioid regimen at this time  Case discussed with anesthesiologist, will consider peripheral nerve block to anterior chest wall today given severe pain    Bowel regimen:  Continue bowel regimen to prevent opioid-induced constipation  Senokot-S 2 tablets twice a day  MiraLAX daily  Bisacodyl suppository as needed  Anxiety  Valium 5 mg oral every 6 hours as needed for anxiety or muscle spasms  Continue to monitor level of anxiety as this can interfere with optimizing pain postoperatively  Recurrent major depressive disorder, in remission (HCC)  Continue to monitor for depressive symptoms as severe depression can interfere with optimizing pain control  postoperatively.    Treatment plan and medications have been reviewed with patient, bedside nursing staff, primary care service and anesthesiology.    APS will continue to follow. Please contact Acute Pain Service - via SecureChat from [phone] with additional questions or concerns. See SecureChat or iLogon for additional contacts and after hours information.     History of Present Illness    HPI: Reuben García is a 69 y.o. year old male who presents with with a history of CAD/NSTEMI status post PCI/ESTHER in 2022, hypertension, GERD, arthritis, daily alcohol use, depression and heart failure who is status post mitral valve repair and ligation of left atrial appendage on 10/8/2024.    Current pain location(s): Pain Score: 7  Pain Location/Orientation: Location: Head  Pain Scale: Pain Assessment Tool: 0-10  Current Analgesic regimen: At present time patient is resting in bed, chest tubes have been removed.  Patient is reporting severe midsternal chest wall pain, that minimally improves on current medication regimen.  He reports it is difficult to take a deep breath or move given severe pain.  Tolerating current analgesic regimen, no reported adverse effects.    Pain History: Reports a history of arthritis.  I have reviewed the patient's controlled substance dispensing history in the Prescription Drug Monitoring Program in compliance with the [facility] regulations before prescribing any controlled substances.     Review of Systems   Respiratory:  Positive for shortness of breath.    Cardiovascular:  Positive for chest pain.   Gastrointestinal:  Negative for constipation, nausea and vomiting.   Musculoskeletal:  Positive for arthralgias.   Neurological:  Positive for dizziness and headaches.   All other systems reviewed and are negative.    I have reviewed the patient's PMH, PSH, Social History, Family History, Meds, and Allergies  Historical Information   Past Medical History:   Diagnosis Date    Allergic     Arthritis      Clotting disorder (HCC)     Coronary artery disease     Depression     Diabetes mellitus (HCC)     Headache(784.0)     HL (hearing loss)     Hypertension     Myocardial infarction (HCC) 8/23/22    Obesity     Visual impairment      Past Surgical History:   Procedure Laterality Date    CARDIAC CATHETERIZATION Left 8/25/2022    Procedure: Cardiac catheterization;  Surgeon: Luann Feng MD;  Location: [facility];  Service: Cardiology    CARDIAC CATHETERIZATION N/A 8/25/2022    Procedure: Cardiac Coronary Angiogram;  Surgeon: Luann Feng MD;  Location: [facility];  Service: Cardiology    CARDIAC CATHETERIZATION N/A 9/19/2024    Procedure: Cardiac RHC/LHC;  Surgeon: Javier Ocasio MD;  Location: [facility];  Service: Cardiology    KS REPLACEMENT MITRAL VALVE W/CARDIOPULMONARY BYP N/A 10/8/2024    Procedure: MITRAL VALVE REPAIR WITH 36MM JANNETH 4D  ANNULOPLASTY RING, LEFT ATRIAL APPENDAGE LIGATION WITH 45MM ATRICLIP;  Surgeon: Manolo Sanford DO;  Location: [facility];  Service: Cardiac Surgery    SEPTOPLASTY       Social History     Tobacco Use    Smoking status: Never    Smokeless tobacco: Never    Tobacco comments:     never a smoker ( as per allscripts)   Vaping Use    Vaping status: Never Used   Substance and Sexual Activity    Alcohol use: Yes     Alcohol/week: 30.0 standard drinks of alcohol     Types: 30 Cans of beer per week     Comment: patient sasys he is cutting down  to 1-2 packs of beer    Drug use: No    Sexual activity: Not Currently     E-Cigarette/Vaping    E-Cigarette Use Never User      E-Cigarette/Vaping Substances    Nicotine No     THC No     CBD No     Flavoring No     Other No     Unknown No      Family history non-contributory  Social History     Tobacco Use    Smoking status: Never    Smokeless tobacco: Never    Tobacco comments:     never a smoker ( as per allscripts)   Vaping Use    Vaping status: Never Used   Substance and Sexual Activity    Alcohol use:  Yes     Alcohol/week: 30.0 standard drinks of alcohol     Types: 30 Cans of beer per week     Comment: patient sasys he is cutting down  to 1-2 packs of beer    Drug use: No    Sexual activity: Not Currently       Current Facility-Administered Medications:     acetaminophen (TYLENOL) rectal suppository 650 mg, Q4H PRN    acetaminophen (TYLENOL) tablet 975 mg, Q6H While awake    albuterol (PROVENTIL HFA,VENTOLIN HFA) inhaler 2 puff, TID PRN    amiodarone (CORDARONE) 900 mg in dextrose 5 % 500 mL infusion, Continuous, Last Rate: 1 mg/min (10/10/24 7665) **FOLLOWED BY** [DISCONTINUED] amiodarone (CORDARONE) 900 mg in dextrose 5 % 500 mL infusion, Continuous    amiodarone tablet 200 mg, Q8H PAOLO    ARIPiprazole (ABILIFY) tablet 2 mg, Daily    atorvastatin (LIPITOR) tablet 80 mg, Daily With Dinner    bisacodyl (DULCOLAX) rectal suppository 10 mg, Daily PRN    bumetanide (BUMEX) 12.5 mg infusion 50 mL, Continuous, Last Rate: 4 mg/hr (10/10/24 0950)    chlorhexidine (PERIDEX) 0.12 % oral rinse 15 mL, BID    clopidogrel (PLAVIX) tablet 75 mg, Daily    diazepam (VALIUM) tablet 5 mg, Q6H PRN    heparin (porcine) subcutaneous injection 5,000 Units, Q8H PAOLO    insulin lispro (HumALOG/ADMELOG) 100 units/mL subcutaneous injection 1-6 Units, TID AC **AND** Fingerstick Glucose (POCT), TID AC    insulin lispro (HumALOG/ADMELOG) 100 units/mL subcutaneous injection 1-6 Units, HS    lidocaine (LIDODERM) 5 % patch 2 patch, Daily    methocarbamol (ROBAXIN) tablet 500 mg, Q6H PAOLO    metoprolol tartrate (LOPRESSOR) tablet 25 mg, Q12H PAOLO    milrinone (PRIMACOR) 20 mg in 100 mL infusion (premix), Continuous, Last Rate: 0.25 mcg/kg/min (10/10/24 1119)    mupirocin (BACTROBAN) 2 % nasal ointment 1 Application, Q12H PAOLO    naloxone (NARCAN) 0.04 mg/mL syringe 0.04 mg, Q1MIN PRN    ondansetron (ZOFRAN) injection 4 mg, Q6H PRN    oxyCODONE (ROXICODONE) IR tablet 2.5 mg, Q4H PRN **OR** oxyCODONE (ROXICODONE) IR tablet 5 mg, Q4H PRN    pantoprazole  (PROTONIX) EC tablet 40 mg, Daily    polyethylene glycol (MIRALAX) packet 17 g, Daily    potassium chloride (Klor-Con M20) CR tablet 20 mEq, BID    senna-docusate sodium (SENOKOT S) 8.6-50 mg per tablet 2 tablet, BID    SUMAtriptan (IMITREX) tablet 50 mg, Q2H PRN    triamcinolone (KENALOG) 0.1 % cream, BID PRN    venlafaxine (EFFEXOR-XR) 24 hr capsule 150 mg, Daily With Breakfast    warfarin (COUMADIN) tablet 2 mg, Once (warfarin)  Prior to Admission Medications   Prescriptions Last Dose Informant Patient Reported? Taking?   ARIPiprazole (ABILIFY) 2 mg tablet 10/7/2024 at 0800 Self No Yes   Sig: TAKE 1 TABLET BY MOUTH EVERY DAY   Aspirin Low Dose 81 MG chewable tablet 10/7/2024 at 0800 Self No Yes   Sig: CHEW 1 TABLET BY MOUTH DAILY   Multiple Vitamin (MULTIVITAMIN PO) 10/1/2024 Self Yes No   Sig: Take 1 tablet by mouth   SUMAtriptan (IMITREX) 50 mg tablet Unknown Self No No   Sig: TAKE 1 TABLET (50 MG TOTAL) BY MOUTH EVERY 2 (TWO) HOURS AS NEEDED FOR MIGRAINE  MG/DAY   Ventolin  (90 Base) MCG/ACT inhaler More than a month Self No No   Sig: Inhale 2 puffs 3 (three) times a day as needed for wheezing   amLODIPine (NORVASC) 10 mg tablet 10/7/2024 at 0800 Self No Yes   Sig: TAKE 1 TABLET BY MOUTH EVERY DAY   atorvastatin (LIPITOR) 80 mg tablet 10/7/2024 at 1630 Self No Yes   Sig: TAKE 1 TABLET BY MOUTH EVERY DAY IN THE EVENING   clopidogrel (PLAVIX) 75 mg tablet 10/7/2024 at 0800 Self No Yes   Sig: TAKE 1 TABLET BY MOUTH EVERY DAY   doxazosin (CARDURA) 2 mg tablet 10/7/2024 at 0800 Self No Yes   Sig: TAKE 1 TABLET BY MOUTH EVERY DAY   metoprolol succinate (TOPROL-XL) 25 mg 24 hr tablet 10/7/2024 at 1630 Self No Yes   Sig: TAKE 1/2 TABLET BY MOUTH DAILY   mometasone (ELOCON) 0.1 % cream 10/7/2024 Self No Yes   Sig: Apply topically daily   mupirocin (BACTROBAN) 2 % ointment 10/8/2024  No Yes   Sig: Apply topically 2 (two) times a day   nitroglycerin (NITROSTAT) 0.4 mg SL tablet  Self No No   Sig: Place 1  tablet (0.4 mg total) under the tongue every 5 (five) minutes as needed for chest pain   torsemide (DEMADEX) 20 mg tablet 10/7/2024 at 1630 Self No Yes   Sig: Take 1 tablet (20 mg total) by mouth 2 (two) times a day   traZODone (DESYREL) 100 mg tablet 10/7/2024 at 2300  No Yes   Sig: TAKE 2 TABLETS (200 MG TOTAL) BY MOUTH DAILY AT BEDTIME   valsartan (DIOVAN) 320 MG tablet 10/5/2024 at 0800 Self No Yes   Sig: TAKE 1 TABLET BY MOUTH EVERY DAY   venlafaxine (EFFEXOR-XR) 75 mg 24 hr capsule 10/7/2024 at 0800 Self No Yes   Sig: TAKE 3 CAPSULES (225 MG TOTAL) BY MOUTH DAILY WITH BREAKFAST   vitamin B-12 (VITAMIN B-12) 500 mcg tablet 10/7/2024 at 0800 Self Yes Yes   Sig: Take 500 mcg by mouth daily      Facility-Administered Medications: None     Patient has no known allergies.    Objective :  Temp:  [98.1 °F (36.7 °C)-99 °F (37.2 °C)] 98.5 °F (36.9 °C)  HR:  [] 108  BP: (104-145)/(50-88) 137/88  Resp:  [15-32] 24  SpO2:  [89 %-99 %] 99 %  O2 Device: Nasal cannula  Nasal Cannula O2 Flow Rate (L/min):  [4 L/min-6 L/min] 4 L/min    Physical Exam  Vitals reviewed.   Constitutional:       General: He is not in acute distress.     Appearance: He is ill-appearing and diaphoretic. He is not toxic-appearing.   HENT:      Head: Normocephalic.      Nose: Nose normal. No congestion or rhinorrhea.      Mouth/Throat:      Mouth: Mucous membranes are moist.   Eyes:      General:         Right eye: No discharge.         Left eye: No discharge.   Cardiovascular:      Rate and Rhythm: Tachycardia present.   Pulmonary:      Effort: No respiratory distress.      Breath sounds: Decreased breath sounds present. No wheezing or rhonchi.   Skin:     General: Skin is warm.      Coloration: Skin is pale.   Neurological:      Mental Status: Mental status is at baseline.   Psychiatric:         Mood and Affect: Mood normal. Mood is not anxious.         Behavior: Behavior normal. Behavior is not agitated. Behavior is cooperative.          Lab  Results: I have reviewed the following results:  Estimated Creatinine Clearance: 24.2 mL/min (A) (by C-G formula based on SCr of 2.69 mg/dL (H)).  Lab Results   Component Value Date    WBC 16.77 (H) 10/10/2024    HGB 11.5 (L) 10/10/2024    HCT 35.4 (L) 10/10/2024     10/10/2024         Component Value Date/Time    K 4.8 10/10/2024 1029     10/10/2024 1029    CO2 20 (L) 10/10/2024 1029    CO2 26 10/08/2024 1417    BUN 45 (H) 10/10/2024 1029    CREATININE 2.69 (H) 10/10/2024 1029         Component Value Date/Time    CALCIUM 8.7 10/10/2024 1029    ALKPHOS 54 10/02/2024 1116    AST 31 10/02/2024 1116    ALT 27 10/02/2024 1116    TP 6.4 10/02/2024 1116    ALB 4.0 10/02/2024 1116       Imaging Results Review: I reviewed radiology reports from this admission including: chest xray.  Other Study Results Review: No additional pertinent studies reviewed.

## 2024-10-10 NOTE — ASSESSMENT & PLAN NOTE
Status post mitral valve repair and ligation left atrial appendage on 10/8/2024.  Chest tubes discontinued on 10/10/2024  Multimodal analgesic plan:  Tylenol 975 mg every 6 hours while awake  Lidocaine patches daily, on for 12 hours and off for 12 hours  Apply 2 patches to anterior chest wall  Change Robaxin to 500 mg every 6 hours scheduled  Valium 5 mg oral every 6 hours as needed for anxiety or muscle spasms  Add Narcan as needed for opioid reversal agent/respiratory depression  Oxycodone 2.5 mg every 4 hours as needed for moderate pain  Oxycodone 5 mg every 4 hours as needed for severe pain  Estimated Creatinine Clearance: 24.2 mL/min (A) (by C-G formula based on SCr of 2.69 mg/dL (H)).  Given elevation in creatinine, would not recommend any further increases in opioid regimen at this time  Case discussed with anesthesiologist, will consider peripheral nerve block to anterior chest wall today given severe pain    Bowel regimen:  Continue bowel regimen to prevent opioid-induced constipation  Senokot-S 2 tablets twice a day  MiraLAX daily  Bisacodyl suppository as needed

## 2024-10-10 NOTE — OCCUPATIONAL THERAPY NOTE
Occupational Therapy Cancel Note        Patient Name: Reuben García  Today's Date: 10/10/2024           10/10/24 1043   OT Last Visit   OT Visit Date 10/10/24   Note Type   Note Type Cancelled Session   Cancel Reasons Medical status       Chart reviewed, attempted to see pt for therapy. Pt observed supine in bed undergoing transition to Bipap. Will hold and continue to follow as medically appropriate for therapy.      Leydi Manuel, RADHA, OTR/L

## 2024-10-10 NOTE — CASE MANAGEMENT
Case Management Assessment & Discharge Planning Note    Patient name Reuben García  Location [facility]01 MRN [id number]  : 1955 Date 10/10/2024       Current Admission Date: 10/8/2024  Current Admission Diagnosis:S/P MVR (mitral valve repair)   Patient Active Problem List    Diagnosis Date Noted Date Diagnosed    S/P MVR (mitral valve repair) 10/08/2024     S/P left atrial appendage ligation 10/08/2024     Hypokalemia 2024     Mixed hyperlipidemia 2024     Acute on chronic heart failure with preserved ejection fraction (HFpEF) ([facility]) 09/10/2024     Recurrent major depressive disorder, in remission ([facility]) 09/10/2024     Nonrheumatic mitral valve regurgitation 2024     Platelets decreased ([facility]) 2023     Coronary artery disease involving native coronary artery of native heart without angina pectoris      Mild intermittent asthma without complication 2021     BMI 29.0-29.9,adult 10/11/2019     Insomnia 2015     Essential hypertension 2014     Gastroesophageal reflux disease without esophagitis 2014     Anxiety 2014     Migraine headache 2014       LOS (days): 2  Geometric Mean LOS (GMLOS) (days): 8.5  Days to GMLOS:6.4     OBJECTIVE:  PATIENT READMITTED TO HOSPITAL  Risk of Unplanned Readmission Score: 31.49         Current admission status: Inpatient       [facility]erred Pharmacy:   [id number] [address]  Phone: [phone] Fax: [phone]    Primary Care Provider: Rich Delgado DO    Primary Insurance: [facility] REP  Secondary Insurance:     ASSESSMENT:  Active Health Care Proxies       Maureen García Health Care Agent - Spouse   Primary Phone: [phone] (Mobile)                  Patient Information  Admitted from:: Home  Mental Status: Alert  During Assessment patient was accompanied by: Not accompanied during assessment  Assessment information provided by::  Patient, Other - please comment (spouse (currently ) Maureen)  Support Systems: Self, Friends/neighbors, Family members  County of Residence: [place]  What city do you live in?: [place]  Home entry access options. Select all that apply.: Stairs  Number of steps to enter home.: 2  Do the steps have railings?: Yes  Type of Current Residence: [facility]  Living Arrangements:  (lives w/ DIL and DIL's SO)  Is patient a ?: Yes  Is patient active with [facility] ( Affairs)?: No    Activities of Daily Living Prior to Admission  Functional Status: Independent  Completes ADLs independently?: Yes  Ambulates independently?: Yes  Does patient use assisted devices?: No  Does patient currently own DME?: Yes  What DME does the patient currently own?: Walker  Does the patient have a history of Short-Term Rehab?: No  Does patient currently have HHC?: No    Current Home Health Care  Home Health Agency Name:: [facility]    Patient Information Continued  Income Source: Pension/residential  Does patient have prescription coverage?: Yes  Does patient receive dialysis treatments?: No  Does patient have a history of substance abuse?: Yes  Historical substance use preference: Alcohol/ETOH  Does patient have a history of Mental Health Diagnosis?: Yes  Is patient receiving treatment for mental health?: Yes     Means of Transportation  Means of Transport to Appts:: Drives Self    Social Determinants of Health (SDOH)      Flowsheet Row Most Recent Value   Housing Stability    In the last 12 months, was there a time when you were not able to pay the mortgage or rent on time? N   In the past 12 months, how many times have you moved where you were living? 0   At any time in the past 12 months, were you homeless or living in a shelter (including now)? N   Transportation Needs    In the past 12 months, has lack of transportation kept you from medical appointments or from getting medications? no   In the past 12 months, has lack of  transportation kept you from meetings, work, or from getting things needed for daily living? No   Food Insecurity    Within the past 12 months, you worried that your food would run out before you got the money to buy more. Never true   Within the past 12 months, the food you bought just didn't last and you didn't have money to get more. Never true   Utilities    In the past 12 months has the electric, gas, oil, or water company threatened to shut off services in your home? No            DISCHARGE DETAILS:  Discharge planning discussed with:: Pt bedside, pt spouse Maureen via phone  Freedom of Choice: Yes     CM contacted family/caregiver?: Yes     Requested Home Health Care         Is the patient interested in HHC at discharge?: Yes  Home Health Discipline requested:: Nursing  Home Health Agency Name::  [facility]  Home Health Follow-Up Provider:: RUPALI  Home Health Services Needed:: Post-Op Care and Assessment  Homebound Criteria Met:: Uses an Assist Device (i.e. cane, walker, etc), Requires the Assistance of Another Person for Safe Ambulation or to Leave the Home  Supporting Clincal Findings:: Limited Endurance, Fatigues Easliy in Short Distances    Treatment Team Recommendation: Home with Home Health Care  Discharge Destination Plan:: Home with Home Health Care  Transport at Discharge : Family

## 2024-10-10 NOTE — PROGRESS NOTES
Progress Note - Critical Care/ICU   Name: Reuben García 69 y.o. male I MRN: [id number]  Unit/Bed#: [facility]-01 I Date of Admission: 10/8/2024   Date of Service: 10/11/2024 I Hospital Day: 3       Assessment & Plan   Impressions:  MVR s/p MV Repair  Hx CAD/NSTEMI s/p PCI/ESTHER (2022)  Post-op low cardiac output state   FRANCISCO   Acute respiratory failure with hypoxia and hypercarbia   Toxic metabolic encephalopathy  HFpEF, G2 DDD  HTN  HLD  GERD  Arthritis  Asthma  ETOH abuse     Neuro:   Cardiac Surgery Pain Control: ATC tylenol, PRN oxycodone 2.5/5mg, Scheduled Robaxin , Lidocaine Patch, and APS consult   Delirium precautions  Regulate sleep/wake cycle  CAM-ICU daily  Trend neuro exam  Trend CIWA, PRN Valium 5mg q6h   Continue home Abilify and Effexor     CV:   Cardiac Surgery Hemodynamic Monitoring: MAP goal >65  Follow rhythm on telemetry  Critical Care Infusions : Primacor 0.25 mcg/kg/min  Beta Blocker Plan: Continue current lopressor dosage  Metoprolol 25mg BID   Epicardial pacing wire plan : No longer needed, discontinue  Post op cardiac surgery medications:   Plavix 75mg AD   Statin: Lipitor 80 mg qHS  Amiodarone 200 mg PO q8 hours   Afib RVR  Amio gtt 1mg/min  150mg Amio bolus overnight   INR 2.2, dose with 1mg Coumadin tonight     Lung:   Acute respiratory failure requiring Bipap 16/6 +3L, RR 24. Secondary to poor inspiratory effort secondary to pain, pulmonary vascular congestion, atelectasis, lethargy/altered mental status, and body habitus/obesity  Continue incentive spirometry/coughing/deep breathing exercises.   Wean supplemental oxygen as tolerated for saturation > 90%  Chest tubes have been discontinued 10/10    GI:   Continue PPI for stress ulcer prophylaxis  Continue bowel regimen  Trend abdominal exam and bowel function    FEN:   Diuresis Plan: Bumex infusion 4 mg/hr.  Check Q6 BMPs  Nutrition plan: NPO while on bipap    Replenish electrolytes with goals: K >4.0, Mag >2.0, and Phos >3.0    :   Linder  "Plan: Continue for accurate I/O monitoring for 48 hours  Trend UOP and BUN/creat  Strict I and O     ID:   Trend temps and WBC count  Maintain normothermia    Heme:   Trend hgb and plts    Endo:   No hx of DM, glucose well controlled, continue SSI Alg #3    MSK/Skin:  Mobility goal:   PT/OT consult as medically appropriate   Frequent turning and pressure off-loading  Local wound care as needed    Disposition:  Critical care    ICU Core Measures     A: Assess, Prevent, and Manage Pain Has pain been assessed? Yes  Need for changes to pain regimen? No   B: Both SAT/SAT  N/A   C: Choice of Sedation RASS Goal: N/A patient not on sedation  Need for changes to sedation or analgesia regimen? NA   D: Delirium CAM-ICU: Negative   E: Early Mobility  Plan for early mobility? Yes   F: Family Engagement Plan for family engagement today? Yes       Review of Invasive Devices:    Linder Plan: Continue for accurate I/O monitoring for 48 hours  Central access plan: Medications requiring central line  Paia Plan: Keep arterial line for hemodynamic monitoring and frequent ABGs    Prophylaxis:  VTE VTE covered by:    None       Stress Ulcer  covered bypantoprazole (PROTONIX) EC tablet 40 mg [[id number]]            24 Hour Events    24 Hour Events/HPI: POD # 3 s/p MV repair with neocord placement, mitral annuloplasty ring, and commisuroplasty, ALTHEA ligation with AtriClip. Yesterday not making urine on Bumex gtt 4mg/hr + 10mg metolazone. Peripherally cool, clammy/diaphoretic, and c/o nausea. ScVO2 57%. Started on Milrinone 0.25mcg and UO improved, ScVO2 improved to 70%. Put on Bipap for increased WOB and respiratory acidosis. Overnight given an additional 10mg metolazone, 150mg IV Amio bolus for Afib RVR, and remained on Bipap 16/6.     Subjective   Review of Systems: Review of Systems   Constitutional:         \"I've had better mornings\"    All other systems reviewed and are negative.    Objective :    Medications:  Scheduled Continuous "   acetaminophen, 975 mg, Q6H While awake  amiodarone, 200 mg, Q8H PAOLO  ARIPiprazole, 2 mg, Daily  atorvastatin, 80 mg, Daily With Dinner  chlorhexidine, 15 mL, BID  clopidogrel, 75 mg, Daily  insulin lispro, 1-6 Units, TID AC  insulin lispro, 1-6 Units, HS  lidocaine, 2 patch, Daily  methocarbamol, 500 mg, Q6H PAOLO  metolazone, 10 mg, Daily  metoprolol tartrate, 25 mg, Q12H PAOLO  pantoprazole, 40 mg, Daily  polyethylene glycol, 17 g, Daily  potassium chloride, 20 mEq, BID  senna-docusate sodium, 2 tablet, BID  venlafaxine, 150 mg, Daily With Breakfast      amiodarone (CORDARONE) 900 mg in dextrose 5 % 500 mL infusion, 1 mg/min, Last Rate: 1 mg/min (10/10/24 1855)  bumetanide (BUMEX) 12.5 mg infusion 50 mL, 4 mg/hr, Last Rate: 4 mg/hr (10/11/24 5918)  milrinone (Primacor) infusion, 0.25 mcg/kg/min, Last Rate: 0.25 mcg/kg/min (10/10/24 2985)         Vitals: Invasive Monitoring       Most Recent Min/Max in 24hrs   Temp 98.6 °F (37 °C) Temp  Min: 97.3 °F (36.3 °C)  Max: 99.1 °F (37.3 °C)   Pulse (!) 118 Pulse  Min: 86  Max: 127   Resp (!) 27 Resp  Min: 15  Max: 32   /71 BP  Min: 99/64  Max: 140/69   O2 Sat 98 % SpO2  Min: 93 %  Max: 100 %   O2 Device: Nasal cannula  Nasal Cannula O2 Flow Rate (L/min): 4 L/min Arterial Line  Lo BP 99/70  Arterial Line BP  Min: 87/75  Max: 212/207   MAP 82 mmHg  Arterial Line MAP (mmHg)  Min: 76 mmHg  Max: 209 mmHg      I/O Chest tube output (if present)     Intake/Output Summary (Last 24 hours) at 10/11/2024 0710  Last data filed at 10/11/2024 0601  Gross per 24 hour   Intake 2362.98 ml   Output 1865 ml   Net 497.98 ml     BM: 10/6   Weight change: -1.8 kg (-3 lb 15.5 oz)   N/A        Physical Exam   Physical Exam  Eyes:      General: Lids are normal.      Extraocular Movements: Extraocular movements intact.      Conjunctiva/sclera: Conjunctivae normal.      Pupils: Pupils are equal, round, and reactive to light.   Skin:     General: Skin is warm and dry.      Capillary Refill:  Capillary refill takes less than 2 seconds.   HENT:      Head: Normocephalic and atraumatic.   Neck:      Trachea: Trachea normal.   Cardiovascular:      Rate and Rhythm: Tachycardia present. Rhythm irregularly irregular.      Pulses: Normal pulses.           Radial pulses are 2+ on the right side and 2+ on the left side.        Dorsalis pedis pulses are 2+ on the right side and 2+ on the left side.      Heart sounds: Normal heart sounds, S1 normal and S2 normal.      Comments: Generalized non pitting edema in both upper and lower extremities   Musculoskeletal:      Cervical back: Full passive range of motion without pain, normal range of motion and neck supple.      Comments: Generalized weakness, HARTMAN    Abdominal: General: Bowel sounds are normal. There is distension.     Palpations: Abdomen is soft.   Constitutional:       Appearance: He is ill-appearing.   Pulmonary:      Effort: Tachypnea, accessory muscle usage and accessory muscle usage present.      Breath sounds: Normal breath sounds.   Psychiatric:         Behavior: Behavior is withdrawn. Behavior is cooperative.         Cognition and Memory: Cognition and memory normal.         Judgment: Judgment normal.   Neurological:      General: No focal deficit present.      Mental Status: He is oriented to person, place, and time and easily aroused. He is lethargic.      GCS: GCS eye subscore is 3. GCS verbal subscore is 5. GCS motor subscore is 6.   Genitourinary/Anorectal:     Comments: Linder in place draining clear yellow urine          Diagnostic Studies      10/11/24 CXR: line and tubes stable. Moderate pulmonary vascular congestion. Small B/L pleural effusions. Bibasilar atelectasis. Loops of distended bowel.  This was personally reviewed by myself in PACS       Labs:  CBC  Recent Labs     10/10/24  0428 10/11/24  0413   WBC 16.77* 11.71*   HGB 11.5* 10.3*   HCT 35.4* 31.4*    211     BMP  Recent Labs     10/10/24  2349 10/11/24  0414   SODIUM 136 137    K 3.8 3.6    101   CO2 23 24   AGAP 11 12   BUN 49* 53*   CREATININE 2.73* 2.99*   CALCIUM 8.1* 8.5        Baseline Creat: 0.9   Coags  Recent Labs     10/10/24  0428 10/11/24  0413   INR 1.33* 2.20*              Additional Electrolytes  Recent Labs     10/10/24  0428 10/11/24  0414   MG 2.9* 2.8*   PHOS  --  8.5*          Blood Gas  Recent Labs     10/10/24  1351   PHART 7.265*   [id number] 46.0*   PO2ART 88.3   EDB7GSI 20.4*   BEART -6.4   SOURCE Line, Arterial     Recent Labs     10/10/24  1351 10/10/24  1750 10/11/24  0414   PHVEN  --    < > 7.263*   FWP5MME  --    < > 57.4*   PO2VEN  --    < > 43.7   [id number]  --    < > 25.4   BEVEN  --    < > -2.3   [id number]  --    < > 74.9   SOURCE Line, Arterial  --   --     < > = values in this interval not displayed.    LFTs  Recent Labs     10/11/24  0454   ALT 7   AST 41*   ALKPHOS 57   ALB 3.1*   TBILI 0.31       Infectious  No recent results     No recent results Glucose  Recent Labs     10/10/24  1029 10/10/24  1750 10/10/24  2349 10/11/24  0414   GLUC 199* 129 133 140        Collaborative bedside rounds performed with cardiac surgery attending, critical care attending and bedside RN.

## 2024-10-10 NOTE — PROCEDURES
Procedure: Chest Tube Removal    10/10/24    Mediastinal CT x 2 and pleural CT x 2 (right and left) d/c'd in typical fashion. Patient tolerated procedure well. No immediate complications. Site dressed with acticoat dressing. EPW remain in place and verified that they are working post CT d/c. Patient not requiring pacing now, put to DDD 50. Patient's nurse was made aware of removal.     DYLAN Andujar

## 2024-10-10 NOTE — ASSESSMENT & PLAN NOTE
Continue to monitor for depressive symptoms as severe depression can interfere with optimizing pain control postoperatively.

## 2024-10-11 ENCOUNTER — HOME HEALTH ADMISSION (OUTPATIENT)
Dept: HOME HEALTH SERVICES | Facility: HOME HEALTHCARE | Age: 69
End: 2024-10-11
Payer: COMMERCIAL

## 2024-10-11 ENCOUNTER — APPOINTMENT (INPATIENT)
Dept: RADIOLOGY | Facility: HOSPITAL | Age: 69
DRG: 219 | End: 2024-10-11
Payer: COMMERCIAL

## 2024-10-11 PROBLEM — Z91.89 ELECTROLYTE IMBALANCE RISK: Status: ACTIVE | Noted: 2024-10-11

## 2024-10-11 PROBLEM — N17.9 AKI (ACUTE KIDNEY INJURY) (HCC): Status: ACTIVE | Noted: 2024-10-11

## 2024-10-11 PROBLEM — E83.39 HYPERPHOSPHATEMIA: Status: ACTIVE | Noted: 2024-10-11

## 2024-10-11 PROBLEM — Z91.89 AT RISK FOR METABOLIC IMBALANCE: Status: ACTIVE | Noted: 2024-10-11

## 2024-10-11 LAB
ALBUMIN SERPL BCG-MCNC: 3.1 G/DL (ref 3.5–5)
ALP SERPL-CCNC: 57 U/L (ref 34–104)
ALT SERPL W P-5'-P-CCNC: 7 U/L (ref 7–52)
AMMONIA PLAS-SCNC: 39 UMOL/L (ref 18–72)
ANION GAP SERPL CALCULATED.3IONS-SCNC: 11 MMOL/L (ref 4–13)
ANION GAP SERPL CALCULATED.3IONS-SCNC: 11 MMOL/L (ref 4–13)
ANION GAP SERPL CALCULATED.3IONS-SCNC: 12 MMOL/L (ref 4–13)
ANION GAP SERPL CALCULATED.3IONS-SCNC: 12 MMOL/L (ref 4–13)
AST SERPL W P-5'-P-CCNC: 41 U/L (ref 13–39)
BACTERIA UR QL AUTO: ABNORMAL /HPF
BASE EX.OXY STD BLDV CALC-SCNC: 64.4 % (ref 60–80)
BASE EX.OXY STD BLDV CALC-SCNC: 74.9 % (ref 60–80)
BASE EX.OXY STD BLDV CALC-SCNC: 75.4 % (ref 60–80)
BASE EX.OXY STD BLDV CALC-SCNC: 76 % (ref 60–80)
BASE EXCESS BLDV CALC-SCNC: -1.5 MMOL/L
BASE EXCESS BLDV CALC-SCNC: -2.3 MMOL/L
BASE EXCESS BLDV CALC-SCNC: -2.8 MMOL/L
BASE EXCESS BLDV CALC-SCNC: -3.5 MMOL/L
BILIRUB DIRECT SERPL-MCNC: 0.14 MG/DL (ref 0–0.2)
BILIRUB SERPL-MCNC: 0.31 MG/DL (ref 0.2–1)
BILIRUB UR QL STRIP: NEGATIVE
BODY TEMPERATURE: 97.9 DEGREES FEHRENHEIT
BODY TEMPERATURE: 99.1 DEGREES FEHRENHEIT
BUN SERPL-MCNC: 49 MG/DL (ref 5–25)
BUN SERPL-MCNC: 52 MG/DL (ref 5–25)
BUN SERPL-MCNC: 53 MG/DL (ref 5–25)
BUN SERPL-MCNC: 56 MG/DL (ref 5–25)
CALCIUM SERPL-MCNC: 8.1 MG/DL (ref 8.4–10.2)
CALCIUM SERPL-MCNC: 8.3 MG/DL (ref 8.4–10.2)
CALCIUM SERPL-MCNC: 8.4 MG/DL (ref 8.4–10.2)
CALCIUM SERPL-MCNC: 8.5 MG/DL (ref 8.4–10.2)
CHLORIDE SERPL-SCNC: 101 MMOL/L (ref 96–108)
CHLORIDE SERPL-SCNC: 101 MMOL/L (ref 96–108)
CHLORIDE SERPL-SCNC: 102 MMOL/L (ref 96–108)
CHLORIDE SERPL-SCNC: 98 MMOL/L (ref 96–108)
CLARITY UR: ABNORMAL
CO2 SERPL-SCNC: 23 MMOL/L (ref 21–32)
CO2 SERPL-SCNC: 24 MMOL/L (ref 21–32)
CO2 SERPL-SCNC: 25 MMOL/L (ref 21–32)
CO2 SERPL-SCNC: 27 MMOL/L (ref 21–32)
COLOR UR: ABNORMAL
CREAT SERPL-MCNC: 2.73 MG/DL (ref 0.6–1.3)
CREAT SERPL-MCNC: 2.98 MG/DL (ref 0.6–1.3)
CREAT SERPL-MCNC: 2.99 MG/DL (ref 0.6–1.3)
CREAT SERPL-MCNC: 3.05 MG/DL (ref 0.6–1.3)
ERYTHROCYTE [DISTWIDTH] IN BLOOD BY AUTOMATED COUNT: 16.8 % (ref 11.6–15.1)
GFR SERPL CREATININE-BSD FRML MDRD: 20 ML/MIN/1.73SQ M
GFR SERPL CREATININE-BSD FRML MDRD: 23 ML/MIN/1.73SQ M
GLUCOSE SERPL-MCNC: 121 MG/DL (ref 65–140)
GLUCOSE SERPL-MCNC: 123 MG/DL (ref 65–140)
GLUCOSE SERPL-MCNC: 127 MG/DL (ref 65–140)
GLUCOSE SERPL-MCNC: 133 MG/DL (ref 65–140)
GLUCOSE SERPL-MCNC: 139 MG/DL (ref 65–140)
GLUCOSE SERPL-MCNC: 140 MG/DL (ref 65–140)
GLUCOSE SERPL-MCNC: 140 MG/DL (ref 65–140)
GLUCOSE SERPL-MCNC: 171 MG/DL (ref 65–140)
GLUCOSE UR STRIP-MCNC: NEGATIVE MG/DL
HCO3 BLDV-SCNC: 24.1 MMOL/L (ref 24–30)
HCO3 BLDV-SCNC: 24.7 MMOL/L (ref 24–30)
HCO3 BLDV-SCNC: 25.3 MMOL/L (ref 24–30)
HCO3 BLDV-SCNC: 25.4 MMOL/L (ref 24–30)
HCT VFR BLD AUTO: 31.4 % (ref 36.5–49.3)
HGB BLD-MCNC: 10.3 G/DL (ref 12–17)
HGB UR QL STRIP.AUTO: ABNORMAL
HYALINE CASTS #/AREA URNS LPF: ABNORMAL /LPF
INR PPP: 2.2 (ref 0.85–1.19)
IPAP: 16
KETONES UR STRIP-MCNC: NEGATIVE MG/DL
LEUKOCYTE ESTERASE UR QL STRIP: ABNORMAL
MAGNESIUM SERPL-MCNC: 2.8 MG/DL (ref 1.9–2.7)
MCH RBC QN AUTO: 30.7 PG (ref 26.8–34.3)
MCHC RBC AUTO-ENTMCNC: 32.8 G/DL (ref 31.4–37.4)
MCV RBC AUTO: 94 FL (ref 82–98)
MUCOUS THREADS UR QL AUTO: ABNORMAL
NITRITE UR QL STRIP: NEGATIVE
NON VENT- BIPAP: ABNORMAL
NON-SQ EPI CELLS URNS QL MICRO: ABNORMAL /HPF
O2 CT BLDV-SCNC: 11.6 ML/DL
O2 CT BLDV-SCNC: 11.9 ML/DL
O2 CT BLDV-SCNC: 12.1 ML/DL
O2 CT BLDV-SCNC: 12.5 ML/DL
PCO2 BLD: 50.6 MM HG (ref 42–50)
PCO2 BLD: 58.6 MM HG (ref 42–50)
PCO2 BLDV: 51.5 MM HG (ref 42–50)
PCO2 BLDV: 52.4 MM HG (ref 42–50)
PCO2 BLDV: 57.4 MM HG (ref 42–50)
PCO2 BLDV: 57.8 MM HG (ref 42–50)
PEEP MAX SETTING VENT: 6 CM[H2O]
PH BLD: 7.24 [PH] (ref 7.3–7.4)
PH BLD: 7.29 [PH] (ref 7.3–7.4)
PH BLDV: 7.25 [PH] (ref 7.3–7.4)
PH BLDV: 7.26 [PH] (ref 7.3–7.4)
PH BLDV: 7.29 [PH] (ref 7.3–7.4)
PH BLDV: 7.3 [PH] (ref 7.3–7.4)
PH UR STRIP.AUTO: 5 [PH]
PHOSPHATE SERPL-MCNC: 8.5 MG/DL (ref 2.3–4.1)
PLATELET # BLD AUTO: 211 THOUSANDS/UL (ref 149–390)
PMV BLD AUTO: 9.9 FL (ref 8.9–12.7)
PO2 BLDV: 35.7 MM HG (ref 35–45)
PO2 BLDV: 43.7 MM HG (ref 35–45)
PO2 BLDV: 43.9 MM HG (ref 35–45)
PO2 BLDV: 44.1 MM HG (ref 35–45)
PO2 VENOUS TEMP CORRECTED: 36.5 MM HG (ref 35–45)
PO2 VENOUS TEMP CORRECTED: 42.7 MM HG (ref 35–45)
POTASSIUM SERPL-SCNC: 3.3 MMOL/L (ref 3.5–5.3)
POTASSIUM SERPL-SCNC: 3.6 MMOL/L (ref 3.5–5.3)
POTASSIUM SERPL-SCNC: 3.6 MMOL/L (ref 3.5–5.3)
POTASSIUM SERPL-SCNC: 3.8 MMOL/L (ref 3.5–5.3)
PROT SERPL-MCNC: 5.4 G/DL (ref 6.4–8.4)
PROT UR STRIP-MCNC: NEGATIVE MG/DL
PROTHROMBIN TIME: 24.4 SECONDS (ref 12.3–15)
RBC # BLD AUTO: 3.36 MILLION/UL (ref 3.88–5.62)
RBC #/AREA URNS AUTO: ABNORMAL /HPF
SODIUM SERPL-SCNC: 135 MMOL/L (ref 135–147)
SODIUM SERPL-SCNC: 136 MMOL/L (ref 135–147)
SODIUM SERPL-SCNC: 137 MMOL/L (ref 135–147)
SODIUM SERPL-SCNC: 139 MMOL/L (ref 135–147)
SP GR UR STRIP.AUTO: 1.01 (ref 1–1.03)
T4 FREE SERPL-MCNC: 0.86 NG/DL (ref 0.61–1.12)
TSH SERPL DL<=0.05 MIU/L-ACNC: 5.24 UIU/ML (ref 0.45–4.5)
UROBILINOGEN UR STRIP-ACNC: <2 MG/DL
WBC # BLD AUTO: 11.71 THOUSAND/UL (ref 4.31–10.16)
WBC #/AREA URNS AUTO: ABNORMAL /HPF

## 2024-10-11 PROCEDURE — 84443 ASSAY THYROID STIM HORMONE: CPT | Performed by: NURSE PRACTITIONER

## 2024-10-11 PROCEDURE — 82140 ASSAY OF AMMONIA: CPT | Performed by: NURSE PRACTITIONER

## 2024-10-11 PROCEDURE — 99291 CRITICAL CARE FIRST HOUR: CPT | Performed by: ANESTHESIOLOGY

## 2024-10-11 PROCEDURE — 99024 POSTOP FOLLOW-UP VISIT: CPT | Performed by: THORACIC SURGERY (CARDIOTHORACIC VASCULAR SURGERY)

## 2024-10-11 PROCEDURE — 85027 COMPLETE CBC AUTOMATED: CPT | Performed by: NURSE PRACTITIONER

## 2024-10-11 PROCEDURE — 83735 ASSAY OF MAGNESIUM: CPT | Performed by: NURSE PRACTITIONER

## 2024-10-11 PROCEDURE — 84439 ASSAY OF FREE THYROXINE: CPT | Performed by: NURSE PRACTITIONER

## 2024-10-11 PROCEDURE — 85610 PROTHROMBIN TIME: CPT | Performed by: NURSE PRACTITIONER

## 2024-10-11 PROCEDURE — 71045 X-RAY EXAM CHEST 1 VIEW: CPT

## 2024-10-11 PROCEDURE — 84100 ASSAY OF PHOSPHORUS: CPT | Performed by: NURSE PRACTITIONER

## 2024-10-11 PROCEDURE — 94760 N-INVAS EAR/PLS OXIMETRY 1: CPT

## 2024-10-11 PROCEDURE — 80048 BASIC METABOLIC PNL TOTAL CA: CPT | Performed by: PHYSICIAN ASSISTANT

## 2024-10-11 PROCEDURE — 82805 BLOOD GASES W/O2 SATURATION: CPT | Performed by: NURSE PRACTITIONER

## 2024-10-11 PROCEDURE — 99232 SBSQ HOSP IP/OBS MODERATE 35: CPT | Performed by: ANESTHESIOLOGY

## 2024-10-11 PROCEDURE — 80076 HEPATIC FUNCTION PANEL: CPT | Performed by: PHYSICIAN ASSISTANT

## 2024-10-11 PROCEDURE — 82948 REAGENT STRIP/BLOOD GLUCOSE: CPT

## 2024-10-11 PROCEDURE — 81001 URINALYSIS AUTO W/SCOPE: CPT | Performed by: NURSE PRACTITIONER

## 2024-10-11 PROCEDURE — 99223 1ST HOSP IP/OBS HIGH 75: CPT | Performed by: INTERNAL MEDICINE

## 2024-10-11 PROCEDURE — NC001 PR NO CHARGE: Performed by: NURSE PRACTITIONER

## 2024-10-11 PROCEDURE — 94660 CPAP INITIATION&MGMT: CPT

## 2024-10-11 RX ORDER — POTASSIUM CHLORIDE 1500 MG/1
40 TABLET, EXTENDED RELEASE ORAL ONCE
Status: COMPLETED | OUTPATIENT
Start: 2024-10-11 | End: 2024-10-11

## 2024-10-11 RX ORDER — BISACODYL 10 MG
10 SUPPOSITORY, RECTAL RECTAL ONCE
Status: COMPLETED | OUTPATIENT
Start: 2024-10-11 | End: 2024-10-11

## 2024-10-11 RX ORDER — METOPROLOL TARTRATE 50 MG
50 TABLET ORAL EVERY 12 HOURS SCHEDULED
Status: DISCONTINUED | OUTPATIENT
Start: 2024-10-11 | End: 2024-10-16

## 2024-10-11 RX ORDER — WARFARIN SODIUM 1 MG/1
1 TABLET ORAL
Status: COMPLETED | OUTPATIENT
Start: 2024-10-11 | End: 2024-10-11

## 2024-10-11 RX ORDER — DIGOXIN 0.25 MG/ML
250 INJECTION INTRAMUSCULAR; INTRAVENOUS ONCE
Status: COMPLETED | OUTPATIENT
Start: 2024-10-11 | End: 2024-10-11

## 2024-10-11 RX ORDER — POTASSIUM CHLORIDE 29.8 MG/ML
40 INJECTION INTRAVENOUS ONCE
Status: COMPLETED | OUTPATIENT
Start: 2024-10-11 | End: 2024-10-11

## 2024-10-11 RX ORDER — METHOCARBAMOL 500 MG/1
500 TABLET, FILM COATED ORAL EVERY 6 HOURS PRN
Status: DISCONTINUED | OUTPATIENT
Start: 2024-10-11 | End: 2024-10-11

## 2024-10-11 RX ORDER — LORAZEPAM 1 MG/1
2 TABLET ORAL ONCE
Status: CANCELLED | OUTPATIENT
Start: 2024-10-11 | End: 2024-10-11

## 2024-10-11 RX ADMIN — POLYETHYLENE GLYCOL 3350 17 G: 17 POWDER, FOR SOLUTION ORAL at 08:29

## 2024-10-11 RX ADMIN — Medication 4 MG/HR: at 22:51

## 2024-10-11 RX ADMIN — WARFARIN SODIUM 1 MG: 1 TABLET ORAL at 17:00

## 2024-10-11 RX ADMIN — Medication 4 MG/HR: at 07:52

## 2024-10-11 RX ADMIN — CHLORHEXIDINE GLUCONATE 0.12% ORAL RINSE 15 ML: 1.2 LIQUID ORAL at 08:29

## 2024-10-11 RX ADMIN — ACETAMINOPHEN 975 MG: 325 TABLET, FILM COATED ORAL at 08:29

## 2024-10-11 RX ADMIN — METOPROLOL TARTRATE 25 MG: 25 TABLET, FILM COATED ORAL at 08:29

## 2024-10-11 RX ADMIN — INSULIN LISPRO 1 UNITS: 100 INJECTION, SOLUTION INTRAVENOUS; SUBCUTANEOUS at 06:38

## 2024-10-11 RX ADMIN — Medication 2.5 MG: at 01:17

## 2024-10-11 RX ADMIN — ARIPIPRAZOLE 2 MG: 2 TABLET ORAL at 08:29

## 2024-10-11 RX ADMIN — SENNOSIDES AND DOCUSATE SODIUM 2 TABLET: 50; 8.6 TABLET ORAL at 17:00

## 2024-10-11 RX ADMIN — METHOCARBAMOL 500 MG: 500 TABLET ORAL at 06:23

## 2024-10-11 RX ADMIN — MILRINONE LACTATE IN DEXTROSE 0.25 MCG/KG/MIN: 200 INJECTION, SOLUTION INTRAVENOUS at 16:58

## 2024-10-11 RX ADMIN — METOLAZONE 10 MG: 10 TABLET ORAL at 08:29

## 2024-10-11 RX ADMIN — Medication 4 MG/HR: at 19:51

## 2024-10-11 RX ADMIN — POTASSIUM CHLORIDE 20 MEQ: 1500 TABLET, EXTENDED RELEASE ORAL at 17:00

## 2024-10-11 RX ADMIN — VENLAFAXINE HYDROCHLORIDE 150 MG: 150 CAPSULE, EXTENDED RELEASE ORAL at 08:30

## 2024-10-11 RX ADMIN — POTASSIUM CHLORIDE 40 MEQ: 1500 TABLET, EXTENDED RELEASE ORAL at 21:06

## 2024-10-11 RX ADMIN — BISACODYL 10 MG: 10 SUPPOSITORY RECTAL at 16:33

## 2024-10-11 RX ADMIN — DIAZEPAM 5 MG: 5 TABLET ORAL at 06:23

## 2024-10-11 RX ADMIN — LIDOCAINE 2 PATCH: 700 PATCH TOPICAL at 08:29

## 2024-10-11 RX ADMIN — Medication 4 MG/HR: at 04:08

## 2024-10-11 RX ADMIN — POTASSIUM CHLORIDE 40 MEQ: 29.8 INJECTION, SOLUTION INTRAVENOUS at 15:17

## 2024-10-11 RX ADMIN — AMIODARONE HYDROCHLORIDE 200 MG: 200 TABLET ORAL at 06:23

## 2024-10-11 RX ADMIN — AMIODARONE HYDROCHLORIDE 200 MG: 200 TABLET ORAL at 21:06

## 2024-10-11 RX ADMIN — AMIODARONE HYDROCHLORIDE 200 MG: 200 TABLET ORAL at 15:14

## 2024-10-11 RX ADMIN — ACETAMINOPHEN 975 MG: 325 TABLET, FILM COATED ORAL at 21:06

## 2024-10-11 RX ADMIN — PANTOPRAZOLE SODIUM 40 MG: 40 TABLET, DELAYED RELEASE ORAL at 06:23

## 2024-10-11 RX ADMIN — DIGOXIN 250 MCG: 0.25 INJECTION INTRAMUSCULAR; INTRAVENOUS at 09:47

## 2024-10-11 RX ADMIN — ATORVASTATIN CALCIUM 80 MG: 80 TABLET, FILM COATED ORAL at 16:59

## 2024-10-11 RX ADMIN — Medication 4 MG/HR: at 00:52

## 2024-10-11 RX ADMIN — POTASSIUM CHLORIDE 20 MEQ: 1500 TABLET, EXTENDED RELEASE ORAL at 08:29

## 2024-10-11 RX ADMIN — METOPROLOL TARTRATE 50 MG: 50 TABLET, FILM COATED ORAL at 21:06

## 2024-10-11 RX ADMIN — Medication 4 MG/HR: at 10:33

## 2024-10-11 RX ADMIN — ACETAMINOPHEN 975 MG: 325 TABLET, FILM COATED ORAL at 15:14

## 2024-10-11 RX ADMIN — Medication 4 MG/HR: at 13:59

## 2024-10-11 RX ADMIN — SENNOSIDES AND DOCUSATE SODIUM 2 TABLET: 50; 8.6 TABLET ORAL at 08:29

## 2024-10-11 RX ADMIN — AMIODARONE HYDROCHLORIDE 1 MG/MIN: 50 INJECTION, SOLUTION INTRAVENOUS at 11:53

## 2024-10-11 RX ADMIN — DEXTROSE 150 MG: 50 INJECTION, SOLUTION INTRAVENOUS at 04:57

## 2024-10-11 RX ADMIN — CLOPIDOGREL BISULFATE 75 MG: 75 TABLET ORAL at 08:29

## 2024-10-11 RX ADMIN — CHLORHEXIDINE GLUCONATE 0.12% ORAL RINSE 15 ML: 1.2 LIQUID ORAL at 17:00

## 2024-10-11 RX ADMIN — Medication 4 MG/HR: at 16:52

## 2024-10-11 NOTE — ASSESSMENT & PLAN NOTE
Status post mitral valve repair and ligation left atrial appendage (10/8).  Patient currently on BIPAP and lethargic appearing, but able to answer questions and minimally converse    Multimodal analgesia:  Tylenol 975 mg Q6H while awake  Lidocaine patches daily, on for 12 hours and off for 12 hours  Apply 2 patches to anterior chest wall  Robaxin 500 mg Q6H scheduled  Oxycodone 2.5 mg every 4 hours as needed for moderate pain  Oxycodone 5 mg every 4 hours as needed for severe pain  Given elevation in creatinine, would not recommend any further increases in opioid regimen at this time

## 2024-10-11 NOTE — ASSESSMENT & PLAN NOTE
Optimize hemodynamic status to avoid delay in renal recovery.  Maintain MAP > 65mmHg  Avoid BP fluctuations.  Home medications: Norvasc 10 mg p.o. daily, Cardura 2 mg p.o. daily, Toprol-XL 12.5 mg p.o. daily, torsemide 20 mg p.o. twice daily, valsartan 320 mg p.o. daily  Current medications: Amiodarone drip, metolazone 10 mg p.o. daily, Toprol 50 mg p.o. every 12, Bumex drip 4 mg/h, Primacor  Hold off on home valsartan for now  Recommend decreasing Bumex drip to 2 mg/h DC metolazone and may give as needed if worsening volume status

## 2024-10-11 NOTE — PROCEDURES
Procedure: Epicardial Wire Removal    10/11/24    Patient was returned to bed. EPW x 2 d/c'd in typical fashion.  No immediate complications. Patient and nurse aware of mandatory 1 hour bedrest protocol. Vital signs ordered q15 minutes for one hour as per protocol.    DYLAN Andujar

## 2024-10-11 NOTE — PROGRESS NOTES
Progress Note - Acute Pain   Name: Reuben García 69 y.o. male I MRN: [id number]  Unit/Bed#: [facility]-01 I Date of Admission: 10/8/2024   Date of Service: 10/11/2024 I Hospital Day: 3    Assessment & Plan  S/P MVR (mitral valve repair)  Status post mitral valve repair and ligation left atrial appendage (10/8).  Patient currently on BIPAP and lethargic appearing, but able to answer questions and minimally converse    Multimodal analgesia:  Tylenol 975 mg Q6H while awake  Lidocaine patches daily, on for 12 hours and off for 12 hours  Apply 2 patches to anterior chest wall  Robaxin 500 mg Q6H scheduled  Oxycodone 2.5 mg every 4 hours as needed for moderate pain  Oxycodone 5 mg every 4 hours as needed for severe pain  Given elevation in creatinine, would not recommend any further increases in opioid regimen at this time      APS will continue to follow. Please contact Acute Pain Service - via SecureChat from [phone] with additional questions or concerns. See [employer] or [employer] for additional contacts and after hours information.     Subjective   Reuben García is a 69 y.o. male PMHx of CAD, HTN, alcohol use disorder, HF, is s/p mitral valve repair and ligation of left atrial appendage on 10/8/24.    Pain History  Current pain location(s):  Pain Score: 5  Pain Location/Orientation: Location: Head  Pain Scale: Pain Assessment Tool: 0-10    Opioid requirement previous 24 hours: PO Oxycodone 5 mg x 1 dose, PO Oxycodone 2.5 mg x 1 dose    Meds/Allergies     No Known Allergies  Objective :  Temp:  [97.3 °F (36.3 °C)-99.1 °F (37.3 °C)] 98.1 °F (36.7 °C)  HR:  [101-127] 121  BP: ()/(55-98) 109/59  Resp:  [15-32] 30  SpO2:  [93 %-100 %] 97 %  O2 Device: Nasal cannula  Nasal Cannula O2 Flow Rate (L/min):  [3 L/min-6 L/min] 4 L/min    Physical Exam   Vitals:    10/11/24 1030   BP:    Pulse: (!) 121   Resp: (!) 30   Temp: 98.1 °F (36.7 °C)   SpO2: 97%     Constitutional: mild distress  HEENT: pupils equal and round, NC/AT,   symmetric facial muscles   Neck: supple  Cardiovascular: well perfused, no peripheral cyanosis  Pulmonary: tachypnic, on BIPAP  Psych: appropriate affect and insight, No agitation. No evidence of aberrant behavior   Skin: No rashes or lesions  Neuro: cranial nerves II-XII grossly intact       Lab Results: I have reviewed the following results:  Estimated Creatinine Clearance: 21.5 mL/min (A) (by C-G formula based on SCr of 2.99 mg/dL (H)).  Lab Results   Component Value Date    WBC 11.71 (H) 10/11/2024    HGB 10.3 (L) 10/11/2024    HCT 31.4 (L) 10/11/2024     10/11/2024         Component Value Date/Time    K 3.6 10/11/2024 0414     10/11/2024 0414    CO2 24 10/11/2024 0414    CO2 26 10/08/2024 1417    BUN 53 (H) 10/11/2024 0414    CREATININE 2.99 (H) 10/11/2024 0414         Component Value Date/Time    CALCIUM 8.5 10/11/2024 0414    ALKPHOS 57 10/11/2024 0454    AST 41 (H) 10/11/2024 0454    ALT 7 10/11/2024 0454    TP 5.4 (L) 10/11/2024 0454    ALB 3.1 (L) 10/11/2024 0454

## 2024-10-11 NOTE — ASSESSMENT & PLAN NOTE
Current phosphorus is 8.5 likely in the setting of acute kidney injury  Patient on BiPAP and not eating  Monitor for now  Once able to eat recommend phosphorus binders 3 times daily with meals

## 2024-10-11 NOTE — PROGRESS NOTES
Progress Note - Cardiothoracic Surgery   Reuben García 69 y.o. male MRN: [id number]  Unit/Bed#: PPHP-313-01 Encounter: [id number]      POD # 3 s/p MVR    Pt seen/examined.  Interval history and data reviewed with critical care team.  Pt doing well.  No specific complaints.    Milrinone 0.25 started  Fluid positive with sub-par response to bumex, improving  Splinting, requiring BiPAP        Medications:   Scheduled Meds:  Current Facility-Administered Medications   Medication Dose Route Frequency Provider Last Rate    acetaminophen  650 mg Rectal Q4H PRN Arun Griffin PA-C      acetaminophen  975 mg Oral Q6H While awake Arun Griffin PA-C      albuterol  2 puff Inhalation TID PRN Arun Griffin PA-C      amiodarone (CORDARONE) 900 mg in dextrose 5 % 500 mL infusion  1 mg/min Intravenous Continuous Otilia Morrison PA-C 1 mg/min (10/10/24 1578)    amiodarone  200 mg Oral Q8H [facility] Arun Griffin PA-C      ARIPiprazole  2 mg Oral Daily Arun Griffin PA-C      atorvastatin  80 mg Oral Daily With Dinner Arun Griffin PA-C      bisacodyl  10 mg Rectal Daily PRN Arun Griffin PA-C      bumetanide (BUMEX) 12.5 mg infusion 50 mL  4 mg/hr Intravenous Continuous Jc Varghese PA-C 4 mg/hr (10/11/24 6812)    chlorhexidine  15 mL Mouth/Throat BID Arun Griffin PA-C      clopidogrel  75 mg Oral Daily Zahida Parish PA-C      diazepam  5 mg Oral Q6H PRN Jc Varghese PA-C      insulin lispro  1-6 Units Subcutaneous TID AC Zahida Parish PA-C      insulin lispro  1-6 Units Subcutaneous HS Zahida Parish PA-C      lidocaine  2 patch Topical Daily Otilia Morrison PA-C      methocarbamol  500 mg Oral Q6H [facility] DYLAN Mondragon      metolazone  10 mg Oral Daily Jon Kerr PA-C      metoprolol tartrate  25 mg Oral Q12H [facility] DYLAN Butt      milrinone (Primacor) infusion  0.25 mcg/kg/min Intravenous Continuous Sid Christianson MD 0.25 mcg/kg/min (10/10/24 6470)    naloxone  0.04 mg  "Intravenous Q1MIN PRN DYLAN Mondragon      ondansetron  4 mg Intravenous Q6H PRN Arun Griffin PA-C      oxyCODONE  2.5 mg Oral Q4H PRN Arun Griffin PA-C      Or    oxyCODONE  5 mg Oral Q4H PRN Arun Griffin PA-C      pantoprazole  40 mg Oral Daily Arun Griffin PA-C      polyethylene glycol  17 g Oral Daily Arun Griffin PA-C      potassium chloride  20 mEq Oral BID Zahida Parish PA-C      senna-docusate sodium  2 tablet Oral BID Jc Varghese PA-C      triamcinolone   Topical BID PRN Arun Griffin PA-C      venlafaxine  150 mg Oral Daily With Breakfast Jc Varghese PA-C      warfarin  1 mg Oral Once (warfarin) DYLAN Butt       Continuous Infusions:amiodarone (CORDARONE) 900 mg in dextrose 5 % 500 mL infusion, 1 mg/min, Last Rate: 1 mg/min (10/10/24 1855)  bumetanide (BUMEX) 12.5 mg infusion 50 mL, 4 mg/hr, Last Rate: 4 mg/hr (10/11/24 5162)  milrinone (Primacor) infusion, 0.25 mcg/kg/min, Last Rate: 0.25 mcg/kg/min (10/10/24 4485)      PRN Meds:.  acetaminophen    albuterol    bisacodyl    diazepam    naloxone    ondansetron    oxyCODONE **OR** oxyCODONE    triamcinolone    Vitals: Blood pressure 118/71, pulse (!) 118, temperature 98.6 °F (37 °C), resp. rate (!) 27, height 5' 2\" (1.575 m), weight 76.5 kg (168 lb 10.4 oz), SpO2 98%.,Body mass index is 30.85 kg/m².  I/O last 24 hours:  In: 2363 [P.O.:840; I.V.:1423; IV Piggyback:100]  Out: 1865 [Urine:1865]  Invasive Devices       Central Venous Catheter Line  Duration             CVC Central Lines 10/08/24 2 days              Peripheral Intravenous Line  Duration             Peripheral IV 10/08/24 Right Hand 2 days              Arterial Line  Duration             Arterial Line 10/08/24 Left Radial 2 days              Line  Duration             Pacer Wires 2 days    Pacer Wires 2 days              Drain  Duration             Urethral Catheter Non-latex;Temperature probe 16 Fr. 2 days                      Lab, Imaging " and other studies:   Results from last 7 days   Lab Units 10/11/24  0413 10/10/24  0428 10/09/24  0411   WBC Thousand/uL 11.71* 16.77* 10.88*   HEMOGLOBIN g/dL 10.3* 11.5* 11.1*   HEMATOCRIT % 31.4* 35.4* 33.6*   PLATELETS Thousands/uL 211 220 160     Results from last 7 days   Lab Units 10/11/24  0414 10/10/24  2349 10/10/24  1750 10/08/24  1947 10/08/24  1417   POTASSIUM mmol/L 3.6 3.8 4.0   < >  --    CHLORIDE mmol/L 101 102 101   < >  --    CO2 mmol/L 24 23 24   < >  --    CO2, I-STAT mmol/L  --   --   --   --  26   BUN mg/dL 53* 49* 48*   < >  --    CREATININE mg/dL 2.99* 2.73* 2.75*   < >  --    GLUCOSE, ISTAT mg/dl  --   --   --   --  211*   CALCIUM mg/dL 8.5 8.1* 8.6   < >  --     < > = values in this interval not displayed.     Results from last 7 days   Lab Units 10/11/24  0413 10/10/24  0428 10/09/24  0411 10/08/24  1415   INR  2.20* 1.33* 1.20* 1.34*   PTT seconds  --   --   --  34     Recent Labs     10/10/24  1351   PHART 7.265*   RSE1MED 20.4*   PO2ART 88.3   [id number] 46.0*   BEART -6.4           Plan:    Continue milrinone for now  Diuresis for net negative  CIWA protocol  Amio gtt for rapid afib  Remove pacing wires  OOBTC  Incentive spirometry.  Dose coumadin  PO ASA/Statin/B blocker.  ICU        SIGNATURE: Ji Smalls DO  DATE: October 11, 2024  TIME: 8:20 AM

## 2024-10-11 NOTE — ASSESSMENT & PLAN NOTE
Blood pressure fluctuating  Medications include: Amlodipine 10 mg daily, doxazosin 2 mg daily Toprol XL 12.5 milligrams daily torsemide 20 mg 2 times daily valsartan 320 mg daily  Currently on amiodarone drip, amiodarone tablets every 8 hours, metolazone 10 mg daily, Lopressor 25 mg every 12 hours, Bumex drip 4 mg/h  Avoid fluctuation

## 2024-10-11 NOTE — PLAN OF CARE
Problem: NEUROSENSORY - ADULT  Goal: Achieves stable or improved neurological status  Description: INTERVENTIONS  - Monitor and report changes in neurological status  - Monitor vital signs such as temperature, blood pressure, glucose, and any other labs ordered   - Initiate measures to prevent increased intracranial pressure  - Monitor for seizure activity and implement precautions if appropriate      Outcome: Progressing  Goal: Achieves maximal functionality and self care  Description: INTERVENTIONS  - Monitor swallowing and airway patency with patient fatigue and changes in neurological status  - Encourage and assist patient to increase activity and self care.   - Encourage visually impaired, hearing impaired and aphasic patients to use assistive/communication devices  Outcome: Not Progressing     Problem: CARDIOVASCULAR - ADULT  Goal: Maintains optimal cardiac output and hemodynamic stability  Description: INTERVENTIONS:  - Monitor I/O, vital signs and rhythm  - Monitor for S/S and trends of decreased cardiac output  - Administer and titrate ordered vasoactive medications to optimize hemodynamic stability  - Assess quality of pulses, skin color and temperature  - Assess for signs of decreased coronary artery perfusion  - Instruct patient to report change in severity of symptoms  Outcome: Progressing  Goal: Absence of cardiac dysrhythmias or at baseline rhythm  Description: INTERVENTIONS:  - Continuous cardiac monitoring, vital signs, obtain 12 lead EKG if ordered  - Administer antiarrhythmic and heart rate control medications as ordered  - Monitor electrolytes and administer replacement therapy as ordered  Outcome: Not Progressing     Problem: GASTROINTESTINAL - ADULT  Goal: Maintains adequate nutritional intake  Description: INTERVENTIONS:  - Monitor percentage of each meal consumed  - Identify factors contributing to decreased intake, treat as appropriate  - Assist with meals as needed  - Monitor I&O, weight,  and lab values if indicated  - Obtain nutrition services referral as needed  Outcome: Not Progressing  Goal: Oral mucous membranes remain intact  Description: INTERVENTIONS  - Assess oral mucosa and hygiene practices  - Implement preventative oral hygiene regimen  - Implement oral medicated treatments as ordered  - Initiate Nutrition services referral as needed  Outcome: Progressing     Problem: METABOLIC, FLUID AND ELECTROLYTES - ADULT  Goal: Electrolytes maintained within normal limits  Description: INTERVENTIONS:  - Monitor labs and assess patient for signs and symptoms of electrolyte imbalances  - Administer electrolyte replacement as ordered  - Monitor response to electrolyte replacements, including repeat lab results as appropriate  - Instruct patient on fluid and nutrition as appropriate  Outcome: Progressing  Goal: Fluid balance maintained  Description: INTERVENTIONS:  - Monitor labs   - Monitor I/O and WT  - Instruct patient on fluid and nutrition as appropriate  - Assess for signs & symptoms of volume excess or deficit  Outcome: Progressing  Goal: Glucose maintained within target range  Description: INTERVENTIONS:  - Monitor Blood Glucose as ordered  - Assess for signs and symptoms of hyperglycemia and hypoglycemia  - Administer ordered medications to maintain glucose within target range  - Assess nutritional intake and initiate nutrition service referral as needed  Outcome: Progressing     Problem: HEMATOLOGIC - ADULT  Goal: Maintains hematologic stability  Description: INTERVENTIONS  - Assess for signs and symptoms of bleeding or hemorrhage  - Monitor labs  - Administer supportive blood products/factors as ordered and appropriate  Outcome: Progressing

## 2024-10-11 NOTE — ASSESSMENT & PLAN NOTE
FRANCISCO most likely secondary to ischemic injury secondary hemodynamic patient intraoperatively in light of underlying comorbidities  After review of records it appears that the patient has a baseline Creatinine of 0.8-1.0 mg/dL.  Admission creatinine of 1.0 mg/dL on 10/8/2024.  Peak creatinine this for this hospitalization 2.99 mg/dL on 10/11/2024  Creatinine today is at 2.50 mg/dL, slightly improved.  check BMP, magnesium, phosphorus every 6  Detailed discussion with patient with regards to renal replacement therapy risks benefits discussed procedure discussed in depth consent obtained placed in chart 10/11/2024 also discussions held with patient's .  No acute indication for dialysis at this time  Monitor closely for dialysis needs  Recommend decrease Bumex drip to 2 mg/h and DC metolazone may give as needed metolazone if worsening volume status  Await renal recovery.  Place on a renal diet when allowed diet order.   Strict I/O.  Daily weights  Urinary retention protocol  Avoid nephrotoxins, adjust meds to appropriate GFR.

## 2024-10-11 NOTE — PROGRESS NOTES
Progress Note - Nephrology   Name: Reuben García 69 y.o. male I MRN: [id number]  Unit/Bed#: PPHP-313-01 I Date of Admission: 10/8/2024   Date of Service: 10/12/2024 I Hospital Day: 4      69-year-old male with multiple comorbidities including obesity, hypertension, diabetes, CAD prior stents, GERD, alcohol use disorder, depression, CHF and severe MR presented for elective 10/8/2024 mitral valve repair. Nephrology consulted for evaluation and management of abnormal renal parameters.   Assessment & Plan  FRANCISCO (acute kidney injury) (HCC)  FRANCISCO most likely secondary to ischemic injury secondary hemodynamic patient intraoperatively in light of underlying comorbidities  After review of records it appears that the patient has a baseline Creatinine of 0.8-1.0 mg/dL.  Admission creatinine of 1.0 mg/dL on 10/8/2024.  Peak creatinine this for this hospitalization 2.99 mg/dL on 10/11/2024  Creatinine today is at 2.50 mg/dL, slightly improved.  check BMP, magnesium, phosphorus every 6  Detailed discussion with patient with regards to renal replacement therapy risks benefits discussed procedure discussed in depth consent obtained placed in chart 10/11/2024 also discussions held with patient's .  No acute indication for dialysis at this time  Monitor closely for dialysis needs  Recommend decrease Bumex drip to 2 mg/h and DC metolazone may give as needed metolazone if worsening volume status  Await renal recovery.  Place on a renal diet when allowed diet order.   Strict I/O.  Daily weights  Urinary retention protocol  Avoid nephrotoxins, adjust meds to appropriate GFR.  Essential hypertension  Optimize hemodynamic status to avoid delay in renal recovery.  Maintain MAP > 65mmHg  Avoid BP fluctuations.  Home medications: Norvasc 10 mg p.o. daily, Cardura 2 mg p.o. daily, Toprol-XL 12.5 mg p.o. daily, torsemide 20 mg p.o. twice daily, valsartan 320 mg p.o. daily  Current medications: Amiodarone drip, metolazone 10 mg p.o. daily, Toprol  50 mg p.o. every 12, Bumex drip 4 mg/h, Primacor  Hold off on home valsartan for now  Recommend decreasing Bumex drip to 2 mg/h DC metolazone and may give as needed if worsening volume status  Hyperphosphatemia  Most recent phosphorus of 5.2 stable and improving  Continue low phosphorus diet  S/P MVR (mitral valve repair)  Follow-up with CT surgery status post MVR 10/8/2024     I have reviewed the nephrology recommendations including discontinuing metolazone and no as needed, decrease Bumex to 2 mg/h for now, with critical care team, and we are in agreement with renal plan including the information outlined above.     Subjective   Brief History of Admission -patient seen and examined in the ICU hemodynamically stable remains afebrile on Bumex drip at 4 mg/h, urine output close to 4.8 L / 24 hours remains on Primacor drip.  No overt uremic symptoms happy renal parameters are slightly improved  Objective :  Temp:  [97.9 °F (36.6 °C)-99.1 °F (37.3 °C)] 99 °F (37.2 °C)  HR:  [] 109  BP: (109-165)/() 155/81  Resp:  [14-31] 21  SpO2:  [96 %-100 %] 98 %  O2 Device: Nasal cannula  Nasal Cannula O2 Flow Rate (L/min):  [4 L/min] 4 L/min    Current Weight: Weight - Scale: 72.9 kg (160 lb 11.5 oz)  First Weight: Weight - Scale: 73.1 kg (161 lb 0.7 oz)  I/O         10/09 0701  10/10 0700 10/10 0701  10/11 0700 10/11 0701  10/12 0700    P.O. 1196 840     I.V. (mL/kg) 692.7 (8.8) 1423 (18.6) 440.7 (5.8)    Blood       IV Piggyback 200 100     Cell Saver       Total Intake(mL/kg) 2088.7 (26.7) 2363 (30.9) 440.7 (5.8)    Urine (mL/kg/hr) 1240 (0.7) 1865 (1) 1175 (1.8)    Blood       Chest Tube 330 0     Total Output 1570 1865 1175    Net +518.7 +498 -734.3                 Physical Exam  Vitals and nursing note reviewed.   Constitutional:       General: He is not in acute distress.     Appearance: Normal appearance. He is normal weight. He is ill-appearing. He is not toxic-appearing.   HENT:      Head: Normocephalic and  atraumatic.      Mouth/Throat:      Pharynx: No oropharyngeal exudate.   Eyes:      General: No scleral icterus.  Cardiovascular:      Rate and Rhythm: Normal rate.   Pulmonary:      Effort: No respiratory distress.      Breath sounds: No stridor. No wheezing.   Abdominal:      General: There is no distension.      Palpations: Abdomen is soft.      Tenderness: There is no abdominal tenderness.   Musculoskeletal:         General: No swelling.      Cervical back: Normal range of motion. No rigidity.   Skin:     Coloration: Skin is not jaundiced.   Neurological:      General: No focal deficit present.      Mental Status: He is alert and oriented to person, place, and time.   Psychiatric:         Mood and Affect: Mood normal.         Behavior: Behavior normal.       ROS:  Constitutional:  No chills, no fever.  Lethargic  HENT:  No sore throat  Respiratory:  No cough, no hemoptysis, no wheezing.    Cardiovascular:  no leg swelling.   Gastrointestinal:  No constipation, no diarrhea.   Genitourinary: Linder in place. No decreased urine output.  Musculoskeletal:  No back pain.   Neurological:  no dizziness, No headaches.   Psychiatric/Behavioral:  No agitation, no confusion.    All other systems reviewed and are negative.            Medications:    Current Facility-Administered Medications:     acetaminophen (TYLENOL) rectal suppository 650 mg, 650 mg, Rectal, Q4H PRN, Arun Griffin PA-C    acetaminophen (TYLENOL) tablet 975 mg, 975 mg, Oral, Q6H While awake, Arun Griffin PA-C, 975 mg at 10/12/24 0810    albuterol (PROVENTIL HFA,VENTOLIN HFA) inhaler 2 puff, 2 puff, Inhalation, TID PRN, Arun Griffin PA-C    amiodarone (CORDARONE) 900 mg in dextrose 5 % 500 mL infusion, 1 mg/min, Intravenous, Continuous, Last Rate: 33.3 mL/hr at 10/12/24 0700, 1 mg/min at 10/12/24 0700 **FOLLOWED BY** [DISCONTINUED] amiodarone (CORDARONE) 900 mg in dextrose 5 % 500 mL infusion, 0.5 mg/min, Intravenous, Continuous, Nicolette Linder  GABRIELE    amiodarone tablet 200 mg, 200 mg, Oral, Q8H PAOLO, Arun Griffin PA-C, 200 mg at 10/12/24 0611    ARIPiprazole (ABILIFY) tablet 2 mg, 2 mg, Oral, Daily, Arun Griffin PA-C, 2 mg at 10/12/24 0811    atorvastatin (LIPITOR) tablet 80 mg, 80 mg, Oral, Daily With Dinner, Arun Griffin PA-C, 80 mg at 10/11/24 1659    bisacodyl (DULCOLAX) rectal suppository 10 mg, 10 mg, Rectal, Daily PRN, Arun Griffin PA-C    bumetanide (BUMEX) 12.5 mg infusion 50 mL, 2 mg/hr, Intravenous, Continuous, Nicolette Linder PA-C, Last Rate: 8 mL/hr at 10/12/24 0740, 2 mg/hr at 10/12/24 0740    chlorhexidine (PERIDEX) 0.12 % oral rinse 15 mL, 15 mL, Mouth/Throat, BID, Arun Griffin PA-C, 15 mL at 10/12/24 0810    clopidogrel (PLAVIX) tablet 75 mg, 75 mg, Oral, Daily, Zahida Parish PA-C, 75 mg at 10/12/24 0810    insulin lispro (HumALOG/ADMELOG) 100 units/mL subcutaneous injection 1-6 Units, 1-6 Units, Subcutaneous, TID AC, 1 Units at 10/11/24 0638 **AND** Fingerstick Glucose (POCT), , , TID AC, Zahida Parish PA-C    insulin lispro (HumALOG/ADMELOG) 100 units/mL subcutaneous injection 1-6 Units, 1-6 Units, Subcutaneous, HS, Zahida Parish PA-C    lidocaine (LIDODERM) 5 % patch 2 patch, 2 patch, Topical, Daily, Otilia Morrison PA-C, 2 patch at 10/11/24 0829    metolazone (ZAROXOLYN) tablet 10 mg, 10 mg, Oral, Daily, Jon Kerr PA-C, 10 mg at 10/12/24 0809    metoprolol tartrate (LOPRESSOR) tablet 50 mg, 50 mg, Oral, Q12H PAOLO, DYLAN Mathews, 50 mg at 10/12/24 0810    milrinone (PRIMACOR) 20 mg in 100 mL infusion (premix), 0.13 mcg/kg/min, Intravenous, Continuous, DYLAN Mathews, Last Rate: 3.1 mL/hr at 10/12/24 0700, 0.13 mcg/kg/min at 10/12/24 0700    naloxone (NARCAN) 0.04 mg/mL syringe 0.04 mg, 0.04 mg, Intravenous, Q1MIN PRN, DYLAN Mondragon    ondansetron (ZOFRAN) injection 4 mg, 4 mg, Intravenous, Q6H PRN, Arun Griffin PA-C, 4 mg at 10/10/24 0638    oxyCODONE  (ROXICODONE) IR tablet 2.5 mg, 2.5 mg, Oral, Q4H PRN, 2.5 mg at 10/11/24 0117 **OR** oxyCODONE (ROXICODONE) IR tablet 5 mg, 5 mg, Oral, Q4H PRN, Arnu Griffin PA-C, 5 mg at 10/12/24 0617    pantoprazole (PROTONIX) EC tablet 40 mg, 40 mg, Oral, Daily, Arun Griffin PA-C, 40 mg at 10/12/24 0611    polyethylene glycol (MIRALAX) packet 17 g, 17 g, Oral, Daily, Arun Griffin PA-C, 17 g at 10/12/24 0810    senna-docusate sodium (SENOKOT S) 8.6-50 mg per tablet 2 tablet, 2 tablet, Oral, BID, Jc Varghese PA-C, 2 tablet at 10/12/24 0809    triamcinolone (KENALOG) 0.1 % cream, , Topical, BID PRN, Arun Griffin PA-C    venlafaxine (EFFEXOR-XR) 24 hr capsule 150 mg, 150 mg, Oral, Daily With Breakfast, Jc Varghese PA-C, 150 mg at 10/12/24 0811      Lab Results: I have reviewed the following results:  Results from last 7 days   Lab Units 10/12/24  0442 10/12/24  0012 10/11/24  1820 10/11/24  1146 10/11/24  0454 10/11/24  0414 10/11/24  0413 10/10/24  2349 10/10/24  1750 10/10/24  1029 10/10/24  0428 10/09/24  1252 10/09/24  0411 10/08/24  2231 10/08/24  1947 10/08/24  1417 10/08/24  1415 10/08/24  1214   0000   WBC Thousand/uL 8.74  --   --   --   --   --  11.71*  --   --   --  16.77*  --  10.88*  --   --   --   --   --   --    HEMOGLOBIN g/dL 11.4*  --   --   --   --   --  10.3*  --   --   --  11.5*  --  11.1* 11.5*  --   --  13.5  --   --    I STAT HEMOGLOBIN g/dl  --   --   --   --   --   --   --   --   --   --   --   --   --   --   --  12.6  --   --   --    HEMATOCRIT % 34.5*  --   --   --   --   --  31.4*  --   --   --  35.4*  --  33.6* 34.8*  --   --  40.1  --   --    HEMATOCRIT, ISTAT %  --   --   --   --   --   --   --   --   --   --   --   --   --   --   --  37  --   --   --    PLATELETS Thousands/uL 249  --   --   --   --   --  211  --   --   --  220  --  160  --   --   --  240 141*  --    POTASSIUM mmol/L 4.1 3.3* 3.6 3.3*  --  3.6  --  3.8 4.0   < > 4.3   < > 4.1 4.2   < >  --  3.4*  --   --   "  CHLORIDE mmol/L 99 97 101 98  --  101  --  102 101   < > 102   < > 107  --   --   --  109*  --    < >   CO2 mmol/L 28 28 27 25  --  24  --  23 24   < > 21   < > 22  --   --   --  24  --    < >   CO2, I-STAT mmol/L  --   --   --   --   --   --   --   --   --   --   --   --   --   --   --  26  --   --   --    BUN mg/dL 53* 54* 56* 52*  --  53*  --  49* 48*   < > 39*   < > 26*  --   --   --  21  --    < >   CREATININE mg/dL 2.50* 2.71* 2.98* 3.05*  --  2.99*  --  2.73* 2.75*   < > 2.31*   < > 1.49*  --   --   --  1.00  --    < >   CALCIUM mg/dL 8.8 8.5 8.3* 8.4  --  8.5  --  8.1* 8.6   < > 8.3*   < > 7.8*  --   --   --  7.7*  --    < >   MAGNESIUM mg/dL 2.5  --   --   --   --  2.8*  --   --   --   --  2.9*  --   --   --   --   --   --   --   --    PHOSPHORUS mg/dL 5.2*  --   --   --   --  8.5*  --   --   --   --   --   --   --   --   --   --   --   --   --    ALBUMIN g/dL  --   --   --   --  3.1*  --   --   --   --   --   --   --   --   --   --   --   --   --   --    GLUCOSE, ISTAT mg/dl  --   --   --   --   --   --   --   --   --   --   --   --   --   --   --  211*  --   --   --     < > = values in this interval not displayed.       Administrative Statements     Portions of the record may have been created with voice recognition software. Occasional wrong word or \"sound a like\" substitutions may have occurred due to the inherent limitations of voice recognition software. Read the chart carefully and recognize, using context, where substitutions have occurred.If you have any questions, please contact the dictating provider.  "

## 2024-10-11 NOTE — CONSULTS
Assessment & Plan  FRANCISCO (acute kidney injury) ([facility])  Etiology: Suspect hemodynamic insult given pertubation's of blood pressure, fibrillation,and  IV diuretics  Baseline creatinine 0.8-1.0 dating back to 2017  Admission creatinine 1.0 10/8/2024  Creatinine has continued to rise and currently 2.99 today with EGFR 20 BUN 53  On valsartan as outpatient-held since admission  Check urinalysis with micro  Linder Linder catheter per primary team for accurate /O in the setting of acute kidney injury  Potassium and acid-base within normal limits  Urinary output -1.8 L within 24 hours  No acute indication for RRT therapy at this time however if worsening renal function, urinary output decreases and electrolyte/acid-base balance will need to consider RRT therapy  BMP every 6 hours per primary team  Essential hypertension  Blood pressure fluctuating  Medications include: Amlodipine 10 mg daily, doxazosin 2 mg daily Toprol XL 12.5 milligrams daily torsemide 20 mg 2 times daily valsartan 320 mg daily  Currently on amiodarone drip, amiodarone tablets every 8 hours, metolazone 10 mg daily, Lopressor 25 mg every 12 hours, Bumex drip 4 mg/h  Avoid fluctuation  Electrolyte imbalance risk  With worsening creatinine patient is at risk for hyperkalemia  Current potassium within normal limits at 3.6 magnesium 2.8  Continue to monitor and treat as needed  Hyperphosphatemia  Current phosphorus is 8.5 likely in the setting of acute kidney injury  Patient on BiPAP and not eating  Monitor for now  Once able to eat recommend phosphorus binders 3 times daily with meals  At risk for metabolic imbalance  With worsening kidney function patient at risk for acid-base imbalance  Bicarb 24 anion gap 12  Continue to monitor for now  Nonrheumatic mitral valve regurgitation  Status post MVR and managed by CT surgery  S/P MVR (mitral valve repair)  In the setting of severe mitral regurgitation status post MVR on 10/8/2024  CT surgery following  Coronary  artery disease involving native coronary artery of native heart without angina pectoris  History of PCI/ESTHER in 2002  Management by CT surgery     HISTORY OF PRESENT ILLNESS:  Requesting Physician: Manolo Sanford DO  Reason for Consult: Acute kidney injury    Reuben García is a 69 y.o. male who has PMH of diabetes, hypertension, obesity, CAD/NSTEMI status post PCI/ESTHER in 2002, GERD, arthritis, daily alcohol use, depression, heart failure and severe mitral regurgitation who presented on 10/8/2024 and underwent went mitral valve repair.  Since surgery creatinine has increased and nephrology has been consulted for acute kidney injury.  Postop complications include acute respiratory failure with hypoxia and hypercarbia, metabolic encephalopathy requiring BiPAP, atrial fibrillation on IV amiodarone and acute kidney injury in which nephrology has been consulted.  Patient currently on BiPAP and lethargic unable to obtain full review of systems..  All information is obtained through the chart.     PAST MEDICAL HISTORY:  Past Medical History:   Diagnosis Date    Allergic     Arthritis     Clotting disorder (HCC)     Coronary artery disease     Depression     Diabetes mellitus (HCC)     Headache(784.0)     HL (hearing loss)     Hypertension     Myocardial infarction (HCC) 8/23/22    Obesity     Visual impairment        PAST SURGICAL HISTORY:  Past Surgical History:   Procedure Laterality Date    CARDIAC CATHETERIZATION Left 8/25/2022    Procedure: Cardiac catheterization;  Surgeon: Luann Feng MD;  Location: [facility];  Service: Cardiology    CARDIAC CATHETERIZATION N/A 8/25/2022    Procedure: Cardiac Coronary Angiogram;  Surgeon: Luann Feng MD;  Location: [facility];  Service: Cardiology    CARDIAC CATHETERIZATION N/A 9/19/2024    Procedure: Cardiac RHC/LHC;  Surgeon: Javier Ocasio MD;  Location: [facility];  Service: Cardiology    WY REPLACEMENT MITRAL VALVE W/CARDIOPULMONARY BYP  N/A 10/8/2024    Procedure: MITRAL VALVE REPAIR WITH 36MM JANNETH 4D  ANNULOPLASTY RING, LEFT ATRIAL APPENDAGE LIGATION WITH 45MM ATRICLIP;  Surgeon: Manolo Sanford DO;  Location: [facility];  Service: Cardiac Surgery    SEPTOPLASTY         ALLERGIES:  No Known Allergies    SOCIAL HISTORY:  Social History     Substance and Sexual Activity   Alcohol Use Yes    Alcohol/week: 30.0 standard drinks of alcohol    Types: 30 Cans of beer per week    Comment: patient sasys he is cutting down  to 1-2 packs of beer     Social History     Substance and Sexual Activity   Drug Use No     Social History     Tobacco Use   Smoking Status Never   Smokeless Tobacco Never   Tobacco Comments    never a smoker ( as per allscripts)       FAMILY HISTORY:  Family History   Problem Relation Age of Onset    Aneurysm Mother     Coronary artery disease Father     Cancer Other     Stroke Other         CVA    Hypertension Sister        MEDICATIONS:    Current Facility-Administered Medications:     acetaminophen (TYLENOL) rectal suppository 650 mg, 650 mg, Rectal, Q4H PRN, Arun Griffin PA-C    acetaminophen (TYLENOL) tablet 975 mg, 975 mg, Oral, Q6H While awake, Arun Griffin PA-C, 975 mg at 10/11/24 0829    albuterol (PROVENTIL HFA,VENTOLIN HFA) inhaler 2 puff, 2 puff, Inhalation, TID PRN, Arun Griffin PA-C    amiodarone (CORDARONE) 900 mg in dextrose 5 % 500 mL infusion, 1 mg/min, Intravenous, Continuous, Last Rate: 33.3 mL/hr at 10/10/24 1855, 1 mg/min at 10/10/24 1855 **FOLLOWED BY** [DISCONTINUED] amiodarone (CORDARONE) 900 mg in dextrose 5 % 500 mL infusion, 0.5 mg/min, Intravenous, Continuous, Nciolette Linder PA-C    amiodarone tablet 200 mg, 200 mg, Oral, Q8H PAOLO, Arun Griffin PA-C, 200 mg at 10/11/24 0623    ARIPiprazole (ABILIFY) tablet 2 mg, 2 mg, Oral, Daily, Arun Griffin PA-C, 2 mg at 10/11/24 0829    atorvastatin (LIPITOR) tablet 80 mg, 80 mg, Oral, Daily With Dinner, Arun Griffin PA-C, 80 mg at 10/10/24 1553     bisacodyl (DULCOLAX) rectal suppository 10 mg, 10 mg, Rectal, Daily PRN, Arun Griffin PA-C    bumetanide (BUMEX) 12.5 mg infusion 50 mL, 4 mg/hr, Intravenous, Continuous, Jc Varghese PA-C, Last Rate: 16 mL/hr at 10/11/24 0752, 4 mg/hr at 10/11/24 0752    chlorhexidine (PERIDEX) 0.12 % oral rinse 15 mL, 15 mL, Mouth/Throat, BID, Arun Griffin PA-C, 15 mL at 10/11/24 0829    clopidogrel (PLAVIX) tablet 75 mg, 75 mg, Oral, Daily, Zahida Parish PA-C, 75 mg at 10/11/24 0829    insulin lispro (HumALOG/ADMELOG) 100 units/mL subcutaneous injection 1-6 Units, 1-6 Units, Subcutaneous, TID AC, 1 Units at 10/11/24 0638 **AND** Fingerstick Glucose (POCT), , , TID AC, Zahida Parish PA-C    insulin lispro (HumALOG/ADMELOG) 100 units/mL subcutaneous injection 1-6 Units, 1-6 Units, Subcutaneous, HS, Zahida Parish PA-C    lidocaine (LIDODERM) 5 % patch 2 patch, 2 patch, Topical, Daily, Otilia Morrison PA-C, 2 patch at 10/11/24 0829    methocarbamol (ROBAXIN) tablet 500 mg, 500 mg, Oral, Q6H PAOLO, DYLAN Mondragon, 500 mg at 10/11/24 0623    metolazone (ZAROXOLYN) tablet 10 mg, 10 mg, Oral, Daily, Jon Kerr PA-C, 10 mg at 10/11/24 0829    metoprolol tartrate (LOPRESSOR) tablet 25 mg, 25 mg, Oral, Q12H PAOLO, DYLAN Butt, 25 mg at 10/11/24 0829    milrinone (PRIMACOR) 20 mg in 100 mL infusion (premix), 0.25 mcg/kg/min, Intravenous, Continuous, Sid Christianson MD, Last Rate: 5.9 mL/hr at 10/10/24 2345, 0.25 mcg/kg/min at 10/10/24 2345    naloxone (NARCAN) 0.04 mg/mL syringe 0.04 mg, 0.04 mg, Intravenous, Q1MIN PRN, DYLAN Mondragon    ondansetron (ZOFRAN) injection 4 mg, 4 mg, Intravenous, Q6H PRN, Arun Griffin PA-C, 4 mg at 10/10/24 0638    oxyCODONE (ROXICODONE) IR tablet 2.5 mg, 2.5 mg, Oral, Q4H PRN, 2.5 mg at 10/11/24 0117 **OR** oxyCODONE (ROXICODONE) IR tablet 5 mg, 5 mg, Oral, Q4H PRN, Arun Griffin PA-C, 5 mg at 10/10/24 0832    pantoprazole (PROTONIX) EC tablet 40 mg,  40 mg, Oral, Daily, Arun Griffin PA-C, 40 mg at 10/11/24 0623    polyethylene glycol (MIRALAX) packet 17 g, 17 g, Oral, Daily, Arun Griffin PA-C, 17 g at 10/11/24 0829    potassium chloride (Klor-Con M20) CR tablet 20 mEq, 20 mEq, Oral, BID, Zahida Parish PA-C, 20 mEq at 10/11/24 0829    senna-docusate sodium (SENOKOT S) 8.6-50 mg per tablet 2 tablet, 2 tablet, Oral, BID, Jc Varghese PA-C, 2 tablet at 10/11/24 0829    triamcinolone (KENALOG) 0.1 % cream, , Topical, BID PRN, Arun Griffin PA-C    venlafaxine (EFFEXOR-XR) 24 hr capsule 150 mg, 150 mg, Oral, Daily With Breakfast, Jc Varghese PA-C, 150 mg at 10/11/24 0830    warfarin (COUMADIN) tablet 1 mg, 1 mg, Oral, Once (warfarin), DYLAN Butt      REVIEW OF SYSTEMS:  Patient seen and examined at bedside.  On BiPAP.  Unable to obtain full review of systems  Review of Systems   Unable to perform ROS: Acuity of condition         PHYSICAL EXAM:  Current weight: Weight - Scale: 76.5 kg (168 lb 10.4 oz)  Vitals:    10/11/24 0600 10/11/24 0636 10/11/24 0800 10/11/24 0900   BP: 118/71  129/61 133/61   BP Location: Right arm      Pulse: (!) 117 (!) 118 (!) 117 (!) 119   Resp: (!) 23 (!) 27 19 (!) 31   Temp: 98.4 °F (36.9 °C) 98.6 °F (37 °C) 97.9 °F (36.6 °C) 97.9 °F (36.6 °C)   TempSrc: Bladder  Bladder    SpO2: 96% 98% 96% 97%   Weight: 76.5 kg (168 lb 10.4 oz)      Height:           Physical Exam  Vitals and nursing note reviewed.   Constitutional:       Appearance: He is ill-appearing.      Comments: NAD, lethargic   HENT:      Head: Normocephalic and atraumatic.      Nose: Nose normal.      Mouth/Throat:      Mouth: Mucous membranes are dry.   Eyes:      Conjunctiva/sclera: Conjunctivae normal.   Cardiovascular:      Rate and Rhythm: Tachycardia present. Rhythm irregular.      Pulses: Normal pulses.      Heart sounds: Normal heart sounds.   Pulmonary:      Effort: Accessory muscle usage present.      Breath sounds: Normal breath  sounds. Decreased air movement present.   Abdominal:      General: Bowel sounds are normal.      Palpations: Abdomen is soft.   Genitourinary:     Comments: Linder catheter patent for clear yellow urine  Musculoskeletal:         General: Normal range of motion.      Cervical back: Normal range of motion and neck supple.      Right lower leg: Edema present.      Left lower leg: Edema present.   Skin:     General: Skin is warm and dry.      Coloration: Skin is pale.   Neurological:      General: No focal deficit present.   Psychiatric:         Mood and Affect: Mood normal.       Invasive Devices:   Urethral Catheter Non-latex;Temperature probe 16 Fr. (Active)   Reasons to continue Urinary Catheter  Accurate I&O assessment in critically ill patients (48 hr. max) 10/11/24 0800   Goal for Removal Remove after 48 hrs of I/O monitoring 10/11/24 0800   Site Assessment Clean;Skin intact 10/11/24 0800   Linder Care Done 10/11/24 0900   Collection Container Standard drainage bag 10/11/24 0800   Securement Method Securing device (Describe) 10/11/24 0800   Output (mL) 175 mL 10/11/24 0800       Lab Results:   Results from last 7 days   Lab Units 10/11/24  0454 10/11/24  0414 10/11/24  0413 10/10/24  2349 10/10/24  1750 10/10/24  1029 10/10/24  0428 10/09/24  1252 10/09/24  0411 10/08/24  1947 10/08/24  1417   WBC Thousand/uL  --   --  11.71*  --   --   --  16.77*  --  10.88*  --   --    HEMOGLOBIN g/dL  --   --  10.3*  --   --   --  11.5*  --  11.1*   < >  --    I STAT HEMOGLOBIN g/dl  --   --   --   --   --   --   --   --   --   --  12.6   HEMATOCRIT %  --   --  31.4*  --   --   --  35.4*  --  33.6*   < >  --    HEMATOCRIT, ISTAT %  --   --   --   --   --   --   --   --   --   --  37   PLATELETS Thousands/uL  --   --  211  --   --   --  220  --  160  --   --    SODIUM mmol/L  --  137  --  136 138   < > 136   < > 140  --   --    POTASSIUM mmol/L  --  3.6  --  3.8 4.0   < > 4.3   < > 4.1   < >  --    CHLORIDE mmol/L  --  101  --   "102 101   < > 102   < > 107  --   --    CO2 mmol/L  --  24  --  23 24   < > 21   < > 22  --   --    CO2, I-STAT mmol/L  --   --   --   --   --   --   --   --   --   --  26   BUN mg/dL  --  53*  --  49* 48*   < > 39*   < > 26*  --   --    CREATININE mg/dL  --  2.99*  --  2.73* 2.75*   < > 2.31*   < > 1.49*  --   --    CALCIUM mg/dL  --  8.5  --  8.1* 8.6   < > 8.3*   < > 7.8*  --   --    MAGNESIUM mg/dL  --  2.8*  --   --   --   --  2.9*  --   --   --   --    PHOSPHORUS mg/dL  --  8.5*  --   --   --   --   --   --   --   --   --    ALK PHOS U/L 57  --   --   --   --   --   --   --   --   --   --    ALT U/L 7  --   --   --   --   --   --   --   --   --   --    AST U/L 41*  --   --   --   --   --   --   --   --   --   --    GLUCOSE, ISTAT mg/dl  --   --   --   --   --   --   --   --   --   --  211*    < > = values in this interval not displayed.         Medical records through [facility] and Care Everywhere has been reviewed for this consult encounter    Portions of the record may have been created with voice recognition software. Occasional wrong word or \"sound a like\" substitutions may have occurred due to the inherent limitations of voice recognition software. Read the chart carefully and recognize,                     "

## 2024-10-11 NOTE — CASE MANAGEMENT
Case Management Discharge Planning Note    Patient name Reuben García  [facility] MRN [id number]  : 1955 Date 10/11/2024       Current Admission Date: 10/8/2024  Current Admission Diagnosis:S/P MVR (mitral valve repair)   Patient Active Problem List    Diagnosis Date Noted Date Diagnosed    S/P MVR (mitral valve repair) 10/08/2024     S/P left atrial appendage ligation 10/08/2024     Hypokalemia 2024     Mixed hyperlipidemia 2024     Acute on chronic heart failure with preserved ejection fraction (HFpEF) ([facility]) 09/10/2024     Recurrent major depressive disorder, in remission ([facility]) 09/10/2024     Nonrheumatic mitral valve regurgitation 2024     Platelets decreased ([facility]) 2023     Coronary artery disease involving native coronary artery of native heart without angina pectoris      Mild intermittent asthma without complication 2021     BMI 29.0-29.9,adult 10/11/2019     Insomnia 2015     Essential hypertension 2014     Gastroesophageal reflux disease without esophagitis 2014     Anxiety 2014     Migraine headache 2014       LOS (days): 3  Geometric Mean LOS (GMLOS) (days): 8.5  Days to GMLOS:5.5     OBJECTIVE:  Risk of Unplanned Readmission Score: 29.57      Current admission status: Inpatient   Preferred Pharmacy:   [id number] [address] [place] [id number]  Phone: [phone] Fax: [phone]  Primary Care Provider: Rich Delgado DO  Primary Insurance: [employer] [facility] REP  Secondary Insurance:   DISCHARGE DETAILS:  Requested Home Health Care         Is the patient interested in HHC at discharge?: Yes  Home Health Discipline requested:: Nursing  Home Health Agency Name:: [facility]  Home Health Follow-Up Provider:: PCP  Home Health Services Needed:: Post-Op Care and Assessment  Homebound Criteria Met:: Uses an Assist Device (i.e. cane, walker, etc)  Supporting Clincal  Findings:: Fatigues Easliy in Short Distances, Limited Endurance

## 2024-10-11 NOTE — QUICK NOTE
If renal function worsens, has decreased urinary output, has electrolyte or metabolic abnormalities or uremia develops, patient may require RRT therapy which has been discussed with patient's spouse per his request.  Risks and benefits discussed with patient's wife over the phone in which she is in agreement to move forward with RRT therapy/CVVHD/HD if needed.  She would like to be notified if this needs to be started.  Consent placed on patient's chart

## 2024-10-11 NOTE — ASSESSMENT & PLAN NOTE
With worsening creatinine patient is at risk for hyperkalemia  Current potassium within normal limits at 3.6 magnesium 2.8  Continue to monitor and treat as needed

## 2024-10-11 NOTE — ASSESSMENT & PLAN NOTE
Etiology: Suspect hemodynamic insult given pertubation's of blood pressure, fibrillation,and  IV diuretics  Baseline creatinine 0.8-1.0 dating back to 2017  Admission creatinine 1.0 10/8/2024  Creatinine has continued to rise and currently 2.99 today with EGFR 20 BUN 53  On valsartan as outpatient-held since admission  Check urinalysis with micro  Linder Linder catheter per primary team for accurate /O in the setting of acute kidney injury  Potassium and acid-base within normal limits  Urinary output -1.8 L within 24 hours  No acute indication for RRT therapy at this time however if worsening renal function, urinary output decreases and electrolyte/acid-base balance will need to consider RRT therapy  BMP every 6 hours per primary team

## 2024-10-11 NOTE — PHYSICAL THERAPY NOTE
Physical Therapy Cancellation Note                 10/11/24 0937   Note Type   Note Type Cancelled Session   Cancel Reasons Medical status     Chart reviewed; spoke to [facility] staff who informs pt is currently on Bipap and overall is not medically appropriate for mobilization; will follow as clinical course allows.    Binh Dawson

## 2024-10-11 NOTE — ASSESSMENT & PLAN NOTE
With worsening kidney function patient at risk for acid-base imbalance  Bicarb 24 anion gap 12  Continue to monitor for now

## 2024-10-12 LAB
ANION GAP SERPL CALCULATED.3IONS-SCNC: 10 MMOL/L (ref 4–13)
ANION GAP SERPL CALCULATED.3IONS-SCNC: 11 MMOL/L (ref 4–13)
ANION GAP SERPL CALCULATED.3IONS-SCNC: 11 MMOL/L (ref 4–13)
ANION GAP SERPL CALCULATED.3IONS-SCNC: 12 MMOL/L (ref 4–13)
BASE EX.OXY STD BLDV CALC-SCNC: 68.9 % (ref 60–80)
BASE EX.OXY STD BLDV CALC-SCNC: 71.5 % (ref 60–80)
BASE EXCESS BLDV CALC-SCNC: 0.5 MMOL/L
BASE EXCESS BLDV CALC-SCNC: 1.1 MMOL/L
BUN SERPL-MCNC: 52 MG/DL (ref 5–25)
BUN SERPL-MCNC: 52 MG/DL (ref 5–25)
BUN SERPL-MCNC: 53 MG/DL (ref 5–25)
BUN SERPL-MCNC: 54 MG/DL (ref 5–25)
CALCIUM SERPL-MCNC: 8.5 MG/DL (ref 8.4–10.2)
CALCIUM SERPL-MCNC: 8.5 MG/DL (ref 8.4–10.2)
CALCIUM SERPL-MCNC: 8.6 MG/DL (ref 8.4–10.2)
CALCIUM SERPL-MCNC: 8.8 MG/DL (ref 8.4–10.2)
CHLORIDE SERPL-SCNC: 94 MMOL/L (ref 96–108)
CHLORIDE SERPL-SCNC: 96 MMOL/L (ref 96–108)
CHLORIDE SERPL-SCNC: 97 MMOL/L (ref 96–108)
CHLORIDE SERPL-SCNC: 99 MMOL/L (ref 96–108)
CO2 SERPL-SCNC: 28 MMOL/L (ref 21–32)
CO2 SERPL-SCNC: 28 MMOL/L (ref 21–32)
CO2 SERPL-SCNC: 32 MMOL/L (ref 21–32)
CO2 SERPL-SCNC: 33 MMOL/L (ref 21–32)
CREAT SERPL-MCNC: 1.87 MG/DL (ref 0.6–1.3)
CREAT SERPL-MCNC: 2.09 MG/DL (ref 0.6–1.3)
CREAT SERPL-MCNC: 2.5 MG/DL (ref 0.6–1.3)
CREAT SERPL-MCNC: 2.71 MG/DL (ref 0.6–1.3)
ERYTHROCYTE [DISTWIDTH] IN BLOOD BY AUTOMATED COUNT: 16.3 % (ref 11.6–15.1)
GFR SERPL CREATININE-BSD FRML MDRD: 23 ML/MIN/1.73SQ M
GFR SERPL CREATININE-BSD FRML MDRD: 25 ML/MIN/1.73SQ M
GFR SERPL CREATININE-BSD FRML MDRD: 31 ML/MIN/1.73SQ M
GFR SERPL CREATININE-BSD FRML MDRD: 36 ML/MIN/1.73SQ M
GLUCOSE SERPL-MCNC: 112 MG/DL (ref 65–140)
GLUCOSE SERPL-MCNC: 113 MG/DL (ref 65–140)
GLUCOSE SERPL-MCNC: 117 MG/DL (ref 65–140)
GLUCOSE SERPL-MCNC: 119 MG/DL (ref 65–140)
GLUCOSE SERPL-MCNC: 126 MG/DL (ref 65–140)
GLUCOSE SERPL-MCNC: 129 MG/DL (ref 65–140)
GLUCOSE SERPL-MCNC: 129 MG/DL (ref 65–140)
GLUCOSE SERPL-MCNC: 145 MG/DL (ref 65–140)
HCO3 BLDV-SCNC: 27.3 MMOL/L (ref 24–30)
HCO3 BLDV-SCNC: 28.2 MMOL/L (ref 24–30)
HCT VFR BLD AUTO: 34.5 % (ref 36.5–49.3)
HGB BLD-MCNC: 11.4 G/DL (ref 12–17)
INR PPP: 2.44 (ref 0.85–1.19)
MAGNESIUM SERPL-MCNC: 2.5 MG/DL (ref 1.9–2.7)
MCH RBC QN AUTO: 31.1 PG (ref 26.8–34.3)
MCHC RBC AUTO-ENTMCNC: 33 G/DL (ref 31.4–37.4)
MCV RBC AUTO: 94 FL (ref 82–98)
NASAL CANNULA: 4
O2 CT BLDV-SCNC: 11.5 ML/DL
O2 CT BLDV-SCNC: 11.6 ML/DL
PCO2 BLDV: 54.3 MM HG (ref 42–50)
PCO2 BLDV: 57.5 MM HG (ref 42–50)
PH BLDV: 7.31 [PH] (ref 7.3–7.4)
PH BLDV: 7.32 [PH] (ref 7.3–7.4)
PHOSPHATE SERPL-MCNC: 5.2 MG/DL (ref 2.3–4.1)
PLATELET # BLD AUTO: 249 THOUSANDS/UL (ref 149–390)
PMV BLD AUTO: 10.1 FL (ref 8.9–12.7)
PO2 BLDV: 38.4 MM HG (ref 35–45)
PO2 BLDV: 41 MM HG (ref 35–45)
POTASSIUM SERPL-SCNC: 3.1 MMOL/L (ref 3.5–5.3)
POTASSIUM SERPL-SCNC: 3.3 MMOL/L (ref 3.5–5.3)
POTASSIUM SERPL-SCNC: 3.6 MMOL/L (ref 3.5–5.3)
POTASSIUM SERPL-SCNC: 4.1 MMOL/L (ref 3.5–5.3)
PROTHROMBIN TIME: 26.4 SECONDS (ref 12.3–15)
RBC # BLD AUTO: 3.66 MILLION/UL (ref 3.88–5.62)
SODIUM SERPL-SCNC: 136 MMOL/L (ref 135–147)
SODIUM SERPL-SCNC: 137 MMOL/L (ref 135–147)
SODIUM SERPL-SCNC: 139 MMOL/L (ref 135–147)
SODIUM SERPL-SCNC: 139 MMOL/L (ref 135–147)
WBC # BLD AUTO: 8.74 THOUSAND/UL (ref 4.31–10.16)

## 2024-10-12 PROCEDURE — 82805 BLOOD GASES W/O2 SATURATION: CPT | Performed by: NURSE PRACTITIONER

## 2024-10-12 PROCEDURE — 80048 BASIC METABOLIC PNL TOTAL CA: CPT | Performed by: PHYSICIAN ASSISTANT

## 2024-10-12 PROCEDURE — 84100 ASSAY OF PHOSPHORUS: CPT | Performed by: NURSE PRACTITIONER

## 2024-10-12 PROCEDURE — 99232 SBSQ HOSP IP/OBS MODERATE 35: CPT | Performed by: INTERNAL MEDICINE

## 2024-10-12 PROCEDURE — 94760 N-INVAS EAR/PLS OXIMETRY 1: CPT

## 2024-10-12 PROCEDURE — 83735 ASSAY OF MAGNESIUM: CPT | Performed by: NURSE PRACTITIONER

## 2024-10-12 PROCEDURE — 85610 PROTHROMBIN TIME: CPT | Performed by: NURSE PRACTITIONER

## 2024-10-12 PROCEDURE — 82948 REAGENT STRIP/BLOOD GLUCOSE: CPT

## 2024-10-12 PROCEDURE — 85027 COMPLETE CBC AUTOMATED: CPT | Performed by: NURSE PRACTITIONER

## 2024-10-12 PROCEDURE — 99024 POSTOP FOLLOW-UP VISIT: CPT | Performed by: THORACIC SURGERY (CARDIOTHORACIC VASCULAR SURGERY)

## 2024-10-12 PROCEDURE — 99232 SBSQ HOSP IP/OBS MODERATE 35: CPT | Performed by: ANESTHESIOLOGY

## 2024-10-12 RX ORDER — POTASSIUM CHLORIDE 29.8 MG/ML
40 INJECTION INTRAVENOUS ONCE
Status: COMPLETED | OUTPATIENT
Start: 2024-10-12 | End: 2024-10-12

## 2024-10-12 RX ORDER — POTASSIUM CHLORIDE 1500 MG/1
40 TABLET, EXTENDED RELEASE ORAL ONCE
Status: COMPLETED | OUTPATIENT
Start: 2024-10-12 | End: 2024-10-12

## 2024-10-12 RX ORDER — POTASSIUM CHLORIDE 14.9 MG/ML
20 INJECTION INTRAVENOUS ONCE
Status: COMPLETED | OUTPATIENT
Start: 2024-10-12 | End: 2024-10-13

## 2024-10-12 RX ORDER — MAGNESIUM SULFATE HEPTAHYDRATE 40 MG/ML
2 INJECTION, SOLUTION INTRAVENOUS ONCE
Status: COMPLETED | OUTPATIENT
Start: 2024-10-12 | End: 2024-10-12

## 2024-10-12 RX ORDER — WARFARIN SODIUM 1 MG/1
1 TABLET ORAL
Status: COMPLETED | OUTPATIENT
Start: 2024-10-12 | End: 2024-10-12

## 2024-10-12 RX ADMIN — METOPROLOL TARTRATE 50 MG: 50 TABLET, FILM COATED ORAL at 08:10

## 2024-10-12 RX ADMIN — POTASSIUM CHLORIDE 40 MEQ: 1500 TABLET, EXTENDED RELEASE ORAL at 16:39

## 2024-10-12 RX ADMIN — ARIPIPRAZOLE 2 MG: 2 TABLET ORAL at 08:11

## 2024-10-12 RX ADMIN — AMIODARONE HYDROCHLORIDE 200 MG: 200 TABLET ORAL at 20:49

## 2024-10-12 RX ADMIN — AMIODARONE HYDROCHLORIDE 1 MG/MIN: 50 INJECTION, SOLUTION INTRAVENOUS at 17:47

## 2024-10-12 RX ADMIN — Medication 2 MG/HR: at 22:33

## 2024-10-12 RX ADMIN — MILRINONE LACTATE IN DEXTROSE 0.13 MCG/KG/MIN: 200 INJECTION, SOLUTION INTRAVENOUS at 11:35

## 2024-10-12 RX ADMIN — POTASSIUM CHLORIDE 20 MEQ: 14.9 INJECTION, SOLUTION INTRAVENOUS at 22:42

## 2024-10-12 RX ADMIN — AMIODARONE HYDROCHLORIDE 200 MG: 200 TABLET ORAL at 06:11

## 2024-10-12 RX ADMIN — AMIODARONE HYDROCHLORIDE 1 MG/MIN: 50 INJECTION, SOLUTION INTRAVENOUS at 02:56

## 2024-10-12 RX ADMIN — MAGNESIUM SULFATE HEPTAHYDRATE 2 G: 40 INJECTION, SOLUTION INTRAVENOUS at 19:39

## 2024-10-12 RX ADMIN — PANTOPRAZOLE SODIUM 40 MG: 40 TABLET, DELAYED RELEASE ORAL at 06:11

## 2024-10-12 RX ADMIN — AMIODARONE HYDROCHLORIDE 200 MG: 200 TABLET ORAL at 14:38

## 2024-10-12 RX ADMIN — OXYCODONE HYDROCHLORIDE 5 MG: 5 TABLET ORAL at 06:17

## 2024-10-12 RX ADMIN — Medication 4 MG/HR: at 04:13

## 2024-10-12 RX ADMIN — VENLAFAXINE HYDROCHLORIDE 150 MG: 150 CAPSULE, EXTENDED RELEASE ORAL at 08:11

## 2024-10-12 RX ADMIN — POTASSIUM CHLORIDE 40 MEQ: 29.8 INJECTION, SOLUTION INTRAVENOUS at 16:40

## 2024-10-12 RX ADMIN — METOPROLOL TARTRATE 50 MG: 50 TABLET, FILM COATED ORAL at 20:49

## 2024-10-12 RX ADMIN — POTASSIUM CHLORIDE 40 MEQ: 1500 TABLET, EXTENDED RELEASE ORAL at 01:04

## 2024-10-12 RX ADMIN — CLOPIDOGREL BISULFATE 75 MG: 75 TABLET ORAL at 08:10

## 2024-10-12 RX ADMIN — Medication 2 MG/HR: at 11:30

## 2024-10-12 RX ADMIN — ACETAMINOPHEN 975 MG: 325 TABLET, FILM COATED ORAL at 14:38

## 2024-10-12 RX ADMIN — POLYETHYLENE GLYCOL 3350 17 G: 17 POWDER, FOR SOLUTION ORAL at 08:10

## 2024-10-12 RX ADMIN — ACETAMINOPHEN 975 MG: 325 TABLET, FILM COATED ORAL at 19:44

## 2024-10-12 RX ADMIN — WARFARIN SODIUM 1 MG: 1 TABLET ORAL at 17:16

## 2024-10-12 RX ADMIN — POTASSIUM CHLORIDE 40 MEQ: 29.8 INJECTION, SOLUTION INTRAVENOUS at 01:04

## 2024-10-12 RX ADMIN — POTASSIUM CHLORIDE 40 MEQ: 1500 TABLET, EXTENDED RELEASE ORAL at 22:42

## 2024-10-12 RX ADMIN — CHLORHEXIDINE GLUCONATE 0.12% ORAL RINSE 15 ML: 1.2 LIQUID ORAL at 08:10

## 2024-10-12 RX ADMIN — METOLAZONE 10 MG: 10 TABLET ORAL at 08:09

## 2024-10-12 RX ADMIN — SENNOSIDES AND DOCUSATE SODIUM 2 TABLET: 50; 8.6 TABLET ORAL at 08:09

## 2024-10-12 RX ADMIN — Medication 2 MG/HR: at 17:14

## 2024-10-12 RX ADMIN — ATORVASTATIN CALCIUM 80 MG: 80 TABLET, FILM COATED ORAL at 16:40

## 2024-10-12 RX ADMIN — SENNOSIDES AND DOCUSATE SODIUM 2 TABLET: 50; 8.6 TABLET ORAL at 17:17

## 2024-10-12 RX ADMIN — ACETAMINOPHEN 975 MG: 325 TABLET, FILM COATED ORAL at 08:10

## 2024-10-12 RX ADMIN — Medication 4 MG/HR: at 06:26

## 2024-10-12 RX ADMIN — CHLORHEXIDINE GLUCONATE 0.12% ORAL RINSE 15 ML: 1.2 LIQUID ORAL at 17:16

## 2024-10-12 RX ADMIN — Medication 4 MG/HR: at 01:46

## 2024-10-12 NOTE — PLAN OF CARE
Problem: Prexisting or High Potential for Compromised Skin Integrity  Goal: Skin integrity is maintained or improved  Description: INTERVENTIONS:  - Identify patients at risk for skin breakdown  - Assess and monitor skin integrity  - Assess and monitor nutrition and hydration status  - Monitor labs   - Assess for incontinence   - Turn and reposition patient  - Assist with mobility/ambulation  - Relieve pressure over bony prominences  - Avoid friction and shearing  - Provide appropriate hygiene as needed including keeping skin clean and dry  - Evaluate need for skin moisturizer/barrier cream  - Collaborate with interdisciplinary team   - Patient/family teaching  - Consider wound care consult   Outcome: Progressing     Problem: NEUROSENSORY - ADULT  Goal: Achieves stable or improved neurological status  Description: INTERVENTIONS  - Monitor and report changes in neurological status  - Monitor vital signs such as temperature, blood pressure, glucose, and any other labs ordered   - Initiate measures to prevent increased intracranial pressure  - Monitor for seizure activity and implement precautions if appropriate      Outcome: Progressing  Goal: Remains free of injury related to seizures activity  Description: INTERVENTIONS  - Maintain airway, patient safety  and administer oxygen as ordered  - Monitor patient for seizure activity, document and report duration and description of seizure to physician/advanced practitioner  - If seizure occurs,  ensure patient safety during seizure  - Reorient patient post seizure  - Seizure pads on all 4 side rails  - Instruct patient/family to notify RN of any seizure activity including if an aura is experienced  - Instruct patient/family to call for assistance with activity based on nursing assessment  - Administer anti-seizure medications if ordered    Outcome: Progressing  Goal: Achieves maximal functionality and self care  Description: INTERVENTIONS  - Monitor swallowing and  airway patency with patient fatigue and changes in neurological status  - Encourage and assist patient to increase activity and self care.   - Encourage visually impaired, hearing impaired and aphasic patients to use assistive/communication devices  Outcome: Progressing     Problem: CARDIOVASCULAR - ADULT  Goal: Maintains optimal cardiac output and hemodynamic stability  Description: INTERVENTIONS:  - Monitor I/O, vital signs and rhythm  - Monitor for S/S and trends of decreased cardiac output  - Administer and titrate ordered vasoactive medications to optimize hemodynamic stability  - Assess quality of pulses, skin color and temperature  - Assess for signs of decreased coronary artery perfusion  - Instruct patient to report change in severity of symptoms  Outcome: Progressing  Goal: Absence of cardiac dysrhythmias or at baseline rhythm  Description: INTERVENTIONS:  - Continuous cardiac monitoring, vital signs, obtain 12 lead EKG if ordered  - Administer antiarrhythmic and heart rate control medications as ordered  - Monitor electrolytes and administer replacement therapy as ordered  Outcome: Progressing     Problem: GASTROINTESTINAL - ADULT  Goal: Maintains adequate nutritional intake  Description: INTERVENTIONS:  - Monitor percentage of each meal consumed  - Identify factors contributing to decreased intake, treat as appropriate  - Assist with meals as needed  - Monitor I&O, weight, and lab values if indicated  - Obtain nutrition services referral as needed  Outcome: Progressing  Goal: Oral mucous membranes remain intact  Description: INTERVENTIONS  - Assess oral mucosa and hygiene practices  - Implement preventative oral hygiene regimen  - Implement oral medicated treatments as ordered  - Initiate Nutrition services referral as needed  Outcome: Progressing     Problem: METABOLIC, FLUID AND ELECTROLYTES - ADULT  Goal: Electrolytes maintained within normal limits  Description: INTERVENTIONS:  - Monitor labs and  assess patient for signs and symptoms of electrolyte imbalances  - Administer electrolyte replacement as ordered  - Monitor response to electrolyte replacements, including repeat lab results as appropriate  - Instruct patient on fluid and nutrition as appropriate  Outcome: Progressing  Goal: Fluid balance maintained  Description: INTERVENTIONS:  - Monitor labs   - Monitor I/O and WT  - Instruct patient on fluid and nutrition as appropriate  - Assess for signs & symptoms of volume excess or deficit  Outcome: Progressing  Goal: Glucose maintained within target range  Description: INTERVENTIONS:  - Monitor Blood Glucose as ordered  - Assess for signs and symptoms of hyperglycemia and hypoglycemia  - Administer ordered medications to maintain glucose within target range  - Assess nutritional intake and initiate nutrition service referral as needed  Outcome: Progressing     Problem: SKIN/TISSUE INTEGRITY - ADULT  Goal: Incision(s), wounds(s) or drain site(s) healing without S/S of infection  Description: INTERVENTIONS  - Assess and document dressing, incision, wound bed, drain sites and surrounding tissue  - Provide patient and family education  - Perform skin care/dressing changes every   Outcome: Progressing     Problem: HEMATOLOGIC - ADULT  Goal: Maintains hematologic stability  Description: INTERVENTIONS  - Assess for signs and symptoms of bleeding or hemorrhage  - Monitor labs  - Administer supportive blood products/factors as ordered and appropriate  Outcome: Progressing

## 2024-10-12 NOTE — PROGRESS NOTES
Progress Note - Nephrology   Name: Reuben García 69 y.o. male I MRN: [id number]  Unit/Bed#: [facility]-313-01 I Date of Admission: 10/8/2024   Date of Service: 10/13/2024 I Hospital Day: 5    69-year-old male with multiple comorbidities including obesity, hypertension, diabetes, CAD prior stents, GERD, alcohol use disorder, depression, CHF and severe MR presented for elective 10/8/2024 mitral valve repair. Nephrology consulted for evaluation and management of abnormal renal parameters.   Assessment & Plan  FRANCISCO (acute kidney injury) (HCC)  FRANCISCO most likely secondary to ischemic injury secondary hemodynamic patient intraoperatively in light of underlying comorbidities  After review of records it appears that the patient has a baseline Creatinine of 0.8-1.0 mg/dL.  Admission creatinine of 1.0 mg/dL on 10/8/2024.  Peak creatinine this for this hospitalization 2.99 mg/dL on 10/11/2024  Creatinine today is at 1.67 mg/dL, stable and improving  check BMP, magnesium, phosphorus every 12  Detailed discussion with patient with regards to renal replacement therapy risks benefits discussed procedure discussed in depth consent obtained placed in chart 10/11/2024 also discussions held with patient's .  No acute indication for dialysis at this time  Monitor closely for dialysis needs  Recommend discontinuation of metolazone and Bumex drip for now  Recommend Bumex 2 mg IV x 1 later today  Reevaluate in a.m. for further diuretic dosage  Recommend Linder discontinuation  Await renal recovery.  Place on a renal diet when allowed diet order.   Strict I/O.  Daily weights  Urinary retention protocol  Avoid nephrotoxins, adjust meds to appropriate GFR.  Essential hypertension  Optimize hemodynamic status to avoid delay in renal recovery.  Maintain MAP > 65mmHg  Avoid BP fluctuations.  Home medications: Norvasc 10 mg p.o. daily, Cardura 2 mg p.o. daily, Toprol-XL 12.5 mg p.o. daily, torsemide 20 mg p.o. twice daily, valsartan 320 mg p.o.  daily  Current medications: metolazone 10 mg p.o. daily, Toprol 50 mg p.o. every 12, Bumex drip 2 mg/h,   Hold off on home valsartan for now  No longer on Primacor  Recommend discontinuation Bumex drip and metolazone  Recommend Bumex 2 mg IV x 1 later today  Hyperphosphatemia  Most recent phosphorus of 2.7 stable resolved  Continue low phosphorus diet  S/P MVR (mitral valve repair)  Follow-up with CT surgery status post MVR 10/8/2024   Coronary artery disease involving native coronary artery of native heart without angina pectoris  History of PCI/ESTHER in 2002  Management by CT surgery  Nonrheumatic mitral valve regurgitation  Status post MVR and managed by CT surgery  Hypokalemia  Most recent potassium 3.3 mEq  Supplement and recheck every 12 BMP    I have reviewed the nephrology recommendations including DC Bumex drip give Bumex 2 mg IV x 1 today reevaluate in a.m. for further diuretic dosage, with critical care team, and we are in agreement with renal plan including the information outlined above.     Subjective   Brief History of Admission -patient seen and examined in the ICU hemodynamically stable remains afebrile urine output close to 5.4 L / 24 hours no overt uremic symptoms happy renal parameters are improving  Objective :  Temp:  [98.4 °F (36.9 °C)-99.1 °F (37.3 °C)] 98.6 °F (37 °C)  HR:  [76-98] 94  BP: (109-167)/(60-89) 138/81  Resp:  [14-19] 15  SpO2:  [97 %-100 %] 98 %  O2 Device: Nasal cannula  Nasal Cannula O2 Flow Rate (L/min):  [2 L/min-4 L/min] 2 L/min    Current Weight: Weight - Scale: 69.8 kg (153 lb 14.1 oz)  First Weight: Weight - Scale: 73.1 kg (161 lb 0.7 oz)  I/O         10/10 0701  10/11 0700 10/11 0701  10/12 0700 10/12 0701  10/13 0700    P.O. 840  222    I.V. (mL/kg) 1423 (18.6) 1362.5 (18.7) 227.2 (3.1)    IV Piggyback 100 200     Total Intake(mL/kg) 2363 (30.9) 1562.5 (21.4) 449.2 (6.2)    Urine (mL/kg/hr) 1865 (1) 4875 (2.8) 1150 (1.6)    Stool  0     Chest Tube 0      Total Output  1865 4875 1150    Net +498 -3312.5 -700.9           Unmeasured Stool Occurrence  1 x           Physical Exam  Vitals and nursing note reviewed.   Constitutional:       General: He is not in acute distress.     Appearance: Normal appearance. He is ill-appearing. He is not toxic-appearing or diaphoretic.   HENT:      Head: Normocephalic and atraumatic.      Mouth/Throat:      Pharynx: No oropharyngeal exudate.   Eyes:      General: No scleral icterus.  Cardiovascular:      Rate and Rhythm: Normal rate.   Pulmonary:      Effort: No respiratory distress.      Breath sounds: No wheezing.   Abdominal:      General: There is no distension.      Palpations: Abdomen is soft.      Tenderness: There is no abdominal tenderness.   Musculoskeletal:         General: Swelling present.      Cervical back: No rigidity.      Comments: Trace bilateral lower extremity edema   Skin:     Coloration: Skin is not jaundiced.   Neurological:      General: No focal deficit present.      Mental Status: He is alert and oriented to person, place, and time. Mental status is at baseline.   Psychiatric:         Mood and Affect: Mood normal.         Behavior: Behavior normal.       ROS:  Constitutional:  No chills, no fever.   HENT:  No sore throat  Respiratory:  No cough, no hemoptysis, no wheezing.    Cardiovascular:  no leg swelling.   Gastrointestinal:  No constipation, no diarrhea.   Genitourinary: No decreased urine output.  Musculoskeletal:  No back pain.   Neurological:  no dizziness, No headaches.   Psychiatric/Behavioral:  No agitation, no confusion.    All other systems reviewed and are negative.          Medications:    Current Facility-Administered Medications:     acetaminophen (TYLENOL) rectal suppository 650 mg, 650 mg, Rectal, Q4H PRN, Arun Griffin PA-C    acetaminophen (TYLENOL) tablet 975 mg, 975 mg, Oral, Q6H While awake, Arun Griffin PA-C, 975 mg at 10/13/24 0820    albuterol (PROVENTIL HFA,VENTOLIN HFA) inhaler 2 puff, 2  puff, Inhalation, TID PRN, Arun Griffin PA-C    amiodarone (CORDARONE) 900 mg in dextrose 5 % 500 mL infusion, 0.5 mg/min, Intravenous, Continuous, Last Rate: 16.7 mL/hr at 10/13/24 0709, 0.5 mg/min at 10/13/24 0709 **FOLLOWED BY** [DISCONTINUED] amiodarone (CORDARONE) 900 mg in dextrose 5 % 500 mL infusion, 0.5 mg/min, Intravenous, Continuous, Nicolette Linder PA-C    amiodarone tablet 200 mg, 200 mg, Oral, Q8H PAOLO, Arun Griffin PA-C, 200 mg at 10/13/24 0553    ARIPiprazole (ABILIFY) tablet 2 mg, 2 mg, Oral, Daily, Arun Griffin PA-C, 2 mg at 10/13/24 0820    atorvastatin (LIPITOR) tablet 80 mg, 80 mg, Oral, Daily With Dinner, Arun Griffin PA-C, 80 mg at 10/12/24 1640    bisacodyl (DULCOLAX) rectal suppository 10 mg, 10 mg, Rectal, Daily PRN, Arun Griffin PA-C    bumetanide (BUMEX) injection 2 mg, 2 mg, Intravenous, BID, Nicolette Linder PA-C    chlorhexidine (PERIDEX) 0.12 % oral rinse 15 mL, 15 mL, Mouth/Throat, BID, Arun Griffin PA-C, 15 mL at 10/13/24 0820    clopidogrel (PLAVIX) tablet 75 mg, 75 mg, Oral, Daily, Zahida Parish PA-C, 75 mg at 10/13/24 0820    docusate sodium (COLACE) capsule 100 mg, 100 mg, Oral, BID, Nicolette Linder PA-C, 100 mg at 10/13/24 0820    insulin lispro (HumALOG/ADMELOG) 100 units/mL subcutaneous injection 1-6 Units, 1-6 Units, Subcutaneous, TID AC, 1 Units at 10/11/24 0638 **AND** Fingerstick Glucose (POCT), , , TID AC, Zahida Parish PA-C    insulin lispro (HumALOG/ADMELOG) 100 units/mL subcutaneous injection 1-6 Units, 1-6 Units, Subcutaneous, HS, Zahida Allegra Parish PA-C    lidocaine (LIDODERM) 5 % patch 2 patch, 2 patch, Topical, Daily, Otilia Morrison PA-C, 2 patch at 10/13/24 0820    magnesium sulfate 2 g/50 mL IVPB (premix) 2 g, 2 g, Intravenous, Once, Nicolette Linder PA-C, Last Rate: 25 mL/hr at 10/13/24 0820, 2 g at 10/13/24 0820    metoprolol tartrate (LOPRESSOR) tablet 50 mg, 50 mg, Oral, Q12H PAOLO, DYLAN Mathews, 50 mg at 10/13/24  0820    naloxone (NARCAN) 0.04 mg/mL syringe 0.04 mg, 0.04 mg, Intravenous, Q1MIN PRN, DYLAN Mondragon    ondansetron (ZOFRAN) injection 4 mg, 4 mg, Intravenous, Q6H PRN, Arun Griffin PA-C, 4 mg at 10/10/24 0638    oxyCODONE (ROXICODONE) IR tablet 2.5 mg, 2.5 mg, Oral, Q4H PRN, 2.5 mg at 10/11/24 0117 **OR** oxyCODONE (ROXICODONE) IR tablet 5 mg, 5 mg, Oral, Q4H PRN, Arun Griffin PA-C, 5 mg at 10/12/24 0617    pantoprazole (PROTONIX) EC tablet 40 mg, 40 mg, Oral, Daily, Arun Griffin PA-C, 40 mg at 10/13/24 0553    polyethylene glycol (MIRALAX) packet 17 g, 17 g, Oral, Daily, Arun Griffin PA-C, 17 g at 10/13/24 0820    potassium chloride (Klor-Con M20) CR tablet 20 mEq, 20 mEq, Oral, BID, Nicolette Linder PA-C    potassium chloride 40 mEq IVPB (premix), 40 mEq, Intravenous, Once, Nicolette Linder PA-C, Last Rate: 25 mL/hr at 10/13/24 0612, 40 mEq at 10/13/24 0612    senna-docusate sodium (SENOKOT S) 8.6-50 mg per tablet 2 tablet, 2 tablet, Oral, BID, Jc Varghese PA-C, 2 tablet at 10/13/24 0820    temazepam (RESTORIL) capsule 15 mg, 15 mg, Oral, HS PRN, Nicolette Linder PA-C    triamcinolone (KENALOG) 0.1 % cream, , Topical, BID PRN, Arun Griffin PA-C    venlafaxine (EFFEXOR-XR) 24 hr capsule 150 mg, 150 mg, Oral, Daily With Breakfast, Jc Varghese PA-C, 150 mg at 10/13/24 0820    warfarin (COUMADIN) tablet 2 mg, 2 mg, Oral, Once (warfarin), Nicolette Linder PA-C      Lab Results: I have reviewed the following results:  Results from last 7 days   Lab Units 10/13/24  0348 10/12/24  2159 10/12/24  1545 10/12/24  0442 10/12/24  0012 10/11/24  1820 10/11/24  1146 10/11/24  0454 10/11/24  0414 10/11/24  0413 10/10/24  1029 10/10/24  0428 10/09/24  1252 10/09/24  0411 10/08/24  2231 10/08/24  1947 10/08/24  1417 10/08/24  1415 10/08/24  1214   0000   WBC Thousand/uL  --   --   --  8.74  --   --   --   --   --  11.71*  --  16.77*  --  10.88*  --   --   --   --   --   --    HEMOGLOBIN g/dL  --   --    --  11.4*  --   --   --   --   --  10.3*  --  11.5*  --  11.1* 11.5*  --   --  13.5  --   --    I STAT HEMOGLOBIN g/dl  --   --   --   --   --   --   --   --   --   --   --   --   --   --   --   --  12.6  --   --   --    HEMATOCRIT %  --   --   --  34.5*  --   --   --   --   --  31.4*  --  35.4*  --  33.6* 34.8*  --   --  40.1  --   --    HEMATOCRIT, ISTAT %  --   --   --   --   --   --   --   --   --   --   --   --   --   --   --   --  37  --   --   --    PLATELETS Thousands/uL  --   --   --  249  --   --   --   --   --  211  --  220  --  160  --   --   --  240 141*  --    POTASSIUM mmol/L 3.3* 3.6 3.1* 4.1 3.3* 3.6 3.3*  --  3.6  --    < > 4.3   < > 4.1 4.2   < >  --  3.4*  --   --    CHLORIDE mmol/L 94* 94* 96 99 97 101 98  --  101  --    < > 102   < > 107  --   --   --  109*  --    < >   CO2 mmol/L 36* 33* 32 28 28 27 25  --  24  --    < > 21   < > 22  --   --   --  24  --    < >   CO2, I-STAT mmol/L  --   --   --   --   --   --   --   --   --   --   --   --   --   --   --   --  26  --   --   --    BUN mg/dL 49* 52* 52* 53* 54* 56* 52*  --  53*  --    < > 39*   < > 26*  --   --   --  21  --    < >   CREATININE mg/dL 1.67* 1.87* 2.09* 2.50* 2.71* 2.98* 3.05*  --  2.99*  --    < > 2.31*   < > 1.49*  --   --   --  1.00  --    < >   CALCIUM mg/dL 8.6 8.6 8.5 8.8 8.5 8.3* 8.4  --  8.5  --    < > 8.3*   < > 7.8*  --   --   --  7.7*  --    < >   MAGNESIUM mg/dL 2.6  --   --  2.5  --   --   --   --  2.8*  --   --  2.9*  --   --   --   --   --   --   --   --    PHOSPHORUS mg/dL 2.7  --   --  5.2*  --   --   --   --  8.5*  --   --   --   --   --   --   --   --   --   --   --    ALBUMIN g/dL  --   --   --   --   --   --   --  3.1*  --   --   --   --   --   --   --   --   --   --   --   --    GLUCOSE, ISTAT mg/dl  --   --   --   --   --   --   --   --   --   --   --   --   --   --   --   --  211*  --   --   --     < > = values in this interval not displayed.       Administrative Statements     Portions of the record may  "have been created with voice recognition software. Occasional wrong word or \"sound a like\" substitutions may have occurred due to the inherent limitations of voice recognition software. Read the chart carefully and recognize, using context, where substitutions have occurred.If you have any questions, please contact the dictating provider.  "

## 2024-10-12 NOTE — PROGRESS NOTES
Progress Note - Critical Care/ICU   Name: Reuben García 69 y.o. male I MRN: [id number]  Unit/Bed#: [facility]-313-01 I Date of Admission: 10/8/2024   Date of Service: 10/12/2024 I Hospital Day: 4      Assessment & Plan   Impressions:  MVR s/p MV Repair (10/8/24)  Hx CAD/NSTEMI s/p PCI/ESTHER (2022)  Post-op low cardiac output state   FRANCISCO   Acute respiratory failure with hypoxia and hypercarbia   Toxic metabolic encephalopathy  HFpEF, G2 DDD  HTN  HLD  GERD  Arthritis  Asthma  ETOH abuse     Neuro:     Cardiac Surgery Pain Control: ATC tylenol, PRN oxycodone 2.5/5mg, and Lidocaine Patch  Delirium precautions  Regulate sleep/wake cycle  CAM-ICU daily  Trend neuro exam    CV:   Cardiac Surgery Hemodynamic Monitoring: MAP goal >65 Continue central line due to vasoactive medications  Follow rhythm on telemetry  Critical Care Infusions : Primacor 0.13 mcg/kg/min  Beta Blocker Plan: Continue current lopressor dosage  Metoprolol 50mg bid  Epicardial pacing wire plan : Epicardial wires not in place  Post op cardiac surgery medications:   Plavix 75mg QD  Statin: Lipitor 80 mg qHS  Amiodarone 200 mg PO q8 hours   Afib  Amio gtt 1mg/min  AC plan: dose coumadin 1mg    Lung:   Acute post-op pulmonary insufficiency requiring 4 liters/min via nasal cannula. Secondary to poor inspiratory effort secondary to pain and pulmonary vascular congestion  Chest tubes have been discontinued 10/10    GI:   Continue PPI for stress ulcer prophylaxis  Continue bowel regimen  Trend abdominal exam and bowel function    FEN:   Diuresis Plan: Bumex infusion 2 mg/hr.  Check Q6 BMPs  Nutrition plan: as tolerated  Replenish electrolytes with goals: K >4.0, Mag >2.0, and Phos >3.0    :   Linder Plan: Continue for accurate I/O monitoring for 48 hours  Trend UOP and BUN/creat  Strict I and O     ID:   Trend temps and WBC count  Maintain normothermia    Heme:   Trend hgb and plts    Endo:   SSI    MSK/Skin:  Mobility goal:   PT/OT consult as medically appropriate    Frequent turning and pressure off-loading  Local wound care as needed    Disposition:  Critical care    ICU Core Measures     A: Assess, Prevent, and Manage Pain Has pain been assessed? Yes  Need for changes to pain regimen? No   B: Both SAT/SAT  N/A   C: Choice of Sedation RASS Goal: N/A patient not on sedation  Need for changes to sedation or analgesia regimen? NA   D: Delirium CAM-ICU: Negative   E: Early Mobility  Plan for early mobility? Yes   F: Family Engagement Plan for family engagement today? Yes       Review of Invasive Devices:    Linder Plan: Continue for accurate I/O monitoring for 48 hours  Central access plan: Medications requiring central line      Prophylaxis:  VTE VTE covered by:    None       Stress Ulcer  covered bypantoprazole (PROTONIX) EC tablet 40 mg [[id number]]            24 Hour Events    24 Hour Events/HPI: POD # 4 s/p MV repair and LAAL. Started on milrinone 10/10 for low ScvO2 at 0.25. milrinone gtt decreased to 0.13 overnight, repeat ScvO2 68%. Decreased bumex gtt to 2mg this am.     Critical Care Infusions : Primacor 0.13 mcg/kg/min  Subjective   Review of Systems: See HPI for Review of Systems  Objective :    Medications:  Scheduled Continuous   acetaminophen, 975 mg, Q6H While awake  amiodarone, 200 mg, Q8H PAOLO  ARIPiprazole, 2 mg, Daily  atorvastatin, 80 mg, Daily With Dinner  chlorhexidine, 15 mL, BID  clopidogrel, 75 mg, Daily  insulin lispro, 1-6 Units, TID AC  insulin lispro, 1-6 Units, HS  lidocaine, 2 patch, Daily  metolazone, 10 mg, Daily  metoprolol tartrate, 50 mg, Q12H PAOLO  pantoprazole, 40 mg, Daily  polyethylene glycol, 17 g, Daily  senna-docusate sodium, 2 tablet, BID  venlafaxine, 150 mg, Daily With Breakfast      amiodarone (CORDARONE) 900 mg in dextrose 5 % 500 mL infusion, 1 mg/min, Last Rate: 1 mg/min (10/12/24 0256)  bumetanide (BUMEX) 12.5 mg infusion 50 mL, 4 mg/hr, Last Rate: 4 mg/hr (10/12/24 0626)  milrinone (Primacor) infusion, 0.13 mcg/kg/min, Last Rate:  0.13 mcg/kg/min (10/12/24 0104)         Vitals: Invasive Monitoring       Most Recent Min/Max in 24hrs   Temp 99 °F (37.2 °C) Temp  Min: 97.9 °F (36.6 °C)  Max: 99.1 °F (37.3 °C)   Pulse (!) 108 Pulse  Min: 99  Max: 124   Resp 21 Resp  Min: 14  Max: 31   /93 BP  Min: 109/59  Max: 165/100   O2 Sat 98 % SpO2  Min: 96 %  Max: 100 %   O2 Device: Nasal cannula  Nasal Cannula O2 Flow Rate (L/min): 4 L/min Arterial Line  Lo /137  Arterial Line BP  Min: 112/89  Max: 262/262    mmHg  Arterial Line MAP (mmHg)  Min: 98 mmHg  Max: 262 mmHg      I/O Chest tube output (if present)     Intake/Output Summary (Last 24 hours) at 10/12/2024 0648  Last data filed at 10/12/2024 0600  Gross per 24 hour   Intake 1510.06 ml   Output 4875 ml   Net -3364.94 ml     UOP: 200/hour    BM: 1 in the last 24 hours  Weight change: -3.6 kg (-7 lb 15 oz)                    Physical Exam   Physical Exam  Vitals and nursing note reviewed.   Eyes:      General: No scleral icterus.     Conjunctiva/sclera: Conjunctivae normal.   Skin:     General: Skin is warm.      Capillary Refill: Capillary refill takes less than 2 seconds.   HENT:      Head: Normocephalic and atraumatic.   Neck:      Vascular: Central line present.   Cardiovascular:      Rate and Rhythm: Tachycardia present. Rhythm irregular.   Musculoskeletal:      Right lower leg: No edema.      Left lower leg: No edema.   Abdominal: General: Bowel sounds are normal. There is no distension.      Palpations: Abdomen is soft.      Tenderness: There is no abdominal tenderness. There is no guarding.   Constitutional:       General: He is not in acute distress.     Appearance: He is well-developed and well-nourished. He is ill-appearing. He is not toxic-appearing.   Pulmonary:      Effort: No accessory muscle usage, respiratory distress or accessory muscle usage.      Breath sounds: No wheezing or rhonchi.   Neurological:      Mental Status: He is oriented to person, place and time.  He is somnolent.   Genitourinary/Anorectal:  Linder present.        Diagnostic Studies      No diagnostic data to review       Labs:  CBC  Recent Labs     10/11/24  0413 10/12/24  0442   WBC 11.71* 8.74   HGB 10.3* 11.4*   HCT 31.4* 34.5*    249     BMP  Recent Labs     10/12/24  0012 10/12/24  0442   SODIUM 136 139   K 3.3* 4.1   CL 97 99   CO2 28 28   AGAP 11 12   BUN 54* 53*   CREATININE 2.71* 2.50*   CALCIUM 8.5 8.8        Baseline Creat: 0.9   Coags  Recent Labs     10/11/24  0413 10/12/24  0442   INR 2.20* 2.44*              Additional Electrolytes  Recent Labs     10/11/24  0414 10/12/24  0442   MG 2.8* 2.5   PHOS 8.5* 5.2*          Blood Gas  Recent Labs     10/10/24  1351   PHART 7.265*   SGT0QCT 46.0*   PO2ART 88.3   IUT1QMT 20.4*   BEART -6.4   SOURCE Line, Arterial     Recent Labs     10/10/24  1351 10/10/24  1750 10/12/24  0444   PHVEN  --    < > 7.320   MKE8AVG  --    < > 54.3*   PO2VEN  --    < > 38.4   USJ4PTC  --    < > 27.3   BEVEN  --    < > 0.5   [id number]  --    < > 68.9   SOURCE Line, Arterial  --   --     < > = values in this interval not displayed.    LFTs  Recent Labs     10/11/24  0454   ALT 7   AST 41*   ALKPHOS 57   ALB 3.1*   TBILI 0.31       Infectious  No recent results     No recent results Glucose  Recent Labs     10/11/24  1146 10/11/24  1820 10/12/24  0012 10/12/24  0442   GLUC 139 127 126 129        Collaborative bedside rounds performed with cardiac surgery attending, critical care attending and bedside RN.

## 2024-10-12 NOTE — PROGRESS NOTES
Progress Note - Cardiothoracic Surgery   Reuben García 69 y.o. male MRN: [id number]  Unit/Bed#: [facility]-313-01 Encounter: [id number]      POD # 4 s/p MVR    Pt seen/examined.  Interval history and data reviewed with critical care team.  Pt doing well.  No specific complaints.    Milrinone 0.13          Medications:   Scheduled Meds:  Current Facility-Administered Medications   Medication Dose Route Frequency Provider Last Rate    acetaminophen  650 mg Rectal Q4H PRN Arun Griffin PA-C      acetaminophen  975 mg Oral Q6H While awake Arun Griffin PA-C      albuterol  2 puff Inhalation TID PRN Arun Griffin PA-C      amiodarone (CORDARONE) 900 mg in dextrose 5 % 500 mL infusion  1 mg/min Intravenous Continuous Otilia Morrison PA-C 1 mg/min (10/12/24 0700)    amiodarone  200 mg Oral Q8H [facility] Arun Griffin PA-C      ARIPiprazole  2 mg Oral Daily Arun Griffin PA-C      atorvastatin  80 mg Oral Daily With Dinner Arun Griffin PA-C      bisacodyl  10 mg Rectal Daily PRN Arun Griffin PA-C      bumetanide (BUMEX) 12.5 mg infusion 50 mL  2 mg/hr Intravenous Continuous Nicolette Linder PA-C 2 mg/hr (10/12/24 0740)    chlorhexidine  15 mL Mouth/Throat BID Arun Griffin PA-C      clopidogrel  75 mg Oral Daily Zahida Parish PA-C      insulin lispro  1-6 Units Subcutaneous TID AC Zahida Parish PA-C      insulin lispro  1-6 Units Subcutaneous HS Zahida Parish PA-C      lidocaine  2 patch Topical Daily Otilia Morrison PA-C      metolazone  10 mg Oral Daily Jon Kerr PA-C      metoprolol tartrate  50 mg Oral Q12H [facility] DYLAN Mathews      milrinone (Primacor) infusion  0.13 mcg/kg/min Intravenous Continuous DYLAN Mathews 0.13 mcg/kg/min (10/12/24 0700)    naloxone  0.04 mg Intravenous Q1MIN PRN DYLAN Mondragon      ondansetron  4 mg Intravenous Q6H PRN Arun Griffin PA-C      oxyCODONE  2.5 mg Oral Q4H PRN Arun Griffin PA-C      Or    oxyCODONE  5 mg Oral Q4H PRN  "Arun Griffin PA-C      pantoprazole  40 mg Oral Daily Arun Griffin PA-C      polyethylene glycol  17 g Oral Daily Arun Griffin PA-C      senna-docusate sodium  2 tablet Oral BID Jc Varghese PA-C      triamcinolone   Topical BID PRN Arun Griffin PA-C      venlafaxine  150 mg Oral Daily With Breakfast Jc Varghese PA-C      warfarin  1 mg Oral Once (warfarin) Nicolette Linder PA-C       Continuous Infusions:amiodarone (CORDARONE) 900 mg in dextrose 5 % 500 mL infusion, 1 mg/min, Last Rate: 1 mg/min (10/12/24 0700)  bumetanide (BUMEX) 12.5 mg infusion 50 mL, 2 mg/hr, Last Rate: 2 mg/hr (10/12/24 0740)  milrinone (Primacor) infusion, 0.13 mcg/kg/min, Last Rate: 0.13 mcg/kg/min (10/12/24 0700)      PRN Meds:.  acetaminophen    albuterol    bisacodyl    naloxone    ondansetron    oxyCODONE **OR** oxyCODONE    triamcinolone    Vitals: Blood pressure 151/71, pulse 90, temperature 98.8 °F (37.1 °C), temperature source Bladder, resp. rate 16, height 5' 2\" (1.575 m), weight 72.9 kg (160 lb 11.5 oz), SpO2 98%.,Body mass index is 29.4 kg/m².  I/O last 24 hours:  In: 1562.5 [I.V.:1362.5; IV Piggyback:200]  Out: 4875 [Urine:4875]  Invasive Devices       Central Venous Catheter Line  Duration             CVC Central Lines 10/08/24 4 days              Peripheral Intravenous Line  Duration             Peripheral IV 10/08/24 Right Hand 4 days              Drain  Duration             Urethral Catheter Non-latex;Temperature probe 16 Fr. 4 days                      Lab, Imaging and other studies:   Results from last 7 days   Lab Units 10/12/24  0442 10/11/24  0413 10/10/24  0428   WBC Thousand/uL 8.74 11.71* 16.77*   HEMOGLOBIN g/dL 11.4* 10.3* 11.5*   HEMATOCRIT % 34.5* 31.4* 35.4*   PLATELETS Thousands/uL 249 211 220     Results from last 7 days   Lab Units 10/12/24  0442 10/12/24  0012 10/11/24  1820 10/08/24  1947 10/08/24  1417   POTASSIUM mmol/L 4.1 3.3* 3.6   < >  --    CHLORIDE mmol/L 99 97 101   < >  --    CO2 " mmol/L 28 28 27   < >  --    CO2, I-STAT mmol/L  --   --   --   --  26   BUN mg/dL 53* 54* 56*   < >  --    CREATININE mg/dL 2.50* 2.71* 2.98*   < >  --    GLUCOSE, ISTAT mg/dl  --   --   --   --  211*   CALCIUM mg/dL 8.8 8.5 8.3*   < >  --     < > = values in this interval not displayed.     Results from last 7 days   Lab Units 10/12/24  0442 10/11/24  0413 10/10/24  0428 10/09/24  0411 10/08/24  1415   INR  2.44* 2.20* 1.33*   < > 1.34*   PTT seconds  --   --   --   --  34    < > = values in this interval not displayed.     Recent Labs     10/10/24  1351   PHART 7.265*   [id number] 20.4*   PO2ART 88.3   [id number] 46.0*   BEART -6.4           Plan:    Continue milrinone for now and plan to d/c by tomorrow morning  Diuresis for net negative  CIWA protocol  OOBTC  Incentive spirometry.  Dose coumadin  PO ASA/Statin/B blocker.  ICU        SIGNATURE: Ji Smalls DO  DATE: October 12, 2024  TIME: 10:19 AM

## 2024-10-12 NOTE — ASSESSMENT & PLAN NOTE
FRANCISCO most likely secondary to ischemic injury secondary hemodynamic patient intraoperatively in light of underlying comorbidities  After review of records it appears that the patient has a baseline Creatinine of 0.8-1.0 mg/dL.  Admission creatinine of 1.0 mg/dL on 10/8/2024.  Peak creatinine this for this hospitalization 2.99 mg/dL on 10/11/2024  Creatinine today is at 1.67 mg/dL, stable and improving  check BMP, magnesium, phosphorus every 12  Detailed discussion with patient with regards to renal replacement therapy risks benefits discussed procedure discussed in depth consent obtained placed in chart 10/11/2024 also discussions held with patient's .  No acute indication for dialysis at this time  Monitor closely for dialysis needs  Recommend discontinuation of metolazone and Bumex drip for now  Recommend Bumex 2 mg IV x 1 later today  Reevaluate in a.m. for further diuretic dosage  Recommend Linder discontinuation  Await renal recovery.  Place on a renal diet when allowed diet order.   Strict I/O.  Daily weights  Urinary retention protocol  Avoid nephrotoxins, adjust meds to appropriate GFR.

## 2024-10-12 NOTE — ASSESSMENT & PLAN NOTE
Optimize hemodynamic status to avoid delay in renal recovery.  Maintain MAP > 65mmHg  Avoid BP fluctuations.  Home medications: Norvasc 10 mg p.o. daily, Cardura 2 mg p.o. daily, Toprol-XL 12.5 mg p.o. daily, torsemide 20 mg p.o. twice daily, valsartan 320 mg p.o. daily  Current medications: metolazone 10 mg p.o. daily, Toprol 50 mg p.o. every 12, Bumex drip 2 mg/h,   Hold off on home valsartan for now  No longer on Primacor  Recommend discontinuation Bumex drip and metolazone  Recommend Bumex 2 mg IV x 1 later today

## 2024-10-13 ENCOUNTER — RA CDI HCC (OUTPATIENT)
Dept: OTHER | Facility: HOSPITAL | Age: 69
End: 2024-10-13

## 2024-10-13 LAB
ANION GAP SERPL CALCULATED.3IONS-SCNC: 8 MMOL/L (ref 4–13)
ANION GAP SERPL CALCULATED.3IONS-SCNC: 9 MMOL/L (ref 4–13)
BASE EX.OXY STD BLDV CALC-SCNC: 65.6 % (ref 60–80)
BASE EXCESS BLDV CALC-SCNC: 8.8 MMOL/L
BUN SERPL-MCNC: 49 MG/DL (ref 5–25)
BUN SERPL-MCNC: 51 MG/DL (ref 5–25)
CALCIUM SERPL-MCNC: 8.6 MG/DL (ref 8.4–10.2)
CALCIUM SERPL-MCNC: 8.6 MG/DL (ref 8.4–10.2)
CHLORIDE SERPL-SCNC: 92 MMOL/L (ref 96–108)
CHLORIDE SERPL-SCNC: 94 MMOL/L (ref 96–108)
CO2 SERPL-SCNC: 36 MMOL/L (ref 21–32)
CO2 SERPL-SCNC: 36 MMOL/L (ref 21–32)
CREAT SERPL-MCNC: 1.46 MG/DL (ref 0.6–1.3)
CREAT SERPL-MCNC: 1.67 MG/DL (ref 0.6–1.3)
GFR SERPL CREATININE-BSD FRML MDRD: 41 ML/MIN/1.73SQ M
GFR SERPL CREATININE-BSD FRML MDRD: 49 ML/MIN/1.73SQ M
GLUCOSE SERPL-MCNC: 110 MG/DL (ref 65–140)
GLUCOSE SERPL-MCNC: 117 MG/DL (ref 65–140)
GLUCOSE SERPL-MCNC: 137 MG/DL (ref 65–140)
GLUCOSE SERPL-MCNC: 137 MG/DL (ref 65–140)
GLUCOSE SERPL-MCNC: 145 MG/DL (ref 65–140)
GLUCOSE SERPL-MCNC: 163 MG/DL (ref 65–140)
HCO3 BLDV-SCNC: 35.7 MMOL/L (ref 24–30)
INR PPP: 2.31 (ref 0.85–1.19)
MAGNESIUM SERPL-MCNC: 2.6 MG/DL (ref 1.9–2.7)
NASAL CANNULA: 2
O2 CT BLDV-SCNC: 10.7 ML/DL
PCO2 BLDV: 61.3 MM HG (ref 42–50)
PH BLDV: 7.38 [PH] (ref 7.3–7.4)
PHOSPHATE SERPL-MCNC: 2.7 MG/DL (ref 2.3–4.1)
PO2 BLDV: 35.7 MM HG (ref 35–45)
POTASSIUM SERPL-SCNC: 3.2 MMOL/L (ref 3.5–5.3)
POTASSIUM SERPL-SCNC: 3.3 MMOL/L (ref 3.5–5.3)
PROTHROMBIN TIME: 25.4 SECONDS (ref 12.3–15)
SODIUM SERPL-SCNC: 136 MMOL/L (ref 135–147)
SODIUM SERPL-SCNC: 139 MMOL/L (ref 135–147)

## 2024-10-13 PROCEDURE — 80048 BASIC METABOLIC PNL TOTAL CA: CPT | Performed by: PHYSICIAN ASSISTANT

## 2024-10-13 PROCEDURE — 82805 BLOOD GASES W/O2 SATURATION: CPT | Performed by: NURSE PRACTITIONER

## 2024-10-13 PROCEDURE — 85610 PROTHROMBIN TIME: CPT | Performed by: NURSE PRACTITIONER

## 2024-10-13 PROCEDURE — 82948 REAGENT STRIP/BLOOD GLUCOSE: CPT

## 2024-10-13 PROCEDURE — 83735 ASSAY OF MAGNESIUM: CPT | Performed by: NURSE PRACTITIONER

## 2024-10-13 PROCEDURE — 99222 1ST HOSP IP/OBS MODERATE 55: CPT | Performed by: INTERNAL MEDICINE

## 2024-10-13 PROCEDURE — 99232 SBSQ HOSP IP/OBS MODERATE 35: CPT | Performed by: ANESTHESIOLOGY

## 2024-10-13 PROCEDURE — 99024 POSTOP FOLLOW-UP VISIT: CPT | Performed by: THORACIC SURGERY (CARDIOTHORACIC VASCULAR SURGERY)

## 2024-10-13 PROCEDURE — 84100 ASSAY OF PHOSPHORUS: CPT | Performed by: NURSE PRACTITIONER

## 2024-10-13 PROCEDURE — 99232 SBSQ HOSP IP/OBS MODERATE 35: CPT | Performed by: INTERNAL MEDICINE

## 2024-10-13 PROCEDURE — 80048 BASIC METABOLIC PNL TOTAL CA: CPT

## 2024-10-13 RX ORDER — POTASSIUM CHLORIDE 1500 MG/1
40 TABLET, EXTENDED RELEASE ORAL ONCE
Status: COMPLETED | OUTPATIENT
Start: 2024-10-13 | End: 2024-10-13

## 2024-10-13 RX ORDER — POTASSIUM CHLORIDE 29.8 MG/ML
40 INJECTION INTRAVENOUS ONCE
Status: COMPLETED | OUTPATIENT
Start: 2024-10-13 | End: 2024-10-13

## 2024-10-13 RX ORDER — MAGNESIUM SULFATE HEPTAHYDRATE 40 MG/ML
2 INJECTION, SOLUTION INTRAVENOUS ONCE
Status: COMPLETED | OUTPATIENT
Start: 2024-10-13 | End: 2024-10-13

## 2024-10-13 RX ORDER — POTASSIUM CHLORIDE 1500 MG/1
20 TABLET, EXTENDED RELEASE ORAL 2 TIMES DAILY
Status: DISCONTINUED | OUTPATIENT
Start: 2024-10-13 | End: 2024-10-16

## 2024-10-13 RX ORDER — TEMAZEPAM 15 MG/1
15 CAPSULE ORAL
Status: DISCONTINUED | OUTPATIENT
Start: 2024-10-13 | End: 2024-10-16 | Stop reason: HOSPADM

## 2024-10-13 RX ORDER — WARFARIN SODIUM 2 MG/1
2 TABLET ORAL
Status: COMPLETED | OUTPATIENT
Start: 2024-10-13 | End: 2024-10-13

## 2024-10-13 RX ORDER — BUMETANIDE 0.25 MG/ML
2 INJECTION, SOLUTION INTRAMUSCULAR; INTRAVENOUS 2 TIMES DAILY
Status: DISCONTINUED | OUTPATIENT
Start: 2024-10-13 | End: 2024-10-14

## 2024-10-13 RX ORDER — DOCUSATE SODIUM 100 MG/1
100 CAPSULE, LIQUID FILLED ORAL 2 TIMES DAILY
Status: DISCONTINUED | OUTPATIENT
Start: 2024-10-13 | End: 2024-10-14

## 2024-10-13 RX ADMIN — CHLORHEXIDINE GLUCONATE 0.12% ORAL RINSE 15 ML: 1.2 LIQUID ORAL at 08:20

## 2024-10-13 RX ADMIN — ACETAMINOPHEN 975 MG: 325 TABLET, FILM COATED ORAL at 14:33

## 2024-10-13 RX ADMIN — POLYETHYLENE GLYCOL 3350 17 G: 17 POWDER, FOR SOLUTION ORAL at 08:20

## 2024-10-13 RX ADMIN — Medication 2 MG/HR: at 04:55

## 2024-10-13 RX ADMIN — MAGNESIUM SULFATE HEPTAHYDRATE 2 G: 40 INJECTION, SOLUTION INTRAVENOUS at 08:20

## 2024-10-13 RX ADMIN — POTASSIUM CHLORIDE 40 MEQ: 29.8 INJECTION, SOLUTION INTRAVENOUS at 16:47

## 2024-10-13 RX ADMIN — AMIODARONE HYDROCHLORIDE 0.5 MG/MIN: 50 INJECTION, SOLUTION INTRAVENOUS at 12:23

## 2024-10-13 RX ADMIN — INSULIN LISPRO 1 UNITS: 100 INJECTION, SOLUTION INTRAVENOUS; SUBCUTANEOUS at 21:45

## 2024-10-13 RX ADMIN — SENNOSIDES AND DOCUSATE SODIUM 2 TABLET: 50; 8.6 TABLET ORAL at 08:20

## 2024-10-13 RX ADMIN — POTASSIUM CHLORIDE 40 MEQ: 29.8 INJECTION, SOLUTION INTRAVENOUS at 06:12

## 2024-10-13 RX ADMIN — PANTOPRAZOLE SODIUM 40 MG: 40 TABLET, DELAYED RELEASE ORAL at 05:53

## 2024-10-13 RX ADMIN — ARIPIPRAZOLE 2 MG: 2 TABLET ORAL at 08:20

## 2024-10-13 RX ADMIN — VENLAFAXINE HYDROCHLORIDE 150 MG: 150 CAPSULE, EXTENDED RELEASE ORAL at 08:20

## 2024-10-13 RX ADMIN — AMIODARONE HYDROCHLORIDE 200 MG: 200 TABLET ORAL at 14:34

## 2024-10-13 RX ADMIN — POTASSIUM CHLORIDE 20 MEQ: 1500 TABLET, EXTENDED RELEASE ORAL at 18:15

## 2024-10-13 RX ADMIN — METOPROLOL TARTRATE 50 MG: 50 TABLET, FILM COATED ORAL at 08:20

## 2024-10-13 RX ADMIN — CHLORHEXIDINE GLUCONATE 0.12% ORAL RINSE 15 ML: 1.2 LIQUID ORAL at 18:15

## 2024-10-13 RX ADMIN — DOCUSATE SODIUM 100 MG: 100 CAPSULE, LIQUID FILLED ORAL at 08:20

## 2024-10-13 RX ADMIN — WARFARIN SODIUM 2 MG: 2 TABLET ORAL at 18:15

## 2024-10-13 RX ADMIN — METOPROLOL TARTRATE 50 MG: 50 TABLET, FILM COATED ORAL at 21:45

## 2024-10-13 RX ADMIN — POTASSIUM CHLORIDE 40 MEQ: 1500 TABLET, EXTENDED RELEASE ORAL at 16:47

## 2024-10-13 RX ADMIN — AMIODARONE HYDROCHLORIDE 200 MG: 200 TABLET ORAL at 21:45

## 2024-10-13 RX ADMIN — CLOPIDOGREL BISULFATE 75 MG: 75 TABLET ORAL at 08:20

## 2024-10-13 RX ADMIN — LIDOCAINE 2 PATCH: 700 PATCH TOPICAL at 08:20

## 2024-10-13 RX ADMIN — POTASSIUM CHLORIDE 40 MEQ: 1500 TABLET, EXTENDED RELEASE ORAL at 06:12

## 2024-10-13 RX ADMIN — ACETAMINOPHEN 975 MG: 325 TABLET, FILM COATED ORAL at 08:20

## 2024-10-13 RX ADMIN — AMIODARONE HYDROCHLORIDE 200 MG: 200 TABLET ORAL at 05:53

## 2024-10-13 RX ADMIN — ATORVASTATIN CALCIUM 80 MG: 80 TABLET, FILM COATED ORAL at 16:47

## 2024-10-13 RX ADMIN — BUMETANIDE 2 MG: 0.25 INJECTION INTRAMUSCULAR; INTRAVENOUS at 18:15

## 2024-10-13 NOTE — PROGRESS NOTES
HCC coding opportunities          Chart Reviewed number of suggestions sent to Provider: 1     Patients Insurance   I11.0  Medicare Insurance: [employer] Medicare Complete

## 2024-10-13 NOTE — PROGRESS NOTES
Progress Note - Cardiothoracic Surgery   Reuben García 69 y.o. male MRN: [id number]  Unit/Bed#: [facility]-313-01 Encounter: [id number]      POD # 5 s/p MVR    Pt seen/examined.  Interval history and data reviewed with critical care team.  Pt doing well.  No specific complaints.    Milrinone off          Medications:   Scheduled Meds:  Current Facility-Administered Medications   Medication Dose Route Frequency Provider Last Rate    acetaminophen  650 mg Rectal Q4H PRN Nicolette Linder PA-C      acetaminophen  975 mg Oral Q6H While awake Nicolette Linder PA-C      albuterol  2 puff Inhalation TID PRN Nicolette Linder PA-C      amiodarone (CORDARONE) 900 mg in dextrose 5 % 500 mL infusion  0.5 mg/min Intravenous Continuous Nicolette Linder PA-C 0.5 mg/min (10/13/24 0800)    amiodarone  200 mg Oral Q8H PAOLO Nicolette Linder PA-C      ARIPiprazole  2 mg Oral Daily Nicolette Lidner PA-C      atorvastatin  80 mg Oral Daily With Dinner Nicolette Linder PA-C      bisacodyl  10 mg Rectal Daily PRN Nicolette Linder PA-C      bumetanide  2 mg Intravenous BID Nicolette Linder PA-C      chlorhexidine  15 mL Mouth/Throat BID Nicolette Linder PA-C      clopidogrel  75 mg Oral Daily Nicolette Linder PA-C      docusate sodium  100 mg Oral BID Nicolette Linder PA-C      insulin lispro  1-6 Units Subcutaneous TID AC Nicolette Linder PA-C      insulin lispro  1-6 Units Subcutaneous HS Nicolette Linder PA-C      lidocaine  2 patch Topical Daily Nicolette Linder PA-C      metoprolol tartrate  50 mg Oral Q12H PAOLO Nicolette Linder PA-C      naloxone  0.04 mg Intravenous Q1MIN PRN Nicolette Linder PA-C      ondansetron  4 mg Intravenous Q6H PRN Nicolette Linder PA-C      oxyCODONE  2.5 mg Oral Q4H PRN Nicolette Linder PA-C      Or    oxyCODONE  5 mg Oral Q4H PRN Nicolette Linder PA-C      pantoprazole  40 mg Oral Daily Nicolette Linder PA-C      polyethylene glycol  17 g Oral Daily Nicolette Linder PA-C      potassium chloride  20 mEq Oral BID  "Nicolette Linder PA-C      senna-docusate sodium  2 tablet Oral BID Nicolette Linder PA-C      temazepam  15 mg Oral HS PRN Nicolette Linder PA-C      triamcinolone   Topical BID PRN Nicolette Linder PA-C      venlafaxine  150 mg Oral Daily With Breakfast Nicolette Linder PA-C      warfarin  2 mg Oral Once (warfarin) Nicolette Linder PA-C       Continuous Infusions:amiodarone (CORDARONE) 900 mg in dextrose 5 % 500 mL infusion, 0.5 mg/min, Last Rate: 0.5 mg/min (10/13/24 0800)      PRN Meds:.  acetaminophen    albuterol    bisacodyl    naloxone    ondansetron    oxyCODONE **OR** oxyCODONE    temazepam    triamcinolone    Vitals: Blood pressure 122/60, pulse 78, temperature 98.8 °F (37.1 °C), resp. rate 15, height 5' 2\" (1.575 m), weight 69.8 kg (153 lb 14.1 oz), SpO2 100%.,Body mass index is 28.15 kg/m².  I/O last 24 hours:  In: 2447.3 [P.O.:1006; I.V.:1041.3; IV Piggyback:400]  Out: 5900 [Urine:5900]  Invasive Devices       Central Venous Catheter Line  Duration             CVC Central Lines 10/08/24 5 days              Drain  Duration             Urethral Catheter Non-latex;Temperature probe 16 Fr. 5 days                      Lab, Imaging and other studies:   Results from last 7 days   Lab Units 10/12/24  0442 10/11/24  0413 10/10/24  0428   WBC Thousand/uL 8.74 11.71* 16.77*   HEMOGLOBIN g/dL 11.4* 10.3* 11.5*   HEMATOCRIT % 34.5* 31.4* 35.4*   PLATELETS Thousands/uL 249 211 220     Results from last 7 days   Lab Units 10/13/24  0348 10/12/24  2159 10/12/24  1545 10/08/24  1947 10/08/24  1417   POTASSIUM mmol/L 3.3* 3.6 3.1*   < >  --    CHLORIDE mmol/L 94* 94* 96   < >  --    CO2 mmol/L 36* 33* 32   < >  --    CO2, I-STAT mmol/L  --   --   --   --  26   BUN mg/dL 49* 52* 52*   < >  --    CREATININE mg/dL 1.67* 1.87* 2.09*   < >  --    GLUCOSE, ISTAT mg/dl  --   --   --   --  211*   CALCIUM mg/dL 8.6 8.6 8.5   < >  --     < > = values in this interval not displayed.     Results from last 7 days   Lab Units " 10/13/24  0348 10/12/24  0442 10/11/24  0413 10/09/24  0411 10/08/24  1415   INR  2.31* 2.44* 2.20*   < > 1.34*   PTT seconds  --   --   --   --  34    < > = values in this interval not displayed.     Recent Labs     10/10/24  1351   PHART 7.265*   [id number] 20.4*   PO2ART 88.3   [id number] 46.0*   BEART -6.4           Plan:    Diuresis for net negative  CIWA protocol  Ambulate  Incentive spirometry.  Dose coumadin  PO ASA/Statin/B blocker.  Transfer to tele        SIGNATURE: Ji Smalls DO  DATE: October 13, 2024  TIME: 10:31 AM

## 2024-10-13 NOTE — CONSULTS
Consultation - Cardiology Team 2  Reuben García 69 y.o. male MRN: [id number]  [facility]: [id number]      Inpatient consult to Cardiology  Consult performed by: Charley Luois MD  Consult ordered by: Nicolette Linder PA-C      PCP: Rich Delgado DO   Outpatient Cardiologist: Previously following with Luann Feng MD  History of Present Illness   Physician Requesting Consult: Manolo Sanford DO  Reason for Consult / Principal Problem: s/ MV repair, post-operative medical management    Assessment and Plan      Assessment:  MV regurgitation s/p MV repair (10/2024)  History of CAD/NSTEMI s/p PCI-DESx2 to mRCA and pRCA in 2022  Ischemic cardiomyopathy, HFpEF  FRANCISCO on CKD stage 2  Hypertension  Hyperlipidemia  GERD  Asthma  Alcohol use history    Plan:  MV regurgitation s/p MV repair (10/2024)  -Has had a history of severe mitral regurgitation as noted on prior echo from August 2024, with a follow-up echo from September 2024 showing flail anterior leaflet (A1 -lateral segment) with a ruptured cord and severe regurgitation with a posteriorly directed wall impinging jet.  LVEF at that time was 55%, severely dilated LA.  -Now status post 36 mm Francesco (ring annuloplasty) and LAAL  -On metoprolol 50 mg BID  -Ongoing diuresis as mentioned below under ischemic cardiomyopathy  -On anticoagulation with warfarin, INR 2.31 today    History of CAD/NSTEMI s/p PCI-DESx2 to mRCA and pRCA in 2022  -On Plavix from the standpoint  -On atorvastatin 80 mg daily    Ischemic cardiomyopathy, HFpEF  -Has had a history of severe mitral regurgitation as noted on prior echo from August 2024, with a follow-up echo from September 2024 showing flail anterior leaflet (A1 -lateral segment) with a ruptured cord and severe regurgitation with a posteriorly directed wall impinging jet.  LVEF at that time was 55%, severely dilated LA.  -Volume status: Currently on Bumex 2 mg IV twice daily with first dose planned for 6 PM today since  the drip was stopped earlier this morning around 5am.  Agree with ongoing diuresis plan and nephrology recommendations  -GDMT: Metoprolol tartrate 50 mgQ12H, ARB (home valsartan on hold due to FRANCISCO/ATN),     Atrial fibrillation  -Likely new onset atrial fibrillation noticed postoperatively in the setting of mitral valve repair and a severely dilated left atrium  -Currently on amiodarone drip with oral loading  -Appears to be rate controlled atrial fibrillation  -On anticoagulation as mentioned above    FRANCISCO on CKD stage   -Baseline serum creatinine from prior chart records appears to be around 0.8-1.0 with creatinine today at 1.67 which appears to be trending down over the course of last 3 days likely indicative of renal recovery with ongoing diuresis  -Agree with nephrology recommendations and diuresis plan as mentioned above    Hypertension  -Antihypertensives on hold since systolics in ranges of 100-120s    Hyperlipidemia  -On atorvastatin 80 mg daily     GERD  Asthma  Alcohol use history    Case discussed and reviewed with Dr. Ruiz. Please wait for final recommendations from Dr. Ruiz.  Thank you for involving us in the care of your patient.      Subjective     HPI:   Patient is a 69-year-old male with history of ischemic cardiomyopathy with preserved EF, CAD/NSTEMI from 2022 with PCI-ESTHER x 2 to mid RCA and proximal RCA at the time, history of severe mitral regurgitation with flail anterior leaflet even lateral segment, hypertension, hyperlipidemia, GERD, asthma, alcohol use history.  He was electively admitted for surgical repair of severe mitral regurgitation.  Patient previously used to follow with  after his NSTEMI in 2022 but has not been seen by cardiology more recently.  For several weeks now, patient has been having shortness of breath and dyspnea on exertion with no relief despite taking his inhalers.  An echo performed in August and September demonstrated severe MR with severely dilated left  atrium.  He was subsequently referred to cardiac surgery for further management.  Patient had a moderate mitral regurgitation at the time of his NSTEMI and it was felt that he may have had progression in this over the last 2 years.  He subsequently underwent surgical repair and is now status post 36 mm Francesco (ring annuloplasty) and LAAL.  During the course of hospitalization, patient also developed acute kidney injury/ATN.  Was in atrial fibrillation and on amiodarone with eventual rate controlled atrial fibrillation.  He was initially on Levophed which was eventually weaned off.  Nephrology was consulted subsequently for concerns for acute renal failure.  He was placed on Bumex drip and metolazone and yesterday had some improvement in his serum creatinine and eventually switched to Bumex scheduled dosing and off metolazone at this time.  Cardiology consulted for postoperative medical management in this patient.    Review of Systems  CONSTITUTIONAL: Denies any fever, chills, rigors, and weight loss  HEENT: No earache or tinnitus, denies hearing loss or visual disturbances  CARDIOVASCULAR: As noted in HPI  RESPIRATORY: Denies any cough, hemoptysis  GASTROINTESTINAL: Denies any diarrhea or constipation  GENITOURINARY: No problems with urination, denies any hematuria or dysuria  NEUROLOGIC: No dizziness or vertigo  MUSCULOSKELETAL: Denies any muscle or joint pain   SKIN: Denies skin rashes or itching   ENDOCRINE: Denies excessive thirst, denies intolerance to heat or cold      Objective     Physical Exam  Vitals reviewed.   Constitutional:       Comments: Seen lying in chair, on nasal cannula.  Urinary catheter noted.   HENT:      Head: Normocephalic.   Cardiovascular:      Rate and Rhythm: Rhythm irregular.      Pulses: Normal pulses.      Heart sounds: Normal heart sounds.   Pulmonary:      Effort: Pulmonary effort is normal.      Comments: Diminished breath sounds in lower lung fields  Abdominal:      General: Bowel  sounds are normal.      Palpations: Abdomen is soft.   Musculoskeletal:      Comments: Trace edema in right upper extremity.   Skin:     General: Skin is warm and dry.      Capillary Refill: Capillary refill takes less than 2 seconds.   Neurological:      Mental Status: He is alert and oriented to person, place, and time.      Comments: Able to move all extremities spontaneously.         Vitals:  Temp:  [98.4 °F (36.9 °C)-99.1 °F (37.3 °C)] 98.6 °F (37 °C)  HR:  [] 80  BP: (109-167)/(60-89) 109/61  Resp:  [14-19] 15  SpO2:  [97 %-100 %] 98 %  O2 Device: Nasal cannula  Nasal Cannula O2 Flow Rate (L/min):  [2 L/min-6 L/min] 2 L/min  Orthostatic Blood Pressures      Flowsheet Row Most Recent Value   Blood Pressure 109/61 filed at 10/13/2024 0600   Patient Position - Orthostatic VS Lying filed at 10/13/2024 0400            Intake and Outputs:  I/O         10/11 0701  10/12 0700 10/12 0701  10/13 0700 10/13 0701  10/14 0700    P.O.  886     I.V. (mL/kg) 1362.5 (18.7) 986.1 (14.1)     IV Piggyback 200 250     Total Intake(mL/kg) 1562.5 (21.4) 2122.1 (30.4)     Urine (mL/kg/hr) 4875 (2.8) 5400 (3.2)     Stool 0      Chest Tube       Total Output 4875 5400     Net -3312.5 -3277.9            Unmeasured Stool Occurrence 1 x              Labs & Results:          Results from last 7 days   Lab Units 10/13/24  0348 10/12/24  2159 10/12/24  1545   POTASSIUM mmol/L 3.3* 3.6 3.1*   CO2 mmol/L 36* 33* 32   CHLORIDE mmol/L 94* 94* 96   BUN mg/dL 49* 52* 52*   CREATININE mg/dL 1.67* 1.87* 2.09*   EGFR ml/min/1.73sq m 41 36 31     Results from last 7 days   Lab Units 10/11/24  0454   AST U/L 41*   ALT U/L 7   ALK PHOS U/L 57   TOTAL PROTEIN g/dL 5.4*   ALBUMIN g/dL 3.1*   TOTAL BILIRUBIN mg/dL 0.31     Results from last 7 days   Lab Units 10/13/24  0348 10/12/24  0442 10/11/24  0413   PROTIME seconds 25.4* 26.4* 24.4*   INR  2.31* 2.44* 2.20*     Results from last 7 days   Lab Units 10/12/24  0442 10/11/24  0413 10/10/24  0428    HEMOGLOBIN g/dL 11.4* 10.3* 11.5*   HEMATOCRIT % 34.5* 31.4* 35.4*   PLATELETS Thousands/uL 249 211 220           Cardiac Imaging/Monitoring     Telemetry:   Personally reviewed by Charley Louis MD: Appears to be rate controlled atrial fibrillation    Previous Cath/PCI:  No results found for this or any previous visit.    Prior Echo:   Results for orders placed during the hospital encounter of 08/07/24    Echo complete w/ contrast if indicated    Interpretation Summary    Left Ventricle: Left ventricular cavity size is normal. Wall thickness is mildly increased. There is mild concentric hypertrophy. The left ventricular ejection fraction is 69%. Systolic function is normal. Wall motion is normal. Diastolic function is moderately abnormal, consistent with grade II (pseudonormal) relaxation.    Right Ventricle: Right ventricular cavity size is mildly dilated.    Left Atrium: The atrium is severely dilated.    Right Atrium: The atrium is moderately dilated.    Mitral Valve: There is moderate thickening. There is moderate annular calcification. There is severe regurgitation.    Tricuspid Valve: There is mild regurgitation.      Results for orders placed during the hospital encounter of 08/23/22    Echo complete w/ contrast if indicated    Interpretation Summary    Left Ventricle: Left ventricular cavity size is normal. Wall thickness is moderately increased. There is moderate concentric hypertrophy. Systolic function is normal (65%). Wall motion is normal. Diastolic function is normal.    Right Ventricle: Right ventricular cavity size is dilated. Systolic function is normal.    Left Atrium: The atrium is moderately dilated.    Aortic Valve: There is mild regurgitation.    Mitral Valve: There is mild to moderate regurgitation.    Tricuspid Valve: There is mild regurgitation. The right ventricular systolic pressure is normal.    Pulmonic Valve: There is trace regurgitation.      Prior Stress Test:  Results for orders  placed during the hospital encounter of 08/23/22    NM myocardial perfusion spect (rx stress and/or rest)    Interpretation Summary    Stress ECG: No ST deviation is noted. The ECG was not diagnostic due to pharmacological (vasodilator) stress.    Stress Function: Left ventricular function post-stress is normal. Post-stress ejection fraction is 66 %.    Stress Combined Conclusion: The ECG and SPECT imaging portions of the stress study are discordant but are nonetheless concerning for inducible myocardial ischemia given the known limited sensitivity of stress electrocardiography.    Perfusion: There is a left ventricular perfusion defect that is large in size with moderate reduction in uptake present in the entire inferior wall, basal-mid inferoseptal wall, basal-mid inferolateral wall that is partially reversible.    Recent Device Check:  No results found for any visits on 10/08/24.     EKG:  Encounter Date: 10/08/24   ECG 12 lead   Result Value    Ventricular Rate 95    Atrial Rate 84    ME Interval     QRSD Interval 106    QT Interval 324    QTC Interval 407    P Axis     QRS Axis 79    T Wave Axis 52    Narrative    Atrial fibrillation  Abnormal ECG  When compared with ECG of 10-OCT-2024 01:57, (unconfirmed)  No significant change was found  Confirmed by Marbella Ruiz ([id number]) on 10/10/2024 7:44:25 AM       Charley Louis MD  Cardiovascular Disease Fellow, FY-1  [facility], [place]

## 2024-10-13 NOTE — PLAN OF CARE
Problem: SAFETY ADULT  Goal: Patient will remain free of falls  Description: INTERVENTIONS:  - Educate patient/family on patient safety including physical limitations  - Instruct patient to call for assistance with activity   - Consult OT/PT to assist with strengthening/mobility   - Keep Call bell within reach  - Keep bed low and locked with side rails adjusted as appropriate  - Keep care items and personal belongings within reach  - Initiate and maintain comfort rounds  - Make Fall Risk Sign visible to staff  - Offer Toileting every 2 Hours, in advance of need  - Initiate/Maintain alarm  - Obtain necessary fall risk management equipment:   - Apply yellow socks and bracelet for high fall risk patients  - Consider moving patient to room near nurses station  Outcome: Progressing     Problem: SAFETY ADULT  Goal: Maintain or return to baseline ADL function  Description: INTERVENTIONS:  -  Assess patient's ability to carry out ADLs; assess patient's baseline for ADL function and identify physical deficits which impact ability to perform ADLs (bathing, care of mouth/teeth, toileting, grooming, dressing, etc.)  - Assess/evaluate cause of self-care deficits   - Assess range of motion  - Assess patient's mobility; develop plan if impaired  - Assess patient's need for assistive devices and provide as appropriate  - Encourage maximum independence but intervene and supervise when necessary  - Involve family in performance of ADLs  - Assess for home care needs following discharge   - Consider OT consult to assist with ADL evaluation and planning for discharge  - Provide patient education as appropriate  Outcome: Progressing     Problem: SAFETY ADULT  Goal: Maintains/Returns to pre admission functional level  Description: INTERVENTIONS:  - Perform AM-PAC 6 Click Basic Mobility/ Daily Activity assessment daily.  - Set and communicate daily mobility goal to care team and patient/family/caregiver.   - Collaborate with  rehabilitation services on mobility goals if consulted  - Perform Range of Motion 4 times a day.  - Reposition patient every 2 hours.  - Dangle patient 4 times a day  - Stand patient 4 times a day  - Ambulate patient 4 times a day  - Out of bed to chair 3 times a day   - Out of bed for meals 3 times a day  - Out of bed for toileting  - Record patient progress and toleration of activity level   Outcome: Progressing

## 2024-10-13 NOTE — PROGRESS NOTES
Progress Note - Critical Care/ICU   Name: Reuben García 69 y.o. male I MRN: [id number]  Unit/Bed#: PPHP-313-01 I Date of Admission: 10/8/2024   Date of Service: 10/13/2024 I Hospital Day: 5      Assessment & Plan   Impressions:  MVR s/p MV Repair (10/8/24)  Hx CAD/NSTEMI s/p PCI/ESTHER (2022)  FRANCISCO   Toxic metabolic encephalopathy  HFpEF, G2 DDD  HTN  HLD  GERD  Arthritis  Asthma  ETOH abuse     Neuro:     Cardiac Surgery Pain Control: ATC tylenol, PRN oxycodone 2.5/5mg, and Lidocaine Patch  Delirium precautions  Regulate sleep/wake cycle  CAM-ICU daily  Trend neuro exam    CV:   Cardiac Surgery Hemodynamic Monitoring: MAP goal >65 Continue central line due to vasoactive medications  Follow rhythm on telemetry  Critical Care Infusions : No cardiac infusions  Beta Blocker Plan: Continue current lopressor dosage  Metoprolol 50mg BID  Epicardial pacing wire plan : Epicardial wires not in place  Post op cardiac surgery medications:   Plavix 75 mg QD  Statin: Lipitor 80 mg qHS  Amiodarone 200 mg PO q8 hours   Amio gtt at 1, decrease to 0.5  AC plan: dose coumadin 2 mg  INR 2.31    Lung:   Acute post-op pulmonary insufficiency requiring 2 liters/min via nasal cannula. Secondary to poor inspiratory effort secondary to pain and pulmonary vascular congestion  Chest tubes have been discontinued, removed on 10/10    GI:   Continue PPI for stress ulcer prophylaxis  Continue bowel regimen  Trend abdominal exam and bowel function    FEN:   Diuresis Plan: Bumex 2 mg IV bid  Off bumex gtt this AM, held AM dose metolazone  Nephrology following  Nutrition plan: as tolerated  Replenish electrolytes with goals: K >4.0, Mag >2.0, and Phos >3.0    :   Linder Plan: Continue for accurate I/O monitoring for 48 hours  Trend UOP and BUN/creat  Strict I and O     ID:   Trend temps and WBC count  Maintain normothermia    Heme:   Trend hgb and plts    Endo:   SSI    MSK/Skin:  Mobility goal:   PT/OT consult as medically appropriate   Frequent  turning and pressure off-loading  Local wound care as needed    Disposition:  Med Surg with Telemetry    ICU Core Measures     A: Assess, Prevent, and Manage Pain Has pain been assessed? Yes  Need for changes to pain regimen? No   B: Both SAT/SAT  N/A   C: Choice of Sedation RASS Goal: N/A patient not on sedation  Need for changes to sedation or analgesia regimen? NA   D: Delirium CAM-ICU: Negative   E: Early Mobility  Plan for early mobility? Yes   F: Family Engagement Plan for family engagement today? Yes       Review of Invasive Devices:    Linder Plan: Continue for accurate I/O monitoring for 48 hours  Central access plan: Medications requiring central line      Prophylaxis:  VTE VTE covered by:  warfarin, Oral        Stress Ulcer  covered bypantoprazole (PROTONIX) EC tablet 40 mg [[id number]]            24 Hour Events    24 Hour Events/HPI: POD # 5 s/p MV repair, LAAL. Milrinone turned off at 5p on 10/12, bumex gtt discontinued this morning. ScvO2 65% this morning.     Critical Care Infusions : No cardiac infusions  Subjective   Review of Systems: See HPI for Review of Systems  Objective :    Medications:  Scheduled Continuous   acetaminophen, 975 mg, Q6H While awake  amiodarone, 200 mg, Q8H PAOLO  ARIPiprazole, 2 mg, Daily  atorvastatin, 80 mg, Daily With Dinner  bumetanide, 2 mg, BID  chlorhexidine, 15 mL, BID  clopidogrel, 75 mg, Daily  insulin lispro, 1-6 Units, TID AC  insulin lispro, 1-6 Units, HS  lidocaine, 2 patch, Daily  metoprolol tartrate, 50 mg, Q12H PAOLO  pantoprazole, 40 mg, Daily  polyethylene glycol, 17 g, Daily  potassium chloride, 20 mEq, BID  potassium chloride, 40 mEq, Once  senna-docusate sodium, 2 tablet, BID  venlafaxine, 150 mg, Daily With Breakfast  warfarin, 2 mg, Once (warfarin)      amiodarone (CORDARONE) 900 mg in dextrose 5 % 500 mL infusion, 0.5 mg/min, Last Rate: 0.5 mg/min (10/13/24 0709)         Vitals: Invasive Monitoring       Most Recent Min/Max in 24hrs   Temp 98.6 °F (37 °C)  Temp  Min: 98.4 °F (36.9 °C)  Max: 99.1 °F (37.3 °C)   Pulse 80 Pulse  Min: 76  Max: 109   Resp 15 Resp  Min: 14  Max: 19   /61 BP  Min: 109/61  Max: 167/73   O2 Sat 98 % SpO2  Min: 97 %  Max: 100 %   O2 Device: Nasal cannula  Nasal Cannula O2 Flow Rate (L/min): 2 L/min    I/O Chest tube output (if present)     Intake/Output Summary (Last 24 hours) at 10/13/2024 0718  Last data filed at 10/13/2024 0600  Gross per 24 hour   Intake 2122.09 ml   Output 5400 ml   Net -3277.91 ml     UOP: 250/hour    BM: 1 in the last 24 hours  Last BM Date: 10/12/24  Weight change: -3.1 kg (-6 lb 13.4 oz)          Physical Exam   Physical Exam  Vitals and nursing note reviewed.   Eyes:      General: No scleral icterus.     Extraocular Movements: Extraocular movements intact.      Conjunctiva/sclera: Conjunctivae normal.   Skin:     General: Skin is warm.      Capillary Refill: Capillary refill takes less than 2 seconds.   HENT:      Head: Normocephalic and atraumatic.   Neck:      Vascular: Central line present.   Cardiovascular:      Rate and Rhythm: Normal rate. Rhythm irregular.      Pulses: Normal pulses.      Heart sounds: Normal heart sounds.   Musculoskeletal:      Right lower leg: No edema.      Left lower leg: No edema.   Abdominal: General: There is no distension.      Palpations: Abdomen is soft.      Tenderness: There is no abdominal tenderness. There is no guarding.   Constitutional:       General: He is not in acute distress.     Appearance: He is well-developed and well-nourished. He is not ill-appearing or toxic-appearing.   Pulmonary:      Effort: Pulmonary effort is normal. No accessory muscle usage, respiratory distress or accessory muscle usage.      Breath sounds: No wheezing or rhonchi.   Neurological:      Mental Status: He is oriented to person, place and time. He is somnolent.      Cranial Nerves: No facial asymmetry.      Sensory: No sensory deficit.   Genitourinary/Anorectal:  Linder present.         Diagnostic Studies             Labs:  CBC  Recent Labs     10/12/24  0442   WBC 8.74   HGB 11.4*   HCT 34.5*        BMP  Recent Labs     10/12/24  2159 10/13/24  0348   SODIUM 137 139   K 3.6 3.3*   CL 94* 94*   CO2 33* 36*   AGAP 10 9   BUN 52* 49*   CREATININE 1.87* 1.67*   CALCIUM 8.6 8.6        Baseline Creat: 0.9   Coags  Recent Labs     10/12/24  0442 10/13/24  0348   INR 2.44* 2.31*              Additional Electrolytes  Recent Labs     10/12/24  0442 10/13/24  0348   MG 2.5 2.6   PHOS 5.2* 2.7          Blood Gas  No recent results  Recent Labs     10/13/24  0348   PHVEN 7.383   BOM9ZTC 61.3*   PO2VEN 35.7   VNT7TTU 35.7*   BEVEN 8.8   G8AYYOR 65.6    LFTs  No recent results    Infectious  No recent results     No recent results Glucose  Recent Labs     10/12/24  0442 10/12/24  1545 10/12/24  2159 10/13/24  0348   GLUC 129 117 113 137        Collaborative bedside rounds performed with cardiac surgery attending, critical care attending and bedside RN.

## 2024-10-14 PROBLEM — Z79.01 ANTICOAGULATED ON COUMADIN: Status: ACTIVE | Noted: 2024-10-14

## 2024-10-14 PROBLEM — E87.3 METABOLIC ALKALOSIS: Status: ACTIVE | Noted: 2024-10-14

## 2024-10-14 PROBLEM — I97.89 POSTOPERATIVE ATRIAL FIBRILLATION (HCC): Status: ACTIVE | Noted: 2024-10-14

## 2024-10-14 PROBLEM — I48.91 POSTOPERATIVE ATRIAL FIBRILLATION (HCC): Status: ACTIVE | Noted: 2024-10-14

## 2024-10-14 LAB
ANION GAP SERPL CALCULATED.3IONS-SCNC: 10 MMOL/L (ref 4–13)
BUN SERPL-MCNC: 44 MG/DL (ref 5–25)
CALCIUM SERPL-MCNC: 8.4 MG/DL (ref 8.4–10.2)
CHLORIDE SERPL-SCNC: 92 MMOL/L (ref 96–108)
CO2 SERPL-SCNC: 35 MMOL/L (ref 21–32)
CREAT SERPL-MCNC: 1.24 MG/DL (ref 0.6–1.3)
ERYTHROCYTE [DISTWIDTH] IN BLOOD BY AUTOMATED COUNT: 15.6 % (ref 11.6–15.1)
GFR SERPL CREATININE-BSD FRML MDRD: 59 ML/MIN/1.73SQ M
GLUCOSE SERPL-MCNC: 106 MG/DL (ref 65–140)
GLUCOSE SERPL-MCNC: 117 MG/DL (ref 65–140)
GLUCOSE SERPL-MCNC: 117 MG/DL (ref 65–140)
GLUCOSE SERPL-MCNC: 123 MG/DL (ref 65–140)
GLUCOSE SERPL-MCNC: 129 MG/DL (ref 65–140)
HCT VFR BLD AUTO: 36.1 % (ref 36.5–49.3)
HGB BLD-MCNC: 12 G/DL (ref 12–17)
INR PPP: 2.11 (ref 0.85–1.19)
MAGNESIUM SERPL-MCNC: 2.3 MG/DL (ref 1.9–2.7)
MCH RBC QN AUTO: 30.4 PG (ref 26.8–34.3)
MCHC RBC AUTO-ENTMCNC: 33.2 G/DL (ref 31.4–37.4)
MCV RBC AUTO: 91 FL (ref 82–98)
PLATELET # BLD AUTO: 262 THOUSANDS/UL (ref 149–390)
PMV BLD AUTO: 9.4 FL (ref 8.9–12.7)
POTASSIUM SERPL-SCNC: 3.5 MMOL/L (ref 3.5–5.3)
PROTHROMBIN TIME: 23.7 SECONDS (ref 12.3–15)
RBC # BLD AUTO: 3.95 MILLION/UL (ref 3.88–5.62)
SODIUM SERPL-SCNC: 137 MMOL/L (ref 135–147)
WBC # BLD AUTO: 8.4 THOUSAND/UL (ref 4.31–10.16)

## 2024-10-14 PROCEDURE — 80048 BASIC METABOLIC PNL TOTAL CA: CPT

## 2024-10-14 PROCEDURE — 85027 COMPLETE CBC AUTOMATED: CPT

## 2024-10-14 PROCEDURE — 85610 PROTHROMBIN TIME: CPT

## 2024-10-14 PROCEDURE — 82948 REAGENT STRIP/BLOOD GLUCOSE: CPT

## 2024-10-14 PROCEDURE — 99024 POSTOP FOLLOW-UP VISIT: CPT | Performed by: PHYSICIAN ASSISTANT

## 2024-10-14 PROCEDURE — 99232 SBSQ HOSP IP/OBS MODERATE 35: CPT | Performed by: INTERNAL MEDICINE

## 2024-10-14 PROCEDURE — 83735 ASSAY OF MAGNESIUM: CPT

## 2024-10-14 PROCEDURE — 99232 SBSQ HOSP IP/OBS MODERATE 35: CPT | Performed by: STUDENT IN AN ORGANIZED HEALTH CARE EDUCATION/TRAINING PROGRAM

## 2024-10-14 PROCEDURE — 97530 THERAPEUTIC ACTIVITIES: CPT

## 2024-10-14 RX ORDER — WARFARIN SODIUM 2 MG/1
2 TABLET ORAL
Status: COMPLETED | OUTPATIENT
Start: 2024-10-14 | End: 2024-10-14

## 2024-10-14 RX ORDER — POTASSIUM CHLORIDE 29.8 MG/ML
40 INJECTION INTRAVENOUS ONCE
Status: COMPLETED | OUTPATIENT
Start: 2024-10-14 | End: 2024-10-14

## 2024-10-14 RX ORDER — BUMETANIDE 0.25 MG/ML
1 INJECTION, SOLUTION INTRAMUSCULAR; INTRAVENOUS
Status: DISCONTINUED | OUTPATIENT
Start: 2024-10-14 | End: 2024-10-14

## 2024-10-14 RX ADMIN — BUMETANIDE 1 MG: 0.25 INJECTION INTRAMUSCULAR; INTRAVENOUS at 08:35

## 2024-10-14 RX ADMIN — VENLAFAXINE HYDROCHLORIDE 150 MG: 150 CAPSULE, EXTENDED RELEASE ORAL at 08:30

## 2024-10-14 RX ADMIN — ACETAMINOPHEN 975 MG: 325 TABLET, FILM COATED ORAL at 13:17

## 2024-10-14 RX ADMIN — AMIODARONE HYDROCHLORIDE 200 MG: 200 TABLET ORAL at 21:43

## 2024-10-14 RX ADMIN — METOPROLOL TARTRATE 50 MG: 50 TABLET, FILM COATED ORAL at 08:35

## 2024-10-14 RX ADMIN — TEMAZEPAM 15 MG: 15 CAPSULE ORAL at 01:14

## 2024-10-14 RX ADMIN — SENNOSIDES AND DOCUSATE SODIUM 2 TABLET: 50; 8.6 TABLET ORAL at 08:35

## 2024-10-14 RX ADMIN — POLYETHYLENE GLYCOL 3350 17 G: 17 POWDER, FOR SOLUTION ORAL at 08:36

## 2024-10-14 RX ADMIN — POTASSIUM CHLORIDE 40 MEQ: 29.8 INJECTION, SOLUTION INTRAVENOUS at 08:36

## 2024-10-14 RX ADMIN — ACETAMINOPHEN 975 MG: 325 TABLET, FILM COATED ORAL at 08:35

## 2024-10-14 RX ADMIN — POTASSIUM CHLORIDE 20 MEQ: 1500 TABLET, EXTENDED RELEASE ORAL at 18:29

## 2024-10-14 RX ADMIN — AMIODARONE HYDROCHLORIDE 200 MG: 200 TABLET ORAL at 06:43

## 2024-10-14 RX ADMIN — POTASSIUM CHLORIDE 20 MEQ: 1500 TABLET, EXTENDED RELEASE ORAL at 08:36

## 2024-10-14 RX ADMIN — TEMAZEPAM 15 MG: 15 CAPSULE ORAL at 21:42

## 2024-10-14 RX ADMIN — PANTOPRAZOLE SODIUM 40 MG: 40 TABLET, DELAYED RELEASE ORAL at 06:32

## 2024-10-14 RX ADMIN — LIDOCAINE 2 PATCH: 700 PATCH TOPICAL at 08:36

## 2024-10-14 RX ADMIN — METOPROLOL TARTRATE 50 MG: 50 TABLET, FILM COATED ORAL at 21:42

## 2024-10-14 RX ADMIN — CLOPIDOGREL BISULFATE 75 MG: 75 TABLET ORAL at 08:35

## 2024-10-14 RX ADMIN — WARFARIN SODIUM 2 MG: 2 TABLET ORAL at 18:29

## 2024-10-14 RX ADMIN — AMIODARONE HYDROCHLORIDE 0.5 MG/MIN: 50 INJECTION, SOLUTION INTRAVENOUS at 13:17

## 2024-10-14 RX ADMIN — CHLORHEXIDINE GLUCONATE 0.12% ORAL RINSE 15 ML: 1.2 LIQUID ORAL at 08:34

## 2024-10-14 RX ADMIN — Medication 2.5 MG: at 21:41

## 2024-10-14 RX ADMIN — ONDANSETRON 4 MG: 2 INJECTION INTRAMUSCULAR; INTRAVENOUS at 01:15

## 2024-10-14 RX ADMIN — AMIODARONE HYDROCHLORIDE 200 MG: 200 TABLET ORAL at 13:17

## 2024-10-14 RX ADMIN — CHLORHEXIDINE GLUCONATE 0.12% ORAL RINSE 15 ML: 1.2 LIQUID ORAL at 21:43

## 2024-10-14 RX ADMIN — ARIPIPRAZOLE 2 MG: 2 TABLET ORAL at 08:34

## 2024-10-14 RX ADMIN — DOCUSATE SODIUM 100 MG: 100 CAPSULE, LIQUID FILLED ORAL at 08:35

## 2024-10-14 RX ADMIN — ATORVASTATIN CALCIUM 80 MG: 80 TABLET, FILM COATED ORAL at 18:29

## 2024-10-14 NOTE — PROGRESS NOTES
Progress Note - Cardiac Surgery   Reuben García 69 y.o. male MRN: [id number]  Unit/Bed#: PPHP-319-01 Encounter: [id number]    Mitral regurgitation. S/P left atrial appendage ligation and mitral valve repair; POD # 6      24 Hour Events: Transferred to telemetry yesterday. Transitioned off Bumex infusion yesterday. Remains in rate controlled Afib. Remains on Amio gtt 0.5mg/min. BM x 2 yesterday.     Medications:   Scheduled Meds:  Current Facility-Administered Medications   Medication Dose Route Frequency Provider Last Rate    acetaminophen  650 mg Rectal Q4H PRN Nicolette Linder PA-C      acetaminophen  975 mg Oral Q6H While awake Nicolette Linder PA-C      albuterol  2 puff Inhalation TID PRN Nicolette Linder PA-C      amiodarone (CORDARONE) 900 mg in dextrose 5 % 500 mL infusion  0.5 mg/min Intravenous Continuous Nicolette Linder PA-C 0.5 mg/min (10/13/24 1223)    amiodarone  200 mg Oral Q8H [facility] Nicolette Linder PA-C      ARIPiprazole  2 mg Oral Daily Nicolette Linder PA-C      atorvastatin  80 mg Oral Daily With Dinner Nicolette Linder PA-C      bisacodyl  10 mg Rectal Daily PRN Nicolette Linder PA-C      bumetanide  2 mg Intravenous BID Nicolette Linder PA-C      chlorhexidine  15 mL Mouth/Throat BID Nicolette Linder PA-C      clopidogrel  75 mg Oral Daily Nicolette Linder PA-C      docusate sodium  100 mg Oral BID Nicolette Linder PA-C      insulin lispro  1-6 Units Subcutaneous TID AC Nicolette Linder PA-C      insulin lispro  1-6 Units Subcutaneous HS Nicolette Linder PA-C      lidocaine  2 patch Topical Daily Nicolette Linder PA-C      metoprolol tartrate  50 mg Oral Q12H [facility] Nicolette Linder PA-C      naloxone  0.04 mg Intravenous Q1MIN PRN Nicolette Linder PA-C      ondansetron  4 mg Intravenous Q6H PRN Nicolette Linder PA-C      oxyCODONE  2.5 mg Oral Q4H PRN Nicolette Linder PA-C      Or    oxyCODONE  5 mg Oral Q4H PRN Nicolette Linder PA-C      pantoprazole  40 mg Oral Daily Nicolette Linder PA-C       polyethylene glycol  17 g Oral Daily Nicolette Linder PA-C      potassium chloride  20 mEq Oral BID Nicolette Linder PA-C      senna-docusate sodium  2 tablet Oral BID Nioclette Linder PA-C      temazepam  15 mg Oral HS PRN Nicolette Linder PA-C      triamcinolone   Topical BID PRN Nicolette Linder PA-C      venlafaxine  150 mg Oral Daily With Breakfast Nicolette Linder PA-C       Continuous Infusions:amiodarone (CORDARONE) 900 mg in dextrose 5 % 500 mL infusion, 0.5 mg/min, Last Rate: 0.5 mg/min (10/13/24 1223)      PRN Meds:.  acetaminophen    albuterol    bisacodyl    naloxone    ondansetron    oxyCODONE **OR** oxyCODONE    temazepam    triamcinolone    Vitals:   Vitals:    10/13/24 2242 10/14/24 0256 10/14/24 0639 10/14/24 0704   BP: 134/85 123/80  119/66   BP Location:       Pulse: 68 67  68   Resp: 15 19     Temp: 98.4 °F (36.9 °C) 98.7 °F (37.1 °C)  97.7 °F (36.5 °C)   TempSrc:       SpO2: 93% 92%  94%   Weight:   69.5 kg (153 lb 3.5 oz)    Height:           Telemetry: Atrial Fibrillation; Heart Rate: 76    Respiratory:   SpO2: SpO2: 94 %; Room Air    Intake/Output:     Intake/Output Summary (Last 24 hours) at 10/14/2024 0753  Last data filed at 10/14/2024 0101  Gross per 24 hour   Intake 791.96 ml   Output 3305 ml   Net -2513.04 ml        Weights:   Weight (last 2 days)       Date/Time Weight    10/14/24 0639 69.5 (153.22)    10/13/24 0530 69.8 (153.88)    10/12/24 0600 72.9 (160.72)            Chest tube Output: removed      Results:   Results from last 7 days   Lab Units 10/14/24  0507 10/12/24  0442 10/11/24  0413   WBC Thousand/uL 8.40 8.74 11.71*   HEMOGLOBIN g/dL 12.0 11.4* 10.3*   HEMATOCRIT % 36.1* 34.5* 31.4*   PLATELETS Thousands/uL 262 249 211     Results from last 7 days   Lab Units 10/14/24  0507 10/13/24  1437 10/13/24  0348 10/08/24  1947 10/08/24  1417   SODIUM mmol/L 137 136 139   < >  --    POTASSIUM mmol/L 3.5 3.2* 3.3*   < >  --    CHLORIDE mmol/L 92* 92* 94*   < >  --    CO2 mmol/L 35*  36* 36*   < >  --    CO2, I-STAT mmol/L  --   --   --   --  26   BUN mg/dL 44* 51* 49*   < >  --    CREATININE mg/dL 1.24 1.46* 1.67*   < >  --    GLUCOSE, ISTAT mg/dl  --   --   --   --  211*   CALCIUM mg/dL 8.4 8.6 8.6   < >  --     < > = values in this interval not displayed.     Recent Labs     10/14/24  0507   MG 2.3     Results from last 7 days   Lab Units 10/14/24  0507 10/13/24  0348 10/12/24  0442 10/09/24  0411 10/08/24  1415   INR  2.11* 2.31* 2.44*   < > 1.34*   PTT seconds  --   --   --   --  34    < > = values in this interval not displayed.         Date:   INR:  Coumadin Dose:  10/14  2.11  -  10/13  2.31  2  10/12  2.44  1  10/11  2.20  1  10/10  1.33  2      Point of care glucose:  - 163  1 unit SSIC/24 hrs    Studies:  No studies past 24 hrs        Invasive Lines/Tubes:  Invasive Devices       Central Venous Catheter Line  Duration             CVC Central Lines 10/08/24 5 days                    Physical Exam:    General: No acute distress, Alert, and Normal appearance  HEENT/NECK:  Normocephalic. Atraumatic.  No jugular venous distention.    Cardiac: Irregularly irregular rate and rhythm and No murmurs/rubs/gallops  Pulmonary:  Breath sounds clear bilaterally and No rales/rhonchi/wheezes  Abdomen:  non-tender, non-distended. + BS  Incisions: Sternum is stable.  Incision is clean, dry, and intact.   Extremities: Extremities warm/dry and No edema B/L  Neuro: Alert and oriented X 3, Sensation is grossly intact, and No focal deficits  Skin: Warm/Dry, without rashes or lesions.    Assessment:  Principal Problem:    S/P MVR (mitral valve repair)  Active Problems:    Anxiety    Essential hypertension    Gastroesophageal reflux disease without esophagitis    Coronary artery disease involving native coronary artery of native heart without angina pectoris    Nonrheumatic mitral valve regurgitation    Recurrent major depressive disorder, in remission ([facility])    Hypokalemia    Mixed hyperlipidemia    S/P  left atrial appendage ligation    FRANCISCO (acute kidney injury) (HCC)    At risk for metabolic imbalance    Electrolyte imbalance risk    Hyperphosphatemia       Mitral regurgitation. S/P left atrial appendage ligation and mitral valve repair; POD # 6    Plan:    Cardiac:     Elective surgical admission  Normal ventricular systolic function, EF 69%    Atrial Fibrillation; HR well-controlled  BP well-controlled    Continue Lopressor, 50mg PO BID    Hold ACE inhibitor/ARB secondary to renal dysfunction    Continue prophylactic Amiodarone, 200 mg PO TID    Anticoagulated for postop Afb  INR 2.11, Dose Coumadin, 2 mg today    Continue Plavix for ESTHER placed in 2022 and Coumadin therapy. No ASA.     Continue  Statin therapy    Epicardial pacing wires have been removed    Maintain central IV access today for medications requiring central IV access    Continue Coumadin for DVT prophylaxis    Pulmonary:     Good Room air oxygen saturation; Continue incentive spirometry/Coughing/Deep breathing exercises    Chest tubes have been discontinued    Renal:     Normal preoperative renal function  Postoperative FRANCISCO, with creatinine 1.24, from peak of 3.05  BMP ordered for tomorrow    Intake/Output net: -2513 mL/24 hours    Diuretic Regimen:  Decrease to IV Bumex,  1  mg BID  Continue Potassium Chloride 20 mEq PO BID  Hypokalemia - Extra KCL 40meq IVBP x 1 today  Neuro:    Neurologically intact; No active issues     Incisional pain well controlled   Continue tylenol, 975 mg PO q 8, standing dose   Continue oxycodone, 2.5 to 5 mg PO q 4 hours prn pain   Continue topical Lidocaine patch to affected area    GI:    Cardiac diet, with 1800 mL fluid restriction    Tolerating diet without complaint    Continue stool softeners and prn suppository    Continue GI prophylaxis    Endo:     Pre-Op Hgb A1C: 5.1    Serum glucose well controlled on insulin sliding scale coverage  Not on injectable insulin therapy prior to open heart surgery    7     Hematology:     Post-operative blood count acceptable; Trend prn    8.   Disposition:        Following daily PT/OT recommendations regarding home vs. rehab when medically cleared for discharge  Not yet medically cleared for discharge, pending medical optimization, rhythm control  Anticipated discharge date: next 24-48 hrs       VTE Pharmacologic Prophylaxis: Warfarin (Coumadin)  VTE Mechanical Prophylaxis: sequential compression device    Collaborative rounds completed with supervising physician  Plan of care discussed with bedside nurse    SIGNATURE: Isabel Candelaria PA-C  DATE: October 14, 2024  TIME: 7:53 AM

## 2024-10-14 NOTE — PLAN OF CARE
Problem: Prexisting or High Potential for Compromised Skin Integrity  Goal: Skin integrity is maintained or improved  Description: INTERVENTIONS:  - Identify patients at risk for skin breakdown  - Assess and monitor skin integrity  - Assess and monitor nutrition and hydration status  - Monitor labs   - Assess for incontinence   - Turn and reposition patient  - Assist with mobility/ambulation  - Relieve pressure over bony prominences  - Avoid friction and shearing  - Provide appropriate hygiene as needed including keeping skin clean and dry  - Evaluate need for skin moisturizer/barrier cream  - Collaborate with interdisciplinary team   - Patient/family teaching  - Consider wound care consult   Outcome: Progressing     Problem: CARDIOVASCULAR - ADULT  Goal: Maintains optimal cardiac output and hemodynamic stability  Description: INTERVENTIONS:  - Monitor I/O, vital signs and rhythm  - Monitor for S/S and trends of decreased cardiac output  - Administer and titrate ordered vasoactive medications to optimize hemodynamic stability  - Assess quality of pulses, skin color and temperature  - Assess for signs of decreased coronary artery perfusion  - Instruct patient to report change in severity of symptoms  Outcome: Progressing     Problem: HEMATOLOGIC - ADULT  Goal: Maintains hematologic stability  Description: INTERVENTIONS  - Assess for signs and symptoms of bleeding or hemorrhage  - Monitor labs  - Administer supportive blood products/factors as ordered and appropriate  Outcome: Progressing     Problem: INFECTION - ADULT  Goal: Absence of fever/infection during neutropenic period  Description: INTERVENTIONS:  - Monitor WBC    Outcome: Progressing

## 2024-10-14 NOTE — ASSESSMENT & PLAN NOTE
Stable, Remains in rate-controlled atrial fibrillation on telemetry  -Continue amiodarone drip at 0.5 mics per minute, still in A-fib  -continue amiodarone 200 mg tid. Continue for one month and can reassess on outpatient setting  -continue metoprolol tartrate 50 mg bid

## 2024-10-14 NOTE — PROGRESS NOTES
"  Progress Note - Nephrology   Reuben García 69 y.o. male MRN: [id number]  Unit/Bed#: [facility]-01 Encounter: [id number]      Assessment & Plan  FRANCISCO (acute kidney injury) (HCC)  Secondary to hemodynamic perturbations/ATN  Baseline creatinine of 0.8-1  Peak creatinine 2.99 mg/dL on 10/11/2024  Creatinine today is at 1.24 mg/dL, stable and improving  Essential hypertension  Blood pressure is currently acceptable  Home medications: Norvasc 10 mg p.o. daily, Cardura 2 mg p.o. daily, Toprol-XL 12.5 mg p.o. daily, torsemide 20 mg p.o. twice daily, valsartan 320 mg p.o. daily  Continue to hold home valsartan for now  Hyperphosphatemia  Resolved  S/P MVR (mitral valve repair)  Mitral regurgitation-status post MVR 10/8/2024   Hypokalemia  Supplement as needed and trying to keep potassium level greater than 4 to help resolve contraction alkalosis  Metabolic alkalosis  Secondary to diuresis  Hold diuretic and monitor bicarbonate level       Plan:  Avoid nephrotoxins and relative hypotension  Repeat BMP in a.m.  Weight has decreased 9 kg over the last 4 days.  Note that Bumex dose was decreased this a.m.  Will hold p.m. dose and reassess volume status, renal function, bicarbonate level in a.m. for further dosing  Trend potassium, bicarbonate level    Discussed with the CT surgery team today.  Specifically regarding diuretic regimen.  There was agreement and holding diuretic and reassessing volume status, renal function, bicarbonate level etc.    Subjective:   The patient was seen and examined.  He denied shortness of breath, chest pain or pressure, abdominal pain, vomiting, diarrhea, sweats, chills, paroxysmal nocturnal dyspnea orthopnea      Objective:     Vitals: Blood pressure 128/73, pulse 70, temperature 97.6 °F (36.4 °C), resp. rate 19, height 5' 2\" (1.575 m), weight 69.5 kg (153 lb 3.5 oz), SpO2 94%.,Body mass index is 28.02 kg/m².Temp (24hrs), Av °F (36.7 °C), Min:97.5 °F (36.4 °C), Max:98.7 °F (37.1 °C)      Temp:  " "[97.5 °F (36.4 °C)-98.7 °F (37.1 °C)] 97.6 °F (36.4 °C)  HR:  [67-83] 70  Resp:  [15-19] 19  BP: (112-139)/(60-85) 128/73  SpO2:  [92 %-96 %] 94 %    Weight (last 2 days)       Date/Time Weight    10/14/24 0639 69.5 (153.22)    10/13/24 0530 69.8 (153.88)    10/12/24 0600 72.9 (160.72)                 I/O last 24 hours:  In: 792 [P.O.:520; I.V.:122; IV Piggyback:150]  Out: 3305 [Urine:3305]        Physical Exam    /73   Pulse 70   Temp 97.6 °F (36.4 °C)   Resp 19   Ht 5' 2\" (1.575 m)   Wt 69.5 kg (153 lb 3.5 oz)   SpO2 94%   BMI 28.02 kg/m²   Vital signs were reviewed  Constitutional: The patient was awake, alert, pleasant, cooperative and in no apparent distress  Cardiovascular: No overt jugular venous distention was noted, S1-S2, no pericardial friction rub S3 appreciated, trace peripheral edema  Pulmonary: Adequate inspiratory effort, lungs were clear                Invasive Devices       Central Venous Catheter Line  Duration             CVC Central Lines 10/08/24 6 days                    Medications:    Scheduled Meds:  Current Facility-Administered Medications   Medication Dose Route Frequency Provider Last Rate    acetaminophen  650 mg Rectal Q4H PRN Nicolette Linder PA-C      acetaminophen  975 mg Oral Q6H While awake Nicolette Linder PA-C      albuterol  2 puff Inhalation TID PRN Nicolette Linder PA-C      amiodarone (CORDARONE) 900 mg in dextrose 5 % 500 mL infusion  0.5 mg/min Intravenous Continuous Nicolette Linder PA-C 0.5 mg/min (10/14/24 1317)    amiodarone  200 mg Oral Q8H [facility] Nicolette Linder PA-C      ARIPiprazole  2 mg Oral Daily Nicolette Linder PA-C      atorvastatin  80 mg Oral Daily With Dinner Nicolette Linder PA-C      bisacodyl  10 mg Rectal Daily PRN Nicolette Linder PA-C      bumetanide  1 mg Intravenous BID (diuretic) Isabel Candelaria PA-C      chlorhexidine  15 mL Mouth/Throat BID Nicolette Linder PA-C      clopidogrel  75 mg Oral Daily Nicolette Linder PA-C      docusate " sodium  100 mg Oral BID Nicolette Linder PA-C      insulin lispro  1-6 Units Subcutaneous TID AC Nicolette Linder PA-C      insulin lispro  1-6 Units Subcutaneous HS Nicolette Linder PA-C      lidocaine  2 patch Topical Daily Nicolette Linder PA-C      metoprolol tartrate  50 mg Oral Q12H PAOLO Nicolette Linder PA-C      naloxone  0.04 mg Intravenous Q1MIN PRN Nicolette Linder PA-C      ondansetron  4 mg Intravenous Q6H PRN Nicolette Linder PA-C      oxyCODONE  2.5 mg Oral Q4H PRN Nicolette Linder PA-C      Or    oxyCODONE  5 mg Oral Q4H PRN Nicolette Linder PA-C      pantoprazole  40 mg Oral Daily Nicolette Linder PA-C      polyethylene glycol  17 g Oral Daily Nicolette Linder PA-C      potassium chloride  20 mEq Oral BID Nicolette Linder PA-C      senna-docusate sodium  2 tablet Oral BID Nicolette Linder PA-C      temazepam  15 mg Oral HS PRN Nicolette Linder PA-C      triamcinolone   Topical BID PRN Nicolette Linder PA-C      venlafaxine  150 mg Oral Daily With Breakfast Nicolette Linder PA-C      warfarin  2 mg Oral Once (warfarin) Isabel Candelaria PA-C         PRN Meds:.  acetaminophen    albuterol    bisacodyl    naloxone    ondansetron    oxyCODONE **OR** oxyCODONE    temazepam    triamcinolone    Continuous Infusions:amiodarone (CORDARONE) 900 mg in dextrose 5 % 500 mL infusion, 0.5 mg/min, Last Rate: 0.5 mg/min (10/14/24 1317)            LAB RESULTS:      Results from last 7 days   Lab Units 10/14/24  0507 10/13/24  1437 10/13/24  0348 10/12/24  2159 10/12/24  1545 10/12/24  0442 10/12/24  0012 10/11/24  1146 10/11/24  0454 10/11/24  0414 10/11/24  0413 10/10/24  1029 10/10/24  0428 10/09/24  1252 10/09/24  0411 10/08/24  2231 10/08/24  1947 10/08/24  1417 10/08/24  1415 10/08/24  1214   0000   WBC Thousand/uL 8.40  --   --   --   --  8.74  --   --   --   --  11.71*  --  16.77*  --  10.88*  --   --   --   --   --   --    HEMOGLOBIN g/dL 12.0  --   --   --   --  11.4*  --   --   --   --  10.3*  --  11.5*  --   11.1* 11.5*  --   --  13.5  --   --    I STAT HEMOGLOBIN g/dl  --   --   --   --   --   --   --   --   --   --   --   --   --   --   --   --   --  12.6  --   --   --    HEMATOCRIT % 36.1*  --   --   --   --  34.5*  --   --   --   --  31.4*  --  35.4*  --  33.6* 34.8*  --   --  40.1  --   --    HEMATOCRIT, ISTAT %  --   --   --   --   --   --   --   --   --   --   --   --   --   --   --   --   --  37  --   --   --    PLATELETS Thousands/uL 262  --   --   --   --  249  --   --   --   --  211  --  220  --  160  --   --   --  240 141*  --    POTASSIUM mmol/L 3.5 3.2* 3.3* 3.6 3.1* 4.1 3.3*   < >  --  3.6  --    < > 4.3   < > 4.1 4.2   < >  --  3.4*  --   --    CHLORIDE mmol/L 92* 92* 94* 94* 96 99 97   < >  --  101  --    < > 102   < > 107  --   --   --  109*  --    < >   CO2 mmol/L 35* 36* 36* 33* 32 28 28   < >  --  24  --    < > 21   < > 22  --   --   --  24  --    < >   CO2, I-STAT mmol/L  --   --   --   --   --   --   --   --   --   --   --   --   --   --   --   --   --  26  --   --   --    BUN mg/dL 44* 51* 49* 52* 52* 53* 54*   < >  --  53*  --    < > 39*   < > 26*  --   --   --  21  --    < >   CREATININE mg/dL 1.24 1.46* 1.67* 1.87* 2.09* 2.50* 2.71*   < >  --  2.99*  --    < > 2.31*   < > 1.49*  --   --   --  1.00  --    < >   CALCIUM mg/dL 8.4 8.6 8.6 8.6 8.5 8.8 8.5   < >  --  8.5  --    < > 8.3*   < > 7.8*  --   --   --  7.7*  --    < >   ALK PHOS U/L  --   --   --   --   --   --   --   --  57  --   --   --   --   --   --   --   --   --   --   --   --    ALT U/L  --   --   --   --   --   --   --   --  7  --   --   --   --   --   --   --   --   --   --   --   --    AST U/L  --   --   --   --   --   --   --   --  41*  --   --   --   --   --   --   --   --   --   --   --   --    GLUCOSE, ISTAT mg/dl  --   --   --   --   --   --   --   --   --   --   --   --   --   --   --   --   --  211*  --   --   --    MAGNESIUM mg/dL 2.3  --  2.6  --   --  2.5  --   --   --  2.8*  --   --  2.9*  --   --   --   --   --   --   " --   --    PHOSPHORUS mg/dL  --   --  2.7  --   --  5.2*  --   --   --  8.5*  --   --   --   --   --   --   --   --   --   --   --     < > = values in this interval not displayed.       CUTURES:  No results found for: \"BLOODCX\", \"URINECX\"              PLEASE NOTE:  This encounter was completed utilizing the Central Desktop/[employer] Direct Speech Voice Recognition Software. Grammatical errors, random word insertions, pronoun errors and incomplete sentences are occasional consequences of the system due to software limitations, ambient noise and hardware issues.These may be missed by proof reading prior to affixing electronic signature. Any questions or concerns about the content, text or information contained within the body of this dictation should be directly addressed to the physician for clarification. Please do not hesitate to call me directly if you have any any questions or concerns.  "

## 2024-10-14 NOTE — RESTORATIVE TECHNICIAN NOTE
Restorative Technician Note      Patient Name: Reuben García     Restorative Tech Visit Date: 10/14/24  Note Type: Mobility  Patient Position Upon Consult: Supine  Activity Performed: Ambulated  Assistive Device: Other (Comment) (hha X 1)  Patient Position at End of Consult: All needs within reach; Bedside chair

## 2024-10-14 NOTE — ASSESSMENT & PLAN NOTE
Blood pressure is currently acceptable  Home medications: Norvasc 10 mg p.o. daily, Cardura 2 mg p.o. daily, Toprol-XL 12.5 mg p.o. daily, torsemide 20 mg p.o. twice daily, valsartan 320 mg p.o. daily  Continue to hold home valsartan for now

## 2024-10-14 NOTE — ASSESSMENT & PLAN NOTE
Supplement as needed and trying to keep potassium level greater than 4 to help resolve contraction alkalosis

## 2024-10-14 NOTE — PLAN OF CARE
Problem: PHYSICAL THERAPY ADULT  Goal: Performs mobility at highest level of function for planned discharge setting.  See evaluation for individualized goals.  Description: Treatment/Interventions: Functional transfer training, LE strengthening/ROM, Elevations, Therapeutic exercise, Endurance training, Equipment eval/education, Bed mobility, Gait training, Spoke to nursing, OT  Equipment Recommended: Walker       See flowsheet documentation for full assessment, interventions and recommendations.  Outcome: Progressing  Note: Prognosis: Good  Problem List: Decreased strength, Decreased endurance, Impaired balance, Decreased mobility, Decreased coordination, Decreased safety awareness  Assessment: Pt demonstrated improved tolerance to mobilization ambulating further distances w/ rw; pt still remains to be generally weak and deconditioned w/ min balance impairment and associated gait dysfunction requiring min (A) to assure safety; D/C recommendations are listed below; will follow  Barriers to Discharge: Inaccessible home environment     Rehab Resource Intensity Level, PT: (S) III (Minimum Resource Intensity) (change from prior recommendations)    See flowsheet documentation for full assessment.

## 2024-10-14 NOTE — PHYSICAL THERAPY NOTE
PHYSICAL THERAPY NOTE          Patient Name: Reuben García  Today's Date: 10/14/2024         10/14/24 0915   PT Last Visit   PT Visit Date 10/14/24   Note Type   Note Type Treatment   Pain Assessment   Pain Assessment Tool 0-10   Pain Score No Pain   Restrictions/Precautions   Other Precautions Cardiac/sternal;Chair Alarm;Telemetry;Multiple lines   General   Chart Reviewed Yes   Additional Pertinent History cleared for Tx session by nsg   Response to Previous Treatment Patient with no complaints from previous session.   Cognition   Overall Cognitive Status WFL   Arousal/Participation Alert;Cooperative   Attention Attends with cues to redirect   Orientation Level Oriented to person;Oriented to place;Oriented to situation   Memory Decreased recall of precautions   Following Commands Follows one step commands without difficulty   Subjective   Subjective Pt is reclined in the chair; somewhat flat affect; reports being tired; agreeable to mobilize   Transfers   Sit to Stand 4  Minimal assistance   Additional items Assist x 1;Verbal cues   Stand to Sit 4  Minimal assistance   Additional items Assist x 1;Verbal cues   Ambulation/Elevation   Gait pattern Short stride;Excessively slow;Inconsistent eze;Foward flexed   Gait Assistance 4  Minimal assist   Additional items Assist x 1;Verbal cues;Tactile cues   Assistive Device Rolling walker   Distance 2 x 90 ft w/ extended seated rest period in between   Stair Management Assistance Not tested  (not appropriate at this time)   Balance   Static Sitting Fair +   Dynamic Sitting Fair   Static Standing Fair -   Dynamic Standing Poor +   Ambulatory Poor +   Activity Tolerance   Activity Tolerance Patient limited by fatigue   Nurse Made Aware spoke to KAYLEE Luna   Exercises   Knee AROM Long Arc Quad Sitting;15 reps;AROM;Bilateral   Ankle Pumps Sitting;15 reps;AROM;Bilateral   Marching Sitting;10  reps;AROM;Bilateral   Assessment   Prognosis Good   Problem List Decreased strength;Decreased endurance;Impaired balance;Decreased mobility;Decreased coordination;Decreased safety awareness   Assessment Pt demonstrated improved tolerance to mobilization ambulating further distances w/ rw; pt still remains to be generally weak and deconditioned w/ min balance impairment and associated gait dysfunction requiring min (A) to assure safety; D/C recommendations are listed below; will follow   Barriers to Discharge Inaccessible home environment   Goals   Patient Goals to cont getting better   PT Treatment Day 1   Plan   Treatment/Interventions Functional transfer training;LE strengthening/ROM;Elevations;Therapeutic exercise;Endurance training;Bed mobility;Gait training;Spoke to nursing;Spoke to case management   Progress Progressing toward goals   PT Frequency 4-6x/wk   Discharge Recommendation   Rehab Resource Intensity Level, PT (S)  III (Minimum Resource Intensity)  (change from prior recommendations)   Equipment Recommended Walker   Walker Package Recommended Wheeled walker   Change/add to Walker Package? No   AM-PAC Basic Mobility Inpatient   Turning in Flat Bed Without Bedrails 3   Lying on Back to Sitting on Edge of Flat Bed Without Bedrails 2   Moving Bed to Chair 3   Standing Up From Chair Using Arms 3   Walk in Room 3   Climb 3-5 Stairs With Railing 2   Basic Mobility Inpatient Raw Score 16   Basic Mobility Standardized Score 38.32   [facility] Highest Level Of Mobility   -[facility] Goal 5: Stand one or more mins   -HLM Achieved 7: Walk 25 feet or more   Education   Education Provided Mobility training;Home exercise program;Assistive device   Patient Demonstrates verbal understanding;Reinforcement needed   End of Consult   Patient Position at End of Consult Bedside chair;Bed/Chair alarm activated;All needs within reach     Binh Dawson

## 2024-10-14 NOTE — ASSESSMENT & PLAN NOTE
Secondary to hemodynamic perturbations/ATN  Baseline creatinine of 0.8-1  Peak creatinine 2.99 mg/dL on 10/11/2024  Creatinine today is at 1.24 mg/dL, stable and improving

## 2024-10-14 NOTE — CASE MANAGEMENT
Case Management Discharge Planning Note    Patient name Reuben García  Location [facility] MRN [id number]  : 1955 Date 10/14/2024       Current Admission Date: 10/8/2024  Current Admission Diagnosis:S/P MVR (mitral valve repair)   Patient Active Problem List    Diagnosis Date Noted Date Diagnosed    Postoperative atrial fibrillation ([facility]) 10/14/2024     Anticoagulated on Coumadin 10/14/2024     Metabolic alkalosis 10/14/2024     FRANCISCO (acute kidney injury) ([facility]) 10/11/2024     At risk for metabolic imbalance 10/11/2024     Electrolyte imbalance risk 10/11/2024     Hyperphosphatemia 10/11/2024     S/P MVR (mitral valve repair) 10/08/2024     S/P left atrial appendage ligation 10/08/2024     Hypokalemia 2024     Mixed hyperlipidemia 2024     Acute on chronic heart failure with preserved ejection fraction (HFpEF) ([facility]) 09/10/2024     Recurrent major depressive disorder, in remission ([facility]) 09/10/2024     Nonrheumatic mitral valve regurgitation 2024     Platelets decreased ([facility]) 2023     Coronary artery disease involving native coronary artery of native heart without angina pectoris      Mild intermittent asthma without complication 2021     BMI 29.0-29.9,adult 10/11/2019     Insomnia 2015     Essential hypertension 2014     Gastroesophageal reflux disease without esophagitis 2014     Anxiety 2014     Migraine headache 2014       LOS (days): 6  Geometric Mean LOS (GMLOS) (days): 8.5  Days to GMLOS:2.2     OBJECTIVE:  Risk of Unplanned Readmission Score: 26.48       Current admission status: Inpatient   Preferred Pharmacy:   [id number] [place] - [address] [address]  Phone: [phone] Fax: [phone]    Primary Care Provider: Rich Delgado DO    Primary Insurance: [facility] REP  Secondary Insurance:     DISCHARGE DETAILS:  Discharge planning discussed with:: Pt bedside   Requested Home  Health Care         Is the patient interested in HHC at discharge?: Yes  Home Health Discipline requested:: Nursing, Occupational Therapy, Physical Therapy  Home Health Agency Name:: [facility]  Home Health Follow-Up Provider:: PCP  Home Health Services Needed:: Evaluate Functional Status and Safety, Gait/ADL Training, Post-Op Care and Assessment, Strengthening/Theraputic Exercises to Improve Function  Homebound Criteria Met:: Uses an Assist Device (i.e. cane, walker, etc)  Supporting Clincal Findings:: Fatigues Easliy in Short Distances, Limited Endurance    Treatment Team Recommendation: Home with Home Health Care  Discharge Destination Plan:: Home with Home Health Care  Transport at Discharge : Family   IMM Given (Date):: 10/14/24 (1859)  IMM Given to:: Patient     Additional Comments: CM met with pt bedside to discuss updated HHC recommendations. [facility] reserved for patient. Patient agreeable to home PT OT. CM notified [facility] via [employer]. CM to follow

## 2024-10-14 NOTE — ASSESSMENT & PLAN NOTE
120s-130s/60s-70s  -Continue metoprolol titrate 50 mg twice daily  -Avoid hypotension in setting of recent FRANCISCO

## 2024-10-14 NOTE — ASSESSMENT & PLAN NOTE
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 POD 6. Off milrinone.  -Continue warfarin for INR goal ~3  -Continue Bumex 1 mg bid.  Appreciate neuro neurology assistance.  Goal slightly net negative

## 2024-10-15 LAB
ANION GAP SERPL CALCULATED.3IONS-SCNC: 6 MMOL/L (ref 4–13)
BUN SERPL-MCNC: 44 MG/DL (ref 5–25)
CALCIUM SERPL-MCNC: 8 MG/DL (ref 8.4–10.2)
CHLORIDE SERPL-SCNC: 90 MMOL/L (ref 96–108)
CO2 SERPL-SCNC: 38 MMOL/L (ref 21–32)
CREAT SERPL-MCNC: 1.08 MG/DL (ref 0.6–1.3)
GFR SERPL CREATININE-BSD FRML MDRD: 70 ML/MIN/1.73SQ M
GLUCOSE SERPL-MCNC: 100 MG/DL (ref 65–140)
GLUCOSE SERPL-MCNC: 100 MG/DL (ref 65–140)
GLUCOSE SERPL-MCNC: 156 MG/DL (ref 65–140)
GLUCOSE SERPL-MCNC: 90 MG/DL (ref 65–140)
GLUCOSE SERPL-MCNC: 99 MG/DL (ref 65–140)
INR PPP: 1.95 (ref 0.85–1.19)
MAGNESIUM SERPL-MCNC: 2.1 MG/DL (ref 1.9–2.7)
PHOSPHATE SERPL-MCNC: 2.2 MG/DL (ref 2.3–4.1)
POTASSIUM SERPL-SCNC: 3.2 MMOL/L (ref 3.5–5.3)
PROTHROMBIN TIME: 22.3 SECONDS (ref 12.3–15)
SODIUM SERPL-SCNC: 134 MMOL/L (ref 135–147)

## 2024-10-15 PROCEDURE — 85610 PROTHROMBIN TIME: CPT | Performed by: PHYSICIAN ASSISTANT

## 2024-10-15 PROCEDURE — 82948 REAGENT STRIP/BLOOD GLUCOSE: CPT

## 2024-10-15 PROCEDURE — 99232 SBSQ HOSP IP/OBS MODERATE 35: CPT | Performed by: STUDENT IN AN ORGANIZED HEALTH CARE EDUCATION/TRAINING PROGRAM

## 2024-10-15 PROCEDURE — 84100 ASSAY OF PHOSPHORUS: CPT | Performed by: INTERNAL MEDICINE

## 2024-10-15 PROCEDURE — 88311 DECALCIFY TISSUE: CPT | Performed by: PATHOLOGY

## 2024-10-15 PROCEDURE — 88305 TISSUE EXAM BY PATHOLOGIST: CPT | Performed by: PATHOLOGY

## 2024-10-15 PROCEDURE — 83735 ASSAY OF MAGNESIUM: CPT | Performed by: INTERNAL MEDICINE

## 2024-10-15 PROCEDURE — 97530 THERAPEUTIC ACTIVITIES: CPT

## 2024-10-15 PROCEDURE — 80048 BASIC METABOLIC PNL TOTAL CA: CPT | Performed by: INTERNAL MEDICINE

## 2024-10-15 PROCEDURE — 99024 POSTOP FOLLOW-UP VISIT: CPT | Performed by: PHYSICIAN ASSISTANT

## 2024-10-15 PROCEDURE — 97535 SELF CARE MNGMENT TRAINING: CPT

## 2024-10-15 PROCEDURE — 97116 GAIT TRAINING THERAPY: CPT

## 2024-10-15 PROCEDURE — 99232 SBSQ HOSP IP/OBS MODERATE 35: CPT | Performed by: INTERNAL MEDICINE

## 2024-10-15 RX ORDER — POTASSIUM CHLORIDE 14.9 MG/ML
20 INJECTION INTRAVENOUS ONCE
Status: COMPLETED | OUTPATIENT
Start: 2024-10-15 | End: 2024-10-15

## 2024-10-15 RX ORDER — ACETAZOLAMIDE 250 MG/1
250 TABLET ORAL EVERY 12 HOURS SCHEDULED
Status: COMPLETED | OUTPATIENT
Start: 2024-10-15 | End: 2024-10-15

## 2024-10-15 RX ORDER — SUMATRIPTAN 50 MG/1
50 TABLET, FILM COATED ORAL DAILY PRN
Status: COMPLETED | OUTPATIENT
Start: 2024-10-15 | End: 2024-10-15

## 2024-10-15 RX ORDER — WARFARIN SODIUM 3 MG/1
3 TABLET ORAL
Status: COMPLETED | OUTPATIENT
Start: 2024-10-15 | End: 2024-10-15

## 2024-10-15 RX ORDER — POTASSIUM CHLORIDE 29.8 MG/ML
40 INJECTION INTRAVENOUS ONCE
Status: DISCONTINUED | OUTPATIENT
Start: 2024-10-15 | End: 2024-10-15

## 2024-10-15 RX ADMIN — CLOPIDOGREL BISULFATE 75 MG: 75 TABLET ORAL at 08:21

## 2024-10-15 RX ADMIN — ACETAMINOPHEN 975 MG: 325 TABLET, FILM COATED ORAL at 14:21

## 2024-10-15 RX ADMIN — METOPROLOL TARTRATE 50 MG: 50 TABLET, FILM COATED ORAL at 21:50

## 2024-10-15 RX ADMIN — CHLORHEXIDINE GLUCONATE 0.12% ORAL RINSE 15 ML: 1.2 LIQUID ORAL at 08:22

## 2024-10-15 RX ADMIN — POTASSIUM CHLORIDE 20 MEQ: 1500 TABLET, EXTENDED RELEASE ORAL at 08:21

## 2024-10-15 RX ADMIN — METOPROLOL TARTRATE 50 MG: 50 TABLET, FILM COATED ORAL at 08:21

## 2024-10-15 RX ADMIN — AMIODARONE HYDROCHLORIDE 200 MG: 200 TABLET ORAL at 21:50

## 2024-10-15 RX ADMIN — PANTOPRAZOLE SODIUM 40 MG: 40 TABLET, DELAYED RELEASE ORAL at 06:32

## 2024-10-15 RX ADMIN — ACETAZOLAMIDE 250 MG: 250 TABLET ORAL at 21:50

## 2024-10-15 RX ADMIN — POTASSIUM CHLORIDE 20 MEQ: 14.9 INJECTION, SOLUTION INTRAVENOUS at 09:47

## 2024-10-15 RX ADMIN — ATORVASTATIN CALCIUM 80 MG: 80 TABLET, FILM COATED ORAL at 17:00

## 2024-10-15 RX ADMIN — ARIPIPRAZOLE 2 MG: 2 TABLET ORAL at 08:21

## 2024-10-15 RX ADMIN — TEMAZEPAM 15 MG: 15 CAPSULE ORAL at 23:02

## 2024-10-15 RX ADMIN — DIBASIC SODIUM PHOSPHATE, MONOBASIC POTASSIUM PHOSPHATE AND MONOBASIC SODIUM PHOSPHATE 1 TABLET: 852; 155; 130 TABLET ORAL at 12:24

## 2024-10-15 RX ADMIN — Medication 2.5 MG: at 12:24

## 2024-10-15 RX ADMIN — DIBASIC SODIUM PHOSPHATE, MONOBASIC POTASSIUM PHOSPHATE AND MONOBASIC SODIUM PHOSPHATE 1 TABLET: 852; 155; 130 TABLET ORAL at 09:47

## 2024-10-15 RX ADMIN — ACETAMINOPHEN 975 MG: 325 TABLET, FILM COATED ORAL at 08:21

## 2024-10-15 RX ADMIN — AMIODARONE HYDROCHLORIDE 200 MG: 200 TABLET ORAL at 14:22

## 2024-10-15 RX ADMIN — VENLAFAXINE HYDROCHLORIDE 150 MG: 150 CAPSULE, EXTENDED RELEASE ORAL at 08:21

## 2024-10-15 RX ADMIN — SUMATRIPTAN SUCCINATE 50 MG: 50 TABLET ORAL at 12:24

## 2024-10-15 RX ADMIN — CHLORHEXIDINE GLUCONATE 0.12% ORAL RINSE 15 ML: 1.2 LIQUID ORAL at 18:07

## 2024-10-15 RX ADMIN — ACETAMINOPHEN 975 MG: 325 TABLET, FILM COATED ORAL at 19:37

## 2024-10-15 RX ADMIN — POTASSIUM CHLORIDE 20 MEQ: 1500 TABLET, EXTENDED RELEASE ORAL at 18:05

## 2024-10-15 RX ADMIN — Medication 2.5 MG: at 06:31

## 2024-10-15 RX ADMIN — ONDANSETRON 4 MG: 2 INJECTION INTRAMUSCULAR; INTRAVENOUS at 19:37

## 2024-10-15 RX ADMIN — ACETAZOLAMIDE 250 MG: 250 TABLET ORAL at 14:21

## 2024-10-15 RX ADMIN — DIBASIC SODIUM PHOSPHATE, MONOBASIC POTASSIUM PHOSPHATE AND MONOBASIC SODIUM PHOSPHATE 1 TABLET: 852; 155; 130 TABLET ORAL at 17:00

## 2024-10-15 RX ADMIN — AMIODARONE HYDROCHLORIDE 200 MG: 200 TABLET ORAL at 06:32

## 2024-10-15 RX ADMIN — WARFARIN SODIUM 3 MG: 3 TABLET ORAL at 18:05

## 2024-10-15 RX ADMIN — INSULIN LISPRO 1 UNITS: 100 INJECTION, SOLUTION INTRAVENOUS; SUBCUTANEOUS at 21:54

## 2024-10-15 NOTE — ASSESSMENT & PLAN NOTE
Secondary to hemodynamic perturbations/ATN  Baseline creatinine of 0.8-1  Peak creatinine 2.99 mg/dL on 10/11/2024  Creatinine today is at 1.08 mg/dL, BUN without change

## 2024-10-15 NOTE — PROGRESS NOTES
Progress Note - Cardiac Surgery   Reuben García 69 y.o. male MRN: [id number]  Unit/Bed#: PPHP-319-01 Encounter: [id number]    Mitral regurgitation. S/P left atrial appendage ligation and mitral valve repair; POD # 7      24 Hour Events: Continues on Amio gtt 0.5mg/min. Pt reports having a migraine today. Takes Imitrex at home PRN. BM x 1 yesterday.     Medications:   Scheduled Meds:  Current Facility-Administered Medications   Medication Dose Route Frequency Provider Last Rate    acetaminophen  650 mg Rectal Q4H PRN Nicolette Linder PA-C      acetaminophen  975 mg Oral Q6H While awake Nicolette Linder PA-C      albuterol  2 puff Inhalation TID PRN Nicolette Linder PA-C      amiodarone (CORDARONE) 900 mg in dextrose 5 % 500 mL infusion  0.5 mg/min Intravenous Continuous Nicolette Linder PA-C 0.5 mg/min (10/14/24 1317)    amiodarone  200 mg Oral Q8H [facility] Nicolette Linder PA-C      ARIPiprazole  2 mg Oral Daily Nicolette Linder PA-C      atorvastatin  80 mg Oral Daily With Dinner Nicolette Linder PA-C      bisacodyl  10 mg Rectal Daily PRN Nicolette Linder PA-C      chlorhexidine  15 mL Mouth/Throat BID Nicolette Linder PA-C      clopidogrel  75 mg Oral Daily Nicolette Linder PA-C      insulin lispro  1-6 Units Subcutaneous TID AC Nicolette Linder PA-C      insulin lispro  1-6 Units Subcutaneous HS Nicolette Linder PA-C      lidocaine  2 patch Topical Daily Nicolette Linder PA-C      metoprolol tartrate  50 mg Oral Q12H [facility] Nicolette Linder PA-C      naloxone  0.04 mg Intravenous Q1MIN PRN Nicolette Linder PA-C      ondansetron  4 mg Intravenous Q6H PRN Nicolette Linder PA-C      oxyCODONE  2.5 mg Oral Q4H PRN Nicolette Linder PA-C      Or    oxyCODONE  5 mg Oral Q4H PRN Nicolette Linder PA-C      pantoprazole  40 mg Oral Daily Nicolette Linder PA-C      potassium chloride  20 mEq Oral BID Armen Mcclendon MD      senna-docusate sodium  2 tablet Oral BID Nicolette Linder PA-C      temazepam  15 mg Oral HS PRN  Nicolette Linder PA-C      triamcinolone   Topical BID PRN Nicolette Linder PA-C      venlafaxine  150 mg Oral Daily With Breakfast Nicolette Linder PA-C       Continuous Infusions:amiodarone (CORDARONE) 900 mg in dextrose 5 % 500 mL infusion, 0.5 mg/min, Last Rate: 0.5 mg/min (10/14/24 1317)      PRN Meds:.  acetaminophen    albuterol    bisacodyl    naloxone    ondansetron    oxyCODONE **OR** oxyCODONE    temazepam    triamcinolone    Vitals:   Vitals:    10/15/24 0256 10/15/24 0600 10/15/24 0725 10/15/24 0726   BP: 105/59   111/70   BP Location: Left arm      Pulse: 59   61   Resp:    16   Temp: 98.3 °F (36.8 °C)  98.5 °F (36.9 °C)    TempSrc:   Oral    SpO2: 96%   95%   Weight:  69.8 kg (153 lb 14.1 oz)     Height:           Telemetry: Atrial Fibrillation; Heart Rate: 84    Respiratory:   SpO2: SpO2: 95 %; Room Air    Intake/Output:     Intake/Output Summary (Last 24 hours) at 10/15/2024 0808  Last data filed at 10/15/2024 0256  Gross per 24 hour   Intake 705 ml   Output 1800 ml   Net -1095 ml        Weights:   Weight (last 2 days)       Date/Time Weight    10/15/24 0600 69.8 (153.88)    10/14/24 0639 69.5 (153.22)    10/13/24 0530 69.8 (153.88)            Chest tube Output: removed      Results:   Results from last 7 days   Lab Units 10/14/24  0507 10/12/24  0442 10/11/24  0413   WBC Thousand/uL 8.40 8.74 11.71*   HEMOGLOBIN g/dL 12.0 11.4* 10.3*   HEMATOCRIT % 36.1* 34.5* 31.4*   PLATELETS Thousands/uL 262 249 211     Results from last 7 days   Lab Units 10/15/24  0430 10/14/24  0507 10/13/24  1437 10/08/24  1947 10/08/24  1417   SODIUM mmol/L 134* 137 136   < >  --    POTASSIUM mmol/L 3.2* 3.5 3.2*   < >  --    CHLORIDE mmol/L 90* 92* 92*   < >  --    CO2 mmol/L 38* 35* 36*   < >  --    CO2, I-STAT mmol/L  --   --   --   --  26   BUN mg/dL 44* 44* 51*   < >  --    CREATININE mg/dL 1.08 1.24 1.46*   < >  --    GLUCOSE, ISTAT mg/dl  --   --   --   --  211*   CALCIUM mg/dL 8.0* 8.4 8.6   < >  --     < > = values  in this interval not displayed.     Recent Labs     10/15/24  0430   MG 2.1     Results from last 7 days   Lab Units 10/15/24  0430 10/14/24  0507 10/13/24  0348 10/09/24  0411 10/08/24  1415   INR  1.95* 2.11* 2.31*   < > 1.34*   PTT seconds  --   --   --   --  34    < > = values in this interval not displayed.         Date:   INR:  Coumadin Dose:  10/15  1.95  -  10/14  2.11  2  10/13  2.31  2  10/12  2.44  1  10/11  2.20  1  10/10  1.33  2      Point of care glucose: BS 90 - 129  No SSIC/24 hrs    Studies:  No studies past 24 hrs        Invasive Lines/Tubes:  Invasive Devices       Central Venous Catheter Line  Duration             CVC Central Lines 10/08/24 6 days                    Physical Exam:    General: No acute distress, Alert, and Normal appearance  HEENT/NECK:  Normocephalic. Atraumatic.  No jugular venous distention.    Cardiac: Regular rate and rhythm and No murmurs/rubs/gallops  Pulmonary:  Breath sounds clear bilaterally and No rales/rhonchi/wheezes  Abdomen:  Non-tender, Non-distended, and Normal bowel sounds  Incisions: Sternum is stable.  Incision is clean, dry, and intact.   Extremities: Extremities warm/dry and No edema B/L  Neuro: Alert and oriented X 3, Sensation is grossly intact, and No focal deficits  Skin: Warm/Dry, without rashes or lesions.       Assessment:  Principal Problem:    S/P MVR (mitral valve repair)  Active Problems:    Anxiety    Essential hypertension    Gastroesophageal reflux disease without esophagitis    Coronary artery disease involving native coronary artery of native heart without angina pectoris    Nonrheumatic mitral valve regurgitation    Recurrent major depressive disorder, in remission ([facility])    Hypokalemia    Mixed hyperlipidemia    S/P left atrial appendage ligation    FRANCISCO (acute kidney injury) ([facility])    At risk for metabolic imbalance    Electrolyte imbalance risk    Hyperphosphatemia    Postoperative atrial fibrillation ([facility])    Anticoagulated on Coumadin     Metabolic alkalosis       Mitral regurgitation. S/P left atrial appendage ligation and mitral valve repair; POD # 7    Plan:    Cardiac:     Elective surgical admission  Normal ventricular systolic function, EF 69%    Atrial Fibrillation; HR well-controlled  BP well-controlled    Continue Lopressor, 50mg PO BID    Hold ACE inhibitor/ARB secondary to renal dysfunction    Continue prophylactic Amiodarone, 200 mg PO TID    Anticoagulated for postop [employer]  INR 1.95, Dose Coumadin, 3 mg today  Discontinue Amio infusion, continue PO dosing with taper upon discharge.    Continue Plavix for ESTHER placed in 2022 and Coumadin therapy. No ASA.     Continue  Statin therapy    Epicardial pacing wires have been removed    Remove central IV access today     Continue Coumadin for DVT prophylaxis    Pulmonary:     Good Room air oxygen saturation; Continue incentive spirometry/Coughing/Deep breathing exercises    Chest tubes have been discontinued    Renal:     Normal preoperative renal function  Postoperative FRANCISCO, with creatinine 1.08 from 1.24, from peak of 3.05    Intake/Output net: -2513 mL/24 hours    Diuretic Regimen:  Diuretics held yesterday afternoon. Continue to follow Nephrology recommendations.    Hypokalemia - Continue Potassium Chloride 20 mEq PO BID.   Give extra KCl 40meq IVBP x 1 today    Neuro:    Neurologically intact; No active issues     Incisional pain well controlled   Continue tylenol, 975 mg PO q 8, standing dose   Continue oxycodone, 2.5 to 5 mg PO q 4 hours prn pain   Continue topical Lidocaine patch to affected area    Migraine - order Imitrex 50mg PO daily PRN (home med)    GI:    Cardiac diet, with 1800 mL fluid restriction    Tolerating diet without complaint    Continue stool softeners and prn suppository    Continue GI prophylaxis    Endo:     Pre-Op Hgb A1C: 5.1    Serum glucose well controlled on insulin sliding scale coverage  Not on injectable insulin therapy prior to open heart surgery    7     Hematology:     Post-operative blood count acceptable; Trend prn    8.   Disposition:        Following daily PT/OT recommendations regarding home vs. rehab when medically cleared for discharge    Not yet medically cleared for discharge, pending medical optimization, rhythm control    Anticipated discharge date: next 24 hrs      VTE Pharmacologic Prophylaxis: Warfarin (Coumadin)  VTE Mechanical Prophylaxis: sequential compression device    Collaborative rounds completed with supervising physician  Plan of care discussed with bedside nurse    SIGNATURE: Isabel Candelaria PA-C  DATE: October 15, 2024  TIME: 8:08 AM

## 2024-10-15 NOTE — ASSESSMENT & PLAN NOTE
For ruptured cord with flail anterior MV leaflet. Off milrinone.  -Continue warfarin for INR goal ~3  -Nephrology was consulted and is managing diuresis

## 2024-10-15 NOTE — ASSESSMENT & PLAN NOTE
Stable, Remains in rate-controlled atrial fibrillation on telemetry  -Continue amiodarone drip at 0.5 mics per minute, still in A-fib  -continue amiodarone 200 mg tid. Continue taper at discharge per primary team  -continue metoprolol tartrate 50 mg bid  -Maintain K >4, Mg >2

## 2024-10-15 NOTE — PROGRESS NOTES
Progress Note - Cardiology   Name: Reuben García 69 y.o. 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 Off milrinone.  -Continue warfarin for INR goal ~3  -Nephrology was consulted and is managing diuresis  Nonrheumatic mitral valve regurgitation  S/p repair. See above  Postoperative atrial fibrillation (HCC)  Stable, Remains in rate-controlled atrial fibrillation on telemetry  -Continue amiodarone drip at 0.5 mics per minute, still in A-fib  -continue amiodarone 200 mg tid. Continue taper at discharge per primary team  -continue metoprolol tartrate 50 mg bid  -Maintain K >4, Mg >2  Coronary artery disease involving native coronary artery of native heart without angina pectoris  Hx ESTHER x2 to proximal and mid RCA  -continue atorvastatin 80 mg, clopidogrel 75 mg daily  -On warfarin. No aspirin to avoid triple AC therapy  Essential hypertension  -Continue metoprolol titrate 50 mg twice daily  -Avoid hypotension in setting of recent FRANCISCO  Gastroesophageal reflux disease without esophagitis  Stable.  Continue PPI in setting of warfarin and Plavix  Recurrent major depressive disorder, in remission (HCC)  Continue venlafaxine  Mixed hyperlipidemia  Continue atorvastatin 80 mg daily  S/P left atrial appendage ligation  Noted.  FRANCISCO (acute kidney injury) (HCC)  Likely inschemic due to operation and underlying comorbidities.  Anticoagulated on Coumadin  Warfarin as above    -----      Subjective:     Overnight events reviewed.   No acute events overnight.    Denies any chest pain or discomfort.  His main concern this morning is migraine headaches which she reports he would ask at home.    Telemetry shows rate controlled atrial fibrillation    INR 1.95  Cr improving, 1.08 today    I reviewed intra-op THOR 10/8.    Review of Systems   Constitutional:   "Negative for fatigue.   Respiratory:  Negative for chest tightness and shortness of breath.    Cardiovascular:  Negative for chest pain.         Objective:     Vitals:   Blood pressure 111/70, pulse 61, temperature 98.5 °F (36.9 °C), temperature source Oral, resp. rate 16, height 5' 2\" (1.575 m), weight 69.8 kg (153 lb 14.1 oz), SpO2 95%.  Body mass index is 28.15 kg/m².  Systolic (24hrs), Av , Min:105 , Max:131     Diastolic (24hrs), Av, Min:59, Max:74      Intake/Output Summary (Last 24 hours) at 10/15/2024 1237  Last data filed at 10/15/2024 1127  Gross per 24 hour   Intake 1447.59 ml   Output 1600 ml   Net -152.41 ml     Weight (last 2 days)       Date/Time Weight    10/15/24 0600 69.8 (153.88)    10/14/24 0639 69.5 (153.22)    10/13/24 0530 69.8 (153.88)              Physical Exam  Vitals and nursing note reviewed.   Constitutional:       General: He is not in acute distress.     Appearance: Normal appearance. He is well-developed.   HENT:      Head: Normocephalic and atraumatic.   Cardiovascular:      Rate and Rhythm: Normal rate and regular rhythm.      Heart sounds: No murmur heard.  Pulmonary:      Effort: Pulmonary effort is normal. No respiratory distress.      Breath sounds: Normal breath sounds.   Chest:      Comments: Incision clean, dry, intact  Abdominal:      General: There is no distension.   Musculoskeletal:         General: No swelling.      Cervical back: Neck supple.   Skin:     General: Skin is warm and dry.   Neurological:      Mental Status: He is alert.   Psychiatric:         Mood and Affect: Mood normal.         Labs & Results:    Lab Results   Component Value Date    SODIUM 134 (L) 10/15/2024    K 3.2 (L) 10/15/2024    CL 90 (L) 10/15/2024    CO2 38 (H) 10/15/2024    BUN 44 (H) 10/15/2024    CREATININE 1.08 10/15/2024    GLUC 90 10/15/2024    CALCIUM 8.0 (L) 10/15/2024     Lab Results   Component Value Date    WBC 8.40 10/14/2024    RBC 3.95 10/14/2024    HGB 12.0 10/14/2024    " "HCT 36.1 (L) 10/14/2024    MCV 91 10/14/2024    MCH 30.4 10/14/2024    RDW 15.6 (H) 10/14/2024     10/14/2024     Results from last 7 days   Lab Units 10/15/24  0430 10/14/24  0507 10/13/24  0348 10/09/24  0411 10/08/24  1415   PTT seconds  --   --   --   --  34   INR  1.95* 2.11* 2.31*   < > 1.34*    < > = values in this interval not displayed.       Available cardiology studies, imaging and lab results independently reviewed today.    Bette Jackson MD, PhD  Cardiology  [facility]    This note was completed in part utilizing voice recognition software.   Grammatical errors, random word insertion, spelling mistakes, occasional wrong word or \"sound-alike\" substitutions and incomplete sentences may be an occasional consequence of the system secondary to software limitations, ambient noise and hardware issues. At the time of dictation, efforts were made to edit, clarify and /or correct errors.  Please read the chart carefully and recognize, using context, where substitutions have occurred.  If you have any questions or concerns about the context, text or information contained within the body of this dictation, please contact myself, the provider, for further clarification.     "

## 2024-10-15 NOTE — PROGRESS NOTES
"  Progress Note - Nephrology   Reuben García 69 y.o. male MRN: [id number]  Unit/Bed#: PPHP-319-01 Encounter: [id number]      Assessment & Plan  FRANCISCO (acute kidney injury) (HCC)  Secondary to hemodynamic perturbations/ATN  Baseline creatinine of 0.8-1  Peak creatinine 2.99 mg/dL on 10/11/2024  Creatinine today is at 1.08 mg/dL, BUN without change  Essential hypertension  Blood pressure is currently acceptable  Home medications: Norvasc 10 mg p.o. daily, Cardura 2 mg p.o. daily, Toprol-XL 12.5 mg p.o. daily, torsemide 20 mg p.o. twice daily, valsartan 320 mg p.o. daily  Continue to hold home valsartan, Norvasc, Cardura for now  Hyperphosphatemia  Resolved  Now hypophosphatemic-will replete orally and trend level  S/P MVR (mitral valve repair)  Mitral regurgitation-status post MVR 10/8/2024   Hypokalemia  Supplement as needed and trying to keep potassium level greater than 4 to help resolve contraction alkalosis  Magnesium level is acceptable but if potassium continues to trend down, would start daily supplement  Metabolic alkalosis  Secondary to diuresis  Bicarbonate level has increased over the last 24 hours  Hold diuretic and monitor bicarbonate level  Will give 2 doses of Diamox       Plan:  Continue to hold loop diuretic given significant increase in bicarbonate and decrease in weight  Will give 2 doses of Diamox  Replete potassium level  Replete phosphorus level and trend  Repeat BMP in a.m. to monitor renal function, potassium level and phosphorus levels closely        Subjective:   The patient was seen and examined earlier this morning.  He denied shortness of breath, chest pain or pressure, abdominal pain, vomiting, diarrhea, sweats, chills, paroxysmal nocturnal dyspnea orthopnea.      Objective:     Vitals: Blood pressure 111/70, pulse 61, temperature 98.5 °F (36.9 °C), temperature source Oral, resp. rate 16, height 5' 2\" (1.575 m), weight 69.8 kg (153 lb 14.1 oz), SpO2 95%.,Body mass index is 28.15 kg/m².Temp " "(24hrs), Av.4 °F (36.9 °C), Min:98.3 °F (36.8 °C), Max:98.5 °F (36.9 °C)      Temp:  [98.3 °F (36.8 °C)-98.5 °F (36.9 °C)] 98.5 °F (36.9 °C)  HR:  [57-75] 61  Resp:  [12-16] 16  BP: (105-131)/(59-74) 111/70  SpO2:  [94 %-97 %] 95 %    Weight (last 2 days)       Date/Time Weight    10/15/24 0600 69.8 (153.88)    10/14/24 0639 69.5 (153.22)    10/13/24 0530 69.8 (153.88)                 I/O last 24 hours:  In: 1447.6 [P.O.:705; I.V.:742.6]  Out:  [Urine:]        Physical Exam    /70   Pulse 61   Temp 98.5 °F (36.9 °C) (Oral)   Resp 16   Ht 5' 2\" (1.575 m)   Wt 69.8 kg (153 lb 14.1 oz)   SpO2 95%   BMI 28.15 kg/m²   Vital signs were reviewed  Constitutional: The patient was awake, alert, pleasant, cooperative and in no apparent distress  Cardiovascular: No overt jugular venous distention was noted, S1-S2, no pericardial friction rub S3 appreciated, trace peripheral edema  Pulmonary: Adequate inspiratory effort, lungs were clear                Invasive Devices       Central Venous Catheter Line  Duration             CVC Central Lines 10/08/24 7 days                    Medications:    Scheduled Meds:  Current Facility-Administered Medications   Medication Dose Route Frequency Provider Last Rate    acetaminophen  650 mg Rectal Q4H PRN Nicolette Linder PA-C      acetaminophen  975 mg Oral Q6H While awake Nicolette Linder PA-C      albuterol  2 puff Inhalation TID PRN Nicolette Linder PA-C      amiodarone  200 mg Oral Q8H PAOLO Nicolette Linder PA-C      ARIPiprazole  2 mg Oral Daily Nicolette Linder PA-C      atorvastatin  80 mg Oral Daily With Dinner Nicolette Linder PA-C      bisacodyl  10 mg Rectal Daily PRN Nicolette Linder PA-C      chlorhexidine  15 mL Mouth/Throat BID Nicolette Linder PA-C      clopidogrel  75 mg Oral Daily Nicolette Linder PA-C      insulin lispro  1-6 Units Subcutaneous TID AC Nicolette Linder PA-C      insulin lispro  1-6 Units Subcutaneous HS Nicolette Linder PA-C      " lidocaine  2 patch Topical Daily Nicolette Linder PA-C      metoprolol tartrate  50 mg Oral Q12H PAOLO Nicolette Linder PA-C      naloxone  0.04 mg Intravenous Q1MIN PRN Nicolette Linder PA-C      ondansetron  4 mg Intravenous Q6H PRN Nicolette Linder PA-C      oxyCODONE  2.5 mg Oral Q4H PRN Nicolette Linder PA-C      Or    oxyCODONE  5 mg Oral Q4H PRN Nicolette Linder PA-C      pantoprazole  40 mg Oral Daily Nicolette Linder PA-C      potassium chloride  20 mEq Oral BID Armen Mcclendon MD      potassium chloride  20 mEq Intravenous Once Armen Mcclendon MD 20 mEq (10/15/24 0947)    potassium-sodium phosphateS  1 tablet Oral TID With Meals Armen Mcclendon MD      senna-docusate sodium  2 tablet Oral BID Nicolette Linder PA-C      SUMAtriptan  50 mg Oral Daily PRN Isabel Candelaria PA-C      temazepam  15 mg Oral HS PRN Nicolette Linder PA-C      triamcinolone   Topical BID PRN Nicolette Linder PA-C      venlafaxine  150 mg Oral Daily With Breakfast Nicolette Linder PA-C      warfarin  3 mg Oral Once (warfarin) Isabel Candelaria PA-C         PRN Meds:.  acetaminophen    albuterol    bisacodyl    naloxone    ondansetron    oxyCODONE **OR** oxyCODONE    SUMAtriptan    temazepam    triamcinolone    Continuous Infusions:         LAB RESULTS:      Results from last 7 days   Lab Units 10/15/24  0430 10/14/24  0507 10/13/24  1437 10/13/24  0348 10/12/24  2159 10/12/24  1545 10/12/24  0442 10/11/24  1146 10/11/24  0454 10/11/24  0414 10/11/24  0413 10/10/24  1029 10/10/24  0428 10/09/24  1252 10/09/24  0411 10/08/24  2231 10/08/24  1947 10/08/24  1417 10/08/24  1415 10/08/24  1214   0000   WBC Thousand/uL  --  8.40  --   --   --   --  8.74  --   --   --  11.71*  --  16.77*  --  10.88*  --   --   --   --   --   --    HEMOGLOBIN g/dL  --  12.0  --   --   --   --  11.4*  --   --   --  10.3*  --  11.5*  --  11.1* 11.5*  --   --  13.5  --   --    I STAT HEMOGLOBIN g/dl  --   --   --   --   --   --   --   --   --   --    --   --   --   --   --   --   --  12.6  --   --   --    HEMATOCRIT %  --  36.1*  --   --   --   --  34.5*  --   --   --  31.4*  --  35.4*  --  33.6* 34.8*  --   --  40.1  --   --    HEMATOCRIT, ISTAT %  --   --   --   --   --   --   --   --   --   --   --   --   --   --   --   --   --  37  --   --   --    PLATELETS Thousands/uL  --  262  --   --   --   --  249  --   --   --  211  --  220  --  160  --   --   --  240 141*  --    POTASSIUM mmol/L 3.2* 3.5 3.2* 3.3* 3.6 3.1* 4.1   < >  --  3.6  --    < > 4.3   < > 4.1 4.2   < >  --  3.4*  --   --    CHLORIDE mmol/L 90* 92* 92* 94* 94* 96 99   < >  --  101  --    < > 102   < > 107  --   --   --  109*  --    < >   CO2 mmol/L 38* 35* 36* 36* 33* 32 28   < >  --  24  --    < > 21   < > 22  --   --   --  24  --    < >   CO2, I-STAT mmol/L  --   --   --   --   --   --   --   --   --   --   --   --   --   --   --   --   --  26  --   --   --    BUN mg/dL 44* 44* 51* 49* 52* 52* 53*   < >  --  53*  --    < > 39*   < > 26*  --   --   --  21  --    < >   CREATININE mg/dL 1.08 1.24 1.46* 1.67* 1.87* 2.09* 2.50*   < >  --  2.99*  --    < > 2.31*   < > 1.49*  --   --   --  1.00  --    < >   CALCIUM mg/dL 8.0* 8.4 8.6 8.6 8.6 8.5 8.8   < >  --  8.5  --    < > 8.3*   < > 7.8*  --   --   --  7.7*  --    < >   ALK PHOS U/L  --   --   --   --   --   --   --   --  57  --   --   --   --   --   --   --   --   --   --   --   --    ALT U/L  --   --   --   --   --   --   --   --  7  --   --   --   --   --   --   --   --   --   --   --   --    AST U/L  --   --   --   --   --   --   --   --  41*  --   --   --   --   --   --   --   --   --   --   --   --    GLUCOSE, ISTAT mg/dl  --   --   --   --   --   --   --   --   --   --   --   --   --   --   --   --   --  211*  --   --   --    MAGNESIUM mg/dL 2.1 2.3  --  2.6  --   --  2.5  --   --  2.8*  --   --  2.9*  --   --   --   --   --   --   --   --    PHOSPHORUS mg/dL 2.2*  --   --  2.7  --   --  5.2*  --   --  8.5*  --   --   --   --   --   --    "--   --   --   --   --     < > = values in this interval not displayed.       CUTURES:  No results found for: \"BLOODCX\", \"URINECX\"              PLEASE NOTE:  This encounter was completed utilizing the [employer] Direct Speech Voice Recognition Software. Grammatical errors, random word insertions, pronoun errors and incomplete sentences are occasional consequences of the system due to software limitations, ambient noise and hardware issues.These may be missed by proof reading prior to affixing electronic signature. Any questions or concerns about the content, text or information contained within the body of this dictation should be directly addressed to the physician for clarification. Please do not hesitate to call me directly if you have any any questions or concerns.  "

## 2024-10-15 NOTE — ASSESSMENT & PLAN NOTE
Blood pressure is currently acceptable  Home medications: Norvasc 10 mg p.o. daily, Cardura 2 mg p.o. daily, Toprol-XL 12.5 mg p.o. daily, torsemide 20 mg p.o. twice daily, valsartan 320 mg p.o. daily  Continue to hold home valsartan, Norvasc, Cardura for now

## 2024-10-15 NOTE — ASSESSMENT & PLAN NOTE
Hx ESTHER x2 to proximal and mid RCA  -continue atorvastatin 80 mg, clopidogrel 75 mg daily  -On warfarin. No aspirin to avoid triple AC therapy

## 2024-10-15 NOTE — ASSESSMENT & PLAN NOTE
Secondary to diuresis  Bicarbonate level has increased over the last 24 hours  Hold diuretic and monitor bicarbonate level  Will give 2 doses of Diamox

## 2024-10-15 NOTE — RESTORATIVE TECHNICIAN NOTE
Restorative Technician Note      Patient Name: Reuben García     Restorative Tech Visit Date: 10/15/24  Note Type: Mobility  Patient Position Upon Consult: Bedside chair  Activity Performed: Ambulated  Assistive Device: Roller walker  Patient Position at End of Consult: Bed/Chair alarm activated; All needs within reach; Bedside chair

## 2024-10-15 NOTE — OCCUPATIONAL THERAPY NOTE
Occupational Therapy Progress Note     Patient Name: Reuben García  Today's Date: 10/15/2024  Problem List  Principal Problem:    S/P MVR (mitral valve repair)  Active Problems:    Anxiety    Essential hypertension    Gastroesophageal reflux disease without esophagitis    Coronary artery disease involving native coronary artery of native heart without angina pectoris    Nonrheumatic mitral valve regurgitation    Recurrent major depressive disorder, in remission (HCC)    Hypokalemia    Mixed hyperlipidemia    S/P left atrial appendage ligation    FRANCISCO (acute kidney injury) (HCC)    At risk for metabolic imbalance    Electrolyte imbalance risk    Hyperphosphatemia    Postoperative atrial fibrillation (HCC)    Anticoagulated on Coumadin    Metabolic alkalosis              10/15/24 1144   OT Last Visit   OT Visit Date 10/15/24   Note Type   Note Type Treatment   Pain Assessment   Pain Assessment Tool 0-10   Pain Score 6   Pain Location/Orientation Orientation: Mid;Location: Chest;Location: Incision   Patient's Stated Pain Goal No pain   Hospital Pain Intervention(s) Repositioned;Ambulation/increased activity   Restrictions/Precautions   Other Precautions Cardiac/sternal;Chair Alarm;Telemetry;Pain   Lifestyle   Autonomy Pta pt I with ADL, IADL and functional mobility, (+)    Reciprocal Relationships supportive family   Service to Others retired    Intrinsic Gratification enjoys gardening, cooking, and time with his 2 cats   ADL   Where Assessed Chair   LB Dressing Assistance 5  Supervision/Setup   LB Dressing Deficit Supervision/safety;Increased time to complete   LB Dressing Comments Pt adjusts socks using seated figure 4 technique.   Bed Mobility   Additional Comments Pt greeted OOB in chair, left in chair with alarm on and all needs within reach.   Transfers   Sit to Stand 5  Supervision   Additional items Verbal cues   Stand to Sit 5  Supervision   Additional items Verbal cues   Additional  Comments with RW   Functional Mobility   Functional Mobility 5  Supervision   Additional Comments Pt performs household distance mobility, with supervision with RW.   Additional items Rolling walker   Cognition   Overall Cognitive Status WFL   Arousal/Participation Cooperative;Alert   Attention Attends with cues to redirect   Orientation Level Oriented to person;Oriented to place;Oriented to situation   Memory Decreased recall of precautions;Decreased short term memory   Following Commands Follows one step commands without difficulty   Comments Pt cooperative to therapy although requiring frequent vc for maintenance of cardiac precautions. WIll have adequate support at home.   Activity Tolerance   Activity Tolerance Patient limited by fatigue;Patient limited by pain;Treatment limited secondary to medical complications (Comment)  (decreased HR into 40s at times, RN aware)   Medical Staff Made Aware RN cleared for therapy, co-tx with DPT due to medical complexity   Assessment   Assessment Pt seen for OT treatment session day 2 on this date focused on ADL retraining, functional transfers and mobility, energy conservation, functional endurance, recall of safety precautions. Pt was greeted in chair and was cooperative throughout session. Following session, pt was left in chair with chair alarm on and all needs within reach. Pt demonstrates improvements in LB dressing performing with supervision, also progresses to supervision with RW for transfers/FM.   The patient's raw score on the -PAC Daily Activity Inpatient Short Form is 21. A raw score of greater than or equal to 19 suggests the patient may benefit from discharge to home. Please refer to the recommendation of the Occupational Therapist for safe discharge planning. Pt is expected to continue to progress and has adequate assist at home, indicating no further acute OT needs at this time, d/c acute OT. Recommend d/c to home with increased social support, level III  services.   Plan   Treatment Interventions ADL retraining;Functional transfer training;Endurance training;Continued evaluation;Energy conservation   Goal Expiration Date 10/23/24   OT Treatment Day 2   OT Frequency Other (comment)  (d/c acute OT)   Discharge Recommendation   Rehab Resource Intensity Level, OT III (Minimum Resource Intensity)   AM-PAC Daily Activity Inpatient   Lower Body Dressing 3   Bathing 3   Toileting 3   Upper Body Dressing 4   Grooming 4   Eating 4   Daily Activity Raw Score 21   Daily Activity Standardized Score (Calc for Raw Score >=11) 44.27   AM-PAC Applied Cognition Inpatient   Following a Speech/Presentation 4   Understanding Ordinary Conversation 4   Taking Medications 3   Remembering Where Things Are Placed or Put Away 3   Remembering List of 4-5 Errands 3   Taking Care of Complicated Tasks 3   Applied Cognition Raw Score 20   Applied Cognition Standardized Score 41.76   End of Consult   Education Provided Yes   Patient Position at End of Consult Bedside chair;Bed/Chair alarm activated;All needs within reach   Nurse Communication Nurse aware of consult         RADHA Sykes, OTR/L

## 2024-10-15 NOTE — PHYSICAL THERAPY NOTE
PHYSICAL THERAPY NOTE          Patient Name: Reuben García  Today's Date: 10/15/2024       10/15/24 1143   PT Last Visit   PT Visit Date 10/15/24   Note Type   Note Type Treatment   Pain Assessment   Pain Assessment Tool FLACC   Pain Location/Orientation Location: Head   Pain Onset/Description Onset: Ongoing;Frequency: Constant/Continuous;Descriptor: Aching;Descriptor: Discomfort;Descriptor: Headache   Effect of Pain on Daily Activities no increased pain   Patient's Stated Pain Goal No pain   Hospital Pain Intervention(s) Emotional support   Pain Rating: FLACC (Rest) - Face 1   Pain Rating: FLACC (Rest) - Legs 0   Pain Rating: FLACC (Rest) - Activity 0   Pain Rating: FLACC (Rest) - Cry 0   Pain Rating: FLACC (Rest) - Consolability 1   Score: FLACC (Rest) 2   Pain Rating: FLACC (Activity) - Face 1   Pain Rating: FLACC (Activity) - Legs 0   Pain Rating: FLACC (Activity) - Activity 0   Pain Rating: FLACC (Activity) - Cry 0   Pain Rating: FLACC (Activity) - Consolability 1   Score: FLACC (Activity) 2   Restrictions/Precautions   Other Precautions Cardiac/sternal;Cognitive;Chair Alarm;Telemetry   General   Chart Reviewed Yes   Additional Pertinent History cleared for Tx session by nsg   Response to Previous Treatment Patient with no complaints from previous session.   Cognition   Overall Cognitive Status WFL   Arousal/Participation Alert;Cooperative   Attention Attends with cues to redirect   Orientation Level Oriented to person;Oriented to place;Oriented to situation   Memory Decreased short term memory;Decreased recall of precautions   Following Commands Follows one step commands without difficulty   Subjective   Subjective Alert; in the chair; reports HA; agreeable to amb and try steps   Transfers   Sit to Stand 5  Supervision   Stand to Sit 5  Supervision   Ambulation/Elevation   Gait pattern Short stride;Foward flexed;Inconsistent  eze;Excessively slow   Gait Assistance 5  Supervision   Additional items Verbal cues   Assistive Device Rolling walker   Distance 2 x 60 ft w/ seated rest periods and steps negotiation in between   Stair Management Assistance 4  Minimal assist   Additional items Assist x 1;Verbal cues;Tactile cues   Stair Management Technique Step to pattern;Foreward;Backward;Nonreciprocal;With walker   Number of Stairs 2  (1 step x 2 trials)   Balance   Static Sitting Fair +   Dynamic Sitting Fair   Static Standing Fair   Dynamic Standing Fair -   Ambulatory Fair -   Activity Tolerance   Activity Tolerance Patient limited by fatigue   Medical Staff Made Aware Co-Tx performed w/ OTR due to complexity of medical status   Nurse Made Aware spoke to KAYLEE Luna   Assessment   Prognosis Good   Problem List Decreased strength;Decreased endurance;Impaired balance;Decreased mobility   Assessment Pt was able to achieve (S) level w/ transfers and amb w/ rw w/ close guarding required on the steps; rest periods provided and pt remained in NAD. Will cont to follow pt in PT for graded mobilization to max endurance and safety; D/C recommendations are listed below   Barriers to Discharge None   Goals   Patient Goals to go home   STG Expiration Date 10/19/24   PT Treatment Day 2   Plan   Treatment/Interventions Functional transfer training;LE strengthening/ROM;Elevations;Therapeutic exercise;Endurance training;Bed mobility;Gait training;Equipment eval/education;Spoke to nursing;OT   Progress Improving as expected   PT Frequency 4-6x/wk   Discharge Recommendation   Rehab Resource Intensity Level, PT III (Minimum Resource Intensity)   Equipment Recommended Walker   Walker Package Recommended Wheeled walker   Change/add to Walker Package? No   AM-PAC Basic Mobility Inpatient   Turning in Flat Bed Without Bedrails 4   Lying on Back to Sitting on Edge of Flat Bed Without Bedrails 3   Moving Bed to Chair 3   Standing Up From Chair Using Arms 3   Walk in  Room 3   Climb 3-5 Stairs With Railing 3   Basic Mobility Inpatient Raw Score 19   Basic Mobility Standardized Score 42.48   [facility] Highest Level Of Mobility   -HLM Goal 6: Walk 10 steps or more   -HLM Achieved 7: Walk 25 feet or more   Education   Education Provided Mobility training;Assistive device   Patient Demonstrates verbal understanding;Reinforcement needed   End of Consult   Patient Position at End of Consult Bedside chair;Bed/Chair alarm activated;All needs within reach     Binh Dawson

## 2024-10-15 NOTE — PLAN OF CARE
Problem: NEUROSENSORY - ADULT  Goal: Achieves stable or improved neurological status  Description: INTERVENTIONS  - Monitor and report changes in neurological status  - Monitor vital signs such as temperature, blood pressure, glucose, and any other labs ordered   - Initiate measures to prevent increased intracranial pressure  - Monitor for seizure activity and implement precautions if appropriate      Outcome: Progressing     Problem: CARDIOVASCULAR - ADULT  Goal: Maintains optimal cardiac output and hemodynamic stability  Description: INTERVENTIONS:  - Monitor I/O, vital signs and rhythm  - Monitor for S/S and trends of decreased cardiac output  - Administer and titrate ordered vasoactive medications to optimize hemodynamic stability  - Assess quality of pulses, skin color and temperature  - Assess for signs of decreased coronary artery perfusion  - Instruct patient to report change in severity of symptoms  Outcome: Progressing     Problem: SKIN/TISSUE INTEGRITY - ADULT  Goal: Incision(s), wounds(s) or drain site(s) healing without S/S of infection  Description: INTERVENTIONS  - Assess and document dressing, incision, wound bed, drain sites and surrounding tissue  - Provide patient and family education  - Perform skin care/dressing changes every  Problem: SAFETY ADULT  Goal: Maintain or return to baseline ADL function  Description: INTERVENTIONS:  -  Assess patient's ability to carry out ADLs; assess patient's baseline for ADL function and identify physical deficits which impact ability to perform ADLs (bathing, care of mouth/teeth, toileting, grooming, dressing, etc.)  - Assess/evaluate cause of self-care deficits   - Assess range of motion  - Assess patient's mobility; develop plan if impaired  - Assess patient's need for assistive devices and provide as appropriate  - Encourage maximum independence but intervene and supervise when necessary  - Involve family in performance of ADLs  - Assess for home care needs  following discharge   - Consider OT consult to assist with ADL evaluation and planning for discharge  - Provide patient education as appropriate  Outcome: Progressing     Outcome: Progressing

## 2024-10-15 NOTE — PLAN OF CARE
Problem: PHYSICAL THERAPY ADULT  Goal: Performs mobility at highest level of function for planned discharge setting.  See evaluation for individualized goals.  Description: Treatment/Interventions: Functional transfer training, LE strengthening/ROM, Elevations, Therapeutic exercise, Endurance training, Equipment eval/education, Bed mobility, Gait training, Spoke to nursing, OT  Equipment Recommended: Walker       See flowsheet documentation for full assessment, interventions and recommendations.  Outcome: Progressing  Note: Prognosis: Good  Problem List: Decreased strength, Decreased endurance, Impaired balance, Decreased mobility  Assessment: Pt was able to achieve (S) level w/ transfers and amb w/ rw w/ close guarding required on the steps; rest periods provided and pt remained in NAD. Will cont to follow pt in PT for graded mobilization to max endurance and safety; D/C recommendations are listed below  Barriers to Discharge: None     Rehab Resource Intensity Level, PT: III (Minimum Resource Intensity)    See flowsheet documentation for full assessment.

## 2024-10-15 NOTE — PLAN OF CARE
Problem: OCCUPATIONAL THERAPY ADULT  Goal: Performs self-care activities at highest level of function for planned discharge setting.  See evaluation for individualized goals.  Description: Treatment Interventions: ADL retraining, Functional transfer training, Endurance training, Patient/family training, Continued evaluation, Cardiac education, Energy conservation          See flowsheet documentation for full assessment, interventions and recommendations.   Outcome: Completed  Note: Limitation: Decreased ADL status, Decreased Safe judgement during ADL, Decreased endurance, Decreased self-care trans, Decreased high-level ADLs  Prognosis: Good  Assessment: Pt seen for OT treatment session day 2 on this date focused on ADL retraining, functional transfers and mobility, energy conservation, functional endurance, recall of safety precautions. Pt was greeted in chair and was cooperative throughout session. Following session, pt was left in chair with chair alarm on and all needs within reach. Pt demonstrates improvements in LB dressing performing with supervision, also progresses to supervision with RW for transfers/FM.   The patient's raw score on the -PAC Daily Activity Inpatient Short Form is 21. A raw score of greater than or equal to 19 suggests the patient may benefit from discharge to home. Please refer to the recommendation of the Occupational Therapist for safe discharge planning. Pt is expected to continue to progress and has adequate assist at home, indicating no further acute OT needs at this time, d/c acute OT. Recommend d/c to home with increased social support, level III services.     Rehab Resource Intensity Level, OT: III (Minimum Resource Intensity)

## 2024-10-15 NOTE — ASSESSMENT & PLAN NOTE
Supplement as needed and trying to keep potassium level greater than 4 to help resolve contraction alkalosis  Magnesium level is acceptable but if potassium continues to trend down, would start daily supplement

## 2024-10-16 ENCOUNTER — TELEPHONE (OUTPATIENT)
Dept: CARDIOLOGY CLINIC | Facility: CLINIC | Age: 69
End: 2024-10-16

## 2024-10-16 VITALS
TEMPERATURE: 97.4 F | OXYGEN SATURATION: 96 % | RESPIRATION RATE: 16 BRPM | SYSTOLIC BLOOD PRESSURE: 104 MMHG | HEART RATE: 62 BPM | HEIGHT: 62 IN | DIASTOLIC BLOOD PRESSURE: 56 MMHG | WEIGHT: 150.57 LBS | BODY MASS INDEX: 27.71 KG/M2

## 2024-10-16 LAB
ANION GAP SERPL CALCULATED.3IONS-SCNC: 8 MMOL/L (ref 4–13)
APOB+LDLR+PCSK9 GENE MUT ANL BLD/T: NOT DETECTED
BRCA1+BRCA2 DEL+DUP + FULL MUT ANL BLD/T: NOT DETECTED
BUN SERPL-MCNC: 31 MG/DL (ref 5–25)
CALCIUM SERPL-MCNC: 8.1 MG/DL (ref 8.4–10.2)
CHLORIDE SERPL-SCNC: 95 MMOL/L (ref 96–108)
CO2 SERPL-SCNC: 31 MMOL/L (ref 21–32)
CREAT SERPL-MCNC: 1.01 MG/DL (ref 0.6–1.3)
GFR SERPL CREATININE-BSD FRML MDRD: 76 ML/MIN/1.73SQ M
GLUCOSE SERPL-MCNC: 100 MG/DL (ref 65–140)
GLUCOSE SERPL-MCNC: 113 MG/DL (ref 65–140)
GLUCOSE SERPL-MCNC: 56 MG/DL (ref 65–140)
GLUCOSE SERPL-MCNC: 82 MG/DL (ref 65–140)
GLUCOSE SERPL-MCNC: 94 MG/DL (ref 65–140)
INR PPP: 2.37 (ref 0.85–1.19)
MAGNESIUM SERPL-MCNC: 2.3 MG/DL (ref 1.9–2.7)
MLH1+MSH2+MSH6+PMS2 GN DEL+DUP+FUL M: NOT DETECTED
PHOSPHATE SERPL-MCNC: 3.8 MG/DL (ref 2.3–4.1)
POTASSIUM SERPL-SCNC: 3.1 MMOL/L (ref 3.5–5.3)
PROTHROMBIN TIME: 25.8 SECONDS (ref 12.3–15)
SODIUM SERPL-SCNC: 134 MMOL/L (ref 135–147)

## 2024-10-16 PROCEDURE — 99024 POSTOP FOLLOW-UP VISIT: CPT | Performed by: PHYSICIAN ASSISTANT

## 2024-10-16 PROCEDURE — 85610 PROTHROMBIN TIME: CPT | Performed by: PHYSICIAN ASSISTANT

## 2024-10-16 PROCEDURE — 82948 REAGENT STRIP/BLOOD GLUCOSE: CPT

## 2024-10-16 PROCEDURE — 84100 ASSAY OF PHOSPHORUS: CPT | Performed by: INTERNAL MEDICINE

## 2024-10-16 PROCEDURE — 83735 ASSAY OF MAGNESIUM: CPT | Performed by: INTERNAL MEDICINE

## 2024-10-16 PROCEDURE — 80048 BASIC METABOLIC PNL TOTAL CA: CPT | Performed by: INTERNAL MEDICINE

## 2024-10-16 PROCEDURE — 99232 SBSQ HOSP IP/OBS MODERATE 35: CPT | Performed by: INTERNAL MEDICINE

## 2024-10-16 RX ORDER — OXYCODONE HYDROCHLORIDE 5 MG/1
TABLET ORAL
Qty: 30 TABLET | Refills: 0 | Status: SHIPPED | OUTPATIENT
Start: 2024-10-16 | End: 2024-10-26

## 2024-10-16 RX ORDER — METOPROLOL TARTRATE 25 MG/1
25 TABLET, FILM COATED ORAL EVERY 12 HOURS SCHEDULED
Qty: 60 TABLET | Refills: 2 | Status: SHIPPED | OUTPATIENT
Start: 2024-10-16

## 2024-10-16 RX ORDER — POTASSIUM CHLORIDE 1500 MG/1
40 TABLET, EXTENDED RELEASE ORAL 2 TIMES DAILY
Status: DISCONTINUED | OUTPATIENT
Start: 2024-10-16 | End: 2024-10-16

## 2024-10-16 RX ORDER — POTASSIUM CHLORIDE 29.8 MG/ML
40 INJECTION INTRAVENOUS ONCE
Status: DISCONTINUED | OUTPATIENT
Start: 2024-10-16 | End: 2024-10-16

## 2024-10-16 RX ORDER — METOPROLOL TARTRATE 25 MG/1
25 TABLET, FILM COATED ORAL EVERY 12 HOURS SCHEDULED
Status: DISCONTINUED | OUTPATIENT
Start: 2024-10-16 | End: 2024-10-16 | Stop reason: HOSPADM

## 2024-10-16 RX ORDER — POTASSIUM CHLORIDE 1500 MG/1
20 TABLET, EXTENDED RELEASE ORAL DAILY
Qty: 5 TABLET | Refills: 1 | Status: SHIPPED | OUTPATIENT
Start: 2024-10-16 | End: 2024-10-23

## 2024-10-16 RX ORDER — POTASSIUM CHLORIDE 1500 MG/1
40 TABLET, EXTENDED RELEASE ORAL
Status: DISCONTINUED | OUTPATIENT
Start: 2024-10-16 | End: 2024-10-16 | Stop reason: HOSPADM

## 2024-10-16 RX ORDER — AMIODARONE HYDROCHLORIDE 200 MG/1
TABLET ORAL
Qty: 42 TABLET | Refills: 0 | Status: SHIPPED | OUTPATIENT
Start: 2024-10-16

## 2024-10-16 RX ORDER — TORSEMIDE 10 MG/1
10 TABLET ORAL DAILY
Qty: 5 TABLET | Refills: 1 | Status: SHIPPED | OUTPATIENT
Start: 2024-10-16 | End: 2024-10-23

## 2024-10-16 RX ORDER — ACETAMINOPHEN 325 MG/1
TABLET ORAL
COMMUNITY
Start: 2024-10-16

## 2024-10-16 RX ORDER — PANTOPRAZOLE SODIUM 40 MG/1
40 TABLET, DELAYED RELEASE ORAL DAILY
Qty: 30 TABLET | Refills: 0 | Status: SHIPPED | OUTPATIENT
Start: 2024-10-17 | End: 2024-11-16

## 2024-10-16 RX ORDER — WARFARIN SODIUM 1 MG/1
TABLET ORAL
Qty: 60 TABLET | Refills: 2 | Status: SHIPPED | OUTPATIENT
Start: 2024-10-16

## 2024-10-16 RX ADMIN — POTASSIUM CHLORIDE 40 MEQ: 1500 TABLET, EXTENDED RELEASE ORAL at 13:00

## 2024-10-16 RX ADMIN — AMIODARONE HYDROCHLORIDE 200 MG: 200 TABLET ORAL at 05:51

## 2024-10-16 RX ADMIN — OXYCODONE HYDROCHLORIDE 5 MG: 5 TABLET ORAL at 13:14

## 2024-10-16 RX ADMIN — POTASSIUM CHLORIDE 40 MEQ: 1500 TABLET, EXTENDED RELEASE ORAL at 07:57

## 2024-10-16 RX ADMIN — POTASSIUM CHLORIDE 40 MEQ: 1500 TABLET, EXTENDED RELEASE ORAL at 17:02

## 2024-10-16 RX ADMIN — Medication 2.5 MG: at 03:21

## 2024-10-16 RX ADMIN — PANTOPRAZOLE SODIUM 40 MG: 40 TABLET, DELAYED RELEASE ORAL at 05:50

## 2024-10-16 RX ADMIN — ACETAMINOPHEN 975 MG: 325 TABLET, FILM COATED ORAL at 07:57

## 2024-10-16 RX ADMIN — CHLORHEXIDINE GLUCONATE 0.12% ORAL RINSE 15 ML: 1.2 LIQUID ORAL at 09:24

## 2024-10-16 RX ADMIN — OXYCODONE HYDROCHLORIDE 5 MG: 5 TABLET ORAL at 17:36

## 2024-10-16 RX ADMIN — ATORVASTATIN CALCIUM 80 MG: 80 TABLET, FILM COATED ORAL at 17:02

## 2024-10-16 RX ADMIN — VENLAFAXINE HYDROCHLORIDE 150 MG: 150 CAPSULE, EXTENDED RELEASE ORAL at 07:59

## 2024-10-16 RX ADMIN — ONDANSETRON 4 MG: 2 INJECTION INTRAMUSCULAR; INTRAVENOUS at 13:15

## 2024-10-16 RX ADMIN — AMIODARONE HYDROCHLORIDE 200 MG: 200 TABLET ORAL at 13:15

## 2024-10-16 RX ADMIN — ACETAMINOPHEN 975 MG: 325 TABLET, FILM COATED ORAL at 13:15

## 2024-10-16 RX ADMIN — METOPROLOL TARTRATE 25 MG: 25 TABLET, FILM COATED ORAL at 09:24

## 2024-10-16 RX ADMIN — ARIPIPRAZOLE 2 MG: 2 TABLET ORAL at 09:24

## 2024-10-16 RX ADMIN — CLOPIDOGREL BISULFATE 75 MG: 75 TABLET ORAL at 09:24

## 2024-10-16 NOTE — PROGRESS NOTES
Progress Note - Cardiac Surgery   Reuben García 69 y.o. male MRN: [id number]  Unit/Bed#: PPHP-319-01 Encounter: [id number]    Mitral regurgitation. S/P left atrial appendage ligation and mitral valve repair; POD # 8      24 Hour Events: Amio gtt discontinued yesterday. Bradycardic with HR in mid 40's overnight. Given Diamox x 2 doses yesterday. Reports migraine is improved. C/O back discomfort today.     Medications:   Scheduled Meds:  Current Facility-Administered Medications   Medication Dose Route Frequency Provider Last Rate    acetaminophen  650 mg Rectal Q4H PRN Nicolette Linder PA-C      acetaminophen  975 mg Oral Q6H While awake Nicolette Linder PA-C      albuterol  2 puff Inhalation TID PRN Nicolette Linder PA-C      amiodarone  200 mg Oral Q8H PAOLO Nicolette Linder PA-C      ARIPiprazole  2 mg Oral Daily Nicolette Linder PA-C      atorvastatin  80 mg Oral Daily With Dinner Nicolette Linder PA-C      bisacodyl  10 mg Rectal Daily PRN Nicolette Linder PA-C      chlorhexidine  15 mL Mouth/Throat BID Nicolette Linder PA-C      clopidogrel  75 mg Oral Daily Nicolette Linder PA-C      insulin lispro  1-6 Units Subcutaneous TID AC Nicolette Linder PA-C      insulin lispro  1-6 Units Subcutaneous HS Nicolette Linder PA-C      lidocaine  2 patch Topical Daily Nicolette Linder PA-C      metoprolol tartrate  50 mg Oral Q12H [facility] Nicolette Linder PA-C      naloxone  0.04 mg Intravenous Q1MIN PRN Nicolette Linder PA-C      ondansetron  4 mg Intravenous Q6H PRN Nicolette Linder PA-C      oxyCODONE  2.5 mg Oral Q4H PRN Nicolette Linder PA-C      Or    oxyCODONE  5 mg Oral Q4H PRN Nicolette Linder PA-C      pantoprazole  40 mg Oral Daily Nicolette Linder PA-C      potassium chloride  20 mEq Oral BID Armen Mcclendon MD      potassium-sodium phosphateS  1 tablet Oral TID With Meals Armen Mcclendon MD      senna-docusate sodium  2 tablet Oral BID Nicolette Linder PA-C      temazepam  15 mg Oral HS PRN Nicolette ABARCA  GABRIELE Linder      triamcinolone   Topical BID PRN Nicolette Linder PA-C      venlafaxine  150 mg Oral Daily With Breakfast Nicolette Linder PA-C       Continuous Infusions:     PRN Meds:.  acetaminophen    albuterol    bisacodyl    naloxone    ondansetron    oxyCODONE **OR** oxyCODONE    temazepam    triamcinolone    Vitals:   Vitals:    10/15/24 1950 10/15/24 2246 10/16/24 0222 10/16/24 0600   BP: 129/80 111/69 99/55    BP Location:       Pulse: 62 57 62    Resp: 20 18 16    Temp: 98.3 °F (36.8 °C) 98.3 °F (36.8 °C) 97.9 °F (36.6 °C)    TempSrc:       SpO2: 96% 97% 97%    Weight:    68.3 kg (150 lb 9.2 oz)   Height:           Telemetry: Atrial Fibrillation; Heart Rate: 84    Respiratory:   SpO2: SpO2: 97 %; Room Air    Intake/Output:     Intake/Output Summary (Last 24 hours) at 10/16/2024 0727  Last data filed at 10/16/2024 0301  Gross per 24 hour   Intake 1112.59 ml   Output 2375 ml   Net -1262.41 ml        Weights:   Weight (last 2 days)       Date/Time Weight    10/16/24 0600 68.3 (150.57)    10/15/24 0600 69.8 (153.88)    10/14/24 0639 69.5 (153.22)            Chest tube Output: removed      Results:   Results from last 7 days   Lab Units 10/14/24  0507 10/12/24  0442 10/11/24  0413   WBC Thousand/uL 8.40 8.74 11.71*   HEMOGLOBIN g/dL 12.0 11.4* 10.3*   HEMATOCRIT % 36.1* 34.5* 31.4*   PLATELETS Thousands/uL 262 249 211     Results from last 7 days   Lab Units 10/16/24  0323 10/15/24  0430 10/14/24  0507   SODIUM mmol/L 134* 134* 137   POTASSIUM mmol/L 3.1* 3.2* 3.5   CHLORIDE mmol/L 95* 90* 92*   CO2 mmol/L 31 38* 35*   BUN mg/dL 31* 44* 44*   CREATININE mg/dL 1.01 1.08 1.24   CALCIUM mg/dL 8.1* 8.0* 8.4     Recent Labs     10/16/24  0323   MG 2.3     Results from last 7 days   Lab Units 10/15/24  0430 10/14/24  0507 10/13/24  0348   INR  1.95* 2.11* 2.31*         Date:   INR:  Coumadin Dose:  10/16    -  10/15  1.95  3  10/14  2.11  2  10/13  2.31  2  10/12  2.44  1  10/11  2.20  1  10/10  1.33  2      Point  of care glucose: BS 99 - 156  1 unit SSIC/24 hrs    Studies:  No studies past 24 hrs        Invasive Lines/Tubes:  Invasive Devices       Peripheral Intravenous Line  Duration             Peripheral IV 10/16/24 Distal;Right;Upper;Ventral (anterior) Arm <1 day                    Physical Exam:    General: No acute distress, Alert, and Normal appearance  HEENT/NECK:  Normocephalic. Atraumatic.  No jugular venous distention.    Cardiac: Irregularly irregular rate and rhythm  Pulmonary:  Breath sounds clear bilaterally and No rales/rhonchi/wheezes  Abdomen:  Non-tender, Non-distended, and Normal bowel sounds  Incisions: Sternum is stable.  Incision is clean, dry, and intact.   Extremities: Extremities warm/dry and No edema B/L  Neuro: Alert and oriented X 3, Sensation is grossly intact, and No focal deficits  Skin: Warm/Dry, without rashes or lesions.          Assessment:  Principal Problem:    S/P MVR (mitral valve repair)  Active Problems:    Anxiety    Essential hypertension    Gastroesophageal reflux disease without esophagitis    Coronary artery disease involving native coronary artery of native heart without angina pectoris    Nonrheumatic mitral valve regurgitation    Recurrent major depressive disorder, in remission (HCC)    Hypokalemia    Mixed hyperlipidemia    S/P left atrial appendage ligation    FRANCISCO (acute kidney injury) (HCC)    At risk for metabolic imbalance    Electrolyte imbalance risk    Hyperphosphatemia    Postoperative atrial fibrillation (HCC)    Anticoagulated on Coumadin    Metabolic alkalosis       Mitral regurgitation. S/P left atrial appendage ligation and mitral valve repair; POD # 8    Plan:    Cardiac:     Elective surgical admission  Normal ventricular systolic function, EF 69%    Atrial Fibrillation; HR well-controlled  BP well-controlled    Bradycardia - Decrease Lopressor,to 25mg PO BID    Hold ACE inhibitor/ARB secondary to renal dysfunction    Continue prophylactic Amiodarone, 200 mg  PO TID    Anticoagulated for postop [employer]  INR pending, Dose Coumadin based on results today  Discontinued Amio infusion, continue PO dosing with taper upon discharge.    Continue Plavix for ESTHER placed in 2022 and Coumadin therapy. No ASA.     Continue Statin therapy    Epicardial pacing wires have been removed    Removed central IV access     Continue Coumadin for DVT prophylaxis    Pulmonary:     Good Room air oxygen saturation; Continue incentive spirometry/Coughing/Deep breathing exercises    Chest tubes have been discontinued    Renal:     Normal preoperative renal function  Postoperative FRANCISCO, with creatinine 1.01 from 1.08, from peak of 3.05    Intake/Output net: -1262 mL/24 hours    Diuretic Regimen:  Diuretics held. Continue to follow Nephrology recommendations.    Diamox x 2 yesterday for metabolic acidosis related to diuresis    Hypokalemia - Increase Potassium Chloride 40 mEq PO TID. Continue Kphos TID   Give extra KCl 40meq IVBP x 1 today    Neuro:    Neurologically intact; No active issues     Incisional pain well controlled   Continue tylenol, 975 mg PO q 8, standing dose   Continue oxycodone, 2.5 to 5 mg PO q 4 hours prn pain   Continue topical Lidocaine patch to affected area    Migraine - continue Imitrex 50mg PO daily PRN (home med)    GI:    Cardiac diet, with 1800 mL fluid restriction    Tolerating diet without complaint    Continue stool softeners and prn suppository    Continue GI prophylaxis    Endo:     Pre-Op Hgb A1C: 5.1    Serum glucose well controlled on insulin sliding scale coverage  Not on injectable insulin therapy prior to open heart surgery    7    Hematology:     Post-operative blood count acceptable; Trend prn    8.   Disposition:        Following daily PT/OT recommendations regarding home vs. rehab when medically cleared for discharge    Not yet medically cleared for discharge, pending medical optimization, rhythm control    Anticipated discharge date: today vs next 24 hrs      VTE  Pharmacologic Prophylaxis: Warfarin (Coumadin)  VTE Mechanical Prophylaxis: sequential compression device    Collaborative rounds completed with supervising physician  Plan of care discussed with bedside nurse    SIGNATURE: Isabel Candelaria PA-C  DATE: October 16, 2024  TIME: 7:27 AM

## 2024-10-16 NOTE — PLAN OF CARE
Problem: Prexisting or High Potential for Compromised Skin Integrity  Goal: Skin integrity is maintained or improved  Description: INTERVENTIONS:  - Identify patients at risk for skin breakdown  - Assess and monitor skin integrity  - Assess and monitor nutrition and hydration status  - Monitor labs   - Assess for incontinence   - Turn and reposition patient  - Assist with mobility/ambulation  - Relieve pressure over bony prominences  - Avoid friction and shearing  - Provide appropriate hygiene as needed including keeping skin clean and dry  - Evaluate need for skin moisturizer/barrier cream  - Collaborate with interdisciplinary team   - Patient/family teaching  - Consider wound care consult   Outcome: Progressing     Problem: NEUROSENSORY - ADULT  Goal: Achieves maximal functionality and self care  Description: INTERVENTIONS  - Monitor swallowing and airway patency with patient fatigue and changes in neurological status  - Encourage and assist patient to increase activity and self care.   - Encourage visually impaired, hearing impaired and aphasic patients to use assistive/communication devices  Outcome: Progressing     Problem: SKIN/TISSUE INTEGRITY - ADULT  Goal: Incision(s), wounds(s) or drain site(s) healing without S/S of infection  Description: INTERVENTIONS  - Assess and document dressing, incision, wound bed, drain sites and surrounding tissue

## 2024-10-16 NOTE — ASSESSMENT & PLAN NOTE
Secondary to diuresis  Bicarbonate level has improved over the last 24 hours after administration of Diamox

## 2024-10-16 NOTE — PROGRESS NOTES
Progress Note - Nephrology   Reuben García 69 y.o. male MRN: [id number]  Unit/Bed#: PPHP-319-01 Encounter: [id number]      Assessment & Plan  FRANCISCO (acute kidney injury) (HCC)  Secondary to hemodynamic perturbations/ATN  Baseline creatinine of 0.8-1  Peak creatinine 2.99 mg/dL on 10/11/2024  Creatinine today is at 1.01 mg/dL, BUN without change  Essential hypertension  Blood pressure is currently acceptable  Home medications: Norvasc 10 mg p.o. daily, Cardura 2 mg p.o. daily, Toprol-XL 12.5 mg p.o. daily, torsemide 20 mg p.o. twice daily, valsartan 320 mg p.o. daily  Continue to hold home valsartan, Norvasc, Cardura for now  Hyperphosphatemia  Resolved  Hypophosphatemia-resolved as well after supplementation  S/P MVR (mitral valve repair)  Mitral regurgitation-status post MVR 10/8/2024   Hypokalemia  Supplement as needed and trying to keep potassium level greater than 4 to help resolve contraction alkalosis  Metabolic alkalosis  Secondary to diuresis  Bicarbonate level has improved over the last 24 hours after administration of Diamox     Disposition:  The patient can be discharged from a renal standpoint.  Would recheck potassium level prior to discharge to ensure it has normalized  The patient should avoid nonsteroidals  Outpatient diuretic regimen per cardiology  The patient does not need to follow-up with nephrology.  He can follow-up with his PCP and cardiologist  Would repeat BMP this coming Monday  Would hold valsartan, Norvasc and Cardura on discharge.  The patient should monitor his blood pressures at home  The patient should see his PCP/cardiology early next week    Outpatient care coordination was reviewed with the CT surgery team today.  There was agreement with discharge plan      Subjective:   The patient was seen and examined earlier this morning.  Overall, he felt well except for some occasional chest discomfort with coughing.  The cough is nonproductive.  He denied any shortness of breath, paroxysmal  "nocturnal dyspnea, orthopnea, abdominal pain, chills or sweats.      Objective:     Vitals: Blood pressure 92/54, pulse 56, temperature 97.9 °F (36.6 °C), temperature source Oral, resp. rate 16, height 5' 2\" (1.575 m), weight 68.3 kg (150 lb 9.2 oz), SpO2 96%.,Body mass index is 27.54 kg/m².Temp (24hrs), Av.1 °F (36.7 °C), Min:97.9 °F (36.6 °C), Max:98.3 °F (36.8 °C)      Temp:  [97.9 °F (36.6 °C)-98.3 °F (36.8 °C)] 97.9 °F (36.6 °C)  HR:  [52-62] 56  Resp:  [16-20] 16  BP: ()/(51-80) 92/54  SpO2:  [95 %-97 %] 96 %    Weight (last 2 days)       Date/Time Weight    10/16/24 0600 68.3 (150.57)    10/15/24 0600 69.8 (153.88)    10/14/24 0639 69.5 (153.22)                 I/O last 24 hours:  In: 1232.6 [P.O.:490; I.V.:742.6]  Out: 2375 [Urine:2375]        Physical Exam    BP 92/54   Pulse 56   Temp 97.9 °F (36.6 °C) (Oral)   Resp 16   Ht 5' 2\" (1.575 m)   Wt 68.3 kg (150 lb 9.2 oz)   SpO2 96%   BMI 27.54 kg/m²   Vital signs were reviewed  Constitutional: The patient was awake, alert, pleasant, cooperative and in no apparent distress  Cardiovascular: No overt jugular venous tension was noted, S1-S2, no pericardial friction rub S3 were appreciated, no peripheral edema was noted  Pulmonary: Adequate inspiratory effort, lungs were clear                Invasive Devices       Peripheral Intravenous Line  Duration             Peripheral IV 10/16/24 Distal;Right;Upper;Ventral (anterior) Arm <1 day                    Medications:    Scheduled Meds:  Current Facility-Administered Medications   Medication Dose Route Frequency Provider Last Rate    acetaminophen  650 mg Rectal Q4H PRN Nicolette Linder PA-C      acetaminophen  975 mg Oral Q6H While awake Nicolette Linder PA-C      albuterol  2 puff Inhalation TID PRN Nicolette Linder PA-C      amiodarone  200 mg Oral Q8H PAOLO Nicolette Linder PA-C      ARIPiprazole  2 mg Oral Daily Nicolette Linder PA-C      atorvastatin  80 mg Oral Daily With Dinner Nicolette Linder, " GABRIELE      bisacodyl  10 mg Rectal Daily PRN Nicolette Linder PA-C      chlorhexidine  15 mL Mouth/Throat BID Nicolette Linder PA-C      clopidogrel  75 mg Oral Daily Nicolette Linder PA-C      insulin lispro  1-6 Units Subcutaneous TID AC Nicolette Linder PA-C      insulin lispro  1-6 Units Subcutaneous HS Nicolette Linder PA-C      lidocaine  2 patch Topical Daily Nicolette Linder PA-C      metoprolol tartrate  25 mg Oral Q12H PAOLO Isabel Candelaria PA-C      naloxone  0.04 mg Intravenous Q1MIN PRN Nicolette Linder PA-C      ondansetron  4 mg Intravenous Q6H PRN Nicolette Linder PA-C      oxyCODONE  2.5 mg Oral Q4H PRN Nicolette Linder PA-C      Or    oxyCODONE  5 mg Oral Q4H PRN Nicolette Linder PA-C      pantoprazole  40 mg Oral Daily Nicolette Linder PA-C      potassium chloride  40 mEq Oral TID With Meals Isabel Candelaria PA-C      senna-docusate sodium  2 tablet Oral BID Nicolette Linder PA-C      temazepam  15 mg Oral HS PRN Nicolette Linder PA-C      triamcinolone   Topical BID PRN Nicolette Linder PA-C      venlafaxine  150 mg Oral Daily With Breakfast Nicolette Linder PA-C         PRN Meds:.  acetaminophen    albuterol    bisacodyl    naloxone    ondansetron    oxyCODONE **OR** oxyCODONE    temazepam    triamcinolone    Continuous Infusions:         LAB RESULTS:      Results from last 7 days   Lab Units 10/16/24  0323 10/15/24  0430 10/14/24  0507 10/13/24  1437 10/13/24  0348 10/12/24  2159 10/12/24  1545 10/12/24  0442 10/11/24  1146 10/11/24  0454 10/11/24  0414 10/11/24  0413 10/10/24  1029 10/10/24  0428   WBC Thousand/uL  --   --  8.40  --   --   --   --  8.74  --   --   --  11.71*  --  16.77*   HEMOGLOBIN g/dL  --   --  12.0  --   --   --   --  11.4*  --   --   --  10.3*  --  11.5*   HEMATOCRIT %  --   --  36.1*  --   --   --   --  34.5*  --   --   --  31.4*  --  35.4*   PLATELETS Thousands/uL  --   --  262  --   --   --   --  249  --   --   --  211  --  220   POTASSIUM mmol/L 3.1* 3.2* 3.5 3.2* 3.3*  "3.6 3.1* 4.1   < >  --  3.6  --    < > 4.3   CHLORIDE mmol/L 95* 90* 92* 92* 94* 94* 96 99   < >  --  101  --    < > 102   CO2 mmol/L 31 38* 35* 36* 36* 33* 32 28   < >  --  24  --    < > 21   BUN mg/dL 31* 44* 44* 51* 49* 52* 52* 53*   < >  --  53*  --    < > 39*   CREATININE mg/dL 1.01 1.08 1.24 1.46* 1.67* 1.87* 2.09* 2.50*   < >  --  2.99*  --    < > 2.31*   CALCIUM mg/dL 8.1* 8.0* 8.4 8.6 8.6 8.6 8.5 8.8   < >  --  8.5  --    < > 8.3*   ALK PHOS U/L  --   --   --   --   --   --   --   --   --  57  --   --   --   --    ALT U/L  --   --   --   --   --   --   --   --   --  7  --   --   --   --    AST U/L  --   --   --   --   --   --   --   --   --  41*  --   --   --   --    MAGNESIUM mg/dL 2.3 2.1 2.3  --  2.6  --   --  2.5  --   --  2.8*  --   --  2.9*   PHOSPHORUS mg/dL 3.8 2.2*  --   --  2.7  --   --  5.2*  --   --  8.5*  --   --   --     < > = values in this interval not displayed.       CUTURES:  No results found for: \"BLOODCX\", \"URINECX\"              PLEASE NOTE:  This encounter was completed utilizing the [email or web address] Direct Speech Voice Recognition Software. Grammatical errors, random word insertions, pronoun errors and incomplete sentences are occasional consequences of the system due to software limitations, ambient noise and hardware issues.These may be missed by proof reading prior to affixing electronic signature. Any questions or concerns about the content, text or information contained within the body of this dictation should be directly addressed to the physician for clarification. Please do not hesitate to call me directly if you have any any questions or concerns.  "

## 2024-10-16 NOTE — DISCHARGE SUMMARY
Discharge Summary - Cardiac Surgery   Reuben García 69 y.o. male MRN: [id number]  Unit/Bed#: PPHP-319-01 Encounter: [id number]    Admission Date: 10/8/2024     Discharge Date: 10/16/24    Admitting Diagnosis: Nonrheumatic mitral valve regurgitation [I34.0]    Primary Discharge Diagnosis:   Mitral regurgitation. S/P left atrial appendage ligation and mitral valve repair;    Secondary Discharge Diagnosis:   CAD/NSTEMI s/p PCI/ESTHER x 2 RCA (8/2022), HTN, HLD, DM2, obesity (BMI 31.89), arthritis, GERD, migraines, mild asthma    Attending: Manolo Sanford D.O.    Consulting Physician(s):   Cardiology  Medical critical care  Nephrology  Acute Pain Service  PT/OT    Procedures Performed:   Procedure(s):  10/8/2024: MITRAL VALVE REPAIR WITH 36MM JANNETH 4D  ANNULOPLASTY RING, LEFT ATRIAL APPENDAGE LIGATION WITH 45MM ATRICLIP     Hospital Course:   The patient was seen in consultation prior to this admission for evaluation of Mitral regurgitation.  Risks and benefits of left atrial appendage ligation and mitral valve repair were discussed in detail, and patient was agreeable.  Routine preoperative evaluation was completed and informed consent was obtained prior to admission.    10/8: Elective admission for Mitral Valve repair/ALTHEA ligation.   Intraoperative blood products include: platelets X 1.  No significant postoperative bleeding.  Transferred to ICU supported with Epi 4 and Levo 4.  Wean towards extubation. PM Coumadin held. Amicar, 2 FFP and 2 Cryo given for high chest tube output. PM: Improved CT o/p. On vent wean. On epi 4 & Levo 6. No ASA, will continue Plavix for ESTHER in 2022 and Coumadin for MVr x 90 days.     10/9: Junctional 56 on 2LNC. Levo 2. CI 2.5. Cr 1.49.  Hold BB.  Wean Levo as able. Lasix 40 mg IV BID. INR 1.2, Coumadin 2 mg tonight.  SSI coverage prn.  Keep in ICU/transition to SD if off Levo later.     10/10: Diuresis up-titrated to Bumex infusion 4 mg/hr and metolazone with improved UOP overnight. AF RVR  overnight with rates up to 150s, received 5 mg metoprolol, amiodarone bolus x 2 and started on infusion with rates improved to 110-115. Continue Bumex infusion at 4mg/hr and re-dose Zaroxolyn prn. Start Lopressor 25 mg PO BID. D/C [place] and arterial line.  Dose Coumadin. Creatinine 2.31, from 2.  Maintain pacing wires. D/C Chest tubes. Maintain ICU level of care. PM: Cr up to 2.69, K 4.8, continues on bumex 4mg/hr. VBG checked with SVO2 57, Milrinone added at 0.25 with improvement in urine output and overall condition, SVO2 improved to 70s. APS consulted for ongoing severe pain.      10/11: Bumex 4 mg/hr and metolazone (+500ml/24h) urine output is picking up this morning. SVO2 75 on Milrinone 0.25, Bipap overnight, lethargic this morning on CIWA protocol. AF ongoing another bolus overnight.  INR 2.2 on 2 mg Coumadin - 1 mg tonight.  D/C EPW.  Cr 2.99. 4LNC. Maintain IV amiodarone. Keep ICU. PM: Bipap off. Continues on Bumex gtt 4mg/hr. Creatinine increased, acidosis improved. On Milrinone 0.25mcg.      10/12: Started on milrinone 10/10 for low ScvO2 at 0.25. milrinone gtt decreased to 0.13 overnight, repeat ScvO2 68%. Decreased bumex gtt to 2mg this am. 153/93, on 4LNC, net - 3L. Tachy in afib. INR 2.44. dose 1 mg of coumadin. Keep milrinone at 0.13. Continue 50 mg Lopressor. Continue bumex. Continue ICU.     10/13: Milrinone turned off at 5p on 10/12, bumex gtt discontinued this morning. ScvO2 65% this morning. Remains in rate controlled afib. On 2L NC, net - 3L, Cr 1.67. Continue BB- 50 mg BID, decrease Amio gtt to 0.5, INR 2.31, dose 2 mg of coumadin. Dose bumex 2 mg IV BID, nephrology following.  Transfer to telemetry.     10/14: Remains in rate-controlled Afib. Continue Amio gtt 0.5mg/min today. K+3.5 - replete. INR 2.11, dose Coumadin 2mg tonight. Cr 1.24 downtrending. -2.5L/24 hrs. Decrease Bumex to 1mg IV BID.  Per Renal, hold further diuretic given contraction alkalosis and significant decrease in weight  over the last few days. Trend bicarbonate level and volume status and restart when applicable. PM: multiple BMs, hold bowel regimen.      10/15: Pt reports migraine today. Order home Imitrex daily PRN. Remains in rate controlled Afib and on Amio 0.5mg/min - d/c IV continue po amiodarone. Cr 1.08 (1.2), INR 1.95 - 3 mg tonight. D/C TLC. Anticipate discharge home tomorrow.     10/16: Episodes of bradycardic Afib with HR in 40’s overnight. Currently in Afib rate 53. Decrease Metoprolol to 25mg q12. K+ 3.1. Transition KCl to 40meq PO TID. Migraine improved today.  INR pending today, dose Coumadin based on results.      Condition at Discharge:   good     Discharge Physical Exam:    Please see the documented physical exam from this morning's progress note for details.    Discharge Data:  Results from last 7 days   Lab Units 10/14/24  0507 10/12/24  0442 10/11/24  0413   WBC Thousand/uL 8.40 8.74 11.71*   HEMOGLOBIN g/dL 12.0 11.4* 10.3*   HEMATOCRIT % 36.1* 34.5* 31.4*   PLATELETS Thousands/uL 262 249 211     Results from last 7 days   Lab Units 10/16/24  0323 10/15/24  0430 10/14/24  0507   POTASSIUM mmol/L 3.1* 3.2* 3.5   CHLORIDE mmol/L 95* 90* 92*   CO2 mmol/L 31 38* 35*   BUN mg/dL 31* 44* 44*   CREATININE mg/dL 1.01 1.08 1.24   CALCIUM mg/dL 8.1* 8.0* 8.4     Results from last 7 days   Lab Units 10/16/24  1000 10/15/24  0430 10/14/24  0507   INR  2.37* 1.95* 2.11*       Discharge instructions/Information to patient and family:   See after visit summary for information provided to patient and family.      Reuben MEME García was educated on restrictions regarding driving and lifting, and techniques of proper incisional care.  They were specifically counselled on signs and symptoms of an incisional infection, and advised to contact our service immediately should they develop fevers, sweats, chill, redness or drainage at the site of any incisions.    Provisions for Follow-Up Care:  See after visit summary for information  related to follow-up care and any pertinent home health orders.      Disposition:  Home w/ HHC, home PT & RW    Planned Readmission:   No    Discharge Medications:  See after visit summary for reconciled discharge medications provided to patient and family.      Reuben García was provided contact information and scheduled a follow up appointment with Manolo Sanford D.O.  Additionally, follow up appointments have been scheduled for their primary care physician and primary cardiologist.  Contact information was provided.    Reuben García was counseled on the importance of avoiding tobacco products.  As with all patients whom have undergone open heart surgery, tobacco cessation medication was contraindicated at the time of discharge.     ACE/ARB was Contraindicated secondary to renal dysfunction    Beta Blocker was Prescribed at discharge    Aspirin was  Contraindicated w/ ongoing Coumadin/Plavix dosing due to bleeding risk w/ triple therapy.    Statin was Prescribed at discharge      The patient was discharged on ongoing diuretic therapy with Torsemide, 10 mg, PO QD and Potassium Chloride 20 mEq, PO QD.  They were advised to continue these medications for 5 days, unless otherwise directed.     Narcotic pain medication was prescribed in the form of Oxycodone.  Prior to prescribing, their prescription profile was reviewed on the [facility] of health prescription drug monitoring program. Tylenol PRN also recommended.    Amio taper for post-op atrial fibrillation.    Reuben García  is being discharged on anticoagulation therapy for treatment of PAF/MV repair.  As they are new to Coumadin therapy, arrangements have been made the [facility] to monitor INR (goal 2 to 3) and provide dosing instructions.  Their office was notified by Epic messaging which itemized the patient’s daily INR’s and correlating Coumadin doses during their hospitalization.     Reuben García has been prescribed Coumadin, 1 mg  tabs, with 60 tablets being dispensed.  They have been advised to take 2 mg daily, unless otherwise directed.  Follow up PT/INR will be ordered at the discretion of the Coumadin clinic.  He will be on this at least 3 months for his MV repair then management/length of treatment to be determined by cardiology.    The patient was informed that following their postoperative surgical evaluation, they will be referred to outpatient cardiac rehabilitation.  They were counseled that this program is run by specialists who will help them safely strengthen their heart and prevent more heart disease.  Cardiac rehabilitation will include exercise, relaxation, stress management, and heart-healthy nutrition.  Caregivers will also check to make sure their medication regimen is working.    During this admission, the patient was questioned on their use of tobacco, alcohol, and illicit/non-prescription drug use in the  previous 24 months. During this time frame they admit to using unhealthy alcohol use. As such they have been counseled on the importance of cessation and abstinence.     I spent 30 minutes discharging the patient. This time was spent on the day of discharge. I had direct contact with the patient on the day of discharge. Additional documentation is required if more than 30 minutes were spent on discharge.     SIGNATURE: Ana Chambers PA-C  DATE: October 16, 2024  TIME: 11:22 AM

## 2024-10-16 NOTE — RESTORATIVE TECHNICIAN NOTE
Restorative Technician Note      Patient Name: Reuben García     Restorative Tech Visit Date: 10/16/24  Note Type: Mobility  Patient Position Upon Consult: Bedside chair  Activity Performed: Ambulated  Assistive Device: Roller walker  Patient Position at End of Consult: All needs within reach; Bedside chair

## 2024-10-16 NOTE — ASSESSMENT & PLAN NOTE
Mitral regurgitation-status post MVR 10/8/2024    Glycopyrrolate Counseling:  I discussed with the patient the risks of glycopyrrolate including but not limited to skin rash, drowsiness, dry mouth, difficulty urinating, and blurred vision.

## 2024-10-16 NOTE — TELEPHONE ENCOUNTER
----- Message -----  From: Ana Chambers PA-C  Sent: 10/16/2024  11:25 AM EDT  To: DYLAN Byrd; Priscilla Rivera PA-C; #  Subject: Discharge Coumadin                               Reuben García is being discharged on anticoagulation therapy for treatment of MV repair/post-op atrial fibrillation.  I have prescribed Coumadin, 1 mg tabs, with 60 tablets being dispensed.  They have been advised to take 2 mg daily, unless otherwise directed.      Primary Cardiologist: Dr. Feng in the past --> appt w/ Mary Mclaughlin PA-C 10/23    Expected duration of Coumadin:  He will be on this at least 3 months for his MV repair then management/length of treatment to be determined by cardiology    Target INR: 2 to 3    Interacting medications: Amiodarone taper x21 days                                        Hospital Anticoagulation Course:    Date            INR         Warfarin dose (mg)  10/16               2.37                 2  10/15               1.95                 3  10/14               2.11                 2  10/13               2.31                 2  10/12               2.44                 1  10/11               2.20                 1  10/10               1.33                 2        If you have any questions, you can reach me at [phone].     Ana Chambers PA-C  10/16/24  11:23 AM

## 2024-10-16 NOTE — ASSESSMENT & PLAN NOTE
Secondary to hemodynamic perturbations/ATN  Baseline creatinine of 0.8-1  Peak creatinine 2.99 mg/dL on 10/11/2024  Creatinine today is at 1.01 mg/dL, BUN without change

## 2024-10-17 ENCOUNTER — TRANSITIONAL CARE MANAGEMENT (OUTPATIENT)
Dept: FAMILY MEDICINE CLINIC | Facility: CLINIC | Age: 69
End: 2024-10-17

## 2024-10-17 ENCOUNTER — HOME CARE VISIT (OUTPATIENT)
Dept: HOME HEALTH SERVICES | Facility: HOME HEALTHCARE | Age: 69
End: 2024-10-17
Payer: COMMERCIAL

## 2024-10-17 VITALS
WEIGHT: 148.25 LBS | BODY MASS INDEX: 27.12 KG/M2 | HEART RATE: 62 BPM | TEMPERATURE: 97.4 F | OXYGEN SATURATION: 97 % | RESPIRATION RATE: 20 BRPM | SYSTOLIC BLOOD PRESSURE: 124 MMHG | DIASTOLIC BLOOD PRESSURE: 80 MMHG

## 2024-10-17 PROCEDURE — G0299 HHS/HOSPICE OF RN EA 15 MIN: HCPCS

## 2024-10-17 PROCEDURE — 400013 VN SOC

## 2024-10-17 NOTE — UTILIZATION REVIEW
NOTIFICATION OF ADMISSION DISCHARGE   This is a Notification of Discharge from [facility]. Please be advised that this patient has been discharge from our facility. Below you will find the admission and discharge date and time including the patient’s disposition.   UTILIZATION REVIEW CONTACT:  Selene Goddard  Utilization   Network Utilization Review Department  Phone: [phone] x carefully listen to the prompts. All voicemails are confidential.  Email: [email or web address]     ADMISSION INFORMATION  PRESENTATION DATE: 10/8/2024  7:43 AM  OBERVATION ADMISSION DATE: N/A  INPATIENT ADMISSION DATE: 10/8/24  9:16 AM   DISCHARGE DATE: 10/16/2024  6:49 PM   DISPOSITION:Home with Home Health Care    Network Utilization Review Department  ATTENTION: Please call with any questions or concerns to [phone] and carefully listen to the prompts so that you are directed to the right person. All voicemails are confidential.   For Discharge needs, contact Care Management DC Support Team at [phone] opt. 2  Send all requests for admission clinical reviews, approved or denied determinations and any other requests to dedicated fax number below belonging to the campus where the patient is receiving treatment. List of dedicated fax numbers for the Facilities:  FACILITY NAME UR FAX NUMBER   ADMISSION DENIALS (Administrative/Medical Necessity) [phone]   DISCHARGE SUPPORT TEAM ([facility]) [phone]   PARENT CHILD HEALTH (Maternity/NICU/Pediatrics) [phone]   [facility] [phone]   [facility] [phone]   [facility] [phone]   [facility] [phone]   [facility] [phone]   [facility] [phone]   [facility] [phone]   [facility]  [phone]   [facility] [phone]   [facility] [phone]   [facility] [phone]   [facility] [phone]

## 2024-10-18 ENCOUNTER — OFFICE VISIT (OUTPATIENT)
Dept: FAMILY MEDICINE CLINIC | Facility: CLINIC | Age: 69
End: 2024-10-18
Payer: COMMERCIAL

## 2024-10-18 ENCOUNTER — HOME CARE VISIT (OUTPATIENT)
Dept: HOME HEALTH SERVICES | Facility: HOME HEALTHCARE | Age: 69
End: 2024-10-18
Payer: COMMERCIAL

## 2024-10-18 VITALS
DIASTOLIC BLOOD PRESSURE: 62 MMHG | SYSTOLIC BLOOD PRESSURE: 112 MMHG | HEART RATE: 45 BPM | OXYGEN SATURATION: 99 % | BODY MASS INDEX: 28.67 KG/M2 | HEIGHT: 62 IN | WEIGHT: 155.8 LBS

## 2024-10-18 VITALS — DIASTOLIC BLOOD PRESSURE: 68 MMHG | SYSTOLIC BLOOD PRESSURE: 112 MMHG | HEART RATE: 71 BPM | OXYGEN SATURATION: 99 %

## 2024-10-18 DIAGNOSIS — I97.89 POSTOPERATIVE ATRIAL FIBRILLATION (HCC): Primary | ICD-10-CM

## 2024-10-18 DIAGNOSIS — I10 ESSENTIAL HYPERTENSION: ICD-10-CM

## 2024-10-18 DIAGNOSIS — I34.0 NONRHEUMATIC MITRAL VALVE REGURGITATION: ICD-10-CM

## 2024-10-18 DIAGNOSIS — I48.91 POSTOPERATIVE ATRIAL FIBRILLATION (HCC): Primary | ICD-10-CM

## 2024-10-18 DIAGNOSIS — Z79.01 ANTICOAGULATION MONITORING, INR RANGE 2-3: Primary | ICD-10-CM

## 2024-10-18 LAB
BASE EXCESS BLDA CALC-SCNC: 1 MMOL/L (ref -2–3)
BASE EXCESS BLDA CALC-SCNC: 3 MMOL/L (ref -2–3)
BASE EXCESS BLDA CALC-SCNC: 3 MMOL/L (ref -2–3)
BASE EXCESS BLDA CALC-SCNC: 4 MMOL/L (ref -2–3)
BASE EXCESS BLDA CALC-SCNC: 4 MMOL/L (ref -2–3)
BASE EXCESS BLDA CALC-SCNC: 5 MMOL/L (ref -2–3)
CA-I BLD-SCNC: 1.01 MMOL/L (ref 1.12–1.32)
CA-I BLD-SCNC: 1.02 MMOL/L (ref 1.12–1.32)
CA-I BLD-SCNC: 1.04 MMOL/L (ref 1.12–1.32)
CA-I BLD-SCNC: 1.11 MMOL/L (ref 1.12–1.32)
CA-I BLD-SCNC: 1.12 MMOL/L (ref 1.12–1.32)
CA-I BLD-SCNC: 1.16 MMOL/L (ref 1.12–1.32)
GLUCOSE SERPL-MCNC: 102 MG/DL (ref 65–140)
GLUCOSE SERPL-MCNC: 119 MG/DL (ref 65–140)
GLUCOSE SERPL-MCNC: 162 MG/DL (ref 65–140)
GLUCOSE SERPL-MCNC: 165 MG/DL (ref 65–140)
GLUCOSE SERPL-MCNC: 193 MG/DL (ref 65–140)
GLUCOSE SERPL-MCNC: 93 MG/DL (ref 65–140)
HCO3 BLDA-SCNC: 27.1 MMOL/L (ref 22–28)
HCO3 BLDA-SCNC: 28.7 MMOL/L (ref 22–28)
HCO3 BLDA-SCNC: 29.7 MMOL/L (ref 22–28)
HCO3 BLDA-SCNC: 29.7 MMOL/L (ref 24–30)
HCO3 BLDA-SCNC: 30.5 MMOL/L (ref 24–30)
HCO3 BLDA-SCNC: 30.6 MMOL/L (ref 22–28)
HCT VFR BLD CALC: 25 % (ref 36.5–49.3)
HCT VFR BLD CALC: 26 % (ref 36.5–49.3)
HCT VFR BLD CALC: 26 % (ref 36.5–49.3)
HCT VFR BLD CALC: 31 % (ref 36.5–49.3)
HCT VFR BLD CALC: 31 % (ref 36.5–49.3)
HCT VFR BLD CALC: 35 % (ref 36.5–49.3)
HGB BLDA-MCNC: 10.5 G/DL (ref 12–17)
HGB BLDA-MCNC: 10.5 G/DL (ref 12–17)
HGB BLDA-MCNC: 11.9 G/DL (ref 12–17)
HGB BLDA-MCNC: 8.5 G/DL (ref 12–17)
HGB BLDA-MCNC: 8.8 G/DL (ref 12–17)
HGB BLDA-MCNC: 8.8 G/DL (ref 12–17)
KCT BLD-ACNC: 116 SEC (ref 89–137)
KCT BLD-ACNC: 129 SEC (ref 89–137)
KCT BLD-ACNC: 489 SEC (ref 89–137)
KCT BLD-ACNC: 540 SEC (ref 89–137)
KCT BLD-ACNC: 565 SEC (ref 89–137)
KCT BLD-ACNC: 603 SEC (ref 89–137)
KCT BLD-ACNC: 794 SEC (ref 89–137)
PCO2 BLD: 29 MMOL/L (ref 21–32)
PCO2 BLD: 30 MMOL/L (ref 21–32)
PCO2 BLD: 31 MMOL/L (ref 21–32)
PCO2 BLD: 31 MMOL/L (ref 21–32)
PCO2 BLD: 32 MMOL/L (ref 21–32)
PCO2 BLD: 32 MMOL/L (ref 21–32)
PCO2 BLD: 45.2 MM HG (ref 36–44)
PCO2 BLD: 48.3 MM HG (ref 36–44)
PCO2 BLD: 52.1 MM HG (ref 36–44)
PCO2 BLD: 54.9 MM HG (ref 42–50)
PCO2 BLD: 55.9 MM HG (ref 36–44)
PCO2 BLD: 58.5 MM HG (ref 42–50)
PH BLD: 7.33 [PH] (ref 7.3–7.4)
PH BLD: 7.34 [PH] (ref 7.3–7.4)
PH BLD: 7.35 [PH] (ref 7.35–7.45)
PH BLD: 7.35 [PH] (ref 7.35–7.45)
PH BLD: 7.36 [PH] (ref 7.35–7.45)
PH BLD: 7.43 [PH] (ref 7.35–7.45)
PO2 BLD: 231 MM HG (ref 75–129)
PO2 BLD: 360 MM HG (ref 75–129)
PO2 BLD: 46 MM HG (ref 35–45)
PO2 BLD: 53 MM HG (ref 35–45)
PO2 BLD: >400 MM HG (ref 75–129)
PO2 BLD: >400 MM HG (ref 75–129)
POTASSIUM BLD-SCNC: 3.5 MMOL/L (ref 3.5–5.3)
POTASSIUM BLD-SCNC: 3.5 MMOL/L (ref 3.5–5.3)
POTASSIUM BLD-SCNC: 3.6 MMOL/L (ref 3.5–5.3)
POTASSIUM BLD-SCNC: 3.6 MMOL/L (ref 3.5–5.3)
POTASSIUM BLD-SCNC: 3.8 MMOL/L (ref 3.5–5.3)
POTASSIUM BLD-SCNC: 3.9 MMOL/L (ref 3.5–5.3)
SAO2 % BLD FROM PO2: 100 % (ref 60–85)
SAO2 % BLD FROM PO2: 100 % (ref 60–85)
SAO2 % BLD FROM PO2: 78 % (ref 60–85)
SAO2 % BLD FROM PO2: 84 % (ref 60–85)
SODIUM BLD-SCNC: 140 MMOL/L (ref 136–145)
SODIUM BLD-SCNC: 141 MMOL/L (ref 136–145)
SPECIMEN SOURCE: ABNORMAL
SPECIMEN SOURCE: NORMAL
SPECIMEN SOURCE: NORMAL

## 2024-10-18 PROCEDURE — 99214 OFFICE O/P EST MOD 30 MIN: CPT | Performed by: FAMILY MEDICINE

## 2024-10-18 PROCEDURE — G0151 HHCP-SERV OF PT,EA 15 MIN: HCPCS

## 2024-10-18 NOTE — CASE COMMUNICATION
C PT zhao completed. Patient demo standby assist level of function with faulty posture and gait pattern present. Endurance reduced.  Plan 1 x 1 week then 2 x 2 weeks to teach HEP, improve functional mobility in home and on steps to exit home.

## 2024-10-18 NOTE — ASSESSMENT & PLAN NOTE
Follow-up with his cardiothoracic surgeon as scheduled.  Start cardiac rehab when cleared by them.

## 2024-10-18 NOTE — PROGRESS NOTES
Transition of Care Visit  Name: Reuben García      : 1955      MRN: [id number]  Encounter Provider: Rich Delgado DO  Encounter Date: 10/18/2024   Encounter department: [facility] [address]    Assessment & Plan  Postoperative atrial fibrillation (HCC)  Regular rhythm today, continue the amiodarone, metoprolol       Nonrheumatic mitral valve regurgitation  Follow-up with his cardiothoracic surgeon as scheduled.  Start cardiac rehab when cleared by them.       Essential hypertension  Well-controlled continue current therapy.            History of Present Illness     Transitional Care Management Review:   Reuben García is a 69 y.o. male here for TCM follow up.     During the TCM phone call patient stated:  TCM Call       Date and time call was made  10/17/2024  8:14 AM    Hospital care reviewed  Records reviewed    Patient was hospitialized at  [facility]    Date of Admission  10/08/24    Date of discharge  10/16/24    Diagnosis  S/P MVR (mitral valve repair) Principal problem    Disposition  Home    Were the patients medications reviewed and updated  Yes    Current Symptoms  None          TCM Call       Post hospital issues  None    Should patient be enrolled in anticoag monitoring?  Yes    Scheduled for follow up?  Yes    Did you obtain your prescribed medications  Yes    Do you need help managing your prescriptions or medications  No    Is transportation to your appointment needed  No    I have advised the patient to call PCP with any new or worsening symptoms  Nena Vines/[place]    Living Arrangements  Spouse or Significiant other    Support System  Spouse    The type of support provided  Emotional; Financial; Physical    Do you have social support  Yes, as much as I need    Are you recieving any outpatient services  No    Are you recieving home care services  No    Are you using any community resources  No    Current waiver services  No    Have you fallen in  "the last 12 months  No    Interperter language line needed  No    Counseling  Patient    Counseling topics  Activities of daily living; patient and family education    Comments  [facility] scheduled for 10/18 at 9 am          Patient comes in today for transitional care management, he was admitted to [facility] for mitral valve repair.  He states that he is feeling weak and some chest discomfort where his incision is but this is all improving.  He does have a home nurse and home physical therapy.  His PT/INR is being managed by his cardiothoracic surgeon.  He does have follow-ups scheduled with both his cardiologist and his cardiothoracic surgeon.      Review of Systems   Constitutional:  Negative for chills, fatigue and fever.   HENT:  Negative for congestion, ear pain, hearing loss, postnasal drip, rhinorrhea and sore throat.    Eyes:  Negative for pain and visual disturbance.   Respiratory:  Negative for chest tightness, shortness of breath and wheezing.    Cardiovascular:  Positive for chest pain. Negative for leg swelling.   Gastrointestinal:  Negative for abdominal distention, abdominal pain, constipation, diarrhea and vomiting.   Endocrine: Negative for cold intolerance and heat intolerance.   Genitourinary:  Negative for difficulty urinating, frequency and urgency.   Musculoskeletal:  Negative for arthralgias and gait problem.   Skin:  Negative for color change.   Neurological:  Negative for dizziness, tremors, syncope, numbness and headaches.   Hematological:  Negative for adenopathy.   Psychiatric/Behavioral:  Negative for agitation, confusion and sleep disturbance. The patient is not nervous/anxious.      Objective     /62   Pulse (!) 45   Ht 5' 2\" (1.575 m)   Wt 70.7 kg (155 lb 12.8 oz)   SpO2 99%   BMI 28.50 kg/m²     Physical Exam  Constitutional:       Appearance: He is well-developed.   HENT:      Head: Normocephalic.      Right Ear: External ear normal.      Left Ear: External ear " normal.      Nose: Nose normal.   Eyes:      Extraocular Movements: Extraocular movements intact.      Conjunctiva/sclera: Conjunctivae normal.      Pupils: Pupils are equal, round, and reactive to light.   Neck:      Thyroid: No thyromegaly.   Cardiovascular:      Rate and Rhythm: Normal rate and regular rhythm.      Heart sounds: Normal heart sounds.      Comments: No murmur.  Pulmonary:      Effort: Pulmonary effort is normal.      Breath sounds: Normal breath sounds.   Abdominal:      General: Bowel sounds are normal.      Palpations: Abdomen is soft.   Musculoskeletal:         General: Normal range of motion.      Cervical back: Normal range of motion.   Skin:     General: Skin is warm and dry.      Comments: Midsternal incision, clean, dry, no dehiscence.  4 small incisions below the ribs are clean, dry, no erythema.   Neurological:      Mental Status: He is alert and oriented to person, place, and time.   Psychiatric:         Mood and Affect: Mood normal.         Behavior: Behavior normal.       Medications have been reviewed by provider in current encounter

## 2024-10-21 ENCOUNTER — HOME CARE VISIT (OUTPATIENT)
Dept: HOME HEALTH SERVICES | Facility: HOME HEALTHCARE | Age: 69
End: 2024-10-21
Payer: COMMERCIAL

## 2024-10-21 ENCOUNTER — LAB REQUISITION (OUTPATIENT)
Dept: LAB | Facility: HOSPITAL | Age: 69
End: 2024-10-21
Payer: COMMERCIAL

## 2024-10-21 VITALS
DIASTOLIC BLOOD PRESSURE: 70 MMHG | OXYGEN SATURATION: 98 % | SYSTOLIC BLOOD PRESSURE: 120 MMHG | TEMPERATURE: 98.2 F | HEART RATE: 78 BPM | RESPIRATION RATE: 18 BRPM

## 2024-10-21 DIAGNOSIS — Z98.890 OTHER SPECIFIED POSTPROCEDURAL STATES: ICD-10-CM

## 2024-10-21 LAB
ANION GAP SERPL CALCULATED.3IONS-SCNC: 7 MMOL/L (ref 4–13)
BUN SERPL-MCNC: 21 MG/DL (ref 5–25)
CALCIUM SERPL-MCNC: 8.5 MG/DL (ref 8.4–10.2)
CHLORIDE SERPL-SCNC: 101 MMOL/L (ref 96–108)
CO2 SERPL-SCNC: 28 MMOL/L (ref 21–32)
CREAT SERPL-MCNC: 1.11 MG/DL (ref 0.6–1.3)
GFR SERPL CREATININE-BSD FRML MDRD: 68 ML/MIN/1.73SQ M
GLUCOSE SERPL-MCNC: 92 MG/DL (ref 65–140)
POTASSIUM SERPL-SCNC: 3.8 MMOL/L (ref 3.5–5.3)
SODIUM SERPL-SCNC: 136 MMOL/L (ref 135–147)

## 2024-10-21 PROCEDURE — 80048 BASIC METABOLIC PNL TOTAL CA: CPT | Performed by: THORACIC SURGERY (CARDIOTHORACIC VASCULAR SURGERY)

## 2024-10-21 PROCEDURE — G0300 HHS/HOSPICE OF LPN EA 15 MIN: HCPCS

## 2024-10-23 ENCOUNTER — OFFICE VISIT (OUTPATIENT)
Dept: CARDIOLOGY CLINIC | Facility: CLINIC | Age: 69
End: 2024-10-23
Payer: COMMERCIAL

## 2024-10-23 ENCOUNTER — TELEPHONE (OUTPATIENT)
Dept: HOME HEALTH SERVICES | Facility: HOME HEALTHCARE | Age: 69
End: 2024-10-23

## 2024-10-23 ENCOUNTER — HOME CARE VISIT (OUTPATIENT)
Dept: HOME HEALTH SERVICES | Facility: HOME HEALTHCARE | Age: 69
End: 2024-10-23
Payer: COMMERCIAL

## 2024-10-23 VITALS
RESPIRATION RATE: 16 BRPM | SYSTOLIC BLOOD PRESSURE: 110 MMHG | HEART RATE: 70 BPM | HEIGHT: 62 IN | OXYGEN SATURATION: 97 % | WEIGHT: 162 LBS | BODY MASS INDEX: 29.81 KG/M2 | DIASTOLIC BLOOD PRESSURE: 78 MMHG

## 2024-10-23 DIAGNOSIS — E78.00 PURE HYPERCHOLESTEROLEMIA: ICD-10-CM

## 2024-10-23 DIAGNOSIS — I48.20 CHRONIC ATRIAL FIBRILLATION (HCC): ICD-10-CM

## 2024-10-23 DIAGNOSIS — I25.10 CORONARY ARTERY DISEASE INVOLVING NATIVE CORONARY ARTERY OF NATIVE HEART WITHOUT ANGINA PECTORIS: ICD-10-CM

## 2024-10-23 DIAGNOSIS — I10 ESSENTIAL HYPERTENSION: ICD-10-CM

## 2024-10-23 DIAGNOSIS — I34.0 NONRHEUMATIC MITRAL VALVE REGURGITATION: Primary | ICD-10-CM

## 2024-10-23 PROCEDURE — 99214 OFFICE O/P EST MOD 30 MIN: CPT | Performed by: PHYSICIAN ASSISTANT

## 2024-10-23 PROCEDURE — G0151 HHCP-SERV OF PT,EA 15 MIN: HCPCS

## 2024-10-23 RX ORDER — AMOXICILLIN 500 MG/1
2000 TABLET, FILM COATED ORAL
Start: 2024-10-23 | End: 2024-10-24

## 2024-10-23 NOTE — ASSESSMENT & PLAN NOTE
Spacer prescription sent to patient pharmacy   Status post mitral valve ring and left atrial appendage ligation  Feeling well from a surgical standpoint  Due to see CT surgery next week  May be interested in starting cardiac rehab; will let us know  Continue amiodarone, Lipitor, Plavix, metoprolol, torsemide, Coumadin  Reviewed the importance of taking antibiotic prophylaxis prior to dental cleaning/work

## 2024-10-23 NOTE — ASSESSMENT & PLAN NOTE
History of PCI to proximal and mid RCA  Currently without anginal symptoms  Continue amiodarone, Lipitor, Plavix, metoprolol, torsemide, Coumadin

## 2024-10-23 NOTE — PATIENT INSTRUCTIONS
Continue all medications.  Report any symptoms.  Follow in Coumadin clinic.  Follow weight lifting restrictions as previously set up by CT surgery.  Continue PT/OT.  Consider attending cardiac rehab, patient will let us know.  Advised about importance of taking antibiotics prior to dental work/cleanings.  Report any bleeding on Plavix and Coumadin.  Goal INR 2-3.  Recent INR 2.3.  Continue all medications. Previous studies reviewed with patient, medications reviewed and possible side effects discussed. Continue risk factor modification. Optimize weight, regular exercise and follow up with appropriate specialists and primary care physician as discussed.  All questions answered. Patient advised to report any problems prompting to medical attention. Return for follow up visit in 2 months or earlier if needed

## 2024-10-23 NOTE — PROGRESS NOTES
[facility] [place]  [address]  [place] [address]  Cardiology Follow Up    Reuben García  1955  [id number]    Assessment & Plan  Nonrheumatic mitral valve regurgitation  Status post mitral valve ring and left atrial appendage ligation  Feeling well from a surgical standpoint  Due to see CT surgery next week  May be interested in starting cardiac rehab; will let us know  Continue amiodarone, Lipitor, Plavix, metoprolol, torsemide, Coumadin  Reviewed the importance of taking antibiotic prophylaxis prior to dental cleaning/work  Coronary artery disease involving native coronary artery of native heart without angina pectoris  History of PCI to proximal and mid RCA  Currently without anginal symptoms  Continue amiodarone, Lipitor, Plavix, metoprolol, torsemide, Coumadin  Chronic atrial fibrillation (HCC)  Postop  Rate controlled, heart rate in the 70s  Continue amiodarone, metoprolol, Coumadin  Goal INR 2-3; recent INR 2.3  Report any bleeding  Essential hypertension  Stable, 110/78  Continue metoprolol, torsemide  Pure hypercholesterolemia  On Lipitor 80  Last lipid panel total cholesterol 134, triglycerides 69, HDL 62, LDL 58    Continue all medications.  Report any symptoms.  Follow in Coumadin clinic.  Follow weight lifting restrictions as previously set up by CT surgery.  Continue PT/OT.  Consider attending cardiac rehab, patient will let us know.  Advised about importance of taking antibiotics prior to dental work/cleanings.  Report any bleeding on Plavix and Coumadin.  Goal INR 2-3.  Recent INR 2.3.  Continue all medications. Previous studies reviewed with patient, medications reviewed and possible side effects discussed. Continue risk factor modification. Optimize weight, regular exercise and follow up with appropriate specialists and primary care physician as discussed.  All questions answered. Patient advised to report any problems prompting to medical attention.  Return for follow up visit in 2 months or earlier if needed    Chief Complaint   Patient presents with    Follow-up       Interval History: Pt presents for follow-up visit after recent hospitalizaton for mitral valve repair. Pt was in [facility] from 10/8-10/16. Pt had Mitral valve ring (36mm franchesca 4D), LAAL w/ atriclip 45mm clip. Pt patient had a pretty complicated postoperative course. Patient with bradycardia and having to back off on beta-blockers.  Also noted with postop A-fib; started on Coumadin.  Patient also did require some milrinone and uptitration of diuretics.  Had FRANCISCO as well.  Patient was eventually noted to be in rate controlled A-fib, INR within normal limits, creatinine back to baseline and was eventually discharged on postop day 8.    Patient states since being home he is overall feeling pretty good.  Notes some incisional pain and using oxycodone couple of times a day but otherwise feeling better every day.  Does have PT/OT coming to the home as well as nursing to draw his blood for INR.  Denies any chest pain, chest pressure, chest heaviness.  Denies any dizziness, palpitations.  Denies any lower extremity edema.  Denies any bleeding troubles on Coumadin.  Due to see CT surgery next week.        PMH: MR status post mitral valve repair with ruptured cord and flare anterior mitral leaflet, paroxysmal atrial fibrillation, CAD with history of ESTHER to proximal and mid RCA, hypertension, GERD, MDD, hyperlipidemia, long-term anticoagulation    Patient Active Problem List   Diagnosis    Anxiety    Essential hypertension    Gastroesophageal reflux disease without esophagitis    Insomnia    Migraine headache    BMI 29.0-29.9,adult    Mild intermittent asthma without complication    Coronary artery disease involving native coronary artery of native heart without angina pectoris    Platelets decreased (HCC)    Nonrheumatic mitral valve regurgitation    Acute on chronic heart failure with preserved ejection fraction  (HFpEF) (HCC)    Recurrent major depressive disorder, in remission (HCC)    Hypokalemia    Mixed hyperlipidemia    S/P MVR (mitral valve repair)    S/P left atrial appendage ligation    FRANCISCO (acute kidney injury) (HCC)    At risk for metabolic imbalance    Electrolyte imbalance risk    Hyperphosphatemia    Postoperative atrial fibrillation (HCC)    Anticoagulated on Coumadin    Metabolic alkalosis     Past Medical History:   Diagnosis Date    Allergic     Arthritis     Clotting disorder (HCC)     Coronary artery disease     Depression     Diabetes mellitus (HCC)     Headache(784.0)     HL (hearing loss)     Hypertension     Myocardial infarction (HCC) 8/23/22    Obesity     Visual impairment      Social History     Socioeconomic History    Marital status: /Civil Union     Spouse name: Not on file    Number of children: Not on file    Years of education: Not on file    Highest education level: Not on file   Occupational History    Occupation: full-time employment   Tobacco Use    Smoking status: Never    Smokeless tobacco: Never    Tobacco comments:     never a smoker ( as per allscripts)   Vaping Use    Vaping status: Never Used   Substance and Sexual Activity    Alcohol use: Yes     Alcohol/week: 30.0 standard drinks of alcohol     Types: 30 Cans of beer per week     Comment: I like my lite beer    Drug use: No    Sexual activity: Not Currently   Other Topics Concern    Not on file   Social History Narrative    Always uses seat belt    Lives with wife    Recreation: gardening    Seeing a dentist     Social Determinants of Health     Financial Resource Strain: Low Risk  (3/4/2024)    Overall Financial Resource Strain (CARDIA)     Difficulty of Paying Living Expenses: Not hard at all   Food Insecurity: No Food Insecurity (10/10/2024)    Nursing - Inadequate Food Risk Classification     Worried About Running Out of Food in the Last Year: Never true     Ran Out of Food in the Last Year: Never true     Ran Out of  Food in the Last Year: Not on file   Transportation Needs: No Transportation Needs (10/17/2024)    OASIS : Transportation     Lack of Transportation (Medical): No     Lack of Transportation (Non-Medical): No     Patient Unable or Declines to Respond: No   Physical Activity: Not on file   Stress: Not on file   Social Connections: Not on file   Intimate Partner Violence: Not on file   Housing Stability: Low Risk  (10/10/2024)    Housing Stability Vital Sign     Unable to Pay for Housing in the Last Year: No     Number of Times Moved in the Last Year: 0     Homeless in the Last Year: No      Family History   Problem Relation Age of Onset    Aneurysm Mother     Coronary artery disease Father     Cancer Other     Stroke Other         CVA    Hypertension Sister      Past Surgical History:   Procedure Laterality Date    CARDIAC CATHETERIZATION Left 8/25/2022    Procedure: Cardiac catheterization;  Surgeon: Luann Feng MD;  Location: [facility];  Service: Cardiology    CARDIAC CATHETERIZATION N/A 8/25/2022    Procedure: Cardiac Coronary Angiogram;  Surgeon: Luann Feng MD;  Location: [facility];  Service: Cardiology    CARDIAC CATHETERIZATION N/A 9/19/2024    Procedure: Cardiac RHC/LHC;  Surgeon: Javier Ocasio MD;  Location: [facility];  Service: Cardiology    ND REPLACEMENT MITRAL VALVE W/CARDIOPULMONARY BYP N/A 10/8/2024    Procedure: MITRAL VALVE REPAIR WITH 36MM JANNETH 4D  ANNULOPLASTY RING, LEFT ATRIAL APPENDAGE LIGATION WITH 45MM ATRICLIP;  Surgeon: Manolo Sanford DO;  Location: [facility];  Service: Cardiac Surgery    SEPTOPLASTY         Current Outpatient Medications:     acetaminophen (TYLENOL) 325 mg tablet, Take 1-2 tablets Q4-6 hours PRN pain. Do not take more than 4 grams in 24 hours. (Patient taking differently: Take 325 mg by mouth every 4 (four) hours as needed for moderate pain Take 1-2 tablets Q4-6 hours PRN pain. Do not take more than 4 grams in 24 hours.),  Disp: , Rfl:     amiodarone 200 mg tablet, Take 1 tablet TID x7 days then 1 tablet BID x7 days then 1 tablet QDay x7 days for total of 21 days., Disp: 42 tablet, Rfl: 0    amoxicillin (AMOXIL) 500 MG tablet, Take 4 tablets (2,000 mg total) by mouth 60 minutes pre-procedure for 1 day, Disp: , Rfl:     ARIPiprazole (ABILIFY) 2 mg tablet, TAKE 1 TABLET BY MOUTH EVERY DAY, Disp: 90 tablet, Rfl: 1    atorvastatin (LIPITOR) 80 mg tablet, TAKE 1 TABLET BY MOUTH EVERY DAY IN THE EVENING, Disp: 90 tablet, Rfl: 2    clopidogrel (PLAVIX) 75 mg tablet, TAKE 1 TABLET BY MOUTH EVERY DAY, Disp: 90 tablet, Rfl: 1    metoprolol tartrate (LOPRESSOR) 25 mg tablet, Take 1 tablet (25 mg total) by mouth every 12 (twelve) hours, Disp: 60 tablet, Rfl: 2    oxyCODONE (ROXICODONE) 5 immediate release tablet, May take 0.5 tablets (2.5 mg total) by mouth every 4 (four) hours as needed for moderate pain or severe pain. May also take 1 tablet (5 mg total) every 6 (six) hours as needed for moderate pain or severe pain. Do all this for 10 days. Max Daily Amount: 35 mg., Disp: 30 tablet, Rfl: 0    pantoprazole (PROTONIX) 40 mg tablet, Take 1 tablet (40 mg total) by mouth daily, Disp: 30 tablet, Rfl: 0    potassium chloride (Klor-Con M20) 20 mEq tablet, Take 1 tablet (20 mEq total) by mouth daily for 5 days Unless otherwise directed., Disp: 5 tablet, Rfl: 1    SUMAtriptan (IMITREX) 50 mg tablet, TAKE 1 TABLET (50 MG TOTAL) BY MOUTH EVERY 2 (TWO) HOURS AS NEEDED FOR MIGRAINE  MG/DAY, Disp: 9 tablet, Rfl: 3    torsemide (DEMADEX) 10 mg tablet, Take 1 tablet (10 mg total) by mouth daily for 5 days Unless otherwise directed., Disp: 5 tablet, Rfl: 1    traZODone (DESYREL) 100 mg tablet, TAKE 2 TABLETS (200 MG TOTAL) BY MOUTH DAILY AT BEDTIME, Disp: 180 tablet, Rfl: 1    venlafaxine (EFFEXOR-XR) 75 mg 24 hr capsule, TAKE 3 CAPSULES (225 MG TOTAL) BY MOUTH DAILY WITH BREAKFAST, Disp: 270 capsule, Rfl: 1    Ventolin  (90 Base) MCG/ACT inhaler,  Inhale 2 puffs 3 (three) times a day as needed for wheezing, Disp: 18 g, Rfl: 5    warfarin (Jantoven) 1 mg tablet, Take 2 tablets (2mg total) QHS unless otherwise directed. (Patient taking differently: Take 2 mg by mouth daily Take 2 tablets (2mg total) QHS unless otherwise directed.), Disp: 60 tablet, Rfl: 2  No Known Allergies      Imaging: XR chest portable ICU    Result Date: 10/11/2024  Narrative: XR CHEST PORTABLE ICU INDICATION: hypoxic and hypercarbic respiratory failure, volume overload. COMPARISON: October 9, 2024 FINDINGS: Redford-Mariel catheter has been removed. Thoracostomy tube no longer visualized. Unchanged right IJ CVC. Low lung volumes with retrocardiac opacity and pulmonary vascular congestion. Small left pleural effusion. No significant pneumothorax. Stable cardiomegaly. Status post median sternotomy and prosthetic cardiac valve. Bones are unremarkable for age. Normal upper abdomen.     Impression: No significant interval change. Support apparatus as above. Workstation performed: [id number]     XR chest portable    Result Date: 10/9/2024  Narrative: XR CHEST PORTABLE INDICATION: Post Open Heart Surgey. COMPARISON: 10/8/2024 FINDINGS: Endotracheal tube and nasogastric tube have been removed. Right internal jugular catheter, Redford-Mariel catheter, and chest tubes remain in place There is stable left basilar atelectasis and probable pleural effusion. There is mild pulmonary vascular congestion. No pneumothorax or pleural effusion. Enlarged cardiac silhouette, unchanged. Bones are unremarkable for age. Normal upper abdomen.     Impression: Stable postoperative appearance status post extubation. Workstation performed: [id number]     XR chest portable ICU    Result Date: 10/8/2024  Narrative: XR CHEST PORTABLE ICU INDICATION: S/P open heart. COMPARISON: 9/9/2024 FINDINGS: Postoperative mediastinal change noted. Endotracheal tube in satisfactory position 5 cm above the froy. Orogastric tube terminates  in the stomach. Right jugular venous catheter tip at the cavoatrial junction. Right jugular Lamoure-Mariel catheter tip in the proximal right main pulmonary artery. Mediastinal and pleural drains present. Left greater than right basilar atelectasis. No pneumothorax or pleural effusion. Normal cardiomediastinal silhouette. Mitral annuloplasty ring. Left atrial appendage AtriClip. Bones are unremarkable for age. Normal upper abdomen.     Impression: Postoperative change with lines and tubes as noted. Left greater than right basilar atelectasis. Workstation performed: [id number]     THOR intraop interventional w/realtime guidance of cardiac procedures    Result Date: 10/8/2024  Narrative: This order contains the linked images for the THOR that was performed by the Anesthesiologist.  Please see the  CARDIAC THOR ANESTHESIA procedure for results.    THOR Anesthesia    Result Date: 10/8/2024  Narrative: Armen Chow MD     10/8/2024 12:55 PM Procedure Performed: THOR Anesthesia Start Time:  10/8/2024 10:02 AM Preanesthesia Checklist Patient identified, IV assessed, risks and benefits discussed, monitors and equipment assessed, procedure being performed at surgeon's request and anesthesia consent obtained. Procedure Diagnostic Indications for THOR:  assessment of surgical repair and hemodynamic monitoring. Type of THOR: complete THOR with interpretation. Images Saved: ultrasound permanent image saved. Physician Requesting Echo: Manolo Sanford DO.  Location performed: OR. Intubated.  Heart visualized. Insertion of THOR Probe:  Easy. Probe Type:  Epiaortic and multiplane. Modalities:  Color flow mapping, 3D, pulse wave Doppler and continuous wave Doppler. Echocardiographic and Doppler Measurements PREPROCEDURE LEFT VENTRICLE: Systolic Function: normal. Ejection Fraction: >55%. Cavity size: normal.   RIGHT VENTRICLE: Systolic Function: normal.  Cavity size normal.  AORTIC VALVE: Leaflets: normal and trileaflet. Leaflet motions  "normal and normal. Stenosis: none.     Regurgitation: none.  MITRAL VALVE: Leaflets: calcified and thickened. Leaflet Motions: flail and flail A1. Regurgitation: severe.   Stenosis: none.   TRICUSPID VALVE: Leaflets: normal. Leaflet Motions: normal. Stenosis: none. Regurgitation: mild. PULMONIC VALVE: Leaflets: normal. Regurgitation: none. Stenosis: none. ASCENDING AORTA: Size:  normal. Diameter: 3.8 cm. Dissection not present.  AORTIC ARCH: Size:  normal.  dissection not present. Grade 2: severe intimal thickening without protruding atheroma. DESCENDING AORTA: Size: normal.  Dissection not present. Grade 2: severe intimal thickening without protruding atheroma. RIGHT ATRIUM: Size:  dilated. LEFT ATRIUM: Size: dilated. LEFT ATRIAL APPENDAGE: Size: normal.  ATRIAL SEPTUM: Intra-atrial septal morphology: normal.  VENTRICULAR SEPTUM: Intra-ventricular septum morphology: normal. EPIAORTIC: Plaque Thickness: 0-5 mm. OTHER FINDINGS: Pericardium:  normal. Pleural Effusion:  left. POSTPROCEDURE LEFT VENTRICLE: Unchanged .    RIGHT VENTRICLE: Unchanged .  AORTIC VALVE: Unchanged .      MITRAL VALVE: Leaflets: ring. Regurgitation: trace. Stenosis: none. Mean Gradient: 4.Valve/Ring Size: 34 mm. TRICUSPID VALVE: Unchanged .   PULMONIC VALVE: Unchanged   ATRIA: Unchanged .   AORTA: Unchanged . REMOVAL: Probe Removal: atraumatic.        Review of Systems:  Review of Systems   Constitutional: Negative.    Respiratory: Negative.     Cardiovascular: Negative.    Musculoskeletal: Negative.    Neurological: Negative.    Hematological: Negative.    Psychiatric/Behavioral: Negative.     All other systems reviewed and are negative.        /78 (BP Location: Left arm, Patient Position: Sitting, Cuff Size: Standard)   Pulse 70   Resp 16   Ht 5' 2\" (1.575 m)   Wt 73.5 kg (162 lb)   SpO2 97%   BMI 29.63 kg/m²     Physical Exam:  Physical Exam  Vitals and nursing note reviewed.   Constitutional:       Appearance: Normal appearance. "   HENT:      Head: Normocephalic and atraumatic.   Cardiovascular:      Rate and Rhythm: Normal rate. Rhythm irregular.      Pulses: Normal pulses.      Heart sounds: Normal heart sounds.      Comments: + Well-healing sternotomy scar  Pulmonary:      Effort: Pulmonary effort is normal.      Breath sounds: Normal breath sounds.   Musculoskeletal:         General: Normal range of motion.      Cervical back: Normal range of motion and neck supple.   Skin:     General: Skin is warm and dry.   Neurological:      General: No focal deficit present.      Mental Status: He is alert and oriented to person, place, and time.   Psychiatric:         Mood and Affect: Mood normal.         Behavior: Behavior normal.         Thought Content: Thought content normal.         Judgment: Judgment normal.

## 2024-10-24 ENCOUNTER — HOME CARE VISIT (OUTPATIENT)
Dept: HOME HEALTH SERVICES | Facility: HOME HEALTHCARE | Age: 69
End: 2024-10-24
Payer: COMMERCIAL

## 2024-10-24 ENCOUNTER — ANTICOAG VISIT (OUTPATIENT)
Dept: CARDIOLOGY CLINIC | Facility: CLINIC | Age: 69
End: 2024-10-24

## 2024-10-24 VITALS
SYSTOLIC BLOOD PRESSURE: 120 MMHG | RESPIRATION RATE: 16 BRPM | DIASTOLIC BLOOD PRESSURE: 60 MMHG | BODY MASS INDEX: 29.26 KG/M2 | TEMPERATURE: 98.4 F | OXYGEN SATURATION: 96 % | WEIGHT: 160 LBS | HEART RATE: 70 BPM

## 2024-10-24 VITALS — HEART RATE: 91 BPM | SYSTOLIC BLOOD PRESSURE: 112 MMHG | OXYGEN SATURATION: 97 % | DIASTOLIC BLOOD PRESSURE: 76 MMHG

## 2024-10-24 LAB — INR PPP: 1.3 (ref 0.85–1.19)

## 2024-10-24 PROCEDURE — G0300 HHS/HOSPICE OF LPN EA 15 MIN: HCPCS

## 2024-10-24 NOTE — CASE COMMUNICATION
Patient dc'd from [facility] PT as indep in home mobility and performing basic walking program and breathing activities appropriate at this stage of healing.  Anticipate cardiac rehab in future for further progression.

## 2024-10-24 NOTE — PROGRESS NOTES
S/w vna. INR is 1.3 today. Last INR was 2.3. Pt denies missing any pills or eating green leafy veggies. Pt was taking 2mg as directed. Advised 3mg Sun/Tues/Thurs, 2mg the rest and retest on Monday.

## 2024-10-28 ENCOUNTER — OFFICE VISIT (OUTPATIENT)
Dept: CARDIAC SURGERY | Facility: CLINIC | Age: 69
End: 2024-10-28

## 2024-10-28 ENCOUNTER — TELEPHONE (OUTPATIENT)
Age: 69
End: 2024-10-28

## 2024-10-28 ENCOUNTER — HOME CARE VISIT (OUTPATIENT)
Dept: HOME HEALTH SERVICES | Facility: HOME HEALTHCARE | Age: 69
End: 2024-10-28
Payer: COMMERCIAL

## 2024-10-28 VITALS
TEMPERATURE: 99.5 F | BODY MASS INDEX: 31.06 KG/M2 | SYSTOLIC BLOOD PRESSURE: 167 MMHG | OXYGEN SATURATION: 97 % | WEIGHT: 168.8 LBS | DIASTOLIC BLOOD PRESSURE: 99 MMHG | HEIGHT: 62 IN | HEART RATE: 80 BPM

## 2024-10-28 VITALS
RESPIRATION RATE: 18 BRPM | HEART RATE: 92 BPM | DIASTOLIC BLOOD PRESSURE: 60 MMHG | SYSTOLIC BLOOD PRESSURE: 132 MMHG | OXYGEN SATURATION: 98 %

## 2024-10-28 DIAGNOSIS — Z98.890 S/P LEFT ATRIAL APPENDAGE LIGATION: ICD-10-CM

## 2024-10-28 DIAGNOSIS — Z98.890 S/P MVR (MITRAL VALVE REPAIR): Primary | ICD-10-CM

## 2024-10-28 PROCEDURE — 99024 POSTOP FOLLOW-UP VISIT: CPT | Performed by: PHYSICIAN ASSISTANT

## 2024-10-28 PROCEDURE — G0152 HHCP-SERV OF OT,EA 15 MIN: HCPCS

## 2024-10-28 NOTE — TELEPHONE ENCOUNTER
Patient's caregiver calling to let us know he has gained 9lbs since he's come home from the hospital. He stopped taking his torsemide for 5 days, but started up again today. His vitals are fine, he took a 3 minute walk earlier today.

## 2024-10-28 NOTE — PROGRESS NOTES
"Procedure: S/P left atrial appendage ligation and mitral valve repair, performed on 10/8/24.     History: Reuben García is a 69 y.o. year old male who presents to our office today for routine post-surgical follow up care. Postoperatively patient was seen by APS for ongoing severe pain. He also required milrinone and bumex gtt's. He went into Afib with RVR, received amiodarone bolus and drip. He remained in rate-controlled Afib throughout the remainder of his hospital stay, and was ultimately discharged home on postop day #8 on 10/16.     Patient followed up with his PCP, and saw cardiology last week. He has been weighing himself daily, and weights have been mostly consistent except for when he stopped the diuretic and then had fast food twice last week. He has been cleaning his incisions daily and has been abiding by his lifting restrictions. He does not have significant pain and has not had any redness or drainage from incisions.     Vital Signs:   Vitals:    10/28/24 1351 10/28/24 1355   BP: (!) 180/107 167/99   BP Location: Right arm Left arm   Patient Position: Sitting Sitting   Cuff Size: Standard Standard   Pulse: 80    Temp: 99.5 °F (37.5 °C)    TempSrc: Tympanic    SpO2: 97%    Weight: 76.6 kg (168 lb 12.8 oz)    Height: 5' 2\" (1.575 m)        Home Medications:   Prior to Admission medications    Medication Sig Start Date End Date Taking? Authorizing Provider   acetaminophen (TYLENOL) 325 mg tablet Take 1-2 tablets Q4-6 hours PRN pain. Do not take more than 4 grams in 24 hours.  Patient taking differently: Take 325 mg by mouth every 4 (four) hours as needed for moderate pain Take 1-2 tablets Q4-6 hours PRN pain. Do not take more than 4 grams in 24 hours. 10/16/24   Ana Chambers PA-C   amiodarone 200 mg tablet Take 1 tablet TID x7 days then 1 tablet BID x7 days then 1 tablet QDay x7 days for total of 21 days. 10/16/24   Ana Chambers PA-C   ARIPiprazole (ABILIFY) 2 mg tablet TAKE 1 TABLET BY MOUTH EVERY " DAY 6/11/24   Rich Delgado DO   atorvastatin (LIPITOR) 80 mg tablet TAKE 1 TABLET BY MOUTH EVERY DAY IN THE EVENING 4/1/24   Rich Delgado DO   clopidogrel (PLAVIX) 75 mg tablet TAKE 1 TABLET BY MOUTH EVERY DAY 8/14/24   Rich Delgado DO   metoprolol tartrate (LOPRESSOR) 25 mg tablet Take 1 tablet (25 mg total) by mouth every 12 (twelve) hours 10/16/24   Ana Chambers PA-C   pantoprazole (PROTONIX) 40 mg tablet Take 1 tablet (40 mg total) by mouth daily 10/17/24 11/16/24  Ana Chambers PA-C   potassium chloride (Klor-Con M20) 20 mEq tablet Take 1 tablet (20 mEq total) by mouth daily for 5 days Unless otherwise directed. 10/16/24 10/23/24  Ana Chambers PA-C   SUMAtriptan (IMITREX) 50 mg tablet TAKE 1 TABLET (50 MG TOTAL) BY MOUTH EVERY 2 (TWO) HOURS AS NEEDED FOR MIGRAINE  MG/DAY 8/22/24   Rich Delgado DO   torsemide (DEMADEX) 10 mg tablet Take 1 tablet (10 mg total) by mouth daily for 5 days Unless otherwise directed. 10/16/24 10/23/24  Ana Chambers PA-C   traZODone (DESYREL) 100 mg tablet TAKE 2 TABLETS (200 MG TOTAL) BY MOUTH DAILY AT BEDTIME 9/25/24   Rich Delgado DO   venlafaxine (EFFEXOR-XR) 75 mg 24 hr capsule TAKE 3 CAPSULES (225 MG TOTAL) BY MOUTH DAILY WITH BREAKFAST 5/17/24   Rich Delgado DO   Ventolin  (90 Base) MCG/ACT inhaler Inhale 2 puffs 3 (three) times a day as needed for wheezing 5/25/24   Rich Delgado DO   warfarin (Jantoven) 1 mg tablet Take 2 tablets (2mg total) QHS unless otherwise directed.  Patient taking differently: Take 2 mg by mouth daily Take 2 tablets (2mg total) QHS unless otherwise directed. 10/16/24   Ana Chambers PA-C       Physical Exam:    HEENT/NECK:  Normocephalic. Atraumatic.  No jugular venous distention.    Cardiac: Irregularly irregular rate and rhythm  Pulmonary:  Breath sounds clear bilaterally  Abdomen:  Non-tender, Non-distended, and Normal bowel sounds  Incisions: Sternum is stable.  Incision is clean, dry, and intact.   Extremities:  "Extremities warm/dry  Neuro: Alert and oriented X 3.  Sensation is grossly intact.  No focal deficits.  Skin: Warm/Dry, without rashes or lesions.    Lab Results:               Invalid input(s): \"LABGLOM\"  Results from last 7 days   Lab Units 10/24/24  0000   INR  1.30*     Lab Results   Component Value Date    HGBA1C 5.1 10/02/2024     No results found for: \"CKTOTAL\", \"CKMB\", \"CKMBINDEX\", \"TROPONINI\"    Imaging Studies:     None since discharge     Results Review Statement: No pertinent imaging studies reviewed.    Assessment: Atrial fibrillation, Mitral regurgitation. S/P left atrial appendage ligation and mitral valve repair;    Plan:     Reuben García continues to recover well following surgery.  To date they have made progressive improvements with their physical rehabilitation. At this point I have cleared them to begin outpatient cardiac rehabilitation and have encouraged them to to do so.  Reuben García will also be cleared to resume driving on Tuesday 11/5. I asked that them do so cautiously, in progressive increments. I did remind them of their ongoing lifting restriction of 10 pounds through 11/5 followed by a lifting restriction of 25 pounds for an additional 2 months.    Counseling provided regarding low sodium diet. I believe his weight gain is from a combination of being finished with his diuretic, but also consuming high sodium foods (sandwich from [email or web address], chicken from [facility] - patient reported these both tasted very salty). He was advised to avoid fast foods and to avoid other high sodium foods.     I do not believe he needs to continue his diuretic. His lungs are clear, he has no LE edema on exam, and he is not short of breath at rest or with exertion. He was advised to continue to monitor his weights every day and contact cardiology if he notices a significant change.     Reuben García has already been evaluated by their primary care physician cardiologist for ongoing medical care. At this point we " will not schedule Reuben García  for routine followup care with our office. Future medical management will be directed by their outpatient cardiologist.. I have advised them to call with any new concerns that may arise. Reuben MEME García was comfortable with our recommendations and their questions were answered to their satisfaction.    Patient had a negative Cologuard in 2021.     Priscilla Rivera PA-C  10/28/24    * This note was completed in part utilizing [employer] direct voice recognition software.   Grammatical errors, random word insertion, spelling mistakes, and incomplete sentences may be an occasional consequence of the system secondary to software limitations, ambient noise and hardware issues. At the time of dictation, efforts were made to edit, clarify and /or correct errors. Please read the chart carefully and recognize, using context, where substitutions have occurred.  If you have any questions or concerns about the context, text or information contained within the body of this dictation, please contact myself, the provider, for further clarification.

## 2024-10-29 ENCOUNTER — HOME CARE VISIT (OUTPATIENT)
Dept: HOME HEALTH SERVICES | Facility: HOME HEALTHCARE | Age: 69
End: 2024-10-29
Payer: COMMERCIAL

## 2024-10-29 NOTE — TELEPHONE ENCOUNTER
Called pt and spoke to his wife, Maureen. Relayed GABRIELE Hernandez message. She verbalized understanding and had no questions.

## 2024-10-30 ENCOUNTER — HOME CARE VISIT (OUTPATIENT)
Dept: HOME HEALTH SERVICES | Facility: HOME HEALTHCARE | Age: 69
End: 2024-10-30
Payer: COMMERCIAL

## 2024-10-30 VITALS
SYSTOLIC BLOOD PRESSURE: 136 MMHG | HEART RATE: 84 BPM | RESPIRATION RATE: 20 BRPM | OXYGEN SATURATION: 97 % | DIASTOLIC BLOOD PRESSURE: 78 MMHG

## 2024-10-30 PROCEDURE — G0152 HHCP-SERV OF OT,EA 15 MIN: HCPCS

## 2024-10-31 ENCOUNTER — LAB (OUTPATIENT)
Dept: LAB | Facility: CLINIC | Age: 69
End: 2024-10-31
Payer: COMMERCIAL

## 2024-10-31 ENCOUNTER — HOME CARE VISIT (OUTPATIENT)
Dept: HOME HEALTH SERVICES | Facility: HOME HEALTHCARE | Age: 69
End: 2024-10-31
Payer: COMMERCIAL

## 2024-10-31 DIAGNOSIS — Z79.01 ANTICOAGULATION MONITORING, INR RANGE 2-3: ICD-10-CM

## 2024-10-31 LAB
INR PPP: 1.24 (ref 0.85–1.19)
PROTHROMBIN TIME: 15.8 SECONDS (ref 12.3–15)

## 2024-10-31 PROCEDURE — 85610 PROTHROMBIN TIME: CPT

## 2024-10-31 PROCEDURE — 36415 COLL VENOUS BLD VENIPUNCTURE: CPT

## 2024-10-31 PROCEDURE — G0299 HHS/HOSPICE OF RN EA 15 MIN: HCPCS

## 2024-11-01 ENCOUNTER — ANTICOAG VISIT (OUTPATIENT)
Dept: CARDIOLOGY CLINIC | Facility: CLINIC | Age: 69
End: 2024-11-01

## 2024-11-02 VITALS
OXYGEN SATURATION: 97 % | DIASTOLIC BLOOD PRESSURE: 78 MMHG | SYSTOLIC BLOOD PRESSURE: 146 MMHG | RESPIRATION RATE: 20 BRPM | TEMPERATURE: 96.8 F | HEART RATE: 88 BPM

## 2024-11-05 ENCOUNTER — HOME CARE VISIT (OUTPATIENT)
Dept: HOME HEALTH SERVICES | Facility: HOME HEALTHCARE | Age: 69
End: 2024-11-05
Payer: COMMERCIAL

## 2024-11-05 VITALS
DIASTOLIC BLOOD PRESSURE: 90 MMHG | HEART RATE: 71 BPM | OXYGEN SATURATION: 98 % | RESPIRATION RATE: 19 BRPM | SYSTOLIC BLOOD PRESSURE: 148 MMHG

## 2024-11-05 PROCEDURE — G0152 HHCP-SERV OF OT,EA 15 MIN: HCPCS

## 2024-11-07 ENCOUNTER — ANTICOAG VISIT (OUTPATIENT)
Dept: CARDIOLOGY CLINIC | Facility: CLINIC | Age: 69
End: 2024-11-07

## 2024-11-07 ENCOUNTER — LAB (OUTPATIENT)
Dept: LAB | Facility: HOSPITAL | Age: 69
End: 2024-11-07
Payer: COMMERCIAL

## 2024-11-07 DIAGNOSIS — Z79.01 ANTICOAGULATION MONITORING, INR RANGE 2-3: ICD-10-CM

## 2024-11-07 LAB
INR PPP: 1.74 (ref 0.85–1.19)
PROTHROMBIN TIME: 21.1 SECONDS (ref 12.3–15)

## 2024-11-07 PROCEDURE — 36415 COLL VENOUS BLD VENIPUNCTURE: CPT

## 2024-11-07 PROCEDURE — 85610 PROTHROMBIN TIME: CPT

## 2024-11-10 DIAGNOSIS — F41.9 ANXIETY: ICD-10-CM

## 2024-11-11 RX ORDER — VENLAFAXINE HYDROCHLORIDE 75 MG/1
225 CAPSULE, EXTENDED RELEASE ORAL
Qty: 270 CAPSULE | Refills: 1 | Status: SHIPPED | OUTPATIENT
Start: 2024-11-11

## 2024-11-14 ENCOUNTER — NURSE TRIAGE (OUTPATIENT)
Dept: FAMILY MEDICINE CLINIC | Facility: CLINIC | Age: 69
End: 2024-11-14

## 2024-12-10 DIAGNOSIS — G43.109 MIGRAINE WITH AURA AND WITHOUT STATUS MIGRAINOSUS, NOT INTRACTABLE: ICD-10-CM

## 2024-12-10 DIAGNOSIS — Z98.890 S/P MVR (MITRAL VALVE REPAIR): ICD-10-CM

## 2024-12-10 RX ORDER — PANTOPRAZOLE SODIUM 40 MG/1
40 TABLET, DELAYED RELEASE ORAL DAILY
Qty: 90 TABLET | Refills: 1 | Status: SHIPPED | OUTPATIENT
Start: 2024-12-10 | End: 2024-12-19 | Stop reason: ALTCHOICE

## 2024-12-10 RX ORDER — SUMATRIPTAN SUCCINATE 100 MG/1
100 TABLET ORAL EVERY 2 HOUR PRN
Qty: 10 TABLET | Refills: 1 | Status: SHIPPED | OUTPATIENT
Start: 2024-12-10

## 2024-12-19 ENCOUNTER — OFFICE VISIT (OUTPATIENT)
Dept: CARDIOLOGY CLINIC | Facility: CLINIC | Age: 69
End: 2024-12-19
Payer: COMMERCIAL

## 2024-12-19 VITALS
RESPIRATION RATE: 16 BRPM | SYSTOLIC BLOOD PRESSURE: 142 MMHG | HEIGHT: 62 IN | OXYGEN SATURATION: 98 % | HEART RATE: 83 BPM | WEIGHT: 162 LBS | DIASTOLIC BLOOD PRESSURE: 88 MMHG | BODY MASS INDEX: 29.81 KG/M2

## 2024-12-19 DIAGNOSIS — I34.0 NONRHEUMATIC MITRAL VALVE REGURGITATION: Primary | ICD-10-CM

## 2024-12-19 DIAGNOSIS — F33.1 MODERATE EPISODE OF RECURRENT MAJOR DEPRESSIVE DISORDER (HCC): ICD-10-CM

## 2024-12-19 DIAGNOSIS — E78.2 MIXED HYPERLIPIDEMIA: ICD-10-CM

## 2024-12-19 DIAGNOSIS — I97.89 POSTOPERATIVE ATRIAL FIBRILLATION (HCC): ICD-10-CM

## 2024-12-19 DIAGNOSIS — I48.91 POSTOPERATIVE ATRIAL FIBRILLATION (HCC): ICD-10-CM

## 2024-12-19 DIAGNOSIS — I25.10 CORONARY ARTERY DISEASE INVOLVING NATIVE CORONARY ARTERY OF NATIVE HEART WITHOUT ANGINA PECTORIS: ICD-10-CM

## 2024-12-19 DIAGNOSIS — I10 ESSENTIAL HYPERTENSION: ICD-10-CM

## 2024-12-19 PROBLEM — Z91.89 ELECTROLYTE IMBALANCE RISK: Status: RESOLVED | Noted: 2024-10-11 | Resolved: 2024-12-19

## 2024-12-19 PROBLEM — E87.3 METABOLIC ALKALOSIS: Status: RESOLVED | Noted: 2024-10-14 | Resolved: 2024-12-19

## 2024-12-19 PROCEDURE — 99214 OFFICE O/P EST MOD 30 MIN: CPT | Performed by: INTERNAL MEDICINE

## 2024-12-19 RX ORDER — ARIPIPRAZOLE 2 MG/1
2 TABLET ORAL DAILY
Qty: 90 TABLET | Refills: 1 | Status: SHIPPED | OUTPATIENT
Start: 2024-12-19

## 2024-12-19 NOTE — PROGRESS NOTES
[facility] [place]  [address]  [place] [address]  Cardiology Follow Up    Reuben García  1955  [id number]    Assessment & Plan  Nonrheumatic mitral regurgitation, status post mitral valve ring and left atrial appendage ligation  -Doing well from the cardiac standpoint of view.  Continue with the current treatment planning.  He is euvolemic.  No indication for diuretic therapy.  CAD, S/P RCA stent  -Continue with the antiplatelet therapy.  Essential hypertension  -The blood pressure is well controlled with the current medications.  No side effect profile has been noted. He will continue with the current dose of antihypertensive medications.  I have asked him to maintain the blood pressure logs to make the necessary changes in the antihypertensive medications if necessary.    -DASH dietary pattern has been suggested.  Diet rich in fruits, vegetables, whole grains and low-fat dairy products with reduced content of had treated and total fat has been addressed.    Postoperative atrial fibrillation (HCC)  -He is currently on the amiodarone.  The beta-blockers will continue as before.   -The etiology of the atrial fibrillation is postop in nature.  He is going to be evaluated another 2 months time from now and if he continues to remain in the atrial fibrillation then I have a strong suspicion that he is going to be in the persistent atrial fibrillation.  I would recommend controlling the heart rate with AV brittany blocking agents.  Taking the age factor into account , will consider stopping the amiodarone therapy at that stage.    Mixed hyperlipidemia  -Continue with the current dose of statin therapy.  No side effect profile noted.    Continue all medications. Previous studies reviewed with patient, medications reviewed and possible side effects discussed. Continue risk factor modification. Optimize weight, regular exercise and follow up with appropriate specialists and primary  care physician as discussed.  All questions answered. Patient advised to report any problems prompting to medical attention. Return for follow up visit in 2 to 3 months or earlier if needed    Chief Complaint   Patient presents with   • Follow-up       Interval History: Reuben García who is a known case of coronary artery disease, status post mitral valve repair, mitral valve ring and left atrial appendage ligation is doing well from the cardiac standpoint of view.  From cardiac standpoint of view he has no symptoms of having any chest pain or shortness of breath.  The exercise capacity is gradually improving.  Still has some degree of pain at the surgical scar site but with no discharge or any redness noted.  He has been afebrile.  Appetite has gradually come back to normal.  He has been able to tolerate the medications without having any side effects so far.          PMH:     Patient Active Problem List   Diagnosis   • Anxiety   • Essential hypertension   • Gastroesophageal reflux disease without esophagitis   • Insomnia   • Migraine headache   • BMI 29.0-29.9,adult   • Mild intermittent asthma without complication   • CAD, S/P RCA stent   • Platelets decreased ([facility])   • Nonrheumatic mitral regurgitation, status post mitral valve ring and left atrial appendage ligation   • Acute on chronic heart failure with preserved ejection fraction (HFpEF) ([facility])   • Recurrent major depressive disorder, in remission ([facility])   • Hypokalemia   • Mixed hyperlipidemia   • S/P MVR (mitral valve repair)   • S/P left atrial appendage ligation   • FRANCISCO (acute kidney injury) ([facility])   • At risk for metabolic imbalance   • Hyperphosphatemia   • Postoperative atrial fibrillation ([facility])   • Anticoagulated on Coumadin     Past Medical History:   Diagnosis Date   • Allergic    • Arthritis    • Clotting disorder ([facility])    • Coronary artery disease    • Depression    • Diabetes mellitus ([facility])    • Headache(784.0)    • HL (hearing loss)    •  Hypertension    • Myocardial infarction (HCC) 8/23/22   • Obesity    • Visual impairment      Social History     Socioeconomic History   • Marital status: /Civil Union     Spouse name: Not on file   • Number of children: Not on file   • Years of education: Not on file   • Highest education level: Not on file   Occupational History   • Occupation: full-time employment   Tobacco Use   • Smoking status: Never   • Smokeless tobacco: Never   • Tobacco comments:     never a smoker ( as per allscripts)   Vaping Use   • Vaping status: Never Used   Substance and Sexual Activity   • Alcohol use: Yes     Alcohol/week: 3.0 standard drinks of alcohol     Types: 3 Cans of beer per week     Comment: Since he has been home form surgery had a few beers   • Drug use: No   • Sexual activity: Not Currently   Other Topics Concern   • Not on file   Social History Narrative    Always uses seat belt    Lives with wife    Recreation: gardening    Seeing a dentist     Social Drivers of Health     Financial Resource Strain: Low Risk  (3/4/2024)    Overall Financial Resource Strain (CARDIA)    • Difficulty of Paying Living Expenses: Not hard at all   Food Insecurity: No Food Insecurity (10/10/2024)    Nursing - Inadequate Food Risk Classification    • Worried About Running Out of Food in the Last Year: Never true    • Ran Out of Food in the Last Year: Never true    • Ran Out of Food in the Last Year: Not on file   Transportation Needs: No Transportation Needs (11/5/2024)    OASIS : Transportation    • Lack of Transportation (Medical): No    • Lack of Transportation (Non-Medical): No    • Patient Unable or Declines to Respond: No   Physical Activity: Not on file   Stress: Not on file   Social Connections: Not on file   Intimate Partner Violence: Not on file   Housing Stability: Low Risk  (10/10/2024)    Housing Stability Vital Sign    • Unable to Pay for Housing in the Last Year: No    • Number of Times Moved in the Last Year: 0     • Homeless in the Last Year: No      Family History   Problem Relation Age of Onset   • Aneurysm Mother    • Coronary artery disease Father    • Cancer Other    • Stroke Other         CVA   • Hypertension Sister      Past Surgical History:   Procedure Laterality Date   • CARDIAC CATHETERIZATION Left 8/25/2022    Procedure: Cardiac catheterization;  Surgeon: Luann Feng MD;  Location: [facility];  Service: Cardiology   • CARDIAC CATHETERIZATION N/A 8/25/2022    Procedure: Cardiac Coronary Angiogram;  Surgeon: Luann Feng MD;  Location: [facility];  Service: Cardiology   • CARDIAC CATHETERIZATION N/A 9/19/2024    Procedure: Cardiac RHC/LHC;  Surgeon: Javier Ocasio MD;  Location: [facility];  Service: Cardiology   • MS REPLACEMENT MITRAL VALVE W/CARDIOPULMONARY BYP N/A 10/8/2024    Procedure: MITRAL VALVE REPAIR WITH 36MM JANNETH 4D  ANNULOPLASTY RING, LEFT ATRIAL APPENDAGE LIGATION WITH 45MM ATRICLIP;  Surgeon: Manolo Sanford DO;  Location: [facility];  Service: Cardiac Surgery   • SEPTOPLASTY         Current Outpatient Medications:   •  acetaminophen (TYLENOL) 325 mg tablet, Take 1-2 tablets Q4-6 hours PRN pain. Do not take more than 4 grams in 24 hours., Disp: , Rfl:   •  amiodarone 200 mg tablet, Take 1 tablet TID x7 days then 1 tablet BID x7 days then 1 tablet QDay x7 days for total of 21 days., Disp: 42 tablet, Rfl: 0  •  ARIPiprazole (ABILIFY) 2 mg tablet, TAKE 1 TABLET BY MOUTH EVERY DAY, Disp: 90 tablet, Rfl: 1  •  atorvastatin (LIPITOR) 80 mg tablet, TAKE 1 TABLET BY MOUTH EVERY DAY IN THE EVENING, Disp: 90 tablet, Rfl: 2  •  clopidogrel (PLAVIX) 75 mg tablet, TAKE 1 TABLET BY MOUTH EVERY DAY, Disp: 90 tablet, Rfl: 1  •  metoprolol tartrate (LOPRESSOR) 25 mg tablet, Take 1 tablet (25 mg total) by mouth every 12 (twelve) hours, Disp: 60 tablet, Rfl: 2  •  potassium chloride (Klor-Con M20) 20 mEq tablet, Take 1 tablet (20 mEq total) by mouth daily for 5 days Unless  "otherwise directed., Disp: 5 tablet, Rfl: 1  •  SUMAtriptan (IMITREX) 100 mg tablet, Take 1 tablet (100 mg total) by mouth every 2 (two) hours as needed for migraine Max 200 mg/day, Disp: 10 tablet, Rfl: 1  •  torsemide (DEMADEX) 10 mg tablet, Take 1 tablet (10 mg total) by mouth daily for 5 days Unless otherwise directed., Disp: 5 tablet, Rfl: 1  •  traZODone (DESYREL) 100 mg tablet, TAKE 2 TABLETS (200 MG TOTAL) BY MOUTH DAILY AT BEDTIME, Disp: 180 tablet, Rfl: 1  •  venlafaxine (EFFEXOR-XR) 75 mg 24 hr capsule, TAKE 3 CAPSULES (225 MG TOTAL) BY MOUTH DAILY WITH BREAKFAST, Disp: 270 capsule, Rfl: 1  •  Ventolin  (90 Base) MCG/ACT inhaler, Inhale 2 puffs 3 (three) times a day as needed for wheezing, Disp: 18 g, Rfl: 5  •  warfarin (Jantoven) 1 mg tablet, Take 2 tablets (2mg total) QHS unless otherwise directed., Disp: 60 tablet, Rfl: 2  Allergies   Allergen Reactions   • Protonix [Pantoprazole] Headache           Review of Systems:  Review of Systems   Constitutional: Negative.    HENT: Negative.     Eyes: Negative.    Respiratory: Negative.     Cardiovascular: Negative.    Musculoskeletal: Negative.    Neurological: Negative.    Psychiatric/Behavioral: Negative.           /88 (BP Location: Left arm, Patient Position: Sitting, Cuff Size: Standard)   Pulse 83   Resp 16   Ht 5' 2\" (1.575 m)   Wt 73.5 kg (162 lb)   SpO2 98%   BMI 29.63 kg/m²     Physical Exam:  Physical Exam  Vitals reviewed.   Constitutional:       Appearance: Normal appearance.   HENT:      Head: Normocephalic.   Eyes:      Pupils: Pupils are equal, round, and reactive to light.   Cardiovascular:      Rate and Rhythm: Rhythm irregular.      Heart sounds: Normal heart sounds.      Comments: Sternotomy scar site is healing well.  No evidence of discharge or redness noted.  Pulmonary:      Effort: Pulmonary effort is normal.      Breath sounds: Normal breath sounds. No wheezing.   Abdominal:      General: Abdomen is flat.      " Palpations: Abdomen is soft.   Skin:     General: Skin is warm.   Neurological:      Mental Status: He is alert.

## 2024-12-19 NOTE — ASSESSMENT & PLAN NOTE
-Doing well from the cardiac standpoint of view.  Continue with the current treatment planning.  He is euvolemic.  No indication for diuretic therapy.

## 2024-12-19 NOTE — ASSESSMENT & PLAN NOTE
-The blood pressure is well controlled with the current medications.  No side effect profile has been noted. He will continue with the current dose of antihypertensive medications.  I have asked him to maintain the blood pressure logs to make the necessary changes in the antihypertensive medications if necessary.    -DASH dietary pattern has been suggested.  Diet rich in fruits, vegetables, whole grains and low-fat dairy products with reduced content of had treated and total fat has been addressed.

## 2024-12-19 NOTE — ASSESSMENT & PLAN NOTE
-He is currently on the amiodarone.  The beta-blockers will continue as before.   -The etiology of the atrial fibrillation is postop in nature.  He is going to be evaluated another 2 months time from now and if he continues to remain in the atrial fibrillation then I have a strong suspicion that he is going to be in the persistent atrial fibrillation.  I would recommend controlling the heart rate with AV brittany blocking agents.  Taking the age factor into account , will consider stopping the amiodarone therapy at that stage.

## 2024-12-21 DIAGNOSIS — I21.4 NSTEMI (NON-ST ELEVATED MYOCARDIAL INFARCTION) (HCC): ICD-10-CM

## 2024-12-23 RX ORDER — ATORVASTATIN CALCIUM 80 MG/1
80 TABLET, FILM COATED ORAL EVERY EVENING
Qty: 90 TABLET | Refills: 1 | Status: SHIPPED | OUTPATIENT
Start: 2024-12-23

## 2024-12-27 ENCOUNTER — VBI (OUTPATIENT)
Dept: ADMINISTRATIVE | Facility: OTHER | Age: 69
End: 2024-12-27

## 2024-12-27 NOTE — TELEPHONE ENCOUNTER
12/27/24 8:52 AM     Chart reviewed for CRC: Colonoscopy ; nothing is submitted to the patient's insurance at this time.     Danae Messina MA   [facility]

## 2025-01-09 NOTE — TELEPHONE ENCOUNTER
"Called pt to gather further information on his s/s.  Pt states that his Cardiologist asked him to start monitoring his BP about 2 weeks ago and keep a record.  Prior to this pt wasn't doing that.  Pt states his BP is always high whenever he checks it.  States that 158/113 is typically where it is.    Aside from HA pt states he hasn't had any other s/s or concerns.  States though that his migraines will last for up to 2 days.  States one day he had to take Imitrex 4x and had no relief.  Pt denies any s/s of blurry vision, SOB, CP or weakness.  Pt states he takes Lopressor 25mg BID and has not missed any doses.     Advised pt best to be seen in office for further evaluation.  Pt declined at this time since PCP is unavailable.  Pt states he is only willing to see PCP but doesn't have any availability until 1/15/25 unless Same Day slot used.  Please review and advise how pt should proceed.  Reason for Disposition   Systolic BP >= 180 OR Diastolic >= 110    Answer Assessment - Initial Assessment Questions  1. BLOOD PRESSURE: \"What is your blood pressure?\" \"Did you take at least two measurements 5 minutes apart?\"      158/113    2. ONSET: \"When did you take your blood pressure?\"      0930    3. HOW: \"How did you take your blood pressure?\" (e.g., automatic home BP monitor, visiting nurse)      Automatic home cuff    4. HISTORY: \"Do you have a history of high blood pressure?\"      Yes    5. MEDICINES: \"Are you taking any medicines for blood pressure?\" \"Have you missed any doses recently?\"      Metoprolol 25mg BID    6. OTHER SYMPTOMS: \"Do you have any symptoms?\" (e.g., blurred vision, chest pain, difficulty breathing, headache, weakness)      HA, migraines    Protocols used: Blood Pressure - High-Adult-OH    "

## 2025-01-10 ENCOUNTER — RA CDI HCC (OUTPATIENT)
Dept: OTHER | Facility: HOSPITAL | Age: 70
End: 2025-01-10

## 2025-01-13 ENCOUNTER — OFFICE VISIT (OUTPATIENT)
Dept: FAMILY MEDICINE CLINIC | Facility: CLINIC | Age: 70
End: 2025-01-13
Payer: COMMERCIAL

## 2025-01-13 VITALS
RESPIRATION RATE: 16 BRPM | HEIGHT: 62 IN | SYSTOLIC BLOOD PRESSURE: 160 MMHG | BODY MASS INDEX: 30.36 KG/M2 | DIASTOLIC BLOOD PRESSURE: 110 MMHG | OXYGEN SATURATION: 96 % | HEART RATE: 106 BPM | WEIGHT: 165 LBS

## 2025-01-13 DIAGNOSIS — I97.89 POSTOPERATIVE ATRIAL FIBRILLATION (HCC): ICD-10-CM

## 2025-01-13 DIAGNOSIS — Z98.890 S/P MVR (MITRAL VALVE REPAIR): ICD-10-CM

## 2025-01-13 DIAGNOSIS — I10 ESSENTIAL HYPERTENSION: Primary | ICD-10-CM

## 2025-01-13 DIAGNOSIS — I48.91 POSTOPERATIVE ATRIAL FIBRILLATION (HCC): ICD-10-CM

## 2025-01-13 PROCEDURE — 99214 OFFICE O/P EST MOD 30 MIN: CPT | Performed by: FAMILY MEDICINE

## 2025-01-13 RX ORDER — METOPROLOL TARTRATE 25 MG/1
25 TABLET, FILM COATED ORAL EVERY 12 HOURS SCHEDULED
Qty: 60 TABLET | Refills: 2 | Status: SHIPPED | OUTPATIENT
Start: 2025-01-13

## 2025-01-13 RX ORDER — WARFARIN SODIUM 1 MG/1
TABLET ORAL
Qty: 60 TABLET | Refills: 2 | Status: SHIPPED | OUTPATIENT
Start: 2025-01-13

## 2025-01-13 RX ORDER — AMLODIPINE BESYLATE 2.5 MG/1
2.5 TABLET ORAL DAILY
Qty: 30 TABLET | Refills: 0 | Status: SHIPPED | OUTPATIENT
Start: 2025-01-13 | End: 2025-01-23 | Stop reason: SDUPTHER

## 2025-01-13 NOTE — ASSESSMENT & PLAN NOTE
Doing well, follow-up with cardiology as scheduled.  Orders:  •  metoprolol tartrate (LOPRESSOR) 25 mg tablet; Take 1 tablet (25 mg total) by mouth every 12 (twelve) hours  •  warfarin (Jantoven) 1 mg tablet; Take 2 tablets (2mg total) QHS unless otherwise directed.

## 2025-01-13 NOTE — PROGRESS NOTES
Name: Reuben García      : 1955      MRN: [id number]  Encounter Provider: Rich Delgado DO  Encounter Date: 2025   Encounter department: [facility] [address]      Assessment & Plan  S/P MVR (mitral valve repair)  Doing well, follow-up with cardiology as scheduled.  Orders:  •  metoprolol tartrate (LOPRESSOR) 25 mg tablet; Take 1 tablet (25 mg total) by mouth every 12 (twelve) hours  •  warfarin (Jantoven) 1 mg tablet; Take 2 tablets (2mg total) QHS unless otherwise directed.    Essential hypertension  We will start amlodipine 2.5 mg daily, he is to report his readings to us in 2 weeks, and we will adjust his medications accordingly.  Orders:  •  amLODIPine (NORVASC) 2.5 mg tablet; Take 1 tablet (2.5 mg total) by mouth daily    Postoperative atrial fibrillation (HCC)  He does continue his amlodipine, renewed his metoprolol today, follow-up with his cardiologist as scheduled.            History of Present Illness     Patient comes in today for evaluation of his blood pressure at the request of his cardiologist.  He has been checking his blood pressure once daily and has noticed readings into the 150s and 160s regularly.  He states he is feeling better each day, less pain in his chest.      Review of Systems   Constitutional:  Negative for chills, fatigue and fever.   HENT:  Negative for congestion, ear pain, hearing loss, postnasal drip, rhinorrhea and sore throat.    Eyes:  Negative for pain and visual disturbance.   Respiratory:  Negative for chest tightness, shortness of breath and wheezing.    Cardiovascular:  Negative for chest pain and leg swelling.   Gastrointestinal:  Negative for abdominal distention, abdominal pain, constipation, diarrhea and vomiting.   Endocrine: Negative for cold intolerance and heat intolerance.   Genitourinary:  Negative for difficulty urinating, frequency and urgency.   Musculoskeletal:  Negative for arthralgias and gait problem.  "  Skin:  Negative for color change.   Neurological:  Negative for dizziness, tremors, syncope, numbness and headaches.   Hematological:  Negative for adenopathy.   Psychiatric/Behavioral:  Negative for agitation, confusion and sleep disturbance. The patient is not nervous/anxious.      Objective     BP (!) 160/110 (BP Location: Right arm)   Pulse (!) 106   Resp 16   Ht 5' 2\" (1.575 m)   Wt 74.8 kg (165 lb)   SpO2 96%   BMI 30.18 kg/m²     Physical Exam  Constitutional:       Appearance: He is well-developed.   HENT:      Head: Normocephalic.      Right Ear: External ear normal.      Left Ear: External ear normal.      Nose: Nose normal.   Eyes:      Extraocular Movements: Extraocular movements intact.      Conjunctiva/sclera: Conjunctivae normal.      Pupils: Pupils are equal, round, and reactive to light.   Neck:      Thyroid: No thyromegaly.   Cardiovascular:      Rate and Rhythm: Normal rate and regular rhythm.      Heart sounds: Normal heart sounds.   Pulmonary:      Effort: Pulmonary effort is normal.      Breath sounds: Normal breath sounds.   Abdominal:      General: Bowel sounds are normal.      Palpations: Abdomen is soft.   Musculoskeletal:         General: Normal range of motion.      Cervical back: Normal range of motion.   Skin:     General: Skin is warm and dry.   Neurological:      Mental Status: He is alert and oriented to person, place, and time.   Psychiatric:         Mood and Affect: Mood normal.         Behavior: Behavior normal.         Rich Delgado, DO     "

## 2025-01-13 NOTE — ASSESSMENT & PLAN NOTE
He does continue his amlodipine, renewed his metoprolol today, follow-up with his cardiologist as scheduled.

## 2025-01-13 NOTE — ASSESSMENT & PLAN NOTE
We will start amlodipine 2.5 mg daily, he is to report his readings to us in 2 weeks, and we will adjust his medications accordingly.  Orders:  •  amLODIPine (NORVASC) 2.5 mg tablet; Take 1 tablet (2.5 mg total) by mouth daily

## 2025-01-23 DIAGNOSIS — I10 ESSENTIAL HYPERTENSION: ICD-10-CM

## 2025-01-23 RX ORDER — AMLODIPINE BESYLATE 5 MG/1
5 TABLET ORAL DAILY
Qty: 30 TABLET | Refills: 1 | Status: SHIPPED | OUTPATIENT
Start: 2025-01-23

## 2025-02-02 DIAGNOSIS — G43.109 MIGRAINE WITH AURA AND WITHOUT STATUS MIGRAINOSUS, NOT INTRACTABLE: ICD-10-CM

## 2025-02-03 DIAGNOSIS — M54.2 NECK PAIN: Primary | ICD-10-CM

## 2025-02-03 RX ORDER — SUMATRIPTAN SUCCINATE 100 MG/1
TABLET ORAL
Qty: 10 TABLET | Refills: 1 | Status: SHIPPED | OUTPATIENT
Start: 2025-02-03

## 2025-02-03 RX ORDER — CYCLOBENZAPRINE HCL 5 MG
5 TABLET ORAL 3 TIMES DAILY PRN
Qty: 20 TABLET | Refills: 0 | Status: SHIPPED | OUTPATIENT
Start: 2025-02-03

## 2025-02-05 DIAGNOSIS — Z98.890 S/P MVR (MITRAL VALVE REPAIR): ICD-10-CM

## 2025-02-06 RX ORDER — WARFARIN SODIUM 1 MG/1
TABLET ORAL
Qty: 180 TABLET | Refills: 0 | Status: SHIPPED | OUTPATIENT
Start: 2025-02-06

## 2025-02-06 RX ORDER — METOPROLOL TARTRATE 25 MG/1
25 TABLET, FILM COATED ORAL EVERY 12 HOURS SCHEDULED
Qty: 180 TABLET | Refills: 0 | Status: SHIPPED | OUTPATIENT
Start: 2025-02-06

## 2025-02-09 DIAGNOSIS — I25.10 CORONARY ARTERY DISEASE INVOLVING NATIVE CORONARY ARTERY OF NATIVE HEART WITHOUT ANGINA PECTORIS: ICD-10-CM

## 2025-02-10 DIAGNOSIS — I10 ESSENTIAL HYPERTENSION: ICD-10-CM

## 2025-02-10 RX ORDER — AMLODIPINE BESYLATE 10 MG/1
10 TABLET ORAL DAILY
Qty: 30 TABLET | Refills: 1 | Status: SHIPPED | OUTPATIENT
Start: 2025-02-10

## 2025-02-10 RX ORDER — CLOPIDOGREL BISULFATE 75 MG/1
75 TABLET ORAL DAILY
Qty: 90 TABLET | Refills: 1 | Status: SHIPPED | OUTPATIENT
Start: 2025-02-10

## 2025-03-04 DIAGNOSIS — I10 ESSENTIAL HYPERTENSION: ICD-10-CM

## 2025-03-05 RX ORDER — AMLODIPINE BESYLATE 10 MG/1
10 TABLET ORAL DAILY
Qty: 90 TABLET | Refills: 1 | Status: SHIPPED | OUTPATIENT
Start: 2025-03-05

## 2025-03-12 ENCOUNTER — OFFICE VISIT (OUTPATIENT)
Dept: FAMILY MEDICINE CLINIC | Facility: CLINIC | Age: 70
End: 2025-03-12
Payer: COMMERCIAL

## 2025-03-12 VITALS
HEART RATE: 96 BPM | BODY MASS INDEX: 30.91 KG/M2 | HEIGHT: 62 IN | OXYGEN SATURATION: 98 % | SYSTOLIC BLOOD PRESSURE: 132 MMHG | WEIGHT: 168 LBS | RESPIRATION RATE: 18 BRPM | DIASTOLIC BLOOD PRESSURE: 80 MMHG

## 2025-03-12 DIAGNOSIS — E78.2 MIXED HYPERLIPIDEMIA: ICD-10-CM

## 2025-03-12 DIAGNOSIS — K21.9 GASTROESOPHAGEAL REFLUX DISEASE WITHOUT ESOPHAGITIS: ICD-10-CM

## 2025-03-12 DIAGNOSIS — I10 ESSENTIAL HYPERTENSION: ICD-10-CM

## 2025-03-12 DIAGNOSIS — Z12.5 PROSTATE CANCER SCREENING: ICD-10-CM

## 2025-03-12 DIAGNOSIS — Z00.00 MEDICARE ANNUAL WELLNESS VISIT, SUBSEQUENT: Primary | ICD-10-CM

## 2025-03-12 DIAGNOSIS — I34.0 NONRHEUMATIC MITRAL VALVE REGURGITATION: ICD-10-CM

## 2025-03-12 DIAGNOSIS — Z12.11 SCREEN FOR COLON CANCER: ICD-10-CM

## 2025-03-12 DIAGNOSIS — I97.89 POSTOPERATIVE ATRIAL FIBRILLATION (HCC): ICD-10-CM

## 2025-03-12 DIAGNOSIS — I48.91 POSTOPERATIVE ATRIAL FIBRILLATION (HCC): ICD-10-CM

## 2025-03-12 PROBLEM — N17.9 AKI (ACUTE KIDNEY INJURY) (HCC): Status: RESOLVED | Noted: 2024-10-11 | Resolved: 2025-03-12

## 2025-03-12 PROBLEM — I50.33 ACUTE ON CHRONIC HEART FAILURE WITH PRESERVED EJECTION FRACTION (HFPEF) (HCC): Status: RESOLVED | Noted: 2024-09-10 | Resolved: 2025-03-12

## 2025-03-12 PROCEDURE — G0439 PPPS, SUBSEQ VISIT: HCPCS | Performed by: FAMILY MEDICINE

## 2025-03-12 PROCEDURE — 99214 OFFICE O/P EST MOD 30 MIN: CPT | Performed by: FAMILY MEDICINE

## 2025-03-12 RX ORDER — AMOXICILLIN 500 MG/1
2000 CAPSULE ORAL ONCE
Qty: 4 CAPSULE | Refills: 5 | Status: SHIPPED | OUTPATIENT
Start: 2025-03-12 | End: 2025-03-12

## 2025-03-12 RX ORDER — FAMOTIDINE 40 MG/1
40 TABLET, FILM COATED ORAL DAILY
Qty: 30 TABLET | Refills: 3 | Status: SHIPPED | OUTPATIENT
Start: 2025-03-12

## 2025-03-12 NOTE — PATIENT INSTRUCTIONS
Medicare Preventive Visit Patient Instructions  Thank you for completing your Welcome to Medicare Visit or Medicare Annual Wellness Visit today. Your next wellness visit will be due in one year (3/13/2026).  The screening/preventive services that you may require over the next 5-10 years are detailed below. Some tests may not apply to you based off risk factors and/or age. Screening tests ordered at today's visit but not completed yet may show as past due. Also, please note that scanned in results may not display below.  Preventive Screenings:  Service Recommendations Previous Testing/Comments   Colorectal Cancer Screening  Colonoscopy    Fecal Occult Blood Test (FOBT)/Fecal Immunochemical Test (FIT)  Fecal DNA/Cologuard Test  Flexible Sigmoidoscopy Age: 45-75 years old   Colonoscopy: every 10 years (May be performed more frequently if at higher risk)  OR  FOBT/FIT: every 1 year  OR  Cologuard: every 3 years  OR  Sigmoidoscopy: every 5 years  Screening may be recommended earlier than age 45 if at higher risk for colorectal cancer. Also, an individualized decision between you and your healthcare provider will decide whether screening between the ages of 76-85 would be appropriate. Colonoscopy: 11/15/2008  FOBT/FIT: Not on file  Cologuard: Not on file  Sigmoidoscopy: Not on file          Prostate Cancer Screening Individualized decision between patient and health care provider in men between ages of 55-69   Medicare will cover every 12 months beginning on the day after your 50th birthday PSA: 0.43 ng/mL           Hepatitis C Screening Once for adults born between 1945 and 1965  More frequently in patients at high risk for Hepatitis C Hep C Antibody: 08/09/2017    Screening Current   Diabetes Screening 1-2 times per year if you're at risk for diabetes or have pre-diabetes Fasting glucose: 96 mg/dL (10/2/2024)  A1C: 5.1 % (10/2/2024)  Screening Current   Cholesterol Screening Once every 5 years if you don't have a lipid  disorder. May order more often based on risk factors. Lipid panel: 10/02/2024  Screening Not Indicated  History Lipid Disorder      Other Preventive Screenings Covered by Medicare:  Abdominal Aortic Aneurysm (AAA) Screening: covered once if your at risk. You're considered to be at risk if you have a family history of AAA or a male between the age of 65-75 who smoking at least 100 cigarettes in your lifetime.  Lung Cancer Screening: covers low dose CT scan once per year if you meet all of the following conditions: (1) Age 55-77; (2) No signs or symptoms of lung cancer; (3) Current smoker or have quit smoking within the last 15 years; (4) You have a tobacco smoking history of at least 20 pack years (packs per day x number of years you smoked); (5) You get a written order from a healthcare provider.  Glaucoma Screening: covered annually if you're considered high risk: (1) You have diabetes OR (2) Family history of glaucoma OR (3)  aged 50 and older OR (4)  American aged 65 and older  Osteoporosis Screening: covered every 2 years if you meet one of the following conditions: (1) Have a vertebral abnormality; (2) On glucocorticoid therapy for more than 3 months; (3) Have primary hyperparathyroidism; (4) On osteoporosis medications and need to assess response to drug therapy.  HIV Screening: covered annually if you're between the age of 15-65. Also covered annually if you are younger than 15 and older than 65 with risk factors for HIV infection. For pregnant patients, it is covered up to 3 times per pregnancy.    Immunizations:  Immunization Recommendations   Influenza Vaccine Annual influenza vaccination during flu season is recommended for all persons aged >= 6 months who do not have contraindications   Pneumococcal Vaccine   * Pneumococcal conjugate vaccine = PCV13 (Prevnar 13), PCV15 (Vaxneuvance), PCV20 (Prevnar 20)  * Pneumococcal polysaccharide vaccine = PPSV23 (Pneumovax) Adults 19-65 yo  with certain risk factors or if 65+ yo  If never received any pneumonia vaccine: recommend Prevnar 20 (PCV20)  Give PCV20 if previously received 1 dose of PCV13 or PPSV23   Hepatitis B Vaccine 3 dose series if at intermediate or high risk (ex: diabetes, end stage renal disease, liver disease)   Respiratory syncytial virus (RSV) Vaccine - COVERED BY MEDICARE PART D  * RSVPreF3 (Arexvy) CDC recommends that adults 60 years of age and older may receive a single dose of RSV vaccine using shared clinical decision-making (SCDM)   Tetanus (Td) Vaccine - COST NOT COVERED BY MEDICARE PART B Following completion of primary series, a booster dose should be given every 10 years to maintain immunity against tetanus. Td may also be given as tetanus wound prophylaxis.   Tdap Vaccine - COST NOT COVERED BY MEDICARE PART B Recommended at least once for all adults. For pregnant patients, recommended with each pregnancy.   Shingles Vaccine (Shingrix) - COST NOT COVERED BY MEDICARE PART B  2 shot series recommended in those 19 years and older who have or will have weakened immune systems or those 50 years and older     Health Maintenance Due:      Topic Date Due   • Colorectal Cancer Screening  09/17/2024   • Hepatitis C Screening  Completed     Immunizations Due:      Topic Date Due   • Hepatitis A Vaccine (1 of 2 - Risk 2-dose series) Never done   • COVID-19 Vaccine (5 - 2024-25 season) 09/01/2024     Advance Directives   What are advance directives?  Advance directives are legal documents that state your wishes and plans for medical care. These plans are made ahead of time in case you lose your ability to make decisions for yourself. Advance directives can apply to any medical decision, such as the treatments you want, and if you want to donate organs.   What are the types of advance directives?  There are many types of advance directives, and each state has rules about how to use them. You may choose a combination of any of the  following:  Living will:  This is a written record of the treatment you want. You can also choose which treatments you do not want, which to limit, and which to stop at a certain time. This includes surgery, medicine, IV fluid, and tube feedings.   Durable power of  for healthcare (DPAHC):  This is a written record that states who you want to make healthcare choices for you when you are unable to make them for yourself. This person, called a proxy, is usually a family member or a friend. You may choose more than 1 proxy.  Do not resuscitate (DNR) order:  A DNR order is used in case your heart stops beating or you stop breathing. It is a request not to have certain forms of treatment, such as CPR. A DNR order may be included in other types of advance directives.  Medical directive:  This covers the care that you want if you are in a coma, near death, or unable to make decisions for yourself. You can list the treatments you want for each condition. Treatment may include pain medicine, surgery, blood transfusions, dialysis, IV or tube feedings, and a ventilator (breathing machine).  Values history:  This document has questions about your views, beliefs, and how you feel and think about life. This information can help others choose the care that you would choose.  Why are advance directives important?  An advance directive helps you control your care. Although spoken wishes may be used, it is better to have your wishes written down. Spoken wishes can be misunderstood, or not followed. Treatments may be given even if you do not want them. An advance directive may make it easier for your family to make difficult choices about your care.   Weight Management   Why it is important to manage your weight:  Being overweight increases your risk of health conditions such as heart disease, high blood pressure, type 2 diabetes, and certain types of cancer. It can also increase your risk for osteoarthritis, sleep apnea, and  other respiratory problems. Aim for a slow, steady weight loss. Even a small amount of weight loss can lower your risk of health problems.  How to lose weight safely:  A safe and healthy way to lose weight is to eat fewer calories and get regular exercise. You can lose up about 1 pound a week by decreasing the number of calories you eat by 500 calories each day.   Healthy meal plan for weight management:  A healthy meal plan includes a variety of foods, contains fewer calories, and helps you stay healthy. A healthy meal plan includes the following:  Eat whole-grain foods more often.  A healthy meal plan should contain fiber. Fiber is the part of grains, fruits, and vegetables that is not broken down by your body. Whole-grain foods are healthy and provide extra fiber in your diet. Some examples of whole-grain foods are whole-wheat breads and pastas, oatmeal, brown rice, and bulgur.  Eat a variety of vegetables every day.  Include dark, leafy greens such as spinach, kale, zaida greens, and mustard greens. Eat yellow and orange vegetables such as carrots, sweet potatoes, and winter squash.   Eat a variety of fruits every day.  Choose fresh or canned fruit (canned in its own juice or light syrup) instead of juice. Fruit juice has very little or no fiber.  Eat low-fat dairy foods.  Drink fat-free (skim) milk or 1% milk. Eat fat-free yogurt and low-fat cottage cheese. Try low-fat cheeses such as mozzarella and other reduced-fat cheeses.  Choose meat and other protein foods that are low in fat.  Choose beans or other legumes such as split peas or lentils. Choose fish, skinless poultry (chicken or turkey), or lean cuts of red meat (beef or pork). Before you cook meat or poultry, cut off any visible fat.   Use less fat and oil.  Try baking foods instead of frying them. Add less fat, such as margarine, sour cream, regular salad dressing and mayonnaise to foods. Eat fewer high-fat foods. Some examples of high-fat foods  "include french fries, doughnuts, ice cream, and cakes.  Eat fewer sweets.  Limit foods and drinks that are high in sugar. This includes candy, cookies, regular soda, and sweetened drinks.  Exercise:  Exercise at least 30 minutes per day on most days of the week. Some examples of exercise include walking, biking, dancing, and swimming. You can also fit in more physical activity by taking the stairs instead of the elevator or parking farther away from stores. Ask your healthcare provider about the best exercise plan for you.   Alcohol Use and Your Health    Drinking too much can harm your health.  Excessive alcohol use leads to about 88,000 death in the [place] each year, and shortens the life of those who diet by almost 30 years.  Further, excessive drinking cost the economy $249 billion in 2010.  Most excessive drinkers are not alcohol dependent.    Excessive alcohol use has immediate effects that increase the risk of many harmful health conditions.  These are most often the result of binge drinking.  Over time, excessive alcohol use can lead to the development of chronic diseases and other series health problems.    What is considered a \"drink\"?        Excessive alcohol use includes:  Binge Drinking: For women, 4 or more drinks consumed on one occasion. For men, 5 or more drinks consumed on one occasion.  Heavy Drinking: For women, 8 or more drinks per week. For men, 15 or more drinks per week  Any alcohol used by pregnant women  Any alcohol used by those under the age of 21 years    If you choose to drink, do so in moderation:  Do not drink at all if you are under the age of 21, or if you are or may be pregnant, or have health problems that could be made worse by drinking.  For women, up to 1 drink per day  For men, up to 2 drinks a day    No one should begin drinking or drink more frequently based on potential health benefits    Short-Term Health Risks:  Injuries: motor vehicle crashes, falls, drownings, " burns  Violence: homicide, suicide, sexual assault, intimate partner violence  Alcohol poisoning  Reproductive health: risky sexual behaviors, unintended prengnacy, sexually transmitted diseases, miscarriage, stillbirth, fetal alcohol syndrome    Long-Term Health Risks:  Chronic diseases: high blood pressure, heart disease, stroke, liver disease, digestive problems  Cancers: breast, mouth and throat, liver, colon  Learning and memory problems: dementia, poor school performance  Mental health: depression, anxiety, insomnia  Social problems: lost productivity, family problems, unemployment  Alcohol dependence    For support and more information:  Substance Abuse and Mental Health Services Administration  [address]  [place] [address]  Web Address: http://www.sama.gov    Alcoholics Anonymous        Web Address: http://www.aa.org    https://www.cdc.gov/alcohol/fact-sheets/alcohol-use.htm     © Copyright [employer] 2018 Information is for End User's use only and may not be sold, redistributed or otherwise used for commercial purposes. All illustrations and images included in CareNotes® are the copyrighted property of [employer]D.A[employer] or [employer]18

## 2025-03-12 NOTE — ASSESSMENT & PLAN NOTE
Rate controlled, continue the amiodarone and metoprolol per cardiology.  He is also on the Coumadin and Plavix.

## 2025-03-12 NOTE — PROGRESS NOTES
Name: Reuben García      : 1955      MRN: [id number]  Encounter Provider: Rich Delgado DO  Encounter Date: 3/12/2025   Encounter department: [facility] [address]    Assessment & Plan  Medicare annual wellness visit, subsequent         Nonrheumatic mitral regurgitation, status post mitral valve ring and left atrial appendage ligation    Orders:  •  amoxicillin (AMOXIL) 500 mg capsule; Take 4 capsules (2,000 mg total) by mouth once for 1 dose 2 hours prior to dental procedure    Essential hypertension  Improved with increasing the amlodipine to 10 mg daily, continue this as well as the metoprolol       Mixed hyperlipidemia  Continue the atorvastatin, check a CMP, lipid profile in 6 months  Orders:  •  CBC; Future  •  Comprehensive metabolic panel; Future  •  Lipid panel; Future    Gastroesophageal reflux disease without esophagitis  Start famotidine 40 mg daily.  Orders:  •  famotidine (PEPCID) 40 MG tablet; Take 1 tablet (40 mg total) by mouth daily    Screen for colon cancer    Orders:  •  Cologuard    Prostate cancer screening    Orders:  •  PSA, Total Screen; Future    Postoperative atrial fibrillation (HCC)  Rate controlled, continue the amiodarone and metoprolol per cardiology.  He is also on the Coumadin and Plavix.          Preventive health issues were discussed with patient, and age appropriate screening tests were ordered as noted in patient's After Visit Summary. Personalized health advice and appropriate referrals for health education or preventive services given if needed, as noted in patient's After Visit Summary.    History of Present Illness     Patient comes in today for checkup.  He states that his blood pressures have been improving since he increased the amlodipine to the 10 mg daily.       Patient Care Team:  Rich Delgado DO as PCP - General  Rich Delgado DO as PCP - PCP-[facility] ([facility]  DO Erin Everett  GABRIELE    Review of Systems   Constitutional:  Negative for chills, fatigue and fever.   HENT:  Negative for congestion, ear pain, hearing loss, postnasal drip, rhinorrhea and sore throat.    Eyes:  Negative for pain and visual disturbance.   Respiratory:  Negative for chest tightness, shortness of breath and wheezing.    Cardiovascular:  Negative for chest pain and leg swelling.   Gastrointestinal:  Negative for abdominal distention, abdominal pain, constipation, diarrhea and vomiting.   Endocrine: Negative for cold intolerance and heat intolerance.   Genitourinary:  Negative for difficulty urinating, frequency and urgency.   Musculoskeletal:  Negative for arthralgias and gait problem.   Skin:  Negative for color change.   Neurological:  Negative for dizziness, tremors, syncope, numbness and headaches.   Hematological:  Negative for adenopathy.   Psychiatric/Behavioral:  Negative for agitation, confusion and sleep disturbance. The patient is not nervous/anxious.      Medical History Reviewed by provider this encounter:  Tobacco  Allergies  Meds  Problems  Med Hx  Surg Hx  Fam Hx       Annual Wellness Visit Questionnaire   Reuben is here for his Subsequent Wellness visit. Last Medicare Wellness visit information reviewed, patient interviewed and updates made to the record today.      Health Risk Assessment:   Patient rates overall health as good. Patient feels that their physical health rating is slightly better. Patient is satisfied with their life. Eyesight was rated as same. Hearing was rated as same. Patient feels that their emotional and mental health rating is much better. Patients states they are never, rarely angry. Patient states they are often unusually tired/fatigued. Pain experienced in the last 7 days has been some. Patient's pain rating has been 4/10. Patient states that he has experienced weight loss or gain in last 6 months.     Fall Risk Screening:   In the past year, patient has experienced: no  history of falling in past year      Home Safety:  Patient does not have trouble with stairs inside or outside of their home. Patient has no working smoke alarms and has no working carbon monoxide detector. Home safety hazards include: none.     Nutrition:   Current diet is No Added Salt and Limited junk food.     Medications:   Patient is currently taking over-the-counter supplements. OTC medications include: see medication list. Patient is able to manage medications.     Activities of Daily Living (ADLs)/Instrumental Activities of Daily Living (IADLs):   Walk and transfer into and out of bed and chair?: Yes  Dress and groom yourself?: Yes    Bathe or shower yourself?: Yes    Feed yourself? Yes  Do your laundry/housekeeping?: Yes  Manage your money, pay your bills and track your expenses?: Yes  Make your own meals?: Yes    Do your own shopping?: Yes    Previous Hospitalizations:   Any hospitalizations or ED visits within the last 12 months?: Yes    How many hospitalizations have you had in the last year?: 1-2    Advance Care Planning:   Living will: Yes    Durable POA for healthcare: Yes    Advanced directive: Yes      Cognitive Screening:   Provider or family/friend/caregiver concerned regarding cognition?: No    PREVENTIVE SCREENINGS      Cardiovascular Screening:    General: Screening Not Indicated and History Lipid Disorder      Diabetes Screening:     General: Screening Current      Colorectal Cancer Screening:       Due for: Cologuard      Prostate Cancer Screening:      Due for: PSA      Osteoporosis Screening:    General: Screening Not Indicated      Abdominal Aortic Aneurysm (AAA) Screening:    Risk factors include: age between 65-74 yo        Lung Cancer Screening:     General: Screening Not Indicated      Hepatitis C Screening:    General: Screening Current    Screening, Brief Intervention, and Referral to Treatment (SBIRT)     Screening  Typical number of drinks in a day: 4  Typical number of drinks in  a week: 15  Interpretation: Risky drinking behavior.    AUDIT-C Screenin) How often did you have a drink containing alcohol in the past year? 4 or more times a week  2) How many drinks did you have on a typical day when you were drinking in the past year? 3 to 4  3) How often did you have 6 or more drinks on one occasion in the past year? monthly    AUDIT-C Score: 6  Interpretation: Score 4-12 (male): POSITIVE screen for alcohol misuse    AUDIT Screenin) How often during the last year have you found that you were not able to stop drinking once you had started? 0 - never  5) How often during the last year have you failed to do what was normally expected from you because of drinking? 0 - never  6) How often during the last year have you needed a first drink in the morning to get yourself going after a heavy drinking session? 0 - never  7) How often during the last year have you had a feeling of guilt or remorse after drinking? 0 - never  8) How often during the last year have you been unable to remember what happened the night before because you had been drinking? 0 - never  9) Have you or someone else been injured as a result of your drinking? 0 - no  10) Has a relative or friend or a doctor or another health worker been concerned about your drinking or suggested you cut down? 4 - yes, during the last year    AUDIT Score: 10  Interpretation: Harmful or hazardous alcohol consumption    Single Item Drug Screening:  How often have you used an illegal drug (including marijuana) or a prescription medication for non-medical reasons in the past year? never    Single Item Drug Screen Score: 0  Interpretation: Negative screen for possible drug use disorder    Social Drivers of Health     Financial Resource Strain: Low Risk  (3/4/2024)    Overall Financial Resource Strain ([facility])    • Difficulty of Paying Living Expenses: Not hard at all   Food Insecurity: No Food Insecurity (10/10/2024)    Nursing - Inadequate Food  "Risk Classification    • Worried About Running Out of Food in the Last Year: Never true    • Ran Out of Food in the Last Year: Never true   Transportation Needs: No Transportation Needs (11/5/2024)    OASIS : Transportation    • Lack of Transportation (Medical): No    • Lack of Transportation (Non-Medical): No    • Patient Unable or Declines to Respond: No   Housing Stability: Low Risk  (10/10/2024)    Housing Stability Vital Sign    • Unable to Pay for Housing in the Last Year: No    • Number of Times Moved in the Last Year: 0    • Homeless in the Last Year: No   Utilities: Not At Risk (10/10/2024)    [facility] Utilities    • Threatened with loss of utilities: No     No results found.    Objective   /80 (BP Location: Left arm, Patient Position: Sitting, Cuff Size: Standard)   Pulse 96   Resp 18   Ht 5' 2\" (1.575 m)   Wt 76.2 kg (168 lb)   SpO2 98%   BMI 30.73 kg/m²     Physical Exam  Constitutional:       Appearance: He is well-developed.   HENT:      Head: Normocephalic.      Right Ear: External ear normal.      Left Ear: External ear normal.      Nose: Nose normal.   Eyes:      Extraocular Movements: Extraocular movements intact.      Conjunctiva/sclera: Conjunctivae normal.      Pupils: Pupils are equal, round, and reactive to light.   Neck:      Thyroid: No thyromegaly.   Cardiovascular:      Rate and Rhythm: Normal rate. Rhythm irregular.      Heart sounds: Normal heart sounds.   Pulmonary:      Effort: Pulmonary effort is normal.      Breath sounds: Normal breath sounds.   Abdominal:      General: Bowel sounds are normal.      Palpations: Abdomen is soft.   Musculoskeletal:         General: Normal range of motion.      Cervical back: Normal range of motion.   Skin:     General: Skin is warm and dry.   Neurological:      Mental Status: He is alert and oriented to person, place, and time.   Psychiatric:         Mood and Affect: Mood normal.         Behavior: Behavior normal.         "

## 2025-03-12 NOTE — ASSESSMENT & PLAN NOTE
Orders:  •  amoxicillin (AMOXIL) 500 mg capsule; Take 4 capsules (2,000 mg total) by mouth once for 1 dose 2 hours prior to dental procedure

## 2025-03-12 NOTE — ASSESSMENT & PLAN NOTE
Continue the atorvastatin, check a CMP, lipid profile in 6 months  Orders:  •  CBC; Future  •  Comprehensive metabolic panel; Future  •  Lipid panel; Future

## 2025-03-12 NOTE — ASSESSMENT & PLAN NOTE
Start famotidine 40 mg daily.  Orders:  •  famotidine (PEPCID) 40 MG tablet; Take 1 tablet (40 mg total) by mouth daily

## 2025-03-12 NOTE — ASSESSMENT & PLAN NOTE
Improved with increasing the amlodipine to 10 mg daily, continue this as well as the metoprolol

## 2025-03-19 ENCOUNTER — APPOINTMENT (OUTPATIENT)
Dept: LAB | Facility: HOSPITAL | Age: 70
End: 2025-03-19
Payer: COMMERCIAL

## 2025-03-19 ENCOUNTER — RESULTS FOLLOW-UP (OUTPATIENT)
Dept: FAMILY MEDICINE CLINIC | Facility: CLINIC | Age: 70
End: 2025-03-19

## 2025-03-19 DIAGNOSIS — E78.2 MIXED HYPERLIPIDEMIA: ICD-10-CM

## 2025-03-19 DIAGNOSIS — Z12.5 PROSTATE CANCER SCREENING: ICD-10-CM

## 2025-03-19 LAB
ALBUMIN SERPL BCG-MCNC: 3.9 G/DL (ref 3.5–5)
ALP SERPL-CCNC: 70 U/L (ref 34–104)
ALT SERPL W P-5'-P-CCNC: 36 U/L (ref 7–52)
ANION GAP SERPL CALCULATED.3IONS-SCNC: 8 MMOL/L (ref 4–13)
AST SERPL W P-5'-P-CCNC: 37 U/L (ref 13–39)
BILIRUB SERPL-MCNC: 0.51 MG/DL (ref 0.2–1)
BUN SERPL-MCNC: 17 MG/DL (ref 5–25)
CALCIUM SERPL-MCNC: 8.4 MG/DL (ref 8.4–10.2)
CHLORIDE SERPL-SCNC: 105 MMOL/L (ref 96–108)
CHOLEST SERPL-MCNC: 118 MG/DL (ref ?–200)
CO2 SERPL-SCNC: 27 MMOL/L (ref 21–32)
CREAT SERPL-MCNC: 0.97 MG/DL (ref 0.6–1.3)
ERYTHROCYTE [DISTWIDTH] IN BLOOD BY AUTOMATED COUNT: 15.5 % (ref 11.6–15.1)
GFR SERPL CREATININE-BSD FRML MDRD: 80 ML/MIN/1.73SQ M
GLUCOSE P FAST SERPL-MCNC: 78 MG/DL (ref 65–99)
HCT VFR BLD AUTO: 42.4 % (ref 36.5–49.3)
HDLC SERPL-MCNC: 58 MG/DL
HGB BLD-MCNC: 13.2 G/DL (ref 12–17)
LDLC SERPL CALC-MCNC: 43 MG/DL (ref 0–100)
MCH RBC QN AUTO: 27.6 PG (ref 26.8–34.3)
MCHC RBC AUTO-ENTMCNC: 31.1 G/DL (ref 31.4–37.4)
MCV RBC AUTO: 89 FL (ref 82–98)
NONHDLC SERPL-MCNC: 60 MG/DL
PLATELET # BLD AUTO: 192 THOUSANDS/UL (ref 149–390)
PMV BLD AUTO: 10.4 FL (ref 8.9–12.7)
POTASSIUM SERPL-SCNC: 3.6 MMOL/L (ref 3.5–5.3)
PROT SERPL-MCNC: 6.6 G/DL (ref 6.4–8.4)
PSA SERPL-MCNC: 0.31 NG/ML (ref 0–4)
RBC # BLD AUTO: 4.79 MILLION/UL (ref 3.88–5.62)
SODIUM SERPL-SCNC: 140 MMOL/L (ref 135–147)
TRIGL SERPL-MCNC: 85 MG/DL (ref ?–150)
WBC # BLD AUTO: 5.08 THOUSAND/UL (ref 4.31–10.16)

## 2025-03-19 PROCEDURE — 36415 COLL VENOUS BLD VENIPUNCTURE: CPT

## 2025-03-19 PROCEDURE — 80053 COMPREHEN METABOLIC PANEL: CPT

## 2025-03-19 PROCEDURE — 85027 COMPLETE CBC AUTOMATED: CPT

## 2025-03-19 PROCEDURE — 80061 LIPID PANEL: CPT

## 2025-03-19 PROCEDURE — G0103 PSA SCREENING: HCPCS

## 2025-03-21 DIAGNOSIS — F41.9 ANXIETY: ICD-10-CM

## 2025-03-21 DIAGNOSIS — G43.109 MIGRAINE WITH AURA AND WITHOUT STATUS MIGRAINOSUS, NOT INTRACTABLE: ICD-10-CM

## 2025-03-21 RX ORDER — SUMATRIPTAN SUCCINATE 100 MG/1
TABLET ORAL
Qty: 10 TABLET | Refills: 1 | Status: SHIPPED | OUTPATIENT
Start: 2025-03-21

## 2025-03-21 RX ORDER — VENLAFAXINE HYDROCHLORIDE 75 MG/1
225 CAPSULE, EXTENDED RELEASE ORAL
Qty: 270 CAPSULE | Refills: 1 | Status: SHIPPED | OUTPATIENT
Start: 2025-03-21

## 2025-03-22 DIAGNOSIS — F41.9 ANXIETY: ICD-10-CM

## 2025-03-22 RX ORDER — TRAZODONE HYDROCHLORIDE 100 MG/1
200 TABLET ORAL
Qty: 180 TABLET | Refills: 1 | Status: SHIPPED | OUTPATIENT
Start: 2025-03-22

## 2025-04-05 DIAGNOSIS — K21.9 GASTROESOPHAGEAL REFLUX DISEASE WITHOUT ESOPHAGITIS: ICD-10-CM

## 2025-04-07 RX ORDER — FAMOTIDINE 40 MG/1
40 TABLET, FILM COATED ORAL DAILY
Qty: 90 TABLET | Refills: 1 | Status: SHIPPED | OUTPATIENT
Start: 2025-04-07

## 2025-04-18 ENCOUNTER — OFFICE VISIT (OUTPATIENT)
Dept: CARDIOLOGY CLINIC | Facility: CLINIC | Age: 70
End: 2025-04-18
Payer: COMMERCIAL

## 2025-04-18 VITALS
WEIGHT: 164 LBS | RESPIRATION RATE: 17 BRPM | BODY MASS INDEX: 30.18 KG/M2 | HEART RATE: 94 BPM | HEIGHT: 62 IN | DIASTOLIC BLOOD PRESSURE: 88 MMHG | OXYGEN SATURATION: 100 % | SYSTOLIC BLOOD PRESSURE: 140 MMHG

## 2025-04-18 DIAGNOSIS — I34.0 NONRHEUMATIC MITRAL VALVE REGURGITATION: Primary | ICD-10-CM

## 2025-04-18 DIAGNOSIS — I25.10 CORONARY ARTERY DISEASE INVOLVING NATIVE CORONARY ARTERY OF NATIVE HEART WITHOUT ANGINA PECTORIS: ICD-10-CM

## 2025-04-18 DIAGNOSIS — I50.32 CHRONIC DIASTOLIC CONGESTIVE HEART FAILURE (HCC): ICD-10-CM

## 2025-04-18 DIAGNOSIS — I48.11 LONGSTANDING PERSISTENT ATRIAL FIBRILLATION (HCC): ICD-10-CM

## 2025-04-18 DIAGNOSIS — I10 ESSENTIAL HYPERTENSION: ICD-10-CM

## 2025-04-18 PROCEDURE — 99214 OFFICE O/P EST MOD 30 MIN: CPT | Performed by: INTERNAL MEDICINE

## 2025-04-18 NOTE — ASSESSMENT & PLAN NOTE
- Doing well from the cardiac standpoint of view.  The repeat echocardiogram to be done before the next appointment.

## 2025-04-18 NOTE — PROGRESS NOTES
[facility]  [address] [place] [address]  Cardiology Follow Up    Reuben García  1955  [id number]    Assessment & Plan  Nonrheumatic mitral regurgitation, status post mitral valve ring and left atrial appendage ligation  - Doing well from the cardiac standpoint of view.  The repeat echocardiogram to be done before the next appointment.  CAD, S/P RCA stent  Status post RCA stent.  Continue with the Plavix therapy.  Longstanding persistent atrial fibrillation (HCC)  - Heart rate is well-controlled.  Will continue with the current dose of beta-blockers.  The amiodarone was stopped.  - I have discussed with the patient about the options for Eliquis/Xarelto.  Was expensive for him.  We are trying to put in all the efforts to enroll him in the free sample program.  Will then he will continue with the Coumadin.  Essential hypertension  The blood pressure is well controlled with the current medications.  No side effect profile has been noted. He will continue with the current dose of antihypertensive medications.  I have asked him to maintain the blood pressure logs to make the necessary changes in the antihypertensive medications if necessary.  The dose of beta-blockers can be increased if necessary depending upon the high pressure and the intrinsic heart rate.        Continue all medications. Previous studies reviewed with patient, medications reviewed and possible side effects discussed. Continue risk factor modification. Optimize weight, regular exercise and follow up with appropriate specialists and primary care physician as discussed.  All questions answered. Patient advised to report any problems prompting to medical attention. Return for follow up visit in 6 months or earlier if needed    Chief Complaint   Patient presents with   • Follow-up       Interval History:Reuben García is a 69 y.o. male  with a history of status post mitral valve repair, mitral valve ring and left  atrial appendage ligation, atrial fibrillation who presents today for follow-up.    From the cardiac standpoint of view, Reuben García denies any symptoms of having any chest pain, shortness of breath, palpitations, dizziness, or syncopal episodes.  The exercise capacity is improving.  He has been able to tolerate the medication without having any side effects.  Denies of having any swelling in the lower extremities.  Weight has been stable.    PMH:     Patient Active Problem List   Diagnosis   • Anxiety   • Essential hypertension   • Gastroesophageal reflux disease without esophagitis   • Insomnia   • Migraine headache   • BMI 29.0-29.9,adult   • Mild intermittent asthma without complication   • CAD, S/P RCA stent   • Platelets decreased ([facility])   • Nonrheumatic mitral regurgitation, status post mitral valve ring and left atrial appendage ligation   • Recurrent major depressive disorder, in remission ([facility])   • Hypokalemia   • Mixed hyperlipidemia   • S/P MVR (mitral valve repair)   • S/P left atrial appendage ligation   • At risk for metabolic imbalance   • Hyperphosphatemia   • Longstanding persistent atrial fibrillation (HCC)   • Anticoagulated on Coumadin   • Chronic diastolic congestive heart failure (HCC)     Past Medical History:   Diagnosis Date   • Allergic    • Arthritis    • Clotting disorder ([facility])    • Coronary artery disease    • Depression    • Diabetes mellitus ([facility])    • Headache(784.0)    • HL (hearing loss)    • Hypertension    • Myocardial infarction ([facility]) 8/23/22   • Obesity    • Visual impairment      Social History     Socioeconomic History   • Marital status: /Civil Union     Spouse name: Not on file   • Number of children: Not on file   • Years of education: Not on file   • Highest education level: Not on file   Occupational History   • Occupation: full-time employment   Tobacco Use   • Smoking status: Never   • Smokeless tobacco: Never   • Tobacco comments:     never a smoker ( as per  allscripts)   Vaping Use   • Vaping status: Never Used   Substance and Sexual Activity   • Alcohol use: Yes     Alcohol/week: 3.0 standard drinks of alcohol     Types: 3 Cans of beer per week     Comment: Since he has been home form surgery had a few beers   • Drug use: No   • Sexual activity: Not Currently   Other Topics Concern   • Not on file   Social History Narrative    Always uses seat belt    Lives with wife    Recreation: gardening    Seeing a dentist     Social Drivers of Health     Financial Resource Strain: Low Risk  (3/4/2024)    Overall Financial Resource Strain ([facility])    • Difficulty of Paying Living Expenses: Not hard at all   Food Insecurity: No Food Insecurity (10/10/2024)    Nursing - Inadequate Food Risk Classification    • Worried About Running Out of Food in the Last Year: Never true    • Ran Out of Food in the Last Year: Never true    • Ran Out of Food in the Last Year: Not on file   Transportation Needs: No Transportation Needs (11/5/2024)    OASIS : Transportation    • Lack of Transportation (Medical): No    • Lack of Transportation (Non-Medical): No    • Patient Unable or Declines to Respond: No   Physical Activity: Not on file   Stress: Not on file   Social Connections: Not on file   Intimate Partner Violence: Not on file   Housing Stability: Low Risk  (10/10/2024)    Housing Stability Vital Sign    • Unable to Pay for Housing in the Last Year: No    • Number of Times Moved in the Last Year: 0    • Homeless in the Last Year: No      Family History   Problem Relation Age of Onset   • Aneurysm Mother    • Coronary artery disease Father    • Cancer Other    • Stroke Other         CVA   • Hypertension Sister      Past Surgical History:   Procedure Laterality Date   • CARDIAC CATHETERIZATION Left 8/25/2022    Procedure: Cardiac catheterization;  Surgeon: Luann Feng MD;  Location: [facility];  Service: Cardiology   • CARDIAC CATHETERIZATION N/A 8/25/2022    Procedure:  Cardiac Coronary Angiogram;  Surgeon: Luann Feng MD;  Location: [facility];  Service: Cardiology   • CARDIAC CATHETERIZATION N/A 9/19/2024    Procedure: Cardiac RHC/LHC;  Surgeon: Javier Ocasio MD;  Location:  CARDIAC CATH LAB;  Service: Cardiology   • MN REPLACEMENT MITRAL VALVE W/CARDIOPULMONARY BYP N/A 10/8/2024    Procedure: MITRAL VALVE REPAIR WITH 36MM JANNETH 4D  ANNULOPLASTY RING, LEFT ATRIAL APPENDAGE LIGATION WITH 45MM ATRICLIP;  Surgeon: Manolo Sanford DO;  Location:  MAIN OR;  Service: Cardiac Surgery   • SEPTOPLASTY         Current Outpatient Medications:   •  amLODIPine (NORVASC) 10 mg tablet, TAKE 1 TABLET BY MOUTH EVERY DAY, Disp: 90 tablet, Rfl: 1  •  apixaban (ELIQUIS) 5 mg, Take 1 tablet (5 mg total) by mouth 2 (two) times a day, Disp: 180 tablet, Rfl: 3  •  ARIPiprazole (ABILIFY) 2 mg tablet, TAKE 1 TABLET BY MOUTH EVERY DAY, Disp: 90 tablet, Rfl: 1  •  atorvastatin (LIPITOR) 80 mg tablet, TAKE 1 TABLET BY MOUTH EVERY DAY IN THE EVENING, Disp: 90 tablet, Rfl: 1  •  clopidogrel (PLAVIX) 75 mg tablet, TAKE 1 TABLET BY MOUTH EVERY DAY, Disp: 90 tablet, Rfl: 1  •  cyclobenzaprine (FLEXERIL) 5 mg tablet, Take 1 tablet (5 mg total) by mouth 3 (three) times a day as needed for muscle spasms, Disp: 20 tablet, Rfl: 0  •  famotidine (PEPCID) 40 MG tablet, TAKE 1 TABLET BY MOUTH EVERY DAY, Disp: 90 tablet, Rfl: 1  •  metoprolol tartrate (LOPRESSOR) 25 mg tablet, TAKE 1 TABLET (25 MG TOTAL) BY MOUTH EVERY 12 (TWELVE) HOURS, Disp: 180 tablet, Rfl: 0  •  potassium chloride (Klor-Con M20) 20 mEq tablet, Take 1 tablet (20 mEq total) by mouth daily for 5 days Unless otherwise directed., Disp: 5 tablet, Rfl: 1  •  SUMAtriptan (IMITREX) 100 mg tablet, TAKE 1 TABLET BY MOUTH EVERY 2 HOURS AS NEEDED FOR MIGRAINE (MAX 2TABS/DAY), Disp: 10 tablet, Rfl: 1  •  venlafaxine (EFFEXOR-XR) 75 mg 24 hr capsule, TAKE 3 CAPSULES (225 MG TOTAL) BY MOUTH DAILY WITH BREAKFAST, Disp: 270 capsule, Rfl: 1  •   "Ventolin  (90 Base) MCG/ACT inhaler, Inhale 2 puffs 3 (three) times a day as needed for wheezing, Disp: 18 g, Rfl: 5  •  warfarin (COUMADIN) 1 mg tablet, TAKE 2 TABLETS (2MG TOTAL) AT BEDTIME UNLESS OTHERWISE DIRECTED., Disp: 180 tablet, Rfl: 0  •  traZODone (DESYREL) 100 mg tablet, TAKE 2 TABLETS (200 MG TOTAL) BY MOUTH DAILY AT BEDTIME (Patient not taking: Reported on 4/18/2025), Disp: 180 tablet, Rfl: 1  Allergies   Allergen Reactions   • Protonix [Pantoprazole] Headache           Review of Systems:  Review of Systems   Constitutional: Negative.  Negative for chills and fever.   Eyes:  Negative for visual disturbance.   Respiratory:  Negative for cough and shortness of breath.    Cardiovascular:  Negative for chest pain and palpitations.   Gastrointestinal:  Negative for abdominal pain and vomiting.   Skin:  Negative for color change and rash.   Neurological:  Negative for syncope.   All other systems reviewed and are negative.        /88 (BP Location: Right arm, Patient Position: Sitting, Cuff Size: Standard)   Pulse 94   Resp 17   Ht 5' 2\" (1.575 m)   Wt 74.4 kg (164 lb)   SpO2 100%   BMI 30.00 kg/m²     Physical Exam:  Physical Exam  Vitals reviewed.   Constitutional:       Appearance: Normal appearance.   HENT:      Head: Normocephalic.   Eyes:      Pupils: Pupils are equal, round, and reactive to light.   Cardiovascular:      Rate and Rhythm: Rhythm irregular.      Pulses: Normal pulses.      Heart sounds: Normal heart sounds. No murmur heard.     No friction rub. No gallop.   Pulmonary:      Effort: Pulmonary effort is normal.      Breath sounds: Normal breath sounds. No wheezing.   Abdominal:      General: Abdomen is flat.      Palpations: Abdomen is soft.   Skin:     General: Skin is warm.   Neurological:      Mental Status: He is alert.         "

## 2025-04-18 NOTE — ASSESSMENT & PLAN NOTE
The blood pressure is well controlled with the current medications.  No side effect profile has been noted. He will continue with the current dose of antihypertensive medications.  I have asked him to maintain the blood pressure logs to make the necessary changes in the antihypertensive medications if necessary.  The dose of beta-blockers can be increased if necessary depending upon the high pressure and the intrinsic heart rate.

## 2025-04-18 NOTE — ASSESSMENT & PLAN NOTE
Wt Readings from Last 3 Encounters:   04/18/25 74.4 kg (164 lb)   03/12/25 76.2 kg (168 lb)   01/13/25 74.8 kg (165 lb)

## 2025-04-18 NOTE — ASSESSMENT & PLAN NOTE
- Heart rate is well-controlled.  Will continue with the current dose of beta-blockers.  The amiodarone was stopped.  - I have discussed with the patient about the options for Eliquis/Xarelto.  Was expensive for him.  We are trying to put in all the efforts to enroll him in the free sample program.  Will then he will continue with the Coumadin.

## 2025-04-21 ENCOUNTER — TELEPHONE (OUTPATIENT)
Dept: CARDIOLOGY CLINIC | Facility: CLINIC | Age: 70
End: 2025-04-21

## 2025-04-25 ENCOUNTER — TELEPHONE (OUTPATIENT)
Dept: CARDIOLOGY CLINIC | Facility: CLINIC | Age: 70
End: 2025-04-25

## 2025-04-25 DIAGNOSIS — I25.10 CORONARY ARTERY DISEASE INVOLVING NATIVE CORONARY ARTERY OF NATIVE HEART WITHOUT ANGINA PECTORIS: ICD-10-CM

## 2025-04-25 DIAGNOSIS — I34.0 NONRHEUMATIC MITRAL VALVE REGURGITATION: ICD-10-CM

## 2025-04-25 DIAGNOSIS — I50.32 CHRONIC DIASTOLIC CONGESTIVE HEART FAILURE (HCC): ICD-10-CM

## 2025-04-25 DIAGNOSIS — I48.11 LONGSTANDING PERSISTENT ATRIAL FIBRILLATION (HCC): ICD-10-CM

## 2025-05-04 DIAGNOSIS — Z98.890 S/P MVR (MITRAL VALVE REPAIR): ICD-10-CM

## 2025-05-04 RX ORDER — METOPROLOL TARTRATE 25 MG/1
25 TABLET, FILM COATED ORAL 2 TIMES DAILY
Qty: 180 TABLET | Refills: 0 | Status: SHIPPED | OUTPATIENT
Start: 2025-05-04

## 2025-05-10 DIAGNOSIS — G43.109 MIGRAINE WITH AURA AND WITHOUT STATUS MIGRAINOSUS, NOT INTRACTABLE: ICD-10-CM

## 2025-05-11 RX ORDER — SUMATRIPTAN SUCCINATE 100 MG/1
TABLET ORAL
Qty: 10 TABLET | Refills: 1 | Status: SHIPPED | OUTPATIENT
Start: 2025-05-11

## 2025-05-16 ENCOUNTER — TELEPHONE (OUTPATIENT)
Dept: FAMILY MEDICINE CLINIC | Facility: CLINIC | Age: 70
End: 2025-05-16

## 2025-05-16 NOTE — TELEPHONE ENCOUNTER
Patient called the RX Refill Line. Message is being forwarded to the office.     Patient is requesting a prescription for warfarin (COUMADIN) 1 mg tablet. It is no longer on patient's medication list and patient said no one told him to stop taking it, Patient only has enough medication for today     Please contact patient at  [phone]

## 2025-05-22 ENCOUNTER — TELEPHONE (OUTPATIENT)
Dept: CARDIOLOGY CLINIC | Facility: CLINIC | Age: 70
End: 2025-05-22

## 2025-05-22 DIAGNOSIS — I34.0 NONRHEUMATIC MITRAL VALVE REGURGITATION: ICD-10-CM

## 2025-05-22 DIAGNOSIS — I48.11 LONGSTANDING PERSISTENT ATRIAL FIBRILLATION (HCC): Primary | ICD-10-CM

## 2025-05-22 DIAGNOSIS — I48.11 LONGSTANDING PERSISTENT ATRIAL FIBRILLATION (HCC): ICD-10-CM

## 2025-05-22 DIAGNOSIS — I50.32 CHRONIC DIASTOLIC CONGESTIVE HEART FAILURE (HCC): ICD-10-CM

## 2025-05-22 DIAGNOSIS — I25.10 CORONARY ARTERY DISEASE INVOLVING NATIVE CORONARY ARTERY OF NATIVE HEART WITHOUT ANGINA PECTORIS: ICD-10-CM

## 2025-05-22 NOTE — TELEPHONE ENCOUNTER
Patient called Rx refill line and states he has not heard back from anyone re: the Eliquis Rx, he states he does not qualify to get it from the  at a discounted price  - he has been off blood thinners for over a week and doesn't know if he should start back on his warfarin and if he should he will need a refill

## 2025-05-28 ENCOUNTER — TELEPHONE (OUTPATIENT)
Dept: CARDIOLOGY CLINIC | Facility: CLINIC | Age: 70
End: 2025-05-28

## 2025-05-28 NOTE — TELEPHONE ENCOUNTER
Called and lvm for pt relaying  response, also advised pt that they will have to come into the office to fill out the Eliquis Assistance program application and also bring in his income.

## 2025-05-28 NOTE — TELEPHONE ENCOUNTER
Pt states he can not afford the eliquis he states it is over 300$ and is asking if the doctor could order something else in place that will be cheaper, please advise pt has not had any blood thinners in 15 days now please assist

## 2025-05-30 DIAGNOSIS — I48.20 CHRONIC ATRIAL FIBRILLATION (HCC): ICD-10-CM

## 2025-05-30 DIAGNOSIS — Z79.01 ANTICOAGULATION MONITORING, INR RANGE 2-3: Primary | ICD-10-CM

## 2025-05-30 RX ORDER — WARFARIN SODIUM 5 MG/1
TABLET ORAL
Qty: 90 TABLET | Refills: 3 | Status: SHIPPED | OUTPATIENT
Start: 2025-05-30

## 2025-05-30 NOTE — TELEPHONE ENCOUNTER
Called pt. Lmom that we are starting him back on Warfarin at 5mg daily and check INR in 1 week. Orders placed

## 2025-05-30 NOTE — TELEPHONE ENCOUNTER
Patient called the RX Refill Line. Message is being forwarded to the office.     Patient was returning a call, stated that he makes too much money a month (around $5000) so he's positive they won't give him a discount. He's asking to be put on coumadin instead     Please contact patient at

## 2025-06-20 DIAGNOSIS — I21.4 NSTEMI (NON-ST ELEVATED MYOCARDIAL INFARCTION) (HCC): ICD-10-CM

## 2025-06-20 DIAGNOSIS — F33.1 MODERATE EPISODE OF RECURRENT MAJOR DEPRESSIVE DISORDER (HCC): ICD-10-CM

## 2025-06-23 RX ORDER — ATORVASTATIN CALCIUM 80 MG/1
80 TABLET, FILM COATED ORAL EVERY EVENING
Qty: 90 TABLET | Refills: 1 | Status: SHIPPED | OUTPATIENT
Start: 2025-06-23

## 2025-06-23 RX ORDER — ARIPIPRAZOLE 2 MG/1
2 TABLET ORAL DAILY
Qty: 90 TABLET | Refills: 1 | Status: SHIPPED | OUTPATIENT
Start: 2025-06-23

## 2025-07-01 DIAGNOSIS — G43.109 MIGRAINE WITH AURA AND WITHOUT STATUS MIGRAINOSUS, NOT INTRACTABLE: ICD-10-CM

## 2025-07-02 ENCOUNTER — NURSE TRIAGE (OUTPATIENT)
Age: 70
End: 2025-07-02

## 2025-07-02 RX ORDER — SUMATRIPTAN SUCCINATE 100 MG/1
TABLET ORAL
Qty: 10 TABLET | Refills: 1 | Status: SHIPPED | OUTPATIENT
Start: 2025-07-02

## 2025-07-02 NOTE — TELEPHONE ENCOUNTER
"REASON FOR CONVERSATION: Breathing Difficulty    SYMPTOMS: shortness of breath with exertion, increased pulse with exertion. Pulseox 91% after exertion    OTHER HEALTH INFORMATION: Patient went to get pusleox probe during call and returned out of breath and with obvious increased respiration rate. Probe showed pulse ox of 91% and pulse of 173 which began to resolve during call back to just above 100. Based on the above, ED was recommended to the patient. He declined, will consider later today but was agreeable to office visit tomorrow and was scheduled.     PROTOCOL DISPOSITION: Go to ED Now    CARE ADVICE PROVIDED: ED (declined), made office visit, call with any concerns.    PRACTICE FOLLOW-UP: none        Reason for Disposition   Oxygen level (e.g., pulse oximetry) 90% or lower    Answer Assessment - Initial Assessment Questions  1. RESPIRATORY STATUS: \"Describe your breathing?\" (e.g., wheezing, shortness of breath, unable to speak, severe coughing)       With activity, has to stop on occasion to catch breath    2. ONSET: \"When did this breathing problem begin?\"       Two weeks    3. PATTERN \"Does the difficult breathing come and go, or has it been constant since it started?\"       Constant with activity    4. SEVERITY: \"How bad is your breathing?\" (e.g., mild, moderate, severe)       Mild - only if exerting self    5. RECURRENT SYMPTOM: \"Have you had difficulty breathing before?\" If Yes, ask: \"When was the last time?\" and \"What happened that time?\"       Not like this    6. CARDIAC HISTORY: \"Do you have any history of heart disease?\" (e.g., heart attack, angina, bypass surgery, angioplasty)       Open heart for leaky valve in October 7. LUNG HISTORY: \"Do you have any history of lung disease?\"  (e.g., pulmonary embolus, asthma, emphysema)      Denies    8. CAUSE: \"What do you think is causing the breathing problem?\"       Humidity    9. OTHER SYMPTOMS: \"Do you have any other symptoms?\" (e.g., chest pain, cough, " "dizziness, fever, runny nose)      Denies    10. O2 SATURATION MONITOR:  \"Do you use an oxygen saturation monitor (pulse oximeter) at home?\" If Yes, ask: \"What is your reading (oxygen level) today?\" \"What is your usual oxygen saturation reading?\" (e.g., 95%)        92% 173pulse      12. TRAVEL: \"Have you traveled out of the country in the last month?\" (e.g., travel history, exposures)        denies    Protocols used: Breathing Difficulty-Adult-[place]    "

## 2025-07-03 ENCOUNTER — OFFICE VISIT (OUTPATIENT)
Dept: FAMILY MEDICINE CLINIC | Facility: CLINIC | Age: 70
End: 2025-07-03
Payer: COMMERCIAL

## 2025-07-03 VITALS
HEIGHT: 62 IN | HEART RATE: 86 BPM | TEMPERATURE: 97.8 F | BODY MASS INDEX: 30.55 KG/M2 | SYSTOLIC BLOOD PRESSURE: 122 MMHG | OXYGEN SATURATION: 98 % | DIASTOLIC BLOOD PRESSURE: 84 MMHG | WEIGHT: 166 LBS

## 2025-07-03 DIAGNOSIS — R06.09 DYSPNEA ON EXERTION: Primary | ICD-10-CM

## 2025-07-03 DIAGNOSIS — I48.11 LONGSTANDING PERSISTENT ATRIAL FIBRILLATION (HCC): ICD-10-CM

## 2025-07-03 DIAGNOSIS — I34.0 NONRHEUMATIC MITRAL VALVE REGURGITATION: ICD-10-CM

## 2025-07-03 DIAGNOSIS — I25.10 CORONARY ARTERY DISEASE INVOLVING NATIVE CORONARY ARTERY OF NATIVE HEART WITHOUT ANGINA PECTORIS: ICD-10-CM

## 2025-07-03 DIAGNOSIS — J45.20 MILD INTERMITTENT ASTHMA WITHOUT COMPLICATION: ICD-10-CM

## 2025-07-03 PROCEDURE — 99214 OFFICE O/P EST MOD 30 MIN: CPT

## 2025-07-03 NOTE — PROGRESS NOTES
Name: Reuben García      : 1955      MRN: [id number]  Encounter Provider: Cha Valdez PA-C  Encounter Date: 7/3/2025   Encounter department: [facility] [address]  :  Assessment & Plan  Dyspnea on exertion  -Pt notes dyspnea on exertion x 2 weeks  -noticed it most when walking outside in heat and humidity   -He has hx of asthma. .    -notes he would take deep breaths and it would pass. He has not taken inhaler with episodes.   -Denies chest pain, palpitations, dizziness, lightheadedness, leg swelling   -Cardiac hx - CAD s/p RCA stent () , mitral valve repaired (10/2024), Cardiac RHX/LHC (2024).  Follows with Dr Benson.   -On exam, rhythm is irregularly irregular HR 86 , lungs cta b/l, no swelling in the legs  -Discussed PFTs and using Ventolin inaler PRN for SOB symptoms. With cardiac hx, also discussed stress test/ECHO. Mitral valve repaired 10/2024.   -Discussed if symptoms worsen, he should report to ED. Recommend also f/u with cardiology given [facility]     Orders:    Complete PFT with post bronchodilator; Future    Stress test only, exercise; Future    Echo complete w/ contrast if indicated; Future    Mild intermittent asthma without complication    Orders:    Complete PFT with post bronchodilator; Future    Longstanding persistent atrial fibrillation (HCC)  Nonrheumatic mitral regurgitation, status post mitral valve ring and left atrial appendage ligation  CAD, S/P RCA stent    Orders:    Echo complete w/ contrast if indicated; Future           History of Present Illness     Shortness of Breath  Pertinent negatives include no chest pain, coughing, dizziness, fatigue, leg swelling, rhinorrhea or sore throat.       Reuben presents to the office for evaluation of SOB when walking outside x 2 weeks. He has hx of asthma. He denies chest pain, leg swelling. He notes SOB is worse with exertion. Notes it has been worse in the humid environment. He notes he would take deep  "breaths and it would pass. He has not taken inhaler with episodes.     Denies chest pain, palpitations, dizziness, lightheadedness.     Cardiac hx - STEMI 2022, mitral valve repaired (10/2024), Cardiac RHX/LHC (9/2024).  Follows with Dr Benson.       Review of Systems   Constitutional:  Negative for chills, fatigue and fever.   HENT:  Negative for congestion, ear pain, rhinorrhea, sinus pain and sore throat.    Eyes:  Negative for pain.   Respiratory:  Positive for shortness of breath. Negative for cough.    Cardiovascular:  Negative for chest pain and leg swelling.   Gastrointestinal:  Negative for abdominal pain, constipation, diarrhea, nausea and vomiting.   Genitourinary:  Negative for difficulty urinating, dysuria, frequency and urgency.   Musculoskeletal:  Negative for neck pain.   Skin:  Negative for rash.   Neurological:  Negative for dizziness and headaches.   Psychiatric/Behavioral:  Negative for sleep disturbance.        Objective   /84 (BP Location: Left arm, Patient Position: Sitting, Cuff Size: Standard)   Pulse 86   Temp 97.8 °F (36.6 °C) (Temporal)   Ht 5' 2\" (1.575 m)   Wt 75.3 kg (166 lb)   SpO2 98%   BMI 30.36 kg/m²      Physical Exam  Vitals reviewed.   Constitutional:       General: He is not in acute distress.     Appearance: Normal appearance.   HENT:      Head: Normocephalic and atraumatic.      Right Ear: External ear normal.      Left Ear: External ear normal.      Nose: Nose normal.      Mouth/Throat:      Mouth: Mucous membranes are moist.     Eyes:      Extraocular Movements: Extraocular movements intact.      Conjunctiva/sclera: Conjunctivae normal.       Cardiovascular:      Rate and Rhythm: Normal rate. Rhythm irregular.      Heart sounds: Normal heart sounds.   Pulmonary:      Effort: Pulmonary effort is normal.      Breath sounds: Normal breath sounds. No wheezing, rhonchi or rales.   Abdominal:      General: Bowel sounds are normal. There is no distension.      " Palpations: Abdomen is soft.      Tenderness: There is no abdominal tenderness. There is no right CVA tenderness or left CVA tenderness.     Musculoskeletal:      Cervical back: Neck supple.      Right lower leg: No edema.      Left lower leg: No edema.   Lymphadenopathy:      Cervical: No cervical adenopathy.     Skin:     General: Skin is warm.      Capillary Refill: Capillary refill takes less than 2 seconds.      Findings: No rash.     Neurological:      Mental Status: He is alert. Mental status is at baseline.

## 2025-07-18 ENCOUNTER — HOSPITAL ENCOUNTER (OUTPATIENT)
Dept: NON INVASIVE DIAGNOSTICS | Facility: CLINIC | Age: 70
Discharge: HOME | End: 2025-07-18
Payer: COMMERCIAL

## 2025-07-18 ENCOUNTER — APPOINTMENT (EMERGENCY)
Dept: CT IMAGING | Facility: HOSPITAL | Age: 70
End: 2025-07-18
Payer: COMMERCIAL

## 2025-07-18 ENCOUNTER — APPOINTMENT (EMERGENCY)
Dept: NON INVASIVE DIAGNOSTICS | Facility: HOSPITAL | Age: 70
End: 2025-07-18
Payer: COMMERCIAL

## 2025-07-18 ENCOUNTER — APPOINTMENT (EMERGENCY)
Dept: RADIOLOGY | Facility: HOSPITAL | Age: 70
End: 2025-07-18
Payer: COMMERCIAL

## 2025-07-18 ENCOUNTER — HOSPITAL ENCOUNTER (INPATIENT)
Facility: HOSPITAL | Age: 70
LOS: 1 days | Discharge: HOME | End: 2025-07-20
Attending: EMERGENCY MEDICINE
Payer: COMMERCIAL

## 2025-07-18 VITALS
SYSTOLIC BLOOD PRESSURE: 122 MMHG | DIASTOLIC BLOOD PRESSURE: 84 MMHG | HEIGHT: 62 IN | BODY MASS INDEX: 30.55 KG/M2 | HEART RATE: 95 BPM | WEIGHT: 166 LBS

## 2025-07-18 DIAGNOSIS — I48.20 CHRONIC ATRIAL FIBRILLATION (HCC): ICD-10-CM

## 2025-07-18 DIAGNOSIS — J90 PLEURAL EFFUSION: Primary | ICD-10-CM

## 2025-07-18 DIAGNOSIS — I48.11 LONGSTANDING PERSISTENT ATRIAL FIBRILLATION (HCC): ICD-10-CM

## 2025-07-18 DIAGNOSIS — I48.91 A-FIB (HCC): ICD-10-CM

## 2025-07-18 DIAGNOSIS — R06.09 DYSPNEA ON EXERTION: ICD-10-CM

## 2025-07-18 DIAGNOSIS — R06.00 DYSPNEA: ICD-10-CM

## 2025-07-18 DIAGNOSIS — I34.0 NONRHEUMATIC MITRAL VALVE REGURGITATION: ICD-10-CM

## 2025-07-18 DIAGNOSIS — I50.811 ACUTE RIGHT-SIDED HEART FAILURE (HCC): ICD-10-CM

## 2025-07-18 DIAGNOSIS — I25.10 CORONARY ARTERY DISEASE INVOLVING NATIVE CORONARY ARTERY OF NATIVE HEART WITHOUT ANGINA PECTORIS: ICD-10-CM

## 2025-07-18 LAB
2HR DELTA HS TROPONIN: -1 NG/L
ALBUMIN SERPL BCG-MCNC: 4.1 G/DL (ref 3.5–5)
ALP SERPL-CCNC: 61 U/L (ref 34–104)
ALT SERPL W P-5'-P-CCNC: 24 U/L (ref 7–52)
ANION GAP SERPL CALCULATED.3IONS-SCNC: 7 MMOL/L (ref 4–13)
AORTIC ROOT: 3.7 CM
APPEARANCE FLD: ABNORMAL
APTT PPP: 32 SECONDS (ref 23–34)
AST SERPL W P-5'-P-CCNC: 36 U/L (ref 13–39)
AV REGURGITATION PRESSURE HALF TIME: 418 MS
BASOPHILS # BLD AUTO: 0.05 THOUSANDS/ÂΜL (ref 0–0.1)
BASOPHILS NFR BLD AUTO: 1 % (ref 0–1)
BILIRUB SERPL-MCNC: 0.53 MG/DL (ref 0.2–1)
BNP SERPL-MCNC: 304 PG/ML (ref 0–100)
BSA FOR ECHO PROCEDURE: 1.77 M2
BUN SERPL-MCNC: 16 MG/DL (ref 5–25)
CALCIUM SERPL-MCNC: 9.2 MG/DL (ref 8.4–10.2)
CARDIAC TROPONIN I PNL SERPL HS: 4 NG/L (ref ?–50)
CARDIAC TROPONIN I PNL SERPL HS: 5 NG/L (ref ?–50)
CHLORIDE SERPL-SCNC: 104 MMOL/L (ref 96–108)
CO2 SERPL-SCNC: 28 MMOL/L (ref 21–32)
COLOR FLD: YELLOW
CREAT SERPL-MCNC: 0.8 MG/DL (ref 0.6–1.3)
D DIMER PPP FEU-MCNC: 1.46 UG/ML FEU
DOP CALC LVOT AREA: 3.46 CM2
DOP CALC LVOT DIAMETER: 2.1 CM
E WAVE DECELERATION TIME: 335 MS
E/A RATIO: 4.54
EOSINOPHIL # BLD AUTO: 0.12 THOUSAND/ÂΜL (ref 0–0.61)
EOSINOPHIL NFR BLD AUTO: 2 % (ref 0–6)
ERYTHROCYTE [DISTWIDTH] IN BLOOD BY AUTOMATED COUNT: 15.2 % (ref 11.6–15.1)
FRACTIONAL SHORTENING: 21 (ref 28–44)
GFR SERPL CREATININE-BSD FRML MDRD: 92 ML/MIN/1.73SQ M
GLUCOSE FLD-MCNC: 93 MG/DL
GLUCOSE SERPL-MCNC: 98 MG/DL (ref 65–140)
HCT VFR BLD AUTO: 40.7 % (ref 36.5–49.3)
HGB BLD-MCNC: 13.8 G/DL (ref 12–17)
IMM GRANULOCYTES # BLD AUTO: 0.02 THOUSAND/UL (ref 0–0.2)
IMM GRANULOCYTES NFR BLD AUTO: 0 % (ref 0–2)
INR PPP: 1.18 (ref 0.85–1.19)
INTERVENTRICULAR SEPTUM IN DIASTOLE (PARASTERNAL SHORT AXIS VIEW): 1.2 CM
INTERVENTRICULAR SEPTUM: 1.2 CM (ref 0.6–1.1)
LAAS-AP2: 48.4 CM2
LAAS-AP4: 41.3 CM2
LDH FLD L TO P-CCNC: 215 U/L
LEFT ATRIUM SIZE: 6.1 CM
LEFT ATRIUM VOLUME (MOD BIPLANE): 214 ML
LEFT ATRIUM VOLUME INDEX (MOD BIPLANE): 120.9 ML/M2
LEFT INTERNAL DIMENSION IN SYSTOLE: 3.3 CM (ref 2.1–4)
LEFT VENTRICULAR INTERNAL DIMENSION IN DIASTOLE: 4.2 CM (ref 3.5–6)
LEFT VENTRICULAR POSTERIOR WALL IN END DIASTOLE: 1.2 CM
LEFT VENTRICULAR STROKE VOLUME: 33 ML
LV EF US.2D.A4C+ESTIMATED: 54 %
LVSV (TEICH): 33 ML
LYMPHOCYTES # BLD AUTO: 1.32 THOUSANDS/ÂΜL (ref 0.6–4.47)
LYMPHOCYTES NFR BLD AUTO: 24 % (ref 14–44)
MCH RBC QN AUTO: 29.9 PG (ref 26.8–34.3)
MCHC RBC AUTO-ENTMCNC: 33.9 G/DL (ref 31.4–37.4)
MCV RBC AUTO: 88 FL (ref 82–98)
MONOCYTES # BLD AUTO: 0.52 THOUSAND/ÂΜL (ref 0.17–1.22)
MONOCYTES NFR BLD AUTO: 10 % (ref 4–12)
MV PEAK A VEL: 0.37 M/S
MV PEAK E VEL: 168 CM/S
MV STENOSIS PRESSURE HALF TIME: 97 MS
MV VALVE AREA P 1/2 METHOD: 2.27
NEUTROPHILS # BLD AUTO: 3.4 THOUSANDS/ÂΜL (ref 1.85–7.62)
NEUTS SEG NFR BLD AUTO: 63 % (ref 43–75)
NRBC BLD AUTO-RTO: 0 /100 WBCS
PH BODY FLUID: 7.6
PLATELET # BLD AUTO: 212 THOUSANDS/UL (ref 149–390)
PMV BLD AUTO: 9.9 FL (ref 8.9–12.7)
POTASSIUM SERPL-SCNC: 3.5 MMOL/L (ref 3.5–5.3)
PROT FLD-MCNC: 3.9 G/DL
PROT SERPL-MCNC: 6.9 G/DL (ref 6.4–8.4)
PROTHROMBIN TIME: 15.8 SECONDS (ref 12.3–15)
RA PRESSURE ESTIMATED: 3 MMHG
RBC # BLD AUTO: 4.61 MILLION/UL (ref 3.88–5.62)
RIGHT ATRIUM AREA SYSTOLE A4C: 16.8 CM2
RIGHT VENTRICLE ID DIMENSION: 3 CM
RV PSP: 56 MMHG
SITE: ABNORMAL
SL CV AV DECELERATION TIME RETROGRADE: 1441 MS
SL CV AV PEAK GRADIENT RETROGRADE: 69 MMHG
SL CV LEFT ATRIUM LENGTH A2C: 7.5 CM
SL CV LV EF: 55
SL CV PED ECHO LEFT VENTRICLE DIASTOLIC VOLUME (MOD BIPLANE) 2D: 77 ML
SL CV PED ECHO LEFT VENTRICLE SYSTOLIC VOLUME (MOD BIPLANE) 2D: 43 ML
SODIUM SERPL-SCNC: 139 MMOL/L (ref 135–147)
TOTAL NUCLEATED CELL COUNT: 993 /UL
TR MAX PG: 53 MMHG
TR PEAK VELOCITY: 3.6 M/S
TRICUSPID VALVE PEAK REGURGITATION VELOCITY: 3.63 M/S
TSH SERPL DL<=0.05 MIU/L-ACNC: 2.63 UIU/ML (ref 0.45–4.5)
WBC # BLD AUTO: 5.43 THOUSAND/UL (ref 4.31–10.16)

## 2025-07-18 PROCEDURE — 93306 TTE W/DOPPLER COMPLETE: CPT | Performed by: INTERNAL MEDICINE

## 2025-07-18 PROCEDURE — 99285 EMERGENCY DEPT VISIT HI MDM: CPT

## 2025-07-18 PROCEDURE — 83880 ASSAY OF NATRIURETIC PEPTIDE: CPT

## 2025-07-18 PROCEDURE — 0W9B3ZZ DRAINAGE OF LEFT PLEURAL CAVITY, PERCUTANEOUS APPROACH: ICD-10-PCS

## 2025-07-18 PROCEDURE — 93005 ELECTROCARDIOGRAM TRACING: CPT

## 2025-07-18 PROCEDURE — 87070 CULTURE OTHR SPECIMN AEROBIC: CPT

## 2025-07-18 PROCEDURE — 88112 CYTOPATH CELL ENHANCE TECH: CPT | Performed by: PATHOLOGY

## 2025-07-18 PROCEDURE — 83615 LACTATE (LD) (LDH) ENZYME: CPT

## 2025-07-18 PROCEDURE — 32555 ASPIRATE PLEURA W/ IMAGING: CPT

## 2025-07-18 PROCEDURE — 85730 THROMBOPLASTIN TIME PARTIAL: CPT

## 2025-07-18 PROCEDURE — 71045 X-RAY EXAM CHEST 1 VIEW: CPT

## 2025-07-18 PROCEDURE — 80053 COMPREHEN METABOLIC PANEL: CPT

## 2025-07-18 PROCEDURE — 36415 COLL VENOUS BLD VENIPUNCTURE: CPT

## 2025-07-18 PROCEDURE — 83986 ASSAY PH BODY FLUID NOS: CPT

## 2025-07-18 PROCEDURE — 85025 COMPLETE CBC W/AUTO DIFF WBC: CPT

## 2025-07-18 PROCEDURE — 85610 PROTHROMBIN TIME: CPT

## 2025-07-18 PROCEDURE — 88305 TISSUE EXAM BY PATHOLOGIST: CPT | Performed by: PATHOLOGY

## 2025-07-18 PROCEDURE — 84157 ASSAY OF PROTEIN OTHER: CPT

## 2025-07-18 PROCEDURE — 84443 ASSAY THYROID STIM HORMONE: CPT

## 2025-07-18 PROCEDURE — 85379 FIBRIN DEGRADATION QUANT: CPT

## 2025-07-18 PROCEDURE — 99223 1ST HOSP IP/OBS HIGH 75: CPT

## 2025-07-18 PROCEDURE — 89051 BODY FLUID CELL COUNT: CPT

## 2025-07-18 PROCEDURE — 84484 ASSAY OF TROPONIN QUANT: CPT

## 2025-07-18 PROCEDURE — 87205 SMEAR GRAM STAIN: CPT

## 2025-07-18 PROCEDURE — 71260 CT THORAX DX C+: CPT

## 2025-07-18 PROCEDURE — 93306 TTE W/DOPPLER COMPLETE: CPT

## 2025-07-18 PROCEDURE — 88342 IMHCHEM/IMCYTCHM 1ST ANTB: CPT | Performed by: PATHOLOGY

## 2025-07-18 PROCEDURE — 82945 GLUCOSE OTHER FLUID: CPT

## 2025-07-18 PROCEDURE — 88341 IMHCHEM/IMCYTCHM EA ADD ANTB: CPT | Performed by: PATHOLOGY

## 2025-07-18 RX ORDER — AMLODIPINE BESYLATE 10 MG/1
10 TABLET ORAL DAILY
Status: DISCONTINUED | OUTPATIENT
Start: 2025-07-19 | End: 2025-07-20 | Stop reason: HOSPADM

## 2025-07-18 RX ORDER — ATORVASTATIN CALCIUM 40 MG/1
80 TABLET, FILM COATED ORAL DAILY
Status: DISCONTINUED | OUTPATIENT
Start: 2025-07-18 | End: 2025-07-20 | Stop reason: HOSPADM

## 2025-07-18 RX ORDER — HEPARIN SODIUM 1000 [USP'U]/ML
3000 INJECTION, SOLUTION INTRAVENOUS; SUBCUTANEOUS EVERY 6 HOURS PRN
Status: DISCONTINUED | OUTPATIENT
Start: 2025-07-18 | End: 2025-07-18

## 2025-07-18 RX ORDER — HEPARIN SODIUM 1000 [USP'U]/ML
6000 INJECTION, SOLUTION INTRAVENOUS; SUBCUTANEOUS EVERY 6 HOURS PRN
Status: DISCONTINUED | OUTPATIENT
Start: 2025-07-18 | End: 2025-07-18

## 2025-07-18 RX ORDER — CLOPIDOGREL BISULFATE 75 MG/1
75 TABLET ORAL DAILY
Status: DISCONTINUED | OUTPATIENT
Start: 2025-07-19 | End: 2025-07-20 | Stop reason: HOSPADM

## 2025-07-18 RX ORDER — ARIPIPRAZOLE 2 MG/1
2 TABLET ORAL DAILY
Status: DISCONTINUED | OUTPATIENT
Start: 2025-07-19 | End: 2025-07-20 | Stop reason: HOSPADM

## 2025-07-18 RX ORDER — HEPARIN SODIUM 10000 [USP'U]/100ML
3-30 INJECTION, SOLUTION INTRAVENOUS
Status: DISCONTINUED | OUTPATIENT
Start: 2025-07-18 | End: 2025-07-18

## 2025-07-18 RX ORDER — LIDOCAINE WITH 8.4% SOD BICARB 0.9%(10ML)
SYRINGE (ML) INJECTION AS NEEDED
Status: COMPLETED | OUTPATIENT
Start: 2025-07-18 | End: 2025-07-18

## 2025-07-18 RX ORDER — METOPROLOL TARTRATE 1 MG/ML
5 INJECTION, SOLUTION INTRAVENOUS EVERY 6 HOURS PRN
Status: DISCONTINUED | OUTPATIENT
Start: 2025-07-18 | End: 2025-07-20 | Stop reason: HOSPADM

## 2025-07-18 RX ORDER — METOPROLOL TARTRATE 25 MG/1
25 TABLET, FILM COATED ORAL EVERY 8 HOURS
Status: DISCONTINUED | OUTPATIENT
Start: 2025-07-18 | End: 2025-07-19

## 2025-07-18 RX ORDER — WARFARIN SODIUM 2.5 MG/1
10 TABLET ORAL
Status: COMPLETED | OUTPATIENT
Start: 2025-07-18 | End: 2025-07-18

## 2025-07-18 RX ORDER — TRAZODONE HYDROCHLORIDE 100 MG/1
100 TABLET ORAL
Status: DISCONTINUED | OUTPATIENT
Start: 2025-07-18 | End: 2025-07-20 | Stop reason: HOSPADM

## 2025-07-18 RX ADMIN — METOROPROLOL TARTRATE 5 MG: 5 INJECTION, SOLUTION INTRAVENOUS at 18:36

## 2025-07-18 RX ADMIN — Medication 10 ML: at 14:53

## 2025-07-18 RX ADMIN — IOHEXOL 85 ML: 350 INJECTION, SOLUTION INTRAVENOUS at 15:35

## 2025-07-18 RX ADMIN — ATORVASTATIN CALCIUM 80 MG: 40 TABLET, FILM COATED ORAL at 18:51

## 2025-07-18 RX ADMIN — WARFARIN SODIUM 10 MG: 2.5 TABLET ORAL at 19:56

## 2025-07-18 RX ADMIN — HEPARIN SODIUM 18 UNITS/KG/HR: 10000 INJECTION, SOLUTION INTRAVENOUS at 19:16

## 2025-07-18 RX ADMIN — METOPROLOL TARTRATE 25 MG: 25 TABLET, FILM COATED ORAL at 18:50

## 2025-07-18 NOTE — ASSESSMENT & PLAN NOTE
Patient admits to taking Plavix 75 mg and atorvastatin 80 mg  Denies chest pain.  Continue home regimen  Monitor on telemetry

## 2025-07-18 NOTE — Clinical Note
Case was discussed with  and the patient's admission status was agreed to be Admission Status: observation status to the service of Dr. Ramachandran

## 2025-07-18 NOTE — ED PROVIDER NOTES
Time reflects when diagnosis was documented in both MDM as applicable and the Disposition within this note       Time User Action Codes Description Comment    7/18/2025  1:48 PM Effie Burciaga [J90] Pleural effusion     7/18/2025  5:08 PM Effie Burciaga Add [R06.00] Dyspnea     7/18/2025  5:08 PM Effie Burciaga Add [I48.91] A-fib ([facility])           ED Disposition       ED Disposition   Admit    Condition   Stable    Date/Time   Fri Jul 18, 2025  5:08 PM    Comment   Case was discussed with NEEL and the patient's admission status was agreed to be Admission Status: observation status to the service of Dr. Du.               Assessment & Plan       Medical Decision Making  69 yr old male with pmh of a-fib, mitral valve replacement, CAD, MI, HTN presents from cardiology for shortness of breath. Patient was getting outpatient echo for new dyspnea on exertion for the past 3 weeks and was found to have possible effusion. Differential diagnosis includes acute cardiac etiologies to include ACS (non ischemic ekg, unremarkable trop), CHF, pericardial effusion / tamponade vs  acute respiratory etiologies to include acute PE (Wells low risk), pneumothorax , asthma, COPD exacerbation, allergic etiologies, or infectious etiologies such as PNA. Presentation also not consistent with non-cardiopulmonary causes to include toxidromes, metabolic etiologies such as acidemia or electrolyte derangements, sepsis, neurologic causes (i.e. demyelinating diseases).    EKG with a-fib, rate controlled. CXR with large left sided pleural effusion, no evidence of pneumothorax. Patient O2 sat are stable. IR thoracentesis ordered. Will reassess after for disposition.     Patient had 1300mL of fluid taken off, procedure had to be terminated due to patient coughing. CT IMPRESSION: Mild to moderate left pleural effusion appears to have improved since the earlier chest x-ray from today status post thoracentesis. No abnormal enhancement  can be appreciated along the pleural surface. Correlation with pleural fluid cytology is advised. Ground glass opacities may be related to reexpansion edema although pneumonia is not excluded in the proper clinical context. Ascending thoracic aortic ectasia 4.4 cm. Annual surveillance is advised. Possible gallstones partially seen.    Discussed all results with patient as well as admission. Patient agreeable. Discussed with NEEL, Dr. Du will be admitting provider. Patient stable for admission.       Amount and/or Complexity of Data Reviewed  Labs: ordered. Decision-making details documented in ED Course.  Radiology: ordered and independent interpretation performed. Decision-making details documented in ED Course.    Risk  Prescription drug management.  Decision regarding hospitalization.        ED Course as of 07/18/25 1717   Fri Jul 18, 2025   1407 BNP(!): 304   1407 hs TnI 0hr: 5   1506 Ultrasound-guided thoracentesis yielding 1300 mL cloudy, yellow left pleural fluid. Residual effusion remains.   1649 CT chest with contrast  IMPRESSION:     Mild to moderate left pleural effusion appears to have improved since the earlier chest x-ray from today status post thoracentesis. No abnormal enhancement can be appreciated along the pleural surface. Correlation with pleural fluid cytology is advised.     Ground glass opacities may be related to reexpansion edema although pneumonia is not excluded in the proper clinical context.     Ascending thoracic aortic ectasia 4.4 cm. Annual surveillance is advised.     Possible gallstones partially seen.         Medications   lidocaine 1% buffered (10 mL Infiltration Given 7/18/25 1453)   iohexol (OMNIPAQUE) 350 MG/ML injection (MULTI-DOSE) 85 mL (85 mL Intravenous Given 7/18/25 1535)       ED Risk Strat Scores   HEART Risk Score      Flowsheet Row Most Recent Value   Heart Score Risk Calculator    History 0 Filed at: 07/18/2025 1450   ECG 1 Filed at: 07/18/2025 1450   Age 2 Filed  at: 07/18/2025 1450   Risk Factors 2 Filed at: 07/18/2025 1450   Troponin 0 Filed at: 07/18/2025 1450   HEART Score 5 Filed at: 07/18/2025 1450          HEART Risk Score      Flowsheet Row Most Recent Value   Heart Score Risk Calculator    History 0 Filed at: 07/18/2025 1450   ECG 1 Filed at: 07/18/2025 1450   Age 2 Filed at: 07/18/2025 1450   Risk Factors 2 Filed at: 07/18/2025 1450   Troponin 0 Filed at: 07/18/2025 1450   HEART Score 5 Filed at: 07/18/2025 1450                      No data recorded        SBIRT 20yo+      Flowsheet Row Most Recent Value   Initial Alcohol Screen: US AUDIT-C     1. How often do you have a drink containing alcohol? 0 Filed at: 07/18/2025 1417   2. How many drinks containing alcohol do you have on a typical day you are drinking?  0 Filed at: 07/18/2025 1417   3a. Male UNDER 65: How often do you have five or more drinks on one occasion? 0 Filed at: 07/18/2025 1417   3b. FEMALE Any Age, or MALE 65+: How often do you have 4 or more drinks on one occassion? 0 Filed at: 07/18/2025 1417   Audit-C Score 0 Filed at: 07/18/2025 1417   ONEIDA: How many times in the past year have you...    Used an illegal drug or used a prescription medication for non-medical reasons? Never Filed at: 07/18/2025 1417                            History of Present Illness       Chief Complaint   Patient presents with    Shortness of Breath     Just has echo done, has large Plureal effusion and is SOB, per provider, possible collapsed lung, sent by cardiology        Past Medical History[1]   Past Surgical History[2]   Family History[3]   Social History[4]   E-Cigarette/Vaping    E-Cigarette Use Never User       E-Cigarette/Vaping Substances    Nicotine No     THC No     CBD No     Flavoring No     Other No     Unknown No       I have reviewed and agree with the history as documented.     69 yr old male with pmh of a-fib, mitral valve replacement, CAD, MI, HTN presents from cardiology for shortness of breath. Patient  was getting outpatient echo for new dyspnea on exertion for the past 3 weeks and was found to have possible effusion. Patient reports SOB on exertion, but denies any currently. Denies chest pain, fevers, chills, cough, or any other complaints.       Shortness of Breath  Associated symptoms: no abdominal pain, no chest pain, no cough, no ear pain, no fever, no rash, no sore throat and no vomiting        Review of Systems   Constitutional:  Negative for chills and fever.   HENT:  Negative for ear pain and sore throat.    Eyes:  Negative for pain and visual disturbance.   Respiratory:  Positive for shortness of breath. Negative for cough.    Cardiovascular:  Negative for chest pain and palpitations.   Gastrointestinal:  Negative for abdominal pain and vomiting.   Genitourinary:  Negative for dysuria and hematuria.   Musculoskeletal:  Negative for arthralgias and back pain.   Skin:  Negative for color change and rash.   Neurological:  Negative for seizures and syncope.   All other systems reviewed and are negative.          Objective       ED Triage Vitals [07/18/25 1308]   Temperature Pulse Blood Pressure Respirations SpO2 Patient Position - Orthostatic VS   (!) 97.3 °F (36.3 °C) (!) 113 160/79 18 99 % --      Temp Source Heart Rate Source BP Location FiO2 (%) Pain Score    Temporal Monitor Left arm -- --      Vitals      Date and Time Temp Pulse SpO2 Resp BP Pain Score FACES Pain Rating User   07/18/25 1700 -- 106 96 % 21 140/90 -- --    07/18/25 1501 -- 113 95 % 22 108/60 -- --    07/18/25 1444 -- 110 96 % 16 157/112 -- --    07/18/25 1417 -- 104 98 % 18 175/107 -- --    07/18/25 1308 97.3 °F (36.3 °C) 113 99 % 18 160/79 -- -- MR            Physical Exam  Vitals and nursing note reviewed.   Constitutional:       General: He is not in acute distress.     Appearance: He is well-developed. He is not ill-appearing.   HENT:      Head: Normocephalic and atraumatic.     Eyes:      Conjunctiva/sclera: Conjunctivae  normal.       Cardiovascular:      Rate and Rhythm: Normal rate. Rhythm regularly irregular.      Pulses: Normal pulses.      Heart sounds: No murmur heard.  Pulmonary:      Effort: Pulmonary effort is normal. No accessory muscle usage or respiratory distress.      Breath sounds: Examination of the left-middle field reveals decreased breath sounds and rales. Examination of the left-lower field reveals decreased breath sounds and rales. Decreased breath sounds and rales present. No wheezing or rhonchi.   Abdominal:      Palpations: Abdomen is soft.      Tenderness: There is no abdominal tenderness.     Musculoskeletal:         General: No swelling.      Cervical back: Neck supple.     Skin:     General: Skin is warm and dry.      Capillary Refill: Capillary refill takes less than 2 seconds.     Neurological:      Mental Status: He is alert.     Psychiatric:         Mood and Affect: Mood normal.         Results Reviewed       Procedure Component Value Units Date/Time    LD (LDH), Body Fluid [[id number]] Collected: 07/18/25 1455    Lab Status: Final result Specimen: Body Fluid from Other Updated: 07/18/25 1635     LD, Fluid 215 U/L     Total Protein, body fluid [[id number]] Collected: 07/18/25 1455    Lab Status: Final result Specimen: Body Fluid from Other Updated: 07/18/25 1624     Protein, Fluid 3.9 g/dL     HS Troponin I 2hr [[id number]]  (Normal) Collected: 07/18/25 1543    Lab Status: Final result Specimen: Blood from Arm, Right Updated: 07/18/25 1622     hs TnI 2hr 4 ng/L      Delta 2hr hsTnI -1 ng/L     Body fluid white cell count with differential [[id number]]  (Abnormal) Collected: 07/18/25 1455    Lab Status: Final result Specimen: Body Fluid from Pleural, Left Updated: 07/18/25 1622     Site left pleural     Color, Fluid Yellow     Clarity, Fluid Cloudy     WBC, Fluid --     TOTAL NUCLEATED CELL COUNT 993 /ul     Body Fluid Diff [[id number]] Collected: 07/18/25 1455    Lab Status: In process Specimen: Body Fluid  from Pleural, Left Updated: 07/18/25 1622    Glucose, body fluid [[id number]] Collected: 07/18/25 1455    Lab Status: Final result Specimen: Body Fluid from Other Updated: 07/18/25 1607     Glucose, Fluid 93 mg/dL     Body fluid culture (Pleural Fluid Culture) and Gram stain [[id number]] Collected: 07/18/25 1455    Lab Status: In process Specimen: Body Fluid from Pleural, Left Updated: 07/18/25 1538    pH, body fluid [[id number]] Collected: 07/18/25 1455    Lab Status: In process Specimen: Body Fluid from Other Updated: 07/18/25 1538    HS Troponin I 4hr [[id number]]     Lab Status: No result Specimen: Blood     Protime-INR [[id number]]  (Abnormal) Collected: 07/18/25 1404    Lab Status: Final result Specimen: Blood from Arm, Left Updated: 07/18/25 1426     Protime 15.8 seconds      INR 1.18    Narrative:      INR Therapeutic Range    Indication                                             INR Range      Atrial Fibrillation                                               2.0-3.0  Hypercoagulable State                                    2.0.2.3  Left Ventricular Asist Device                            2.0-3.0  Mechanical Heart Valve                                  -    Aortic(with afib, MI, embolism, HF, LA enlargement,    and/or coagulopathy)                                     2.0-3.0 (2.5-3.5)     Mitral                                                             2.5-3.5  Prosthetic/Bioprosthetic Heart Valve               2.0-3.0  Venous thromboembolism (VTE: VT, PE        2.0-3.0    HS Troponin 0hr (reflex protocol) [[id number]]  (Normal) Collected: 07/18/25 1338    Lab Status: Final result Specimen: Blood from Arm, Right Updated: 07/18/25 1407     hs TnI 0hr 5 ng/L     B-Type Natriuretic Peptide(BNP) [[id number]]  (Abnormal) Collected: 07/18/25 1338    Lab Status: Final result Specimen: Blood from Arm, Right Updated: 07/18/25 1406      pg/mL     Comprehensive metabolic panel [[id number]] Collected: 07/18/25 1338     Lab Status: Final result Specimen: Blood from Arm, Right Updated: 07/18/25 1404     Sodium 139 mmol/L      Potassium 3.5 mmol/L      Chloride 104 mmol/L      CO2 28 mmol/L      ANION GAP 7 mmol/L      BUN 16 mg/dL      Creatinine 0.80 mg/dL      Glucose 98 mg/dL      Calcium 9.2 mg/dL      AST 36 U/L      ALT 24 U/L      Alkaline Phosphatase 61 U/L      Total Protein 6.9 g/dL      Albumin 4.1 g/dL      Total Bilirubin 0.53 mg/dL      eGFR 92 ml/min/1.73sq m     Narrative:      National Kid[employer] guidelines for Chronic Kidney Disease (CKD):     Stage 1 with normal or high GFR (GFR > 90 mL/min/1.73 square meters)    Stage 2 Mild CKD (GFR = 60-89 mL/min/1.73 square meters)    Stage 3A Moderate CKD (GFR = 45-59 mL/min/1.73 square meters)    Stage 3B Moderate CKD (GFR = 30-44 mL/min/1.73 square meters)    Stage 4 Severe CKD (GFR = 15-29 mL/min/1.73 square meters)    Stage 5 End Stage CKD (GFR <15 mL/min/1.73 square meters)  Note: GFR calculation is accurate only with a steady state creatinine    CBC and differential [[id number]]  (Abnormal) Collected: 07/18/25 1338    Lab Status: Final result Specimen: Blood from Arm, Right Updated: 07/18/25 1344     WBC 5.43 Thousand/uL      RBC 4.61 Million/uL      Hemoglobin 13.8 g/dL      Hematocrit 40.7 %      MCV 88 fL      MCH 29.9 pg      MCHC 33.9 g/dL      RDW 15.2 %      MPV 9.9 fL      Platelets 212 Thousands/uL      nRBC 0 /100 WBCs      Segmented % 63 %      Immature Grans % 0 %      Lymphocytes % 24 %      Monocytes % 10 %      Eosinophils Relative 2 %      Basophils Relative 1 %      Absolute Neutrophils 3.40 Thousands/µL      Absolute Immature Grans 0.02 Thousand/uL      Absolute Lymphocytes 1.32 Thousands/µL      Absolute Monocytes 0.52 Thousand/µL      Eosinophils Absolute 0.12 Thousand/µL      Basophils Absolute 0.05 Thousands/µL             CT chest with contrast   Final Interpretation by Karl Barton MD (07/18 1641)      Mild to moderate left  pleural effusion appears to have improved since the earlier chest x-ray from today status post thoracentesis. No abnormal enhancement can be appreciated along the pleural surface. Correlation with pleural fluid cytology is advised.      Ground glass opacities may be related to reexpansion edema although pneumonia is not excluded in the proper clinical context.      Ascending thoracic aortic ectasia 4.4 cm. Annual surveillance is advised.      Possible gallstones partially seen.      This was discussed with NORMA Marcum at 4:35 p.m.            Computerized Assisted Algorithm (CAA) may have aided analysis of applicable images.         Workstation performed: [id number]         IR Thoracentesis   Final Interpretation by Rahat Barnett MD (07/18 1524)   Impression:      Ultrasound-guided thoracentesis yielding 1300 mL cloudy, yellow left pleural fluid. Residual effusion remains.      Signed, performed, and dictated by DYLAN Cosme under the supervision of Dr. Rahat Barnett.         Workstation performed: [id number]         XR chest 1 view portable   ED Interpretation by Effie Burciaga PA-C (07/18 1344)   Large left pleural effusion      Final Interpretation by Karl Barton MD (07/18 1625)      Large left effusion.      Pneumonia is not excluded in the proper clinical context.      Please see subsequent CT for further result            Workstation performed: [id number]             Procedures    ED Medication and Procedure Management   Prior to Admission Medications   Prescriptions Last Dose Informant Patient Reported? Taking?   ARIPiprazole (ABILIFY) 2 mg tablet   No No   Sig: TAKE 1 TABLET BY MOUTH EVERY DAY   SUMAtriptan (IMITREX) 100 mg tablet   No No   Sig: TAKE 1 TABLET BY MOUTH EVERY 2 HOURS AS NEEDED FOR MIGRAINE (MAX 2TABS/DAY)   Ventolin  (90 Base) MCG/ACT inhaler  Self No No   Sig: Inhale 2 puffs 3 (three) times a day as needed for wheezing   amLODIPine (NORVASC) 10 mg tablet   Self No No   Sig: TAKE 1 TABLET BY MOUTH EVERY DAY   atorvastatin (LIPITOR) 80 mg tablet   No No   Sig: TAKE 1 TABLET BY MOUTH EVERY DAY IN THE EVENING   clopidogrel (PLAVIX) 75 mg tablet  Self No No   Sig: TAKE 1 TABLET BY MOUTH EVERY DAY   cyclobenzaprine (FLEXERIL) 5 mg tablet  Self No No   Sig: Take 1 tablet (5 mg total) by mouth 3 (three) times a day as needed for muscle spasms   famotidine (PEPCID) 40 MG tablet  Self No No   Sig: TAKE 1 TABLET BY MOUTH EVERY DAY   metoprolol tartrate (LOPRESSOR) 25 mg tablet   No No   Sig: TAKE 1 TABLET (25 MG TOTAL) BY MOUTH EVERY 12 (TWELVE) HOURS   potassium chloride (Klor-Con M20) 20 mEq tablet  Self No No   Sig: Take 1 tablet (20 mEq total) by mouth daily for 5 days Unless otherwise directed.   traZODone (DESYREL) 100 mg tablet  Self No No   Sig: TAKE 2 TABLETS (200 MG TOTAL) BY MOUTH DAILY AT BEDTIME   Patient not taking: Reported on 4/18/2025   venlafaxine (EFFEXOR-XR) 75 mg 24 hr capsule  Self No No   Sig: TAKE 3 CAPSULES (225 MG TOTAL) BY MOUTH DAILY WITH BREAKFAST   warfarin (COUMADIN) 5 mg tablet   No No   Sig: Take one tablet daily in the evening or as directed      Facility-Administered Medications: None     Patient's Medications   Discharge Prescriptions    No medications on file     No discharge procedures on file.  ED SEPSIS DOCUMENTATION   Time reflects when diagnosis was documented in both MDM as applicable and the Disposition within this note       Time User Action Codes Description Comment    7/18/2025  1:48 PM Effie Burciaga [J90] Pleural effusion     7/18/2025  5:08 PM Effie Burciaga [R06.00] Dyspnea     7/18/2025  5:08 PM Effie Burciaga [I48.91] A-fib (HCC)                    Effie Burciaga PA-C  07/18/25 1717         [1]   Past Medical History:  Diagnosis Date    Abnormal ECG 8/24/22    Allergic     Arthritis     Clotting disorder (HCC)     ((Pt Qnr Sub: na))     Coronary artery disease     ((Pt Qnr Sub: na))     Depression      Diabetes mellitus (HCC)     ((Pt Qnr Sub: na))     Headache(784.0)     HL (hearing loss)     Hypertension     Myocardial infarction (HCC) 8/23/22    Obesity     Visual impairment    [2]   Past Surgical History:  Procedure Laterality Date    CARDIAC CATHETERIZATION Left 8/25/2022    Procedure: Cardiac catheterization;  Surgeon: Luann Feng MD;  Location: [facility];  Service: Cardiology    CARDIAC CATHETERIZATION N/A 8/25/2022    Procedure: Cardiac Coronary Angiogram;  Surgeon: Luann Feng MD;  Location: [facility];  Service: Cardiology    CARDIAC CATHETERIZATION N/A 9/19/2024    Procedure: Cardiac RHC/LHC;  Surgeon: Javier Ocasio MD;  Location: [facility];  Service: Cardiology    IR THORACENTESIS  7/18/2025    FL REPLACEMENT MITRAL VALVE W/CARDIOPULMONARY BYP N/A 10/8/2024    Procedure: MITRAL VALVE REPAIR WITH 36MM JANNETH 4D  ANNULOPLASTY RING, LEFT ATRIAL APPENDAGE LIGATION WITH 45MM ATRICLIP;  Surgeon: Manolo Sanford DO;  Location: [facility];  Service: Cardiac Surgery    SEPTOPLASTY     [3]   Family History  Problem Relation Name Age of Onset    Aneurysm Mother Carla genie     Coronary artery disease Father Ivan     Cancer Other grandmother     Stroke Other grandfather         CVA    Hypertension Sister Eve     Heart attack Sister Jacque    [4]   Social History  Tobacco Use    Smoking status: Never    Smokeless tobacco: Never    Tobacco comments:     never a smoker ( as per allscripts)   Vaping Use    Vaping status: Never Used   Substance Use Topics    Alcohol use: Yes     Alcohol/week: 3.0 standard drinks of alcohol     Types: 3 Cans of beer per week     Comment: Since he has been home form surgery had a few beers    Drug use: No        Effie Burciaga PA-C  07/18/25 7044

## 2025-07-18 NOTE — ASSESSMENT & PLAN NOTE
Patient on admission due to newly diagnosed left-sided pleural effusion.  Patient has been having shortness of breath for the past 2 weeks.  Sent by PCP to proceed with echo. During the test today patient was found to have pleural effusion and was sent to the ED.  In the ED patient underwent IR thoracentesis with removal of 1.3 L of fluid.  The test was not completed given patient with persistent cough.  Mild to moderate fluid still present according to repeat CT chest.  Echo was done showing normal LVEF 55% with normal systolic and diastolic function however Right ventricular cavity size is normal. Systolic function is reduced. Left Atrium: The atrium is severely dilated.  Lights criteria positive according to fluid analysis.  Patient underwent CT chest with contrast without PE study.  Clarified with radiology team that no concern for PE.  The pulmonary arteries are patent per radiology.  Plan:  Given echo findings consulted cardiology.  Patient on room air and does not report shortness of breath.  Will hold off on diuretics since patient appears euvolemic on exam.  Defer to cardiology further diuretics management.  Follow-up on cytology fluid analysis,  Consider pulmonology evaluation if pleural effusion not cardiac related.

## 2025-07-18 NOTE — ASSESSMENT & PLAN NOTE
Patient on presentation due to abnormal echo including pleural effusion findings and final results showed a right heart failure.  Patient underwent CT chest with contrast however not dedicated PE.  Discussed with radiology team and the team looked at the images and saw the arteries patent so no suspicion for acute PE.  Consulted cardiology

## 2025-07-18 NOTE — BRIEF OP NOTE (RAD/CATH)
IR THORACENTESIS Procedure Note    PATIENT NAME: Reuben García  : 1955  MRN: [id number]    Pre-op Diagnosis:   1. Pleural effusion      Post-op Diagnosis:   1. Pleural effusion        Surgeon:   DYLAN Reza  Assistants:     No qualified resident was available, Resident is only observing    Estimated Blood Loss: minimal  Findings: 1300 ml cloudy yellow left pleural fluid.    Procedure ended prior to complete drainage of effusion due to patient discomfort.    Specimens: multiple sent as requested    Complications:  none immediate    Anesthesia: local    DYLAN Reza     Date: 2025  Time: 3:13 PM

## 2025-07-18 NOTE — H&P
H&P - Hospitalist   Name: Reuben García 69 y.o. male I MRN: [id number]  Unit/Bed#: FT 05 I Date of Admission: 7/18/2025   Date of Service: 7/18/2025 I Hospital Day: 0     Assessment & Plan  Pleural effusion, left  Patient on admission due to newly diagnosed left-sided pleural effusion.  Patient has been having shortness of breath for the past 2 weeks.  Sent by PCP to proceed with echo. During the test today patient was found to have pleural effusion and was sent to the ED.  In the ED patient underwent IR thoracentesis with removal of 1.3 L of fluid.  The test was not completed given patient with persistent cough.  Mild to moderate fluid still present according to repeat CT chest.  Echo was done showing normal LVEF 55% with normal systolic and diastolic function however Right ventricular cavity size is normal. Systolic function is reduced. Left Atrium: The atrium is severely dilated.  Lights criteria positive according to fluid analysis.  Patient underwent CT chest with contrast without PE study.  Clarified with radiology team that no concern for PE.  The pulmonary arteries are patent per radiology.  Plan:  Given echo findings consulted cardiology.  Patient on room air and does not report shortness of breath.  Will hold off on diuretics since patient appears euvolemic on exam.  Defer to cardiology further diuretics management.  Follow-up on cytology fluid analysis,  Consider pulmonology evaluation if pleural effusion not cardiac related.    Atrial fibrillation with rapid ventricular response (HCC)  Patient with known history of atrial fibrillation on warfarin and metoprolol.  Patient with A-fib and RVR on admission.  INR on admission 1.1  Given Matthew Vascor 2 will not proceed with bridging.  Will give a dose of warfarin 10 mg.  Reassess INR in the a.m..  Patient on metoprolol 25 mg every 12 hour will increase to 25 mg every 8 hours.  Monitor on telemetry  Further recommendation per cardiology team  Acute right-sided  heart failure (HCC)  Patient on presentation due to abnormal echo including pleural effusion findings and final results showed a right heart failure.  Patient underwent CT chest with contrast however not dedicated PE.  Discussed with radiology team and the team looked at the images and saw the arteries patent so no suspicion for acute PE.  Consulted cardiology  Essential hypertension  Blood Pressure: (!) 152/105    Elevated on admission.  Admits to taking amlodipine 10 mg and metoprolol 25 mg every 12 hours  Resume home dose and increase metoprolol to 25 every 8 hours due to A-fib and RVR  Gastroesophageal reflux disease without esophagitis  Continue with famotidine  CAD, S/P RCA stent  Patient admits to taking Plavix 75 mg and atorvastatin 80 mg  Denies chest pain.  Continue home regimen  Monitor on telemetry  Mixed hyperlipidemia  Continue with high intensity statin      VTE Pharmacologic Prophylaxis:   Moderate Risk (Score 3-4) - Pharmacological DVT Prophylaxis Ordered: warfarin (Coumadin).  Code Status: Level 3 - DNAR and DNI patient  Discussion with family: Updated  (wife) via phone.    Anticipated Length of Stay: Patient will be admitted on an observation basis with an anticipated length of stay of less than 2 midnights secondary to left-sided pleural effusion.    History of Present Illness   Chief Complaint: Shortness of breath    Reuben García is a 69 y.o. male with a PMH of CAD s/p ESTHER, hypertension, hyperlipidemia, mitral valve replacement, A-fib on warfarin who presents with shortness of breath.  Patient was sent by his PCP for routine echo.  During test patient was found to have large pleural effusion for which he was sent to the ED for further evaluation.  Chest x-ray was consistent with left-sided pleural effusion and patient underwent IR thoracentesis.  Patient was removed 1.3 L however not complete fluid removal given patient becoming symptomatic with persistent cough.  CT chest showed  mild to moderate pleural effusion on the left remaining.  On exam patient appears comfortable on room air without shortness of breath.  Patient was found to be in A-fib and RVR.  Denies palpitation, chest pain.  Echo results showed normal LVEF with normal systolic and diastolic function and right ventricle abnormal systolic function.  Admitted for further cardiac evaluation.    Review of Systems   Constitutional:  Negative for chills and fever.   HENT:  Negative for ear pain and sore throat.    Eyes:  Negative for pain and visual disturbance.   Respiratory:  Positive for shortness of breath. Negative for cough.    Cardiovascular:  Negative for chest pain and palpitations.   Gastrointestinal:  Negative for abdominal pain and vomiting.   Genitourinary:  Negative for dysuria and hematuria.   Musculoskeletal:  Negative for arthralgias and back pain.   Skin:  Negative for color change and rash.   Neurological:  Negative for seizures and syncope.   All other systems reviewed and are negative.      Historical Information   Past Medical History[1]  Past Surgical History[2]  Social History[3]  E-Cigarette/Vaping    E-Cigarette Use Never User      E-Cigarette/Vaping Substances    Nicotine No     THC No     CBD No     Flavoring No     Other No     Unknown No        Social History:  Marital Status: /Civil Union   Occupation:   Patient Pre-hospital Living Situation: Home  Patient Pre-hospital Level of Mobility: walks  Patient Pre-hospital Diet Restrictions: none    Meds/Allergies   I have reviewed home medications with patient personally.  Prior to Admission medications    Medication Sig Start Date End Date Taking? Authorizing Provider   amLODIPine (NORVASC) 10 mg tablet TAKE 1 TABLET BY MOUTH EVERY DAY 3/5/25  Yes Rich Delgado DO   ARIPiprazole (ABILIFY) 2 mg tablet TAKE 1 TABLET BY MOUTH EVERY DAY 6/23/25  Yes Sesar Menon MD   atorvastatin (LIPITOR) 80 mg tablet TAKE 1 TABLET BY MOUTH EVERY DAY IN THE EVENING 6/23/25   Yes Sesar Menon MD   clopidogrel (PLAVIX) 75 mg tablet TAKE 1 TABLET BY MOUTH EVERY DAY 2/10/25  Yes Rich Delgado DO   metoprolol tartrate (LOPRESSOR) 25 mg tablet TAKE 1 TABLET (25 MG TOTAL) BY MOUTH EVERY 12 (TWELVE) HOURS 5/4/25  Yes Rich Delgado DO   venlafaxine (EFFEXOR-XR) 75 mg 24 hr capsule TAKE 3 CAPSULES (225 MG TOTAL) BY MOUTH DAILY WITH BREAKFAST 3/21/25  Yes Rich Delgado DO   Ventolin  (90 Base) MCG/ACT inhaler Inhale 2 puffs 3 (three) times a day as needed for wheezing 5/25/24  Yes Rich Delgado DO   warfarin (COUMADIN) 5 mg tablet Take one tablet daily in the evening or as directed 5/30/25  Yes Timi Benson MD   cyclobenzaprine (FLEXERIL) 5 mg tablet Take 1 tablet (5 mg total) by mouth 3 (three) times a day as needed for muscle spasms 2/3/25   Rich Delgado DO   famotidine (PEPCID) 40 MG tablet TAKE 1 TABLET BY MOUTH EVERY DAY  Patient not taking: Reported on 7/18/2025 4/7/25   Rich Delgado DO   potassium chloride (Klor-Con M20) 20 mEq tablet Take 1 tablet (20 mEq total) by mouth daily for 5 days Unless otherwise directed. 10/16/24 4/18/25  Ana Chambers PA-C   SUMAtriptan (IMITREX) 100 mg tablet TAKE 1 TABLET BY MOUTH EVERY 2 HOURS AS NEEDED FOR MIGRAINE (MAX 2TABS/DAY) 7/2/25   Rich Delgado DO   traZODone (DESYREL) 100 mg tablet TAKE 2 TABLETS (200 MG TOTAL) BY MOUTH DAILY AT BEDTIME  Patient not taking: Reported on 4/18/2025 3/22/25   Rich Delgado DO     Allergies   Allergen Reactions    Protonix [Pantoprazole] Headache       Objective :  Temp:  [97.3 °F (36.3 °C)] 97.3 °F (36.3 °C)  HR:  [] 105  BP: (108-180)/() 152/105  Resp:  [16-22] 20  SpO2:  [94 %-99 %] 94 %  O2 Device: None (Room air)    Physical Exam  Vitals and nursing note reviewed.   Constitutional:       General: He is not in acute distress.     Appearance: He is well-developed.   HENT:      Head: Normocephalic and atraumatic.     Eyes:      Conjunctiva/sclera: Conjunctivae normal.       Cardiovascular:       Rate and Rhythm: Tachycardia present. Rhythm irregular.      Heart sounds: No murmur heard.  Pulmonary:      Effort: Pulmonary effort is normal. No respiratory distress.      Breath sounds: No wheezing or rales.      Comments: Decreased breathing sounds on the left  Abdominal:      Palpations: Abdomen is soft.      Tenderness: There is no abdominal tenderness.     Musculoskeletal:         General: No swelling.      Cervical back: Neck supple.      Right lower leg: No edema.      Left lower leg: No edema.     Skin:     General: Skin is warm and dry.      Capillary Refill: Capillary refill takes less than 2 seconds.     Neurological:      Mental Status: He is alert.     Psychiatric:         Mood and Affect: Mood normal.          Lines/Drains:            Lab Results: I have reviewed the following results:  Results from last 7 days   Lab Units 07/18/25  1338   WBC Thousand/uL 5.43   HEMOGLOBIN g/dL 13.8   HEMATOCRIT % 40.7   PLATELETS Thousands/uL 212   SEGS PCT % 63   LYMPHO PCT % 24   MONO PCT % 10   EOS PCT % 2     Results from last 7 days   Lab Units 07/18/25  1338   SODIUM mmol/L 139   POTASSIUM mmol/L 3.5   CHLORIDE mmol/L 104   CO2 mmol/L 28   BUN mg/dL 16   CREATININE mg/dL 0.80   ANION GAP mmol/L 7   CALCIUM mg/dL 9.2   ALBUMIN g/dL 4.1   TOTAL BILIRUBIN mg/dL 0.53   ALK PHOS U/L 61   ALT U/L 24   AST U/L 36   GLUCOSE RANDOM mg/dL 98     Results from last 7 days   Lab Units 07/18/25  1404   INR  1.18         Lab Results   Component Value Date    HGBA1C 5.1 10/02/2024    HGBA1C 4.6 02/14/2023    HGBA1C 4.9 03/09/2022           Imaging Results Review: I reviewed radiology reports from this admission including: chest xray and CT chest.  Other Study Results Review: No additional pertinent studies reviewed.    Administrative Statements       ** Please Note: This note has been constructed using a voice recognition system. **         [1]   Past Medical History:  Diagnosis Date    Abnormal ECG 8/24/22    Allergic      Arthritis     Clotting disorder (HCC)     ((Pt Qnr Sub: na))     Coronary artery disease     ((Pt Qnr Sub: na))     Depression     Diabetes mellitus (HCC)     ((Pt Qnr Sub: na))     Headache(784.0)     HL (hearing loss)     Hypertension     Myocardial infarction (HCC) 8/23/22    Obesity     Visual impairment    [2]   Past Surgical History:  Procedure Laterality Date    CARDIAC CATHETERIZATION Left 8/25/2022    Procedure: Cardiac catheterization;  Surgeon: Luann Feng MD;  Location: [facility];  Service: Cardiology    CARDIAC CATHETERIZATION N/A 8/25/2022    Procedure: Cardiac Coronary Angiogram;  Surgeon: Luann Feng MD;  Location: [facility];  Service: Cardiology    CARDIAC CATHETERIZATION N/A 9/19/2024    Procedure: Cardiac RHC/LHC;  Surgeon: Javier Ocasio MD;  Location: [facility];  Service: Cardiology    IR THORACENTESIS  7/18/2025    DC REPLACEMENT MITRAL VALVE W/CARDIOPULMONARY BYP N/A 10/8/2024    Procedure: MITRAL VALVE REPAIR WITH 36MM JANNETH 4D  ANNULOPLASTY RING, LEFT ATRIAL APPENDAGE LIGATION WITH 45MM ATRICLIP;  Surgeon: Manolo Sanford DO;  Location: [facility];  Service: Cardiac Surgery    SEPTOPLASTY     [3]   Social History  Tobacco Use    Smoking status: Never    Smokeless tobacco: Never    Tobacco comments:     never a smoker ( as per allscripts)   Vaping Use    Vaping status: Never Used   Substance and Sexual Activity    Alcohol use: Yes     Alcohol/week: 3.0 standard drinks of alcohol     Types: 3 Cans of beer per week     Comment: Since he has been home form surgery had a few beers    Drug use: No    Sexual activity: Not Currently

## 2025-07-18 NOTE — ASSESSMENT & PLAN NOTE
Patient with known history of atrial fibrillation on warfarin and metoprolol.  Patient with A-fib and RVR on admission.  INR on admission 1.1  Given Matthew Vascor 2 will not proceed with bridging.  Will give a dose of warfarin 10 mg.  Reassess INR in the a.m..  Patient on metoprolol 25 mg every 12 hour will increase to 25 mg every 8 hours.  Monitor on telemetry  Further recommendation per cardiology team

## 2025-07-18 NOTE — ASSESSMENT & PLAN NOTE
Blood Pressure: (!) 152/105    Elevated on admission.  Admits to taking amlodipine 10 mg and metoprolol 25 mg every 12 hours  Resume home dose and increase metoprolol to 25 every 8 hours due to A-fib and RVR

## 2025-07-18 NOTE — SEDATION DOCUMENTATION
1300 mls cloudy yellow fluid drained from left thoracentesis done, labs sent, patient tolerated 1300 cc out then proceeded to cough with some pain, some fluid remains.

## 2025-07-19 PROBLEM — I71.21 ANEURYSM OF ASCENDING AORTA WITHOUT RUPTURE (HCC): Status: ACTIVE | Noted: 2025-07-19

## 2025-07-19 PROBLEM — I05.9 MITRAL VALVE DISORDER: Status: ACTIVE | Noted: 2025-07-19

## 2025-07-19 LAB
ANION GAP SERPL CALCULATED.3IONS-SCNC: 7 MMOL/L (ref 4–13)
BASOPHILS # BLD AUTO: 0.04 THOUSANDS/ÂΜL (ref 0–0.1)
BASOPHILS NFR BLD AUTO: 1 % (ref 0–1)
BUN SERPL-MCNC: 13 MG/DL (ref 5–25)
CALCIUM SERPL-MCNC: 8.6 MG/DL (ref 8.4–10.2)
CHLORIDE SERPL-SCNC: 107 MMOL/L (ref 96–108)
CO2 SERPL-SCNC: 26 MMOL/L (ref 21–32)
CREAT SERPL-MCNC: 0.65 MG/DL (ref 0.6–1.3)
EOSINOPHIL # BLD AUTO: 0.17 THOUSAND/ÂΜL (ref 0–0.61)
EOSINOPHIL NFR BLD AUTO: 3 % (ref 0–6)
ERYTHROCYTE [DISTWIDTH] IN BLOOD BY AUTOMATED COUNT: 15 % (ref 11.6–15.1)
GFR SERPL CREATININE-BSD FRML MDRD: 100 ML/MIN/1.73SQ M
GLUCOSE P FAST SERPL-MCNC: 97 MG/DL (ref 65–99)
GLUCOSE SERPL-MCNC: 97 MG/DL (ref 65–140)
HCT VFR BLD AUTO: 39.5 % (ref 36.5–49.3)
HGB BLD-MCNC: 13.4 G/DL (ref 12–17)
IMM GRANULOCYTES # BLD AUTO: 0.01 THOUSAND/UL (ref 0–0.2)
IMM GRANULOCYTES NFR BLD AUTO: 0 % (ref 0–2)
INR PPP: 1.23 (ref 0.85–1.19)
LYMPHOCYTES # BLD AUTO: 0.98 THOUSANDS/ÂΜL (ref 0.6–4.47)
LYMPHOCYTES NFR BLD AUTO: 16 % (ref 14–44)
MAGNESIUM SERPL-MCNC: 2 MG/DL (ref 1.9–2.7)
MCH RBC QN AUTO: 29.8 PG (ref 26.8–34.3)
MCHC RBC AUTO-ENTMCNC: 33.9 G/DL (ref 31.4–37.4)
MCV RBC AUTO: 88 FL (ref 82–98)
MONOCYTES # BLD AUTO: 0.56 THOUSAND/ÂΜL (ref 0.17–1.22)
MONOCYTES NFR BLD AUTO: 9 % (ref 4–12)
NEUTROPHILS # BLD AUTO: 4.5 THOUSANDS/ÂΜL (ref 1.85–7.62)
NEUTS SEG NFR BLD AUTO: 71 % (ref 43–75)
NRBC BLD AUTO-RTO: 0 /100 WBCS
PLATELET # BLD AUTO: 181 THOUSANDS/UL (ref 149–390)
PMV BLD AUTO: 9.5 FL (ref 8.9–12.7)
POTASSIUM SERPL-SCNC: 3.2 MMOL/L (ref 3.5–5.3)
PROTHROMBIN TIME: 16.2 SECONDS (ref 12.3–15)
RBC # BLD AUTO: 4.5 MILLION/UL (ref 3.88–5.62)
SODIUM SERPL-SCNC: 140 MMOL/L (ref 135–147)
WBC # BLD AUTO: 6.26 THOUSAND/UL (ref 4.31–10.16)

## 2025-07-19 PROCEDURE — 99233 SBSQ HOSP IP/OBS HIGH 50: CPT

## 2025-07-19 PROCEDURE — 99222 1ST HOSP IP/OBS MODERATE 55: CPT | Performed by: INTERNAL MEDICINE

## 2025-07-19 PROCEDURE — 85610 PROTHROMBIN TIME: CPT

## 2025-07-19 PROCEDURE — 80048 BASIC METABOLIC PNL TOTAL CA: CPT

## 2025-07-19 PROCEDURE — 83735 ASSAY OF MAGNESIUM: CPT

## 2025-07-19 PROCEDURE — 85025 COMPLETE CBC W/AUTO DIFF WBC: CPT

## 2025-07-19 RX ORDER — POTASSIUM CHLORIDE 1500 MG/1
40 TABLET, EXTENDED RELEASE ORAL EVERY 4 HOURS
Status: COMPLETED | OUTPATIENT
Start: 2025-07-19 | End: 2025-07-19

## 2025-07-19 RX ORDER — METOPROLOL TARTRATE 25 MG/1
25 TABLET, FILM COATED ORAL EVERY 8 HOURS SCHEDULED
Status: DISCONTINUED | OUTPATIENT
Start: 2025-07-19 | End: 2025-07-20 | Stop reason: HOSPADM

## 2025-07-19 RX ORDER — WARFARIN SODIUM 5 MG/1
5 TABLET ORAL
Status: DISCONTINUED | OUTPATIENT
Start: 2025-07-19 | End: 2025-07-20 | Stop reason: HOSPADM

## 2025-07-19 RX ADMIN — METOPROLOL TARTRATE 25 MG: 25 TABLET, FILM COATED ORAL at 23:39

## 2025-07-19 RX ADMIN — ATORVASTATIN CALCIUM 80 MG: 40 TABLET, FILM COATED ORAL at 08:21

## 2025-07-19 RX ADMIN — METOPROLOL TARTRATE 25 MG: 25 TABLET, FILM COATED ORAL at 03:18

## 2025-07-19 RX ADMIN — AMLODIPINE BESYLATE 10 MG: 10 TABLET ORAL at 08:21

## 2025-07-19 RX ADMIN — WARFARIN SODIUM 5 MG: 5 TABLET ORAL at 17:14

## 2025-07-19 RX ADMIN — VENLAFAXINE HYDROCHLORIDE 225 MG: 150 CAPSULE, EXTENDED RELEASE ORAL at 08:21

## 2025-07-19 RX ADMIN — CLOPIDOGREL 75 MG: 75 TABLET ORAL at 08:21

## 2025-07-19 RX ADMIN — ARIPIPRAZOLE 2 MG: 2 TABLET ORAL at 08:20

## 2025-07-19 RX ADMIN — POTASSIUM CHLORIDE 40 MEQ: 1500 TABLET, EXTENDED RELEASE ORAL at 12:26

## 2025-07-19 RX ADMIN — METOPROLOL TARTRATE 25 MG: 25 TABLET, FILM COATED ORAL at 12:55

## 2025-07-19 RX ADMIN — POTASSIUM CHLORIDE 40 MEQ: 1500 TABLET, EXTENDED RELEASE ORAL at 08:46

## 2025-07-19 NOTE — ASSESSMENT & PLAN NOTE
Severe MR with ruptured chordae and flail anterior mitral s/p mitral valve repair with ALTHEA ligation (10/2024)  Mild MR noted on echocardiogram yesterday.  Continue routine surveillance.

## 2025-07-19 NOTE — ASSESSMENT & PLAN NOTE
Blood Pressure: 136/96  Elevated on admission.  Admits to taking amlodipine 10 mg and metoprolol 25 mg every 12 hours  Resume home dose and increase metoprolol to 25 every 8 hours due to A-fib and RVR

## 2025-07-19 NOTE — UTILIZATION REVIEW
OBSERVATION 7-18-25 CHANGED TO INPATIENT 7-19-25 FOR CHEST X RAY, SUBTHERAPEUTIC INR, PLEURAL FLUID ANALYSIS.    Initial Clinical Review    Admission: Date/Time/Statement:   Admission Orders (From admission, onward)       Ordered        07/19/25 1357  INPATIENT ADMISSION  Once            07/18/25 1708  Place in Observation  Once                            ED Arrival Information       Expected   -    Arrival   7/18/2025 12:52    Acuity   Emergent              Means of arrival   Walk-In    Escorted by   Self    Service   Hospitalist    Admission type   Emergency              Arrival complaint   SOB             Chief Complaint   Patient presents with    Shortness of Breath     Just has echo done, has large Plureal effusion and is SOB, per provider, possible collapsed lung, sent by cardiology        Initial Presentation: 69 y.o. male presents to ed from home on 7-18-25 for evaluation and treatment of shortness of breath and large pleural effusion on outpatient Echo done today.  PMHX:  CAD, MI, HTN, DM2.    Clinical assessment significant for temp 97.3, heart rate 106- 148, 175/107.  Imaging shows large L pleural effusions. Pneumonia not excluded. .  Thoracentesis done.  Initially treated with heparin gtt, iv metoprolol, coumadin 10 mg.   Admit to observation : L pleural effusion.      Anticipated Length of Stay/Certification Statement:   Patient will be admitted on an observation basis with an anticipated length of stay of less than 2 midnights secondary to left-sided pleural effusion.      Date: 7-19-25    Day 2: observation changed to inpatient  Certification Statement: The patient will continue to require additional inpatient hospital stay due to monitoring of pleural effusion and follow-up of thoracentesis studies  Discharge Plan: Anticipate discharge in 24-48 hrs to home.    Remains on telemetry.    CT chest :  no PE.  Consult cardiology:  no evidence of CHF.  Pulmonary effusion may be related to mitral  valve repair in 2024.  Mild MR on ECHO yesterday.   Recommend fluid cytology.  If pleural effusion re-accumulates, further investigation into underlying etiology will be initiated.    Date: 7-20-25   Day 3: Has surpassed a 2nd midnight with active treatments and services.  Awaiting pleural fluid analysis: preliminary No growth.    If there is recurrence of pleural effusion, other possible etiologies should be considered.  Afib- continue rate control and anticoagulation.  Patient is on coumadin.  Target INR 2-3.  Today INR 1.23> 1.40.  Chest x ray pending.      ED Treatment-Medication Administration from 07/18/2025 1251 to 07/18/2025 2130         Date/Time Order Dose Route Action     07/18/2025 1453 lidocaine 1% buffered 10 mL Infiltration Given     07/18/2025 1836 metoprolol (LOPRESSOR) injection 5 mg 5 mg Intravenous Given     07/18/2025 1851 atorvastatin (LIPITOR) tablet 80 mg 80 mg Oral Given     07/18/2025 1850 metoprolol tartrate (LOPRESSOR) tablet 25 mg 25 mg Oral Given     07/18/2025 1916 heparin (porcine) 25,000 units in 0.45% NaCl 250 mL infusion (premix) 18 Units/kg/hr Intravenous New Bag     07/18/2025 1956 warfarin (COUMADIN) tablet 10 mg 10 mg Oral Given         Scheduled Medications:    amLODIPine, 10 mg, Oral, Daily  ARIPiprazole, 2 mg, Oral, Daily  atorvastatin, 80 mg, Oral, Daily  clopidogrel, 75 mg, Oral, Daily  metoprolol tartrate, 25 mg, Oral, Q8H PAOLO  venlafaxine, 225 mg, Oral, Daily With Breakfast  warfarin, 5 mg, Oral, Daily (warfarin)      Continuous IV Infusions:     PRN Meds:  metoprolol, 5 mg, Intravenous, Q6H PRN  traZODone, 100 mg, Oral, HS PRN      ED Triage Vitals   Temperature Pulse Respirations Blood Pressure SpO2 Pain Score   07/18/25 1308 07/18/25 1308 07/18/25 1308 07/18/25 1308 07/18/25 1308 07/18/25 2155   (!) 97.3 °F   (36.3 °C) (!) 113 18 160/79 99 % No Pain     Weight (last 2 days)       Date/Time Weight    07/18/25 2134 72 (158.8)            Vital Signs (last 3 days)        Date/Time Temp Pulse Resp BP MAP (mmHg) SpO2 O2 Device Arielle Coma Scale Score Pain    07/19/25 1321 -- -- -- -- -- -- -- 15 --    07/19/25 1255 -- 118 -- 152/101 -- -- -- -- --    07/19/25 12:49:35 -- 118 -- 152/101 118 97 % -- -- --    07/19/25 0904 -- -- -- -- -- -- -- -- No Pain    07/19/25 0821 -- -- -- 139/96 -- -- -- -- --    07/19/25 07:14:06 -- 80 -- 136/96 109 97 % -- -- --    07/19/25 03:06:55 98 °F (36.7 °C) 97 -- 145/102 116 95 % -- -- --    07/18/25 2155 -- -- -- -- -- -- None (Room air) 15 No Pain    07/18/25 21:37:35 98 °F (36.7 °C) 86 -- 137/88 104 94 % -- -- --    07/18/25 1850 -- 105 -- -- -- -- -- -- --    07/18/25 1830 -- 151 20 152/105 122 94 % -- -- --    07/18/25 1815 -- 148 20 -- -- 97 % -- -- --    07/18/25 1745 -- 114 21 170/97 118 95 % -- -- --    07/18/25 1730 -- 113 19 180/85 115 94 % -- -- --    07/18/25 1715 -- 130 21 146/100 116 97 % -- -- --    07/18/25 1700 -- 106 21 140/90 110 96 % -- -- --    07/18/25 15:01:04 -- 113 22 108/60 -- 95 % -- -- --    07/18/25 14:44:14 -- 110 16 157/112 -- 96 % -- -- --    07/18/25 1417 -- 104 18 175/107 -- 98 % None (Room air) -- --    07/18/25 1308 97.3 °F (36.3 °C) 113 18 160/79 -- 99 % None (Room air) -- --         Pertinent Labs/Diagnostic Test Results:     Radiology:    CT chest with contrast   Final (07/18 1641)      Mild to moderate left pleural effusion appears to have improved since the earlier chest x-ray from today status post thoracentesis. No abnormal enhancement can be appreciated along the pleural surface. Correlation with pleural fluid cytology is advised.      Ground glass opacities may be related to reexpansion edema although pneumonia is not excluded in the proper clinical context.      Ascending thoracic aortic ectasia 4.4 cm. Annual surveillance is advised.      Possible gallstones partially seen.         IR Thoracentesis   Final (07/18 1524)   Impression:      Ultrasound-guided thoracentesis yielding 1300 mL cloudy, yellow left  pleural fluid. Residual effusion remains.         XR chest 1 view portable   Final (07/18 1625)      Large pleural left effusion.      Pneumonia is not excluded in the proper clinical context.         XR chest pa and lateral    (Results Pending)     Cardiology:  No orders to display     GI:  No orders to display           Results from last 7 days   Lab Units 07/19/25  0439 07/18/25  1338   WBC Thousand/uL 6.26 5.43   HEMOGLOBIN g/dL 13.4 13.8   HEMATOCRIT % 39.5 40.7   PLATELETS Thousands/uL 181 212   TOTAL NEUT ABS Thousands/µL 4.50 3.40         Results from last 7 days   Lab Units 07/19/25  0439 07/18/25  1338   SODIUM mmol/L 140 139   POTASSIUM mmol/L 3.2* 3.5   CHLORIDE mmol/L 107 104   CO2 mmol/L 26 28   ANION GAP mmol/L 7 7   BUN mg/dL 13 16   CREATININE mg/dL 0.65 0.80   EGFR ml/min/1.73sq m 100 92   CALCIUM mg/dL 8.6 9.2   MAGNESIUM mg/dL 2.0  --      Results from last 7 days   Lab Units 07/18/25  1338   AST U/L 36   ALT U/L 24   ALK PHOS U/L 61   TOTAL PROTEIN g/dL 6.9   ALBUMIN g/dL 4.1   TOTAL BILIRUBIN mg/dL 0.53         Results from last 7 days   Lab Units 07/19/25  0439 07/18/25  1338   GLUCOSE RANDOM mg/dL 97 98       Results from last 7 days   Lab Units 07/18/25  1543 07/18/25  1338   HS TNI 0HR ng/L  --  5   HS TNI 2HR ng/L 4  --    HSTNI D2 ng/L -1  --      Results from last 7 days   Lab Units 07/18/25  1404   D-DIMER QUANTITATIVE ug/ml FEU 1.46*     Results from last 7 days   Lab Units 07/19/25  1053 07/18/25  1848 07/18/25  1404   PROTIME seconds 16.2*  --  15.8*   INR  1.23*  --  1.18   PTT seconds  --  32  --      Results from last 7 days   Lab Units 07/18/25 2008   TSH 3RD GENERATON uIU/mL 2.629     Results from last 7 days   Lab Units 07/18/25  1338   BNP pg/mL 304*       Results from last 7 days   Lab Units 07/18/25  1455   GRAM STAIN RESULT  No Polys or Bacteria seen   BODY FLUID CULTURE, STERILE  No growth       Past Medical History:  Diagnosis Date    Abnormal ECG 8/24/22    Allergic      Arthritis     Clotting disorder (HCC)     ((Pt Qnr Sub: na))     Coronary artery disease     ((Pt Qnr Sub: na))     Depression     Diabetes mellitus (HCC)     ((Pt Qnr Sub: na))     Headache(784.0)     HL (hearing loss)     Hypertension     Myocardial infarction (HCC) 8/23/22    Obesity     Visual impairment      Present on Admission:   Essential hypertension   Gastroesophageal reflux disease without esophagitis   CAD, S/P RCA stent   Mixed hyperlipidemia      Admitting Diagnosis:     Acute right-sided heart failure (HCC) [I50.811]  A-fib (HCC) [I48.91]  Dyspnea [R06.00]  SOB (shortness of breath) [R06.02]  Pleural effusion [J90]    Age/Sex: 69 y.o. male    Network Utilization Review Department  ATTENTION: Please call with any questions or concerns to [phone] and carefully listen to the prompts so that you are directed to the right person. All voicemails are confidential.   For Discharge needs, contact Care Management DC Support Team at [phone] opt. 2  Send all requests for admission clinical reviews, approved or denied determinations and any other requests to dedicated fax number below belonging to the campus where the patient is receiving treatment. List of dedicated fax numbers for the Facilities:  FACILITY NAME UR FAX NUMBER   ADMISSION DENIALS (Administrative/Medical Necessity) [phone]   DISCHARGE SUPPORT TEAM (NETWORK) [phone]   PARENT CHILD HEALTH (Maternity/NICU/Pediatrics) [phone]   [facility] [phone]   [facility] [phone]   [facility] [phone]   [facility] [phone]   [facility] [phone]   [facility] [phone]   [facility] [phone]   [facility] [phone]   [facility] [phone]   [facility]  [facility] [phone]   [facility] [phone]   [facility] [phone]

## 2025-07-19 NOTE — PROGRESS NOTES
Progress Note - Hospitalist   Name: Reuben García 69 y.o. male I MRN: [id number]  Unit/Bed#: 2 E 277-01 I Date of Admission: 7/18/2025   Date of Service: 7/19/2025 I Hospital Day: 0    Assessment & Plan  Pleural effusion, left  Patient on admission due to newly diagnosed left-sided pleural effusion.  Patient has been having shortness of breath for the past 2 weeks.  Sent by PCP to proceed with echo. During the test today patient was found to have pleural effusion and was sent to the ED.  In the ED patient underwent IR thoracentesis with removal of 1.3 L of fluid.  The test was not completed given patient with persistent cough.  Mild to moderate fluid still present according to repeat CT chest.  Echo was done showing normal LVEF 55% with normal systolic and diastolic function however Right ventricular cavity size is normal. Systolic function is reduced. Left Atrium: The atrium is severely dilated.  Lights criteria positive according to fluid analysis.  Patient underwent CT chest with contrast without PE study.  Clarified with radiology team that no concern for PE.  The pulmonary arteries are patent per radiology.  Plan:  Appreciate cardiology: No concern for CHF, repeat CXR in a.m.  Patient on room air and does not report shortness of breath.  Will hold off on diuretics since patient appears euvolemic on exam.  Defer to cardiology further diuretics management.  Follow-up on cytology fluid analysis, if pleural effusion recurs consider additional etiologies besides cardiovascular  Consider pulmonology evaluation if pleural effusion not cardiac related.  Atrial fibrillation with rapid ventricular response (HCC)  Patient with known history of atrial fibrillation on warfarin and metoprolol.  Patient with A-fib and RVR on admission.  INR on admission 1.1 > 1.23, trend  Given Matthew Vascor 2 will not proceed with bridging.  Will give a dose of warfarin 10 mg.  Patient on metoprolol 25 mg every 12 hour will increase to 25 mg  every 8 hours.  Monitor on telemetry  Further recommendation per cardiology team  Acute right-sided heart failure (HCC)  Patient on presentation due to abnormal echo including pleural effusion findings and final results showed a right heart failure.  Patient underwent CT chest with contrast however not dedicated PE.  Discussed with radiology team and the team looked at the images and saw the arteries patent so no suspicion for acute PE.  Essential hypertension  Blood Pressure: 136/96  Elevated on admission.  Admits to taking amlodipine 10 mg and metoprolol 25 mg every 12 hours  Resume home dose and increase metoprolol to 25 every 8 hours due to A-fib and RVR  Gastroesophageal reflux disease without esophagitis  Continue with famotidine  CAD, S/P RCA stent  Patient admits to taking Plavix 75 mg and atorvastatin 80 mg  Denies chest pain.  Continue home regimen  Monitor on telemetry  Mixed hyperlipidemia  Continue with high intensity statin    VTE Pharmacologic Prophylaxis:   Moderate Risk (Score 3-4) - Pharmacological DVT Prophylaxis Ordered: warfarin (Coumadin).    Mobility:   Basic Mobility Inpatient Raw Score: 24  JH-HLM Goal: 8: Walk 250 feet or more  JH-HLM Achieved: 7: Walk 25 feet or more  JH-HLM Goal achieved. Continue to encourage appropriate mobility.    Discussions with Specialists or Other Care Team Provider: Cardiology    Education and Discussions with Family / Patient: None.     Current Length of Stay: 0 day(s)  Current Patient Status: Observation   Certification Statement: The patient will continue to require additional inpatient hospital stay due to monitoring of pleural effusion and follow-up of thoracentesis studies  Discharge Plan: Anticipate discharge in 24-48 hrs to home.    Code Status: Level 3 - DNAR and DNI    Subjective   NAEON, patient endorses improved SOB, no bleeding from site of thoracentesis, urinating well    Objective :  Temp:  [97.3 °F (36.3 °C)-98 °F (36.7 °C)] 98 °F (36.7 °C)  HR:   [] 80  BP: (108-180)/() 136/96  Resp:  [16-22] 20  SpO2:  [94 %-99 %] 97 %  O2 Device: None (Room air)    Body mass index is 29.04 kg/m².     Input and Output Summary (last 24 hours):     Intake/Output Summary (Last 24 hours) at 7/19/2025 0821  Last data filed at 7/18/2025 2155  Gross per 24 hour   Intake 250 ml   Output --   Net 250 ml       Physical Exam  Vitals and nursing note reviewed.   Constitutional:       General: He is not in acute distress.     Appearance: He is well-developed.   HENT:      Head: Normocephalic and atraumatic.   Eyes:      Conjunctiva/sclera: Conjunctivae normal.   Cardiovascular:      Rate and Rhythm: Tachycardia present. Rhythm irregular.      Heart sounds: No murmur heard.  Pulmonary:      Effort: Pulmonary effort is normal. No respiratory distress.      Breath sounds: No wheezing or rales.      Comments: Decreased breathing sounds on the left  Abdominal:      Palpations: Abdomen is soft.      Tenderness: There is no abdominal tenderness.   Musculoskeletal:         General: No swelling.      Cervical back: Neck supple.      Right lower leg: No edema.      Left lower leg: No edema.   Skin:     General: Skin is warm and dry.      Capillary Refill: Capillary refill takes less than 2 seconds.   Neurological:      Mental Status: He is alert.   Psychiatric:         Mood and Affect: Mood normal.     Lines/Drains: PIV    Telemetry:  Telemetry Orders (From admission, onward)               24 Hour Telemetry Monitoring  Continuous x 24 Hours (Telem)        Expiring   Question:  Reason for 24 Hour Telemetry  Answer:  Arrhythmias requiring acute medical intervention / PPM or ICD malfunction                  Lab Results: I have reviewed the following results:   Results from last 7 days   Lab Units 07/19/25  0439   WBC Thousand/uL 6.26   HEMOGLOBIN g/dL 13.4   HEMATOCRIT % 39.5   PLATELETS Thousands/uL 181   SEGS PCT % 71   LYMPHO PCT % 16   MONO PCT % 9   EOS PCT % 3     Results from last  7 days   Lab Units 07/19/25  0439 07/18/25  1338   SODIUM mmol/L 140 139   POTASSIUM mmol/L 3.2* 3.5   CHLORIDE mmol/L 107 104   CO2 mmol/L 26 28   BUN mg/dL 13 16   CREATININE mg/dL 0.65 0.80   ANION GAP mmol/L 7 7   CALCIUM mg/dL 8.6 9.2   ALBUMIN g/dL  --  4.1   TOTAL BILIRUBIN mg/dL  --  0.53   ALK PHOS U/L  --  61   ALT U/L  --  24   AST U/L  --  36   GLUCOSE RANDOM mg/dL 97 98     Results from last 7 days   Lab Units 07/18/25  1404   INR  1.18     Recent Cultures (last 7 days):   Results from last 7 days   Lab Units 07/18/25  1455   GRAM STAIN RESULT  No Polys or Bacteria seen     Imaging Results Review: No pertinent imaging studies reviewed.  Other Study Results Review: No additional pertinent studies reviewed.    Last 24 Hours Medication List:     Current Facility-Administered Medications:     amLODIPine (NORVASC) tablet 10 mg, Daily    ARIPiprazole (ABILIFY) tablet 2 mg, Daily    atorvastatin (LIPITOR) tablet 80 mg, Daily    clopidogrel (PLAVIX) tablet 75 mg, Daily    metoprolol (LOPRESSOR) injection 5 mg, Q6H PRN    metoprolol tartrate (LOPRESSOR) tablet 25 mg, Q8H PAOLO    traZODone (DESYREL) tablet 100 mg, HS PRN    venlafaxine (EFFEXOR-XR) 24 hr capsule 225 mg, Daily With Breakfast    Administrative Statements   I have spent a total time of 40 minutes in caring for this patient on the day of the visit/encounter including reviewing diagnostic results, prognosis, risks and benefits of tx options; instructions for management; patient and family education; risk factor reductions; impressions; counseling / coordination of care; documenting in the medical record; reviewing/placing orders in the medical record; obtaining or reviewing history; and communicating with other healthcare professionals.    This note has been generated by voice recognition software system. Therefore, there may be spelling, grammar, and or syntax errors. Please contact if questions arise.     Jon Matthews, DO

## 2025-07-19 NOTE — ASSESSMENT & PLAN NOTE
Patient on admission due to newly diagnosed left-sided pleural effusion.  Patient has been having shortness of breath for the past 2 weeks.  Sent by PCP to proceed with echo. During the test today patient was found to have pleural effusion and was sent to the ED.  In the ED patient underwent IR thoracentesis with removal of 1.3 L of fluid.  The test was not completed given patient with persistent cough.  Mild to moderate fluid still present according to repeat CT chest.  Echo was done showing normal LVEF 55% with normal systolic and diastolic function however Right ventricular cavity size is normal. Systolic function is reduced. Left Atrium: The atrium is severely dilated.  Lights criteria positive according to fluid analysis.  Patient underwent CT chest with contrast without PE study.  Clarified with radiology team that no concern for PE.  The pulmonary arteries are patent per radiology.  Plan:  Appreciate cardiology: No concern for CHF, repeat CXR in a.m.  Patient on room air and does not report shortness of breath.  Will hold off on diuretics since patient appears euvolemic on exam.  Defer to cardiology further diuretics management.  Follow-up on cytology fluid analysis, if pleural effusion recurs consider additional etiologies besides cardiovascular  Consider pulmonology evaluation if pleural effusion not cardiac related.

## 2025-07-19 NOTE — ASSESSMENT & PLAN NOTE
Patient on presentation due to abnormal echo including pleural effusion findings and final results showed a right heart failure.  Patient underwent CT chest with contrast however not dedicated PE.  Discussed with radiology team and the team looked at the images and saw the arteries patent so no suspicion for acute PE.

## 2025-07-19 NOTE — ASSESSMENT & PLAN NOTE
He is s/p thoracentesis with removal of 1.3 L of left-sided pleural fluid  Chest x-ray ordered to be completed tomorrow for reassessment  He appears euvolemic on exam today.  No evidence of CHF exacerbation at this time.  Pleural effusion may be postoperative in nature secondary to mitral valve repair 10/2024.  Recommended follow-up on fluid cytology per primary team.  If pleural effusion reaccumulate's, recommend further investigation into underlying etiology per primary team.

## 2025-07-19 NOTE — ASSESSMENT & PLAN NOTE
Postoperative persistent atrial fibrillation with RVR  Maintaining rate controlled atrial fibrillation on telemetry  Rate control: Continue Lopressor 25 mg every 8 hours  Continue anticoagulation with Coumadin.  Target INR 2-3.  [id number].   Continue telemetry

## 2025-07-19 NOTE — PLAN OF CARE
Problem: PAIN - ADULT  Goal: Verbalizes/displays adequate comfort level or baseline comfort level  Description: Interventions:  - Encourage patient to monitor pain and request assistance  - Assess pain using appropriate pain scale  - Administer analgesics as ordered based on type and severity of pain and evaluate response  - Implement non-pharmacological measures as appropriate and evaluate response  - Consider cultural and social influences on pain and pain management  - Notify physician/advanced practitioner if interventions unsuccessful or patient reports new pain  - Educate patient/family on pain management process including their role and importance of  reporting pain   - Provide non-pharmacologic/complimentary pain relief interventions  Outcome: Progressing     Problem: INFECTION - ADULT  Goal: Absence or prevention of progression during hospitalization  Description: INTERVENTIONS:  - Assess and monitor for signs and symptoms of infection  - Monitor lab/diagnostic results  - Monitor all insertion sites, i.e. indwelling lines, tubes, and drains  - Monitor endotracheal if appropriate and nasal secretions for changes in amount and color  - [place] appropriate cooling/warming therapies per order  - Administer medications as ordered  - Instruct and encourage patient and family to use good hand hygiene technique  - Identify and instruct in appropriate isolation precautions for identified infection/condition  Outcome: Progressing  Goal: Absence of fever/infection during neutropenic period  Description: INTERVENTIONS:  - Monitor WBC  - Perform strict hand hygiene  - Limit to healthy visitors only  - No plants, dried, fresh or silk flowers with zepeda in patient room  Outcome: Progressing     Problem: SAFETY ADULT  Goal: Maintain or return to baseline ADL function  Description: INTERVENTIONS:  -  Assess patient's ability to carry out ADLs; assess patient's baseline for ADL function and identify physical deficits  which impact ability to perform ADLs (bathing, care of mouth/teeth, toileting, grooming, dressing, etc.)  - Assess/evaluate cause of self-care deficits   - Assess range of motion  - Assess patient's mobility; develop plan if impaired  - Assess patient's need for assistive devices and provide as appropriate  - Encourage maximum independence but intervene and supervise when necessary  - Involve family in performance of ADLs  - Assess for home care needs following discharge   - Consider OT consult to assist with ADL evaluation and planning for discharge  - Provide patient education as appropriate  - Monitor functional capacity and physical performance, use of AM PAC & JH-HLM   - Monitor gait, balance and fatigue with ambulation    Outcome: Progressing     Problem: DISCHARGE PLANNING  Goal: Discharge to home or other facility with appropriate resources  Description: INTERVENTIONS:  - Identify barriers to discharge w/patient and caregiver  - Arrange for needed discharge resources and transportation as appropriate  - Identify discharge learning needs (meds, wound care, etc.)  - Arrange for interpretive services to assist at discharge as needed  - Refer to Case Management Department for coordinating discharge planning if the patient needs post-hospital services based on physician/advanced practitioner order or complex needs related to functional status, cognitive ability, or social support system  Outcome: Progressing     Problem: Knowledge Deficit  Goal: Patient/family/caregiver demonstrates understanding of disease process, treatment plan, medications, and discharge instructions  Description: Complete learning assessment and assess knowledge base.  Interventions:  - Provide teaching at level of understanding  - Provide teaching via preferred learning methods  Outcome: Progressing

## 2025-07-19 NOTE — ASSESSMENT & PLAN NOTE
Patient with known history of atrial fibrillation on warfarin and metoprolol.  Patient with A-fib and RVR on admission.  INR on admission 1.1 > 1.23, trend  Given Matthew Vascor 2 will not proceed with bridging.  Will give a dose of warfarin 10 mg.  Patient on metoprolol 25 mg every 12 hour will increase to 25 mg every 8 hours.  Monitor on telemetry  Further recommendation per cardiology team

## 2025-07-19 NOTE — ASSESSMENT & PLAN NOTE
CAD s/p ESTHER x 2 to proximal-mid RCA (2022)  Continue Plavix, Lipitor, Lopressor, amlodipine     detailed exam

## 2025-07-19 NOTE — CONSULTS
Consultation - Cardiology   Reuben García 69 y.o. male MRN: [id number]  Unit/Bed#: 2 E 277-01 Encounter: [id number]  07/19/25  8:53 AM    Assessment/ Plan:   Assessment & Plan  Pleural effusion, left  He is s/p thoracentesis with removal of 1.3 L of left-sided pleural fluid  Chest x-ray ordered to be completed tomorrow for reassessment  He appears euvolemic on exam today.  No evidence of CHF exacerbation at this time.  Pleural effusion may be postoperative in nature secondary to mitral valve repair 10/2024.  Recommended follow-up on fluid cytology per primary team.  If pleural effusion reaccumulate's, recommend further investigation into underlying etiology per primary team.    Atrial fibrillation with rapid ventricular response (HCC)  Postoperative persistent atrial fibrillation with RVR  Maintaining rate controlled atrial fibrillation on telemetry  Rate control: Continue Lopressor 25 mg every 8 hours  Continue anticoagulation with Coumadin.  Target INR 2-3.  [id number] 3.   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  Continue routine surveillance.    CAD, S/P RCA stent  CAD s/p ESTHER x 2 to proximal-mid RCA (2022)  Continue Plavix, Lipitor, Lopressor, amlodipine    Essential hypertension  Continue amlodipine 10 mg daily, Lopressor 25 mg twice daily    Mixed hyperlipidemia  Continue Lipitor 80 mg daily    Aneurysm of ascending aorta without rupture (HCC)  4.4 cm  Recommend routine surveillance        History of Present Illness   Physician Requesting Consult: Jon Matthews DO    Reason for Consult / Principal Problem: Pleural effusion, A-fib    HPI: Reuben García is a 69 y.o. year old male with PMHx of chronic CHF, severe mitral regurgitation with ruptured chordae and flail anterior leaflet s/p mitral valve repair with ligation of left atrial appendage (10/2024),  CAD s/p ESTHER x 2 to proximal-mid RCA (2022), postoperative persistent atrial fibrillation hypertension, hyperlipidemia, thoracic aortic aneurysm.  Who presents with shortness of breath. He was sent for echocardiogram by his PCP due to ongoing dyspnea, and was noted to have large left pleural  effusion. He was advised to proceed to the ER for further evaluation.  His chest x-ray demonstrated large left pleural effusion.  He underwent IR thoracentesis with removal of 1.3L of L-sided pleural fluid, although the procedure was terminated prior to completion due to patient discomfort.  He subsequently underwent a CT chest which demonstrated mild to moderate left pleural effusion decreased from admission chest x-ray.  A previously noted right pleural effusion in 2024 had resolved on the CT scan.  He was noted to have thoracic aortic aneurysm at 4.4 cm.  He was noted to otherwise appear euvolemic on exam, and diuretics were held off on per primary team.  He was additionally noted to be in atrial fibrillation with RVR metoprolol was increased from 25 mg twice daily to 25 mg every 8 hours with improvement in HR.    Trops negative x2. EKG without acute ischemic changes. He denies chest pain or other cardiac concerns at this time.     His echocardiogram yesterday demonstrated EF 55%, severely dilated left atrium, mild to moderate aortic regurgitation, mitral valve repaired with annular ring mild mitral regurgitation noted, mild to moderate TR, RVSP 56 mmHg, mild GA    He follows with Dr. Benson as his outpatient cardiologist.   He underwent MV repair 10/2024 for severe MR. He additionally underwent left atrial ligation with atrial clip.  His postoperative course was complicated by bradycardia, postoperative atrial fibrillation which has since been persistent, requiring milrinone and uptitration of diuretics, and FRANCISCO.  Cardiac cath 9/2024 with no obstructive CAD, patent stents     Inpatient consult to Cardiology  Consult  "performed by: David Richard PA-C  Consult ordered by: Geno Du MD        EKG: Atrial fibrillation with RVR, nonspecific ST-T wave abnormalities         Review of Systems   Constitutional:  Negative for diaphoresis, fever and unexpected weight change.   HENT:  Negative for ear pain and sore throat.    Eyes:  Negative for pain and redness.   Respiratory:  Positive for shortness of breath. Negative for cough.    Cardiovascular:  Negative for chest pain, palpitations and leg swelling.   Gastrointestinal:  Negative for abdominal pain and vomiting.   Genitourinary:  Negative for dysuria.   Skin:  Negative for color change and rash.   Neurological:  Negative for dizziness, syncope and light-headedness.   Hematological:  Does not bruise/bleed easily.   Psychiatric/Behavioral:  Negative for agitation and behavioral problems.    All other systems reviewed and are negative.      Historical Information   Past Medical History[1]  Past Surgical History[2]  Social History     Substance and Sexual Activity   Alcohol Use Yes    Alcohol/week: 3.0 standard drinks of alcohol    Types: 3 Cans of beer per week    Comment: Since he has been home form surgery had a few beers     Social History     Substance and Sexual Activity   Drug Use No     Tobacco Use History[3]    Family History: Family History[4]    Meds/Allergies   all current active meds have been reviewed  Allergies[5]    Objective   Vitals: Blood pressure 139/96, pulse 80, temperature 98 °F (36.7 °C), resp. rate 20, height 5' 2\" (1.575 m), weight 72 kg (158 lb 12.8 oz), SpO2 97%., Body mass index is 29.04 kg/m².,   Orthostatic Blood Pressures      Flowsheet Row Most Recent Value   Blood Pressure 139/96 filed at 2025 0821            Systolic (24hrs), Av , Min:108 , Max:180     Diastolic (24hrs), Av, Min:60, Max:112        Intake/Output Summary (Last 24 hours) at 2025 0853  Last data filed at 2025 2155  Gross per 24 hour   Intake 250 ml "   Output --   Net 250 ml       Invasive Devices       Peripheral Intravenous Line  Duration             Peripheral IV 07/18/25 Distal;Right;Upper;Ventral (anterior) Arm <1 day    Peripheral IV 07/18/25 Left Antecubital <1 day                        Physical Exam:    GEN: Alert and oriented x 3, in no acute distress.  Well appearing and well nourished.   HEENT: Sclera anicteric, conjunctivae pink, mucous membranes moist. Oropharynx clear.   NECK: Supple, no significant JVD. Trachea midline.   HEART: Regular rate, irregular rhythm, normal S1 and S2, no murmurs, clicks, gallops or rubs. PMI nondisplaced, no thrills.   LUNGS: Clear to auscultation bilaterally; no wheezes, rales, or rhonchi. No increased work of breathing or signs of respiratory distress.   ABDOMEN: Soft, nontender, non-distended.   EXTREMITIES: Skin warm and well perfused, no clubbing, cyanosis, or edema.  NEURO: No focal findings. Normal speech. Mood and affect normal.   SKIN: Normal without suspicious lesions on exposed skin.      Lab Results:     Troponins:   Results from last 7 days   Lab Units 07/18/25  1543 07/18/25  1338   HS TNI 0HR ng/L  --  5   HS TNI 2HR ng/L 4  --        CBC with diff:   Results from last 7 days   Lab Units 07/19/25  0439 07/18/25  1338   WBC Thousand/uL 6.26 5.43   HEMOGLOBIN g/dL 13.4 13.8   HEMATOCRIT % 39.5 40.7   MCV fL 88 88   PLATELETS Thousands/uL 181 212   RBC Million/uL 4.50 4.61   MCH pg 29.8 29.9   MCHC g/dL 33.9 33.9   RDW % 15.0 15.2*   MPV fL 9.5 9.9   NRBC AUTO /100 WBCs 0 0         CMP:   Results from last 7 days   Lab Units 07/19/25  0439 07/18/25  1338   POTASSIUM mmol/L 3.2* 3.5   CHLORIDE mmol/L 107 104   CO2 mmol/L 26 28   BUN mg/dL 13 16   CREATININE mg/dL 0.65 0.80   CALCIUM mg/dL 8.6 9.2   AST U/L  --  36   ALT U/L  --  24   ALK PHOS U/L  --  61   EGFR ml/min/1.73sq m 100 92       ** Please Note: Fluency DirectDictation voice to text software may have been used in the creation of this document.  **           [1]   Past Medical History:  Diagnosis Date    Abnormal ECG 8/24/22    Allergic     Arthritis     Clotting disorder (HCC)     ((Pt Qnr Sub: na))     Coronary artery disease     ((Pt Qnr Sub: na))     Depression     Diabetes mellitus (HCC)     ((Pt Qnr Sub: na))     Headache(784.0)     HL (hearing loss)     Hypertension     Myocardial infarction (HCC) 8/23/22    Obesity     Visual impairment    [2]   Past Surgical History:  Procedure Laterality Date    CARDIAC CATHETERIZATION Left 8/25/2022    Procedure: Cardiac catheterization;  Surgeon: Luann Feng MD;  Location: [facility];  Service: Cardiology    CARDIAC CATHETERIZATION N/A 8/25/2022    Procedure: Cardiac Coronary Angiogram;  Surgeon: Luann Feng MD;  Location: [facility];  Service: Cardiology    CARDIAC CATHETERIZATION N/A 9/19/2024    Procedure: Cardiac RHC/LHC;  Surgeon: Javier Ocasio MD;  Location: [facility];  Service: Cardiology    IR THORACENTESIS  7/18/2025    NV REPLACEMENT MITRAL VALVE W/CARDIOPULMONARY BYP N/A 10/8/2024    Procedure: MITRAL VALVE REPAIR WITH 36MM JANNETH 4D  ANNULOPLASTY RING, LEFT ATRIAL APPENDAGE LIGATION WITH 45MM ATRICLIP;  Surgeon: Manolo Sanford DO;  Location: [facility];  Service: Cardiac Surgery    SEPTOPLASTY     [3]   Social History  Tobacco Use   Smoking Status Never   Smokeless Tobacco Never   Tobacco Comments    never a smoker ( as per allscripts)   [4]   Family History  Problem Relation Name Age of Onset    Aneurysm Mother Carla genie     Coronary artery disease Father Ivan     Cancer Other grandmother     Stroke Other grandfather         CVA    Hypertension Sister Eve     Heart attack Sister Jacque    [5]   Allergies  Allergen Reactions    Protonix [Pantoprazole] Headache

## 2025-07-19 NOTE — PLAN OF CARE
Problem: PAIN - ADULT  Goal: Verbalizes/displays adequate comfort level or baseline comfort level  Description: Interventions:  - Encourage patient to monitor pain and request assistance  - Assess pain using appropriate pain scale  - Administer analgesics as ordered based on type and severity of pain and evaluate response  - Implement non-pharmacological measures as appropriate and evaluate response  - Consider cultural and social influences on pain and pain management  - Notify physician/advanced practitioner if interventions unsuccessful or patient reports new pain  - Educate patient/family on pain management process including their role and importance of  reporting pain   - Provide non-pharmacologic/complimentary pain relief interventions  Outcome: Progressing     Problem: INFECTION - ADULT  Goal: Absence or prevention of progression during hospitalization  Description: INTERVENTIONS:  - Assess and monitor for signs and symptoms of infection  - Monitor lab/diagnostic results  - Monitor all insertion sites, i.e. indwelling lines, tubes, and drains  - Monitor endotracheal if appropriate and nasal secretions for changes in amount and color  - Mcgregor appropriate cooling/warming therapies per order  - Administer medications as ordered  - Instruct and encourage patient and family to use good hand hygiene technique  - Identify and instruct in appropriate isolation precautions for identified infection/condition  Outcome: Progressing  Goal: Absence of fever/infection during neutropenic period  Description: INTERVENTIONS:  - Monitor WBC  - Perform strict hand hygiene  - Limit to healthy visitors only  - No plants, dried, fresh or silk flowers with zepeda in patient room  Outcome: Progressing     Problem: SAFETY ADULT  Goal: Patient will remain free of falls  Description: INTERVENTIONS:  - Educate patient/family on patient safety including physical limitations  - Instruct patient to call for assistance with activity   -  Consider consulting OT/PT to assist with strengthening/mobility based on AM PAC & JH-HLM score  - Consult OT/PT to assist with strengthening/mobility   - Keep Call bell within reach  - Keep bed low and locked with side rails adjusted as appropriate  - Keep care items and personal belongings within reach  - Initiate and maintain comfort rounds  -Outcome: Progressing  Goal: Maintain or return to baseline ADL function  Description: INTERVENTIONS:  -  Assess patient's ability to carry out ADLs; assess patient's baseline for ADL function and identify physical deficits which impact ability to perform ADLs (bathing, care of mouth/teeth, toileting, grooming, dressing, etc.)  - Assess/evaluate cause of self-care deficits   - Assess range of motion  - Assess patient's mobility; develop plan if impaired  - Assess patient's need for assistive devices and provide as appropriate  - Encourage maximum independence but intervene and supervise when necessary  - Involve family in performance of ADLs  - Assess for home care needs following discharge   - Consider OT consult to assist with ADL evaluation and planning for discharge  - Provide patient education as appropriate  - Monitor functional capacity and physical performance, use of AM PAC & JH-HLM   - Monitor gait, balance and fatigue with ambulation    Outcome: Progressing  Goal: Maintains/Returns to pre admission functional level  Description: INTERVENTIONS:  - Perform AM-PAC 6 Click Basic Mobility/ Daily Activity assessment daily.  - Set and communicate daily mobility goal to care team and patient/family/caregiver.   - Collaborate with rehabilitation services on mobility goals if consulted  - Perform Range of Motion 3 times a day.  - Reposition patient every 2 hours.  - Dangle patient 3 times a day  - Stand patient 3 times a day  - Ambulate patient 3 times a day  - Out of bed to chair 3 times a day   - Out of bed for meals 3 times a day  - Out of bed for toileting  - Record  patient progress and toleration of activity level   Outcome: Progressing     Problem: DISCHARGE PLANNING  Goal: Discharge to home or other facility with appropriate resources  Description: INTERVENTIONS:  - Identify barriers to discharge w/patient and caregiver  - Arrange for needed discharge resources and transportation as appropriate  - Identify discharge learning needs (meds, wound care, etc.)  - Arrange for interpretive services to assist at discharge as needed  - Refer to Case Management Department for coordinating discharge planning if the patient needs post-hospital services based on physician/advanced practitioner order or complex needs related to functional status, cognitive ability, or social support system  Outcome: Progressing     Problem: Knowledge Deficit  Goal: Patient/family/caregiver demonstrates understanding of disease process, treatment plan, medications, and discharge instructions  Description: Complete learning assessment and assess knowledge base.  Interventions:  - Provide teaching at level of understanding  - Provide teaching via preferred learning methods  Outcome: Progressing

## 2025-07-20 ENCOUNTER — APPOINTMENT (INPATIENT)
Dept: RADIOLOGY | Facility: HOSPITAL | Age: 70
End: 2025-07-20
Payer: COMMERCIAL

## 2025-07-20 VITALS
HEIGHT: 62 IN | BODY MASS INDEX: 29.22 KG/M2 | HEART RATE: 84 BPM | TEMPERATURE: 97.9 F | RESPIRATION RATE: 16 BRPM | OXYGEN SATURATION: 98 % | SYSTOLIC BLOOD PRESSURE: 134 MMHG | DIASTOLIC BLOOD PRESSURE: 98 MMHG | WEIGHT: 158.8 LBS

## 2025-07-20 LAB
INR PPP: 1.4 (ref 0.85–1.19)
PROTHROMBIN TIME: 17.9 SECONDS (ref 12.3–15)

## 2025-07-20 PROCEDURE — 99232 SBSQ HOSP IP/OBS MODERATE 35: CPT | Performed by: INTERNAL MEDICINE

## 2025-07-20 PROCEDURE — 71046 X-RAY EXAM CHEST 2 VIEWS: CPT

## 2025-07-20 PROCEDURE — 85610 PROTHROMBIN TIME: CPT

## 2025-07-20 PROCEDURE — 99238 HOSP IP/OBS DSCHRG MGMT 30/<: CPT | Performed by: PHYSICIAN ASSISTANT

## 2025-07-20 RX ORDER — WARFARIN SODIUM 5 MG/1
TABLET ORAL
Start: 2025-07-21

## 2025-07-20 RX ORDER — METOPROLOL TARTRATE 25 MG/1
37.5 TABLET, FILM COATED ORAL EVERY 8 HOURS SCHEDULED
Qty: 135 TABLET | Refills: 0 | Status: SHIPPED | OUTPATIENT
Start: 2025-07-20

## 2025-07-20 RX ORDER — WARFARIN SODIUM 5 MG/1
7.5 TABLET ORAL
Start: 2025-07-20 | End: 2025-07-20

## 2025-07-20 RX ADMIN — CLOPIDOGREL 75 MG: 75 TABLET ORAL at 08:06

## 2025-07-20 RX ADMIN — ARIPIPRAZOLE 2 MG: 2 TABLET ORAL at 08:07

## 2025-07-20 RX ADMIN — VENLAFAXINE HYDROCHLORIDE 225 MG: 150 CAPSULE, EXTENDED RELEASE ORAL at 08:06

## 2025-07-20 RX ADMIN — METOPROLOL TARTRATE 25 MG: 25 TABLET, FILM COATED ORAL at 08:07

## 2025-07-20 RX ADMIN — ATORVASTATIN CALCIUM 80 MG: 40 TABLET, FILM COATED ORAL at 08:06

## 2025-07-20 RX ADMIN — AMLODIPINE BESYLATE 10 MG: 10 TABLET ORAL at 08:06

## 2025-07-20 NOTE — DISCHARGE SUMMARY
Discharge Summary - Hospitalist   Name: Reuben García 69 y.o. male I MRN: [id number]  Unit/Bed#: 2 E 277-01 I Date of Admission: 7/18/2025   Date of Service: 7/20/2025 I Hospital Day: 1     Assessment & Plan  Pleural effusion, left  Patient with complaint of shortness of breath.  Outpatient workup showed newly diagnosed left-sided pleural effusion patient was sent to emergency department  In the ED patient underwent IR thoracentesis however only removal of 1.3 L of fluid before stopping given patient with persistent cough.    Mild to moderate fluid still present according to repeat CT chest.  Echo:LVEF 55% with normal systolic and diastolic function however Right ventricular cavity size is normal. Systolic function is reduced. Left Atrium: The atrium is severely dilated.  Lights criteria positive according to fluid analysis.  Patient underwent CT chest with contrast without PE study.  Clarified with radiology team that no concern for PE.  The pulmonary arteries are patent per radiology.  Cardiology consultation appreciated: No concern for CHF  Ambulatory referral placed for pulmonology consultation as an outpatient  Outpatient follow-up with PCP  Atrial fibrillation with rapid ventricular response (HCC)  Patient with known history of atrial fibrillation on warfarin and metoprolol.  Patient with A-fib and RVR on admission.  INR on admission 1.1 > 1.23 > 1.40 today  Given Matthew Vascor 2 will not proceed with bridging.  Patient on metoprolol 25 mg every 12 hour will increase to 37.5 mg every 12 hours on discahrge.  Patient instructed to increase Coumadin to 7.5 mg x 1 dose.  Patient instructed to call cardiology office tomorrow for continued management of Coumadin.  Outpatient follow-up with cardiology  Acute right-sided heart failure (HCC)  Patient on presentation due to abnormal echo including pleural effusion findings and final results showed a right heart failure.  Patient underwent CT chest with contrast however not  dedicated PE.  Discussed with radiology team and the team looked at the images and saw the arteries patent so no suspicion for acute PE.  Essential hypertension  Elevated on admission, now improved  Continue home amlodipine 10 mg  Metoprolol 25 mg every 12 hours increased to 37.5 mg every 12 hours due to Afib with RVR  Outpatient follow-up with PCP/cardiology  Gastroesophageal reflux disease without esophagitis  Continue with famotidine  CAD, S/P RCA stent  Continue home Plavix 75 mg and atorvastatin 80 mg  Mixed hyperlipidemia  Continue with high intensity statin     Medical Problems       Resolved Problems  Date Reviewed: 7/3/2025   None       Discharging Physician / Practitioner: Kris De Souza PA-C  PCP: Rich Delgado DO  Admission Date:   Admission Orders (From admission, onward)       Ordered        07/19/25 1357  INPATIENT ADMISSION  Once            07/18/25 1708  Place in Observation  Once                          Discharge Date: 07/20/25    Next Steps for Physician/AP Assuming Care:  Follow-up with pulmonology in the office    Test Results Pending at Discharge (will require follow up):  N/A    Medication Changes for Discharge & Rationale:   Metoprolol increased to 37.5 mg twice daily  See after visit summary for reconciled discharge medications provided to patient and/or family.     Consultations During Hospital Stay:  Cardiology    Procedures Performed:   Left thoracentesis on 7/18/2025 by IR    Significant Findings / Test Results:   CT chest with contrast  Result Date: 7/18/2025  Impression: Mild to moderate left pleural effusion appears to have improved since the earlier chest x-ray from today status post thoracentesis. No abnormal enhancement can be appreciated along the pleural surface. Correlation with pleural fluid cytology is advised. Ground glass opacities may be related to reexpansion edema although pneumonia is not excluded in the proper clinical context. Ascending thoracic aortic ectasia 4.4  cm. Annual surveillance is advised. Possible gallstones partially seen. This was discussed with NORMA Marcum at 4:35 p.m. Computerized Assisted Algorithm (CAA) may have aided analysis of applicable images. Workstation performed: [id number]     XR chest 1 view portable  Result Date: 7/18/2025  Impression: Large left effusion. Pneumonia is not excluded in the proper clinical context. Please see subsequent CT for further result Workstation performed: [id number]     IR Thoracentesis  Result Date: 7/18/2025  Impression: Impression: Ultrasound-guided thoracentesis yielding 1300 mL cloudy, yellow left pleural fluid. Residual effusion remains. Signed, performed, and dictated by DYLAN Cosme under the supervision of Dr. Rahat Barnett. Workstation performed: [id number]       Incidental Findings:   none     Hospital Course:   Reuben García is a 69 y.o. male patient who originally presented to the hospital on 7/18/2025 due to pleural effusion noted on outpatient testing.  Please see H&P for complete details of presentation.  Patient underwent a left thoracentesis by IR in the emergency department on 7/18/2025.  1300 mL of fluid have been taken off however procedure had to be terminated early due to patient's persistent cough.  CT of the chest showed mild to moderate left pleural effusion postthoracentesis.  Patient was noted to be in A-fib with RVR.  The patient's metoprolol 25 mg twice daily was increased to 25 mg 3 times daily.  Patient's heart rate improved and he was discharged on 37.5 mg twice daily.  Cardiology consulted and felt this was not due to cardiac reasons.  An ambulatory referral was placed for pulmonology.  Patient was noted to have a subtherapeutic INR on admission at 1.1.  Given patient had [id number] score of 2 he was not bridged.  The patient's Coumadin was increased to 10 mg during his hospitalization.  Patient was noted to have a INR of 1.4 on the day of discharge.  The patient was  "instructed to increase his Coumadin to 7.5 mg on the night of discharge and then call his cardiologist office in the morning to discuss further management of his Coumadin.  The patient verbalized understanding of this.  Patient was also instructed to follow-up with his PCP.  This is a brief discharge summary, please see notes from throughout the patient's hospital stay for complete details of his hospitalization.      Please see above list of diagnoses and related plan for additional information.     Discharge Day Visit / Exam:   Subjective:    Vitals: Blood Pressure: 134/98 (07/20/25 0806)  Pulse: 84 (07/20/25 0806)  Temperature: 97.9 °F (36.6 °C) (07/20/25 0745)  Temp Source: Oral (07/19/25 1500)  Respirations: 16 (07/19/25 1500)  Height: 5' 2\" (157.5 cm) (07/18/25 2134)  Weight - Scale: 72 kg (158 lb 12.8 oz) (07/18/25 2134)  SpO2: 98 % (07/20/25 0745)  Physical Exam  Vitals and nursing note reviewed.   Constitutional:       Appearance: Normal appearance.   HENT:      Head: Normocephalic and atraumatic.      Nose: Nose normal.      Mouth/Throat:      Mouth: Mucous membranes are moist.      Pharynx: Oropharynx is clear.     Cardiovascular:      Rate and Rhythm: Normal rate and regular rhythm.   Pulmonary:      Effort: Pulmonary effort is normal.      Breath sounds: Examination of the left-lower field reveals decreased breath sounds. Decreased breath sounds present.   Abdominal:      General: There is no distension.      Palpations: Abdomen is soft.      Tenderness: There is no abdominal tenderness.     Skin:     General: Skin is warm and dry.     Neurological:      General: No focal deficit present.      Mental Status: He is alert and oriented to person, place, and time.     Psychiatric:         Mood and Affect: Mood normal.         Behavior: Behavior normal. Behavior is cooperative.         Thought Content: Thought content normal.          Discussion with Family: Updated  (wife) via " phone.    Discharge instructions/Information to patient and family:   See after visit summary for information provided to patient and family.      Provisions for Follow-Up Care:  See after visit summary for information related to follow-up care and any pertinent home health orders.      Mobility at time of Discharge:   Basic Mobility Inpatient Raw Score: 24  JH-HLM Goal: 8: Walk 250 feet or more  JH-HLM Achieved: 6: Walk 10 steps or more  HLM Goal achieved. Continue to encourage appropriate mobility.     Disposition:   Home    Planned Readmission: no    Administrative Statements   Discharge Statement:  I have spent a total time of 25 minutes in caring for this patient on the day of the visit/encounter. >30 minutes of time was spent on: Documenting in the medical record, Reviewing / ordering tests, medicine, procedures  , and Communicating with other healthcare professionals .    **Please Note: This note may have been constructed using a voice recognition system**

## 2025-07-20 NOTE — ASSESSMENT & PLAN NOTE
Patient with known history of atrial fibrillation on warfarin and metoprolol.  Patient with A-fib and RVR on admission.  INR on admission 1.1 > 1.23 > 1.40 today  Given Matthew Vascor 2 will not proceed with bridging.  Patient on metoprolol 25 mg every 12 hour will increase to 37.5 mg every 12 hours on discahrge.  Patient instructed to increase Coumadin to 7.5 mg x 1 dose.  Patient instructed to call cardiology office tomorrow for continued management of Coumadin.  Outpatient follow-up with cardiology

## 2025-07-20 NOTE — ASSESSMENT & PLAN NOTE
Patient with complaint of shortness of breath.  Outpatient workup showed newly diagnosed left-sided pleural effusion patient was sent to emergency department  In the ED patient underwent IR thoracentesis however only removal of 1.3 L of fluid before stopping given patient with persistent cough.    Mild to moderate fluid still present according to repeat CT chest.  Echo:LVEF 55% with normal systolic and diastolic function however Right ventricular cavity size is normal. Systolic function is reduced. Left Atrium: The atrium is severely dilated.  Lights criteria positive according to fluid analysis.  Patient underwent CT chest with contrast without PE study.  Clarified with radiology team that no concern for PE.  The pulmonary arteries are patent per radiology.  Cardiology consultation appreciated: No concern for CHF  Ambulatory referral placed for pulmonology consultation as an outpatient  Outpatient follow-up with PCP

## 2025-07-20 NOTE — ASSESSMENT & PLAN NOTE
Postoperative persistent atrial fibrillation  HR control adequate  Rate control: Continue Lopressor 37.5 mg every 8 hours  Continue anticoagulation with Coumadin.  Target INR 2-3.  [id number] 3.   Continue telemetry

## 2025-07-20 NOTE — PLAN OF CARE
Problem: PAIN - ADULT  Goal: Verbalizes/displays adequate comfort level or baseline comfort level  Description: Interventions:  - Encourage patient to monitor pain and request assistance  - Assess pain using appropriate pain scale  - Administer analgesics as ordered based on type and severity of pain and evaluate response  - Implement non-pharmacological measures as appropriate and evaluate response  - Consider cultural and social influences on pain and pain management  - Notify physician/advanced practitioner if interventions unsuccessful or patient reports new pain  - Educate patient/family on pain management process including their role and importance of  reporting pain   - Provide non-pharmacologic/complimentary pain relief interventions  Outcome: Progressing     Problem: INFECTION - ADULT  Goal: Absence or prevention of progression during hospitalization  Description: INTERVENTIONS:  - Assess and monitor for signs and symptoms of infection  - Monitor lab/diagnostic results  - Monitor all insertion sites, i.e. indwelling lines, tubes, and drains  - Monitor endotracheal if appropriate and nasal secretions for changes in amount and color  - [place] appropriate cooling/warming therapies per order  - Administer medications as ordered  - Instruct and encourage patient and family to use good hand hygiene technique  - Identify and instruct in appropriate isolation precautions for identified infection/condition  Outcome: Progressing  Goal: Absence of fever/infection during neutropenic period  Description: INTERVENTIONS:  - Monitor WBC  - Perform strict hand hygiene  - Limit to healthy visitors only  - No plants, dried, fresh or silk flowers with zepeda in patient room  Outcome: Progressing     Problem: SAFETY ADULT  Goal: Patient will remain free of falls  Description: INTERVENTIONS:  - Educate patient/family on patient safety including physical limitations  - Instruct patient to call for assistance with activity   -  Consider consulting OT/PT to assist with strengthening/mobility based on AM PAC & JH-HLM score  - Consult OT/PT to assist with strengthening/mobility   - Keep Call bell within reach  - Keep bed low and locked with side rails adjusted as appropriate  - Keep care items and personal belongings within reach  - Initiate and maintain comfort rounds  -Outcome: Progressing  Goal: Maintain or return to baseline ADL function  Description: INTERVENTIONS:  -  Assess patient's ability to carry out ADLs; assess patient's baseline for ADL function and identify physical deficits which impact ability to perform ADLs (bathing, care of mouth/teeth, toileting, grooming, dressing, etc.)  - Assess/evaluate cause of self-care deficits   - Assess range of motion  - Assess patient's mobility; develop plan if impaired  - Assess patient's need for assistive devices and provide as appropriate  - Encourage maximum independence but intervene and supervise when necessary  - Involve family in performance of ADLs  - Assess for home care needs following discharge   - Consider OT consult to assist with ADL evaluation and planning for discharge  - Provide patient education as appropriate  - Monitor functional capacity and physical performance, use of AM PAC & JH-HLM   - Monitor gait, balance and fatigue with ambulation    Outcome: Progressing  Goal: Maintains/Returns to pre admission functional level  Description: INTERVENTIONS:  - Perform AM-PAC 6 Click Basic Mobility/ Daily Activity assessment daily.  - Set and communicate daily mobility goal to care team and patient/family/caregiver.   - Collaborate with rehabilitation services on mobility goals if consulted  - Perform Range of Motion 3 times a day.  - Reposition patient every 2 hours.  - Dangle patient 3 times a day  - Stand patient 3 times a day  - Ambulate patient 3 times a day  - Out of bed to chair 3 times a day   - Out of bed for meals 3 times a day  - Out of bed for toileting  - Record  patient progress and toleration of activity level   Outcome: Progressing     Problem: DISCHARGE PLANNING  Goal: Discharge to home or other facility with appropriate resources  Description: INTERVENTIONS:  - Identify barriers to discharge w/patient and caregiver  - Arrange for needed discharge resources and transportation as appropriate  - Identify discharge learning needs (meds, wound care, etc.)  - Arrange for interpretive services to assist at discharge as needed  - Refer to Case Management Department for coordinating discharge planning if the patient needs post-hospital services based on physician/advanced practitioner order or complex needs related to functional status, cognitive ability, or social support system  Outcome: Progressing     Problem: Knowledge Deficit  Goal: Patient/family/caregiver demonstrates understanding of disease process, treatment plan, medications, and discharge instructions  Description: Complete learning assessment and assess knowledge base.  Interventions:  - Provide teaching at level of understanding  - Provide teaching via preferred learning methods  Outcome: Progressing

## 2025-07-20 NOTE — ASSESSMENT & PLAN NOTE
Elevated on admission, now improved  Continue home amlodipine 10 mg  Metoprolol 25 mg every 12 hours increased to 37.5 mg every 12 hours due to Afib with RVR  Outpatient follow-up with PCP/cardiology

## 2025-07-20 NOTE — PLAN OF CARE
Problem: PAIN - ADULT  Goal: Verbalizes/displays adequate comfort level or baseline comfort level  Description: Interventions:  - Encourage patient to monitor pain and request assistance  - Assess pain using appropriate pain scale  - Administer analgesics as ordered based on type and severity of pain and evaluate response  - Implement non-pharmacological measures as appropriate and evaluate response  - Consider cultural and social influences on pain and pain management  - Notify physician/advanced practitioner if interventions unsuccessful or patient reports new pain  - Educate patient/family on pain management process including their role and importance of  reporting pain   - Provide non-pharmacologic/complimentary pain relief interventions  Outcome: Progressing     Problem: INFECTION - ADULT  Goal: Absence or prevention of progression during hospitalization  Description: INTERVENTIONS:  - Assess and monitor for signs and symptoms of infection  - Monitor lab/diagnostic results  - Monitor all insertion sites, i.e. indwelling lines, tubes, and drains  - Monitor endotracheal if appropriate and nasal secretions for changes in amount and color  - [place] appropriate cooling/warming therapies per order  - Administer medications as ordered  - Instruct and encourage patient and family to use good hand hygiene technique  - Identify and instruct in appropriate isolation precautions for identified infection/condition  Outcome: Progressing  Goal: Absence of fever/infection during neutropenic period  Description: INTERVENTIONS:  - Monitor WBC  - Perform strict hand hygiene  - Limit to healthy visitors only  - No plants, dried, fresh or silk flowers with zepeda in patient room  Outcome: Progressing     Problem: SAFETY ADULT  Goal: Maintain or return to baseline ADL function  Description: INTERVENTIONS:  -  Assess patient's ability to carry out ADLs; assess patient's baseline for ADL function and identify physical deficits  which impact ability to perform ADLs (bathing, care of mouth/teeth, toileting, grooming, dressing, etc.)  - Assess/evaluate cause of self-care deficits   - Assess range of motion  - Assess patient's mobility; develop plan if impaired  - Assess patient's need for assistive devices and provide as appropriate  - Encourage maximum independence but intervene and supervise when necessary  - Involve family in performance of ADLs  - Assess for home care needs following discharge   - Consider OT consult to assist with ADL evaluation and planning for discharge  - Provide patient education as appropriate  - Monitor functional capacity and physical performance, use of AM PAC & -HLM   - Monitor gait, balance and fatigue with ambulation    Outcome: Progressing     Problem: Knowledge Deficit  Goal: Patient/family/caregiver demonstrates understanding of disease process, treatment plan, medications, and discharge instructions  Description: Complete learning assessment and assess knowledge base.  Interventions:  - Provide teaching at level of understanding  - Provide teaching via preferred learning methods  Outcome: Progressing

## 2025-07-20 NOTE — ASSESSMENT & PLAN NOTE
He is s/p thoracentesis with removal of 1.3 L of left-sided pleural fluid on 7/18.  Chest x-ray wet read demonstrates improved pleural effusion compared to admission.  He denies dyspnea or orthopnea, and appears euvolemic on exam today. No evidence of CHF exacerbation at this time.  Pleural effusion may be postoperative in nature secondary to mitral valve repair 10/2024 versus alternative etiology.  Recommended follow-up on fluid cytology per primary team.  Recommend outpatient versus inpatient pulmonology evaluation, will defer to primary team.

## 2025-07-20 NOTE — PROGRESS NOTES
Cardiology Progress Note - Reuben García 69 y.o. male MRN: [id number]    Unit/Bed#: 2 E 277-01 Encounter: [id number]      Assessment/Plan:  Assessment & Plan  Pleural effusion, left  He is s/p thoracentesis with removal of 1.3 L of left-sided pleural fluid on 7/18.  Chest x-ray wet read demonstrates improved pleural effusion compared to admission.  He denies dyspnea or orthopnea, and appears euvolemic on exam today. No evidence of CHF exacerbation at this time.  Pleural effusion may be postoperative in nature secondary to mitral valve repair 10/2024 versus alternative etiology.  Recommended follow-up on fluid cytology per primary team.  Recommend outpatient versus inpatient pulmonology evaluation, will defer to primary team.    Atrial fibrillation with rapid ventricular response (HCC)  Postoperative persistent atrial fibrillation  HR control adequate  Rate control: Continue Lopressor 37.5 mg every 8 hours  Continue anticoagulation with Coumadin.  Target INR 2-3.  [id number] 3.   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  Continue routine surveillance.    CAD, S/P RCA stent  CAD s/p ESTHER x 2 to proximal-mid RCA (2022)  Continue Plavix, Lipitor, Lopressor, amlodipine    Essential hypertension  Continue amlodipine 10 mg daily, Lopressor 37.5 mg every 8 hours    Mixed hyperlipidemia  Continue Lipitor 80 mg daily    Aneurysm of ascending aorta without rupture (HCC)  4.4 cm  Recommend routine surveillance      Cardiology will sign-off. Please reach out with any further questions or concerns.      Subjective:   Patient seen and examined.  He offers no specific cardiac concerns or complaints today.  Denies dyspnea orthopnea.  Denies lower extremity edema.    Objective:     Vitals: Blood pressure 134/98, pulse 84, temperature 97.9 °F (36.6 °C), resp. rate 16, height 5'  "2\" (1.575 m), weight 72 kg (158 lb 12.8 oz), SpO2 98%., Body mass index is 29.04 kg/m².,   Orthostatic Blood Pressures      Flowsheet Row Most Recent Value   Blood Pressure 134/98 filed at 07/20/2025 0806   Patient Position - Orthostatic VS Lying filed at 07/19/2025 1500              Intake/Output Summary (Last 24 hours) at 7/20/2025 1640  Last data filed at 7/19/2025 2100  Gross per 24 hour   Intake 250 ml   Output --   Net 250 ml         Physical Exam:   GEN: Alert and oriented x 3, in no acute distress.  Well appearing and well nourished.   HEENT: Sclera anicteric, conjunctivae pink, mucous membranes moist. Oropharynx clear.   NECK: Supple, no significant JVD. Trachea midline.   HEART: Regular rate, irregular rhythm, normal S1 and S2, no murmurs, clicks, gallops or rubs. PMI nondisplaced, no thrills.   LUNGS: Clear to auscultation bilaterally; no wheezes, rales, or rhonchi. No increased work of breathing or signs of respiratory distress.   ABDOMEN: Soft, nontender, non-distended.   EXTREMITIES: Skin warm and well perfused, no clubbing, cyanosis, or edema.  NEURO: No focal findings. Normal speech. Mood and affect normal.   SKIN: Normal without suspicious lesions on exposed skin.        Medications:    Current Medications[1]     Labs & Results:    Results from last 7 days   Lab Units 07/18/25  1543 07/18/25  1338   HS TNI 0HR ng/L  --  5   HS TNI 2HR ng/L 4  --      Results from last 7 days   Lab Units 07/19/25  0439 07/18/25  1338   WBC Thousand/uL 6.26 5.43   HEMOGLOBIN g/dL 13.4 13.8   HEMATOCRIT % 39.5 40.7   PLATELETS Thousands/uL 181 212         Results from last 7 days   Lab Units 07/19/25  0439 07/18/25  1338   POTASSIUM mmol/L 3.2* 3.5   CHLORIDE mmol/L 107 104   CO2 mmol/L 26 28   BUN mg/dL 13 16   CREATININE mg/dL 0.65 0.80   CALCIUM mg/dL 8.6 9.2   ALK PHOS U/L  --  61   ALT U/L  --  24   AST U/L  --  36     Results from last 7 days   Lab Units 07/20/25  0918 07/19/25  1053 07/18/25  1848 07/18/25  1404 "   INR  1.40* 1.23*  --  1.18   PTT seconds  --   --  32  --      Results from last 7 days   Lab Units 07/19/25  0439   MAGNESIUM mg/dL 2.0       ** Please Note: Fluency DirectDictation voice to text software may have been used in the creation of this document. **           [1] No current facility-administered medications for this encounter.    Current Outpatient Medications:     amLODIPine (NORVASC) 10 mg tablet, TAKE 1 TABLET BY MOUTH EVERY DAY, Disp: 90 tablet, Rfl: 1    ARIPiprazole (ABILIFY) 2 mg tablet, TAKE 1 TABLET BY MOUTH EVERY DAY, Disp: 90 tablet, Rfl: 1    atorvastatin (LIPITOR) 80 mg tablet, TAKE 1 TABLET BY MOUTH EVERY DAY IN THE EVENING, Disp: 90 tablet, Rfl: 1    clopidogrel (PLAVIX) 75 mg tablet, TAKE 1 TABLET BY MOUTH EVERY DAY, Disp: 90 tablet, Rfl: 1    metoprolol tartrate (LOPRESSOR) 25 mg tablet, Take 1.5 tablets (37.5 mg total) by mouth every 8 (eight) hours, Disp: 135 tablet, Rfl: 0    venlafaxine (EFFEXOR-XR) 75 mg 24 hr capsule, TAKE 3 CAPSULES (225 MG TOTAL) BY MOUTH DAILY WITH BREAKFAST, Disp: 270 capsule, Rfl: 1    Ventolin  (90 Base) MCG/ACT inhaler, Inhale 2 puffs 3 (three) times a day as needed for wheezing, Disp: 18 g, Rfl: 5    [START ON 7/21/2025] warfarin (COUMADIN) 5 mg tablet, Take one tablet daily in the evening or as directed Do not start before July 21, 2025., Disp: , Rfl:     warfarin (COUMADIN) 5 mg tablet, Take 1.5 tablets (7.5 mg total) by mouth once for 1 dose, Disp: , Rfl:     cyclobenzaprine (FLEXERIL) 5 mg tablet, Take 1 tablet (5 mg total) by mouth 3 (three) times a day as needed for muscle spasms, Disp: 20 tablet, Rfl: 0    potassium chloride (Klor-Con M20) 20 mEq tablet, Take 1 tablet (20 mEq total) by mouth daily for 5 days Unless otherwise directed., Disp: 5 tablet, Rfl: 1    SUMAtriptan (IMITREX) 100 mg tablet, TAKE 1 TABLET BY MOUTH EVERY 2 HOURS AS NEEDED FOR MIGRAINE (MAX 2TABS/DAY), Disp: 10 tablet, Rfl: 1

## 2025-07-21 ENCOUNTER — TELEPHONE (OUTPATIENT)
Age: 70
End: 2025-07-21

## 2025-07-21 ENCOUNTER — TRANSITIONAL CARE MANAGEMENT (OUTPATIENT)
Dept: INTERNAL MEDICINE CLINIC | Facility: CLINIC | Age: 70
End: 2025-07-21

## 2025-07-21 LAB
ATRIAL RATE: 138 BPM
ATRIAL RATE: 93 BPM
BACTERIA SPEC BFLD CULT: NO GROWTH
GRAM STN SPEC: NORMAL
QRS AXIS: 54 DEGREES
QRS AXIS: 68 DEGREES
QRSD INTERVAL: 82 MS
QRSD INTERVAL: 86 MS
QT INTERVAL: 288 MS
QT INTERVAL: 354 MS
QTC INTERVAL: 421 MS
QTC INTERVAL: 463 MS
T WAVE AXIS: 106 DEGREES
T WAVE AXIS: 112 DEGREES
VENTRICULAR RATE: 103 BPM
VENTRICULAR RATE: 129 BPM

## 2025-07-21 PROCEDURE — 93010 ELECTROCARDIOGRAM REPORT: CPT | Performed by: INTERNAL MEDICINE

## 2025-07-21 NOTE — TELEPHONE ENCOUNTER
We have received a referral for this patient for Pleural Effusion. Please review and advise    Date Referral Received: 7/20/25    Priority of Referral: Routine    Referred by: Kris De Souza PA-C    Diagnosis: Pleural effusion, Dyspnea    Location: [email or web address]

## 2025-07-21 NOTE — UTILIZATION REVIEW
NOTIFICATION OF ADMISSION DISCHARGE   This is a Notification of Discharge from [facility]. Please be advised that this patient has been discharge from our facility. Below you will find the admission and discharge date and time including the patient’s disposition.   UTILIZATION REVIEW CONTACT:  Utilization Review Assistants  Network Utilization Review Department  Phone: [phone] x carefully listen to the prompts. All voicemails are confidential.  Email: [email or web address]     ADMISSION INFORMATION  PRESENTATION DATE: 7/18/2025  1:21 PM  OBERVATION ADMISSION DATE: 07/18/2025 1708  INPATIENT ADMISSION DATE: 7/19/25  1:57 PM   DISCHARGE DATE: 7/20/2025  2:17 PM   DISPOSITION:Home/Self Care    Network Utilization Review Department  ATTENTION: Please call with any questions or concerns to [phone] and carefully listen to the prompts so that you are directed to the right person. All voicemails are confidential.   For Discharge needs, contact Care Management DC Support Team at [phone] opt. 2  Send all requests for admission clinical reviews, approved or denied determinations and any other requests to dedicated fax number below belonging to the campus where the patient is receiving treatment. List of dedicated fax numbers for the Facilities:  FACILITY NAME UR FAX NUMBER   ADMISSION DENIALS (Administrative/Medical Necessity) [phone]   DISCHARGE SUPPORT TEAM ([facility]) [phone]   PARENT CHILD HEALTH (Maternity/NICU/Pediatrics) [phone]   [facility] [phone]   [facility] [phone]   [facility] [phone]   [facility] [phone]   Replaced by [facility] [phone]   [facility] [phone]   [facility] [phone]   [facility] [phone]    [facility] [phone]   Replaced by [facility] [phone]   [facility] [phone]   [facility] [phone]

## 2025-07-21 NOTE — TELEPHONE ENCOUNTER
Called and LM for patient advising of upcoming scheduled Consult for referral Pleural effusion.    Thank You

## 2025-07-23 PROCEDURE — 88341 IMHCHEM/IMCYTCHM EA ADD ANTB: CPT | Performed by: PATHOLOGY

## 2025-07-23 PROCEDURE — 88342 IMHCHEM/IMCYTCHM 1ST ANTB: CPT | Performed by: PATHOLOGY

## 2025-07-23 PROCEDURE — 88305 TISSUE EXAM BY PATHOLOGIST: CPT | Performed by: PATHOLOGY

## 2025-07-23 PROCEDURE — 88112 CYTOPATH CELL ENHANCE TECH: CPT | Performed by: PATHOLOGY

## 2025-07-24 LAB
HISTIOCYTES NFR FLD: 40 %
LYMPHOCYTES NFR BLD AUTO: 57 %
MONO+MESO NFR FLD MANUAL: 1 %
NEUTS SEG NFR BLD AUTO: 2 %
PATH REV: YES
TOTAL CELLS COUNTED SPEC: 100

## 2025-08-04 ENCOUNTER — OFFICE VISIT (OUTPATIENT)
Dept: FAMILY MEDICINE CLINIC | Facility: CLINIC | Age: 70
End: 2025-08-04
Payer: COMMERCIAL

## 2025-08-04 VITALS
HEIGHT: 62 IN | DIASTOLIC BLOOD PRESSURE: 88 MMHG | RESPIRATION RATE: 18 BRPM | WEIGHT: 167 LBS | BODY MASS INDEX: 30.73 KG/M2 | OXYGEN SATURATION: 98 % | SYSTOLIC BLOOD PRESSURE: 130 MMHG | HEART RATE: 106 BPM

## 2025-08-04 DIAGNOSIS — J90 PLEURAL EFFUSION, LEFT: Primary | ICD-10-CM

## 2025-08-04 PROCEDURE — 99213 OFFICE O/P EST LOW 20 MIN: CPT | Performed by: FAMILY MEDICINE

## 2025-08-06 DIAGNOSIS — I25.10 CORONARY ARTERY DISEASE INVOLVING NATIVE CORONARY ARTERY OF NATIVE HEART WITHOUT ANGINA PECTORIS: ICD-10-CM

## 2025-08-07 RX ORDER — CLOPIDOGREL BISULFATE 75 MG/1
75 TABLET ORAL DAILY
Qty: 90 TABLET | Refills: 1 | Status: SHIPPED | OUTPATIENT
Start: 2025-08-07

## 2025-08-11 ENCOUNTER — HOSPITAL ENCOUNTER (OUTPATIENT)
Dept: PULMONOLOGY | Facility: HOSPITAL | Age: 70
Discharge: HOME | End: 2025-08-11
Payer: COMMERCIAL

## 2025-08-11 PROBLEM — R06.09 DYSPNEA ON EXERTION: Status: ACTIVE | Noted: 2025-08-11

## 2025-08-20 DIAGNOSIS — G43.109 MIGRAINE WITH AURA AND WITHOUT STATUS MIGRAINOSUS, NOT INTRACTABLE: ICD-10-CM

## 2025-08-21 RX ORDER — SUMATRIPTAN SUCCINATE 100 MG/1
TABLET ORAL
Qty: 10 TABLET | Refills: 1 | Status: SHIPPED | OUTPATIENT
Start: 2025-08-21

## (undated) DEVICE — SUT SILK 3-0 SH CR/8 18 IN C013D

## (undated) DEVICE — CATHETER PLUG WITH CAP: Brand: DOVER

## (undated) DEVICE — NC TREK CORONARY DILATATION CATHETER 3.5 MM X 12 MM / RAPID-EXCHANGE: Brand: NC TREK

## (undated) DEVICE — PACK CUSTOM TAVR

## (undated) DEVICE — STERNAL WIRE

## (undated) DEVICE — NC TREK CORONARY DILATATION CATHETER 3.0 MM X 15 MM / RAPID-EXCHANGE: Brand: NC TREK

## (undated) DEVICE — LIGHT HANDLE COVER SLEEVE DISP BLUE STELLAR

## (undated) DEVICE — RADIFOCUS OPTITORQUE ANGIOGRAPHIC CATHETER: Brand: OPTITORQUE

## (undated) DEVICE — TR BAND RADIAL ARTERY COMPRESSION DEVICE: Brand: TR BAND

## (undated) DEVICE — 3000CC GUARDIAN II: Brand: GUARDIAN

## (undated) DEVICE — SUT ETHIBOND 2-0 SH-1/SH-1 30 IN X763H

## (undated) DEVICE — GAUZE SPONGES,16 PLY: Brand: CURITY

## (undated) DEVICE — DGW .035 FC J3MM 150CM TEF: Brand: EMERALD

## (undated) DEVICE — 40601 PROLONGED POSITIONING SYSTEM: Brand: 40601 PROLONGED POSITIONING SYSTEM

## (undated) DEVICE — SUTURE GUIDE

## (undated) DEVICE — SUT PROLENE 4-0 SH 36 IN 8521H

## (undated) DEVICE — TRAY FOLEY 16FR SURESTEP TEMP SENS URIMETER STAT LOK

## (undated) DEVICE — INTENDED FOR TISSUE SEPARATION, AND OTHER PROCEDURES THAT REQUIRE A SHARP SURGICAL BLADE TO PUNCTURE OR CUT.: Brand: BARD-PARKER ® CARBON RIB-BACK BLADES

## (undated) DEVICE — 32 FR RIGHT ANGLE – SOFT PVC CATHETER: Brand: PVC THORACIC CATHETERS

## (undated) DEVICE — SUT SILK 0 CT-1 30 IN 424H

## (undated) DEVICE — CATH DIAG 5FR IMPULSE 100CM FR4

## (undated) DEVICE — TREK CORONARY DILATATION CATHETER 3.0 MM X 15 MM / RAPID-EXCHANGE: Brand: TREK

## (undated) DEVICE — UMBILICAL TAPE: Brand: DEROYAL

## (undated) DEVICE — PACK VALVE PBDS

## (undated) DEVICE — DGW .035 FC J3MM 260CM TEF: Brand: EMERALD

## (undated) DEVICE — PLEDGET CARDIO PTFE 9.5 X 4.8 SOFT LF (6EA/PK)

## (undated) DEVICE — ELECTRODE BLADE E-Z CLEAN 4IN -0014A

## (undated) DEVICE — PACK CUSTOM PERFUSION ANH

## (undated) DEVICE — RUNTHROUGH NS EXTRA FLOPPY PTCA GUIDEWIRE: Brand: RUNTHROUGH

## (undated) DEVICE — SUT GORE-TEX CV-5 2TTC-13 4 IN 5N02B

## (undated) DEVICE — DRESSING ALLEVYN LIFE HEEL 25 X 25.2CM

## (undated) DEVICE — GUIDEWIRE WHOLEY HI TORQUE INTERM MOD J .035 145CM

## (undated) DEVICE — BLANKET HYPOTHERMIA ADULT GAYMAR

## (undated) DEVICE — SUT SILK 2 60 IN SA8H

## (undated) DEVICE — GLOVE INDICATOR PI UNDERGLOVE SZ 7 BLUE

## (undated) DEVICE — SUT PROLENE 4-0 BB 36 IN 8581H

## (undated) DEVICE — RECIP.STERNUM SAW BLADE 34/7.5/0.7MM: Brand: AESCULAP

## (undated) DEVICE — BONE WAX WHITE: Brand: BONE WAX WHITE

## (undated) DEVICE — OASIS DRAIN, SINGLE, INLINE & ATS COMPATIBLE: Brand: OASIS

## (undated) DEVICE — PLUMEPEN PRO 10FT

## (undated) DEVICE — FILTER SMOKE EVAC VIROSAFE

## (undated) DEVICE — 32 FR STRAIGHT – SOFT PVC CATHETER: Brand: PVC THORACIC CATHETERS

## (undated) DEVICE — TREK CORONARY DILATATION CATHETER 3.50 MM X 15 MM / RAPID-EXCHANGE: Brand: TREK

## (undated) DEVICE — PVC URETHRAL CATHETER: Brand: DOVER

## (undated) DEVICE — RED RUBBER URETHRAL CATHETER: Brand: DOVER

## (undated) DEVICE — PACK CUSTOM PERFUSION PLEG PK

## (undated) DEVICE — AIRLIFE™ TRI-FLO™ SUCTION CATHETER: Brand: AIRLIFE™

## (undated) DEVICE — GLOVE SRG BIOGEL ECLIPSE 7

## (undated) DEVICE — GLIDESHEATH BASIC HYDROPHILIC COATED INTRODUCER SHEATH: Brand: GLIDESHEATH

## (undated) DEVICE — GUIDELINER CATHETERS ARE INTENDED TO BE USED IN CONJUNCTION WITH GUIDE CATHETERS TO ACCESS DISCRETE REGIONS OF THE CORONARY AND/OR PERIPHERAL VASCULATURE, AND TO FACILITATE PLACEMENT OF INTERVENTIONAL DEVICES.: Brand: GUIDELINER® V3 CATHETER

## (undated) DEVICE — CATH GUIDE LAUNCHER 6FR JR4 110CM

## (undated) DEVICE — EVERGRIP INSERT SET 61MM: Brand: FOGARTY EVERGRIP

## (undated) DEVICE — ANTIBACTERIAL UNDYED BRAIDED (POLYGLACTIN 910), SYNTHETIC ABSORBABLE SUTURE: Brand: COATED VICRYL

## (undated) DEVICE — SILVER-COATED ANTIBACTERIAL BARRIER DRESSING: Brand: ACTICOAT SURGIC 10X25CM 5PK US

## (undated) DEVICE — SUT PDS PLUS 1 CTB 36 IN PDPB359T

## (undated) DEVICE — SUT VICRYL 3-0 SH 27 IN J416H

## (undated) DEVICE — GLIDESHEATH SLENDER STAINLESS STEEL KIT: Brand: GLIDESHEATH SLENDER

## (undated) DEVICE — THERMOFLECT BLANKET, L, 25EA                               TS THERMOFLECT BLANKET, 48" X 84", SILVER, 5/BG, 5 BG/CS NW: Brand: THERMOFLECT